# Patient Record
Sex: MALE | Race: OTHER | NOT HISPANIC OR LATINO | Employment: OTHER | URBAN - METROPOLITAN AREA
[De-identification: names, ages, dates, MRNs, and addresses within clinical notes are randomized per-mention and may not be internally consistent; named-entity substitution may affect disease eponyms.]

---

## 2017-11-20 ENCOUNTER — HISTORICAL (OUTPATIENT)
Dept: RADIOLOGY | Facility: HOSPITAL | Age: 62
End: 2017-11-20

## 2017-11-20 LAB
BUN SERPL-MCNC: 10 MG/DL (ref 7–18)
CALCIUM SERPL-MCNC: 9.1 MG/DL (ref 8.5–10.1)
CHLORIDE SERPL-SCNC: 104 MMOL/L (ref 98–107)
CO2 SERPL-SCNC: 30 MMOL/L (ref 21–32)
CREAT SERPL-MCNC: 0.78 MG/DL (ref 0.7–1.3)
GLUCOSE SERPL-MCNC: 92 MG/DL (ref 74–106)
POC CREATININE: 0.9 MG/DL (ref 0.6–1.3)
POTASSIUM SERPL-SCNC: 4.2 MMOL/L (ref 3.5–5.1)
SODIUM SERPL-SCNC: 139 MMOL/L (ref 136–145)

## 2017-12-04 ENCOUNTER — HISTORICAL (OUTPATIENT)
Dept: ADMINISTRATIVE | Facility: HOSPITAL | Age: 62
End: 2017-12-04

## 2017-12-04 LAB
ABS NEUT (OLG): 7.69 X10(3)/MCL (ref 2.1–9.2)
BASOPHILS # BLD AUTO: 0.1 X10(3)/MCL (ref 0–0.2)
BASOPHILS NFR BLD AUTO: 0 %
EOSINOPHIL # BLD AUTO: 0 X10(3)/MCL (ref 0–0.9)
EOSINOPHIL NFR BLD AUTO: 0 %
ERYTHROCYTE [DISTWIDTH] IN BLOOD BY AUTOMATED COUNT: 12.3 % (ref 11.5–17)
HCT VFR BLD AUTO: 35.1 % (ref 42–52)
HGB BLD-MCNC: 11.8 GM/DL (ref 14–18)
LYMPHOCYTES # BLD AUTO: 2.3 X10(3)/MCL (ref 0.6–4.6)
LYMPHOCYTES NFR BLD AUTO: 20 %
MCH RBC QN AUTO: 31.4 PG (ref 27–31)
MCHC RBC AUTO-ENTMCNC: 33.6 GM/DL (ref 33–36)
MCV RBC AUTO: 93.4 FL (ref 80–94)
MONOCYTES # BLD AUTO: 1.1 X10(3)/MCL (ref 0.1–1.3)
MONOCYTES NFR BLD AUTO: 10 %
NEUTROPHILS # BLD AUTO: 7.69 X10(3)/MCL (ref 2.1–9.2)
NEUTROPHILS NFR BLD AUTO: 68 %
PLATELET # BLD AUTO: 263 X10(3)/MCL (ref 130–400)
PMV BLD AUTO: 10 FL (ref 9.4–12.4)
RBC # BLD AUTO: 3.76 X10(6)/MCL (ref 4.7–6.1)
WBC # SPEC AUTO: 11.3 X10(3)/MCL (ref 4.5–11.5)

## 2017-12-07 LAB — FINAL CULTURE: NORMAL

## 2017-12-12 ENCOUNTER — HISTORICAL (OUTPATIENT)
Dept: ENDOSCOPY | Facility: HOSPITAL | Age: 62
End: 2017-12-12

## 2017-12-15 ENCOUNTER — HISTORICAL (OUTPATIENT)
Dept: ADMINISTRATIVE | Facility: HOSPITAL | Age: 62
End: 2017-12-15

## 2017-12-15 LAB
ALBUMIN SERPL-MCNC: 3.6 GM/DL (ref 3.4–5)
ALP SERPL-CCNC: 76 UNIT/L (ref 50–136)
ALT SERPL-CCNC: 26 UNIT/L (ref 12–78)
AST SERPL-CCNC: 17 UNIT/L (ref 15–37)
BILIRUB SERPL-MCNC: 0.4 MG/DL (ref 0.2–1)
BILIRUBIN DIRECT+TOT PNL SERPL-MCNC: 0.1 MG/DL (ref 0–0.2)
BILIRUBIN DIRECT+TOT PNL SERPL-MCNC: 0.3 MG/DL (ref 0–0.8)
CHOLEST SERPL-MCNC: 132 MG/DL (ref 0–200)
CHOLEST/HDLC SERPL: 2.6 {RATIO} (ref 0–5)
HDLC SERPL-MCNC: 50 MG/DL (ref 35–60)
LDLC SERPL CALC-MCNC: 61 MG/DL (ref 0–129)
LIVER PROFILE INTERP: NORMAL
PROT SERPL-MCNC: 7.6 GM/DL (ref 6.4–8.2)
TRIGL SERPL-MCNC: 103 MG/DL (ref 30–150)
VLDLC SERPL CALC-MCNC: 21 MG/DL

## 2018-10-31 ENCOUNTER — HISTORICAL (OUTPATIENT)
Dept: ADMINISTRATIVE | Facility: HOSPITAL | Age: 63
End: 2018-10-31

## 2019-01-26 LAB — HEMOCCULT SP2 STL QL: NEGATIVE

## 2019-01-28 ENCOUNTER — HISTORICAL (OUTPATIENT)
Dept: LAB | Facility: HOSPITAL | Age: 64
End: 2019-01-28

## 2019-01-29 ENCOUNTER — HISTORICAL (OUTPATIENT)
Dept: ADMINISTRATIVE | Facility: HOSPITAL | Age: 64
End: 2019-01-29

## 2021-01-22 ENCOUNTER — NURSE TRIAGE (OUTPATIENT)
Dept: ADMINISTRATIVE | Facility: CLINIC | Age: 66
End: 2021-01-22

## 2021-09-13 ENCOUNTER — HISTORICAL (OUTPATIENT)
Dept: FAMILY MEDICINE | Facility: CLINIC | Age: 66
End: 2021-09-13

## 2021-09-13 LAB
ABS NEUT (OLG): 4.93 X10(3)/MCL (ref 2.1–9.2)
ALBUMIN SERPL-MCNC: 3.6 GM/DL (ref 3.4–4.8)
ALBUMIN/GLOB SERPL: 0.8 RATIO (ref 1.1–2)
ALP SERPL-CCNC: 69 UNIT/L (ref 40–150)
ALT SERPL-CCNC: 17 UNIT/L (ref 0–55)
AST SERPL-CCNC: 19 UNIT/L (ref 5–34)
BASOPHILS # BLD AUTO: 0.1 X10(3)/MCL (ref 0–0.2)
BASOPHILS NFR BLD AUTO: 1 %
BILIRUB SERPL-MCNC: 0.6 MG/DL
BILIRUBIN DIRECT+TOT PNL SERPL-MCNC: 0.3 MG/DL (ref 0–0.5)
BILIRUBIN DIRECT+TOT PNL SERPL-MCNC: 0.3 MG/DL (ref 0–0.8)
BUN SERPL-MCNC: 7.5 MG/DL (ref 8.4–25.7)
CALCIUM SERPL-MCNC: 9.6 MG/DL (ref 8.8–10)
CHLORIDE SERPL-SCNC: 106 MMOL/L (ref 98–107)
CHOLEST SERPL-MCNC: 104 MG/DL
CHOLEST/HDLC SERPL: 3 {RATIO} (ref 0–5)
CO2 SERPL-SCNC: 26 MMOL/L (ref 23–31)
CREAT SERPL-MCNC: 0.8 MG/DL (ref 0.73–1.18)
DEPRECATED CALCIDIOL+CALCIFEROL SERPL-MC: 38.6 NG/ML (ref 30–80)
EOSINOPHIL # BLD AUTO: 0.4 X10(3)/MCL (ref 0–0.9)
EOSINOPHIL NFR BLD AUTO: 4 %
ERYTHROCYTE [DISTWIDTH] IN BLOOD BY AUTOMATED COUNT: 12.6 % (ref 11.5–14.5)
EST. AVERAGE GLUCOSE BLD GHB EST-MCNC: 119.8 MG/DL
GLOBULIN SER-MCNC: 4.6 GM/DL (ref 2.4–3.5)
GLUCOSE SERPL-MCNC: 104 MG/DL (ref 82–115)
HBA1C MFR BLD: 5.8 %
HCT VFR BLD AUTO: 37.2 % (ref 40–51)
HDLC SERPL-MCNC: 31 MG/DL (ref 35–60)
HGB BLD-MCNC: 12 GM/DL (ref 13.5–17.5)
IMM GRANULOCYTES # BLD AUTO: 0.03 10*3/UL
IMM GRANULOCYTES NFR BLD AUTO: 0 %
LDLC SERPL CALC-MCNC: 33 MG/DL (ref 50–140)
LYMPHOCYTES # BLD AUTO: 3.5 X10(3)/MCL (ref 0.6–4.6)
LYMPHOCYTES NFR BLD AUTO: 35 %
MCH RBC QN AUTO: 32 PG (ref 26–34)
MCHC RBC AUTO-ENTMCNC: 32.3 GM/DL (ref 31–37)
MCV RBC AUTO: 99.2 FL (ref 80–100)
MONOCYTES # BLD AUTO: 0.9 X10(3)/MCL (ref 0.1–1.3)
MONOCYTES NFR BLD AUTO: 9 %
NEUTROPHILS # BLD AUTO: 4.93 X10(3)/MCL (ref 2.1–9.2)
NEUTROPHILS NFR BLD AUTO: 50 %
NRBC BLD AUTO-RTO: 0 % (ref 0–0.2)
PLATELET # BLD AUTO: 238 X10(3)/MCL (ref 130–400)
PMV BLD AUTO: 9.4 FL (ref 7.4–10.4)
POTASSIUM SERPL-SCNC: 4.1 MMOL/L (ref 3.5–5.1)
PROT SERPL-MCNC: 8.2 GM/DL (ref 5.8–7.6)
RBC # BLD AUTO: 3.75 X10(6)/MCL (ref 4.5–5.9)
SODIUM SERPL-SCNC: 136 MMOL/L (ref 136–145)
TRIGL SERPL-MCNC: 202 MG/DL (ref 34–140)
VLDLC SERPL CALC-MCNC: 40 MG/DL
WBC # SPEC AUTO: 9.8 X10(3)/MCL (ref 4.5–11)

## 2021-09-17 ENCOUNTER — HISTORICAL (OUTPATIENT)
Dept: LAB | Facility: HOSPITAL | Age: 66
End: 2021-09-17

## 2021-09-17 LAB
ABS NEUT (OLG): 3.39 X10(3)/MCL (ref 2.1–9.2)
BASOPHILS # BLD AUTO: 0.1 X10(3)/MCL (ref 0–0.2)
BASOPHILS NFR BLD AUTO: 1 %
EOSINOPHIL # BLD AUTO: 0.3 X10(3)/MCL (ref 0–0.9)
EOSINOPHIL NFR BLD AUTO: 4 %
ERYTHROCYTE [DISTWIDTH] IN BLOOD BY AUTOMATED COUNT: 12.5 % (ref 11.5–14.5)
FERRITIN SERPL-MCNC: 227.58 NG/ML (ref 21.81–274.66)
FOLATE SERPL-MCNC: 17 NG/ML (ref 7–31.4)
HAPTOGLOB SERPL-MCNC: 187 MG/DL (ref 40–368)
HCT VFR BLD AUTO: 37.7 % (ref 40–51)
HGB BLD-MCNC: 12.6 GM/DL (ref 13.5–17.5)
IMM GRANULOCYTES # BLD AUTO: 0.06 10*3/UL
IMM GRANULOCYTES NFR BLD AUTO: 1 %
IRON SATN MFR SERPL: 39 % (ref 20–50)
IRON SERPL-MCNC: 107 UG/DL (ref 65–175)
LDH SERPL-CCNC: 199 UNIT/L (ref 140–271)
LYMPHOCYTES # BLD AUTO: 3.3 X10(3)/MCL (ref 0.6–4.6)
LYMPHOCYTES NFR BLD AUTO: 41 %
MCH RBC QN AUTO: 32.7 PG (ref 26–34)
MCHC RBC AUTO-ENTMCNC: 33.4 GM/DL (ref 31–37)
MCV RBC AUTO: 97.9 FL (ref 80–100)
MONOCYTES # BLD AUTO: 0.9 X10(3)/MCL (ref 0.1–1.3)
MONOCYTES NFR BLD AUTO: 11 %
NEUTROPHILS # BLD AUTO: 3.39 X10(3)/MCL (ref 2.1–9.2)
NEUTROPHILS NFR BLD AUTO: 42 %
NRBC BLD AUTO-RTO: 0 % (ref 0–0.2)
PLATELET # BLD AUTO: 247 X10(3)/MCL (ref 130–400)
PMV BLD AUTO: 10.1 FL (ref 7.4–10.4)
PSA SERPL-MCNC: 1.96 NG/ML
RBC # BLD AUTO: 3.85 X10(6)/MCL (ref 4.5–5.9)
RET# (OHS): 0.06 X10(6)/MCL (ref 0.02–0.09)
RETICULOCYTE COUNT AUTOMATED (OLG): 1.6 % (ref 0.5–1.5)
TIBC SERPL-MCNC: 170 UG/DL (ref 69–240)
TIBC SERPL-MCNC: 277 UG/DL (ref 250–450)
TRANSFERRIN SERPL-MCNC: 239 MG/DL (ref 163–344)
TSH SERPL-ACNC: 1.25 UIU/ML (ref 0.35–4.94)
VIT B12 SERPL-MCNC: 1333 PG/ML (ref 213–816)
WBC # SPEC AUTO: 8 X10(3)/MCL (ref 4.5–11)

## 2021-10-31 ENCOUNTER — HISTORICAL (OUTPATIENT)
Dept: ADMINISTRATIVE | Facility: HOSPITAL | Age: 66
End: 2021-10-31

## 2021-10-31 LAB — SARS-COV-2 RNA RESP QL NAA+PROBE: NOT DETECTED

## 2022-01-06 ENCOUNTER — HISTORICAL (OUTPATIENT)
Dept: ADMINISTRATIVE | Facility: HOSPITAL | Age: 67
End: 2022-01-06

## 2022-01-06 LAB — SARS-COV-2 RNA RESP QL NAA+PROBE: POSITIVE

## 2022-01-25 ENCOUNTER — HISTORICAL (OUTPATIENT)
Dept: RADIOLOGY | Facility: HOSPITAL | Age: 67
End: 2022-01-25

## 2022-04-09 ENCOUNTER — HISTORICAL (OUTPATIENT)
Dept: ADMINISTRATIVE | Facility: HOSPITAL | Age: 67
End: 2022-04-09
Payer: MEDICAID

## 2022-04-25 VITALS
OXYGEN SATURATION: 100 % | SYSTOLIC BLOOD PRESSURE: 118 MMHG | BODY MASS INDEX: 23.6 KG/M2 | WEIGHT: 159.38 LBS | DIASTOLIC BLOOD PRESSURE: 74 MMHG | HEIGHT: 69 IN

## 2022-05-30 ENCOUNTER — OFFICE VISIT (OUTPATIENT)
Dept: FAMILY MEDICINE | Facility: CLINIC | Age: 67
End: 2022-05-30
Payer: MEDICAID

## 2022-05-30 VITALS
DIASTOLIC BLOOD PRESSURE: 65 MMHG | BODY MASS INDEX: 24.22 KG/M2 | SYSTOLIC BLOOD PRESSURE: 121 MMHG | TEMPERATURE: 98 F | HEART RATE: 58 BPM | HEIGHT: 68 IN | OXYGEN SATURATION: 99 % | WEIGHT: 159.81 LBS

## 2022-05-30 DIAGNOSIS — E55.9 VITAMIN D DEFICIENCY: ICD-10-CM

## 2022-05-30 DIAGNOSIS — I10 ESSENTIAL HYPERTENSION: ICD-10-CM

## 2022-05-30 DIAGNOSIS — J84.10 CALCIFIED GRANULOMA OF LUNG: ICD-10-CM

## 2022-05-30 DIAGNOSIS — G47.30 SLEEP APNEA, UNSPECIFIED TYPE: ICD-10-CM

## 2022-05-30 DIAGNOSIS — E78.5 DYSLIPIDEMIA: ICD-10-CM

## 2022-05-30 DIAGNOSIS — I34.0 MITRAL VALVE INSUFFICIENCY, UNSPECIFIED ETIOLOGY: ICD-10-CM

## 2022-05-30 DIAGNOSIS — K90.0 CELIAC DISEASE: ICD-10-CM

## 2022-05-30 DIAGNOSIS — D64.9 ANEMIA, UNSPECIFIED TYPE: ICD-10-CM

## 2022-05-30 DIAGNOSIS — R53.83 FATIGUE, UNSPECIFIED TYPE: Primary | ICD-10-CM

## 2022-05-30 PROBLEM — I38 HEART VALVE DISEASE: Status: RESOLVED | Noted: 2022-05-30 | Resolved: 2022-05-30

## 2022-05-30 PROBLEM — J98.4 CALCIFIED GRANULOMA OF LUNG: Status: ACTIVE | Noted: 2022-05-30

## 2022-05-30 PROBLEM — I38 HEART VALVE DISEASE: Status: ACTIVE | Noted: 2022-05-30

## 2022-05-30 LAB
ALBUMIN SERPL-MCNC: 3.6 GM/DL (ref 3.4–4.8)
ALBUMIN/GLOB SERPL: 0.8 RATIO (ref 1.1–2)
ALP SERPL-CCNC: 58 UNIT/L (ref 40–150)
ALT SERPL-CCNC: 19 UNIT/L (ref 0–55)
AST SERPL-CCNC: 18 UNIT/L (ref 5–34)
BASOPHILS # BLD AUTO: 0.06 X10(3)/MCL (ref 0–0.2)
BASOPHILS NFR BLD AUTO: 0.7 %
BILIRUBIN DIRECT+TOT PNL SERPL-MCNC: 0.4 MG/DL
BUN SERPL-MCNC: 14.1 MG/DL (ref 8.4–25.7)
CALCIUM SERPL-MCNC: 9.5 MG/DL (ref 8.8–10)
CHLORIDE SERPL-SCNC: 105 MMOL/L (ref 98–107)
CO2 SERPL-SCNC: 29 MMOL/L (ref 23–31)
CREAT SERPL-MCNC: 0.88 MG/DL (ref 0.73–1.18)
DEPRECATED CALCIDIOL+CALCIFEROL SERPL-MC: 23.3 NG/ML (ref 30–80)
EOSINOPHIL # BLD AUTO: 0.44 X10(3)/MCL (ref 0–0.9)
EOSINOPHIL NFR BLD AUTO: 5.2 %
ERYTHROCYTE [DISTWIDTH] IN BLOOD BY AUTOMATED COUNT: 12.8 % (ref 11.5–17)
GLOBULIN SER-MCNC: 4.5 GM/DL (ref 2.4–3.5)
GLUCOSE SERPL-MCNC: 121 MG/DL (ref 82–115)
HCT VFR BLD AUTO: 34 % (ref 42–52)
HGB BLD-MCNC: 10.9 GM/DL (ref 14–18)
IMM GRANULOCYTES # BLD AUTO: 0.07 X10(3)/MCL (ref 0–0.02)
IMM GRANULOCYTES NFR BLD AUTO: 0.8 % (ref 0–0.43)
LYMPHOCYTES # BLD AUTO: 3.37 X10(3)/MCL (ref 0.6–4.6)
LYMPHOCYTES NFR BLD AUTO: 39.7 %
MCH RBC QN AUTO: 31.5 PG (ref 27–31)
MCHC RBC AUTO-ENTMCNC: 32.1 MG/DL (ref 33–36)
MCV RBC AUTO: 98.3 FL (ref 80–94)
MONOCYTES # BLD AUTO: 0.95 X10(3)/MCL (ref 0.1–1.3)
MONOCYTES NFR BLD AUTO: 11.2 %
NEUTROPHILS # BLD AUTO: 3.6 X10(3)/MCL (ref 2.1–9.2)
NEUTROPHILS NFR BLD AUTO: 42.4 %
NRBC BLD AUTO-RTO: 0 %
PLATELET # BLD AUTO: 242 X10(3)/MCL (ref 130–400)
PMV BLD AUTO: 10.2 FL (ref 9.4–12.4)
POTASSIUM SERPL-SCNC: 3.9 MMOL/L (ref 3.5–5.1)
PROT SERPL-MCNC: 8.1 GM/DL (ref 5.8–7.6)
RBC # BLD AUTO: 3.46 X10(6)/MCL (ref 4.7–6.1)
SODIUM SERPL-SCNC: 138 MMOL/L (ref 136–145)
TSH SERPL-ACNC: 1.51 UIU/ML (ref 0.35–4.94)
WBC # SPEC AUTO: 8.5 X10(3)/MCL (ref 4.5–11.5)

## 2022-05-30 PROCEDURE — 85025 COMPLETE CBC W/AUTO DIFF WBC: CPT

## 2022-05-30 PROCEDURE — 80053 COMPREHEN METABOLIC PANEL: CPT

## 2022-05-30 PROCEDURE — 99213 OFFICE O/P EST LOW 20 MIN: CPT | Mod: PBBFAC

## 2022-05-30 PROCEDURE — 82306 VITAMIN D 25 HYDROXY: CPT

## 2022-05-30 PROCEDURE — 84443 ASSAY THYROID STIM HORMONE: CPT

## 2022-05-30 PROCEDURE — 36415 COLL VENOUS BLD VENIPUNCTURE: CPT

## 2022-05-30 RX ORDER — POLYETHYLENE GLYCOL 3350 17 G/17G
17 POWDER, FOR SOLUTION ORAL DAILY
COMMUNITY
Start: 2022-05-06 | End: 2022-09-29

## 2022-05-30 RX ORDER — HYDROXYZINE HYDROCHLORIDE 25 MG/1
25 TABLET, FILM COATED ORAL 4 TIMES DAILY PRN
COMMUNITY
Start: 2022-02-08 | End: 2022-09-29

## 2022-05-30 RX ORDER — METFORMIN HYDROCHLORIDE 1000 MG/1
1000 TABLET ORAL 2 TIMES DAILY WITH MEALS
COMMUNITY
End: 2022-07-22 | Stop reason: SDUPTHER

## 2022-05-30 RX ORDER — ASPIRIN 81 MG/1
81 TABLET ORAL DAILY
COMMUNITY

## 2022-05-30 RX ORDER — FLUTICASONE PROPIONATE 50 MCG
50 SPRAY, SUSPENSION (ML) NASAL 2 TIMES DAILY
COMMUNITY
Start: 2022-02-08

## 2022-05-30 RX ORDER — IBUPROFEN 100 MG/5ML
1000 SUSPENSION, ORAL (FINAL DOSE FORM) ORAL DAILY
COMMUNITY

## 2022-05-30 RX ORDER — ATORVASTATIN CALCIUM 20 MG/1
20 TABLET, FILM COATED ORAL DAILY
COMMUNITY

## 2022-05-30 RX ORDER — METOPROLOL SUCCINATE 50 MG/1
50 TABLET, EXTENDED RELEASE ORAL DAILY
COMMUNITY
Start: 2022-05-07 | End: 2022-09-29 | Stop reason: SDUPTHER

## 2022-05-30 NOTE — PROGRESS NOTES
Subjective:       Patient ID: Yuriy Díaz is a 66 y.o. male.    Chief Complaint: Follow-up (Fatigue, sleepy, and bilateral lower ext and feet swelling x 4wks)    HPI  65 yo M with PMH Celiac disease, Dyslipidemia, Essential HTN, Mitral Regurgitation,     C/O  Tired, Swelling in his feet  + pain in his legs   + feeling sleepy most of the times for 4 weeks  - good appetite  - no trouble breathing  - drinking a lot water   - Blood pressure between 115/77 - 120-80 mm hg at home  - No cough runny nose, no fevers  - No significant weight loss or generalized pain   - Denies back pain   - He could walk > 3 blocks without getting short of breath    Sleep apnea  - he does wear a CPAP at night  - last adjustment made for the machine was about 1 month ago    Diabetes Mellitus  - He takes Metformin 1000 mg BID  - adherent with diet control and exercise     Celiac disease  -Colonoscopy to be done on June 29, 2022  By Dr. Kendall Kapoor   Transglutaminase IgA < 2  Endomysial Ab IgA  Negative    HbA1c level - 6.7 03/29/22  Vitamin B12 - 777  Folate 17  Hb 11.7    Mitral Regurg hx  Dr. Albert - Cardiologist. Last echo was normal 1 month ago per patient report.    Review of Systems    See above    Objective:       Vitals:    05/30/22 1409   BP: 121/65   Pulse: (!) 58   Temp: 98.1 °F (36.7 °C)     Physical Exam    Gen appearance: NAD  HEENT: EOMI, TM clear bilaterally, Clear oropharynx  Neck: No anterior or posterior cervical lymphadenopathy, no thyromegaly  CV: S1/S2, no murmurs, rubs or gallops  Resp: Clear breath sounds bilaterally  Abdomen: soft, no hepatosplenomegaly  Ext: no palmar erythema    Assessment and Plan:     1. Fatigue, unspecified type  - Chronic fatigue. Labs ordered today  - CBC Auto Differential  - Comprehensive Metabolic Panel  - TSH  - Vitamin D  - Will call patient in regards to lab results  - Reviewed Vitamin D - low level 23.3 . H and H 10.9/34.0, MCV 98.3. Low vitamin level and anemia most likely could explain  his symptoms of fatigue.     2. Calcified granuloma of lung  - stable  - CXR 09/17/2021: Calcified granuloma identified at the right base. Stable.     3. Essential hypertension  -Blood pressure reviewed. Within normal limits.    4. Celiac disease  - Scheduled for Colonoscopy on June 29, 2022 with Dr. Kendall Kapoor     5. Dyslipidemia  - Continue Atorvastatin 20 mg daily    6. Mitral valve insufficiency, unspecified etiology  - Stable    7. Sleep apnea  - Continue CPAP at night    8. Anemia  - Microcytic, hypochromic anemia consistent with Iron deficiency    - Scheduled for Colonoscopy on June 29, 2022 with Dr. Kendall Kapoor   Attempted to call patient x 2, left a VM.    9. Vitamin D deficiency  - Vitamin D 23.3   - Will replete Vitamin D levels 1000 IU daily       RCT 3 months for routine visit or sooner if patient has acute complaints

## 2022-06-02 RX ORDER — VIT C/E/ZN/COPPR/LUTEIN/ZEAXAN 250MG-90MG
1000 CAPSULE ORAL DAILY
Qty: 30 CAPSULE | Refills: 3 | Status: SHIPPED | OUTPATIENT
Start: 2022-06-02 | End: 2023-03-15 | Stop reason: SDUPTHER

## 2022-06-02 RX ORDER — PANTOPRAZOLE SODIUM 40 MG/1
40 TABLET, DELAYED RELEASE ORAL DAILY
COMMUNITY
End: 2023-08-18

## 2022-06-29 LAB — CRC RECOMMENDATION EXT: NORMAL

## 2022-07-23 RX ORDER — METFORMIN HYDROCHLORIDE 1000 MG/1
1000 TABLET ORAL 2 TIMES DAILY WITH MEALS
Qty: 30 TABLET | Refills: 3 | Status: SHIPPED | OUTPATIENT
Start: 2022-07-23 | End: 2022-09-29

## 2022-08-30 ENCOUNTER — OFFICE VISIT (OUTPATIENT)
Dept: FAMILY MEDICINE | Facility: CLINIC | Age: 67
End: 2022-08-30
Payer: MEDICAID

## 2022-08-30 VITALS
SYSTOLIC BLOOD PRESSURE: 130 MMHG | TEMPERATURE: 98 F | HEART RATE: 51 BPM | OXYGEN SATURATION: 100 % | HEIGHT: 68 IN | WEIGHT: 152.75 LBS | DIASTOLIC BLOOD PRESSURE: 71 MMHG | BODY MASS INDEX: 23.15 KG/M2

## 2022-08-30 DIAGNOSIS — E11.9 TYPE 2 DIABETES MELLITUS WITHOUT COMPLICATION, WITHOUT LONG-TERM CURRENT USE OF INSULIN: ICD-10-CM

## 2022-08-30 DIAGNOSIS — G51.0 RIGHT-SIDED BELL'S PALSY: Primary | ICD-10-CM

## 2022-08-30 PROBLEM — I83.93 VARICOSE VEINS OF BOTH LOWER EXTREMITIES: Status: ACTIVE | Noted: 2022-08-30

## 2022-08-30 PROBLEM — Z86.11 HISTORY OF TUBERCULOSIS: Status: ACTIVE | Noted: 2022-08-30

## 2022-08-30 PROBLEM — E78.2 MIXED HYPERLIPIDEMIA: Status: ACTIVE | Noted: 2022-08-30

## 2022-08-30 LAB — HBA1C MFR BLD: 6.2 %

## 2022-08-30 PROCEDURE — 99215 OFFICE O/P EST HI 40 MIN: CPT | Mod: PBBFAC

## 2022-08-30 PROCEDURE — 83036 HEMOGLOBIN GLYCOSYLATED A1C: CPT | Mod: PBBFAC

## 2022-08-30 RX ORDER — LACTULOSE 10 G/15ML
15 SOLUTION ORAL; RECTAL 2 TIMES DAILY
COMMUNITY
Start: 2022-08-22 | End: 2022-09-29

## 2022-08-30 RX ORDER — PREDNISONE 20 MG/1
60 TABLET ORAL DAILY
COMMUNITY
Start: 2022-08-27 | End: 2022-09-08

## 2022-08-30 RX ORDER — VALACYCLOVIR HYDROCHLORIDE 1 G/1
1000 TABLET, FILM COATED ORAL EVERY 8 HOURS
COMMUNITY
Start: 2022-08-27 | End: 2022-09-29

## 2022-08-30 RX ORDER — OFLOXACIN 3 MG/ML
1 SOLUTION/ DROPS OPHTHALMIC 4 TIMES DAILY
COMMUNITY
Start: 2022-08-05 | End: 2023-01-12

## 2022-08-30 RX ORDER — HYDROCORTISONE 25 MG/G
1 CREAM TOPICAL 2 TIMES DAILY
COMMUNITY
Start: 2022-06-29 | End: 2022-09-29 | Stop reason: SDUPTHER

## 2022-08-30 RX ORDER — FAMOTIDINE 40 MG/1
40 TABLET, FILM COATED ORAL NIGHTLY
COMMUNITY
Start: 2022-08-22 | End: 2023-08-09

## 2022-08-30 NOTE — PROGRESS NOTES
[facility] OFFICE VISIT NOTE  Yuriy Díaz  [id number]  08/30/2022      Chief Complaint: bells palsy    HPI    66 y.o. male    [place] Palsy   - right sided facial droop and decreased sensation with intermittent tinnitus; started 8/26/22  - minimal improvement in symptoms    - evaluated and dx at ED in [place]  - CTA Head and Neck W/ WO (8/26/22): No major LVO. No flow limiting intracranial stenosis or internal carotid artery or vertebral stenosis.    - Rx: Valtrex 1g PO q8 hr x7 days and Prednisone 60 mg PO daily x 5 days  - Hx [place] palsy 8/2017  - requests referral for neurology evaluation   - denies upper and lower extremity weakness, numbness or tingling, slurred speech, hearing loss, dizziness, confusion, sudden onset change in vision, new rash, travel to tick endemic area  - had cataract surgery 8/29/22, follow up today    PE:  Vitals:    08/30/22 0846   BP: 130/71   Pulse: (!) 51   Temp: 97.7 °F (36.5 °C)     General: appears well, in no acute distress   Eye: wearing sunglasses   HENT: MMM, oropharynx without erythema, exudate or masses, no parotid tenderness or masses  Respiratory: nonlabored respirations   Cardiovascular: bradycardic (at baseline), regular rhythm without murmurs   Neurological:   Mental Status: Alert and oriented. Comprehension wnl. No dysarthria.   CN II - XII: pupillary and fundus exam deferred due to recent cataract surgery and upcoming ophthalmology appointment, VA and visual field grossly intact, moderate ptosis to right eyelid, EOMI without nystagmus, light touch symmetric in CN V1-3 distribution, hearing grossly intact, right sided facial droop involving forehead, smile asymmetric with right sided droop, tongue and uvula midline, trapezius symmetric bilateral  Motor:  strength normal, strength 5/5 with extension and flexion of elbows, knees and ankles and flexion of hips and abduction of shoulders, no abnormal involuntary or voluntary movements, fine finger movements wnl b/l, no pronator  drift.   Sensory: Light touch symmetric. No sensory simultagnosia.   Cerebellar: finger nose finger wnl b/l  Romberg: Negative  Gait: normal, heel gait, toe gait and tandem gait wnl.       Assessment:   1. Right-sided Bell's palsy    2. Type 2 diabetes mellitus without complication, without long-term current use of insulin        Plan:  - continue prednisone and Valtrex as previously prescribed  - eye lubrication  - obtain outside records  - referral to Dr. Hyde (Neurology)  - POC A1c= 6.2; patient would like to continue current regimen, consider decreasing metformin at next visit  - ED precautions for new neurological symptoms    Keep scheduled follow up 9/29/22. Call clinic if symptoms not improving in 10 days or sooner if needed.     Nena Elliott M.D. HO-II  [facility]

## 2022-08-31 DIAGNOSIS — G51.0 BELL'S PALSY: Primary | ICD-10-CM

## 2022-08-31 RX ORDER — PREDNISONE 20 MG/1
60 TABLET ORAL DAILY
Qty: 6 TABLET | Refills: 0 | Status: SHIPPED | OUTPATIENT
Start: 2022-08-31 | End: 2022-09-02

## 2022-08-31 NOTE — PROGRESS NOTES
Patient called with questions regarding treatment for Bell's Palsy and referral to neurology. Called back and confirmed . Completed original rx prednisone 60 mg daily x 5 days. Symptoms have not resolved. Discussed that resolution may take weeks. Will Rx prednisone 60 mg daily x 2 days for total course of 7 days. Requests new referral to Dr. Barak Ferreira in [place]. Referral sent.     Nena Elliott MD  [facility] Resident, -II

## 2022-09-08 ENCOUNTER — OFFICE VISIT (OUTPATIENT)
Dept: URGENT CARE | Facility: CLINIC | Age: 67
End: 2022-09-08
Payer: MEDICAID

## 2022-09-08 ENCOUNTER — DOCUMENTATION ONLY (OUTPATIENT)
Dept: FAMILY MEDICINE | Facility: CLINIC | Age: 67
End: 2022-09-08
Payer: MEDICAID

## 2022-09-08 VITALS
RESPIRATION RATE: 18 BRPM | DIASTOLIC BLOOD PRESSURE: 71 MMHG | BODY MASS INDEX: 23.44 KG/M2 | HEART RATE: 51 BPM | SYSTOLIC BLOOD PRESSURE: 109 MMHG | HEIGHT: 68 IN | WEIGHT: 154.63 LBS | TEMPERATURE: 98 F | OXYGEN SATURATION: 98 %

## 2022-09-08 DIAGNOSIS — R51.9 INTRACTABLE HEADACHE, UNSPECIFIED CHRONICITY PATTERN, UNSPECIFIED HEADACHE TYPE: Primary | ICD-10-CM

## 2022-09-08 DIAGNOSIS — M54.2 NECK PAIN ON RIGHT SIDE: ICD-10-CM

## 2022-09-08 DIAGNOSIS — G51.0 BELL PALSY: ICD-10-CM

## 2022-09-08 PROCEDURE — 99213 PR OFFICE/OUTPT VISIT, EST, LEVL III, 20-29 MIN: ICD-10-PCS | Mod: S$PBB,,, | Performed by: NURSE PRACTITIONER

## 2022-09-08 PROCEDURE — 99214 OFFICE O/P EST MOD 30 MIN: CPT | Mod: PBBFAC | Performed by: NURSE PRACTITIONER

## 2022-09-08 PROCEDURE — 99213 OFFICE O/P EST LOW 20 MIN: CPT | Mod: S$PBB,,, | Performed by: NURSE PRACTITIONER

## 2022-09-08 RX ORDER — KETOROLAC TROMETHAMINE 30 MG/ML
30 INJECTION, SOLUTION INTRAMUSCULAR; INTRAVENOUS
Status: COMPLETED | OUTPATIENT
Start: 2022-09-08 | End: 2022-09-08

## 2022-09-08 RX ORDER — TIZANIDINE 4 MG/1
4 TABLET ORAL EVERY 6 HOURS PRN
Qty: 20 TABLET | Refills: 0 | Status: SHIPPED | OUTPATIENT
Start: 2022-09-08 | End: 2022-09-18

## 2022-09-08 RX ORDER — CHOLECALCIFEROL (VITAMIN D3) 25 MCG
1000 TABLET ORAL DAILY
COMMUNITY
Start: 2022-06-02 | End: 2022-09-29

## 2022-09-08 RX ORDER — METFORMIN HYDROCHLORIDE 500 MG/1
500 TABLET ORAL 2 TIMES DAILY WITH MEALS
COMMUNITY
Start: 2022-07-20 | End: 2022-09-29

## 2022-09-08 RX ADMIN — KETOROLAC TROMETHAMINE 30 MG: 30 INJECTION, SOLUTION INTRAMUSCULAR; INTRAVENOUS at 04:09

## 2022-09-08 NOTE — PROGRESS NOTES
I received a call from this patient concerning new right sided neck and upper extremity pain worse with movement. I discussed that I would like him to be evaluated in person. Will request same day appointment. Patient to call to schedule. Urgent care precautions given.     Nena Elliott MD

## 2022-09-08 NOTE — PROGRESS NOTES
"Subjective:       Patient ID: Yuriy Díaz is a 66 y.o. male.    Vitals:  height is 5' 8" (1.727 m) and weight is 70.1 kg (154 lb 9.6 oz). His oral temperature is 98.2 °F (36.8 °C). His blood pressure is 109/71 and his pulse is 51 (abnormal). His respiration is 18 and oxygen saturation is 98%.     Chief Complaint: Headache, Neck Pain, Generalized Body Aches, and Weakness (Patient reports being diagnosed with [place] Palsy on 8/29/22. Now c/o severe headaches, body and neck pain mostly on right side. Went to PCP and was come to  to see if we could order an MRI)    Patient is a 66-year-old male, here today for headache, neck pain that has been going on since he was diagnosed with Bell's palsy on 08/29/2022.  Patient states he was seen at a hospital in [place], head CT scan done at that time and was put on antiviral and high-dose steroid.  The pain improved and patient states movement to right lips, right eyelid improved while on antiviral and steroid.  Patient states overall symptoms have improved but he is still having a headache that he rates 5/10 and right neck pain.  Patient states he has had Bell's palsy in the past, states these symptoms today are similar to when he previously had it.  States that he does have an appointment with the neurologist and was told by the neurologist to try to get an MRI of the head before the appointment, so he was told to come to urgent care today by his primary doctor.  Denies dizziness, vision changes, increased  numbness, tingling, h/a or any other symptoms.       Constitution: Negative.   Neck: Positive for neck pain.   Cardiovascular: Negative.    Respiratory: Negative.     Musculoskeletal:  Positive for pain.   Neurological:  Positive for facial drooping.     Objective:      Physical Exam   Constitutional: He is oriented to person, place, and time. He appears well-developed.   HENT:   Head: Normocephalic.   Eyes: Conjunctivae and EOM are normal. Pupils are equal, round, and " reactive to light.   Neck: Neck supple.   Cardiovascular: Normal rate, regular rhythm and normal heart sounds.   Pulmonary/Chest: Effort normal and breath sounds normal.   Musculoskeletal: Normal range of motion.         General: Normal range of motion.        Arms:    Neurological: He is alert and oriented to person, place, and time. Coordination and gait normal.      Comments: R facial droop. Patient able to close both eyes. Tongue protrusion midline.  No pronator drift.   Skin: Skin is warm and dry.   Psychiatric: His behavior is normal. Mood, judgment and thought content normal.   Vitals reviewed.      Assessment:       1. Intractable headache, unspecified chronicity pattern, unspecified headache type    2. Neck pain on right side    3. Bell palsy            No visits with results within 1 Day(s) from this visit.   Latest known visit with results is:   Office Visit on 08/30/2022   Component Date Value Ref Range Status    Hemoglobin A1C 08/30/2022 6.2  % Final        No results found.   Plan:       Toradol 30mg IM in office.  Medication as ordered.  If any increase of symptoms such as pain, vision changes, headache,  weakness or any new symptoms then immediately go to ER.  Follow-up with neurologist as scheduled.        Intractable headache, unspecified chronicity pattern, unspecified headache type  -     ketorolac injection 30 mg    Neck pain on right side  -     ketorolac injection 30 mg  -     tiZANidine (ZANAFLEX) 4 MG tablet; Take 1 tablet (4 mg total) by mouth every 6 (six) hours as needed (pain).  Dispense: 20 tablet; Refill: 0    Bell palsy

## 2022-09-14 ENCOUNTER — TELEPHONE (OUTPATIENT)
Dept: FAMILY MEDICINE | Facility: CLINIC | Age: 67
End: 2022-09-14
Payer: MEDICAID

## 2022-09-15 ENCOUNTER — OFFICE VISIT (OUTPATIENT)
Dept: FAMILY MEDICINE | Facility: CLINIC | Age: 67
End: 2022-09-15
Payer: MEDICAID

## 2022-09-15 VITALS
TEMPERATURE: 98 F | DIASTOLIC BLOOD PRESSURE: 57 MMHG | WEIGHT: 156.94 LBS | HEART RATE: 54 BPM | HEIGHT: 68 IN | BODY MASS INDEX: 23.79 KG/M2 | SYSTOLIC BLOOD PRESSURE: 117 MMHG | OXYGEN SATURATION: 100 %

## 2022-09-15 DIAGNOSIS — M79.2 NEUROPATHIC PAIN: ICD-10-CM

## 2022-09-15 DIAGNOSIS — G51.0 BELL'S PALSY: Primary | ICD-10-CM

## 2022-09-15 DIAGNOSIS — G51.0 RIGHT-SIDED BELL'S PALSY: Primary | ICD-10-CM

## 2022-09-15 PROCEDURE — 99213 OFFICE O/P EST LOW 20 MIN: CPT | Mod: PBBFAC

## 2022-09-15 RX ORDER — GABAPENTIN 300 MG/1
300 CAPSULE ORAL 3 TIMES DAILY
Qty: 90 CAPSULE | Refills: 0 | Status: SHIPPED | OUTPATIENT
Start: 2022-09-15 | End: 2023-01-12

## 2022-09-15 NOTE — PROGRESS NOTES
Family Medicine Clinic    Subjective:       Patient ID: Yuriy Díaz is a 66 y.o. male.    Chief Complaint: R side Bell's Palsy pain (Requesting MRI)    2017 - initial bell's palsy dx. Thought to be idiopathic at that time  2022- June in [place], given steroid x 5 days.    Seen in [facility] given prednisone in valtrex. Pt reports Weakness Rt face slightly improved since June.   Pt spoke with physician at [facility] who recommended MRI and gave him name of neurologist who would be able to see him/take his insurance.  Pt reports continued pain over Rt face and occasionally down his neck. Pain fluctuates in severity and locality.      Review of Systems   Constitutional:  Negative for activity change, fatigue and fever.   Gastrointestinal:  Negative for abdominal pain, change in bowel habit and change in bowel habit.   Genitourinary:  Negative for bladder incontinence.   Musculoskeletal:  Negative for arthralgias, joint swelling and myalgias.   Integumentary:  Negative for rash.   Neurological:  Positive for facial asymmetry and weakness. Negative for syncope, numbness and headaches.        Weakness when chewing Rt side, eyelid weakness on Rt       Objective:      Vitals:    09/15/22 1527   BP: (!) 117/57   Pulse: (!) 54   Temp: 97.9 °F (36.6 °C)       Physical Exam  Constitutional:       General: He is not in acute distress.     Appearance: He is normal weight.      Comments: Pleasant, conversational   Cardiovascular:      Rate and Rhythm: Normal rate and regular rhythm.      Heart sounds: No murmur heard.  Pulmonary:      Effort: Pulmonary effort is normal. No respiratory distress.      Breath sounds: Normal breath sounds.   Skin:     General: Skin is warm and dry.   Neurological:      Mental Status: He is oriented to person, place, and time.      Cranial Nerves: Facial asymmetry present.      Comments: Sensation intact V1-3. Decreased hearing in Rt ear. Decrease motor VII Rt as compared to left.    Psychiatric:         Mood and  Affect: Mood normal.         Behavior: Behavior normal.         Thought Content: Thought content normal.       Assessment:       Problem List Items Addressed This Visit          Neuro    Bell's palsy - Primary    Relevant Orders    Ambulatory referral/consult to Neurology     Other Visit Diagnoses       Neuropathic pain                Plan:       Gabapentin 300 mg nightly. Gradually increasing dose frequency (1->2->3 /day)  Referral placed to Neurology: Markel Vanegas MD. [phone]  Encouraged pt to contact clinic w/ issues regarding referral    RTC 1 months, or earlier if needed    Angy Cazares M.D.  \[facility] [place] [facility]\"" Family Medicine HO-2

## 2022-09-16 ENCOUNTER — TELEPHONE (OUTPATIENT)
Dept: FAMILY MEDICINE | Facility: CLINIC | Age: 67
End: 2022-09-16
Payer: MEDICAID

## 2022-09-16 NOTE — TELEPHONE ENCOUNTER
Pharmacy left , needs clarification on gabapentin 300 mg, sig states take 1 cap po tid, but pharmacy note states 300 mg nightly then increase to BID. Please advise. Thank you!

## 2022-09-16 NOTE — PROGRESS NOTES
Patient requesting new referral to neurology. Previously referred to Dr. Hyde and Dr. Ferreira. Will send referral to Dr. Zamorano.     eNna Elliott MD  [facility] Resident, BRUNIL[place]

## 2022-09-29 ENCOUNTER — OFFICE VISIT (OUTPATIENT)
Dept: FAMILY MEDICINE | Facility: CLINIC | Age: 67
End: 2022-09-29
Payer: MEDICAID

## 2022-09-29 VITALS
RESPIRATION RATE: 17 BRPM | BODY MASS INDEX: 23.95 KG/M2 | HEART RATE: 63 BPM | OXYGEN SATURATION: 98 % | SYSTOLIC BLOOD PRESSURE: 101 MMHG | TEMPERATURE: 98 F | HEIGHT: 68 IN | DIASTOLIC BLOOD PRESSURE: 46 MMHG | WEIGHT: 158 LBS

## 2022-09-29 DIAGNOSIS — H90.5 SENSORINEURAL HEARING LOSS (SNHL) OF RIGHT EAR, UNSPECIFIED HEARING STATUS ON CONTRALATERAL SIDE: ICD-10-CM

## 2022-09-29 DIAGNOSIS — R03.1 LOW BLOOD PRESSURE READING: ICD-10-CM

## 2022-09-29 DIAGNOSIS — E11.9 TYPE 2 DIABETES MELLITUS WITHOUT COMPLICATION, WITHOUT LONG-TERM CURRENT USE OF INSULIN: ICD-10-CM

## 2022-09-29 DIAGNOSIS — G51.0 RIGHT-SIDED BELL'S PALSY: Primary | ICD-10-CM

## 2022-09-29 PROCEDURE — 99215 OFFICE O/P EST HI 40 MIN: CPT | Mod: PBBFAC

## 2022-09-29 RX ORDER — METOPROLOL SUCCINATE 25 MG/1
25 TABLET, EXTENDED RELEASE ORAL DAILY
Qty: 30 TABLET | Refills: 2 | Status: SHIPPED | OUTPATIENT
Start: 2022-09-29 | End: 2023-03-15

## 2022-09-29 RX ORDER — HYDROCORTISONE 25 MG/G
1 CREAM TOPICAL 2 TIMES DAILY
Qty: 28 G | Refills: 2 | Status: SHIPPED | OUTPATIENT
Start: 2022-09-29

## 2022-09-29 RX ORDER — METFORMIN HYDROCHLORIDE 1000 MG/1
1000 TABLET ORAL
Qty: 30 TABLET | Refills: 3
Start: 2022-09-29 | End: 2023-01-06 | Stop reason: SDUPTHER

## 2022-09-29 NOTE — PROGRESS NOTES
[facility] OFFICE VISIT NOTE  Yuriy Díaz  [id number]  09/30/2022      Chief Complaint:  Follow up [place] Palsy     HPI    Yuriy Díaz is a 66 y.o. male  presenting to [facility] for follow up. Had second cataract surgery 9/26/22.     Right sided Bell's Palsy dx 8/26/22  - completed course of prednisone and Valtrex  - currently on gabapentin 300 mg TID prn  - improvement in facial movement , some lip tingling   - pain to right side temporal and upper maxillary region since onset, no jaw pain   - worse in evening, 5/10 pain at max   - feeling of fullness to right ear, denies tinnitus   - appointment with neurologist Tuesday   - no numbness tinging, no bowel or bladder incontinence     Review of Systems  General: no fatigue, weight loss, lightheadedness or dizziness  CVS: no chest pain  Resp: no SOB  Ext: no lower extremity swelling     Healthcare maintenance  Colon cancer screen: Colonoscopy 6/29/22 Dr. Kendall Kapoor- will request records  Prostate cancer screen:   Hx smoking/ non smoker: never smoker   Immunizations: Tdap (7/202), Shingles (7/2020, 11/2020)  Specialists: Cardioogist (Dr. Albert)- appointment every 6 months, last visit one month ago, GI (Dr. Kapoor), Ophthalmologist- [facility] clinic       PE:  Vitals:    09/29/22 1556   BP: (!) 101/46   Pulse: 63   Resp: 17   Temp: 98 °F (36.7 °C)      General: appears well, in no acute distress   Eye: wearing sunglasses  HENT: MMM, mild pain to palpation over lateral temporal and upper maxillary area, no TMJ dysfunction or pain  Respiratory: nonlabored respirations, lungs clear to auscultation   Cardiovascular: RRR without murmurs, no carotid bruits    Neurological:   Mental Status: Alert and oriented. Comprehension wnl. No dysarthria.   CN II - XII: pupillary and fundus exam deferred due to recent cataract surgery, VA and visual field grossly intact, no ptosis to right eyelid, EOMI without nystagmus, light touch symmetric in CN V1-3 distribution bilaterally, hearing grossly  intact, Hamilton test lateralizes to left ear, Rhinne test air conduction > bone conduction with left > right,   minimal asymmetry in smile with right sided droop, tongue and uvula midline, trapezius symmetric bilateral  Motor: no abnormal involuntary or voluntary movements, fine finger movements wnl b/l.   Sensory: Light touch symmetric. No sensory simultagnosia.   Cerebellar: finger nose finger wnl b/l, heel to shin normal   Gait: normal       Assessment:   1. Right-sided Bell's palsy    2. Sensorineural hearing loss (SNHL) of right ear, unspecified hearing status on contralateral side    3. Type 2 diabetes mellitus without complication, without long-term current use of insulin    4. Low blood pressure reading        Plan:  - continue gabapentin 300 mg TID prn, keep scheduled neurology follow up   - referral to Audiology   - last A1c 6.2 (8/30/22)- shared decision making to decrease metformin to 1000 mg daily although likely discontinue if able to control with diet; plan for DM maintenance at next visit 10/2022   - decrease metoprolol succinate to 25 mg daily due to concern for hypotension; patient asymptomatic  - will request colonoscopy records    Return to clinic in 1 month for follow up DM and healthcare maintenance, or sooner if needed.     Nena Elliott M.D. HO-II  [facility]

## 2022-12-07 ENCOUNTER — TELEPHONE (OUTPATIENT)
Dept: AUDIOLOGY | Facility: HOSPITAL | Age: 67
End: 2022-12-07
Payer: MEDICARE

## 2023-01-06 RX ORDER — METFORMIN HYDROCHLORIDE 1000 MG/1
1000 TABLET ORAL
Qty: 30 TABLET | Refills: 3
Start: 2023-01-06 | End: 2023-01-10

## 2023-01-10 RX ORDER — METFORMIN HYDROCHLORIDE 500 MG/1
1000 TABLET ORAL DAILY
COMMUNITY
Start: 2022-10-26 | End: 2023-01-10

## 2023-01-10 RX ORDER — METFORMIN HYDROCHLORIDE 1000 MG/1
1000 TABLET ORAL
Qty: 30 TABLET | Refills: 3
Start: 2023-01-10 | End: 2023-03-15

## 2023-01-12 ENCOUNTER — OFFICE VISIT (OUTPATIENT)
Dept: FAMILY MEDICINE | Facility: CLINIC | Age: 68
End: 2023-01-12
Payer: MEDICARE

## 2023-01-12 VITALS
TEMPERATURE: 98 F | SYSTOLIC BLOOD PRESSURE: 120 MMHG | HEART RATE: 52 BPM | DIASTOLIC BLOOD PRESSURE: 60 MMHG | HEIGHT: 67 IN | OXYGEN SATURATION: 100 % | BODY MASS INDEX: 24.83 KG/M2 | WEIGHT: 158.19 LBS

## 2023-01-12 DIAGNOSIS — Z11.4 SCREENING FOR HIV (HUMAN IMMUNODEFICIENCY VIRUS): ICD-10-CM

## 2023-01-12 DIAGNOSIS — Z12.5 PROSTATE CANCER SCREENING: ICD-10-CM

## 2023-01-12 DIAGNOSIS — Z23 NEED FOR VACCINATION: ICD-10-CM

## 2023-01-12 DIAGNOSIS — Z11.59 NEED FOR HEPATITIS C SCREENING TEST: ICD-10-CM

## 2023-01-12 DIAGNOSIS — E11.9 TYPE 2 DIABETES MELLITUS WITHOUT COMPLICATION, WITHOUT LONG-TERM CURRENT USE OF INSULIN: Primary | ICD-10-CM

## 2023-01-12 DIAGNOSIS — K90.0 CELIAC DISEASE: ICD-10-CM

## 2023-01-12 LAB
ALBUMIN SERPL-MCNC: 3.8 G/DL (ref 3.4–4.8)
ALBUMIN/GLOB SERPL: 0.8 RATIO (ref 1.1–2)
ALP SERPL-CCNC: 66 UNIT/L (ref 40–150)
ALT SERPL-CCNC: 25 UNIT/L (ref 0–55)
AST SERPL-CCNC: 18 UNIT/L (ref 5–34)
BASOPHILS # BLD AUTO: 0.06 X10(3)/MCL (ref 0–0.2)
BASOPHILS NFR BLD AUTO: 0.8 %
BILIRUBIN DIRECT+TOT PNL SERPL-MCNC: 0.6 MG/DL
BUN SERPL-MCNC: 10.5 MG/DL (ref 8.4–25.7)
CALCIUM SERPL-MCNC: 9.4 MG/DL (ref 8.8–10)
CHLORIDE SERPL-SCNC: 105 MMOL/L (ref 98–107)
CHOLEST SERPL-MCNC: 106 MG/DL
CHOLEST/HDLC SERPL: 3 {RATIO} (ref 0–5)
CO2 SERPL-SCNC: 29 MMOL/L (ref 23–31)
CREAT SERPL-MCNC: 0.95 MG/DL (ref 0.73–1.18)
CREAT UR-MCNC: 62 MG/DL (ref 63–166)
DEPRECATED CALCIDIOL+CALCIFEROL SERPL-MC: 19.9 NG/ML (ref 30–80)
EOSINOPHIL # BLD AUTO: 0.3 X10(3)/MCL (ref 0–0.9)
EOSINOPHIL NFR BLD AUTO: 4.1 %
ERYTHROCYTE [DISTWIDTH] IN BLOOD BY AUTOMATED COUNT: 12.8 % (ref 11.5–17)
EST. AVERAGE GLUCOSE BLD GHB EST-MCNC: 128.4 MG/DL
FERRITIN SERPL-MCNC: 211.66 NG/ML (ref 21.81–274.66)
FOLATE SERPL-MCNC: 13.9 NG/ML (ref 7–31.4)
GFR SERPLBLD CREATININE-BSD FMLA CKD-EPI: >60 MLS/MIN/1.73/M2
GLOBULIN SER-MCNC: 4.8 GM/DL (ref 2.4–3.5)
GLUCOSE SERPL-MCNC: 102 MG/DL (ref 82–115)
HBA1C MFR BLD: 6.1 %
HCT VFR BLD AUTO: 37.2 % (ref 42–52)
HCV AB SERPL QL IA: NONREACTIVE
HDLC SERPL-MCNC: 33 MG/DL (ref 35–60)
HGB BLD-MCNC: 11.9 GM/DL (ref 14–18)
HIV 1+2 AB+HIV1 P24 AG SERPL QL IA: NONREACTIVE
IMM GRANULOCYTES # BLD AUTO: 0.04 X10(3)/MCL (ref 0–0.04)
IMM GRANULOCYTES NFR BLD AUTO: 0.6 %
IRON SATN MFR SERPL: 42 % (ref 20–50)
IRON SERPL-MCNC: 116 UG/DL (ref 65–175)
LDLC SERPL CALC-MCNC: 45 MG/DL (ref 50–140)
LYMPHOCYTES # BLD AUTO: 3.19 X10(3)/MCL (ref 0.6–4.6)
LYMPHOCYTES NFR BLD AUTO: 44.1 %
MCH RBC QN AUTO: 31.6 PG
MCHC RBC AUTO-ENTMCNC: 32 MG/DL (ref 33–36)
MCV RBC AUTO: 98.7 FL (ref 80–94)
MICROALBUMIN UR-MCNC: 7.6 UG/ML
MICROALBUMIN/CREAT RATIO PNL UR: 12.3 MG/GM CR (ref 0–30)
MONOCYTES # BLD AUTO: 0.87 X10(3)/MCL (ref 0.1–1.3)
MONOCYTES NFR BLD AUTO: 12 %
NEUTROPHILS # BLD AUTO: 2.77 X10(3)/MCL (ref 2.1–9.2)
NEUTROPHILS NFR BLD AUTO: 38.4 %
NRBC BLD AUTO-RTO: 0 %
PLATELET # BLD AUTO: 260 X10(3)/MCL (ref 130–400)
PMV BLD AUTO: 9.9 FL (ref 7.4–10.4)
POTASSIUM SERPL-SCNC: 4 MMOL/L (ref 3.5–5.1)
PROT SERPL-MCNC: 8.6 GM/DL (ref 5.8–7.6)
RBC # BLD AUTO: 3.77 X10(6)/MCL (ref 4.7–6.1)
SODIUM SERPL-SCNC: 136 MMOL/L (ref 136–145)
TIBC SERPL-MCNC: 163 UG/DL (ref 69–240)
TIBC SERPL-MCNC: 279 UG/DL (ref 250–450)
TRANSFERRIN SERPL-MCNC: 243 MG/DL (ref 163–344)
TRIGL SERPL-MCNC: 138 MG/DL (ref 34–140)
VIT B12 SERPL-MCNC: 969 PG/ML (ref 213–816)
VLDLC SERPL CALC-MCNC: 28 MG/DL
WBC # SPEC AUTO: 7.2 X10(3)/MCL (ref 4.5–11.5)

## 2023-01-12 PROCEDURE — 86803 HEPATITIS C AB TEST: CPT

## 2023-01-12 PROCEDURE — 85025 COMPLETE CBC W/AUTO DIFF WBC: CPT

## 2023-01-12 PROCEDURE — 90677 PCV20 VACCINE IM: CPT | Mod: PBBFAC

## 2023-01-12 PROCEDURE — 82607 VITAMIN B-12: CPT

## 2023-01-12 PROCEDURE — 87389 HIV-1 AG W/HIV-1&-2 AB AG IA: CPT

## 2023-01-12 PROCEDURE — 80061 LIPID PANEL: CPT

## 2023-01-12 PROCEDURE — 80053 COMPREHEN METABOLIC PANEL: CPT

## 2023-01-12 PROCEDURE — 83550 IRON BINDING TEST: CPT

## 2023-01-12 PROCEDURE — 36415 COLL VENOUS BLD VENIPUNCTURE: CPT

## 2023-01-12 PROCEDURE — 82728 ASSAY OF FERRITIN: CPT

## 2023-01-12 PROCEDURE — 82043 UR ALBUMIN QUANTITATIVE: CPT

## 2023-01-12 PROCEDURE — 99214 OFFICE O/P EST MOD 30 MIN: CPT | Mod: PBBFAC

## 2023-01-12 PROCEDURE — 83036 HEMOGLOBIN GLYCOSYLATED A1C: CPT

## 2023-01-12 PROCEDURE — 82746 ASSAY OF FOLIC ACID SERUM: CPT

## 2023-01-12 PROCEDURE — G0009 ADMIN PNEUMOCOCCAL VACCINE: HCPCS | Mod: PBBFAC

## 2023-01-12 PROCEDURE — 82306 VITAMIN D 25 HYDROXY: CPT

## 2023-01-12 RX ADMIN — PNEUMOCOCCAL 20-VALENT CONJUGATE VACCINE 0.5 ML
2.2; 2.2; 2.2; 2.2; 2.2; 2.2; 2.2; 2.2; 2.2; 2.2; 2.2; 2.2; 2.2; 2.2; 2.2; 2.2; 4.4; 2.2; 2.2; 2.2 INJECTION, SUSPENSION INTRAMUSCULAR at 11:01

## 2023-01-12 NOTE — PROGRESS NOTES
[facility] OFFICE VISIT NOTE  Yuriy Díaz  [id number]  01/13/2023      Chief Complaint: F/u R Side [place]'s Palsy      HPI    67 y.o. male- follow up, no complaints     Right sided Bell's Palsy- resolved, seen by Neurology (Dr. Tommie Dickey), MRI done- reports no acute abnormalities, no scheduled follow up    Chronic conditions:  DM II- controlled with diet and metformin 1000 mg daily   HTN- at goal today, on metoprolol 50 mg daily  Sleep apnea- compliant on CPAP at night  Celiac disease- follows Dr. Kapoor, last colonoscopy 6/2022, no recent flares  Mitral valve regurgitation- s/p MVR  GERD- controlled on Protonix 40 mg daily and famotidine 40 mg nightly     ROS:  CONSTITUTIONAL: No unexplained weight loss or weakness    Eyes: No acute change in vision   CARDIOVASCULAR: No chest pain or palpitations.    RESPIRATORY: No shortness of breath  GASTROINTESTINAL: No abdominal pain  GENITOURINARY: No dysuria  NEUROLOGICAL: No headache or dizziness    Healthcare maintenance  Colon cancer screen: Colonoscopy 6/29/22 Dr. Kendall Kapoor  Prostate cancer screen: due   Never smoker   Immunizations: Tdap (7/202), Shingles (7/2020, 11/2020)  Specialists: Cardiologist (Dr. Albert), GI (Dr. Kapoor), Ophthalmologist- [facility] clinic       PE:  Vitals:    01/12/23 1037   BP: 120/60   Pulse: (!) 52   Temp: 97.9 °F (36.6 °C)     General: appears well, in no acute distress   Eye: no scleral icterus   HENT: MMM, face symmetric  Neck: supple, no carotid bruits   Respiratory: clear to auscultation bilaterally, nonlabored respirations   Cardiovascular: mild bradycardia (chronic) regular rhythm without murmurs or gallops, no edema in bilateral lower extremities   Gastrointestinal: soft, non-tender, non-distended, bowel sounds present   Musculoskeletal: no gross deformities observed, gait wnl     DM foot exam:  Protective Sensation (w/ 10 gram monofilament):  Right: Intact  Left: Intact    Visual Inspection:  Normal -  Bilateral  Toenails thin  without onychomycosis    Pedal Pulses:   Right: Present  Left: Present    Posterior tibialis:   Right:Present  Left: Present       Assessment:   1. Type 2 diabetes mellitus without complication, without long-term current use of insulin    2. Celiac disease    3. Need for hepatitis C screening test    4. Need for vaccination    5. Screening for HIV (human immunodeficiency virus)    6. Prostate cancer screening        Plan:  - continue metformin 1000 mg and dietyary modifications, discussed possibility of discontinuation if repeat A1c below goal  - foot exam today  - urine microalbumin/Cr  - PCV 20 today   - Will obtain records from Dr. Kapoor (colonoscopy), Dr. Al (DM eye exam) and Neurology   - Labs: CMP, CBC, iron/TIBC, ferritin, folate, Vit D, Vit B12, lipid panel, A1c, Hep C Ab, HIV, Vit D and PSA     Return to clinic in 4 months for follow up, or sooner if needed.     Nena Elliott M.D. HO-II  [facility]

## 2023-01-13 NOTE — PROGRESS NOTES
I have discussed the case with the resident and reviewed the resident's history and physical, assessment, plan, and progress note. I agree with the findings.       Gray Baez MD  [facility] - Family Medicine

## 2023-03-15 ENCOUNTER — OFFICE VISIT (OUTPATIENT)
Dept: FAMILY MEDICINE | Facility: CLINIC | Age: 68
End: 2023-03-15
Payer: MEDICARE

## 2023-03-15 VITALS
WEIGHT: 158.81 LBS | DIASTOLIC BLOOD PRESSURE: 55 MMHG | HEART RATE: 60 BPM | SYSTOLIC BLOOD PRESSURE: 105 MMHG | OXYGEN SATURATION: 100 % | HEIGHT: 67 IN | BODY MASS INDEX: 24.92 KG/M2 | TEMPERATURE: 98 F

## 2023-03-15 DIAGNOSIS — M54.50 ACUTE BILATERAL LOW BACK PAIN WITHOUT SCIATICA: Primary | ICD-10-CM

## 2023-03-15 DIAGNOSIS — B35.1 ONYCHOMYCOSIS: ICD-10-CM

## 2023-03-15 DIAGNOSIS — E55.9 VITAMIN D DEFICIENCY: ICD-10-CM

## 2023-03-15 PROCEDURE — 99215 OFFICE O/P EST HI 40 MIN: CPT | Mod: PBBFAC

## 2023-03-15 RX ORDER — METOPROLOL SUCCINATE 50 MG/1
TABLET, EXTENDED RELEASE ORAL
COMMUNITY
Start: 2023-02-20

## 2023-03-15 RX ORDER — AMMONIUM LACTATE 12 G/100G
1 CREAM TOPICAL DAILY
Qty: 385 G | Refills: 2 | Status: SHIPPED | OUTPATIENT
Start: 2023-03-15

## 2023-03-15 RX ORDER — NYSTATIN 100000 [USP'U]/G
POWDER TOPICAL 4 TIMES DAILY
Qty: 60 G | Refills: 2 | Status: SHIPPED | OUTPATIENT
Start: 2023-03-15

## 2023-03-15 RX ORDER — CHOLECALCIFEROL (VITAMIN D3) 125 MCG
5000 CAPSULE ORAL DAILY
Qty: 30 CAPSULE | Refills: 1 | Status: SHIPPED | OUTPATIENT
Start: 2023-03-15 | End: 2023-05-10

## 2023-03-15 RX ORDER — KETOCONAZOLE 20 MG/G
CREAM TOPICAL DAILY
Qty: 60 G | Refills: 1 | Status: SHIPPED | OUTPATIENT
Start: 2023-03-15

## 2023-03-15 RX ORDER — NAPROXEN 500 MG/1
500 TABLET ORAL 2 TIMES DAILY WITH MEALS
Qty: 28 TABLET | Refills: 0 | Status: SHIPPED | OUTPATIENT
Start: 2023-03-15 | End: 2023-03-29

## 2023-03-15 RX ORDER — DICLOFENAC SODIUM 10 MG/G
2 GEL TOPICAL 4 TIMES DAILY
Qty: 450 G | Refills: 1 | Status: SHIPPED | OUTPATIENT
Start: 2023-03-15

## 2023-03-15 RX ORDER — METFORMIN HYDROCHLORIDE 500 MG/1
TABLET ORAL
COMMUNITY
Start: 2023-02-16 | End: 2023-07-20 | Stop reason: SDUPTHER

## 2023-03-15 NOTE — PROGRESS NOTES
I reviewed History, PE, A/P.  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  I agree with resident. Care provided was reasonable and necessary.   I evaluated the patient with resident at time of visit, participated in key parts of H/P and management was discussed.    No red flags; conservative management with short interval follow up and reassessment

## 2023-03-15 NOTE — PROGRESS NOTES
[facility] OFFICE VISIT NOTE  Yuriy Díaz  [id number]  03/15/2023      Chief Complaint: back pain    HPI    67 y.o. male     Low back pain x 2 weeks   - described as achy  - worse with sitting, improved with walking  - posterior thighs feel tight/stiff with prolonged car rides, right worse than left  - denies radiating pain down legs   - denies night sweats, unexplained weight loss, bowel/bladder incontience     Chronic conditions:  DM II- last A1c 6.1 (1/2023), controlled with diet and metformin 500 mg daily   HTN- at goal today, denies lightheadedness or dizziness, on metoprolol 50 mg daily  Sleep apnea- compliant on CPAP at night  Celiac disease- follows Dr. Kapoor, last colonoscopy 6/2022, no recent flares  Mitral valve regurgitation- s/p MVR  GERD- controlled on Protonix 40 mg daily and famotidine 40 mg nightly     ROS:  As per HPI     PE:  Vitals:    03/15/23 0835   BP: (!) 105/55   Pulse: 60   Temp: 97.9 °F (36.6 °C)     General: appears well, in no acute distress   Eye: no scleral icterus   Neck: no carotid bruits   Respiratory: clear to auscultation bilaterally, nonlabored respirations   Cardiovascular: regular rate and rhythm without murmurs   Musculoskeletal: no rash to lower back, tenderness to palpation over midline lumbar spine, PSIS and bilateral gluteal region, forward flexion of back to mid calf, extension of back without pain, roll log negative b/l, FADIR/BEKA negative bilaterally, straight leg negative    Feet: 2+ dorsal pedal pulses, nail of right great toe deformed, thickened and discolored, dry skin      Assessment:   1. Acute bilateral low back pain without sciatica    2. Vitamin D deficiency    3. Onychomycosis        Plan:  - Voltaren gel prn, heat pad, Naproxen 500 mg BID  - hamstring stretches  - no red flags, consider imaging if low back pain persists past 6 weeks  - increase vit D supplementation to 5000 IU daily x 8 weeks  - continue ketoconazole 2% cream, Nystatin powder and ammonium  lactate 12% cream, refills sent       Return to clinic in 1 month for follow up back pain, or sooner if needed.     Nena Elliott M.D. [facility]

## 2023-03-27 ENCOUNTER — OFFICE VISIT (OUTPATIENT)
Dept: URGENT CARE | Facility: CLINIC | Age: 68
End: 2023-03-27
Payer: MEDICARE

## 2023-03-27 VITALS
OXYGEN SATURATION: 100 % | SYSTOLIC BLOOD PRESSURE: 130 MMHG | HEART RATE: 64 BPM | HEIGHT: 68 IN | RESPIRATION RATE: 18 BRPM | WEIGHT: 158 LBS | TEMPERATURE: 98 F | BODY MASS INDEX: 23.95 KG/M2 | DIASTOLIC BLOOD PRESSURE: 69 MMHG

## 2023-03-27 DIAGNOSIS — M54.50 ACUTE LOW BACK PAIN, UNSPECIFIED BACK PAIN LATERALITY, UNSPECIFIED WHETHER SCIATICA PRESENT: Primary | ICD-10-CM

## 2023-03-27 PROCEDURE — 99214 PR OFFICE/OUTPT VISIT, EST, LEVL IV, 30-39 MIN: ICD-10-PCS | Mod: S$PBB,,, | Performed by: FAMILY MEDICINE

## 2023-03-27 PROCEDURE — 99214 OFFICE O/P EST MOD 30 MIN: CPT | Mod: PBBFAC | Performed by: FAMILY MEDICINE

## 2023-03-27 PROCEDURE — 63600175 PHARM REV CODE 636 W HCPCS: Performed by: FAMILY MEDICINE

## 2023-03-27 PROCEDURE — 99214 OFFICE O/P EST MOD 30 MIN: CPT | Mod: S$PBB,,, | Performed by: FAMILY MEDICINE

## 2023-03-27 RX ORDER — HYDROCODONE BITARTRATE AND ACETAMINOPHEN 5; 325 MG/1; MG/1
1 TABLET ORAL EVERY 6 HOURS PRN
Qty: 15 TABLET | Refills: 0 | Status: SHIPPED | OUTPATIENT
Start: 2023-03-27 | End: 2023-05-01

## 2023-03-27 RX ORDER — KETOROLAC TROMETHAMINE 30 MG/ML
30 INJECTION, SOLUTION INTRAMUSCULAR; INTRAVENOUS
Status: COMPLETED | OUTPATIENT
Start: 2023-03-27 | End: 2023-03-27

## 2023-03-27 RX ADMIN — KETOROLAC TROMETHAMINE 30 MG: 30 INJECTION, SOLUTION INTRAMUSCULAR; INTRAVENOUS at 09:03

## 2023-03-28 NOTE — PROGRESS NOTES
"Subjective:       Patient ID: Yuriy Díaz is a 67 y.o. male.    Vitals:  height is 5' 7.72" (1.72 m) and weight is 71.7 kg (158 lb). His oral temperature is 98.2 °F (36.8 °C). His blood pressure is 130/69 and his pulse is 64. His respiration is 18 and oxygen saturation is 100%.     Chief Complaint: Back Pain (X 3 weeks)    Bilateral paralumbar pain for 3 weeks, off and on, no bowel or bladder symptoms, no saddle anesthesia.  No fever.  Requesting a shot and something for pain.    Back Pain  Pertinent negatives include no abdominal pain, bladder incontinence, bowel incontinence, dysuria, leg pain, numbness, paresis, perianal numbness, tingling or weakness.     Gastrointestinal:  Negative for abdominal pain and bowel incontinence.   Genitourinary:  Negative for dysuria and bladder incontinence.   Musculoskeletal:  Positive for back pain.   Neurological:  Negative for numbness.     Objective:      Physical Exam   Constitutional: He appears well-developed.  Non-toxic appearance. He does not appear ill. No distress.   Musculoskeletal:      Thoracic back: Normal.      Lumbar back: He exhibits decreased range of motion, tenderness and spasm (Bilateral paralumbar, reproduces symptoms). He exhibits no bony tenderness.   Skin: Skin is not diaphoretic.   Nursing note and vitals reviewed.      Assessment:       1. Acute low back pain, unspecified back pain laterality, unspecified whether sciatica present            Plan:         Acute low back pain, unspecified back pain laterality, unspecified whether sciatica present    Other orders  -     ketorolac injection 30 mg  -     HYDROcodone-acetaminophen (NORCO) 5-325 mg per tablet; Take 1 tablet by mouth every 6 (six) hours as needed for Pain.  Dispense: 15 tablet; Refill: 0         Will give Toradol IM.  Restart p.o. NSAIDs tomorrow.  After reviewing , prescribed a few Norco with side effect precautions.  Use heat, topical analgesics.  Follow-up with PCP.  Return to urgent care " if need

## 2023-05-01 ENCOUNTER — OFFICE VISIT (OUTPATIENT)
Dept: FAMILY MEDICINE | Facility: CLINIC | Age: 68
End: 2023-05-01
Payer: MEDICARE

## 2023-05-01 VITALS
WEIGHT: 158 LBS | OXYGEN SATURATION: 100 % | HEIGHT: 68 IN | BODY MASS INDEX: 23.95 KG/M2 | SYSTOLIC BLOOD PRESSURE: 121 MMHG | TEMPERATURE: 98 F | RESPIRATION RATE: 18 BRPM | DIASTOLIC BLOOD PRESSURE: 68 MMHG | HEART RATE: 55 BPM

## 2023-05-01 DIAGNOSIS — M54.42 ACUTE BILATERAL LOW BACK PAIN WITH BILATERAL SCIATICA: Primary | ICD-10-CM

## 2023-05-01 DIAGNOSIS — M54.9 DORSALGIA, UNSPECIFIED: ICD-10-CM

## 2023-05-01 DIAGNOSIS — Z12.5 PROSTATE CANCER SCREENING: ICD-10-CM

## 2023-05-01 DIAGNOSIS — M54.41 ACUTE BILATERAL LOW BACK PAIN WITH BILATERAL SCIATICA: Primary | ICD-10-CM

## 2023-05-01 LAB
ANION GAP SERPL CALC-SCNC: 4 MEQ/L
BUN SERPL-MCNC: 11.6 MG/DL (ref 8.4–25.7)
CALCIUM SERPL-MCNC: 10.2 MG/DL (ref 8.8–10)
CHLORIDE SERPL-SCNC: 105 MMOL/L (ref 98–107)
CO2 SERPL-SCNC: 27 MMOL/L (ref 23–31)
CREAT SERPL-MCNC: 1.02 MG/DL (ref 0.73–1.18)
CREAT/UREA NIT SERPL: 11
GFR SERPLBLD CREATININE-BSD FMLA CKD-EPI: >60 MLS/MIN/1.73/M2
GLUCOSE SERPL-MCNC: 93 MG/DL (ref 82–115)
POTASSIUM SERPL-SCNC: 4.3 MMOL/L (ref 3.5–5.1)
PSA SERPL-MCNC: 1.7 NG/ML
SODIUM SERPL-SCNC: 136 MMOL/L (ref 136–145)

## 2023-05-01 PROCEDURE — 80048 BASIC METABOLIC PNL TOTAL CA: CPT

## 2023-05-01 PROCEDURE — 84153 ASSAY OF PSA TOTAL: CPT

## 2023-05-01 PROCEDURE — 96372 THER/PROPH/DIAG INJ SC/IM: CPT | Mod: PBBFAC

## 2023-05-01 PROCEDURE — 36415 COLL VENOUS BLD VENIPUNCTURE: CPT

## 2023-05-01 PROCEDURE — 99215 OFFICE O/P EST HI 40 MIN: CPT | Mod: PBBFAC

## 2023-05-01 RX ORDER — GABAPENTIN 100 MG/1
100 CAPSULE ORAL 3 TIMES DAILY
Qty: 90 CAPSULE | Refills: 0 | Status: SHIPPED | OUTPATIENT
Start: 2023-05-01 | End: 2023-05-09

## 2023-05-01 RX ORDER — KETOROLAC TROMETHAMINE 30 MG/ML
30 INJECTION, SOLUTION INTRAMUSCULAR; INTRAVENOUS ONCE
Status: COMPLETED | OUTPATIENT
Start: 2023-05-01 | End: 2023-05-01

## 2023-05-01 RX ORDER — METHOCARBAMOL 750 MG/1
750 TABLET, FILM COATED ORAL 4 TIMES DAILY
Qty: 40 TABLET | Refills: 0 | Status: SHIPPED | OUTPATIENT
Start: 2023-05-01 | End: 2023-05-11

## 2023-05-01 RX ORDER — HYDROCODONE BITARTRATE AND ACETAMINOPHEN 7.5; 325 MG/1; MG/1
1 TABLET ORAL EVERY 6 HOURS PRN
Qty: 45 TABLET | Refills: 0 | Status: SHIPPED | OUTPATIENT
Start: 2023-05-01 | End: 2023-05-01

## 2023-05-01 RX ORDER — HYDROCODONE BITARTRATE AND ACETAMINOPHEN 7.5; 325 MG/1; MG/1
1 TABLET ORAL EVERY 6 HOURS PRN
Qty: 45 TABLET | Refills: 0 | Status: SHIPPED | OUTPATIENT
Start: 2023-05-01 | End: 2023-05-09

## 2023-05-01 RX ADMIN — KETOROLAC TROMETHAMINE 30 MG: 30 INJECTION, SOLUTION INTRAMUSCULAR at 03:05

## 2023-05-01 NOTE — PROGRESS NOTES
Chief Complaint  Back Pain      History of Present Illness  Yuriy Díaz is a 67 y.o.  male presents to the clinic for low back pain; last seen 3/15/2023    LBP bilaterally x 3 months, worsening in the last 3-4days  Achy, constant, shooting radiates bilateral legs  No numbness, tingling, trauma, injuries  Awakes from sleep unable to get comfortable, now sleeping in a recliner   PT in the past without improvement  Reports he was seen by a Chiropractor in [facility] last week, completed hip and back Xrays  Tried to see his Neurologist, but currently on vacation  No relief with Voltern, heating pad, Tylenol and Naproxen 500mg bid  Seen in the UC on 3/27/2023 given  Rx for Norco 5mg q6hr prn, no relief    ROS per HPI      Physical Exam    Vitals:    05/01/23 1412   Resp: 18     Wt Readings from Last 2 Encounters:   03/27/23 71.7 kg (158 lb)   03/15/23 72 kg (158 lb 12.8 oz)     Gen: NAD, uncomfortable appearing  MSK: Antalgic gait; ROM on lumbar spine limited secondary to pain. Lumbar spine and SI joint without overlying skin changes. Tender bilateral SIJ and latissimus dorsi. 5/5 bilateral lower extremities    Current Outpatient Medications  Current Outpatient Medications   Medication Instructions    ammonium lactate 12 % Crea 1 application, Topical (Top), Daily    ascorbic acid (vitamin C) (VITAMIN C) 1,000 mg, Oral, Daily    aspirin (ECOTRIN) 81 mg, Oral, Daily    atorvastatin (LIPITOR) 20 mg, Oral, Daily    calcium carbonate 195 mg calcium (500 mg) Chew 1 tablet, Oral, Daily    cholecalciferol (vitamin D3) 5,000 Units, Oral, Daily    diclofenac sodium (VOLTAREN) 2 g, Topical (Top), 4 times daily    famotidine (PEPCID) 40 mg, Oral, Nightly    fluticasone propionate (FLONASE) 50 mcg/actuation nasal spray 50 sprays, Nasal, 2 times daily    HYDROcodone-acetaminophen (NORCO) 5-325 mg per tablet 1 tablet, Oral, Every 6 hours PRN    hydrocortisone (ANUSOL-HC) 2.5 % rectal cream 1 applicator, Rectal, 2 times daily    ketoconazole  (NIZORAL) 2 % cream Topical (Top), Daily    metFORMIN (GLUCOPHAGE) 500 MG tablet Oral    metoprolol succinate (TOPROL-XL) 50 MG 24 hr tablet Oral    nystatin (MYCOSTATIN) powder Topical (Top), 4 times daily    pantoprazole (PROTONIX) 40 mg, Oral, Daily             Assessment / Plan:    Acute bilateral low back pain with bilateral sciatica  -     ketorolac injection 30 mg  -     MRI Lumbar Spine Without Contrast; Future; Expected date: 05/01/2023  -     methocarbamoL (ROBAXIN) 750 MG Tab; Take 1 tablet (750 mg total) by mouth 4 (four) times daily. Can be sedating. Do not use while operating heavy machinery. for 10 days  Dispense: 40 tablet; Refill: 0  -     Basic Metabolic Panel; Future; Expected date: 05/01/2023  -     gabapentin (NEURONTIN) 100 MG capsule; Take 1 capsule (100 mg total) by mouth 3 (three) times daily.  Dispense: 90 capsule; Refill: 0  -     HYDROcodone-acetaminophen (NORCO) 7.5-325 mg per tablet; Take 1 tablet by mouth every 6 (six) hours as needed for Pain.  Dispense: 45 tablet; Refill: 0    Prostate cancer screening  -     PSA, Screening; Future; Expected date: 05/01/2023          Follow up:    In PRN, or earlier if needed. Keep scheduled f/u 5/16/2023    Meg Rodriguez MD  [employer] [facility] PGY-2

## 2023-05-07 ENCOUNTER — OFFICE VISIT (OUTPATIENT)
Dept: URGENT CARE | Facility: CLINIC | Age: 68
End: 2023-05-07
Payer: MEDICARE

## 2023-05-07 VITALS
TEMPERATURE: 100 F | SYSTOLIC BLOOD PRESSURE: 120 MMHG | OXYGEN SATURATION: 99 % | BODY MASS INDEX: 24.81 KG/M2 | HEART RATE: 53 BPM | DIASTOLIC BLOOD PRESSURE: 70 MMHG | RESPIRATION RATE: 20 BRPM | WEIGHT: 158.06 LBS | HEIGHT: 67 IN

## 2023-05-07 DIAGNOSIS — M54.50 CHRONIC LOW BACK PAIN, UNSPECIFIED BACK PAIN LATERALITY, UNSPECIFIED WHETHER SCIATICA PRESENT: Primary | ICD-10-CM

## 2023-05-07 DIAGNOSIS — G89.29 CHRONIC LOW BACK PAIN, UNSPECIFIED BACK PAIN LATERALITY, UNSPECIFIED WHETHER SCIATICA PRESENT: Primary | ICD-10-CM

## 2023-05-07 PROCEDURE — 99215 OFFICE O/P EST HI 40 MIN: CPT | Mod: PBBFAC | Performed by: FAMILY MEDICINE

## 2023-05-07 PROCEDURE — 99214 OFFICE O/P EST MOD 30 MIN: CPT | Mod: S$PBB,,, | Performed by: FAMILY MEDICINE

## 2023-05-07 PROCEDURE — 63600175 PHARM REV CODE 636 W HCPCS: Performed by: FAMILY MEDICINE

## 2023-05-07 PROCEDURE — 99214 PR OFFICE/OUTPT VISIT, EST, LEVL IV, 30-39 MIN: ICD-10-PCS | Mod: S$PBB,,, | Performed by: FAMILY MEDICINE

## 2023-05-07 RX ORDER — KETOROLAC TROMETHAMINE 30 MG/ML
60 INJECTION, SOLUTION INTRAMUSCULAR; INTRAVENOUS
Status: COMPLETED | OUTPATIENT
Start: 2023-05-07 | End: 2023-05-07

## 2023-05-07 RX ORDER — KETOROLAC TROMETHAMINE 30 MG/ML
30 INJECTION, SOLUTION INTRAMUSCULAR; INTRAVENOUS
Status: DISCONTINUED | OUTPATIENT
Start: 2023-05-07 | End: 2023-05-07

## 2023-05-07 RX ADMIN — KETOROLAC TROMETHAMINE 60 MG: 30 INJECTION, SOLUTION INTRAMUSCULAR at 02:05

## 2023-05-07 NOTE — PROGRESS NOTES
"Subjective:      Patient ID: Yuriy Díaz is a 67 y.o. male.    Vitals:  height is 5' 7" (1.702 m) and weight is 71.7 kg (158 lb 1.1 oz). His temperature is 100.2 °F (37.9 °C). His blood pressure is 120/70 and his pulse is 53 (abnormal). His respiration is 20 and oxygen saturation is 99%.     Chief Complaint: Back Pain (Requested pt to give urine sample, pt declines)    Patient presents to urgent care with continued low back pain, no radicular symptoms.  No bowel or bladder symptoms.  Recent lumbar MRI with degenerative disc disease and facet arthrosis.  Recently seen in Family Medicine, obtained some relief from Toradol 30 mg IM.  Awaiting a neurology appointment tomorrow?  States he has a friend who is a neurologist and will review his medications.  Has had some GI side effects from methocarbamol, combined with gabapentin.  States Norco does not really help.  Requesting another shot of Toradol to get him through the night so he can get more advice tomorrow.  No history of CKD.  Chart reviewed.  Labs reviewed    Back Pain  Pertinent negatives include no abdominal pain, bladder incontinence, bowel incontinence, dysuria, fever, leg pain, numbness, perianal numbness or weakness.     Constitution: Negative for fever.   Gastrointestinal:  Negative for abdominal pain and bowel incontinence.   Genitourinary:  Negative for dysuria and bladder incontinence.   Musculoskeletal:  Positive for back pain.   Neurological:  Negative for numbness.    Objective:     Physical Exam   Constitutional: He appears well-developed.  Non-toxic appearance. He does not appear ill. No distress.   Abdominal: Normal appearance.   Musculoskeletal:      Thoracic back: Normal.      Lumbar back: He exhibits tenderness (bilateral paralumbar). He exhibits no bony tenderness, no swelling and no deformity.   Neurological: no focal deficit. He is alert. He displays no weakness.   Skin: Skin is not diaphoretic. Capillary refill takes less than 2 seconds. "   Psychiatric: His behavior is normal. Mood normal.   Nursing note and vitals reviewed.    Assessment:     1. Chronic low back pain, unspecified back pain laterality, unspecified whether sciatica present        Plan:       Chronic low back pain, unspecified back pain laterality, unspecified whether sciatica present  -     Discontinue: ketorolac injection 30 mg    Other orders  -     ketorolac injection 60 mg      Had a lengthy discussion about various treatments for low back pain.  He will follow-up with his neurologist friend tomorrow as well as an upcoming PCP appointment.  During the interim, agreed to give Toradol IM.  Discussed his medications in detail and potential side effects.  ER precautions.  Return to urgent care if I can help in any way.

## 2023-05-09 ENCOUNTER — OFFICE VISIT (OUTPATIENT)
Dept: FAMILY MEDICINE | Facility: CLINIC | Age: 68
End: 2023-05-09
Payer: MEDICARE

## 2023-05-09 VITALS
RESPIRATION RATE: 16 BRPM | HEART RATE: 52 BPM | OXYGEN SATURATION: 100 % | SYSTOLIC BLOOD PRESSURE: 125 MMHG | TEMPERATURE: 98 F | BODY MASS INDEX: 24.11 KG/M2 | WEIGHT: 153.63 LBS | HEIGHT: 67 IN | DIASTOLIC BLOOD PRESSURE: 70 MMHG

## 2023-05-09 DIAGNOSIS — M54.41 ACUTE BILATERAL LOW BACK PAIN WITH RIGHT-SIDED SCIATICA: ICD-10-CM

## 2023-05-09 DIAGNOSIS — M51.36 DEGENERATIVE DISC DISEASE, LUMBAR: ICD-10-CM

## 2023-05-09 DIAGNOSIS — M48.00 SPINAL STENOSIS, UNSPECIFIED SPINAL REGION: Primary | ICD-10-CM

## 2023-05-09 PROCEDURE — 99215 OFFICE O/P EST HI 40 MIN: CPT | Mod: PBBFAC

## 2023-05-09 RX ORDER — PREGABALIN 50 MG/1
50 CAPSULE ORAL 2 TIMES DAILY
Qty: 60 CAPSULE | Refills: 1 | Status: SHIPPED | OUTPATIENT
Start: 2023-05-09 | End: 2023-07-05

## 2023-05-09 RX ORDER — TRAMADOL HYDROCHLORIDE 50 MG/1
50 TABLET ORAL EVERY 12 HOURS PRN
Qty: 60 TABLET | Refills: 0 | Status: SHIPPED | OUTPATIENT
Start: 2023-05-09 | End: 2023-06-08

## 2023-05-09 RX ORDER — LIDOCAINE 50 MG/G
1 PATCH TOPICAL DAILY
Qty: 30 PATCH | Refills: 1 | Status: SHIPPED | OUTPATIENT
Start: 2023-05-09 | End: 2023-07-05

## 2023-05-09 NOTE — PROGRESS NOTES
[facility] OFFICE VISIT [facility]  Yuriy Díaz  [id number]  05/14/2023      Chief Complaint: Back Pain      HPI    67 y.o. male- follow up back pain    Acute concerns:    Low back pain, onset early March   - localized to back with occasional sharp pain down posterior aspect of right leg  - fluctuating in intensity, overall worsening since onset, no change in character, difficulty doing ADLs, walking with pain  - difficulty sleeping   - seen in urgent care and [facility] multiple times  - MRI 5/4/23: degenerative disc disease and facet arthrosis, mild spinal canal stenosis L4-L5 greater than L3-L4, no high grade stenosis   - Toradol IM helps briefly  - side effects from gabapentin, Robaxin and Norco- GI and drowsiness   - Voltaren gel not helping  - stopped going to PT due to pain  - follows with Neurologist   - requests trial of tramadol and Lyrica   - going out of country [place], concerned he will not be able to go due to pain  - denies bowel/ bladder incontinence, night sweats, unexplained weight loss, chest pain or shortness of breath     ROS:  As per HPI    PE:  Vitals:    05/09/23 1000   BP: 125/70   Pulse: (!) 52   Resp: 16   Temp: 97.9 °F (36.6 °C)     General: appears uncomfortable due to pain, in no acute distress, not diaphoretic    Respiratory: non labored respirations   Cardiovascular: bradycardia, regular rhythm without murmurs   Genitourinary: no CVA tenderness   Extremities: no edema in bilateral lower extremities  Musculoskeletal: tenderness to palpation to bilateral lower lumbar back and no midline lumbar spine tenderness, no step offs, bilateral gluteal tenderness, antalgic gait    Neuro: no focal neurological deficit, face symmetric     Assessment:   1. Spinal stenosis, unspecified spinal region    2. Acute bilateral low back pain with right-sided sciatica        Plan:  - Will trial Lyrica 50 mg BID and Tramadol 50 mg BID prn pain, side effects discussed, sedation precautions   - lidocaine patch daily prn   -  referral Neurosurgery and interventional anesthesiology   - discussed MRI findings and anticipated course and treatment options for low back pain  - ED precautions to include bowel/ bladder incontinence, night sweats, unexplained weight loss or for any other concerns     Return to clinic in 3 weeks for follow up back pain, or sooner if needed.     Nena Elliott M.D. HO-II  [facility]

## 2023-05-15 ENCOUNTER — TELEPHONE (OUTPATIENT)
Dept: NEUROSURGERY | Facility: CLINIC | Age: 68
End: 2023-05-15
Payer: MEDICARE

## 2023-05-15 DIAGNOSIS — M54.50 ACUTE MIDLINE LOW BACK PAIN WITHOUT SCIATICA: Primary | ICD-10-CM

## 2023-05-15 DIAGNOSIS — M48.061 SPINAL STENOSIS, LUMBAR REGION, WITHOUT NEUROGENIC CLAUDICATION: Primary | ICD-10-CM

## 2023-05-15 NOTE — TELEPHONE ENCOUNTER
Give him a next available appointment with Dr. Grubbs.  He can come in to see Flower once her schedule opens up.    It would have been helpful to know if it was just back pain or leg pain as well.

## 2023-05-15 NOTE — TELEPHONE ENCOUNTER
"----- Message from Taty Collins MA sent at 5/15/2023 10:08 AM CDT -----  Regarding: REFERRAL PROCESS- lumbar  This patient is being referred to Dr. Grubbs by Dr. Maxwell for spinal stenosis of lumbar region, without neurogenic claudication. Nothing on the MRI report alarms me but was told to send this to for review. Per Anju: " He has called several times.  Hes foreign so its hard to understand.  He called last week saying he is in pain and cant walk.  I told him we received his referral and would work on it but if he was in too much pain to go to ER.  He called again this morning. told him to go to the ER he said they cant help him hes already been.  I talked to shaka and she said just get it processed today and send it for review and say patient is saying he is in lots of pain, cant walk, lay down, sleep, etc". Please review and advise on scheduling. Thank you     "

## 2023-05-15 NOTE — TELEPHONE ENCOUNTER
Patient came in the office today to see if he can be seen. Anju did advise him that if he is in that much pain, he needs to go to the ER. Patient stated he has already been there and they cannot do anything for him. He is wanting injections for relief. Daphne and Anju both advised patient that we do not do injections over here. Daphne asked if that is what he is wanting to be referred for because if so, he will have to be referred elsewhere. He stated he will find someone who does injections but does not want us to cancel referral.

## 2023-05-16 NOTE — TELEPHONE ENCOUNTER
Spoke with patient. Advised of below. I told him I can f/u with him in about 2wks to see if Flower's schedule is open. He asked if it was going to be for injections. I then advised him again that we do not do injections over here. He requests to cancel the referral. He does not want it.

## 2023-05-17 ENCOUNTER — TELEPHONE (OUTPATIENT)
Dept: FAMILY MEDICINE | Facility: CLINIC | Age: 68
End: 2023-05-17

## 2023-05-18 ENCOUNTER — OFFICE VISIT (OUTPATIENT)
Dept: FAMILY MEDICINE | Facility: CLINIC | Age: 68
End: 2023-05-18
Payer: MEDICARE

## 2023-05-18 VITALS
DIASTOLIC BLOOD PRESSURE: 59 MMHG | OXYGEN SATURATION: 99 % | RESPIRATION RATE: 20 BRPM | TEMPERATURE: 98 F | WEIGHT: 153.63 LBS | SYSTOLIC BLOOD PRESSURE: 109 MMHG | HEART RATE: 68 BPM | BODY MASS INDEX: 24.06 KG/M2

## 2023-05-18 DIAGNOSIS — M51.36 DEGENERATION OF LUMBAR INTERVERTEBRAL DISC: ICD-10-CM

## 2023-05-18 DIAGNOSIS — M48.061 SPINAL STENOSIS OF LUMBAR REGION, UNSPECIFIED WHETHER NEUROGENIC CLAUDICATION PRESENT: Primary | ICD-10-CM

## 2023-05-18 PROCEDURE — 99215 OFFICE O/P EST HI 40 MIN: CPT | Mod: PBBFAC

## 2023-05-18 NOTE — PROGRESS NOTES
[facility]    Subjective:      Chief Complaint: Back Pain    HPI   Yuriy Díaz is a 67 y.o. male who presents to [facility] for low back pain f/u.    Acute concerns  Continued low back pain with onset early March 2023. Pain is localized to lumbar region constant in nature with occasional radiation down posterior right leg. Has difficulty sleeping in bed with pain exacerbated by laying flat. Compensating by sleeping in recliner and on sofa. MRI showed degenerative disc disease and facet arthrosis at multiple levels with mild canal stenosis.  - Has attempted gabapentin, Robaxin, Norco, Lyrica, and tramadol with minimal relief. Does not need refills on current therapy of tramadol and Lyrica.  - Told by neurosurgery that he needs pain management referral because NS does not deal with injections  - Requesting referral to pain management and PT today  - Denies saddle anesthesia, bowel/bladder incontinence, weight loss, night sweats     Review of Systems  As per HPI.    Objective:     BP (!) 109/59 (BP Location: Left arm, Patient Position: Sitting, BP Method: Large (Automatic))   Pulse 68   Temp 97.7 °F (36.5 °C) (Oral)   Resp 20   Wt 69.7 kg (153 lb 9.6 oz)   SpO2 99%   BMI 24.06 kg/m²     Physical Exam:  Gen: No acute distress. Raises slowly from seated position due to back pain.  CV: Regular rhythm, bradycardia (chronic), no murmurs. No LE edema.  Resp: CTAB, breathing non labored on room air.  MSK: ambulating with antalgic gait. Lumbar paraspinal muscles tender to palpation. No step-offs or bony tenderness.       Current Outpatient Medications:     ammonium lactate 12 % Crea, Apply 1 application topically once daily., Disp: 385 g, Rfl: 2    ascorbic acid, vitamin C, (VITAMIN C) 1000 MG tablet, Take 1,000 mg by mouth once daily., Disp: , Rfl:     aspirin (ECOTRIN) 81 MG EC tablet, Take 81 mg by mouth once daily., Disp: , Rfl:     atorvastatin (LIPITOR) 20 MG tablet, Take 20 mg by mouth once daily.,  Disp: , Rfl:     calcium carbonate 195 mg calcium (500 mg) Chew, Take 1 tablet by mouth once daily., Disp: , Rfl:     diclofenac sodium (VOLTAREN) 1 % Gel, Apply 2 g topically 4 (four) times daily., Disp: 450 g, Rfl: 1    famotidine (PEPCID) 40 MG tablet, Take 40 mg by mouth every evening., Disp: , Rfl:     fluticasone propionate (FLONASE) 50 mcg/actuation nasal spray, 50 sprays by Nasal route 2 (two) times a day., Disp: , Rfl:     hydrocortisone (ANUSOL-HC) 2.5 % rectal cream, Place 1 applicator rectally 2 (two) times daily., Disp: 28 g, Rfl: 2    ketoconazole (NIZORAL) 2 % cream, Apply topically once daily., Disp: 60 g, Rfl: 1    LIDOcaine (LIDODERM) 5 %, Place 1 patch onto the skin once daily. Remove & Discard patch within 12 hours or as directed by MD, Disp: 30 patch, Rfl: 1    metFORMIN (GLUCOPHAGE) 500 MG tablet, Take by mouth., Disp: , Rfl:     metoprolol succinate (TOPROL-XL) 50 MG 24 hr tablet, Take by mouth., Disp: , Rfl:     nystatin (MYCOSTATIN) powder, Apply topically 4 (four) times daily., Disp: 60 g, Rfl: 2    pantoprazole (PROTONIX) 40 MG tablet, Take 40 mg by mouth once daily., Disp: , Rfl:     pregabalin (LYRICA) 50 MG capsule, Take 1 capsule (50 mg total) by mouth 2 (two) times daily., Disp: 60 capsule, Rfl: 1    traMADoL (ULTRAM) 50 mg tablet, Take 1 tablet (50 mg total) by mouth every 12 (twelve) hours as needed for Pain., Disp: 60 tablet, Rfl: 0     Assessment/Plan:     Spinal stenosis of lumbar region  Degeneration of lumbar intervertebral disc  - Continue treatment with tramadol and Lyrica as prescribed  - MRI reviewed  - Return to clinic or ED if red flag symptoms occur, precautions discussed  - Ambulatory referral/consult to Physical/Occupational Therapy; Future  - Ambulatory referral/consult to Pain Clinic; Future    Bradycardia  - Patient aware of chronic bradycardia, states he has been this way for 3-4 years due to palpitations if HR >60  - Currently on Toprol XL 50 mg  - Follows with  [facility]    Follow-up: 3 months for back pain, sooner if needed    Andrei Thorpe MD  [employer] [facility] - PGY-1

## 2023-05-18 NOTE — PROGRESS NOTES
Faculty addendum: Patient discussed with resident. Chart was reviewed including vitals, labs, etc. Care provided reasonable and necessary. I participated in the management of the patient and was immediately available throughout the encounter. Services were furnished in a primary care center located in the outpatient department of a [facility] hospital. I agree with the resident's findings and plan as documented in the resident's note.

## 2023-06-13 ENCOUNTER — OFFICE VISIT (OUTPATIENT)
Dept: FAMILY MEDICINE | Facility: CLINIC | Age: 68
End: 2023-06-13
Payer: MEDICARE

## 2023-06-13 VITALS
OXYGEN SATURATION: 99 % | SYSTOLIC BLOOD PRESSURE: 125 MMHG | WEIGHT: 144.63 LBS | HEART RATE: 57 BPM | BODY MASS INDEX: 22.7 KG/M2 | TEMPERATURE: 98 F | DIASTOLIC BLOOD PRESSURE: 74 MMHG | HEIGHT: 67 IN

## 2023-06-13 DIAGNOSIS — R73.03 PREDIABETES: ICD-10-CM

## 2023-06-13 DIAGNOSIS — K90.0 CELIAC DISEASE: ICD-10-CM

## 2023-06-13 DIAGNOSIS — R63.4 WEIGHT LOSS: ICD-10-CM

## 2023-06-13 DIAGNOSIS — I10 ESSENTIAL HYPERTENSION: Primary | ICD-10-CM

## 2023-06-13 PROCEDURE — 99215 OFFICE O/P EST HI 40 MIN: CPT | Mod: PBBFAC

## 2023-06-13 RX ORDER — TIZANIDINE 4 MG/1
4 TABLET ORAL 2 TIMES DAILY PRN
COMMUNITY
Start: 2023-05-16 | End: 2023-07-05

## 2023-06-13 NOTE — PROGRESS NOTES
[facility] Progress Note    ID:  Yuriy Díaz   MRN:  [id number]     6/13/2023    Chief Complaint:  Weight Loss     History of Present Illness:  Yuriy Díaz is a 67 y.o. male who presents to [facility] clinic for       Acute Issues:    Weight loss- 6 weeks history of weight loss 12-14 lb unintentionally.  Reports appetite has been somewhat decreased secondary to pain though has noticed increased size of abdomen.  He denies nausea, vomiting, dysphagia, dyspepsia, bloody stools, staying changes in stool caliber, constipation, fevers or night sweats, nervousness/anxiousness, skin or hair changes.  Denies history of smoking or alcohol use.  Receives EGD and colonoscopy every 2 years per Dr. Antwon Turner, last was roughly 1 year ago. PSA wnl 5/2023.    Chronic Issues Addressed today:  DM- med compliant metformin 500 daily, last A1C 6.1 in 1/2023.      Chronic medical conditions not addressed today:  HTN  HLD  GERD  Lumbar spinal stenosis  Vit D def  Allergic rhinitis      Health Maintenance   Topic Date Due    Eye Exam  Never done    Hemoglobin A1c  07/12/2023    Foot Exam  01/12/2024    Lipid Panel  01/12/2024    Low Dose Statin  05/07/2024    TETANUS VACCINE  07/29/2030    Hepatitis C Screening  Completed       Past Medical History:   Diagnosis Date    Diabetes mellitus, type 2     GERD (gastroesophageal reflux disease)     Hyperlipidemia     Hypertension        Past Surgical History:   Procedure Laterality Date    CARDIAC SURGERY  2018    EYE SURGERY         Social History     Tobacco Use    Smoking status: Never    Smokeless tobacco: Never   Substance Use Topics    Alcohol use: Never    Drug use: Never       History reviewed. No pertinent family history.      Current Outpatient Medications:     ammonium lactate 12 % Crea, Apply 1 application topically once daily., Disp: 385 g, Rfl: 2    ascorbic acid, vitamin C, (VITAMIN C) 1000 MG tablet, Take 1,000 mg by mouth once daily., Disp: , Rfl:     aspirin (ECOTRIN) 81 MG EC  "tablet, Take 81 mg by mouth once daily., Disp: , Rfl:     atorvastatin (LIPITOR) 20 MG tablet, Take 20 mg by mouth once daily., Disp: , Rfl:     calcium carbonate 195 mg calcium (500 mg) Chew, Take 1 tablet by mouth once daily., Disp: , Rfl:     diclofenac sodium (VOLTAREN) 1 % Gel, Apply 2 g topically 4 (four) times daily., Disp: 450 g, Rfl: 1    famotidine (PEPCID) 40 MG tablet, Take 40 mg by mouth every evening., Disp: , Rfl:     fluticasone propionate (FLONASE) 50 mcg/actuation nasal spray, 50 sprays by Nasal route 2 (two) times a day., Disp: , Rfl:     hydrocortisone (ANUSOL-HC) 2.5 % rectal cream, Place 1 applicator rectally 2 (two) times daily., Disp: 28 g, Rfl: 2    ketoconazole (NIZORAL) 2 % cream, Apply topically once daily., Disp: 60 g, Rfl: 1    LIDOcaine (LIDODERM) 5 %, Place 1 patch onto the skin once daily. Remove & Discard patch within 12 hours or as directed by MD, Disp: 30 patch, Rfl: 1    metFORMIN (GLUCOPHAGE) 500 MG tablet, Take by mouth., Disp: , Rfl:     metoprolol succinate (TOPROL-XL) 50 MG 24 hr tablet, Take by mouth., Disp: , Rfl:     nystatin (MYCOSTATIN) powder, Apply topically 4 (four) times daily., Disp: 60 g, Rfl: 2    pantoprazole (PROTONIX) 40 MG tablet, Take 40 mg by mouth once daily., Disp: , Rfl:     pregabalin (LYRICA) 50 MG capsule, Take 1 capsule (50 mg total) by mouth 2 (two) times daily., Disp: 60 capsule, Rfl: 1    tiZANidine (ZANAFLEX) 4 MG tablet, Take 4 mg by mouth 2 (two) times daily as needed., Disp: , Rfl:     Review of patient's allergies indicates:  No Known Allergies      Review of Systems:  See HPI      Physical Exam:  /74 (BP Location: Right arm, Patient Position: Sitting, BP Method: Medium (Automatic))   Pulse (!) 57   Temp 97.7 °F (36.5 °C) (Oral)   Ht 5' 7" (1.702 m)   Wt 65.6 kg (144 lb 9.6 oz)   SpO2 99%   BMI 22.65 kg/m²     Gen- appears uncomfortable secondary to pain  HEENT- PERRLA, EOM intact, no conjunctival injection or scleral icterus.  " Nares patent without rhinorrhea.  Oropharynx clear without oropharyngeal erythema or edema.  Palpable cervical lymphadenopathy.  CV- RRR, no MRG, BL PT pulses palpable  Resp- breathing is nonlabored, speaking full sentences, lungs clear  GI- abdomen is soft, nontender, nondistended.  No palpable masses.  No appreciable hepatosplenomegaly.   - no suprapubic tenderness  Lymph-no palpable lymphadenopathy  Neuro- alert and responding appropriately questions commands.   MSK- mild lumbar paraspinal tenderness.   Skin- No wound or rash    Assessment/Plan:    Weight loss   - Diff includes not limited to abdominal pelvic malignancies, multiple myeloma, hyperthyroidism, protein/calorie malnutrition  - CBC, CMP, TSH, A1c, SPEP and serum immunoglobulins, ESR, UPEP, long-bone survey ordered  - CT abdomen and pelvis with contrast evaluate for intra-abdominal processes    DM   - repeat A1c today   - stable, continue metformin at current dose pending results    Follow up in 3 weeks    Hank Mccarthy MD  [employer] [facility] Resident HO2

## 2023-06-14 ENCOUNTER — HOSPITAL ENCOUNTER (OUTPATIENT)
Dept: RADIOLOGY | Facility: HOSPITAL | Age: 68
Discharge: HOME | End: 2023-06-14
Attending: STUDENT IN AN ORGANIZED HEALTH CARE EDUCATION/TRAINING PROGRAM
Payer: MEDICARE

## 2023-06-14 ENCOUNTER — LAB VISIT (OUTPATIENT)
Dept: FAMILY MEDICINE | Facility: CLINIC | Age: 68
End: 2023-06-14
Payer: MEDICARE

## 2023-06-14 DIAGNOSIS — R63.4 WEIGHT LOSS: ICD-10-CM

## 2023-06-14 DIAGNOSIS — K90.0 CELIAC DISEASE: ICD-10-CM

## 2023-06-14 LAB
ALBUMIN SERPL-MCNC: 3.2 G/DL (ref 3.4–4.8)
ALBUMIN/GLOB SERPL: 0.6 RATIO (ref 1.1–2)
ALP SERPL-CCNC: 127 UNIT/L (ref 40–150)
ALT SERPL-CCNC: 15 UNIT/L (ref 0–55)
AST SERPL-CCNC: 13 UNIT/L (ref 5–34)
BASOPHILS # BLD AUTO: 0.05 X10(3)/MCL
BASOPHILS NFR BLD AUTO: 0.4 %
BILIRUBIN DIRECT+TOT PNL SERPL-MCNC: 0.4 MG/DL
BUN SERPL-MCNC: 15 MG/DL (ref 8.4–25.7)
CALCIUM SERPL-MCNC: 9.6 MG/DL (ref 8.8–10)
CHLORIDE SERPL-SCNC: 103 MMOL/L (ref 98–107)
CO2 SERPL-SCNC: 25 MMOL/L (ref 23–31)
CREAT SERPL-MCNC: 0.79 MG/DL (ref 0.73–1.18)
EOSINOPHIL # BLD AUTO: 0.16 X10(3)/MCL (ref 0–0.9)
EOSINOPHIL NFR BLD AUTO: 1.4 %
ERYTHROCYTE [DISTWIDTH] IN BLOOD BY AUTOMATED COUNT: 14.2 % (ref 11.5–17)
ERYTHROCYTE [SEDIMENTATION RATE] IN BLOOD: 15 MM/HR (ref 0–15)
EST. AVERAGE GLUCOSE BLD GHB EST-MCNC: 125.5 MG/DL
GFR SERPLBLD CREATININE-BSD FMLA CKD-EPI: >60 MLS/MIN/1.73/M2
GLOBULIN SER-MCNC: 5.7 GM/DL (ref 2.4–3.5)
GLUCOSE SERPL-MCNC: 110 MG/DL (ref 82–115)
HBA1C MFR BLD: 6 %
HCT VFR BLD AUTO: 33.2 % (ref 42–52)
HGB BLD-MCNC: 11 G/DL (ref 14–18)
IGA SERPL-MCNC: 33 MG/DL (ref 101–645)
IGG SERPL-MCNC: 3810 MG/DL (ref 540–1822)
IGM SERPL-MCNC: 11 MG/DL (ref 22–240)
IMM GRANULOCYTES # BLD AUTO: 0.12 X10(3)/MCL (ref 0–0.04)
IMM GRANULOCYTES NFR BLD AUTO: 1.1 %
LYMPHOCYTES # BLD AUTO: 4.21 X10(3)/MCL (ref 0.6–4.6)
LYMPHOCYTES NFR BLD AUTO: 37.4 %
MCH RBC QN AUTO: 32.8 PG (ref 27–31)
MCHC RBC AUTO-ENTMCNC: 33.1 G/DL (ref 33–36)
MCV RBC AUTO: 99.1 FL (ref 80–94)
MONOCYTES # BLD AUTO: 1.11 X10(3)/MCL (ref 0.1–1.3)
MONOCYTES NFR BLD AUTO: 9.9 %
NEUTROPHILS # BLD AUTO: 5.6 X10(3)/MCL (ref 2.1–9.2)
NEUTROPHILS NFR BLD AUTO: 49.8 %
NRBC BLD AUTO-RTO: 0 %
PLATELET # BLD AUTO: 322 X10(3)/MCL (ref 130–400)
PMV BLD AUTO: 9.6 FL (ref 7.4–10.4)
POTASSIUM SERPL-SCNC: 4 MMOL/L (ref 3.5–5.1)
PROT SERPL-MCNC: 8.9 GM/DL (ref 5.8–7.6)
RBC # BLD AUTO: 3.35 X10(6)/MCL (ref 4.7–6.1)
SODIUM SERPL-SCNC: 134 MMOL/L (ref 136–145)
TSH SERPL-ACNC: 0.71 UIU/ML (ref 0.35–4.94)
WBC # SPEC AUTO: 11.25 X10(3)/MCL (ref 4.5–11.5)

## 2023-06-14 PROCEDURE — 83521 IG LIGHT CHAINS FREE EACH: CPT

## 2023-06-14 PROCEDURE — 82784 ASSAY IGA/IGD/IGG/IGM EACH: CPT

## 2023-06-14 PROCEDURE — 84165 PROTEIN E-PHORESIS SERUM: CPT

## 2023-06-14 PROCEDURE — 85652 RBC SED RATE AUTOMATED: CPT

## 2023-06-14 PROCEDURE — 86334 IMMUNOFIX E-PHORESIS SERUM: CPT

## 2023-06-14 PROCEDURE — 85025 COMPLETE CBC W/AUTO DIFF WBC: CPT

## 2023-06-14 PROCEDURE — 36415 COLL VENOUS BLD VENIPUNCTURE: CPT

## 2023-06-14 PROCEDURE — 77074 RADEX OSSEOUS SURVEY LMTD: CPT | Mod: TC

## 2023-06-15 ENCOUNTER — APPOINTMENT (OUTPATIENT)
Dept: LAB | Facility: HOSPITAL | Age: 68
End: 2023-06-15
Attending: STUDENT IN AN ORGANIZED HEALTH CARE EDUCATION/TRAINING PROGRAM
Payer: MEDICARE

## 2023-06-15 LAB
ALBUMIN % SPEP (OHS): 41.6
ALBUMIN SERPL-MCNC: 3.6 G/DL (ref 3.4–4.8)
ALBUMIN/GLOB SERPL: 0.7 RATIO (ref 1.1–2)
ALPHA 1 GLOB (OHS): 0.18 GM/DL
ALPHA 1 GLOB% (OHS): 2.14
ALPHA 2 GLOB % (OHS): 11.08
ALPHA 2 GLOB (OHS): 0.95 GM/DL
BETA GLOB (OHS): 0.83 GM/DL
BETA GLOB% (OHS): 9.65
GAMMA GLOBULIN % (OHS): 35.54
GAMMA GLOBULIN (OHS): 3.06 GM/DL
GLOBULIN SER-MCNC: 5 GM/DL (ref 2.4–3.5)
KAPPA LC FREE SER-MCNC: 99.4 MG/DL (ref 0.33–1.94)
KAPPA LC FREE/LAMBDA FREE SER: 292 {RATIO} (ref 0.26–1.65)
LAMBDA LC FREE SERPL-MCNC: 0.34 MG/DL (ref 0.57–2.63)
M SPIKE % (OHS): 26.55
M SPIKE (OHS): 2.28 GM/DL
PATH REV: NORMAL
PROT SERPL-MCNC: 8.6 GM/DL (ref 5.8–7.6)

## 2023-06-16 ENCOUNTER — OFFICE VISIT (OUTPATIENT)
Dept: FAMILY MEDICINE | Facility: CLINIC | Age: 68
End: 2023-06-16
Payer: MEDICARE

## 2023-06-16 VITALS
HEART RATE: 63 BPM | OXYGEN SATURATION: 99 % | HEIGHT: 67 IN | SYSTOLIC BLOOD PRESSURE: 119 MMHG | DIASTOLIC BLOOD PRESSURE: 70 MMHG | BODY MASS INDEX: 22.73 KG/M2 | TEMPERATURE: 98 F | RESPIRATION RATE: 18 BRPM | WEIGHT: 144.81 LBS

## 2023-06-16 DIAGNOSIS — C90.00 MULTIPLE MYELOMA, REMISSION STATUS UNSPECIFIED: Primary | ICD-10-CM

## 2023-06-16 PROBLEM — R53.83 TIRED: Status: RESOLVED | Noted: 2018-01-09 | Resolved: 2023-06-16

## 2023-06-16 PROBLEM — E78.2 MIXED HYPERLIPIDEMIA: Status: RESOLVED | Noted: 2022-08-30 | Resolved: 2023-06-16

## 2023-06-16 PROBLEM — I10 ESSENTIAL HYPERTENSION: Status: RESOLVED | Noted: 2018-01-10 | Resolved: 2023-06-16

## 2023-06-16 LAB — LDH SERPL-CCNC: 174 U/L (ref 125–220)

## 2023-06-16 PROCEDURE — 83615 LACTATE (LD) (LDH) ENZYME: CPT

## 2023-06-16 PROCEDURE — 36415 COLL VENOUS BLD VENIPUNCTURE: CPT

## 2023-06-16 PROCEDURE — 99215 OFFICE O/P EST HI 40 MIN: CPT | Mod: PBBFAC

## 2023-06-16 PROCEDURE — 83883 ASSAY NEPHELOMETRY NOT SPEC: CPT

## 2023-06-16 PROCEDURE — 83521 IG LIGHT CHAINS FREE EACH: CPT | Mod: 59

## 2023-06-16 NOTE — PROGRESS NOTES
[facility] Progress Note    ID:  Yuriy Díaz   MRN:  [id number]     6/16/2023    Chief Complaint:  Discuss labs/Multiple Myeloma    History of Present Illness:  Yuriy Díaz is a 67 y.o. male who presents to [facility] for       Low back pain/weight loss  Multiple Myeloma  Interval Hx:   No significant clinical changes. Labs show evidence of multiple myeloma. Pt is aware of diagnosis due to discussion with outside physicians (daughter entering Oncology fellowship) and wishes for urgent referral.         Chronic medical conditions not addressed today:  HTN  HLD  GERD  Lumbar spinal stenosis  Vit D def  Allergic rhinitis      Health Maintenance   Topic Date Due    Eye Exam  Never done    Hemoglobin A1c  12/14/2023    Foot Exam  01/12/2024    Lipid Panel  01/12/2024    Low Dose Statin  05/07/2024    TETANUS VACCINE  07/29/2030    Hepatitis C Screening  Completed       Past Medical History:   Diagnosis Date    Diabetes mellitus, type 2     GERD (gastroesophageal reflux disease)     Hyperlipidemia     Hypertension        Past Surgical History:   Procedure Laterality Date    CARDIAC SURGERY  2018    EYE SURGERY         Social History     Tobacco Use    Smoking status: Never    Smokeless tobacco: Never   Substance Use Topics    Alcohol use: Never    Drug use: Never       History reviewed. No pertinent family history.      Current Outpatient Medications:     ammonium lactate 12 % Crea, Apply 1 application topically once daily., Disp: 385 g, Rfl: 2    ascorbic acid, vitamin C, (VITAMIN C) 1000 MG tablet, Take 1,000 mg by mouth once daily., Disp: , Rfl:     aspirin (ECOTRIN) 81 MG EC tablet, Take 81 mg by mouth once daily., Disp: , Rfl:     atorvastatin (LIPITOR) 20 MG tablet, Take 20 mg by mouth once daily., Disp: , Rfl:     calcium carbonate 195 mg calcium (500 mg) Chew, Take 1 tablet by mouth once daily., Disp: , Rfl:     diclofenac sodium (VOLTAREN) 1 % Gel, Apply 2 g topically 4 (four) times daily., Disp: 450 g, Rfl: 1    " famotidine (PEPCID) 40 MG tablet, Take 40 mg by mouth every evening., Disp: , Rfl:     fluticasone propionate (FLONASE) 50 mcg/actuation nasal spray, 50 sprays by Nasal route 2 (two) times a day., Disp: , Rfl:     hydrocortisone (ANUSOL-HC) 2.5 % rectal cream, Place 1 applicator rectally 2 (two) times daily., Disp: 28 g, Rfl: 2    ketoconazole (NIZORAL) 2 % cream, Apply topically once daily., Disp: 60 g, Rfl: 1    LIDOcaine (LIDODERM) 5 %, Place 1 patch onto the skin once daily. Remove & Discard patch within 12 hours or as directed by MD, Disp: 30 patch, Rfl: 1    metFORMIN (GLUCOPHAGE) 500 MG tablet, Take by mouth., Disp: , Rfl:     metoprolol succinate (TOPROL-XL) 50 MG 24 hr tablet, Take by mouth., Disp: , Rfl:     nystatin (MYCOSTATIN) powder, Apply topically 4 (four) times daily., Disp: 60 g, Rfl: 2    pantoprazole (PROTONIX) 40 MG tablet, Take 40 mg by mouth once daily., Disp: , Rfl:     pregabalin (LYRICA) 50 MG capsule, Take 1 capsule (50 mg total) by mouth 2 (two) times daily., Disp: 60 capsule, Rfl: 1    tiZANidine (ZANAFLEX) 4 MG tablet, Take 4 mg by mouth 2 (two) times daily as needed., Disp: , Rfl:     Review of patient's allergies indicates:  No Known Allergies      Review of Systems:  See HPI      Physical Exam:  /70 (BP Location: Left arm, Patient Position: Sitting, BP Method: Medium (Automatic))   Pulse 63   Temp 97.8 °F (36.6 °C) (Oral)   Resp 18   Ht 5' 7" (1.702 m)   Wt 65.7 kg (144 lb 12.8 oz)   SpO2 99%   BMI 22.68 kg/m²     Gen- appears uncomfortable secondary to pain  HEENT- PERRLA, EOM intact, no conjunctival injection or scleral icterus.  CV- no lower extremity edema  Resp- breathing is nonlabored, speaking full sentences  GI- abdomen is soft, nontender, nondistended.  No palpable masses.  No appreciable hepatosplenomegaly.   - no suprapubic tenderness  Lymph-no palpable lymphadenopathy  Neuro- alert and responding appropriately questions commands.   MSK- mild lumbar " paraspinal tenderness.   Skin- No wound or rash    Assessment/Plan:    Multiple Myeloma   - Labs reviewed and discussed with pt  - Additional studies Serum FLC, LDH, Urine FLC, and Urine IEF  - PET/CT scan whole body  - Referral sent to Dr. Obregon, oncologist upon pt request    Keep scheduled follow up visit    Hank Mccarthy MD  [employer]  Resident [facility]

## 2023-06-19 LAB
KAPPA LC FREE SER-MCNC: 79.2 MG/DL (ref 0.33–1.94)
KAPPA LC FREE/LAMBDA FREE SER: 139 {RATIO} (ref 0.26–1.65)
LAMBDA LC FREE SERPL-MCNC: 0.57 MG/DL (ref 0.57–2.63)

## 2023-06-21 ENCOUNTER — PATIENT MESSAGE (OUTPATIENT)
Dept: ADMINISTRATIVE | Facility: HOSPITAL | Age: 68
End: 2023-06-21
Payer: MEDICARE

## 2023-06-21 LAB
ALBUMIN 24H UR ELPH-MRATE: NORMAL G/(24.H)
ALBUMIN/GLOB 24H UR ELPH: 0.07 {RATIO}
ALPHA1 GLOB 24H UR ELPH-MRATE: NORMAL MG/(24.H)
ALPHA2 GLOB 24H UR ELPH-MRATE: NORMAL MG/(24.H)
B-GLOBULIN 24H UR ELPH-MRATE: NORMAL MG/(24.H)
COLLECT DURATION TIME UR: NORMAL H
GAMMA GLOB 24H UR ELPH-MRATE: NORMAL MG/(24.H)
INTERPRETATION 24H UR IFE-IMP: NORMAL
KAPPA LC UR-MCNC: NORMAL MG/DL
KAPPA LC/LAMBDA UR: >36.4 {RATIO}
LAMBDA LC UR-MCNC: NORMAL MG/DL
M IMMUNOFIXATION FLAG, 24 HR, U: NORMAL
M PROTEIN 24H UR ELPH-MRATE: NORMAL MG/(24.H)
M PROTEIN 24H UR ELPH-MRATE: NORMAL MG/(24.H)
PROT 24H UR-MRATE: NORMAL G/(24.H)
PROT PATTERN 24H UR ELPH-IMP: NORMAL
SPECIMEN VOL 24H UR: NORMAL L

## 2023-06-22 ENCOUNTER — TELEPHONE (OUTPATIENT)
Dept: FAMILY MEDICINE | Facility: CLINIC | Age: 68
End: 2023-06-22
Payer: MEDICARE

## 2023-06-22 ENCOUNTER — TELEPHONE (OUTPATIENT)
Dept: HEMATOLOGY/ONCOLOGY | Facility: CLINIC | Age: 68
End: 2023-06-22
Payer: MEDICARE

## 2023-06-22 NOTE — NURSING
Received return call from patient. Spoke with patient for pre-visit introduction and to assist with any questions or concerns. Discussed arrival time, location of clinic, and what to expect at consult visit. Provided contact number to call. No concerns or needs at this time. Confirmed that patient plans to attend appointment for 6/26/23 at 10:40 am.

## 2023-06-22 NOTE — NURSING
Attempted pre-visit phone contact, unsuccessful. Left voice message with RN Abhilash contact for return call.

## 2023-06-23 PROBLEM — C90.00 IGG MULTIPLE MYELOMA: Status: ACTIVE | Noted: 2023-06-23

## 2023-06-23 PROBLEM — M54.9 BACK PAIN: Status: ACTIVE | Noted: 2023-06-23

## 2023-06-24 NOTE — PROGRESS NOTES
History:  Past Medical History:   Diagnosis Date    Diabetes mellitus, type 2     GERD (gastroesophageal reflux disease)     Hyperlipidemia     Hypertension    Past medical history:  NIDDM. Dyslipidemia.  GERD.  Lumbar spinal stenosis.  Vitamin-D deficiency.  Allergic rhinitis.  HTN.  Bell's palsy.  Celiac disease.  Hemorrhoids.  Mitral regurgitation 01/05/2018.  Rheumatic valve narrowing and leaking mitral valve.  -02/02/2018 ([facility]):  АНДРЕЙ, right minimally invasive anterior thoracotomy, right femoral artery and vein exploration, extensive right pleural adhesiolysis/pneumolysis, bilateral Maze/ablation (extensive), left atrial appendage clip placement for left atrial appendage exclusion, mitral valve repair (complex repair with 26 mm annuloplasty ring)  Social history:  .  Lives in [place], [place].  Five children.  Owns a [employer] business.  No history of tobacco, alcohol, or illicit drug abuse.    Family history:  Negative for cancers or blood dyscrasias.    Health maintenance:  PCP in family medicine clinic, [facility].  Had EGD and colonoscopy done 1 year ago by Dr. Zoltan Kapoor, [facility], apparently unremarkable (he gets the endoscopies done periodically because of history of celiac disease).  Past Surgical History:   Procedure Laterality Date    CARDIAC SURGERY  2018    EYE SURGERY        Social History     Socioeconomic History    Marital status:    Tobacco Use    Smoking status: Never    Smokeless tobacco: Never   Substance and Sexual Activity    Alcohol use: Never    Drug use: Never      History reviewed. No pertinent family history.     Reason for Follow-up:  Reason for consultation:  -multiple myeloma, IgG kappa  -worsening low back pain    History of Present Illness:   Anemia        Oncologic/Hematologic History:  Oncology History   IgG multiple myeloma   6/23/2023 Initial Diagnosis    IgG multiple myeloma     7/6/2023 -  Chemotherapy    Treatment  Summary   Plan Name: OP VRD - WEEKLY BORTEZOMIB LENALIDOMIDE DEXAMETHASONE Q3W  Treatment Goal: Curative  Status: Active  Start Date: 7/6/2023 (Planned)  End Date: 11/2/2023 (Planned)  Provider: Narciso Lundy MD  Chemotherapy: bortezomib (VELCADE) injection 1.3 mg/m2 (Treatment Plan), 1.3 mg/m2, Subcutaneous, Clinic/HOD 1 time, 0 of 6 cycles  lenalidomide 25 mg Cap, 25 mg, Oral, Daily, 0 of 1 cycle, Start date: --, End date: --     67-year-old gentleman, referred from [facility], with newly diagnosed multiple myeloma.      05/14/2023: [facility] note:  Low back pain, onset early March   - localized to back with occasional sharp pain down posterior aspect of right leg  - fluctuating in intensity, overall worsening since onset, no change in character, difficulty doing ADLs, walking with pain  - difficulty sleeping   - seen in urgent care and C multiple times  - MRI 5/4/23: degenerative disc disease and facet arthrosis, mild spinal canal stenosis L4-L5 greater than L3-L4, no high grade stenosis   - Toradol IM helps briefly  - side effects from gabapentin, Robaxin and Norco- GI and drowsiness   - Voltaren gel not helping  - stopped going to PT due to pain  - follows with Neurologist   - requests trial of tramadol and Lyrica   - going out of country soon, concerned he will not be able to go due to pain  - denies bowel/ bladder incontinence, night sweats, unexplained weight loss, chest pain or shortness of breath   Spinal stenosis   Acute bilateral low back pain with right-sided sciatica    05/18/2023: [facility] note:  Low back pain.  Onset early March 2023.  Localized lumbar region.  Constant.  Occasional radiation down posterior right leg.  Difficulty sleeping in bed because of exacerbation of pain by lying down flat.  Compensating by sleeping in a recliner or on sofa. MRI showed degenerative disc disease and facet arthrosis at multiple levels with mild canal stenosis.  - Has  attempted gabapentin, Robaxin, Norco, Lyrica, and tramadol with minimal relief.  Currently being managed with tramadol and Lyrica.  - Told by neurosurgery that he needs pain management referral because NS does not deal with injections  - Denies saddle anesthesia, bowel/bladder incontinence, weight loss, night sweats    Spinal stenosis of lumbar region  Degeneration of lumbar intervertebral disc  - Continue treatment with tramadol and Lyrica as prescribed  Bradycardia  - Patient aware of chronic bradycardia, states he has been this way for 3-4 years due to palpitations if HR >60  - Currently on Toprol XL 50 mg  - Follows with [facility]    06/13/2023:  [facility] note:   Acute Issues:   Weight loss- 6 weeks history of weight loss 12-14 lb unintentionally.  Reports appetite has been somewhat decreased secondary to pain though has noticed increased size of abdomen.  He denies nausea, vomiting, dysphagia, dyspepsia, bloody stools, staying changes in stool caliber, constipation, fevers or night sweats, nervousness/anxiousness, skin or hair changes.  Denies history of smoking or alcohol use.  Receives EGD and colonoscopy every 2 years per Dr. Kapoor, [place], last was roughly 1 year ago. PSA wnl 5/2023.   Chronic Issues: DM- med compliant metformin 500 daily, last A1C 6.1 in 1/2023.   Chronic medical conditions:  Hypertension.  Dyslipidemia.  GERD.  Lumbar spinal stenosis.  Vitamin-D deficiency.  Allergic rhinitis.  HTN      Investigations reviewed:  05/04/2023: MRI lumbar spine without contrast (worsening lumbar pain x5-7 weeks with bilateral sciatica):   Lumbar degenerative disc disease and facet arthrosis as detailed above with no acute pathology identified.    Mild spinal canal stenosis at L4-L5 greater than L3-L4 with no high-grade spinal canal stenosis.   Mild bilateral neural foramen stenosis at L3-L4 and on the left greater than right at L4-L5.    06/14/2023:  Skeletal survey:   No definite lesions to  suggest either lytic and or sclerotic metastatic changes.   Multiple compression deformities mostly in the lumbar spine but these are seen also in the thoracic spine; other imaging modalities might prove helpful for further assessment; some of these compression deformities appear to be relatively new as compared with an MR of May 2023  (There are some degenerative changes of the thoracic spine with a mild compression deformity of superior endplate of 1 of the lower thoracic vertebral body levels; there are multiple compression deformities of the lumbar spine including the superior endplate of L3 and L4 there is a compression deformity of the superior endplate of T11 with compression deformities of the superior endplate of T12 and a compression deformity of L1; some of these compression deformities appear to be new since the last exam (MR) of May 4, 2023 other imaging modalities might prove helpful for further assessment    Labs reviewed:  01/12/2023:  Hemoglobin 11.9.  MCV 98.7.  Ferritin 211.66.  Transferrin saturation 42%.  Vitamin B12 969.  Folate 13.9.    06/14/2023:  Hemoglobin 11.0.  MCV 99.1.  ESR 15, normal.    06/14/2023: IgG 3810 mg/dL, elevated.  IgM 11, suppressed.  IgA 33, suppressed.  2.28 gm/dL IgG kappa monoclonal protein on SPEP and BUBBA.  Kappa 99.4, elevated.  Lambda 0.3400, suppressed.  Kappa/lambda ratio 292, elevated.    05/01/2023: PSA 1.7, normal.  06/14/2023:  BUN 15.  Creatinine 0.79.  Calcium 9.6.  Albumin 3.6.  LFTs normal.  TSH normal.  06/16/2023:  , normal.    06/15/2023:  24 hour urine protein 288 mg, elevated (reference Range:  < 229). M spike 183 mg. Monoclonal kappa.    06/16/2023:  Urine:  Kappa 25.5 mg/dL; lambda < 0.7000 mg/dL; kappa/lambda ratio> 36.4    06/26/2023:  Pleasant gentleman who presents for initial medical oncology consultation, accompanied by his wife and family present who is a past interventional cardiologist.  Back pain for 2 months.  Initially started in  right groin.  Physical therapy has not helped.  10/10 severity.  Has been taking Tylenol without significant relief.  Secured to take narcotics because of constipation.  Underwent couple of spinal steroid shots 3 weeks ago, with minor relief.  Pain is present all the time.  Pain increases with changing position in bed as well as upon standing.  He finds it difficult to ambulate.  Pain does not radiate down lower extremities and is not accompanied with lower extremity weakness, numbness, urinary or bowel incontinence, or saddle anesthesia.  Also experiences muscle cramps.  Generalized weakness.  However, ECOG 2.  No fevers or chills.  No repeated infections.  Cramps in hands and feet.  Appetite is down.  Appetite has picked up in last 10 days.  Has lost 12-14 lb in last 1-1/2 months.  No abnormal bleeding or bruising.      Interval History:  [No matching plan found]   OP VRD - WEEKLY BORTEZOMIB LENALIDOMIDE DEXAMETHASONE Q3W       Medications:  Current Outpatient Medications on File Prior to Visit   Medication Sig Dispense Refill    ammonium lactate 12 % Crea Apply 1 application topically once daily. 385 g 2    ascorbic acid, vitamin C, (VITAMIN C) 1000 MG tablet Take 1,000 mg by mouth once daily.      aspirin (ECOTRIN) 81 MG EC tablet Take 81 mg by mouth once daily.      atorvastatin (LIPITOR) 20 MG tablet Take 20 mg by mouth once daily.      calcium carbonate 195 mg calcium (500 mg) Chew Take 1 tablet by mouth once daily.      diclofenac sodium (VOLTAREN) 1 % Gel Apply 2 g topically 4 (four) times daily. 450 g 1    famotidine (PEPCID) 40 MG tablet Take 40 mg by mouth every evening.      fluticasone propionate (FLONASE) 50 mcg/actuation nasal spray 50 sprays by Nasal route 2 (two) times a day.      hydrocortisone (ANUSOL-HC) 2.5 % rectal cream Place 1 applicator rectally 2 (two) times daily. 28 g 2    ketoconazole (NIZORAL) 2 % cream Apply topically once daily. 60 g 1    LIDOcaine (LIDODERM) 5 % Place 1 patch onto  the skin once daily. Remove & Discard patch within 12 hours or as directed by MD Bowser patch 1    metFORMIN (GLUCOPHAGE) 500 MG tablet Take by mouth.      metoprolol succinate (TOPROL-XL) 50 MG 24 hr tablet Take by mouth.      nystatin (MYCOSTATIN) powder Apply topically 4 (four) times daily. 60 g 2    pantoprazole (PROTONIX) 40 MG tablet Take 40 mg by mouth once daily.      pregabalin (LYRICA) 50 MG capsule Take 1 capsule (50 mg total) by mouth 2 (two) times daily. 60 capsule 1    tiZANidine (ZANAFLEX) 4 MG tablet Take 4 mg by mouth 2 (two) times daily as needed.       No current facility-administered medications on file prior to visit.       Review of Systems:   All systems reviewed and found to be negative except for the symptoms detailed above    Physical Examination:   VITAL SIGNS:   Vitals:    06/26/23 1116   BP: 130/81   Pulse: 69   Resp: 18   Temp: 98.4 °F (36.9 °C)     GENERAL:  In no apparent distress.    HEAD:  No signs of head trauma.  EYES:  Pupils are equal.  Extraocular motions intact.    EARS:  Hearing grossly intact.  MOUTH:  Oropharynx is normal.   NECK:  No adenopathy, no JVD.     CHEST:  Chest with clear breath sounds bilaterally.  No wheezes, rales, rhonchi.    CARDIAC:  Regular rate and rhythm.  S1 and S2, without murmurs, gallops, rubs.  VASCULAR:  No Edema.  Peripheral pulses normal and equal in all extremities.  ABDOMEN:  Soft, without detectable tenderness.  No sign of distention.  No   rebound or guarding, and no masses palpated.   Bowel Sounds normal.  MUSCULOSKELETAL:  Good range of motion of all major joints. Extremities without clubbing, cyanosis or edema.    NEUROLOGIC EXAM:  Alert and oriented x 3.  No focal sensory or strength deficits.   Speech normal.  Follows commands.  PSYCHIATRIC:  Mood normal.    No results for input(s): CBC in the last 72 hours.   No results for input(s): CMP in the last 72 hours.     Assessment:  Problem List Items Addressed This Visit          Oncology    IgG  multiple myeloma       Orthopedic    Back pain     Other Visit Diagnoses       Multiple myeloma, remission status unspecified              Multiple myeloma, IgG kappa:  -presentation: 03/2023:  Worsening low back pain, no associated neurological symptoms, 12-14 pound weight loss over 6 weeks, mild L4-L5 spinal canal stenosis on MRI (05/04/2023), lumbar DJD and facet arthrosis on MRI lumbar spine (05/04/2023)  -skeletal survey 06/14/2023: Negative for lytic or sclerotic lesions; multiple compression deformities lumbar spine and thoracic spine (new since MRI scan dated 05/04/2023)  -06/14/2023:  Mild anemia (hemoglobin 11.0)   -06/14/2023:  2.28 gm/dL IgG kappa M protein. Kappa 99.4, elevated.  Lambda 0.3400, suppressed.  Kappa/lambda ratio 292, elevated.  -06/14/2023:  BUN, creatinine, calcium, albumin normal  -06/15/2022:  24 hour urine protein 280 mg, elevated.  M spike 183 mg.  Urine BUBBA showed monoclonal kappa light chains.  -06/16/2023: Urine:  Kappa 25.5 mg/dL; lambda < 0.7000 mg/dL; kappa/lambda ratio> 36.4  06/16/2023:  , normal.  >>>  From the data available so far, patient has multiple myeloma (symptomatic), by virtue of:  1. Involved: Uninvolved serum FLC ratio =/> 100 and involved FLC concentration 10 mg/dL or higher (in this patient, kappa/lambda ratio age 292, and kappa light chain 99.4 mg/dL)   2. So far, investigations reveal no hypercalcemia, renal insufficiency, anemia, osteolytic bone lesions (skeletal survey); bone marrow biopsy is pending.      Co-morbidities:  NIDDM.  Dyslipidemia.  Hypertension.  Celiac disease.  History of rheumatic mitral valve disease, S/P Maze/ablation procedure 02/02/2018      Vaccination history:   COVID-19, MRNA, LN-S, PF (MODERNA FULL 0.5 ML DOSE) 8/18/2022 , 10/23/2021 , 3/17/2021 , 2/17/2021   Hepatitis A / Hepatitis B 8/18/2022 , 10/5/2021 , 7/29/2020   Influenza - Quadrivalent - MDCK - PF 10/5/2021   Influenza - Trivalent - [facility] - PF 9/10/2015   Meningococcal  Conjugate (MCV4O) 8/18/2022 , 4/17/2012   Meningococcal Conjugate (MCV4P) 4/17/2012   Pneumococcal Conjugate - 20 Valent 1/12/2023   Tdap 7/29/2020   Zoster 11/18/2020 , 7/29/2020   Zoster Recombinant 11/18/2020 , 7/29/2020         Plan:  Start Norco 5 mg p.o. q.6 hours PRN x1 month  Patient is already taking Linzess for constipation    I am going to place chemotherapy orders in computer (VRD every 3 weeks)  Do not start chemotherapy at this time   Start chemotherapy ASAP after PET-CT scan and bone marrow biopsy are done  (need not wait for the results of bone marrow biopsy)    Chemotherapy teaching education with nursing staff within a week  Follow-up visit with me in 3 weeks.  For back pain      Newly diagnosed multiple myeloma.      Need the following for workup:  Bone marrow aspiration and core biopsy   FISH panel on bone marrow including del(13), del(17p13), t(4;14), t(11;14), t(14;16), t(14;20), 1q21 gain/1q21 amplification, 1p deletion  NGS panel testing on bone marrow specimen  FDG PET-CT  NT-proBNP/BNP  Serum viscosity  Hepatitis-B and C testing  HIV screening    Need the following for staging of multiple myeloma:  Serum beta 2 microglobulin level  Serum albumin and LDH are normal.  Evaluation of chromosomal abnormalities by FISH on bone marrow specimen    Patient needs dental evaluation and preventive Dentistry before we can start him on bisphosphonate as bone targeted therapy.    Start levofloxacin 500 mg p.o. q.day X 12 weeks    Patient is symptomatic in that he is experiencing severe back pain   We will plan to initiate chemotherapy ASAP while, at the same time, attempting to get investigations completed ASAP as well.    We will initiate treatment with bortezomib/lenalidomide/dexamethasone (VRd), category 1 regimen  Cycle length 21 days  -bortezomib 1.3 mg per m2 subQ days 1, 8, and 15  -lenalidomide 25 mg p.o. daily, on days 1 through 14  -dexamethasone 40 mg p.o. with food days 1, 8, and 15  Cycle  length: 21 days    Routine antithrombotic prophylaxis is warranted.  Thromboembolism was reported in 2 to 6% of patients in clinical trials receiving VRd despite antithrombotic prophylaxis.   Bortezomib therapy may be associated with an increased risk of herpes zoster and infections not related to neutropenia.   Antiviral prophylaxis (eg, acyclovir 400 mg orally twice a day) should be administered to all patients receiving VRd.   Some clinicians also administer prophylactic trimethoprim-sulfamethoxazole (eg, one double-strength tablet once daily on Mondays, Wednesdays, and Fridays) during treatment.    Assess CBC with differential, electrolytes, renal function, and liver function prior to starting each cycle.  A CBC should also be performed prior to the day 8 and 15 doses of bortezomib during induction therapy.  Weekly assessment for peripheral neuropathy and/or neuropathic pain.  Monitor for hypotension during bortezomib therapy; adjustment of antihypertensives and/or administration of IV hydration may be needed.  -Thyroid function tests (TSH at baseline then every 2 to 3 months during lenalidomide treatment    -lenalidomide: Monitor for signs and symptoms of infection (if neutropenic), bleeding or bruising, hepatotoxicity, secondary malignancies, thromboembolism (eg, shortness of breath, chest pain, arm or leg swelling), dermatologic toxicity, tumor lysis syndrome, or hypersensitivity.  -bortezomib: Peripheral neuropathy, posterior reversible leukoencephalopathy syndrome, progressive multifocal leukoencephalopathy, tumor lysis syndrome, or hyper-/hypoglycemia. Monitor closely in patients with risk factors for heart failure or existing heart disease.    Autologous transplantation: Category 1 evidence supports proceeding directly after induction therapy to high-dose therapy and HCT  Renal dysfunction and advanced age are not contraindications to transplant    If response after primary therapy, then:  Autologous HCT  versus continuation of myeloma therapy or maintenance therapy versus tandem autologous transplant, etc.    Bone targeted therapy with zoledronic acid x2 years  If PET-CT scan shows involvement of spine, then, short course radiotherapy for palliation of pain  Supportive care treatment as indicated for symptom management  We will assess for candidacy for transplant after starting therapy and reassess for transplant as performance status improves    Supportive care:  -bone targeted treatment: Bisphosphonate, category 1; or denosumab to reduce risk of skeletal related events; denosumab is preferred in patients with renal insufficiency; assess vitamin-D status; baseline dental exam; monitor for osteonecrosis of the jaw; monitor renal dysfunction with bisphosphonate therapy  -continue bone targeted treatment (bisphosphonate or denosumab) for 2 years; continue beyond 2 years should be based on clinical judgment  -patients receiving denosumab for bone disease who subsequently discontinued therapy should be given maintenance denosumab every 6 months or a single dose of bisphosphonate to mitigate risk of rebound osteoporosis  -for hypercalcemia, zoledronic acid, denosumab, steroids, and/or calcitonin  -for hyperviscosity, plasmapheresis should be used as adjuvant therapy for symptomatic hyperviscosity  -intravenous immunoglobulin therapy should be considered in the setting of recurrent serious infection and/or hypogammaglobulinemia (IgG </= 400 mg/dL)  -pneumococcal conjugate vaccine, followed by pneumococcal polysaccharide vaccine 1 year later  -Influenza vaccination is recommended; 2 doses of high-dose inactivated quandaivalent influenza vaccine should be considered  -consider 12 weeks of levofloxacin 500 mg p.o. daily at the time of initial diagnosis of multiple myeloma  -COVID-19 vaccination  -highest risk for VTE is in the 1st 6 months following new diagnosis of multiple myeloma  -VTE prophylaxis if no contraindications  to anticoagulation agents or antiplatelets  Recommendations for VTE prophylaxis:  Aspirin  mg daily; low molecular weight heparin equivalent to enoxaparin 40 mg daily; Xarelto 10 mg daily; Eliquis 2.5 mg b.i.d.; Arixtra 2.5 mg daily; Coumadin with target INR 2.0-3.0    Follow-up in 3 weeks, with results of requested studies.    Above discussed at length with the patient.  All questions answered.    Discussed labs and scans and gave him copies of relevant reports.    Nature of multiple myeloma discussed.    Rationale of investigations discussed.  Tentative plans of chemotherapy and subsequent referral for consideration of autologous hematopoietic stem cell transplant, discussed.    He understands and agrees with this plan.  ----------------------------------    Discussion:      Lenalidomide:  Embryo fetal toxicity  Hematologic toxicity.  Neutropenia.  Thrombocytopenia.  Venous and arterial thromboembolism.  DVT.  PE.  MI.  Stroke.  Dizziness, fatigue.  Tumor lysis syndrome.  Heart failure.    Used with caution in patients with renal impairment.  Lenalidomide =/> 4 cycles may decrease the # of CD34 pos cells collected for autologous stem cell transplant.  DVT, PE, atrial fibrillation, renal failure are more likely in patients> 65 years  Hepatotoxicity  Hypersensitivity reactions.  Anaphylaxis.  Angioedema.  Skin rash.  Eosinophilia.  Immediate hypersensitivity reactions.  Second primary malignancy.  AML.  MDS.  Solid tumor malignancies.  Nonmelanoma skin cancers.    Monitoring with lenalidomide:  -CBC with differential: weekly for the first 2 cycles, every 2 weeks during the third cycle and monthly thereafter;  -serum creatinine, LFTs (periodically).  -Thyroid function tests (TSH at baseline then every 2 to 3 months during lenalidomide treatment  -ECG when clinically indicated. Monitor for signs and symptoms of infection (if neutropenic), bleeding or bruising, hepatotoxicity, secondary malignancies,  thromboembolism (eg, shortness of breath, chest pain, arm or leg swelling), dermatologic toxicity, tumor lysis syndrome, or hypersensitivity.  Patients who could become pregnant: Pregnancy test 10 to 14 days and 24 hours prior to initiating therapy, weekly during the first 4 weeks of treatment, then every 2 to 4 weeks through 4 weeks after therapy discontinued.    Bortezomib:  Bone marrow suppression.  Neutropenia.  Thrombocytopenia.  Acute CHF.  Nausea, vomiting, diarrhea, constipation, ileus.    Hepatotoxicity:  Herpes zoster and her PCP plaques reactivation.    Anaphylactic reactions.  Hypersensitive reactions.  Angioedema.  Laryngeal edema.    Hypotension.  Peripheral neuropathy.  Posterior reversible leukoencephalopathy syndrome.  Progressive multifocal leukoencephalopathy.  Pulmonary toxicity.  Pneumonitis.  Interstitial pneumonia.  Lung infiltrates.  ARDS.  Thrombotic microangiopathy.  Tumor lysis syndrome.      Monitoring parameters with bortezomib:  CBC, CMP  Blood pressure (to monitor for hypotension)  Peripheral neuropathy  Posterior reversible leukoencephalopathy syndrome, progressive multifocal leukoencephalopathy, tumor lysis syndrome, or hyper-/hypoglycemia. Monitor baseline chest x-ray and then periodic pulmonary function testing (with new or worsening pulmonary symptoms). Monitor closely in patients with risk factors for heart failure or existing heart disease.      Follow-up:  No follow-ups on file.    Answers submitted by the patient for this visit:  Review of Systems Questionnaire (Submitted on 6/19/2023)  appetite change : No  unexpected weight change: Yes  mouth sores: No  visual disturbance: No  cough: No  shortness of breath: No  chest pain: No  abdominal pain: No  diarrhea: No  frequency: No  back pain: Yes  rash: No  headaches: No  adenopathy: No  nervous/ anxious: No

## 2023-06-26 ENCOUNTER — DOCUMENTATION ONLY (OUTPATIENT)
Dept: HEMATOLOGY/ONCOLOGY | Facility: CLINIC | Age: 68
End: 2023-06-26
Payer: MEDICARE

## 2023-06-26 ENCOUNTER — OFFICE VISIT (OUTPATIENT)
Dept: HEMATOLOGY/ONCOLOGY | Facility: CLINIC | Age: 68
End: 2023-06-26
Attending: INTERNAL MEDICINE
Payer: MEDICARE

## 2023-06-26 VITALS
TEMPERATURE: 98 F | DIASTOLIC BLOOD PRESSURE: 81 MMHG | WEIGHT: 145.63 LBS | SYSTOLIC BLOOD PRESSURE: 130 MMHG | RESPIRATION RATE: 18 BRPM | HEART RATE: 69 BPM | HEIGHT: 67 IN | BODY MASS INDEX: 22.86 KG/M2 | OXYGEN SATURATION: 99 %

## 2023-06-26 DIAGNOSIS — R94.5 ABNORMAL RESULTS OF LIVER FUNCTION STUDIES: ICD-10-CM

## 2023-06-26 DIAGNOSIS — C90.00 IGG MULTIPLE MYELOMA: Primary | ICD-10-CM

## 2023-06-26 DIAGNOSIS — C90.00 IGG MULTIPLE MYELOMA: ICD-10-CM

## 2023-06-26 DIAGNOSIS — M54.9 BILATERAL BACK PAIN, UNSPECIFIED BACK LOCATION, UNSPECIFIED CHRONICITY: ICD-10-CM

## 2023-06-26 DIAGNOSIS — C90.00 MULTIPLE MYELOMA, REMISSION STATUS UNSPECIFIED: ICD-10-CM

## 2023-06-26 LAB
ALBUMIN SERPL-MCNC: 3.2 G/DL (ref 3.4–4.8)
ALBUMIN/GLOB SERPL: 0.6 RATIO (ref 1.1–2)
ALP SERPL-CCNC: 103 UNIT/L (ref 40–150)
ALT SERPL-CCNC: 14 UNIT/L (ref 0–55)
AST SERPL-CCNC: 16 UNIT/L (ref 5–34)
BASOPHILS # BLD AUTO: 0.05 X10(3)/MCL
BASOPHILS NFR BLD AUTO: 0.5 %
BILIRUBIN DIRECT+TOT PNL SERPL-MCNC: 0.2 MG/DL
BUN SERPL-MCNC: 12.7 MG/DL (ref 8.4–25.7)
CALCIUM SERPL-MCNC: 10.1 MG/DL (ref 8.8–10)
CHLORIDE SERPL-SCNC: 103 MMOL/L (ref 98–107)
CO2 SERPL-SCNC: 25 MMOL/L (ref 23–31)
CREAT SERPL-MCNC: 0.82 MG/DL (ref 0.73–1.18)
EOSINOPHIL # BLD AUTO: 0.21 X10(3)/MCL (ref 0–0.9)
EOSINOPHIL NFR BLD AUTO: 2.3 %
ERYTHROCYTE [DISTWIDTH] IN BLOOD BY AUTOMATED COUNT: 14.7 % (ref 11.5–17)
GFR SERPLBLD CREATININE-BSD FMLA CKD-EPI: >60 MLS/MIN/1.73/M2
GLOBULIN SER-MCNC: 5.7 GM/DL (ref 2.4–3.5)
GLUCOSE SERPL-MCNC: 83 MG/DL (ref 82–115)
HAV IGM SERPL QL IA: NONREACTIVE
HBV CORE IGM SERPL QL IA: NONREACTIVE
HBV SURFACE AG SERPL QL IA: NONREACTIVE
HCT VFR BLD AUTO: 33.6 % (ref 42–52)
HCV AB SERPL QL IA: NONREACTIVE
HGB BLD-MCNC: 11 G/DL (ref 14–18)
HIV 1+2 AB+HIV1 P24 AG SERPL QL IA: NONREACTIVE
IMM GRANULOCYTES # BLD AUTO: 0.15 X10(3)/MCL (ref 0–0.04)
IMM GRANULOCYTES NFR BLD AUTO: 1.6 %
LYMPHOCYTES # BLD AUTO: 2.37 X10(3)/MCL (ref 0.6–4.6)
LYMPHOCYTES NFR BLD AUTO: 25.8 %
MCH RBC QN AUTO: 33.2 PG (ref 27–31)
MCHC RBC AUTO-ENTMCNC: 32.7 G/DL (ref 33–36)
MCV RBC AUTO: 101.5 FL (ref 80–94)
MONOCYTES # BLD AUTO: 1.34 X10(3)/MCL (ref 0.1–1.3)
MONOCYTES NFR BLD AUTO: 14.6 %
NEUTROPHILS # BLD AUTO: 5.06 X10(3)/MCL (ref 2.1–9.2)
NEUTROPHILS NFR BLD AUTO: 55.2 %
NRBC BLD AUTO-RTO: 0 %
PLATELET # BLD AUTO: 309 X10(3)/MCL (ref 130–400)
PMV BLD AUTO: 9.4 FL (ref 7.4–10.4)
POTASSIUM SERPL-SCNC: 4.3 MMOL/L (ref 3.5–5.1)
PROT SERPL-MCNC: 8.9 GM/DL (ref 5.8–7.6)
RBC # BLD AUTO: 3.31 X10(6)/MCL (ref 4.7–6.1)
SODIUM SERPL-SCNC: 133 MMOL/L (ref 136–145)
WBC # SPEC AUTO: 9.18 X10(3)/MCL (ref 4.5–11.5)

## 2023-06-26 PROCEDURE — 99205 PR OFFICE/OUTPT VISIT, NEW, LEVL V, 60-74 MIN: ICD-10-PCS | Mod: S$PBB,,, | Performed by: INTERNAL MEDICINE

## 2023-06-26 PROCEDURE — 36415 COLL VENOUS BLD VENIPUNCTURE: CPT | Performed by: INTERNAL MEDICINE

## 2023-06-26 PROCEDURE — 87389 HIV-1 AG W/HIV-1&-2 AB AG IA: CPT | Performed by: INTERNAL MEDICINE

## 2023-06-26 PROCEDURE — 82232 ASSAY OF BETA-2 PROTEIN: CPT | Performed by: INTERNAL MEDICINE

## 2023-06-26 PROCEDURE — 99205 OFFICE O/P NEW HI 60 MIN: CPT | Mod: S$PBB,,, | Performed by: INTERNAL MEDICINE

## 2023-06-26 PROCEDURE — 83880 ASSAY OF NATRIURETIC PEPTIDE: CPT | Performed by: INTERNAL MEDICINE

## 2023-06-26 PROCEDURE — 80074 ACUTE HEPATITIS PANEL: CPT | Performed by: INTERNAL MEDICINE

## 2023-06-26 PROCEDURE — 99215 OFFICE O/P EST HI 40 MIN: CPT | Mod: PBBFAC | Performed by: INTERNAL MEDICINE

## 2023-06-26 PROCEDURE — 85025 COMPLETE CBC W/AUTO DIFF WBC: CPT | Performed by: INTERNAL MEDICINE

## 2023-06-26 PROCEDURE — 80053 COMPREHEN METABOLIC PANEL: CPT | Performed by: INTERNAL MEDICINE

## 2023-06-26 RX ORDER — BORTEZOMIB 3.5 MG/1
1.3 INJECTION, POWDER, LYOPHILIZED, FOR SOLUTION INTRAVENOUS; SUBCUTANEOUS
Status: CANCELLED | OUTPATIENT
Start: 2023-07-06

## 2023-06-26 RX ORDER — BORTEZOMIB 3.5 MG/1
1.3 INJECTION, POWDER, LYOPHILIZED, FOR SOLUTION INTRAVENOUS; SUBCUTANEOUS
Status: CANCELLED | OUTPATIENT
Start: 2023-07-20

## 2023-06-26 RX ORDER — HEPARIN 100 UNIT/ML
500 SYRINGE INTRAVENOUS
Status: CANCELLED | OUTPATIENT
Start: 2023-08-03

## 2023-06-26 RX ORDER — BORTEZOMIB 3.5 MG/1
1.3 INJECTION, POWDER, LYOPHILIZED, FOR SOLUTION INTRAVENOUS; SUBCUTANEOUS
Status: CANCELLED | OUTPATIENT
Start: 2023-08-03

## 2023-06-26 RX ORDER — SODIUM CHLORIDE 0.9 % (FLUSH) 0.9 %
10 SYRINGE (ML) INJECTION
Status: CANCELLED | OUTPATIENT
Start: 2023-08-11

## 2023-06-26 RX ORDER — HEPARIN 100 UNIT/ML
500 SYRINGE INTRAVENOUS
Status: CANCELLED | OUTPATIENT
Start: 2023-08-11

## 2023-06-26 RX ORDER — HEPARIN 100 UNIT/ML
500 SYRINGE INTRAVENOUS
Status: CANCELLED | OUTPATIENT
Start: 2023-07-06

## 2023-06-26 RX ORDER — HEPARIN 100 UNIT/ML
500 SYRINGE INTRAVENOUS
Status: CANCELLED | OUTPATIENT
Start: 2023-07-27

## 2023-06-26 RX ORDER — SODIUM CHLORIDE 0.9 % (FLUSH) 0.9 %
10 SYRINGE (ML) INJECTION
Status: CANCELLED | OUTPATIENT
Start: 2023-07-06

## 2023-06-26 RX ORDER — SODIUM CHLORIDE 0.9 % (FLUSH) 0.9 %
10 SYRINGE (ML) INJECTION
Status: CANCELLED | OUTPATIENT
Start: 2023-08-24

## 2023-06-26 RX ORDER — HEPARIN 100 UNIT/ML
500 SYRINGE INTRAVENOUS
Status: CANCELLED | OUTPATIENT
Start: 2023-07-20

## 2023-06-26 RX ORDER — BORTEZOMIB 3.5 MG/1
1.3 INJECTION, POWDER, LYOPHILIZED, FOR SOLUTION INTRAVENOUS; SUBCUTANEOUS
Status: CANCELLED | OUTPATIENT
Start: 2023-08-11

## 2023-06-26 RX ORDER — BORTEZOMIB 3.5 MG/1
1.3 INJECTION, POWDER, LYOPHILIZED, FOR SOLUTION INTRAVENOUS; SUBCUTANEOUS
Status: CANCELLED | OUTPATIENT
Start: 2023-07-27

## 2023-06-26 RX ORDER — BORTEZOMIB 3.5 MG/1
1.3 INJECTION, POWDER, LYOPHILIZED, FOR SOLUTION INTRAVENOUS; SUBCUTANEOUS
Status: CANCELLED | OUTPATIENT
Start: 2023-08-24

## 2023-06-26 RX ORDER — SODIUM CHLORIDE 0.9 % (FLUSH) 0.9 %
10 SYRINGE (ML) INJECTION
Status: CANCELLED | OUTPATIENT
Start: 2023-08-03

## 2023-06-26 RX ORDER — SODIUM CHLORIDE 0.9 % (FLUSH) 0.9 %
10 SYRINGE (ML) INJECTION
Status: CANCELLED | OUTPATIENT
Start: 2023-07-20

## 2023-06-26 RX ORDER — SODIUM CHLORIDE 0.9 % (FLUSH) 0.9 %
10 SYRINGE (ML) INJECTION
Status: CANCELLED | OUTPATIENT
Start: 2023-07-27

## 2023-06-26 RX ORDER — HEPARIN 100 UNIT/ML
500 SYRINGE INTRAVENOUS
Status: CANCELLED | OUTPATIENT
Start: 2023-08-24

## 2023-06-26 RX ORDER — LEVOFLOXACIN 500 MG/1
500 TABLET, FILM COATED ORAL DAILY
Qty: 30 TABLET | Refills: 2 | Status: SHIPPED | OUTPATIENT
Start: 2023-06-26 | End: 2023-09-25 | Stop reason: SDUPTHER

## 2023-06-26 NOTE — Clinical Note
Other orders: Start Norco 5 mg p.o. q.6 hours PRN x1 month Patient is already taking Linzess for constipation  I am going to place chemotherapy orders in computer (VRD every 3 weeks) Do not start chemotherapy at this time   Start chemotherapy ASAP after PET-CT scan and bone marrow biopsy are done (need not wait for the results of bone marrow biopsy) Check with me before starting chemotherapy   Chemotherapy teaching education with nursing staff within a week Follow-up visit with me in 3 weeks.

## 2023-06-26 NOTE — Clinical Note
For clarification, keep chemotherapy on hold until FDG PET-CT and bone marrow biopsy done. We do not need to wait for the bone marrow biopsy report before starting chemotherapy.   Patient can start chemotherapy ASAP after FDG PET-CT and bone marrow biopsy are done.   Please check with me before starting chemotherapy.

## 2023-06-26 NOTE — PROGRESS NOTES
Labs reviewed:  Calcium 10.1.  Albumin 3.2.    Check ionized calcium level   Hydrate with 1 L of normal saline in our office today

## 2023-06-26 NOTE — Clinical Note
Orders for today:   Check CBC and CMP  Bone marrow aspiration and core biopsy FISH and NGS panel testing on bone marrow biopsy  FDG PET-CT ASAP NT-proBNP/BNP Serum viscosity Hepatitis-B and C testing HIV screening Serum beta 2 microglobulin level Serum albumin and LDH are normal.  Dental evaluation and preventive Dentistry before we can start him on Zometa or Xgeva  Start levofloxacin 500 mg p.o. q.day for 12 weeks.  Follow-up in 3 weeks, with results of requested studies.

## 2023-06-26 NOTE — NURSING
Met with patient today after his consult appointment with Dr. Lundy. Patient attended appointment accompanied by his wife and family friend Dr. Morrison. Provided patient with RN Abhilash contact and explained role in patient's care.    NCCN Distress Screen completed with a reported distress score of 1. Patient indicates his distress is related to his pain which effects his sleep. Reports he has resources of Medicare/Medicaid/Part D benefits. Social support reported as very good. Provided information on counseling services. Patient says that he watches TV to help him relax. Resource folder with written information of community and cancer resources, disability benefits, nutritional hints during cancer treatment, and Get a Game Plan hurricane preparedness given to patient. Provided patient resource booklet for Multiple Myeloma. Informed of [facility] and [employer] referral that he may need to utilize during the course of his treatment. Patient verbalized understanding and agreed to be referred. Patient agrees to contact BOBBY Hung if any needs or concerns arise.   Provided written schedule of appointments. Reviewed medications sent to pharmacy.    Oncology Navigation   Intake  Initial Nurse Navigator Contact: 06/22/23  Date Worked: 06/26/23  Appointment Date: 06/26/23     Treatment  Current Status: Staging work-up       Medical Oncologist: Narciso Lnudy MD  Consult Date: 06/26/23  Chemotherapy: Planned  Chemotherapy Regimen: bortezomib/lenalidomide/dexamethasone (VRd), category 1 regimen       Procedures: Biopsy; PET scan    General Referrals: [employer]; [facility]          Support Systems: Spouse/significant other; Family members; Friends / neighbors     Acuity  Systemic Treatment - predicted or initiated: Chemotherapy Regimen with Multiple drugs (+1)  Treatment Tolerability: Has not started treatment yet/treatment fully completed and side effects resolved  ECOG:  1  Comorbidities in Medical History: 2   Needed: 0  Support: 0  Verbalizes Financial Concerns: 0  Transportation: 0  Mental Health: PHQ Score: 0  Verbalizes the need for more education: 0  Navigation Acuity: 6     Follow Up  No follow-ups on file.

## 2023-06-27 ENCOUNTER — INFUSION (OUTPATIENT)
Dept: INFUSION THERAPY | Facility: HOSPITAL | Age: 68
End: 2023-06-27
Attending: INTERNAL MEDICINE
Payer: MEDICARE

## 2023-06-27 ENCOUNTER — HOSPITAL ENCOUNTER (OUTPATIENT)
Dept: RADIOLOGY | Facility: HOSPITAL | Age: 68
Discharge: HOME | End: 2023-06-27
Attending: STUDENT IN AN ORGANIZED HEALTH CARE EDUCATION/TRAINING PROGRAM
Payer: MEDICARE

## 2023-06-27 ENCOUNTER — HOSPITAL ENCOUNTER (OUTPATIENT)
Facility: HOSPITAL | Age: 68
Discharge: HOME | End: 2023-06-27
Attending: PATHOLOGY | Admitting: PATHOLOGY
Payer: MEDICARE

## 2023-06-27 VITALS
DIASTOLIC BLOOD PRESSURE: 63 MMHG | RESPIRATION RATE: 19 BRPM | HEIGHT: 67 IN | TEMPERATURE: 98 F | OXYGEN SATURATION: 98 % | HEART RATE: 66 BPM | WEIGHT: 145 LBS | BODY MASS INDEX: 22.76 KG/M2 | SYSTOLIC BLOOD PRESSURE: 116 MMHG

## 2023-06-27 DIAGNOSIS — C90.00 IGG MULTIPLE MYELOMA: ICD-10-CM

## 2023-06-27 DIAGNOSIS — R63.4 WEIGHT LOSS: ICD-10-CM

## 2023-06-27 DIAGNOSIS — C90.00 IGG MULTIPLE MYELOMA: Primary | ICD-10-CM

## 2023-06-27 LAB
B2 MICROGLOB SERPL-MCNC: 3.18 MCG/ML (ref 1.21–2.7)
BASOPHILS # BLD AUTO: 0.05 X10(3)/MCL
BASOPHILS NFR BLD AUTO: 0.5 %
EOSINOPHIL # BLD AUTO: 0.19 X10(3)/MCL (ref 0–0.9)
EOSINOPHIL NFR BLD AUTO: 1.9 %
ERYTHROCYTE [DISTWIDTH] IN BLOOD BY AUTOMATED COUNT: 14.6 % (ref 11.5–17)
HCT VFR BLD AUTO: 32.1 % (ref 42–52)
HGB BLD-MCNC: 10.4 G/DL (ref 14–18)
IMM GRANULOCYTES # BLD AUTO: 0.16 X10(3)/MCL (ref 0–0.04)
IMM GRANULOCYTES NFR BLD AUTO: 1.6 %
LYMPHOCYTES # BLD AUTO: 2.58 X10(3)/MCL (ref 0.6–4.6)
LYMPHOCYTES NFR BLD AUTO: 26.2 %
MCH RBC QN AUTO: 32.9 PG (ref 27–31)
MCHC RBC AUTO-ENTMCNC: 32.4 G/DL (ref 33–36)
MCV RBC AUTO: 101.6 FL (ref 80–94)
MONOCYTES # BLD AUTO: 1.43 X10(3)/MCL (ref 0.1–1.3)
MONOCYTES NFR BLD AUTO: 14.5 %
NEUTROPHILS # BLD AUTO: 5.45 X10(3)/MCL (ref 2.1–9.2)
NEUTROPHILS NFR BLD AUTO: 55.3 %
NRBC BLD AUTO-RTO: 0 %
NT-PROBNP SERPL IA-MCNC: 86 PG/ML
PATH REV: NORMAL
PLATELET # BLD AUTO: 269 X10(3)/MCL (ref 130–400)
PMV BLD AUTO: 9.1 FL (ref 7.4–10.4)
RBC # BLD AUTO: 3.16 X10(6)/MCL (ref 4.7–6.1)
WBC # SPEC AUTO: 9.86 X10(3)/MCL (ref 4.5–11.5)

## 2023-06-27 PROCEDURE — 99152 MOD SED SAME PHYS/QHP 5/>YRS: CPT | Performed by: PATHOLOGY

## 2023-06-27 PROCEDURE — 38222 DX BONE MARROW BX & ASPIR: CPT | Performed by: PATHOLOGY

## 2023-06-27 PROCEDURE — 25000003 PHARM REV CODE 250: Performed by: INTERNAL MEDICINE

## 2023-06-27 PROCEDURE — 74178 CT ABD&PLV WO CNTR FLWD CNTR: CPT | Mod: TC

## 2023-06-27 PROCEDURE — 85025 COMPLETE CBC W/AUTO DIFF WBC: CPT | Performed by: PATHOLOGY

## 2023-06-27 PROCEDURE — 96360 HYDRATION IV INFUSION INIT: CPT

## 2023-06-27 PROCEDURE — 85097 BONE MARROW INTERPRETATION: CPT | Mod: TC | Performed by: PATHOLOGY

## 2023-06-27 PROCEDURE — 85610 PROTHROMBIN TIME: CPT | Performed by: PATHOLOGY

## 2023-06-27 PROCEDURE — 85060 BLOOD SMEAR INTERPRETATION: CPT | Performed by: PATHOLOGY

## 2023-06-27 PROCEDURE — 63600175 PHARM REV CODE 636 W HCPCS: Performed by: PATHOLOGY

## 2023-06-27 PROCEDURE — 25000003 PHARM REV CODE 250: Performed by: PATHOLOGY

## 2023-06-27 PROCEDURE — 25500020 PHARM REV CODE 255: Performed by: STUDENT IN AN ORGANIZED HEALTH CARE EDUCATION/TRAINING PROGRAM

## 2023-06-27 RX ORDER — MIDAZOLAM HYDROCHLORIDE 1 MG/ML
INJECTION INTRAMUSCULAR; INTRAVENOUS
Status: COMPLETED | OUTPATIENT
Start: 2023-06-27 | End: 2023-06-27

## 2023-06-27 RX ORDER — SODIUM CHLORIDE 0.9 % (FLUSH) 0.9 %
10 SYRINGE (ML) INJECTION
Status: CANCELLED | OUTPATIENT
Start: 2023-06-27

## 2023-06-27 RX ORDER — SODIUM CHLORIDE, SODIUM LACTATE, POTASSIUM CHLORIDE, CALCIUM CHLORIDE 600; 310; 30; 20 MG/100ML; MG/100ML; MG/100ML; MG/100ML
INJECTION, SOLUTION INTRAVENOUS CONTINUOUS
Status: DISCONTINUED | OUTPATIENT
Start: 2023-06-27 | End: 2023-06-27 | Stop reason: HOSPADM

## 2023-06-27 RX ORDER — LIDOCAINE HYDROCHLORIDE 20 MG/ML
INJECTION, SOLUTION INFILTRATION; PERINEURAL
Status: COMPLETED | OUTPATIENT
Start: 2023-06-27 | End: 2023-06-27

## 2023-06-27 RX ADMIN — IOPAMIDOL 100 ML: 755 INJECTION, SOLUTION INTRAVENOUS at 01:06

## 2023-06-27 RX ADMIN — DIATRIZOATE MEGLUMINE AND DIATRIZOATE SODIUM 30 ML: 660; 100 LIQUID ORAL; RECTAL at 01:06

## 2023-06-27 RX ADMIN — SODIUM CHLORIDE 1000 ML: 9 INJECTION, SOLUTION INTRAVENOUS at 08:06

## 2023-06-27 NOTE — PROCEDURES
"Yuriy Díaz is a 67 y.o. male patient.    Temp: 98.4 °F (36.9 °C) (06/27/23 1026)  Pulse: 62 (06/27/23 1026)  Resp: 18 (06/27/23 1026)  BP: 132/74 (06/27/23 1026)  SpO2: 98 % (06/27/23 1026)  Weight: 66.7 kg (147 lb) (06/27/23 1026)  Height: 5' 8" (172.7 cm) (06/27/23 1026)       Bone marrow    Date/Time: 6/27/2023 10:51 AM  Performed by: Namita Daniels MD  Authorized by: Namita Daniels MD     Consent Done?: Yes (Verbal) and Yes (Written)   Immediately prior to procedure a time out was called to verify the correct patient, procedure, equipment, support staff and site/side marked as required.   Patient was prepped and draped in the usual sterile fashion.    I was present for the entire procedure.    Position: left lateral  Anesthesia: local infiltration  Local anesthetic: lidocaine 2% without epinephrine  Aspiration?: Yes   Biopsy?: Yes    Specimen source: left posterior iliac crest  Estimated blood loss (cc):2  Patient tolerated the procedure well with no immediate complications.  Post-operative instructions were provided for the patient.  Patient was discharged and will follow up if any complications occur.     Nursing staff at bedside and monitored patient during procedure. Versed 3 mg IV administered. 10 ml of lidocaine 2% administered to OP site (left posterior iliac crest).    6/27/2023    "

## 2023-06-27 NOTE — DISCHARGE SUMMARY
[facility]  Discharge Note  Short Stay    Procedure(s) (LRB):  ASPIRATION, BONE MARROW (N/A)  Biopsy-bone marrow (N/A)      OUTCOME: Patient tolerated treatment/procedure well without complication and is now ready for discharge.    DISPOSITION: Home or Self Care    FINAL DIAGNOSIS:  Multiple Myeloma    FOLLOWUP: In clinic (Oncology)    DISCHARGE INSTRUCTIONS:  See bone marrow instructions sheet.  Return to ER if signs of infection.     Clinical Reference Documents Added to Patient Instructions         Document    BONE MARROW ASPIRATION OR BIOPSY (ENGLISH)            TIME SPENT ON DISCHARGE: 5 minutes

## 2023-06-27 NOTE — PROGRESS NOTES
Pre-operative note  Consulted for bone marrow aspiration and biopsy by Kamron.  Indication:  Multiple Myeloma  Patient chart reviewed.  Patient seen this am and no complaints.    No changes to ordering provider's previous assessment.  PE: Alert and oriented.  Vitals stable.    Pre-procedure labs reviewed.    Informed consent obtained from patient.    Alternative treatment options, risks, and complications discussed with patient.    Patient voices understanding and wishes to proceed with procedure.  Discussed discharge instructions and transportation with patient.     Plan:  Bone marrow biopsy procedure to be performed on 5W.

## 2023-06-28 ENCOUNTER — DOCUMENTATION ONLY (OUTPATIENT)
Dept: HEMATOLOGY/ONCOLOGY | Facility: CLINIC | Age: 68
End: 2023-06-28
Payer: MEDICARE

## 2023-06-28 VITALS
WEIGHT: 147 LBS | OXYGEN SATURATION: 99 % | DIASTOLIC BLOOD PRESSURE: 70 MMHG | HEIGHT: 68 IN | HEART RATE: 68 BPM | TEMPERATURE: 98 F | SYSTOLIC BLOOD PRESSURE: 135 MMHG | RESPIRATION RATE: 20 BRPM | BODY MASS INDEX: 22.28 KG/M2

## 2023-06-28 DIAGNOSIS — M54.9 BILATERAL BACK PAIN, UNSPECIFIED BACK LOCATION, UNSPECIFIED CHRONICITY: ICD-10-CM

## 2023-06-28 DIAGNOSIS — C90.00 IGG MULTIPLE MYELOMA: Primary | ICD-10-CM

## 2023-06-28 RX ORDER — HYDROCODONE BITARTRATE AND ACETAMINOPHEN 10; 325 MG/1; MG/1
1 TABLET ORAL EVERY 6 HOURS PRN
Qty: 120 TABLET | Refills: 0 | Status: SHIPPED | OUTPATIENT
Start: 2023-06-28 | End: 2023-07-28

## 2023-06-28 RX ORDER — HYDROCODONE BITARTRATE AND ACETAMINOPHEN 10; 325 MG/1; MG/1
1 TABLET ORAL EVERY 6 HOURS PRN
Qty: 90 TABLET | Refills: 0 | Status: CANCELLED | OUTPATIENT
Start: 2023-06-28

## 2023-06-28 RX ORDER — HYDROCODONE BITARTRATE AND ACETAMINOPHEN 10; 325 MG/1; MG/1
1 TABLET ORAL EVERY 6 HOURS PRN
COMMUNITY
End: 2023-06-28 | Stop reason: SDUPTHER

## 2023-06-28 NOTE — NURSING
Dental response for biphosphonate treatment scanned into Epic media.    BOBBY Hung spoke with patient who reports he has dental appointment today to address dental issues.     Patient says his pain rating is 10/10 to his back. Discussed pain medication. Patient states he previously tried Norco 5mg that he found to be ineffective for his pain so is not taking any pain medication at this time.     Please advise.

## 2023-06-29 ENCOUNTER — DOCUMENTATION ONLY (OUTPATIENT)
Dept: HEMATOLOGY/ONCOLOGY | Facility: CLINIC | Age: 68
End: 2023-06-29
Payer: MEDICARE

## 2023-06-29 ENCOUNTER — HOSPITAL ENCOUNTER (OUTPATIENT)
Dept: RADIOLOGY | Facility: HOSPITAL | Age: 68
Discharge: HOME | End: 2023-06-29
Attending: STUDENT IN AN ORGANIZED HEALTH CARE EDUCATION/TRAINING PROGRAM
Payer: MEDICARE

## 2023-06-29 DIAGNOSIS — C90.00 IGG MULTIPLE MYELOMA: Primary | ICD-10-CM

## 2023-06-29 DIAGNOSIS — C90.00 MULTIPLE MYELOMA, REMISSION STATUS UNSPECIFIED: ICD-10-CM

## 2023-06-29 PROCEDURE — A9552 F18 FDG: HCPCS

## 2023-06-29 PROCEDURE — 78816 PET IMAGE W/CT FULL BODY: CPT | Mod: TC,PI

## 2023-06-29 RX ORDER — SODIUM CHLORIDE 0.9 % (FLUSH) 0.9 %
10 SYRINGE (ML) INJECTION
Status: CANCELLED | OUTPATIENT
Start: 2023-07-28

## 2023-06-29 RX ORDER — HEPARIN 100 UNIT/ML
500 SYRINGE INTRAVENOUS
Status: CANCELLED | OUTPATIENT
Start: 2023-06-30

## 2023-06-29 RX ORDER — SODIUM CHLORIDE 0.9 % (FLUSH) 0.9 %
10 SYRINGE (ML) INJECTION
Status: CANCELLED | OUTPATIENT
Start: 2023-06-30

## 2023-06-29 RX ORDER — HEPARIN 100 UNIT/ML
500 SYRINGE INTRAVENOUS
Status: CANCELLED | OUTPATIENT
Start: 2023-07-28

## 2023-06-29 NOTE — PROGRESS NOTES
Dental clearance noted.      Start Zometa 4 mg IV every 4 weeks  Calcium and vitamin-D supplements along with Zometa.  Check CMP at least monthly before Zometa infusion.

## 2023-06-30 ENCOUNTER — TELEPHONE (OUTPATIENT)
Dept: HEMATOLOGY/ONCOLOGY | Facility: CLINIC | Age: 68
End: 2023-06-30
Payer: MEDICARE

## 2023-06-30 LAB
HEMATOLOGIST REVIEW: NORMAL
POCT GLUCOSE: 105 MG/DL (ref 70–110)

## 2023-06-30 NOTE — NURSING
Received phone call from patient regarding his scans. Patient needing to confirm that Dr. Lundy received the scan reports and information on his biopsy. Informed patient that scan and biopsy reports are in his electronic medical record for Dr. Lundy to review. Confirmed that patient will meet with the NP for chemo teaching on 7/5/23. Patient voiced understanding.

## 2023-07-03 NOTE — PROGRESS NOTES
I reviewed History, PE, A/P and medical record.  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  I agree with resident, care reasonable and necessary.   I evaluated the patient with resident at time of visit, participated in key parts of H/P and management was discussed.    Labs reviewed with DR Mccarthy, he consulted ONC who requested add'l labs as ordered and will set up appt in Oncology    Yuliet Rivero MD  [facility] - [place]  [facility]

## 2023-07-05 ENCOUNTER — OFFICE VISIT (OUTPATIENT)
Dept: HEMATOLOGY/ONCOLOGY | Facility: CLINIC | Age: 68
End: 2023-07-05
Payer: MEDICARE

## 2023-07-05 VITALS
HEART RATE: 64 BPM | WEIGHT: 148 LBS | SYSTOLIC BLOOD PRESSURE: 124 MMHG | DIASTOLIC BLOOD PRESSURE: 73 MMHG | HEIGHT: 68 IN | OXYGEN SATURATION: 100 % | TEMPERATURE: 98 F | BODY MASS INDEX: 22.43 KG/M2 | RESPIRATION RATE: 20 BRPM

## 2023-07-05 DIAGNOSIS — C90.00 IGG MULTIPLE MYELOMA: Primary | ICD-10-CM

## 2023-07-05 PROCEDURE — 99215 OFFICE O/P EST HI 40 MIN: CPT | Mod: S$PBB,,, | Performed by: NURSE PRACTITIONER

## 2023-07-05 PROCEDURE — 99215 PR OFFICE/OUTPT VISIT, EST, LEVL V, 40-54 MIN: ICD-10-PCS | Mod: S$PBB,,, | Performed by: NURSE PRACTITIONER

## 2023-07-05 PROCEDURE — 99215 OFFICE O/P EST HI 40 MIN: CPT | Mod: PBBFAC | Performed by: NURSE PRACTITIONER

## 2023-07-05 RX ORDER — ACYCLOVIR 400 MG/1
400 TABLET ORAL 2 TIMES DAILY
Qty: 60 TABLET | Refills: 4 | Status: SHIPPED | OUTPATIENT
Start: 2023-07-05 | End: 2023-10-11 | Stop reason: SDUPTHER

## 2023-07-05 RX ORDER — SULFAMETHOXAZOLE AND TRIMETHOPRIM 800; 160 MG/1; MG/1
TABLET ORAL
Qty: 48 TABLET | Refills: 3 | Status: SHIPPED | OUTPATIENT
Start: 2023-07-05 | End: 2023-11-02 | Stop reason: SDUPTHER

## 2023-07-05 RX ORDER — LENALIDOMIDE 25 MG/1
25 CAPSULE ORAL DAILY
Qty: 14 CAPSULE | Refills: 4 | Status: SHIPPED | OUTPATIENT
Start: 2023-07-05 | End: 2023-07-07 | Stop reason: SDUPTHER

## 2023-07-05 RX ORDER — DEXAMETHASONE 4 MG/1
40 TABLET ORAL
Qty: 30 TABLET | Refills: 5 | Status: SHIPPED | OUTPATIENT
Start: 2023-07-05 | End: 2023-08-10 | Stop reason: SDUPTHER

## 2023-07-05 NOTE — PROGRESS NOTES
Reason for Follow-up:  Reason for consultation:  -multiple myeloma, IgG kappa  -worsening low back pain    Current Treatment:  We will initiate treatment with bortezomib/lenalidomide/dexamethasone (VRd), category 1 regimen  Cycle length 21 days  -bortezomib 1.3 mg per m2 subQ days 1, 8, and 15  -lenalidomide 25 mg p.o. daily, on days 1 through 14  -dexamethasone 40 mg p.o. with food days 1, 8, and 15  Cycle length: 21 days    Start 7/7/2023    Zometa 4mg IV every 28 days  Start 7/7/2023    Treatment History:    Past medical history:  NIDDM. Dyslipidemia.  GERD.  Lumbar spinal stenosis.  Vitamin-D deficiency.  Allergic rhinitis.  HTN.  Bell's palsy.  Celiac disease.  Hemorrhoids.  Mitral regurgitation 01/05/2018.  Rheumatic valve narrowing and leaking mitral valve.  -02/02/2018 ([facility]):  АНДРЕЙ, right minimally invasive anterior thoracotomy, right femoral artery and vein exploration, extensive right pleural adhesiolysis/pneumolysis, bilateral Maze/ablation (extensive), left atrial appendage clip placement for left atrial appendage exclusion, mitral valve repair (complex repair with 26 mm annuloplasty ring)  Social history:  .  Lives in [place], [place].  Five children.  Owns a [employer] business.  No history of tobacco, alcohol, or illicit drug abuse.    Family history:  Negative for cancers or blood dyscrasias.    Health maintenance:  PCP in family medicine clinic, [facility].  Had EGD and colonoscopy done 1 year ago by Dr. Zoltan Kapoor, [facility], apparently unremarkable (he gets the endoscopies done periodically because of history of celiac disease).      History of Present Illness:     Oncologic/Hematologic History:  Oncology History   IgG multiple myeloma   6/23/2023 Initial Diagnosis    IgG multiple myeloma     7/6/2023 -  Chemotherapy    Treatment Summary   Plan Name: OP VRD - WEEKLY BORTEZOMIB LENALIDOMIDE DEXAMETHASONE Q3W  Treatment Goal: Curative  Status:  Active  Start Date: 7/6/2023 (Planned)  End Date: 11/2/2023 (Planned)  Provider: Narciso Lundy MD  Chemotherapy: bortezomib (VELCADE) injection 2.25 mg, 1.3 mg/m2 = 2.25 mg, Subcutaneous, Clinic/HOD 1 time, 0 of 6 cycles  lenalidomide 25 mg Cap, 25 mg, Oral, Daily, 0 of 1 cycle, Start date: --, End date: --     67-year-old gentleman, referred from [facility], with newly diagnosed multiple myeloma.      05/14/2023: [facility] note:  Low back pain, onset early March   - localized to back with occasional sharp pain down posterior aspect of right leg  - fluctuating in intensity, overall worsening since onset, no change in character, difficulty doing ADLs, walking with pain  - difficulty sleeping   - seen in urgent care and [facility] multiple times  - MRI 5/4/23: degenerative disc disease and facet arthrosis, mild spinal canal stenosis L4-L5 greater than L3-L4, no high grade stenosis   - Toradol IM helps briefly  - side effects from gabapentin, Robaxin and Norco- GI and drowsiness   - Voltaren gel not helping  - stopped going to PT due to pain  - follows with Neurologist   - requests trial of tramadol and Lyrica   - going out of country soon, concerned he will not be able to go due to pain  - denies bowel/ bladder incontinence, night sweats, unexplained weight loss, chest pain or shortness of breath   Spinal stenosis   Acute bilateral low back pain with right-sided sciatica    05/18/2023: [facility] note:  Low back pain.  Onset early March 2023.  Localized lumbar region.  Constant.  Occasional radiation down posterior right leg.  Difficulty sleeping in bed because of exacerbation of pain by lying down flat.  Compensating by sleeping in a recliner or on sofa. MRI showed degenerative disc disease and facet arthrosis at multiple levels with mild canal stenosis.  - Has attempted gabapentin, Robaxin, Norco, Lyrica, and tramadol with minimal relief.  Currently being managed with tramadol and  Lyrica.  - Told by neurosurgery that he needs pain management referral because NS does not deal with injections  - Denies saddle anesthesia, bowel/bladder incontinence, weight loss, night sweats    Spinal stenosis of lumbar region  Degeneration of lumbar intervertebral disc  - Continue treatment with tramadol and Lyrica as prescribed  Bradycardia  - Patient aware of chronic bradycardia, states he has been this way for 3-4 years due to palpitations if HR >60  - Currently on Toprol XL 50 mg  - Follows with [facility]    06/13/2023:  [facility] note:   Acute Issues:   Weight loss- 6 weeks history of weight loss 12-14 lb unintentionally.  Reports appetite has been somewhat decreased secondary to pain though has noticed increased size of abdomen.  He denies nausea, vomiting, dysphagia, dyspepsia, bloody stools, staying changes in stool caliber, constipation, fevers or night sweats, nervousness/anxiousness, skin or hair changes.  Denies history of smoking or alcohol use.  Receives EGD and colonoscopy every 2 years per Dr. Kapoor Hackberry, last was roughly 1 year ago. PSA wnl 5/2023.   Chronic Issues: DM- med compliant metformin 500 daily, last A1C 6.1 in 1/2023.   Chronic medical conditions:  Hypertension.  Dyslipidemia.  GERD.  Lumbar spinal stenosis.  Vitamin-D deficiency.  Allergic rhinitis.  HTN      Investigations reviewed:  05/04/2023: MRI lumbar spine without contrast (worsening lumbar pain x5-7 weeks with bilateral sciatica):   Lumbar degenerative disc disease and facet arthrosis as detailed above with no acute pathology identified.    Mild spinal canal stenosis at L4-L5 greater than L3-L4 with no high-grade spinal canal stenosis.   Mild bilateral neural foramen stenosis at L3-L4 and on the left greater than right at L4-L5.    06/14/2023:  Skeletal survey:   No definite lesions to suggest either lytic and or sclerotic metastatic changes.   Multiple compression deformities mostly in the lumbar spine but  these are seen also in the thoracic spine; other imaging modalities might prove helpful for further assessment; some of these compression deformities appear to be relatively new as compared with an MR of May 2023  (There are some degenerative changes of the thoracic spine with a mild compression deformity of superior endplate of 1 of the lower thoracic vertebral body levels; there are multiple compression deformities of the lumbar spine including the superior endplate of L3 and L4 there is a compression deformity of the superior endplate of T11 with compression deformities of the superior endplate of T12 and a compression deformity of L1; some of these compression deformities appear to be new since the last exam (MR) of May 4, 2023 other imaging modalities might prove helpful for further assessment    Labs reviewed:  01/12/2023:  Hemoglobin 11.9.  MCV 98.7.  Ferritin 211.66.  Transferrin saturation 42%.  Vitamin B12 969.  Folate 13.9.    06/14/2023:  Hemoglobin 11.0.  MCV 99.1.  ESR 15, normal.    06/14/2023: IgG 3810 mg/dL, elevated.  IgM 11, suppressed.  IgA 33, suppressed.  2.28 gm/dL IgG kappa monoclonal protein on SPEP and BUBBA.  Kappa 99.4, elevated.  Lambda 0.3400, suppressed.  Kappa/lambda ratio 292, elevated.    05/01/2023: PSA 1.7, normal.  06/14/2023:  BUN 15.  Creatinine 0.79.  Calcium 9.6.  Albumin 3.6.  LFTs normal.  TSH normal.  06/16/2023:  , normal.    06/15/2023:  24 hour urine protein 288 mg, elevated (reference Range:  < 229). M spike 183 mg. Monoclonal kappa.    06/16/2023:  Urine:  Kappa 25.5 mg/dL; lambda < 0.7000 mg/dL; kappa/lambda ratio> 36.4    06/26/2023:  Pleasant gentleman who presents for initial medical oncology consultation, accompanied by his wife and family present who is a past interventional cardiologist.  Back pain for 2 months.  Initially started in right groin.  Physical therapy has not helped.  10/10 severity.  Has been taking Tylenol without significant relief.   Secured to take narcotics because of constipation.  Underwent couple of spinal steroid shots 3 weeks ago, with minor relief.  Pain is present all the time.  Pain increases with changing position in bed as well as upon standing.  He finds it difficult to ambulate.  Pain does not radiate down lower extremities and is not accompanied with lower extremity weakness, numbness, urinary or bowel incontinence, or saddle anesthesia.  Also experiences muscle cramps.  Generalized weakness.  However, ECOG 2.  No fevers or chills.  No repeated infections.  Cramps in hands and feet.  Appetite is down.  Appetite has picked up in last 10 days.  Has lost 12-14 lb in last 1-1/2 months.  No abnormal bleeding or bruising.      Interval History 7/5/2023: Patient presents to [facility] along with family member for chemotherapy teaching on VRD. We discussed treatment regimen along with potential side effects. We also discussed treatment options for any presenting symptoms. Patient consents to starting cycle 1 day 1 on 7/7/2023. He has not yet picked up Revlimid rx. Pending insurance auth.       ROS:  Constitutional: No fever, chills, weight loss, weakness or fatigue  Eye: No visual disturbances or changes in vision, no double vision  ENMT: no decreased hearing, nasal congestion, nosebleeds, sore throat, taste disturbance, tinnitus, vertigo  Respiratory: No shortness of breath, cough, sputum production, hemoptysis or pleuritic type chest pain  Cardiovascular: No chest pain, palpitations, syncope, leg swelling  Gastrointestinal: No nausea, vomiting, diarrhea, constipation, heartburn, abdominal pain  Genitourinary: No CVA tenderness  Hematology/lymph: no abnormal bruising, hemorrhage, petechiae swollen lymph glands  Musculoskeletal: No back or neck pain, joint pain, muscle pain or decreased range of motion  Integumentary: No rash, pruritus, skin lesion or any other significant skin complaints  Neurologic: alert and oriented x4, no abnormal  balance, confusion, numbness, tingling, headache  Psychiatric: No anxiety, depression, suicidal or homicidal ideations  12 point ROS done in full with pertinent positives as described in interval history. Remainder of ROS are negative.     Patient Instructions  Discussed:  Goals of therapy to be:  Premedication  Chemotherapy agents  Chemotherapy education:   Discussed common side effects to include nausea and vomiting, which is quite manageable, alopecia, taste changes, mouth ulcers, fatigue, fertility issues, low blood counts that may require transfusion, peripheral neuropathy 42% to 70%; grade 3/4: Less than 7% arthralgia and or myalgias, and rare side effects including cardiac toxicity.  Discussed chemotherapy (Revlimid days 1-14,  Dexamethasone days 1,8,15, Velcade days 1,8,15) to be given every 3 weeks    Notify our office for any problems or concerns, specifically shortness of breath, swelling, chest pain or fast heartbeat, intractable pain or nausea and vomiting, fever greater than 100.4, extreme fatigue or weakness.  Self-care tips include good hygiene in frequent handwashing to avoid infection, anti-nausea medicines to reduce nausea, soft toothbrush and mouth rinses 3 times a day with 1 teaspoon of baking soda with 8 ounces of water to prevent and treat mouth sores, avoid contact sports, avoid sun exposure and apply sunblock with SPF 30 or higher, drink 2-3 quarts of water every 24 hours, and maintaining good nutrition.  Short term and long term effects of treatment  Signs and symptoms to call clinic--->Notify our office for any problems or concerns, specifically shortness of breath, swelling, chest pain or fast heartbeat, intractable pain or nausea and vomiting, fever greater than 100.4, extreme fatigue or weakness.  Written material given to patient   All questions answered; patient verbalized understanding.    Review of Systems:   All systems reviewed and found to be negative except for the symptoms  detailed above    Physical Exam  VITAL SIGNS:   Vitals:    07/05/23 1314   BP: 124/73   Pulse: 64   Resp: 20   Temp: 98 °F (36.7 °C)       Assessment:  Multiple myeloma, IgG kappa:  -presentation: 03/2023:  Worsening low back pain, no associated neurological symptoms, 12-14 pound weight loss over 6 weeks, mild L4-L5 spinal canal stenosis on MRI (05/04/2023), lumbar DJD and facet arthrosis on MRI lumbar spine (05/04/2023)  -skeletal survey 06/14/2023: Negative for lytic or sclerotic lesions; multiple compression deformities lumbar spine and thoracic spine (new since MRI scan dated 05/04/2023)  -06/14/2023:  Mild anemia (hemoglobin 11.0)   -06/14/2023:  2.28 gm/dL IgG kappa M protein. Kappa 99.4, elevated.  Lambda 0.3400, suppressed.  Kappa/lambda ratio 292, elevated.  -06/14/2023:  BUN, creatinine, calcium, albumin normal  -06/15/2022:  24 hour urine protein 280 mg, elevated.  M spike 183 mg.  Urine BUBBA showed monoclonal kappa light chains.  -06/16/2023: Urine:  Kappa 25.5 mg/dL; lambda < 0.7000 mg/dL; kappa/lambda ratio> 36.4  06/16/2023:  , normal.  >>>  From the data available so far, patient has multiple myeloma (symptomatic), by virtue of:  1. Involved: Uninvolved serum FLC ratio =/> 100 and involved FLC concentration 10 mg/dL or higher (in this patient, kappa/lambda ratio age 292, and kappa light chain 99.4 mg/dL)   2. So far, investigations reveal no hypercalcemia, renal insufficiency, anemia, osteolytic bone lesions (skeletal survey); bone marrow biopsy is pending.      Co-morbidities:  NIDDM.  Dyslipidemia.  Hypertension.  Celiac disease.  History of rheumatic mitral valve disease, S/P Maze/ablation procedure 02/02/2018      Vaccination history:   COVID-19, MRNA, LN-S, PF (MODERNA FULL 0.5 ML DOSE) 8/18/2022 , 10/23/2021 , 3/17/2021 , 2/17/2021   Hepatitis A / Hepatitis B 8/18/2022 , 10/5/2021 , 7/29/2020   Influenza - Quadrivalent - MDCK - PF 10/5/2021   Influenza - Trivalent - MDCK - PF 9/10/2015    Meningococcal Conjugate (MCV4O) 8/18/2022 , 4/17/2012   Meningococcal Conjugate (MCV4P) 4/17/2012   Pneumococcal Conjugate - 20 Valent 1/12/2023   Tdap 7/29/2020   Zoster 11/18/2020 , 7/29/2020   Zoster Recombinant 11/18/2020 , 7/29/2020         Plan:  Multiple myeloma, IgG kappa  Newly diagnosed multiple myeloma.      Need the following for workup:  Bone marrow aspiration and core biopsy   FISH panel on bone marrow including del(13), del(17p13), t(4;14), t(11;14), t(14;16), t(14;20), 1q21 gain/1q21 amplification, 1p deletion  NGS panel testing on bone marrow specimen  FDG PET-CT  NT-proBNP/BNP  Serum viscosity  Hepatitis-B and C testing  HIV screening    Need the following for staging of multiple myeloma:  Serum beta 2 microglobulin level  Serum albumin and LDH are normal.  Evaluation of chromosomal abnormalities by FISH on bone marrow specimen    We will initiate treatment with bortezomib/lenalidomide/dexamethasone (VRd), category 1 regimen  Cycle length 21 days  -bortezomib 1.3 mg per m2 subQ days 1, 8, and 15  -lenalidomide 25 mg p.o. daily, on days 1 through 14  -dexamethasone 40 mg p.o. with food days 1, 8, and 15  Cycle length: 21 days    Autologous transplantation: Category 1 evidence supports proceeding directly after induction therapy to high-dose therapy and HCT  Renal dysfunction and advanced age are not contraindications to transplant    If response after primary therapy, then:  Autologous HCT versus continuation of myeloma therapy or maintenance therapy versus tandem autologous transplant, etc.    We will assess for candidacy for transplant after starting therapy and reassess for transplant as performance status improves    Do not start chemotherapy at this time   Start chemotherapy ASAP after PET-CT scan and bone marrow biopsy are done  (need not wait for the results of bone marrow biopsy)    Return to clinic on Friday 7/7/23 with lab work (cbc,cmp,mg) to start cycle 1 day 1 of VRD and zometa  infusion   Follow up with  on 7/10/23 to review biopsy results and all other investigational studies requested  Fu w/np on 7/14/23 with lab work (cbc,cmp,mg) for toxicity check prior to cycle 1 day 8 of VRD  Continue Norco 5mg po every 6 hours prn   Continue Linzess for constipation   Start Zometa 4mg IV every 4 weeks   Start Acyclovir 400mg po bid-rx sent to pharmacy   Continue levofloxacin 500 mg p.o. q.day X 12 weeks  Continue ASA 81mg po daily   Start Prophylactic trimethoprim-sulfamethoxazole (eg, one double-strength tablet once daily on Mondays, Wednesdays, and Fridays)-rx sent to pharmacy     Bone targeted therapy with zoledronic acid x2 years  Start Zometa 4 mg IV every 4 weeks  Calcium and vitamin-D supplements along with Zometa.  Check CMP at least monthly before Zometa infusion  If PET-CT scan shows involvement of spine, then, short course radiotherapy for palliation of pain  Supportive care treatment as indicated for symptom management    Routine antithrombotic prophylaxis is warranted.  Thromboembolism was reported in 2 to 6% of patients in clinical trials receiving VRd despite antithrombotic prophylaxis.   Bortezomib therapy may be associated with an increased risk of herpes zoster and infections not related to neutropenia.   Antiviral prophylaxis (eg, acyclovir 400 mg orally twice a day) should be administered to all patients receiving VRd.   Some clinicians also administer prophylactic trimethoprim-sulfamethoxazole (eg, one double-strength tablet once daily on Mondays, Wednesdays, and Fridays) during treatment.    Assess CBC with differential, electrolytes, renal function, and liver function prior to starting each cycle.  A CBC should also be performed prior to the day 8 and 15 doses of bortezomib during induction therapy.  Weekly assessment for peripheral neuropathy and/or neuropathic pain.  Monitor for hypotension during bortezomib therapy; adjustment of antihypertensives and/or  administration of IV hydration may be needed.  -Thyroid function tests (TSH at baseline then every 2 to 3 months during lenalidomide treatment    -lenalidomide: Monitor for signs and symptoms of infection (if neutropenic), bleeding or bruising, hepatotoxicity, secondary malignancies, thromboembolism (eg, shortness of breath, chest pain, arm or leg swelling), dermatologic toxicity, tumor lysis syndrome, or hypersensitivity.  -bortezomib: Peripheral neuropathy, posterior reversible leukoencephalopathy syndrome, progressive multifocal leukoencephalopathy, tumor lysis syndrome, or hyper-/hypoglycemia. Monitor closely in patients with risk factors for heart failure or existing heart disease.    Supportive care:  -bone targeted treatment: Bisphosphonate, category 1; or denosumab to reduce risk of skeletal related events; denosumab is preferred in patients with renal insufficiency; assess vitamin-D status; baseline dental exam; monitor for osteonecrosis of the jaw; monitor renal dysfunction with bisphosphonate therapy  -continue bone targeted treatment (bisphosphonate or denosumab) for 2 years; continue beyond 2 years should be based on clinical judgment  -patients receiving denosumab for bone disease who subsequently discontinued therapy should be given maintenance denosumab every 6 months or a single dose of bisphosphonate to mitigate risk of rebound osteoporosis  -for hypercalcemia, zoledronic acid, denosumab, steroids, and/or calcitonin  -for hyperviscosity, plasmapheresis should be used as adjuvant therapy for symptomatic hyperviscosity  -intravenous immunoglobulin therapy should be considered in the setting of recurrent serious infection and/or hypogammaglobulinemia (IgG </= 400 mg/dL)  -pneumococcal conjugate vaccine, followed by pneumococcal polysaccharide vaccine 1 year later  -Influenza vaccination is recommended; 2 doses of high-dose inactivated quandaivalent influenza vaccine should be considered  -consider 12  weeks of levofloxacin 500 mg p.o. daily at the time of initial diagnosis of multiple myeloma  -COVID-19 vaccination  -highest risk for VTE is in the 1st 6 months following new diagnosis of multiple myeloma  -VTE prophylaxis if no contraindications to anticoagulation agents or antiplatelets  Recommendations for VTE prophylaxis:  Aspirin  mg daily; low molecular weight heparin equivalent to enoxaparin 40 mg daily; Xarelto 10 mg daily; Eliquis 2.5 mg b.i.d.; Arixtra 2.5 mg daily; Coumadin with target INR 2.0-3.0    Above discussed at length with the patient.  All questions answered.    Rationale of investigations discussed.  Tentative plans of chemotherapy   He understands and agrees with this plan.  ----------------------------------    Discussion:  Lenalidomide:  Embryo fetal toxicity  Hematologic toxicity.  Neutropenia.  Thrombocytopenia.  Venous and arterial thromboembolism.  DVT.  PE.  MI.  Stroke.  Dizziness, fatigue.  Tumor lysis syndrome.  Heart failure.    Used with caution in patients with renal impairment.  Lenalidomide =/> 4 cycles may decrease the # of CD34 pos cells collected for autologous stem cell transplant.  DVT, PE, atrial fibrillation, renal failure are more likely in patients> 65 years  Hepatotoxicity  Hypersensitivity reactions.  Anaphylaxis.  Angioedema.  Skin rash.  Eosinophilia.  Immediate hypersensitivity reactions.  Second primary malignancy.  AML.  MDS.  Solid tumor malignancies.  Nonmelanoma skin cancers.    Monitoring with lenalidomide:  -CBC with differential: weekly for the first 2 cycles, every 2 weeks during the third cycle and monthly thereafter;  -serum creatinine, LFTs (periodically).  -Thyroid function tests (TSH at baseline then every 2 to 3 months during lenalidomide treatment  -ECG when clinically indicated. Monitor for signs and symptoms of infection (if neutropenic), bleeding or bruising, hepatotoxicity, secondary malignancies, thromboembolism (eg, shortness of breath,  chest pain, arm or leg swelling), dermatologic toxicity, tumor lysis syndrome, or hypersensitivity.  Patients who could become pregnant: Pregnancy test 10 to 14 days and 24 hours prior to initiating therapy, weekly during the first 4 weeks of treatment, then every 2 to 4 weeks through 4 weeks after therapy discontinued.    Bortezomib:  Bone marrow suppression.  Neutropenia.  Thrombocytopenia.  Acute CHF.  Nausea, vomiting, diarrhea, constipation, ileus.    Hepatotoxicity:  Herpes zoster and her PCP plaques reactivation.    Anaphylactic reactions.  Hypersensitive reactions.  Angioedema.  Laryngeal edema.    Hypotension.  Peripheral neuropathy.  Posterior reversible leukoencephalopathy syndrome.  Progressive multifocal leukoencephalopathy.  Pulmonary toxicity.  Pneumonitis.  Interstitial pneumonia.  Lung infiltrates.  ARDS.  Thrombotic microangiopathy.  Tumor lysis syndrome.      Monitoring parameters with bortezomib:  CBC, CMP  Blood pressure (to monitor for hypotension)  Peripheral neuropathy  Posterior reversible leukoencephalopathy syndrome, progressive multifocal leukoencephalopathy, tumor lysis syndrome, or hyper-/hypoglycemia. Monitor baseline chest x-ray and then periodic pulmonary function testing (with new or worsening pulmonary symptoms). Monitor closely in patients with risk factors for heart failure or existing heart disease.

## 2023-07-07 DIAGNOSIS — C90.00 IGG MULTIPLE MYELOMA: ICD-10-CM

## 2023-07-07 RX ORDER — LENALIDOMIDE 25 MG/1
25 CAPSULE ORAL DAILY
Qty: 14 CAPSULE | Refills: 4 | Status: SHIPPED | OUTPATIENT
Start: 2023-07-07 | End: 2023-08-24 | Stop reason: SDUPTHER

## 2023-07-10 ENCOUNTER — OFFICE VISIT (OUTPATIENT)
Dept: HEMATOLOGY/ONCOLOGY | Facility: CLINIC | Age: 68
End: 2023-07-10
Attending: INTERNAL MEDICINE
Payer: MEDICARE

## 2023-07-10 ENCOUNTER — TELEPHONE (OUTPATIENT)
Dept: HEMATOLOGY/ONCOLOGY | Facility: CLINIC | Age: 68
End: 2023-07-10
Payer: MEDICARE

## 2023-07-10 VITALS
SYSTOLIC BLOOD PRESSURE: 132 MMHG | BODY MASS INDEX: 21.92 KG/M2 | RESPIRATION RATE: 20 BRPM | HEIGHT: 68 IN | WEIGHT: 144.63 LBS | TEMPERATURE: 98 F | OXYGEN SATURATION: 100 % | DIASTOLIC BLOOD PRESSURE: 76 MMHG | HEART RATE: 68 BPM

## 2023-07-10 DIAGNOSIS — E83.52 HYPERCALCEMIA: ICD-10-CM

## 2023-07-10 DIAGNOSIS — C90.00 MULTIPLE MYELOMA, REMISSION STATUS UNSPECIFIED: ICD-10-CM

## 2023-07-10 DIAGNOSIS — Z79.899 DRUG-INDUCED IMMUNODEFICIENCY: ICD-10-CM

## 2023-07-10 DIAGNOSIS — C79.51 SECONDARY MALIGNANT NEOPLASM OF BONE: ICD-10-CM

## 2023-07-10 DIAGNOSIS — C79.51 SECONDARY MALIGNANCY OF LUMBAR VERTEBRAL COLUMN: ICD-10-CM

## 2023-07-10 DIAGNOSIS — M54.9 BILATERAL BACK PAIN, UNSPECIFIED BACK LOCATION, UNSPECIFIED CHRONICITY: Primary | ICD-10-CM

## 2023-07-10 DIAGNOSIS — N28.1 CYST OF RIGHT KIDNEY: ICD-10-CM

## 2023-07-10 DIAGNOSIS — C79.51 SECONDARY MALIGNANT NEOPLASM OF BONE: Primary | ICD-10-CM

## 2023-07-10 DIAGNOSIS — M48.54XA NONTRAUMATIC COMPRESSION FRACTURE OF T8 VERTEBRA, INITIAL ENCOUNTER: ICD-10-CM

## 2023-07-10 DIAGNOSIS — C90.00 IGG MULTIPLE MYELOMA: ICD-10-CM

## 2023-07-10 DIAGNOSIS — M54.9 BILATERAL BACK PAIN, UNSPECIFIED BACK LOCATION, UNSPECIFIED CHRONICITY: ICD-10-CM

## 2023-07-10 DIAGNOSIS — R94.5 ABNORMAL RESULTS OF LIVER FUNCTION STUDIES: ICD-10-CM

## 2023-07-10 DIAGNOSIS — C90.00 IGG MULTIPLE MYELOMA: Primary | ICD-10-CM

## 2023-07-10 DIAGNOSIS — D84.821 DRUG-INDUCED IMMUNODEFICIENCY: ICD-10-CM

## 2023-07-10 LAB
ALBUMIN SERPL-MCNC: 3.1 G/DL (ref 3.4–4.8)
ALBUMIN/GLOB SERPL: 0.5 RATIO (ref 1.1–2)
ALP SERPL-CCNC: 85 UNIT/L (ref 40–150)
ALT SERPL-CCNC: 13 UNIT/L (ref 0–55)
AST SERPL-CCNC: 17 UNIT/L (ref 5–34)
BASOPHILS # BLD AUTO: 0.08 X10(3)/MCL
BASOPHILS NFR BLD AUTO: 0.7 %
BILIRUBIN DIRECT+TOT PNL SERPL-MCNC: 0.2 MG/DL
BUN SERPL-MCNC: 11.8 MG/DL (ref 8.4–25.7)
CALCIUM SERPL-MCNC: 10.3 MG/DL (ref 8.8–10)
CHLORIDE SERPL-SCNC: 102 MMOL/L (ref 98–107)
CO2 SERPL-SCNC: 27 MMOL/L (ref 23–31)
CREAT SERPL-MCNC: 0.81 MG/DL (ref 0.73–1.18)
DEPRECATED CALCIDIOL+CALCIFEROL SERPL-MC: 34.9 NG/ML (ref 30–80)
EOSINOPHIL # BLD AUTO: 0.31 X10(3)/MCL (ref 0–0.9)
EOSINOPHIL NFR BLD AUTO: 2.8 %
ERYTHROCYTE [DISTWIDTH] IN BLOOD BY AUTOMATED COUNT: 14.1 % (ref 11.5–17)
GFR SERPLBLD CREATININE-BSD FMLA CKD-EPI: >60 MLS/MIN/1.73/M2
GLOBULIN SER-MCNC: 6.7 GM/DL (ref 2.4–3.5)
GLUCOSE SERPL-MCNC: 109 MG/DL (ref 82–115)
HCT VFR BLD AUTO: 31.6 % (ref 42–52)
HGB BLD-MCNC: 10.5 G/DL (ref 14–18)
IMM GRANULOCYTES # BLD AUTO: 0.49 X10(3)/MCL (ref 0–0.04)
IMM GRANULOCYTES NFR BLD AUTO: 4.5 %
LYMPHOCYTES # BLD AUTO: 3.74 X10(3)/MCL (ref 0.6–4.6)
LYMPHOCYTES NFR BLD AUTO: 34.2 %
MAGNESIUM SERPL-MCNC: 1.8 MG/DL (ref 1.6–2.6)
MCH RBC QN AUTO: 33.2 PG (ref 27–31)
MCHC RBC AUTO-ENTMCNC: 33.2 G/DL (ref 33–36)
MCV RBC AUTO: 100 FL (ref 80–94)
MONOCYTES # BLD AUTO: 1.58 X10(3)/MCL (ref 0.1–1.3)
MONOCYTES NFR BLD AUTO: 14.5 %
NEUTROPHILS # BLD AUTO: 4.72 X10(3)/MCL (ref 2.1–9.2)
NEUTROPHILS NFR BLD AUTO: 43.3 %
NRBC BLD AUTO-RTO: 0.2 %
PLATELET # BLD AUTO: 387 X10(3)/MCL (ref 130–400)
PMV BLD AUTO: 9 FL (ref 7.4–10.4)
POTASSIUM SERPL-SCNC: 4.1 MMOL/L (ref 3.5–5.1)
PROT SERPL-MCNC: 9.8 GM/DL (ref 5.8–7.6)
PTH-INTACT SERPL-MCNC: 19.7 PG/ML (ref 8.7–77)
RBC # BLD AUTO: 3.16 X10(6)/MCL (ref 4.7–6.1)
SODIUM SERPL-SCNC: 132 MMOL/L (ref 136–145)
T4 FREE SERPL-MCNC: 1.14 NG/DL (ref 0.7–1.48)
TSH SERPL-ACNC: 1.71 UIU/ML (ref 0.35–4.94)
WBC # SPEC AUTO: 10.92 X10(3)/MCL (ref 4.5–11.5)

## 2023-07-10 PROCEDURE — 80053 COMPREHEN METABOLIC PANEL: CPT | Performed by: INTERNAL MEDICINE

## 2023-07-10 PROCEDURE — 82306 VITAMIN D 25 HYDROXY: CPT | Performed by: INTERNAL MEDICINE

## 2023-07-10 PROCEDURE — 83970 ASSAY OF PARATHORMONE: CPT | Performed by: INTERNAL MEDICINE

## 2023-07-10 PROCEDURE — 99215 PR OFFICE/OUTPT VISIT, EST, LEVL V, 40-54 MIN: ICD-10-PCS | Mod: S$PBB,,, | Performed by: INTERNAL MEDICINE

## 2023-07-10 PROCEDURE — 84439 ASSAY OF FREE THYROXINE: CPT | Performed by: INTERNAL MEDICINE

## 2023-07-10 PROCEDURE — 83735 ASSAY OF MAGNESIUM: CPT | Performed by: INTERNAL MEDICINE

## 2023-07-10 PROCEDURE — 99215 OFFICE O/P EST HI 40 MIN: CPT | Mod: S$PBB,,, | Performed by: INTERNAL MEDICINE

## 2023-07-10 PROCEDURE — 36415 COLL VENOUS BLD VENIPUNCTURE: CPT | Performed by: INTERNAL MEDICINE

## 2023-07-10 PROCEDURE — 99215 OFFICE O/P EST HI 40 MIN: CPT | Mod: PBBFAC | Performed by: INTERNAL MEDICINE

## 2023-07-10 PROCEDURE — 84443 ASSAY THYROID STIM HORMONE: CPT | Performed by: INTERNAL MEDICINE

## 2023-07-10 PROCEDURE — 85025 COMPLETE CBC W/AUTO DIFF WBC: CPT | Performed by: INTERNAL MEDICINE

## 2023-07-10 PROCEDURE — 82330 ASSAY OF CALCIUM: CPT | Performed by: INTERNAL MEDICINE

## 2023-07-10 PROCEDURE — 82397 CHEMILUMINESCENT ASSAY: CPT | Performed by: INTERNAL MEDICINE

## 2023-07-10 NOTE — NURSING
Spoke with Bonita (pharmacist) at [facility] to provide [employer] auth #. Will also fax provider note and lab results to [phone].

## 2023-07-10 NOTE — Clinical Note
Start chemotherapy ASAP without any further delay Start Decadron ASAP Start Velcade shots ASAP Start chemotherapy even if he has not procured Revlimid as yet.   Start Zometa ASAP

## 2023-07-10 NOTE — Clinical Note
Orders for today:  Please order MRI scan of the abdomen, adrenal protocol, for evaluation of 10 mm cyst right kidney noted on CT scans dated 06/27/2023  Continue Norco Start chemotherapy ASAP Continue levofloxacin 500 mg p.o. q.day for 12 weeks Refer to Radiation Oncology at this time for palliative radiotherapy to thoracolumbar spine for pain control  Start Xgeva ASAP  Today, check CBC, CMP, ionized calcium level, intact PTH level, PTH related peptide, 25 hydroxy vitamin-D  Along with chemotherapy, start  Baby aspirin 81 mg daily Acyclovir 400 mg p.o. b.i.d. Bactrim ds 1 tablet every Monday/Wednesday/Friday Today, also check TSH and free T4, then every 3 months  Refer the patient to [facility] for stem cell transplant evaluation  Follow-up with me in 2 weeks.

## 2023-07-10 NOTE — PROGRESS NOTES
History:  Past Medical History:   Diagnosis Date    Diabetes mellitus, type 2     GERD (gastroesophageal reflux disease)     Hyperlipidemia     Hypertension    Past medical history:  NIDDM. Dyslipidemia.  GERD.  Lumbar spinal stenosis.  Vitamin-D deficiency.  Allergic rhinitis.  HTN.  Bell's palsy.  Celiac disease.  Hemorrhoids.  Mitral regurgitation 01/05/2018.  Rheumatic valve narrowing and leaking mitral valve.  -02/02/2018 ([facility]):  АНДРЕЙ, right minimally invasive anterior thoracotomy, right femoral artery and vein exploration, extensive right pleural adhesiolysis/pneumolysis, bilateral Maze/ablation (extensive), left atrial appendage clip placement for left atrial appendage exclusion, mitral valve repair (complex repair with 26 mm annuloplasty ring)  Social history:  .  Lives in [place], [place].  Five children.  Owns a [employer] business.  No history of tobacco, alcohol, or illicit drug abuse.    Family history:  Negative for cancers or blood dyscrasias.    Health maintenance:  PCP in family medicine clinic, [facility].  Had EGD and colonoscopy done 1 year ago by Dr. Zoltan Kapoor, [facility], apparently unremarkable (he gets the endoscopies done periodically because of history of celiac disease).  Past Surgical History:   Procedure Laterality Date    BONE MARROW ASPIRATION N/A 6/27/2023    Procedure: ASPIRATION, BONE MARROW;  Surgeon: Namita Daniels MD;  Location: [facility] ENDOSCOPY;  Service: General;  Laterality: N/A;    BONE MARROW BIOPSY N/A 6/27/2023    Procedure: Biopsy-bone marrow;  Surgeon: Haylie Liu MD;  Location: [facility] ENDOSCOPY;  Service: General;  Laterality: N/A;    CARDIAC SURGERY  2018    EYE SURGERY        Social History     Socioeconomic History    Marital status:    Tobacco Use    Smoking status: Never    Smokeless tobacco: Never   Substance and Sexual Activity    Alcohol use: Never    Drug use: Never      History reviewed. No  pertinent family history.     Reason for Follow-up:  Reason for consultation:  -multiple myeloma, IgG kappa  -worsening low back pain  -hypercalcemia    History of Present Illness:   IgG multiple myeloma        Oncologic/Hematologic History:  Oncology History   IgG multiple myeloma   6/23/2023 Initial Diagnosis    IgG multiple myeloma     7/6/2023 -  Chemotherapy    Treatment Summary   Plan Name: OP VRD - WEEKLY BORTEZOMIB LENALIDOMIDE DEXAMETHASONE Q3W  Treatment Goal: Curative  Status: Active  Start Date: 7/6/2023 (Planned)  End Date: 11/2/2023 (Planned)  Provider: Narciso Lundy MD  Chemotherapy: bortezomib (VELCADE) injection 2.25 mg, 1.3 mg/m2 = 2.25 mg, Subcutaneous, Clinic/HOD 1 time, 0 of 6 cycles  lenalidomide 25 mg Cap, 25 mg, Oral, Daily, 1 of 1 cycle, Start date: 7/5/2023, End date: --     67-year-old gentleman, referred from [facility], with newly diagnosed multiple myeloma.      05/14/2023: [facility] note:  Low back pain, onset early March   - localized to back with occasional sharp pain down posterior aspect of right leg  - fluctuating in intensity, overall worsening since onset, no change in character, difficulty doing ADLs, walking with pain  - difficulty sleeping   - seen in urgent care and C multiple times  - MRI 5/4/23: degenerative disc disease and facet arthrosis, mild spinal canal stenosis L4-L5 greater than L3-L4, no high grade stenosis   - Toradol IM helps briefly  - side effects from gabapentin, Robaxin and Norco- GI and drowsiness   - Voltaren gel not helping  - stopped going to PT due to pain  - follows with Neurologist   - requests trial of tramadol and Lyrica   - going out of country soon, concerned he will not be able to go due to pain  - denies bowel/ bladder incontinence, night sweats, unexplained weight loss, chest pain or shortness of breath   Spinal stenosis   Acute bilateral low back pain with right-sided sciatica    05/18/2023: [facility]  Medicine note:  Low back pain.  Onset early March 2023.  Localized lumbar region.  Constant.  Occasional radiation down posterior right leg.  Difficulty sleeping in bed because of exacerbation of pain by lying down flat.  Compensating by sleeping in a recliner or on sofa. MRI showed degenerative disc disease and facet arthrosis at multiple levels with mild canal stenosis.  - Has attempted gabapentin, Robaxin, Norco, Lyrica, and tramadol with minimal relief.  Currently being managed with tramadol and Lyrica.  - Told by neurosurgery that he needs pain management referral because NS does not deal with injections  - Denies saddle anesthesia, bowel/bladder incontinence, weight loss, night sweats    Spinal stenosis of lumbar region  Degeneration of lumbar intervertebral disc  - Continue treatment with tramadol and Lyrica as prescribed  Bradycardia  - Patient aware of chronic bradycardia, states he has been this way for 3-4 years due to palpitations if HR >60  - Currently on Toprol XL 50 mg  - Follows with [facility]    06/13/2023:  [facility] note:   Acute Issues:   Weight loss- 6 weeks history of weight loss 12-14 lb unintentionally.  Reports appetite has been somewhat decreased secondary to pain though has noticed increased size of abdomen.  He denies nausea, vomiting, dysphagia, dyspepsia, bloody stools, staying changes in stool caliber, constipation, fevers or night sweats, nervousness/anxiousness, skin or hair changes.  Denies history of smoking or alcohol use.  Receives EGD and colonoscopy every 2 years per Dr. Kapoor [place], last was roughly 1 year ago. PSA wnl 5/2023.   Chronic Issues: DM- med compliant metformin 500 daily, last A1C 6.1 in 1/2023.   Chronic medical conditions:  Hypertension.  Dyslipidemia.  GERD.  Lumbar spinal stenosis.  Vitamin-D deficiency.  Allergic rhinitis.  HTN      Investigations reviewed:  05/04/2023: MRI lumbar spine without contrast (worsening lumbar pain x5-7 weeks with  bilateral sciatica):   Lumbar degenerative disc disease and facet arthrosis as detailed above with no acute pathology identified.    Mild spinal canal stenosis at L4-L5 greater than L3-L4 with no high-grade spinal canal stenosis.   Mild bilateral neural foramen stenosis at L3-L4 and on the left greater than right at L4-L5.    06/14/2023:  Skeletal survey:   No definite lesions to suggest either lytic and or sclerotic metastatic changes.   Multiple compression deformities mostly in the lumbar spine but these are seen also in the thoracic spine; other imaging modalities might prove helpful for further assessment; some of these compression deformities appear to be relatively new as compared with an MR of May 2023  (There are some degenerative changes of the thoracic spine with a mild compression deformity of superior endplate of 1 of the lower thoracic vertebral body levels; there are multiple compression deformities of the lumbar spine including the superior endplate of L3 and L4 there is a compression deformity of the superior endplate of T11 with compression deformities of the superior endplate of T12 and a compression deformity of L1; some of these compression deformities appear to be new since the last exam (MR) of May 4, 2023 other imaging modalities might prove helpful for further assessment    Labs reviewed:  01/12/2023:  Hemoglobin 11.9.  MCV 98.7.  Ferritin 211.66.  Transferrin saturation 42%.  Vitamin B12 969.  Folate 13.9.    06/14/2023:  Hemoglobin 11.0.  MCV 99.1.  ESR 15, normal.    06/14/2023: IgG 3810 mg/dL, elevated.  IgM 11, suppressed.  IgA 33, suppressed.  2.28 gm/dL IgG kappa monoclonal protein on SPEP and BUBBA.  Kappa 99.4, elevated.  Lambda 0.3400, suppressed.  Kappa/lambda ratio 292, elevated.    05/01/2023: PSA 1.7, normal.  06/14/2023:  BUN 15.  Creatinine 0.79.  Calcium 9.6.  Albumin 3.6.  LFTs normal.  TSH normal.  06/16/2023:  , normal.    06/15/2023:  24 hour urine protein 288 mg,  elevated (reference Range:  < 229). M spike 183 mg. Monoclonal kappa.    06/16/2023:  Urine:  Kappa 25.5 mg/dL; lambda < 0.7000 mg/dL; kappa/lambda ratio> 36.4    06/26/2023:  Pleasant gentleman who presents for initial medical oncology consultation, accompanied by his wife and family present who is a past interventional cardiologist.  Back pain for 2 months.  Initially started in right groin.  Physical therapy has not helped.  10/10 severity.  Has been taking Tylenol without significant relief.  Secured to take narcotics because of constipation.  Underwent couple of spinal steroid shots 3 weeks ago, with minor relief.  Pain is present all the time.  Pain increases with changing position in bed as well as upon standing.  He finds it difficult to ambulate.  Pain does not radiate down lower extremities and is not accompanied with lower extremity weakness, numbness, urinary or bowel incontinence, or saddle anesthesia.  Also experiences muscle cramps.  Generalized weakness.  However, ECOG 2.  No fevers or chills.  No repeated infections.  Cramps in hands and feet.  Appetite is down.  Appetite has picked up in last 10 days.  Has lost 12-14 lb in last 1-1/2 months.  No abnormal bleeding or bruising.      Interval History:  INF FLUIDS   OP VRD - WEEKLY BORTEZOMIB LENALIDOMIDE DEXAMETHASONE Q3W     07/10/2023:   -06/26/2023:  Hemoglobin 11.0.  .5.  Monocytes 1.34 K. BUN, creatinine normal.  Calcium 10.1.  Albumin 3.2.  No serologic evidence of recent or past hepatitis-A, B, or C infection.  HIV negative.  Beta 2 microglobulin 3.18, elevated (reference Range:  1.21-2.70 microgram/mL).  NP Pineda NP 86, normal (reference Range:  </= 540 pg /ml).  -06/27/2020:  Hemoglobin 10.4.  .6.  Monocytes 1.43 K.  -06/27/2023:  Bone marrow aspiration and core biopsy:  Plasma cells 50% of bone marrow cellularity on bone marrow aspirate.  Plasma cells 36% of analyzed cells on flow cytometry of bone marrow, with kappa light  chain restriction.  -06/27/2022: CT abdomen pelvis with and without contrast (unintentional weight loss) pelvic comparison: CT abdomen pelvis 08/11/2015):  1. The liver demonstrates mild diffuse decreased attenuation which may reflect fatty infiltration.  2. Right larger than left fat containing inguinal hernias are seen.  There is extension of a small portion of the right inferior aspect of the urinary bladder into the right inguinal hernia.  3. The liver demonstrates mild diffuse decreased attenuation which may reflect fatty infiltration.  4. The visualized osseous structures demonstrate heterogeneous demineralization.  Marrow infiltrative process such as the patient's reported multiple myeloma is not excluded.  5. Multilevel compression deformities in the included thoracic spine and lumbar spine are seen.  The prominent superior endplate Schmorl's node/compression deformity at L3 is new from MRI from 05/04/2023.  The T12 on L1 compression deformities are also new from the prior MRI.  The lower thoracic levels are not included on the prior MRI for comparison.  These compression deformities may be subacute as there is no significant paravertebral hematoma.  Please correlate clinically with further evaluation including MRI to assess for bone marrow edema as warranted.  6. 10 mm exophytic hyperdense focus at the upper pole right kidney is seen compatible with a hyperdense cyst.  Consider follow-up to ensure stability.  -06/29/2023:  FDG PET-CT (comparison:  CT abdomen pelvis 06/27/2023):  1. Small focus of mild uptake at the left femur lesser trochanter  2. Borderline uptake with subtle contour deformity at the right 4th rib laterally  3. Multilevel thoracolumbar compression deformities.  There is mild linear uptake at the in plates of T8, T12, L1 and L2 suggesting potential remodeling acute to subacute fractures  4. Uptake at the maxilla on the right presumed odontogenic  5. Nonspecific small mildly FDG avid  bilateral hilar lymph nodes.  Presents for a follow-up visit, accompanied by his family, including his wife and his daughter.  In no acute discomfort.  Complains of constant pain in the lower back, radiating down anterior aspect of right thigh.  No weakness in lower extremity.  Retains full control of bowel and bladder.  No saddle anesthesia.  Fair appetite.  Supplementing with protein shakes.  No fevers or chills.  Awaiting shipment of Revlimid.  Back pain is rated as 9/10 in severity, 10 being the worst.  Norco 5 mg did not help.  He is taking only 2 pills of Norco 10 mg daily, twice daily.  Instructed him to take 1 pill every 4-6 hours prn.  In reference to 10 mm complex cyst of right kidney noted on CT scans, he denies flank pain or hematuria.      Medications:  Current Outpatient Medications on File Prior to Visit   Medication Sig Dispense Refill    acyclovir (ZOVIRAX) 400 MG tablet Take 1 tablet (400 mg total) by mouth 2 (two) times daily. 60 tablet 4    ammonium lactate 12 % Crea Apply 1 application topically once daily. 385 g 2    ascorbic acid, vitamin C, (VITAMIN C) 1000 MG tablet Take 1,000 mg by mouth once daily.      aspirin (ECOTRIN) 81 MG EC tablet Take 81 mg by mouth once daily.      atorvastatin (LIPITOR) 20 MG tablet Take 20 mg by mouth once daily.      calcium carbonate 195 mg calcium (500 mg) Chew Take 1 tablet by mouth once daily.      dexAMETHasone (DECADRON) 4 MG Tab Take 10 tablets (40 mg total) by mouth every 7 days. on days 1, 8, and 15 of each chemotherapy cycle. Take with food. 30 tablet 5    diclofenac sodium (VOLTAREN) 1 % Gel Apply 2 g topically 4 (four) times daily. 450 g 1    famotidine (PEPCID) 40 MG tablet Take 40 mg by mouth every evening.      fluticasone propionate (FLONASE) 50 mcg/actuation nasal spray 50 sprays by Nasal route 2 (two) times a day.      HYDROcodone-acetaminophen (NORCO)  mg per tablet Take 1 tablet by mouth every 6 (six) hours as needed for Pain. 120  tablet 0    hydrocortisone (ANUSOL-HC) 2.5 % rectal cream Place 1 applicator rectally 2 (two) times daily. 28 g 2    ketoconazole (NIZORAL) 2 % cream Apply topically once daily. 60 g 1    lenalidomide 25 mg Cap Take 1 capsule (25 mg total) by mouth once daily on days 1-14 of each 21 day cycle. [place] authorization #. 14 capsule 4    levoFLOXacin (LEVAQUIN) 500 MG tablet Take 1 tablet (500 mg total) by mouth once daily. 30 tablet 2    metFORMIN (GLUCOPHAGE) 500 MG tablet Take by mouth.      metoprolol succinate (TOPROL-XL) 50 MG 24 hr tablet Take by mouth.      nystatin (MYCOSTATIN) powder Apply topically 4 (four) times daily. 60 g 2    pantoprazole (PROTONIX) 40 MG tablet Take 40 mg by mouth once daily.      sulfamethoxazole-trimethoprim 800-160mg (BACTRIM DS) 800-160 mg Tab Take 1 tablet by mouth on MWF of each week 48 tablet 3     No current facility-administered medications on file prior to visit.       Review of Systems:   All systems reviewed and found to be negative except for the symptoms detailed above    Physical Examination:   VITAL SIGNS:   Vitals:    07/10/23 1516   BP: 132/76   Pulse: 68   Resp: 20   Temp: 98.2 °F (36.8 °C)       GENERAL:  In no apparent distress.    HEAD:  No signs of head trauma.  EYES:  Pupils are equal.  Extraocular motions intact.    EARS:  Hearing grossly intact.  MOUTH:  Oropharynx is normal.   NECK:  No adenopathy, no JVD.     CHEST:  Chest with clear breath sounds bilaterally.  No wheezes, rales, rhonchi.    CARDIAC:  Regular rate and rhythm.  S1 and S2, without murmurs, gallops, rubs.  VASCULAR:  No Edema.  Peripheral pulses normal and equal in all extremities.  ABDOMEN:  Soft, without detectable tenderness.  No sign of distention.  No   rebound or guarding, and no masses palpated.   Bowel Sounds normal.  MUSCULOSKELETAL:  Good range of motion of all major joints. Extremities without clubbing, cyanosis or edema.    NEUROLOGIC EXAM:  Alert and oriented x 3.  No focal sensory  or strength deficits.   Speech normal.  Follows commands.  PSYCHIATRIC:  Mood normal.    No results for input(s): CBC in the last 72 hours.   No results for input(s): CMP in the last 72 hours.     Assessment:  Problem List Items Addressed This Visit          Neuro    Compression fracture of thoracic spine, non-traumatic       Renal/    Hypercalcemia    Cyst of right kidney       Oncology    IgG multiple myeloma - Primary    Secondary malignant neoplasm of bone    Secondary malignancy of lumbar vertebral column       Orthopedic    Back pain     Multiple myeloma, IgG kappa:  -presentation: 03/2023:  Worsening low back pain, no associated neurological symptoms, 12-14 pound weight loss over 6 weeks, mild L4-L5 spinal canal stenosis on MRI (05/04/2023), lumbar DJD and facet arthrosis on MRI lumbar spine (05/04/2023)  -skeletal survey 06/14/2023: Negative for lytic or sclerotic lesions; multiple compression deformities lumbar spine and thoracic spine (new since MRI scan dated 05/04/2023)  -06/14/2023:  Mild anemia (hemoglobin 11.0)   -06/14/2023:  2.28 gm/dL IgG kappa M protein. Kappa 99.4, elevated.  Lambda 0.3400, suppressed.  Kappa/lambda ratio 292, elevated.  -06/14/2023:  BUN, creatinine, calcium, albumin normal  -06/15/2022:  24 hour urine protein 280 mg, elevated.  M spike 183 mg.  Urine BUBBA showed monoclonal kappa light chains.  -06/16/2023: Urine:  Kappa 25.5 mg/dL; lambda < 0.7000 mg/dL; kappa/lambda ratio> 36.4  06/16/2023:  , normal.  -06/26/2023:  Hemoglobin 11.0.  Beta 2 microglobulin 3.18.  Calcium 10.1.  Albumin 3.2.  Hepatitis-A/B/C/HIV serology negative.  -bone marrow biopsy 06/27/2022:  Plasma cells 50% in bone marrow aspirate; plasma cells 36% on flow cytometry of bone marrow aspirate, with kappa light chain restriction; molecular studies pending  -CT abdomen pelvis with contrast 06/27/2023:  Hepatic steatosis; bilateral inguinal hernias; heterogeneous bone demineralization, suggesting marrow  infiltrative process like multiple myeloma; multilevel compression deformities in the included thoracic spine and lumbar spine; 10 mm exophytic hyperdense focus upper pole of right kidney, compatible with a hyperdense cyst  -FDG PET-CT 06/29/2023:  Left femur lesser trochanter; right 4th rib laterally; multilevel thoracolumbar compression deformities; subacute fractures T8, T12, L1, L2  >>>  From the data available so far, patient has multiple myeloma (symptomatic), by virtue of:  1. Involved: Uninvolved serum FLC ratio =/> 100 and involved FLC concentration 10 mg/dL or higher (in this patient, kappa/lambda ratio age 292, and kappa light chain 99.4 mg/dL)   2. Hypercalcemia (06/26/2023): Calcium 10.1.  Albumin 3.2   3. Bone marrow plasmacytosis 50%  4. Heterogeneous bone demineralization on CT 06/27/2023, suggesting bone marrow infiltrative process like multiple myeloma  5. FDG PET-CT 06/29/2023:  Left femur lesser trochanter; right 4th rib laterally; multilevel thoracolumbar compression deformities; subacute fractures T8, T12, L1, L2  6. Beta 2 microglobulin level 3.18, elevated (06/26/2023)   7. No renal insufficiency, no significant anemia, LDH normal    Co-morbidities:  NIDDM.  Dyslipidemia.  Hypertension.  Celiac disease.  History of rheumatic mitral valve disease, S/P Maze/ablation procedure 02/02/2018      Vaccination history:   COVID-19, MRNA, LN-S, PF (MODERNA FULL 0.5 ML DOSE) 8/18/2022 , 10/23/2021 , 3/17/2021 , 2/17/2021   Hepatitis A / Hepatitis B 8/18/2022 , 10/5/2021 , 7/29/2020   Influenza - Quadrivalent - MDCK - PF 10/5/2021   Influenza - Trivalent - MDCK - PF 9/10/2015   Meningococcal Conjugate (MCV4O) 8/18/2022 , 4/17/2012   Meningococcal Conjugate (MCV4P) 4/17/2012   Pneumococcal Conjugate - 20 Valent 1/12/2023   Tdap 7/29/2020   Zoster 11/18/2020 , 7/29/2020   Zoster Recombinant 11/18/2020 , 7/29/2020         Plan:  06/26/2023:   Started Norco 5 mg p.o. q.6 hours PRN for pain; did not work; he  stopped taking  Taking Norco 10 mg only twice daily; instructed him to take Norco 10 mg every 4-6 hours p.r.n. for pain (has 9/10 lower back pain, constant, radiating to anterior aspect of right thigh, without neurological symptoms)  Chemotherapy orders placed (VRD every 3 weeks)  Started levofloxacin 500 mg p.o. q.day X 12 weeks  Patient is symptomatic in that he is experiencing severe back pain   We will plan to initiate chemotherapy ASAP while, at the same time, attempting to get investigations completed ASAP as well.  Refer to Radiation Oncology at this time for palliative radiotherapy to thoracolumbar spine for pain control    07/10/2023:  Pending:  FISH panel on bone marrow including del(13), del(17p13), t(4;14), t(11;14), t(14;16), t(14;20), 1q21 gain/1q21 amplification, 1p deletion  NGS panel testing on bone marrow specimen    10 mm exophytic hyperdense focus upper pole of right kidney, compatible with a hyperdense cyst, on CT abdomen pelvis with contrast 06/27/2023  >>>  07/10/2023: Will order MRI scan of abdomen, renal protocol    Need the following for staging of multiple myeloma:  Serum beta 2 microglobulin elevated  Serum albumin and LDH are normal.  Evaluation of chromosomal abnormalities by FISH on bone marrow specimen:  Pending as of 07/10/2023    06/29/2023:   Results of dental evaluation and dental purulence obtained from his dentist  Orders for Xgeva 4 mg IV every 4 weeks, along with calcium and vitamin-D supplements, placed  (bone targeted therapy) (x2 years)    We will initiate treatment with bortezomib/lenalidomide/dexamethasone (VRd), category 1 regimen  Cycle length 21 days  -bortezomib 1.3 mg per m2 subQ days 1, 8, and 15  -lenalidomide 25 mg p.o. daily, on days 1 through 14  -dexamethasone 40 mg p.o. with food days 1, 8, and 15  Cycle length: 21 days    Start baby aspirin 81 mg p.o. q.day concurrently, for thromboprophylaxis secondary to lenalidomide.  Start acyclovir 400 mg p.o. b.i.d. for  herpes zoster prophylaxis with bortezomib  Start Bactrim ds 1 tablet every Monday/Wednesday/Friday during treatment  Check CBC and CMP weekly during treatment  Check baseline TSH and free T4, then, every 3 months (patient on lenalidomide)  Weekly assessment for peripheral neuropathy and/or neuropathic pain.  Monitor for hypotension during bortezomib therapy; adjustment of antihypertensives and/or administration of IV hydration may be needed.    We are going to refer the patient for evaluation of stem cell transplant, to [facility]    Monitoring on lenalidomide:   Monitor for signs and symptoms of infection (if neutropenic), bleeding or bruising, hepatotoxicity, secondary malignancies, thromboembolism (eg, shortness of breath, chest pain, arm or leg swelling), dermatologic toxicity, tumor lysis syndrome, or hypersensitivity.    Monitoring on bortezomib:  Peripheral neuropathy, posterior reversible leukoencephalopathy syndrome, progressive multifocal leukoencephalopathy, tumor lysis syndrome, or hyper-/hypoglycemia. Monitor closely in patients with risk factors for heart failure or existing heart disease.    Autologous transplantation: Category 1 evidence supports proceeding directly after induction therapy to high-dose therapy and HCT  Renal dysfunction and advanced age are not contraindications to transplant    If response after primary therapy, then:  Autologous HCT versus continuation of myeloma therapy or maintenance therapy versus tandem autologous transplant, etc.    Bone targeted therapy with zoledronic acid x2 years  If PET-CT scan shows involvement of spine, then, short course radiotherapy for palliation of pain  Supportive care treatment as indicated for symptom management  We will assess for candidacy for transplant after starting therapy and reassess for transplant as performance status improves    Follow-up with me in 2 weeks.    Above discussed at length with the patient.  All questions answered.     Discussed labs and scans and gave him copies of relevant reports.    Nature of multiple myeloma discussed.    Rationale of investigations discussed.  Tentative plans of chemotherapy and subsequent referral for consideration of autologous hematopoietic stem cell transplant, discussed.    He understands and agrees with this plan.  ----------------------------------    Discussion:    Supportive care:  -bone targeted treatment: Bisphosphonate, category 1; or denosumab to reduce risk of skeletal related events; denosumab is preferred in patients with renal insufficiency; assess vitamin-D status; baseline dental exam; monitor for osteonecrosis of the jaw; monitor renal dysfunction with bisphosphonate therapy  -continue bone targeted treatment (bisphosphonate or denosumab) for 2 years; continue beyond 2 years should be based on clinical judgment  -patients receiving denosumab for bone disease who subsequently discontinued therapy should be given maintenance denosumab every 6 months or a single dose of bisphosphonate to mitigate risk of rebound osteoporosis  -for hypercalcemia, zoledronic acid, denosumab, steroids, and/or calcitonin  -for hyperviscosity, plasmapheresis should be used as adjuvant therapy for symptomatic hyperviscosity  -intravenous immunoglobulin therapy should be considered in the setting of recurrent serious infection and/or hypogammaglobulinemia (IgG </= 400 mg/dL)  -pneumococcal conjugate vaccine, followed by pneumococcal polysaccharide vaccine 1 year later  -Influenza vaccination is recommended; 2 doses of high-dose inactivated quandaivalent influenza vaccine should be considered  -consider 12 weeks of levofloxacin 500 mg p.o. daily at the time of initial diagnosis of multiple myeloma  -COVID-19 vaccination  -highest risk for VTE is in the 1st 6 months following new diagnosis of multiple myeloma  -VTE prophylaxis if no contraindications to anticoagulation agents or antiplatelets  Recommendations for  VTE prophylaxis:  Aspirin  mg daily; low molecular weight heparin equivalent to enoxaparin 40 mg daily; Xarelto 10 mg daily; Eliquis 2.5 mg b.i.d.; Arixtra 2.5 mg daily; Coumadin with target INR 2.0-3.0      Lenalidomide:  Embryo fetal toxicity  Hematologic toxicity.  Neutropenia.  Thrombocytopenia.  Venous and arterial thromboembolism.  DVT.  PE.  MI.  Stroke.  Dizziness, fatigue.  Tumor lysis syndrome.  Heart failure.    Used with caution in patients with renal impairment.  Lenalidomide =/> 4 cycles may decrease the # of CD34 pos cells collected for autologous stem cell transplant.  DVT, PE, atrial fibrillation, renal failure are more likely in patients> 65 years  Hepatotoxicity  Hypersensitivity reactions.  Anaphylaxis.  Angioedema.  Skin rash.  Eosinophilia.  Immediate hypersensitivity reactions.  Second primary malignancy.  AML.  MDS.  Solid tumor malignancies.  Nonmelanoma skin cancers.    Monitoring with lenalidomide:  -CBC with differential: weekly for the first 2 cycles, every 2 weeks during the third cycle and monthly thereafter;  -serum creatinine, LFTs (periodically).  -Thyroid function tests (TSH at baseline then every 2 to 3 months during lenalidomide treatment  -ECG when clinically indicated. Monitor for signs and symptoms of infection (if neutropenic), bleeding or bruising, hepatotoxicity, secondary malignancies, thromboembolism (eg, shortness of breath, chest pain, arm or leg swelling), dermatologic toxicity, tumor lysis syndrome, or hypersensitivity.  Patients who could become pregnant: Pregnancy test 10 to 14 days and 24 hours prior to initiating therapy, weekly during the first 4 weeks of treatment, then every 2 to 4 weeks through 4 weeks after therapy discontinued.    Bortezomib:  Bone marrow suppression.  Neutropenia.  Thrombocytopenia.  Acute CHF.  Nausea, vomiting, diarrhea, constipation, ileus.    Hepatotoxicity:  Herpes zoster and her PCP plaques reactivation.    Anaphylactic  reactions.  Hypersensitive reactions.  Angioedema.  Laryngeal edema.    Hypotension.  Peripheral neuropathy.  Posterior reversible leukoencephalopathy syndrome.  Progressive multifocal leukoencephalopathy.  Pulmonary toxicity.  Pneumonitis.  Interstitial pneumonia.  Lung infiltrates.  ARDS.  Thrombotic microangiopathy.  Tumor lysis syndrome.      Monitoring parameters with bortezomib:  CBC, CMP  Blood pressure (to monitor for hypotension)  Peripheral neuropathy  Posterior reversible leukoencephalopathy syndrome, progressive multifocal leukoencephalopathy, tumor lysis syndrome, or hyper-/hypoglycemia. Monitor baseline chest x-ray and then periodic pulmonary function testing (with new or worsening pulmonary symptoms). Monitor closely in patients with risk factors for heart failure or existing heart disease.      Follow-up:  No follow-ups on file.    Answers submitted by the patient for this visit:  Review of Systems Questionnaire (Submitted on 6/19/2023)  appetite change : No  unexpected weight change: Yes  mouth sores: No  visual disturbance: No  cough: No  shortness of breath: No  chest pain: No  abdominal pain: No  diarrhea: No  frequency: No  back pain: Yes  rash: No  headaches: No  adenopathy: No  nervous/ anxious: No

## 2023-07-11 ENCOUNTER — TELEPHONE (OUTPATIENT)
Dept: HEMATOLOGY/ONCOLOGY | Facility: CLINIC | Age: 68
End: 2023-07-11
Payer: MEDICARE

## 2023-07-11 ENCOUNTER — DOCUMENTATION ONLY (OUTPATIENT)
Dept: HEMATOLOGY/ONCOLOGY | Facility: CLINIC | Age: 68
End: 2023-07-11
Payer: MEDICARE

## 2023-07-11 DIAGNOSIS — C90.00 IGG MULTIPLE MYELOMA: Primary | ICD-10-CM

## 2023-07-11 DIAGNOSIS — C79.51 SECONDARY MALIGNANT NEOPLASM OF BONE: ICD-10-CM

## 2023-07-11 DIAGNOSIS — C79.51 SECONDARY MALIGNANCY OF LUMBAR VERTEBRAL COLUMN: ICD-10-CM

## 2023-07-11 RX ORDER — ABIRATERONE 500 MG/1
1000 TABLET ORAL DAILY
COMMUNITY

## 2023-07-11 NOTE — NURSING
BOBBY Hung spoke with patient regarding medication Revlimid through [facility]. Informed patient that:  [facility] is waiting on approval from patient's insurance.   Informed of referral to [facility] Hematology/Oncology for stem cell transplant evaluation; [facility] will reach out to him to schedule appointment.    Patient voiced understanding. Reports he has a GI appointment in [place] for 8/16/2023.

## 2023-07-11 NOTE — NURSING
Referral sent to Radiation Oncology for palliative radiotherapy to spine for pain control and referral to [facility] Hematology/Oncology for stem cell transplant eval.

## 2023-07-12 ENCOUNTER — HOSPITAL ENCOUNTER (OUTPATIENT)
Dept: RADIOLOGY | Facility: HOSPITAL | Age: 68
Discharge: HOME | End: 2023-07-12
Attending: INTERNAL MEDICINE
Payer: MEDICARE

## 2023-07-12 DIAGNOSIS — C79.51 SECONDARY MALIGNANT NEOPLASM OF BONE: ICD-10-CM

## 2023-07-12 DIAGNOSIS — C79.51 SECONDARY MALIGNANCY OF LUMBAR VERTEBRAL COLUMN: ICD-10-CM

## 2023-07-12 LAB — CA-I ADJ PH7.4 SERPL ISE-MCNC: 5.6 MG/DL (ref 4.57–5.43)

## 2023-07-12 PROCEDURE — A9577 INJ MULTIHANCE: HCPCS

## 2023-07-12 PROCEDURE — 25500020 PHARM REV CODE 255

## 2023-07-12 PROCEDURE — 74183 MRI ABD W/O CNTR FLWD CNTR: CPT | Mod: TC

## 2023-07-12 RX ADMIN — GADOBENATE DIMEGLUMINE 14 ML: 529 INJECTION, SOLUTION INTRAVENOUS at 09:07

## 2023-07-13 ENCOUNTER — INFUSION (OUTPATIENT)
Dept: INFUSION THERAPY | Facility: HOSPITAL | Age: 68
End: 2023-07-13
Attending: INTERNAL MEDICINE
Payer: MEDICARE

## 2023-07-13 ENCOUNTER — CLINICAL SUPPORT (OUTPATIENT)
Dept: HEMATOLOGY/ONCOLOGY | Facility: CLINIC | Age: 68
End: 2023-07-13
Payer: MEDICARE

## 2023-07-13 VITALS
DIASTOLIC BLOOD PRESSURE: 60 MMHG | SYSTOLIC BLOOD PRESSURE: 112 MMHG | TEMPERATURE: 98 F | WEIGHT: 145 LBS | RESPIRATION RATE: 18 BRPM | HEART RATE: 60 BPM | OXYGEN SATURATION: 100 % | BODY MASS INDEX: 22.23 KG/M2

## 2023-07-13 DIAGNOSIS — C90.00 IGG MULTIPLE MYELOMA: Primary | ICD-10-CM

## 2023-07-13 PROCEDURE — 96374 THER/PROPH/DIAG INJ IV PUSH: CPT

## 2023-07-13 PROCEDURE — 96401 CHEMO ANTI-NEOPL SQ/IM: CPT

## 2023-07-13 PROCEDURE — 25000003 PHARM REV CODE 250: Performed by: INTERNAL MEDICINE

## 2023-07-13 PROCEDURE — 63600175 PHARM REV CODE 636 W HCPCS: Mod: JZ,JG | Performed by: INTERNAL MEDICINE

## 2023-07-13 PROCEDURE — 96375 TX/PRO/DX INJ NEW DRUG ADDON: CPT

## 2023-07-13 RX ORDER — SODIUM CHLORIDE 0.9 % (FLUSH) 0.9 %
10 SYRINGE (ML) INJECTION
Status: DISCONTINUED | OUTPATIENT
Start: 2023-07-13 | End: 2023-07-13 | Stop reason: HOSPADM

## 2023-07-13 RX ORDER — HEPARIN 100 UNIT/ML
500 SYRINGE INTRAVENOUS
Status: DISCONTINUED | OUTPATIENT
Start: 2023-07-13 | End: 2023-07-13 | Stop reason: HOSPADM

## 2023-07-13 RX ORDER — BORTEZOMIB 3.5 MG/1
2.3 INJECTION, POWDER, LYOPHILIZED, FOR SOLUTION INTRAVENOUS; SUBCUTANEOUS
Status: COMPLETED | OUTPATIENT
Start: 2023-07-13 | End: 2023-07-13

## 2023-07-13 RX ADMIN — ZOLEDRONIC ACID 4 MG: 4 INJECTION, SOLUTION, CONCENTRATE INTRAVENOUS at 10:07

## 2023-07-13 RX ADMIN — BORTEZOMIB 2.25 MG: 3.5 INJECTION, POWDER, LYOPHILIZED, FOR SOLUTION INTRAVENOUS; SUBCUTANEOUS at 10:07

## 2023-07-13 NOTE — PROGRESS NOTES
"  Reason for Visit:  Chemo treatment for Mulitple Myeloma      PMHx: DM, GERD, HLD, HTN, Lumbar spinal stenosis, Vit D deficiency, Martha Palsy, Celiac disease, Mitral Regurgitation      Height: 68"  Weight:   Wt Readings from Last 15 Encounters:   07/13/23 65.8 kg (145 lb)   07/10/23 65.6 kg (144 lb 9.6 oz)   07/05/23 67.1 kg (148 lb)   06/27/23 66.7 kg (147 lb)   06/27/23 65.8 kg (145 lb)   06/26/23 66 kg (145 lb 9.6 oz)   06/16/23 65.7 kg (144 lb 12.8 oz)   06/13/23 65.6 kg (144 lb 9.6 oz)   05/18/23 69.7 kg (153 lb 9.6 oz)   05/09/23 69.7 kg (153 lb 9.6 oz)   05/07/23 71.7 kg (158 lb 1.1 oz)   05/01/23 71.7 kg (158 lb)   03/27/23 71.7 kg (158 lb)   03/15/23 72 kg (158 lb 12.8 oz)   01/12/23 71.8 kg (158 lb 3.2 oz)       Usual BW: 158 lb  Weight Change: 6% wt loss x 1 month  IBW:  154 lb  BMI: 22 (normal)    Allergies: Patient has no known allergies.    Current Medications:    Current Outpatient Medications:     abiraterone (ZYTIGA) 500 mg Tab, Take 1,000 mg by mouth once daily Take 2 (two) 500 mg tab (to equal 1,000 mg) by mouth daily., Disp: , Rfl:     acyclovir (ZOVIRAX) 400 MG tablet, Take 1 tablet (400 mg total) by mouth 2 (two) times daily., Disp: 60 tablet, Rfl: 4    ammonium lactate 12 % Crea, Apply 1 application topically once daily., Disp: 385 g, Rfl: 2    ascorbic acid, vitamin C, (VITAMIN C) 1000 MG tablet, Take 1,000 mg by mouth once daily., Disp: , Rfl:     aspirin (ECOTRIN) 81 MG EC tablet, Take 81 mg by mouth once daily., Disp: , Rfl:     atorvastatin (LIPITOR) 20 MG tablet, Take 20 mg by mouth once daily., Disp: , Rfl:     calcium carbonate 195 mg calcium (500 mg) Chew, Take 1 tablet by mouth once daily., Disp: , Rfl:     dexAMETHasone (DECADRON) 4 MG Tab, Take 10 tablets (40 mg total) by mouth every 7 days. on days 1, 8, and 15 of each chemotherapy cycle. Take with food., Disp: 30 tablet, Rfl: 5    diclofenac sodium (VOLTAREN) 1 % Gel, Apply 2 g topically 4 (four) times daily., Disp: 450 g, Rfl: " 1    famotidine (PEPCID) 40 MG tablet, Take 40 mg by mouth every evening., Disp: , Rfl:     fluticasone propionate (FLONASE) 50 mcg/actuation nasal spray, 50 sprays by Nasal route 2 (two) times a day., Disp: , Rfl:     HYDROcodone-acetaminophen (NORCO)  mg per tablet, Take 1 tablet by mouth every 6 (six) hours as needed for Pain., Disp: 120 tablet, Rfl: 0    hydrocortisone (ANUSOL-HC) 2.5 % rectal cream, Place 1 applicator rectally 2 (two) times daily., Disp: 28 g, Rfl: 2    ketoconazole (NIZORAL) 2 % cream, Apply topically once daily., Disp: 60 g, Rfl: 1    lenalidomide 25 mg Cap, Take 1 capsule (25 mg total) by mouth once daily on days 1-14 of each 21 day cycle. [place] authorization #., Disp: 14 capsule, Rfl: 4    levoFLOXacin (LEVAQUIN) 500 MG tablet, Take 1 tablet (500 mg total) by mouth once daily., Disp: 30 tablet, Rfl: 2    metFORMIN (GLUCOPHAGE) 500 MG tablet, Take by mouth., Disp: , Rfl:     metoprolol succinate (TOPROL-XL) 50 MG 24 hr tablet, Take by mouth., Disp: , Rfl:     nystatin (MYCOSTATIN) powder, Apply topically 4 (four) times daily., Disp: 60 g, Rfl: 2    pantoprazole (PROTONIX) 40 MG tablet, Take 40 mg by mouth once daily., Disp: , Rfl:     sulfamethoxazole-trimethoprim 800-160mg (BACTRIM DS) 800-160 mg Tab, Take 1 tablet by mouth on MWF of each week, Disp: 48 tablet, Rfl: 3    Labs:  7/13/23 -- H/H 10/13 L, Glu 144 H, Na 129 L, BUN 14.4, Cr 0.91    Diet History:   7/13/23 -- Pt beginning chemo treatment for multiple myeloma; reports decreased appetite over the last 3 weeks d/t feeling bloated & full, describes appetite as fair - encouraged small frequent meals/snacks for best management of early satiety; drinking 1 protein shake daily -- suggest ONS BID, will refer to [facility] for Glucerna shake; reports constipation managed, no n/v; pt with h/o celiac diease & follows gluten free, diabetic diet; Significant wt loss between May & June noted, stable over the last month    Nutrition  Diagnosis:   Problem:  malnutrition  Etiology (related to):  decreased appetite, early satiety  Signs/Symptoms (as evidenced by):  < 75% nutrition intake > 7 days, >5% wt loss x 1 month       Nutrition Rx: (based on 65.9 kg)  EEN: 4089-5575 kcal (28 - 30 kcal/kg)  EPN: 66-79 gm (1-1.2 gm/kg)  FLD: 1845 - 1975 ml (1ml/kcal)    Intervention:  Diabetic, Gluten Free diet; 5-6 small meals/snacks  Glucerna BID  Monitor Weights Weekly   Consult RD prn    Monitoring:  energy intake, GI tolerance, weight, and labs

## 2023-07-14 ENCOUNTER — TELEPHONE (OUTPATIENT)
Dept: FAMILY MEDICINE | Facility: CLINIC | Age: 68
End: 2023-07-14

## 2023-07-14 ENCOUNTER — TELEPHONE (OUTPATIENT)
Dept: HEMATOLOGY/ONCOLOGY | Facility: CLINIC | Age: 68
End: 2023-07-14
Payer: MEDICARE

## 2023-07-14 ENCOUNTER — OFFICE VISIT (OUTPATIENT)
Dept: FAMILY MEDICINE | Facility: CLINIC | Age: 68
End: 2023-07-14
Payer: MEDICARE

## 2023-07-14 VITALS
HEIGHT: 68 IN | DIASTOLIC BLOOD PRESSURE: 58 MMHG | HEART RATE: 69 BPM | WEIGHT: 150 LBS | RESPIRATION RATE: 18 BRPM | BODY MASS INDEX: 22.73 KG/M2 | SYSTOLIC BLOOD PRESSURE: 102 MMHG | TEMPERATURE: 98 F | OXYGEN SATURATION: 99 %

## 2023-07-14 DIAGNOSIS — K21.9 GASTROESOPHAGEAL REFLUX DISEASE WITHOUT ESOPHAGITIS: Primary | ICD-10-CM

## 2023-07-14 DIAGNOSIS — K59.03 THERAPEUTIC OPIOID INDUCED CONSTIPATION: ICD-10-CM

## 2023-07-14 DIAGNOSIS — R00.0 TACHYCARDIA: ICD-10-CM

## 2023-07-14 DIAGNOSIS — T40.2X5A THERAPEUTIC OPIOID INDUCED CONSTIPATION: ICD-10-CM

## 2023-07-14 LAB — PTH RELATED PROT SERPL-SCNC: 0.8 PMOL/L

## 2023-07-14 PROCEDURE — 99213 OFFICE O/P EST LOW 20 MIN: CPT | Mod: PBBFAC

## 2023-07-14 PROCEDURE — 93005 ELECTROCARDIOGRAM TRACING: CPT

## 2023-07-14 NOTE — NURSING
BOBBY Hung attempted to send P. A. through [employer] My Med[employer] and received message that it was a duplicate. Spoke with [facility] who states they had not submitted a request. Contacted Cover my Meds and [employer]. Completed authorization through [employer] and sent to [facility] drug plan. Awaiting response.  Patient notified of above and voiced understanding.

## 2023-07-14 NOTE — PROGRESS NOTES
Discussed with resident at time of encounter (07-14-23); pt. seen.  Epigastric / right anterior inferior thoracic region discomfort; onset post IVIG infusion (07-13-23);    some improvement since initial onset. Single episode of noticing heart rate faster than baseline.    PE  Gen: NAD; pleasant.  Chest: + surgical scar; anterior chest wall nontender; BS's equal / clear.  CV: reg. rhythm.  ABD: soft, subtle epigastric tenderness; no masses    EKG: NSR; no acute changes.    Resident's note reviewed 07-14-23.  Agree with assessment; plan of care appropriate.  Professional services provided in an outpatient primary care center affiliated with a teaching institution.

## 2023-07-14 NOTE — PROGRESS NOTES
[facility] Walk-in Clinic Note    CHIEF COMPLAINT:  Chief Complaint   Patient presents with    Gastroesophageal Reflux     C/o had chemo yesterday, noticed after he was having more heartburn than usual          HISTORY OF  PRESENT ILLNESS:  Yuriy Díaz is a 67 y.o. male w/PMHx of multiple myeloma on chemo, mitral valve repair, GERD, who presents to clinic today for epigastric pain.     Acute issues:  1) Epigastric pain  Onset 07/13/2023 after receiving his inital IVIG infusion for multiple myeloma (diagnosed 06/14/2023)  Patient states shortly after infusion, he felt the pain in his epigastric region without radiation  Describes pain has burning without radiation  Has history of GERD, taking Protonix 40 mg qd, which did not relieve the pain  Nothing makes the pain worse or better; he states this AM the epigastric pain is improved compared to yesterday  Denies fever, chills, chest pain, palpitations, Nausea/vomiting, but endorses epigastric pain    2) Constipation  Recently started taking Norco 10 mg q6hrs prn for pain related to chemotherapy  Patient admits to having decreased bowel-movements and feels bloated  Denies diarrhea but admits to constipation    REVIEW OF SYSTEMS:  See HPI    Past Medical History:   Diagnosis Date    Diabetes mellitus, type 2     GERD (gastroesophageal reflux disease)     Hyperlipidemia     Hypertension       Past Surgical History:   Procedure Laterality Date    BONE MARROW ASPIRATION N/A 6/27/2023    Procedure: ASPIRATION, BONE MARROW;  Surgeon: Namita Daniels MD;  Location: [facility] ENDOSCOPY;  Service: General;  Laterality: N/A;    BONE MARROW BIOPSY N/A 6/27/2023    Procedure: Biopsy-bone marrow;  Surgeon: Haylie Liu MD;  Location: [facility] ENDOSCOPY;  Service: General;  Laterality: N/A;    CARDIAC SURGERY  2018    EYE SURGERY        Social History     Socioeconomic History    Marital status:    Tobacco Use    Smoking status: Never    Smokeless tobacco: Never   Substance and  "Sexual Activity    Alcohol use: Never    Drug use: Never       PHYSICAL EXAMINATION:  Blood pressure (!) 102/58, pulse 69, temperature 97.9 °F (36.6 °C), temperature source Oral, resp. rate 18, height 5' 7.72" (1.72 m), weight 68 kg (150 lb), SpO2 99 %.    General:  VSS. No acute distress. Sitting in chair comfortably. Talking in full sentences.   Respiratory: clear to ascultation in all lung fields  Cardiovascular:  RRR, no additional heart sounds heard.  ABD: BS +, soft, no masses palpable, mild tenderness in the epigastric region and the right midclavicular line in the T5/T6 region.     ASSESSMENT/PLAN:    1) GERD  ECG done in clinic showed normal sinus rhythm  Informed patient to monitor symptoms and provided ED precautions related to chest pain, such as worsening chest pain with radiation to left arm/neck, palpitations, persistent elevated BP  Patient has cardiology f/u appointment 07/19/2023; informed patient to go to cardiology appointment; patient voiced understanding    2) Opioid-induced constipation  Prescribed Linzess 72 mcg qd prn for opioid induced constipation as patient prefers to work compared to other meds in the past      - Return to walk-in clinic prn; f/u with PCP on 07/26/2023 for routine f/u       Daryl Lara MD   [facility] Resident HO-1  07/14/2023   "

## 2023-07-16 PROBLEM — S32.020A COMPRESSION FRACTURE OF L2 LUMBAR VERTEBRA: Status: ACTIVE | Noted: 2023-07-16

## 2023-07-16 PROBLEM — S32.010A COMPRESSION FRACTURE OF FIRST LUMBAR VERTEBRA: Status: ACTIVE | Noted: 2023-07-16

## 2023-07-17 ENCOUNTER — OFFICE VISIT (OUTPATIENT)
Dept: HEMATOLOGY/ONCOLOGY | Facility: CLINIC | Age: 68
End: 2023-07-17
Attending: INTERNAL MEDICINE
Payer: MEDICARE

## 2023-07-17 VITALS
TEMPERATURE: 98 F | RESPIRATION RATE: 20 BRPM | WEIGHT: 146 LBS | SYSTOLIC BLOOD PRESSURE: 124 MMHG | BODY MASS INDEX: 22.91 KG/M2 | OXYGEN SATURATION: 99 % | HEART RATE: 65 BPM | DIASTOLIC BLOOD PRESSURE: 69 MMHG | HEIGHT: 67 IN

## 2023-07-17 DIAGNOSIS — C90.00 IGG MULTIPLE MYELOMA: Primary | ICD-10-CM

## 2023-07-17 DIAGNOSIS — Z79.899 DRUG-INDUCED IMMUNODEFICIENCY: ICD-10-CM

## 2023-07-17 DIAGNOSIS — M48.54XA NONTRAUMATIC COMPRESSION FRACTURE OF T8 VERTEBRA, INITIAL ENCOUNTER: ICD-10-CM

## 2023-07-17 DIAGNOSIS — S32.020G COMPRESSION FRACTURE OF L2 VERTEBRA WITH DELAYED HEALING, SUBSEQUENT ENCOUNTER: ICD-10-CM

## 2023-07-17 DIAGNOSIS — N28.1 CYST OF RIGHT KIDNEY: ICD-10-CM

## 2023-07-17 DIAGNOSIS — C79.51 SECONDARY MALIGNANT NEOPLASM OF BONE: ICD-10-CM

## 2023-07-17 DIAGNOSIS — S32.010G COMPRESSION FRACTURE OF L1 VERTEBRA WITH DELAYED HEALING, SUBSEQUENT ENCOUNTER: ICD-10-CM

## 2023-07-17 DIAGNOSIS — E83.52 HYPERCALCEMIA: ICD-10-CM

## 2023-07-17 DIAGNOSIS — C79.51 SECONDARY MALIGNANCY OF LUMBAR VERTEBRAL COLUMN: ICD-10-CM

## 2023-07-17 DIAGNOSIS — D84.821 DRUG-INDUCED IMMUNODEFICIENCY: ICD-10-CM

## 2023-07-17 DIAGNOSIS — M54.9 BILATERAL BACK PAIN, UNSPECIFIED BACK LOCATION, UNSPECIFIED CHRONICITY: ICD-10-CM

## 2023-07-17 PROCEDURE — 99215 PR OFFICE/OUTPT VISIT, EST, LEVL V, 40-54 MIN: ICD-10-PCS | Mod: S$PBB,,, | Performed by: INTERNAL MEDICINE

## 2023-07-17 PROCEDURE — 99215 OFFICE O/P EST HI 40 MIN: CPT | Mod: S$PBB,,, | Performed by: INTERNAL MEDICINE

## 2023-07-17 PROCEDURE — 99215 OFFICE O/P EST HI 40 MIN: CPT | Mod: PBBFAC | Performed by: INTERNAL MEDICINE

## 2023-07-17 NOTE — PROGRESS NOTES
History:  Past Medical History:   Diagnosis Date    Diabetes mellitus, type 2     GERD (gastroesophageal reflux disease)     Hyperlipidemia     Hypertension    Past medical history:  NIDDM. Dyslipidemia.  GERD.  Lumbar spinal stenosis.  Vitamin-D deficiency.  Allergic rhinitis.  HTN.  Bell's palsy.  Celiac disease.  Hemorrhoids.  Mitral regurgitation 01/05/2018.  Rheumatic valve narrowing and leaking mitral valve.  -02/02/2018 ([facility]):  АНДРЕЙ, right minimally invasive anterior thoracotomy, right femoral artery and vein exploration, extensive right pleural adhesiolysis/pneumolysis, bilateral Maze/ablation (extensive), left atrial appendage clip placement for left atrial appendage exclusion, mitral valve repair (complex repair with 26 mm annuloplasty ring)  Social history:  .  Lives in [place], [place].  Five children.  Owns a [employer] business.  No history of tobacco, alcohol, or illicit drug abuse.    Family history:  Negative for cancers or blood dyscrasias.    Health maintenance:  PCP in family medicine clinic, [facility].  Had EGD and colonoscopy done 1 year ago by Dr. Zoltan Kapoor, [facility], apparently unremarkable (he gets the endoscopies done periodically because of history of celiac disease).  Past Surgical History:   Procedure Laterality Date    BONE MARROW ASPIRATION N/A 6/27/2023    Procedure: ASPIRATION, BONE MARROW;  Surgeon: Namita Daniels MD;  Location: [facility] ENDOSCOPY;  Service: General;  Laterality: N/A;    BONE MARROW BIOPSY N/A 6/27/2023    Procedure: Biopsy-bone marrow;  Surgeon: Haylie Liu MD;  Location: [facility] ENDOSCOPY;  Service: General;  Laterality: N/A;    CARDIAC SURGERY  2018    EYE SURGERY        Social History     Socioeconomic History    Marital status:    Tobacco Use    Smoking status: Never    Smokeless tobacco: Never   Substance and Sexual Activity    Alcohol use: Never    Drug use: Never      History reviewed. No  pertinent family history.     Reason for Follow-up:  -multiple myeloma, IgG kappa  -worsening low back pain  -hypercalcemia    History of Present Illness:   Multiple Myeloma        Oncologic/Hematologic History:  Oncology History   IgG multiple myeloma   6/23/2023 Initial Diagnosis    IgG multiple myeloma     7/13/2023 -  Chemotherapy    Treatment Summary   Plan Name: OP VRD - WEEKLY BORTEZOMIB LENALIDOMIDE DEXAMETHASONE Q3W  Treatment Goal: Curative  Status: Active  Start Date: 7/13/2023  End Date: 11/3/2023 (Planned)  Provider: Narciso Lundy MD  Chemotherapy: bortezomib (VELCADE) injection 2.25 mg, 1.3 mg/m2 = 2.25 mg, Subcutaneous, Clinic/HOD 1 time, 1 of 6 cycles  lenalidomide 25 mg Cap, 25 mg, Oral, Daily, 1 of 1 cycle, Start date: 7/7/2023, End date: --     67-year-old gentleman, referred from [facility], with newly diagnosed multiple myeloma.      05/14/2023: [facility] note:  Low back pain, onset early March   - localized to back with occasional sharp pain down posterior aspect of right leg  - fluctuating in intensity, overall worsening since onset, no change in character, difficulty doing ADLs, walking with pain  - difficulty sleeping   - seen in urgent care and C multiple times  - MRI 5/4/23: degenerative disc disease and facet arthrosis, mild spinal canal stenosis L4-L5 greater than L3-L4, no high grade stenosis   - Toradol IM helps briefly  - side effects from gabapentin, Robaxin and Norco- GI and drowsiness   - Voltaren gel not helping  - stopped going to PT due to pain  - follows with Neurologist   - requests trial of tramadol and Lyrica   - going out of country soon, concerned he will not be able to go due to pain  - denies bowel/ bladder incontinence, night sweats, unexplained weight loss, chest pain or shortness of breath   Spinal stenosis   Acute bilateral low back pain with right-sided sciatica    05/18/2023: [facility] note:  Low back pain.  Onset early  March 2023.  Localized lumbar region.  Constant.  Occasional radiation down posterior right leg.  Difficulty sleeping in bed because of exacerbation of pain by lying down flat.  Compensating by sleeping in a recliner or on sofa. MRI showed degenerative disc disease and facet arthrosis at multiple levels with mild canal stenosis.  - Has attempted gabapentin, Robaxin, Norco, Lyrica, and tramadol with minimal relief.  Currently being managed with tramadol and Lyrica.  - Told by neurosurgery that he needs pain management referral because NS does not deal with injections  - Denies saddle anesthesia, bowel/bladder incontinence, weight loss, night sweats    Spinal stenosis of lumbar region  Degeneration of lumbar intervertebral disc  - Continue treatment with tramadol and Lyrica as prescribed  Bradycardia  - Patient aware of chronic bradycardia, states he has been this way for 3-4 years due to palpitations if HR >60  - Currently on Toprol XL 50 mg  - Follows with [facility]    06/13/2023:  [facility] note:   Acute Issues:   Weight loss- 6 weeks history of weight loss 12-14 lb unintentionally.  Reports appetite has been somewhat decreased secondary to pain though has noticed increased size of abdomen.  He denies nausea, vomiting, dysphagia, dyspepsia, bloody stools, staying changes in stool caliber, constipation, fevers or night sweats, nervousness/anxiousness, skin or hair changes.  Denies history of smoking or alcohol use.  Receives EGD and colonoscopy every 2 years per Dr. Kapoor, [place], last was roughly 1 year ago. PSA wnl 5/2023.   Chronic Issues: DM- med compliant metformin 500 daily, last A1C 6.1 in 1/2023.   Chronic medical conditions:  Hypertension.  Dyslipidemia.  GERD.  Lumbar spinal stenosis.  Vitamin-D deficiency.  Allergic rhinitis.  HTN      Investigations reviewed:  05/04/2023: MRI lumbar spine without contrast (worsening lumbar pain x5-7 weeks with bilateral sciatica):   Lumbar degenerative disc  disease and facet arthrosis as detailed above with no acute pathology identified.    Mild spinal canal stenosis at L4-L5 greater than L3-L4 with no high-grade spinal canal stenosis.   Mild bilateral neural foramen stenosis at L3-L4 and on the left greater than right at L4-L5.    06/14/2023:  Skeletal survey:   No definite lesions to suggest either lytic and or sclerotic metastatic changes.   Multiple compression deformities mostly in the lumbar spine but these are seen also in the thoracic spine; other imaging modalities might prove helpful for further assessment; some of these compression deformities appear to be relatively new as compared with an MR of May 2023  (There are some degenerative changes of the thoracic spine with a mild compression deformity of superior endplate of 1 of the lower thoracic vertebral body levels; there are multiple compression deformities of the lumbar spine including the superior endplate of L3 and L4 there is a compression deformity of the superior endplate of T11 with compression deformities of the superior endplate of T12 and a compression deformity of L1; some of these compression deformities appear to be new since the last exam (MR) of May 4, 2023 other imaging modalities might prove helpful for further assessment    Labs reviewed:  01/12/2023:  Hemoglobin 11.9.  MCV 98.7.  Ferritin 211.66.  Transferrin saturation 42%.  Vitamin B12 969.  Folate 13.9.    06/14/2023:  Hemoglobin 11.0.  MCV 99.1.  ESR 15, normal.    06/14/2023: IgG 3810 mg/dL, elevated.  IgM 11, suppressed.  IgA 33, suppressed.  2.28 gm/dL IgG kappa monoclonal protein on SPEP and BUBBA.  Kappa 99.4, elevated.  Lambda 0.3400, suppressed.  Kappa/lambda ratio 292, elevated.    05/01/2023: PSA 1.7, normal.  06/14/2023:  BUN 15.  Creatinine 0.79.  Calcium 9.6.  Albumin 3.6.  LFTs normal.  TSH normal.  06/16/2023:  , normal.    06/15/2023:  24 hour urine protein 288 mg, elevated (reference Range:  < 229). M spike 183  mg. Monoclonal kappa.    06/16/2023:  Urine:  Kappa 25.5 mg/dL; lambda < 0.7000 mg/dL; kappa/lambda ratio> 36.4    06/26/2023:  Pleasant gentleman who presents for initial medical oncology consultation, accompanied by his wife and family present who is a past interventional cardiologist.  Back pain for 2 months.  Initially started in right groin.  Physical therapy has not helped.  10/10 severity.  Has been taking Tylenol without significant relief.  Secured to take narcotics because of constipation.  Underwent couple of spinal steroid shots 3 weeks ago, with minor relief.  Pain is present all the time.  Pain increases with changing position in bed as well as upon standing.  He finds it difficult to ambulate.  Pain does not radiate down lower extremities and is not accompanied with lower extremity weakness, numbness, urinary or bowel incontinence, or saddle anesthesia.  Also experiences muscle cramps.  Generalized weakness.  However, ECOG 2.  No fevers or chills.  No repeated infections.  Cramps in hands and feet.  Appetite is down.  Appetite has picked up in last 10 days.  Has lost 12-14 lb in last 1-1/2 months.  No abnormal bleeding or bruising.      Interval History:  INF FLUIDS   OP VRD - WEEKLY BORTEZOMIB LENALIDOMIDE DEXAMETHASONE Q3W     07/17/2023:   -07/10/2023: TSH, free T4 normal 25 hydroxy vitamin-D level normal.  PTH level 19.7, normal begin (reference range:  8.7-77.0 picograms/mL) calcium 10.3.  Albumin 3.1.  Ionized calcium 5.6, elevated.  PTH related peptide 0.8, normal (reference Range:  </= 4.2 pmol/L)  -07/12/2023:  MRI abdomen with and without contrast (right renal cyst): Small exophytic right renal lesion most likely a proteinaceous or hemorrhagic cyst (10 mm, upper pole of right kidney)  -Velcade started 07/13/2023  -07/13/2023:  Calcium 9.8.  Albumin 3.0.  -07/13/2023: Zometa 4 mg IV every 4 weeks, started  Presents for a follow-up visit, accompanied by his wife and his daughter.  In no acute  discomfort.  Taking Norco, tramadol, and gabapentin for back pain.  The day after Zometa, back pain and pain in both thighs, worsened; I told him that is expected.  Says that he is going to start Revlimid tomorrow.  No fevers or chills.  ECOG 1.  No weakness or objective sensory loss in lower extremities.  Retains full control of bowel and bladder.  No saddle anesthesia.        Medications:  Current Outpatient Medications on File Prior to Visit   Medication Sig Dispense Refill    abiraterone (ZYTIGA) 500 mg Tab Take 1,000 mg by mouth once daily Take 2 (two) 500 mg tab (to equal 1,000 mg) by mouth daily.      acyclovir (ZOVIRAX) 400 MG tablet Take 1 tablet (400 mg total) by mouth 2 (two) times daily. 60 tablet 4    ammonium lactate 12 % Crea Apply 1 application topically once daily. 385 g 2    ascorbic acid, vitamin C, (VITAMIN C) 1000 MG tablet Take 1,000 mg by mouth once daily.      aspirin (ECOTRIN) 81 MG EC tablet Take 81 mg by mouth once daily.      atorvastatin (LIPITOR) 20 MG tablet Take 20 mg by mouth once daily.      calcium carbonate 195 mg calcium (500 mg) Chew Take 1 tablet by mouth once daily.      dexAMETHasone (DECADRON) 4 MG Tab Take 10 tablets (40 mg total) by mouth every 7 days. on days 1, 8, and 15 of each chemotherapy cycle. Take with food. 30 tablet 5    diclofenac sodium (VOLTAREN) 1 % Gel Apply 2 g topically 4 (four) times daily. 450 g 1    famotidine (PEPCID) 40 MG tablet Take 40 mg by mouth every evening.      fluticasone propionate (FLONASE) 50 mcg/actuation nasal spray 50 sprays by Nasal route 2 (two) times a day.      HYDROcodone-acetaminophen (NORCO)  mg per tablet Take 1 tablet by mouth every 6 (six) hours as needed for Pain. 120 tablet 0    hydrocortisone (ANUSOL-HC) 2.5 % rectal cream Place 1 applicator rectally 2 (two) times daily. 28 g 2    ketoconazole (NIZORAL) 2 % cream Apply topically once daily. 60 g 1    lenalidomide 25 mg Cap Take 1 capsule (25 mg total) by mouth once  daily on days 1-14 of each 21 day cycle. [employer] authorization #. 14 capsule 4    levoFLOXacin (LEVAQUIN) 500 MG tablet Take 1 tablet (500 mg total) by mouth once daily. 30 tablet 2    linaCLOtide (LINZESS) 72 mcg Cap capsule Take 1 capsule (72 mcg total) by mouth daily as needed (constipation). 90 each 1    metFORMIN (GLUCOPHAGE) 500 MG tablet Take by mouth.      metoprolol succinate (TOPROL-XL) 50 MG 24 hr tablet Take by mouth.      nystatin (MYCOSTATIN) powder Apply topically 4 (four) times daily. 60 g 2    pantoprazole (PROTONIX) 40 MG tablet Take 40 mg by mouth once daily.      sulfamethoxazole-trimethoprim 800-160mg (BACTRIM DS) 800-160 mg Tab Take 1 tablet by mouth on MWF of each week 48 tablet 3     No current facility-administered medications on file prior to visit.       Review of Systems:   All systems reviewed and found to be negative except for the symptoms detailed above    Physical Examination:   VITAL SIGNS:   Vitals:    07/17/23 1520   BP: 124/69   Pulse: 65   Resp: 20   Temp: 97.7 °F (36.5 °C)       GENERAL:  In no apparent distress.    HEAD:  No signs of head trauma.  EYES:  Pupils are equal.  Extraocular motions intact.    EARS:  Hearing grossly intact.  MOUTH:  Oropharynx is normal.   NECK:  No adenopathy, no JVD.     CHEST:  Chest with clear breath sounds bilaterally.  No wheezes, rales, rhonchi.    CARDIAC:  Regular rate and rhythm.  S1 and S2, without murmurs, gallops, rubs.  VASCULAR:  No Edema.  Peripheral pulses normal and equal in all extremities.  ABDOMEN:  Soft, without detectable tenderness.  No sign of distention.  No   rebound or guarding, and no masses palpated.   Bowel Sounds normal.  MUSCULOSKELETAL:  Good range of motion of all major joints. Extremities without clubbing, cyanosis or edema.    NEUROLOGIC EXAM:  Alert and oriented x 3.  No focal sensory or strength deficits.   Speech normal.  Follows commands.  PSYCHIATRIC:  Mood normal.    No results for input(s): CBC in the last  72 hours.   No results for input(s): CMP in the last 72 hours.     Assessment:  Problem List Items Addressed This Visit          Neuro    Compression fracture of thoracic spine, non-traumatic    Compression fracture of first lumbar vertebra    Compression fracture of L2 lumbar vertebra       Renal/    Hypercalcemia    Cyst of right kidney       Immunology/Multi System    Drug-induced immunodeficiency       Oncology    IgG multiple myeloma - Primary    Secondary malignant neoplasm of bone    Secondary malignancy of lumbar vertebral column       Orthopedic    Back pain     Multiple myeloma, IgG kappa:  -presentation: 03/2023:  Worsening low back pain, no associated neurological symptoms, 12-14 pound weight loss over 6 weeks, mild L4-L5 spinal canal stenosis on MRI (05/04/2023), lumbar DJD and facet arthrosis on MRI lumbar spine (05/04/2023)  -skeletal survey 06/14/2023: Negative for lytic or sclerotic lesions; multiple compression deformities lumbar spine and thoracic spine (new since MRI scan dated 05/04/2023)  -06/14/2023:  Mild anemia (hemoglobin 11.0)   -06/14/2023:  2.28 gm/dL IgG kappa M protein. Kappa 99.4, elevated.  Lambda 0.3400, suppressed.  Kappa/lambda ratio 292, elevated.  -06/14/2023:  BUN, creatinine, calcium, albumin normal  -06/15/2022:  24 hour urine protein 280 mg, elevated.  M spike 183 mg.  Urine BUBBA showed monoclonal kappa light chains.  -06/16/2023: Urine:  Kappa 25.5 mg/dL; lambda < 0.7000 mg/dL; kappa/lambda ratio> 36.4  06/16/2023:  , normal.  -06/26/2023:  Hemoglobin 11.0.  Beta 2 microglobulin 3.18.  Calcium 10.1.  Albumin 3.2.  Hepatitis-A/B/C/HIV serology negative.  -bone marrow biopsy 06/27/2022:  Plasma cells 50% in bone marrow aspirate; plasma cells 36% on flow cytometry of bone marrow aspirate, with kappa light chain restriction; molecular studies pending  -CT abdomen pelvis with contrast 06/27/2023:  Hepatic steatosis; bilateral inguinal hernias; heterogeneous bone  demineralization, suggesting marrow infiltrative process like multiple myeloma; multilevel compression deformities in the included thoracic spine and lumbar spine; 10 mm exophytic hyperdense focus upper pole of right kidney, compatible with a hyperdense cyst  -FDG PET-CT 06/29/2023:  Left femur lesser trochanter; right 4th rib laterally; multilevel thoracolumbar compression deformities; subacute fractures T8, T12, L1, L2  >>>  From the data available so far, patient has multiple myeloma (symptomatic), by virtue of:  1. Involved: Uninvolved serum FLC ratio =/> 100 and involved FLC concentration 10 mg/dL or higher (in this patient, kappa/lambda ratio age 292, and kappa light chain 99.4 mg/dL)   2. Hypercalcemia (06/26/2023): Calcium 10.1.  Albumin 3.2   3. Bone marrow plasmacytosis 50%  4. Heterogeneous bone demineralization on CT 06/27/2023, suggesting bone marrow infiltrative process like multiple myeloma  5. FDG PET-CT 06/29/2023:  Left femur lesser trochanter; right 4th rib laterally; multilevel thoracolumbar compression deformities; subacute fractures T8, T12, L1, L2  6. Beta 2 microglobulin level 3.18, elevated (06/26/2023)   7. No renal insufficiency, no significant anemia, LDH normal  >>>  -Velcade started 07/13/2023  -Zometa 4 mg IV every 4 weeks) x2 years), started 07/13/2023    Co-morbidities:  NIDDM.  Dyslipidemia.  Hypertension.  Celiac disease.  History of rheumatic mitral valve disease, S/P Maze/ablation procedure 02/02/2018      Vaccination history:   COVID-19, MRNA, LN-S, PF (MODERNA FULL 0.5 ML DOSE) 8/18/2022 , 10/23/2021 , 3/17/2021 , 2/17/2021   Hepatitis A / Hepatitis B 8/18/2022 , 10/5/2021 , 7/29/2020   Influenza - Quadrivalent - MDCK - PF 10/5/2021   Influenza - Trivalent - MDCK - PF 9/10/2015   Meningococcal Conjugate (MCV4O) 8/18/2022 , 4/17/2012   Meningococcal Conjugate (MCV4P) 4/17/2012   Pneumococcal Conjugate - 20 Valent 1/12/2023   Tdap 7/29/2020   Zoster 11/18/2020 , 7/29/2020    Zoster Recombinant 11/18/2020 , 7/29/2020         Plan:  -06/26/2023: Started Norco 5 mg p.o. q.6 hours PRN for pain; did not work; he stopped taking  -06/26/2023: Taking Norco 10 mg only twice daily; instructed him to take Norco 10 mg every 4-6 hours p.r.n. for pain (has 9/10 lower back pain, constant, radiating to anterior aspect of right thigh, without neurological symptoms)  -06/26/2023: Started levofloxacin 500 mg p.o. q.day X 12 weeks  -06/26/2023: Severe back pain; presumed due to myeloma; referred to Radiation Oncology  -Velcade started 07/13/2023  -Zometa 4 mg IV every 4 weeks x2 years (started 07/13/2023  >>>  Continue chemotherapy per protocol  Continue baby aspirin 81 mg p.o. q.day concurrently, for thromboprophylaxis secondary to lenalidomide.  Continue levofloxacin 5 mg p.o. q.day X 12 weeks (started 06/26/2023)  Continue acyclovir 400 mg p.o. b.i.d. for herpes zoster prophylaxis with bortezomib  Continue Bactrim ds 1 tablet every Monday/Wednesday/Friday during treatment  Check CBC and CMP weekly during treatment  Check baseline TSH and free T4, then, every 3 months (monitoring on lenalidomide)   Weekly assessment for peripheral neuropathy and/or neuropathic pain.  Monitor for hypotension during bortezomib therapy; adjustment of antihypertensives and/or administration of IV hydration may be needed.  Assess response with repeat SPEP, BUBBA, FLC assay, and 24 hour urine for total protein, UPEP, urine BUBBA, and urine FLC assay in 1 month (middle of August)  Continue Zometa 4 mg IV every 4 weeks x2 years (started 07/13/2023)  Patient already referred to [facility] for transplant evaluation  Patient already referred to Radiation Oncology for short course radiotherapy to spine for pain control  Continue narcotics for back pain    07/10/2023:  Pending:  FISH panel on bone marrow including del(13), del(17p13), t(4;14), t(11;14), t(14;16), t(14;20), 1q21 gain/1q21 amplification, 1p deletion  NGS panel testing on  bone marrow specimen    10 mm exophytic hyperdense focus upper pole of right kidney, compatible with a hyperdense cyst, on CT abdomen pelvis with contrast 06/27/2023  -07/12/2023:  MRI abdomen with and without contrast (right renal cyst): Small exophytic right renal lesion most likely a proteinaceous or hemorrhagic cyst (10 mm, upper pole of right kidney)  -Velcade started 07/13/2023    Need the following for staging of multiple myeloma:  Serum beta 2 microglobulin elevated  Serum albumin and LDH are normal.  Evaluation of chromosomal abnormalities by FISH on bone marrow specimen:  Pending as of 07/10/2023    Bortezomib/lenalidomide/dexamethasone (VRd), category 1 regimen:  Cycle length 21 days  -bortezomib 1.3 mg per m2 subQ days 1, 8, and 15  -lenalidomide 25 mg p.o. daily, on days 1 through 14  -dexamethasone 40 mg p.o. with food days 1, 8, and 15  Cycle length: 21 days    Monitoring on lenalidomide:   Monitor for signs and symptoms of infection (if neutropenic), bleeding or bruising, hepatotoxicity, secondary malignancies, thromboembolism (eg, shortness of breath, chest pain, arm or leg swelling), dermatologic toxicity, tumor lysis syndrome, or hypersensitivity.    Monitoring on bortezomib:  Peripheral neuropathy, posterior reversible leukoencephalopathy syndrome, progressive multifocal leukoencephalopathy, tumor lysis syndrome, or hyper-/hypoglycemia. Monitor closely in patients with risk factors for heart failure or existing heart disease.    Autologous transplantation:   Category 1 evidence supports proceeding directly after induction therapy to high-dose therapy and HCT  Renal dysfunction and advanced age are not contraindications to transplant    If response after primary therapy, then:  Autologous HCT versus continuation of myeloma therapy or maintenance therapy versus tandem autologous transplant, etc.    Follow-up with me in 2 weeks.    Above discussed at length with the patient.  All questions answered.     Discussed labs and scans and gave him copies of relevant reports.    Nature of multiple myeloma discussed.    Rationale of investigations discussed.  Tentative plans of chemotherapy and subsequent referral for consideration of autologous hematopoietic stem cell transplant, discussed.    He understands and agrees with this plan.  ----------------------------------    Discussion:    Supportive care:  -bone targeted treatment: Bisphosphonate, category 1; or denosumab to reduce risk of skeletal related events; denosumab is preferred in patients with renal insufficiency; assess vitamin-D status; baseline dental exam; monitor for osteonecrosis of the jaw; monitor renal dysfunction with bisphosphonate therapy  -continue bone targeted treatment (bisphosphonate or denosumab) for 2 years; continue beyond 2 years should be based on clinical judgment  -patients receiving denosumab for bone disease who subsequently discontinued therapy should be given maintenance denosumab every 6 months or a single dose of bisphosphonate to mitigate risk of rebound osteoporosis  -for hypercalcemia, zoledronic acid, denosumab, steroids, and/or calcitonin  -for hyperviscosity, plasmapheresis should be used as adjuvant therapy for symptomatic hyperviscosity  -intravenous immunoglobulin therapy should be considered in the setting of recurrent serious infection and/or hypogammaglobulinemia (IgG </= 400 mg/dL)  -pneumococcal conjugate vaccine, followed by pneumococcal polysaccharide vaccine 1 year later  -Influenza vaccination is recommended; 2 doses of high-dose inactivated quandaivalent influenza vaccine should be considered  -consider 12 weeks of levofloxacin 500 mg p.o. daily at the time of initial diagnosis of multiple myeloma  -COVID-19 vaccination  -highest risk for VTE is in the 1st 6 months following new diagnosis of multiple myeloma  -VTE prophylaxis if no contraindications to anticoagulation agents or antiplatelets  Recommendations for  VTE prophylaxis:  Aspirin  mg daily; low molecular weight heparin equivalent to enoxaparin 40 mg daily; Xarelto 10 mg daily; Eliquis 2.5 mg b.i.d.; Arixtra 2.5 mg daily; Coumadin with target INR 2.0-3.0      Lenalidomide:  Embryo fetal toxicity  Hematologic toxicity.  Neutropenia.  Thrombocytopenia.  Venous and arterial thromboembolism.  DVT.  PE.  MI.  Stroke.  Dizziness, fatigue.  Tumor lysis syndrome.  Heart failure.    Used with caution in patients with renal impairment.  Lenalidomide =/> 4 cycles may decrease the # of CD34 pos cells collected for autologous stem cell transplant.  DVT, PE, atrial fibrillation, renal failure are more likely in patients> 65 years  Hepatotoxicity  Hypersensitivity reactions.  Anaphylaxis.  Angioedema.  Skin rash.  Eosinophilia.  Immediate hypersensitivity reactions.  Second primary malignancy.  AML.  MDS.  Solid tumor malignancies.  Nonmelanoma skin cancers.    Monitoring with lenalidomide:  -CBC with differential: weekly for the first 2 cycles, every 2 weeks during the third cycle and monthly thereafter;  -serum creatinine, LFTs (periodically).  -Thyroid function tests (TSH at baseline then every 2 to 3 months during lenalidomide treatment  -ECG when clinically indicated. Monitor for signs and symptoms of infection (if neutropenic), bleeding or bruising, hepatotoxicity, secondary malignancies, thromboembolism (eg, shortness of breath, chest pain, arm or leg swelling), dermatologic toxicity, tumor lysis syndrome, or hypersensitivity.  Patients who could become pregnant: Pregnancy test 10 to 14 days and 24 hours prior to initiating therapy, weekly during the first 4 weeks of treatment, then every 2 to 4 weeks through 4 weeks after therapy discontinued.    Bortezomib:  Bone marrow suppression.  Neutropenia.  Thrombocytopenia.  Acute CHF.  Nausea, vomiting, diarrhea, constipation, ileus.    Hepatotoxicity:  Herpes zoster and her PCP plaques reactivation.    Anaphylactic  reactions.  Hypersensitive reactions.  Angioedema.  Laryngeal edema.    Hypotension.  Peripheral neuropathy.  Posterior reversible leukoencephalopathy syndrome.  Progressive multifocal leukoencephalopathy.  Pulmonary toxicity.  Pneumonitis.  Interstitial pneumonia.  Lung infiltrates.  ARDS.  Thrombotic microangiopathy.  Tumor lysis syndrome.      Monitoring parameters with bortezomib:  CBC, CMP  Blood pressure (to monitor for hypotension)  Peripheral neuropathy  Posterior reversible leukoencephalopathy syndrome, progressive multifocal leukoencephalopathy, tumor lysis syndrome, or hyper-/hypoglycemia. Monitor baseline chest x-ray and then periodic pulmonary function testing (with new or worsening pulmonary symptoms). Monitor closely in patients with risk factors for heart failure or existing heart disease.      Follow-up:  No follow-ups on file.

## 2023-07-17 NOTE — Clinical Note
Orders for today:   Continue chemotherapy  Baby aspirin 81 mg daily Continue levofloxacin, acyclovir, and Bactrim ds  In 1 month, recheck SPEP, BUBBA, FLC assay, and 24 hour urine for total protein, UPEP, urine BUBBA, and FLC assay  Check CBC and CMP weekly  Check TSH and free T4 today, then, every 3 months  Continue Zometa every 4 weeks  Refer to Radiation Oncology once again to consider palliative radiotherapy to spine  Continue Norco, tramadol, and gabapentin for back pain  Follow-up with NP in 2 weeks   Follow-up visit with me in 1 month.

## 2023-07-18 LAB
ESTRIOL SERPL-MCNC: NORMAL NG/ML
ESTROGEN SERPL-MCNC: NORMAL PG/ML
INSULIN SERPL-ACNC: NORMAL U[IU]/ML
LAB AP BM FLOWCYTOMETRY RESULT: NORMAL
LAB AP CLINICAL INFORMATION: NORMAL
LAB AP GROSS DESCRIPTION: NORMAL
T3RU NFR SERPL: NORMAL %

## 2023-07-18 NOTE — PROGRESS NOTES
Faculty Attestation: Patient was seen in [facility] clinic. Patient was seen and examined by me at the time of the visit. I have personally reviewed History of the Present Illness , Review of Systems, PFSH , Physical Exam, Assessment and Plan documented by the resident. I participated in the management of the patient and was immediately available throughout the encounter.  Dr. Obregon is not accepting patient's insurance therefore he has been referred to [facility].  Importance of adherence to treatment recommendations discussed with patient at the time of the visit. Services were furnished in a primary care center in the outpatient department at a [facility] hospital. I discussed the patient with the resident and agree with resident's findings and plan as documented in the resident note. Lauryn Wynn MD

## 2023-07-19 ENCOUNTER — TELEPHONE (OUTPATIENT)
Dept: HEMATOLOGY/ONCOLOGY | Facility: CLINIC | Age: 68
End: 2023-07-19
Payer: MEDICARE

## 2023-07-19 DIAGNOSIS — C79.51 SECONDARY MALIGNANT NEOPLASM OF BONE: Primary | ICD-10-CM

## 2023-07-19 PROCEDURE — 88184 FLOWCYTOMETRY/ TC 1 MARKER: CPT | Performed by: PATHOLOGY

## 2023-07-19 PROCEDURE — 88275 CYTOGENETICS 100-300: CPT | Performed by: PATHOLOGY

## 2023-07-19 PROCEDURE — 88271 CYTOGENETICS DNA PROBE: CPT | Mod: 59

## 2023-07-19 PROCEDURE — 88185 FLOWCYTOMETRY/TC ADD-ON: CPT | Mod: 59

## 2023-07-19 RX ORDER — MORPHINE SULFATE 30 MG/1
30 TABLET, FILM COATED, EXTENDED RELEASE ORAL 2 TIMES DAILY
COMMUNITY
End: 2023-07-19 | Stop reason: SDUPTHER

## 2023-07-19 RX ORDER — MORPHINE SULFATE 30 MG/1
30 TABLET, FILM COATED, EXTENDED RELEASE ORAL 2 TIMES DAILY
Qty: 30 TABLET | Refills: 0 | Status: SHIPPED | OUTPATIENT
Start: 2023-07-19 | End: 2024-03-01

## 2023-07-19 NOTE — NURSING
Received phone call from patient's daughter with patient in the back ground to report increased pain to the right lower leg. Reports that since last clinic appointment the pain to his right leg has become intolerable when walking and even has considerable pain at rest. Describes pain to right leg starting at the knee and radiating down anterior aspect of leg to his foot. Denies any redness, swelling, tenderness to touch, falls, or fever. Patient is taking Norco q 6 hours, Tramadol TID, and Gabapentin 100 mg BID. Patient requesting assistance with pain or to see what is causing the pain.    Please advise.

## 2023-07-20 ENCOUNTER — INFUSION (OUTPATIENT)
Dept: INFUSION THERAPY | Facility: HOSPITAL | Age: 68
End: 2023-07-20
Attending: INTERNAL MEDICINE
Payer: MEDICARE

## 2023-07-20 ENCOUNTER — OFFICE VISIT (OUTPATIENT)
Dept: HEMATOLOGY/ONCOLOGY | Facility: CLINIC | Age: 68
End: 2023-07-20
Payer: MEDICARE

## 2023-07-20 ENCOUNTER — HOSPITAL ENCOUNTER (OUTPATIENT)
Dept: RADIOLOGY | Facility: HOSPITAL | Age: 68
Discharge: HOME | End: 2023-07-20
Attending: NURSE PRACTITIONER
Payer: MEDICARE

## 2023-07-20 VITALS
HEIGHT: 68 IN | OXYGEN SATURATION: 100 % | HEART RATE: 54 BPM | RESPIRATION RATE: 18 BRPM | TEMPERATURE: 98 F | BODY MASS INDEX: 22.13 KG/M2 | DIASTOLIC BLOOD PRESSURE: 68 MMHG | SYSTOLIC BLOOD PRESSURE: 124 MMHG | WEIGHT: 146 LBS

## 2023-07-20 DIAGNOSIS — M79.604 LEG PAIN, DIFFUSE, RIGHT: ICD-10-CM

## 2023-07-20 DIAGNOSIS — C90.00 MULTIPLE MYELOMA, REMISSION STATUS UNSPECIFIED: ICD-10-CM

## 2023-07-20 DIAGNOSIS — M79.89 PAIN AND SWELLING OF RIGHT LOWER LEG: Primary | ICD-10-CM

## 2023-07-20 DIAGNOSIS — M79.89 PAIN AND SWELLING OF RIGHT LOWER LEG: ICD-10-CM

## 2023-07-20 DIAGNOSIS — Z79.899 DRUG-INDUCED IMMUNODEFICIENCY: ICD-10-CM

## 2023-07-20 DIAGNOSIS — C90.00 IGG MULTIPLE MYELOMA: ICD-10-CM

## 2023-07-20 DIAGNOSIS — M79.661 PAIN AND SWELLING OF RIGHT LOWER LEG: ICD-10-CM

## 2023-07-20 DIAGNOSIS — M79.661 PAIN AND SWELLING OF RIGHT LOWER LEG: Primary | ICD-10-CM

## 2023-07-20 DIAGNOSIS — C90.00 IGG MULTIPLE MYELOMA: Primary | ICD-10-CM

## 2023-07-20 DIAGNOSIS — D84.821 DRUG-INDUCED IMMUNODEFICIENCY: ICD-10-CM

## 2023-07-20 PROCEDURE — 99215 OFFICE O/P EST HI 40 MIN: CPT | Mod: PBBFAC,25 | Performed by: NURSE PRACTITIONER

## 2023-07-20 PROCEDURE — 96401 CHEMO ANTI-NEOPL SQ/IM: CPT

## 2023-07-20 PROCEDURE — 93971 EXTREMITY STUDY: CPT | Mod: RT

## 2023-07-20 PROCEDURE — 99214 PR OFFICE/OUTPT VISIT, EST, LEVL IV, 30-39 MIN: ICD-10-PCS | Mod: S$PBB,,, | Performed by: NURSE PRACTITIONER

## 2023-07-20 PROCEDURE — 63600175 PHARM REV CODE 636 W HCPCS: Mod: JZ,JG | Performed by: INTERNAL MEDICINE

## 2023-07-20 PROCEDURE — 99214 OFFICE O/P EST MOD 30 MIN: CPT | Mod: S$PBB,,, | Performed by: NURSE PRACTITIONER

## 2023-07-20 RX ORDER — HEPARIN 100 UNIT/ML
500 SYRINGE INTRAVENOUS
Status: DISCONTINUED | OUTPATIENT
Start: 2023-07-20 | End: 2023-10-11 | Stop reason: HOSPADM

## 2023-07-20 RX ORDER — METFORMIN HYDROCHLORIDE 500 MG/1
500 TABLET ORAL 2 TIMES DAILY WITH MEALS
Qty: 60 TABLET | Refills: 2 | Status: SHIPPED | OUTPATIENT
Start: 2023-07-20 | End: 2023-07-27 | Stop reason: SDUPTHER

## 2023-07-20 RX ORDER — SODIUM CHLORIDE 0.9 % (FLUSH) 0.9 %
10 SYRINGE (ML) INJECTION
Status: DISCONTINUED | OUTPATIENT
Start: 2023-07-20 | End: 2023-10-11 | Stop reason: HOSPADM

## 2023-07-20 RX ORDER — BORTEZOMIB 3.5 MG/1
1.3 INJECTION, POWDER, LYOPHILIZED, FOR SOLUTION INTRAVENOUS; SUBCUTANEOUS
Status: COMPLETED | OUTPATIENT
Start: 2023-07-20 | End: 2023-07-20

## 2023-07-20 RX ADMIN — BORTEZOMIB 2.25 MG: 3.5 INJECTION, POWDER, LYOPHILIZED, FOR SOLUTION INTRAVENOUS; SUBCUTANEOUS at 12:07

## 2023-07-20 NOTE — Clinical Note
Infusion (Velcade Day 8 today)  Schedule Infusion Velcade Day 15 7/27 RTC with NP 8/10 prior to infusion appt.  Cancel appt 8/3 with NP- Keep lab visit. He will ONLY need a LAB only appt this day.

## 2023-07-20 NOTE — PROGRESS NOTES
Reason for Follow-up:  Reason for consultation:  -multiple myeloma, IgG kappa  -worsening low back pain    Current Treatment:  We will initiate treatment with bortezomib/lenalidomide/dexamethasone (VRd), category 1 regimen  Cycle length 21 days  -bortezomib 1.3 mg per m2 subQ days 1, 8, and 15  -lenalidomide 25 mg p.o. daily, on days 1 through 14  -dexamethasone 40 mg p.o. with food days 1, 8, and 15  Cycle length: 21 days    Start 7/13/2023    Zometa 4mg IV every 28 days  Start 7/13/2023    Treatment History:    Past medical history:  NIDDM. Dyslipidemia.  GERD.  Lumbar spinal stenosis.  Vitamin-D deficiency.  Allergic rhinitis.  HTN.  Bell's palsy.  Celiac disease.  Hemorrhoids.  Mitral regurgitation 01/05/2018.  Rheumatic valve narrowing and leaking mitral valve.  -02/02/2018 ([facility]):  АНДРЕЙ, right minimally invasive anterior thoracotomy, right femoral artery and vein exploration, extensive right pleural adhesiolysis/pneumolysis, bilateral Maze/ablation (extensive), left atrial appendage clip placement for left atrial appendage exclusion, mitral valve repair (complex repair with 26 mm annuloplasty ring)  Social history:  .  Lives in [place], [place].  Five children.  Owns a [employer] business.  No history of tobacco, alcohol, or illicit drug abuse.    Family history:  Negative for cancers or blood dyscrasias.    Health maintenance:  PCP in family medicine clinic, [facility].  Had EGD and colonoscopy done 1 year ago by Dr. Zoltan Kapoor, [facility], apparently unremarkable (he gets the endoscopies done periodically because of history of celiac disease).      History of Present Illness:     Oncologic/Hematologic History:  Oncology History   IgG multiple myeloma   6/23/2023 Initial Diagnosis    IgG multiple myeloma     7/13/2023 -  Chemotherapy    Treatment Summary   Plan Name: OP VRD - WEEKLY BORTEZOMIB LENALIDOMIDE DEXAMETHASONE Q3W  Treatment Goal: Curative  Status:  Active  Start Date: 7/13/2023  End Date: 11/3/2023 (Planned)  Provider: Narciso Lundy MD  Chemotherapy: bortezomib (VELCADE) injection 2.25 mg, 1.3 mg/m2 = 2.25 mg, Subcutaneous, Clinic/HOD 1 time, 1 of 6 cycles  lenalidomide 25 mg Cap, 25 mg, Oral, Daily, 1 of 1 cycle, Start date: 7/7/2023, End date: --     67-year-old gentleman, referred from [facility], with newly diagnosed multiple myeloma.      05/14/2023: [facility] note:  Low back pain, onset early March   - localized to back with occasional sharp pain down posterior aspect of right leg  - fluctuating in intensity, overall worsening since onset, no change in character, difficulty doing ADLs, walking with pain  - difficulty sleeping   - seen in urgent care and [facility] [place]  - MRI 5/4/23: degenerative disc disease and facet arthrosis, mild spinal canal stenosis L4-L5 greater than L3-L4, no high grade stenosis   - Toradol IM helps briefly  - side effects from gabapentin, Robaxin and Norco- GI and drowsiness   - Voltaren gel not helping  - stopped going to PT due to pain  - follows with Neurologist   - requests trial of tramadol and Lyrica   - going out of country soon, concerned he will not be able to go due to pain  - denies bowel/ bladder incontinence, night sweats, unexplained weight loss, chest pain or shortness of breath   Spinal stenosis   Acute bilateral low back pain with right-sided sciatica    05/18/2023: [facility] note:  Low back pain.  Onset early March 2023.  Localized lumbar region.  Constant.  Occasional radiation down posterior right leg.  Difficulty sleeping in bed because of exacerbation of pain by lying down flat.  Compensating by sleeping in a recliner or on sofa. MRI showed degenerative disc disease and facet arthrosis at multiple levels with mild canal stenosis.  - Has attempted gabapentin, Robaxin, Norco, Lyrica, and tramadol with minimal relief.  Currently being managed with tramadol and  Lyrica.  - Told by neurosurgery that he needs pain management referral because NS does not deal with injections  - Denies saddle anesthesia, bowel/bladder incontinence, weight loss, night sweats    Spinal stenosis of lumbar region  Degeneration of lumbar intervertebral disc  - Continue treatment with tramadol and Lyrica as prescribed  Bradycardia  - Patient aware of chronic bradycardia, states he has been this way for 3-4 years due to palpitations if HR >60  - Currently on Toprol XL 50 mg  - Follows with [facility]    06/13/2023:  [facility] note:   Acute Issues:   Weight loss- 6 weeks history of weight loss 12-14 lb unintentionally.  Reports appetite has been somewhat decreased secondary to pain though has noticed increased size of abdomen.  He denies nausea, vomiting, dysphagia, dyspepsia, bloody stools, staying changes in stool caliber, constipation, fevers or night sweats, nervousness/anxiousness, skin or hair changes.  Denies history of smoking or alcohol use.  Receives EGD and colonoscopy every 2 years per Dr. Kapoor [place], last was roughly 1 year ago. PSA wnl 5/2023.   Chronic Issues: DM- med compliant metformin 500 daily, last A1C 6.1 in 1/2023.   Chronic medical conditions:  Hypertension.  Dyslipidemia.  GERD.  Lumbar spinal stenosis.  Vitamin-D deficiency.  Allergic rhinitis.  HTN      Investigations reviewed:  05/04/2023: MRI lumbar spine without contrast (worsening lumbar pain x5-7 weeks with bilateral sciatica):   Lumbar degenerative disc disease and facet arthrosis as detailed above with no acute pathology identified.    Mild spinal canal stenosis at L4-L5 greater than L3-L4 with no high-grade spinal canal stenosis.   Mild bilateral neural foramen stenosis at L3-L4 and on the left greater than right at L4-L5.    06/14/2023:  Skeletal survey:   No definite lesions to suggest either lytic and or sclerotic metastatic changes.   Multiple compression deformities mostly in the lumbar spine but  these are seen also in the thoracic spine; other imaging modalities might prove helpful for further assessment; some of these compression deformities appear to be relatively new as compared with an MR of May 2023  (There are some degenerative changes of the thoracic spine with a mild compression deformity of superior endplate of 1 of the lower thoracic vertebral body levels; there are multiple compression deformities of the lumbar spine including the superior endplate of L3 and L4 there is a compression deformity of the superior endplate of T11 with compression deformities of the superior endplate of T12 and a compression deformity of L1; some of these compression deformities appear to be new since the last exam (MR) of May 4, 2023 other imaging modalities might prove helpful for further assessment    Labs reviewed:  01/12/2023:  Hemoglobin 11.9.  MCV 98.7.  Ferritin 211.66.  Transferrin saturation 42%.  Vitamin B12 969.  Folate 13.9.    06/14/2023:  Hemoglobin 11.0.  MCV 99.1.  ESR 15, normal.    06/14/2023: IgG 3810 mg/dL, elevated.  IgM 11, suppressed.  IgA 33, suppressed.  2.28 gm/dL IgG kappa monoclonal protein on SPEP and BUBBA.  Kappa 99.4, elevated.  Lambda 0.3400, suppressed.  Kappa/lambda ratio 292, elevated.    05/01/2023: PSA 1.7, normal.  06/14/2023:  BUN 15.  Creatinine 0.79.  Calcium 9.6.  Albumin 3.6.  LFTs normal.  TSH normal.  06/16/2023:  , normal.    06/15/2023:  24 hour urine protein 288 mg, elevated (reference Range:  < 229). M spike 183 mg. Monoclonal kappa.    06/16/2023:  Urine:  Kappa 25.5 mg/dL; lambda < 0.7000 mg/dL; kappa/lambda ratio> 36.4    06/26/2023:  Pleasant gentleman who presents for initial medical oncology consultation, accompanied by his wife and family present who is a past interventional cardiologist.  Back pain for 2 months.  Initially started in right groin.  Physical therapy has not helped.  10/10 severity.  Has been taking Tylenol without significant relief.   Secured to take narcotics because of constipation.  Underwent couple of spinal steroid shots 3 weeks ago, with minor relief.  Pain is present all the time.  Pain increases with changing position in bed as well as upon standing.  He finds it difficult to ambulate.  Pain does not radiate down lower extremities and is not accompanied with lower extremity weakness, numbness, urinary or bowel incontinence, or saddle anesthesia.  Also experiences muscle cramps.  Generalized weakness.  However, ECOG 2.  No fevers or chills.  No repeated infections.  Cramps in hands and feet.  Appetite is down.  Appetite has picked up in last 10 days.  Has lost 12-14 lb in last 1-1/2 months.  No abnormal bleeding or bruising.      Interval History 7/20/2023:   Patient presented to the clinic today for Cycle 1 day 8 treatment;accompanied by his wife and one of his daughters. Patient admits to being very weak and increasing fatigue. Patient admits to having right lower leg pain and swelling that is affecting his ADL's. Doppler ultrasound was ordered on the patient today -Negative. Patient stated that he noticed his leg pain after he started taking his treatment. Patient also mentioned that he has lower back pain that was present before he started his treatment. MD prescribed patient MS contin on 7/19 to help control his pain. Patients daughter voiced apprehension to him taking this medication along with other medications. Education was given to the family regarding the use of MS contin. Patient and family understood the information. Patient says he takes Tramadol, Gabapentin and Norco to manage pain.  Patient denies any signs and symptoms of infection-denies any fever, chills or body aches. Patient denies any bleeding- blood in urine or any dysuria. Lab work was reviewed with the patient. Na level was slightly low- his daughter stated that he is consuming more than 7-8 (16oz) bottles of water per day. Patient will be going to [facility] tomorrow for a radiation consultation. All questions were answered fully.     Review of Systems:   All systems reviewed and found to be negative except for the symptoms detailed above    Lab Results   Component Value Date    WBC 11.02 07/20/2023    RBC 2.96 (L) 07/20/2023    HGB 9.6 (L) 07/20/2023    HCT 30.0 (L) 07/20/2023    .4 (H) 07/20/2023    MCH 32.4 (H) 07/20/2023    MCHC 32.0 (L) 07/20/2023    RDW 14.6 07/20/2023     07/20/2023    MPV 9.4 07/20/2023     CMP  Sodium Level   Date Value Ref Range Status   07/20/2023 128 (L) 136 - 145 mmol/L Final     Potassium Level   Date Value Ref Range Status   07/20/2023 4.4 3.5 - 5.1 mmol/L Final     Carbon Dioxide   Date Value Ref Range Status   07/20/2023 22 (L) 23 - 31 mmol/L Final     Blood Urea Nitrogen   Date Value Ref Range Status   07/20/2023 15.1 8.4 - 25.7 mg/dL Final     Creatinine   Date Value Ref Range Status   07/20/2023 1.10 0.73 - 1.18 mg/dL Final     Calcium Level Total   Date Value Ref Range Status   07/20/2023 9.3 8.8 - 10.0 mg/dL Final     Albumin Level   Date Value Ref Range Status   07/20/2023 3.0 (L) 3.4 - 4.8 g/dL Final     Bilirubin Total   Date Value Ref Range Status   07/20/2023 0.4 <=1.5 mg/dL Final     Alkaline Phosphatase   Date Value Ref Range Status   07/20/2023 79 40 - 150 unit/L Final     Aspartate Aminotransferase   Date Value Ref Range Status   07/20/2023 17 5 - 34 unit/L Final     Alanine Aminotransferase   Date Value Ref Range Status   07/20/2023 17 0 - 55 unit/L Final     eGFR   Date Value Ref Range Status   07/20/2023 >60 mls/min/1.73/m2 Final       Physical Exam  VITAL SIGNS:   Vitals:    07/20/23 1111   BP: 124/68   Pulse: (!) 54   Resp: 18   Temp: 97.5 °F (36.4 °C)        General: Alert and oriented. NAD  Eye: Pupils are equal, round and reactive to light, Extraocular movements are intact. Normal conjunctiva  HENT: Normocephalic. Oropharynx exam deferred; mask in place due to coronavirus  Neck:  Supple, Non-tender  Respiratory: Respirations are non-labored, Symmetrical chest wall expansion. Breath sounds CTA bilaterally  Cardiovascular: Regular rate, rhythm, Normal peripheral perfusion, No bilateral lower extremity edema  Gastrointestinal: Non-distended, Present bowel sounds   Genitourinary: Exam deferred  Lymphatics: No lymphadenopathy appreciated  Musculoskeletal: Moves all extremities  Integumentary: Intact. Warm, dry. Right lower leg darkening discoloration.  Neurologic: No focal deficits  Psychiatric: Cooperative. Appropriate mood and affect   ECOG Performance Scale: 1 - Capable of all self-care able to carry out light work activities  CBC  Lab Results   Component Value Date    WBC 11.02 07/20/2023    HGB 9.6 (L) 07/20/2023    HCT 30.0 (L) 07/20/2023    .4 (H) 07/20/2023     07/20/2023      CMP  Sodium Level   Date Value Ref Range Status   07/20/2023 128 (L) 136 - 145 mmol/L Final     Potassium Level   Date Value Ref Range Status   07/20/2023 4.4 3.5 - 5.1 mmol/L Final     Carbon Dioxide   Date Value Ref Range Status   07/20/2023 22 (L) 23 - 31 mmol/L Final     Blood Urea Nitrogen   Date Value Ref Range Status   07/20/2023 15.1 8.4 - 25.7 mg/dL Final     Creatinine   Date Value Ref Range Status   07/20/2023 1.10 0.73 - 1.18 mg/dL Final     Calcium Level Total   Date Value Ref Range Status   07/20/2023 9.3 8.8 - 10.0 mg/dL Final     Albumin Level   Date Value Ref Range Status   07/20/2023 3.0 (L) 3.4 - 4.8 g/dL Final     Bilirubin Total   Date Value Ref Range Status   07/20/2023 0.4 <=1.5 mg/dL Final     Alkaline Phosphatase   Date Value Ref Range Status   07/20/2023 79 40 - 150 unit/L Final     Aspartate Aminotransferase   Date Value Ref Range Status   07/20/2023 17 5 - 34 unit/L Final     Alanine Aminotransferase   Date Value Ref Range Status   07/20/2023 17 0 - 55 unit/L Final     eGFR   Date Value Ref Range Status   07/20/2023 >60 mls/min/1.73/m2 Final        Assessment:  Multiple  myeloma, IgG kappa:  -presentation: 03/2023:  Worsening low back pain, no associated neurological symptoms, 12-14 pound weight loss over 6 weeks, mild L4-L5 spinal canal stenosis on MRI (05/04/2023), lumbar DJD and facet arthrosis on MRI lumbar spine (05/04/2023)  -skeletal survey 06/14/2023: Negative for lytic or sclerotic lesions; multiple compression deformities lumbar spine and thoracic spine (new since MRI scan dated 05/04/2023)  -06/14/2023:  Mild anemia (hemoglobin 11.0)   -06/14/2023:  2.28 gm/dL IgG kappa M protein. Kappa 99.4, elevated.  Lambda 0.3400, suppressed.  Kappa/lambda ratio 292, elevated.  -06/14/2023:  BUN, creatinine, calcium, albumin normal  -06/15/2022:  24 hour urine protein 280 mg, elevated.  M spike 183 mg.  Urine BUBBA showed monoclonal kappa light chains.  -06/16/2023: Urine:  Kappa 25.5 mg/dL; lambda < 0.7000 mg/dL; kappa/lambda ratio> 36.4  06/16/2023:  , normal.  -06/26/2023:  Hemoglobin 11.0.  Beta 2 microglobulin 3.18.  Calcium 10.1.  Albumin 3.2.  Hepatitis-A/B/C/HIV serology negative.  -bone marrow biopsy 06/27/2022:  Plasma cells 50% in bone marrow aspirate; plasma cells 36% on flow cytometry of bone marrow aspirate, with kappa light chain restriction; molecular studies pending  -CT abdomen pelvis with contrast 06/27/2023:  Hepatic steatosis; bilateral inguinal hernias; heterogeneous bone demineralization, suggesting marrow infiltrative process like multiple myeloma; multilevel compression deformities in the included thoracic spine and lumbar spine; 10 mm exophytic hyperdense focus upper pole of right kidney, compatible with a hyperdense cyst  -FDG PET-CT 06/29/2023:  Left femur lesser trochanter; right 4th rib laterally; multilevel thoracolumbar compression deformities; subacute fractures T8, T12, L1, L2  >>>  From the data available so far, patient has multiple myeloma (symptomatic), by virtue of:  1. Involved: Uninvolved serum FLC ratio =/> 100 and involved FLC  concentration 10 mg/dL or higher (in this patient, kappa/lambda ratio age 292, and kappa light chain 99.4 mg/dL)   2. Hypercalcemia (06/26/2023): Calcium 10.1.  Albumin 3.2   3. Bone marrow plasmacytosis 50%  4. Heterogeneous bone demineralization on CT 06/27/2023, suggesting bone marrow infiltrative process like multiple myeloma  5. FDG PET-CT 06/29/2023:  Left femur lesser trochanter; right 4th rib laterally; multilevel thoracolumbar compression deformities; subacute fractures T8, T12, L1, L2  6. Beta 2 microglobulin level 3.18, elevated (06/26/2023)   7. No renal insufficiency, no significant anemia, LDH normal  >>>  -Velcade started 07/13/2023  -Zometa 4 mg IV every 4 weeks) x2 years), started 07/13/2023     Co-morbidities:  NIDDM.  Dyslipidemia.  Hypertension.  Celiac disease.  History of rheumatic mitral valve disease, S/P Maze/ablation procedure 02/02/2018        Vaccination history:   COVID-19, MRNA, LN-S, PF (MODERNA FULL 0.5 ML DOSE) 8/18/2022 , 10/23/2021 , 3/17/2021 , 2/17/2021   Hepatitis A / Hepatitis B 8/18/2022 , 10/5/2021 , 7/29/2020   Influenza - Quadrivalent - MDCK - PF 10/5/2021   Influenza - Trivalent - [facility]CK - PF 9/10/2015   Meningococcal Conjugate (MCV4O) 8/18/2022 , 4/17/2012   Meningococcal Conjugate (MCV4P) 4/17/2012   Pneumococcal Conjugate - 20 Valent 1/12/2023   Tdap 7/29/2020   Zoster 11/18/2020 , 7/29/2020   Zoster Recombinant 11/18/2020 , 7/29/2020          Plan:  Multiple myeloma, IgG kappa:  Autologous transplantation:   Category 1 evidence supports proceeding directly after induction therapy to high-dose therapy and HCT  Renal dysfunction and advanced age are not contraindications to transplant     Need the following for staging of multiple myeloma:  Serum beta 2 microglobulin elevated  Serum albumin and LDH are normal.  Evaluation of chromosomal abnormalities by FISH on bone marrow specimen:  Pending as of 07/10/2023     Bortezomib/lenalidomide/dexamethasone (VRd), category 1  regimen:  Cycle length 21 days  -bortezomib 1.3 mg per m2 subQ days 1, 8, and 15  -lenalidomide 25 mg p.o. daily, on days 1 through 14  -dexamethasone 40 mg p.o. with food days 1, 8, and 15  Cycle length: 21 days    If response after primary therapy, then:  Autologous HCT versus continuation of myeloma therapy or maintenance therapy versus tandem autologous transplant, etc.    -Velcade started 07/13/2023  -Zometa 4 mg IV every 4 weeks x2 years (started 07/13/2023      Okay to proceed with cycle 1 day 8 (Bortezomib)  Return in 1 week with lab work (cbc,cmp) to receive cycle 1 day 15 of (VRD) regimen  Follow up with NP in 3 weeks with lab work (cbc,cmp,mg) prior to cycle 2 Vrd and to review scans  Check baseline TSH and free T4, then, every 3 months (monitoring on lenalidomide)   Continue Zometa 4 mg IV every 4 weeks x2 years (started 07/13/2023)  Assess response with repeat SPEP, BUBBA, FLC assay, and 24 hour urine for total protein, UPEP, urine BUBBA, and urine FLC assay in 1 month (middle of August)  Continue baby aspirin 81 mg p.o. q.day concurrently, for thromboprophylaxis secondary to lenalidomide.  Continue levofloxacin 5 mg p.o. q.day X 12 weeks (started 06/26/2023)  Continue acyclovir 400 mg p.o. b.i.d. for herpes zoster prophylaxis with bortezomib  Continue Bactrim ds 1 tablet every Monday/Wednesday/Friday during treatment  Check CBC and CMP weekly during treatment  Patient already referred to [facility] for transplant evaluation  Patient already referred to [facility] ([facility]) for short course radiotherapy to spine for pain control-consultation appointment 7/21.       Weekly assessment for peripheral neuropathy and/or neuropathic pain.  Monitor for hypotension during bortezomib therapy; adjustment of antihypertensives and/or administration of IV hydration may be needed.    Back Pain  -06/26/2023: Started Norco 5 mg p.o. q.6 hours PRN for pain; did not work; he stopped taking  -06/26/2023: Taking  Norco 10 mg only twice daily; instructed him to take Norco 10 mg every 4-6 hours p.r.n. for pain (has 9/10 lower back pain, constant, radiating to anterior aspect of right thigh, without neurological symptoms)  -06/26/2023: Started levofloxacin 500 mg p.o. q.day X 12 weeks  -06/26/2023: Severe back pain; presumed due to myeloma; referred to Radiation Oncology  Continue narcotics for back pain      07/10/2023:  Pending:  FISH panel on bone marrow including del(13), del(17p13), t(4;14), t(11;14), t(14;16), t(14;20), 1q21 gain/1q21 amplification, 1p deletion  NGS panel testing on bone marrow specimen     10 mm exophytic hyperdense focus upper pole of right kidney, compatible with a hyperdense cyst, on CT abdomen pelvis with contrast 06/27/2023  -07/12/2023:  MRI abdomen with and without contrast (right renal cyst): Small exophytic right renal lesion most likely a proteinaceous or hemorrhagic cyst (10 mm, upper pole of right kidney)  -Velcade started 07/13/2023     Monitoring on lenalidomide:   Monitor for signs and symptoms of infection (if neutropenic), bleeding or bruising, hepatotoxicity, secondary malignancies, thromboembolism (eg, shortness of breath, chest pain, arm or leg swelling), dermatologic toxicity, tumor lysis syndrome, or hypersensitivity.     Monitoring on bortezomib:  Peripheral neuropathy, posterior reversible leukoencephalopathy syndrome, progressive multifocal leukoencephalopathy, tumor lysis syndrome, or hyper-/hypoglycemia. Monitor closely in patients with risk factors for heart failure or existing heart disease.        Above discussed at length with the patient.  All questions answered.      Nature of multiple myeloma discussed.    He understands and agrees with this plan.  ----------------------------------   Discussion:     Supportive care:  -bone targeted treatment: Bisphosphonate, category 1; or denosumab to reduce risk of skeletal related events; denosumab is preferred in patients with renal  insufficiency; assess vitamin-D status; baseline dental exam; monitor for osteonecrosis of the jaw; monitor renal dysfunction with bisphosphonate therapy  -continue bone targeted treatment (bisphosphonate or denosumab) for 2 years; continue beyond 2 years should be based on clinical judgment  -patients receiving denosumab for bone disease who subsequently discontinued therapy should be given maintenance denosumab every 6 months or a single dose of bisphosphonate to mitigate risk of rebound osteoporosis  -for hypercalcemia, zoledronic acid, denosumab, steroids, and/or calcitonin  -for hyperviscosity, plasmapheresis should be used as adjuvant therapy for symptomatic hyperviscosity  -intravenous immunoglobulin therapy should be considered in the setting of recurrent serious infection and/or hypogammaglobulinemia (IgG </= 400 mg/dL)  -pneumococcal conjugate vaccine, followed by pneumococcal polysaccharide vaccine 1 year later  -Influenza vaccination is recommended; 2 doses of high-dose inactivated quandaivalent influenza vaccine should be considered  -consider 12 weeks of levofloxacin 500 mg p.o. daily at the time of initial diagnosis of multiple myeloma  -COVID-19 vaccination  -highest risk for VTE is in the 1st 6 months following new diagnosis of multiple myeloma  -VTE prophylaxis if no contraindications to anticoagulation agents or antiplatelets  Recommendations for VTE prophylaxis:  Aspirin  mg daily; low molecular weight heparin equivalent to enoxaparin 40 mg daily; Xarelto 10 mg daily; Eliquis 2.5 mg b.i.d.; Arixtra 2.5 mg daily; Coumadin with target INR 2.0-3.0        Lenalidomide:  Embryo fetal toxicity  Hematologic toxicity.  Neutropenia.  Thrombocytopenia.  Venous and arterial thromboembolism.  DVT.  PE.  MI.  Stroke.  Dizziness, fatigue.  Tumor lysis syndrome.  Heart failure.    Used with caution in patients with renal impairment.  Lenalidomide =/> 4 cycles may decrease the # of CD34 pos cells collected  for autologous stem cell transplant.  DVT, PE, atrial fibrillation, renal failure are more likely in patients> 65 years  Hepatotoxicity  Hypersensitivity reactions.  Anaphylaxis.  Angioedema.  Skin rash.  Eosinophilia.  Immediate hypersensitivity reactions.  Second primary malignancy.  AML.  MDS.  Solid tumor malignancies.  Nonmelanoma skin cancers.     Monitoring with lenalidomide:  -CBC with differential: weekly for the first 2 cycles, every 2 weeks during the third cycle and monthly thereafter;  -serum creatinine, LFTs (periodically).  -Thyroid function tests (TSH at baseline then every 2 to 3 months during lenalidomide treatment  -ECG when clinically indicated. Monitor for signs and symptoms of infection (if neutropenic), bleeding or bruising, hepatotoxicity, secondary malignancies, thromboembolism (eg, shortness of breath, chest pain, arm or leg swelling), dermatologic toxicity, tumor lysis syndrome, or hypersensitivity.  Patients who could become pregnant: Pregnancy test 10 to 14 days and 24 hours prior to initiating therapy, weekly during the first 4 weeks of treatment, then every 2 to 4 weeks through 4 weeks after therapy discontinued.     Bortezomib:  Bone marrow suppression.  Neutropenia.  Thrombocytopenia.  Acute CHF.  Nausea, vomiting, diarrhea, constipation, ileus.    Hepatotoxicity:  Herpes zoster and her PCP plaques reactivation.    Anaphylactic reactions.  Hypersensitive reactions.  Angioedema.  Laryngeal edema.    Hypotension.  Peripheral neuropathy.  Posterior reversible leukoencephalopathy syndrome.  Progressive multifocal leukoencephalopathy.  Pulmonary toxicity.  Pneumonitis.  Interstitial pneumonia.  Lung infiltrates.  ARDS.  Thrombotic microangiopathy.  Tumor lysis syndrome.       Monitoring parameters with bortezomib:  CBC, CMP  Blood pressure (to monitor for hypotension)  Peripheral neuropathy  Posterior reversible leukoencephalopathy syndrome, progressive multifocal leukoencephalopathy,  tumor lysis syndrome, or hyper-/hypoglycemia. Monitor baseline chest x-ray and then periodic pulmonary function testing (with new or worsening pulmonary symptoms). Monitor closely in patients with risk factors for heart failure or existing heart disease.

## 2023-07-24 LAB
BEAKER SEE SCANNED REPORT: NORMAL
BEAKER SEE SCANNED REPORT: NORMAL

## 2023-07-25 ENCOUNTER — TELEPHONE (OUTPATIENT)
Dept: HEMATOLOGY/ONCOLOGY | Facility: CLINIC | Age: 68
End: 2023-07-25
Payer: MEDICARE

## 2023-07-25 NOTE — NURSING
Received call from patient to report that the pain to his leg is not controlled with MS Contin and hydrocodone. Informed Dr. Lundy of patient's report with directive to send referral to palliative care for pain management. BOBBY Hung informed patient of above and agreed to try with palliative medicine. Referral sent to [facility].   
Pediatric

## 2023-07-26 ENCOUNTER — DOCUMENTATION ONLY (OUTPATIENT)
Dept: HEMATOLOGY/ONCOLOGY | Facility: CLINIC | Age: 68
End: 2023-07-26
Payer: MEDICARE

## 2023-07-26 NOTE — NURSING
Confirmed that patient has an appt with [facility] for stem cell transplant eval on 8/1/23.

## 2023-07-27 ENCOUNTER — INFUSION (OUTPATIENT)
Dept: INFUSION THERAPY | Facility: HOSPITAL | Age: 68
End: 2023-07-27
Attending: INTERNAL MEDICINE
Payer: MEDICARE

## 2023-07-27 VITALS
OXYGEN SATURATION: 100 % | DIASTOLIC BLOOD PRESSURE: 66 MMHG | WEIGHT: 147 LBS | RESPIRATION RATE: 20 BRPM | HEART RATE: 58 BPM | SYSTOLIC BLOOD PRESSURE: 130 MMHG | TEMPERATURE: 98 F | BODY MASS INDEX: 22.54 KG/M2

## 2023-07-27 DIAGNOSIS — C90.00 IGG MULTIPLE MYELOMA: Primary | ICD-10-CM

## 2023-07-27 LAB
ALBUMIN SERPL-MCNC: 3 G/DL (ref 3.4–4.8)
ALBUMIN/GLOB SERPL: 0.7 RATIO (ref 1.1–2)
ALP SERPL-CCNC: 89 UNIT/L (ref 40–150)
ALT SERPL-CCNC: 12 UNIT/L (ref 0–55)
AST SERPL-CCNC: 12 UNIT/L (ref 5–34)
BILIRUBIN DIRECT+TOT PNL SERPL-MCNC: 0.3 MG/DL
BUN SERPL-MCNC: 12.5 MG/DL (ref 8.4–25.7)
CALCIUM SERPL-MCNC: 9.2 MG/DL (ref 8.8–10)
CHLORIDE SERPL-SCNC: 104 MMOL/L (ref 98–107)
CO2 SERPL-SCNC: 23 MMOL/L (ref 23–31)
CREAT SERPL-MCNC: 0.93 MG/DL (ref 0.73–1.18)
GFR SERPLBLD CREATININE-BSD FMLA CKD-EPI: >60 MLS/MIN/1.73/M2
GLOBULIN SER-MCNC: 4.6 GM/DL (ref 2.4–3.5)
GLUCOSE SERPL-MCNC: 118 MG/DL (ref 82–115)
POTASSIUM SERPL-SCNC: 4.1 MMOL/L (ref 3.5–5.1)
PROT SERPL-MCNC: 7.6 GM/DL (ref 5.8–7.6)
SODIUM SERPL-SCNC: 133 MMOL/L (ref 136–145)

## 2023-07-27 PROCEDURE — 80053 COMPREHEN METABOLIC PANEL: CPT

## 2023-07-27 PROCEDURE — 63600175 PHARM REV CODE 636 W HCPCS: Mod: JZ,JG | Performed by: INTERNAL MEDICINE

## 2023-07-27 PROCEDURE — 96401 CHEMO ANTI-NEOPL SQ/IM: CPT

## 2023-07-27 RX ORDER — SODIUM CHLORIDE 0.9 % (FLUSH) 0.9 %
10 SYRINGE (ML) INJECTION
Status: DISCONTINUED | OUTPATIENT
Start: 2023-07-27 | End: 2023-07-27 | Stop reason: HOSPADM

## 2023-07-27 RX ORDER — HEPARIN 100 UNIT/ML
500 SYRINGE INTRAVENOUS
Status: DISCONTINUED | OUTPATIENT
Start: 2023-07-27 | End: 2023-07-27 | Stop reason: HOSPADM

## 2023-07-27 RX ORDER — BORTEZOMIB 3.5 MG/1
1.3 INJECTION, POWDER, LYOPHILIZED, FOR SOLUTION INTRAVENOUS; SUBCUTANEOUS
Status: COMPLETED | OUTPATIENT
Start: 2023-07-27 | End: 2023-07-27

## 2023-07-27 RX ADMIN — BORTEZOMIB 2.25 MG: 3.5 INJECTION, POWDER, LYOPHILIZED, FOR SOLUTION INTRAVENOUS; SUBCUTANEOUS at 01:07

## 2023-07-28 DIAGNOSIS — C90.00 MYELOMA KIDNEY: Primary | ICD-10-CM

## 2023-07-28 DIAGNOSIS — N08 MYELOMA KIDNEY: Primary | ICD-10-CM

## 2023-07-28 RX ORDER — METFORMIN HYDROCHLORIDE 500 MG/1
500 TABLET ORAL 2 TIMES DAILY WITH MEALS
Qty: 60 TABLET | Refills: 2 | Status: SHIPPED | OUTPATIENT
Start: 2023-07-28 | End: 2023-09-05 | Stop reason: SDUPTHER

## 2023-07-28 RX ORDER — NALOXONE HYDROCHLORIDE 4 MG/.1ML
SPRAY NASAL
COMMUNITY
Start: 2023-07-21

## 2023-08-03 ENCOUNTER — APPOINTMENT (OUTPATIENT)
Dept: HEMATOLOGY/ONCOLOGY | Facility: CLINIC | Age: 68
End: 2023-08-03
Payer: MEDICARE

## 2023-08-03 DIAGNOSIS — C90.00 IGG MULTIPLE MYELOMA: ICD-10-CM

## 2023-08-03 DIAGNOSIS — Z79.899 DRUG-INDUCED IMMUNODEFICIENCY: ICD-10-CM

## 2023-08-03 DIAGNOSIS — C90.00 MULTIPLE MYELOMA, REMISSION STATUS UNSPECIFIED: ICD-10-CM

## 2023-08-03 DIAGNOSIS — D84.821 DRUG-INDUCED IMMUNODEFICIENCY: ICD-10-CM

## 2023-08-03 LAB
ALBUMIN SERPL-MCNC: 2.8 G/DL (ref 3.4–4.8)
ALBUMIN/GLOB SERPL: 0.7 RATIO (ref 1.1–2)
ALP SERPL-CCNC: 87 UNIT/L (ref 40–150)
ALT SERPL-CCNC: 14 UNIT/L (ref 0–55)
AST SERPL-CCNC: 15 UNIT/L (ref 5–34)
BASOPHILS # BLD AUTO: 0.04 X10(3)/MCL
BASOPHILS NFR BLD AUTO: 0.5 %
BILIRUBIN DIRECT+TOT PNL SERPL-MCNC: 0.3 MG/DL
BUN SERPL-MCNC: 8.3 MG/DL (ref 8.4–25.7)
CALCIUM SERPL-MCNC: 8.5 MG/DL (ref 8.8–10)
CHLORIDE SERPL-SCNC: 105 MMOL/L (ref 98–107)
CO2 SERPL-SCNC: 25 MMOL/L (ref 23–31)
CREAT SERPL-MCNC: 0.86 MG/DL (ref 0.73–1.18)
EOSINOPHIL # BLD AUTO: 0.3 X10(3)/MCL (ref 0–0.9)
EOSINOPHIL NFR BLD AUTO: 3.4 %
ERYTHROCYTE [DISTWIDTH] IN BLOOD BY AUTOMATED COUNT: 14 % (ref 11.5–17)
GFR SERPLBLD CREATININE-BSD FMLA CKD-EPI: >60 MLS/MIN/1.73/M2
GLOBULIN SER-MCNC: 3.9 GM/DL (ref 2.4–3.5)
GLUCOSE SERPL-MCNC: 101 MG/DL (ref 82–115)
HCT VFR BLD AUTO: 28.5 % (ref 42–52)
HGB BLD-MCNC: 9.2 G/DL (ref 14–18)
IMM GRANULOCYTES # BLD AUTO: 0.04 X10(3)/MCL (ref 0–0.04)
IMM GRANULOCYTES NFR BLD AUTO: 0.5 %
LYMPHOCYTES # BLD AUTO: 2.36 X10(3)/MCL (ref 0.6–4.6)
LYMPHOCYTES NFR BLD AUTO: 26.6 %
MAGNESIUM SERPL-MCNC: 2.1 MG/DL (ref 1.6–2.6)
MCH RBC QN AUTO: 33.5 PG (ref 27–31)
MCHC RBC AUTO-ENTMCNC: 32.3 G/DL (ref 33–36)
MCV RBC AUTO: 103.6 FL (ref 80–94)
MONOCYTES # BLD AUTO: 1.55 X10(3)/MCL (ref 0.1–1.3)
MONOCYTES NFR BLD AUTO: 17.5 %
NEUTROPHILS # BLD AUTO: 4.59 X10(3)/MCL (ref 2.1–9.2)
NEUTROPHILS NFR BLD AUTO: 51.5 %
NRBC BLD AUTO-RTO: 0 %
PLATELET # BLD AUTO: 350 X10(3)/MCL (ref 130–400)
PMV BLD AUTO: 9.9 FL (ref 7.4–10.4)
POTASSIUM SERPL-SCNC: 4.8 MMOL/L (ref 3.5–5.1)
PROT SERPL-MCNC: 6.7 GM/DL (ref 5.8–7.6)
RBC # BLD AUTO: 2.75 X10(6)/MCL (ref 4.7–6.1)
SODIUM SERPL-SCNC: 135 MMOL/L (ref 136–145)
T4 FREE SERPL-MCNC: 1.14 NG/DL (ref 0.7–1.48)
TSH SERPL-ACNC: 0.84 UIU/ML (ref 0.35–4.94)
WBC # SPEC AUTO: 8.88 X10(3)/MCL (ref 4.5–11.5)

## 2023-08-03 PROCEDURE — 83735 ASSAY OF MAGNESIUM: CPT

## 2023-08-03 PROCEDURE — 36415 COLL VENOUS BLD VENIPUNCTURE: CPT

## 2023-08-03 PROCEDURE — 80053 COMPREHEN METABOLIC PANEL: CPT

## 2023-08-03 PROCEDURE — 84443 ASSAY THYROID STIM HORMONE: CPT

## 2023-08-03 PROCEDURE — 85025 COMPLETE CBC W/AUTO DIFF WBC: CPT

## 2023-08-03 PROCEDURE — 84439 ASSAY OF FREE THYROXINE: CPT

## 2023-08-09 ENCOUNTER — OFFICE VISIT (OUTPATIENT)
Dept: FAMILY MEDICINE | Facility: CLINIC | Age: 68
End: 2023-08-09
Payer: MEDICARE

## 2023-08-09 VITALS
BODY MASS INDEX: 22.32 KG/M2 | SYSTOLIC BLOOD PRESSURE: 113 MMHG | DIASTOLIC BLOOD PRESSURE: 69 MMHG | HEIGHT: 67 IN | OXYGEN SATURATION: 99 % | RESPIRATION RATE: 18 BRPM | WEIGHT: 142.19 LBS | TEMPERATURE: 98 F | HEART RATE: 62 BPM

## 2023-08-09 DIAGNOSIS — R14.0 ABDOMINAL BLOATING WITH CRAMPS: ICD-10-CM

## 2023-08-09 DIAGNOSIS — R10.9 ABDOMINAL BLOATING WITH CRAMPS: ICD-10-CM

## 2023-08-09 DIAGNOSIS — M51.36 DEGENERATION OF LUMBAR INTERVERTEBRAL DISC: ICD-10-CM

## 2023-08-09 DIAGNOSIS — M48.061 SPINAL STENOSIS OF LUMBAR REGION, UNSPECIFIED WHETHER NEUROGENIC CLAUDICATION PRESENT: ICD-10-CM

## 2023-08-09 DIAGNOSIS — C90.00 IGG MULTIPLE MYELOMA: ICD-10-CM

## 2023-08-09 DIAGNOSIS — M54.50 RIGHT-SIDED LOW BACK PAIN WITHOUT SCIATICA, UNSPECIFIED CHRONICITY: Primary | ICD-10-CM

## 2023-08-09 DIAGNOSIS — C90.00 IGG MULTIPLE MYELOMA: Primary | ICD-10-CM

## 2023-08-09 PROBLEM — M51.369 DEGENERATION OF LUMBAR INTERVERTEBRAL DISC: Status: ACTIVE | Noted: 2023-08-09

## 2023-08-09 PROCEDURE — 99215 OFFICE O/P EST HI 40 MIN: CPT | Mod: PBBFAC

## 2023-08-09 RX ORDER — DICYCLOMINE HYDROCHLORIDE 10 MG/1
10 CAPSULE ORAL
Qty: 120 CAPSULE | Refills: 0 | Status: SHIPPED | OUTPATIENT
Start: 2023-08-09 | End: 2023-09-08

## 2023-08-09 RX ORDER — PANTOPRAZOLE SODIUM 20 MG/1
20 TABLET, DELAYED RELEASE ORAL DAILY
Qty: 30 TABLET | Refills: 11 | Status: SHIPPED | OUTPATIENT
Start: 2023-08-09 | End: 2024-08-08

## 2023-08-09 NOTE — ASSESSMENT & PLAN NOTE
Has hx of multiple myeloma   Pet Scan on 6/2023: Multilevel thoracolumbar compression deformities.  There is mild linear uptake at the in plates of T8, T12, L1 and L2 suggesting potential remodeling acute to subacute fractures    MRI 5/1/2023: Lumbar degenerative disc disease and facet arthrosis as detailed above with no acute pathology identified.  Mild spinal canal stenosis at L4-L5 greater than L3-L4 with no high-grade spinal canal stenosis.Mild bilateral neural foramen stenosis at L3-L4 and on the left greater than right at L4-L5.    -pain unimproved with PT, robaxin, norco,lyrica, and tramadol  -s/p epidural injection 2 weeks ago (pain is unimproved)   -follows pain management   -advised to apply heat pads   -cw taking gabapentin   -cw seeing oncology for multiple myleloma management   -advised to follow up with his pain management (next appointment is in 2 weeks)

## 2023-08-09 NOTE — PROGRESS NOTES
Subjective     Patient ID: Yuriy Díaz is a 67 y.o. male.    Chief Complaint: Back Pain    HPI  67 year old male with hx multiple myeloma and GERD presents to the office complaining of atraumatic right sided lower back pain onset 2-3 months ago. He states that his pain can exacerbate to a 10/10, constant and all day, worse with walking up and down the stairs and prolonged standing, improves with sitting and sleeping, intermittently radiates to his R knee, lost about 10-12 pounds of weight, and loss of appetite. Denies f/c, cp, sob, palpitations, numbness/tingling, recent injury, saddle anesthesia, or urinary/fecal incontinence. He was recently diagnosed with multiple myeloma this year, follows oncology, and is being treated with chemo (next chemo session is tomorrow). Follows pain management, has had 3 epidural injections done so far, most recently done 2 days ago with no improvement in pain. He is currently taking gabapentin for his pain with minimal relief. He has tried taking robaxain, norco, morphine, and tramadol in the past with minimal relief.  He has gotten a MRI done on 5/1 and a Pet Scan oin 6/2023. He has done PT daily for 2-3 weeks, which he has completed 2-3 months ago, with no improvement.    He also admits to diffuse abdominal bloating and cramping onset 1 month ago. Admits to daily BM/flatus and intermittent unchanged hematochezia (bright red blood) due to known hemorrhoid. Denies f/c, melena, n/v/d, and hematuria. He has had colonoscopy a year ago which was normal.     Review of Systems   Constitutional:  Negative for chills and fever.   HENT:  Negative for nasal congestion, ear pain and sore throat.    Eyes:  Negative for pain.   Respiratory:  Negative for cough, shortness of breath and wheezing.    Cardiovascular:  Negative for chest pain and palpitations.   Gastrointestinal:  Negative for abdominal pain, constipation and diarrhea.   Endocrine: Negative for polyuria.   Genitourinary:  Negative for  difficulty urinating, dysuria and urgency.   Musculoskeletal:  Negative for arthralgias and myalgias.   Neurological:  Negative for light-headedness and headaches.   Psychiatric/Behavioral:  Negative for confusion.    All other systems reviewed and are negative.         Objective     Physical Exam  Constitutional:       General: He is not in acute distress.     Appearance: Normal appearance. He is normal weight.   HENT:      Head: Normocephalic.      Right Ear: Tympanic membrane and external ear normal.      Left Ear: Tympanic membrane and external ear normal.      Nose: Nose normal.      Mouth/Throat:      Mouth: Mucous membranes are moist.      Pharynx: Oropharynx is clear.   Eyes:      Extraocular Movements: Extraocular movements intact.      Conjunctiva/sclera: Conjunctivae normal.      Pupils: Pupils are equal, round, and reactive to light.   Cardiovascular:      Rate and Rhythm: Normal rate and regular rhythm.      Pulses: Normal pulses.      Heart sounds: Normal heart sounds. No murmur heard.     No friction rub. No gallop.   Pulmonary:      Effort: Pulmonary effort is normal.      Breath sounds: Normal breath sounds.   Abdominal:      General: Abdomen is flat. Bowel sounds are normal. There is no distension.      Palpations: Abdomen is soft. There is no mass.      Tenderness: There is no abdominal tenderness. There is no guarding or rebound.      Hernia: No hernia is present.   Musculoskeletal:         General: No tenderness (No TTP b/l lower paraspinal muscles) or signs of injury. Normal range of motion.      Right lower leg: No edema.      Left lower leg: No edema.   Skin:     General: Skin is warm and dry.      Capillary Refill: Capillary refill takes less than 2 seconds.   Neurological:      General: No focal deficit present.      Mental Status: He is alert.      Sensory: No sensory deficit.      Deep Tendon Reflexes: Reflexes normal.      Comments: Neg b/l slump test, neg SLR   Psychiatric:          Behavior: Behavior normal.            Assessment and Plan     1. Right-sided low back pain without sciatica, unspecified chronicity  Assessment & Plan:  Has hx of multiple myeloma   Pet Scan on 6/2023: Multilevel thoracolumbar compression deformities.  There is mild linear uptake at the in plates of T8, T12, L1 and L2 suggesting potential remodeling acute to subacute fractures    MRI 5/1/2023: Lumbar degenerative disc disease and facet arthrosis as detailed above with no acute pathology identified.  Mild spinal canal stenosis at L4-L5 greater than L3-L4 with no high-grade spinal canal stenosis.Mild bilateral neural foramen stenosis at L3-L4 and on the left greater than right at L4-L5.    -pain unimproved with PT, robaxin, norco,lyrica, and tramadol  -s/p epidural injection 2 weeks ago (pain is unimproved)   -follows pain management   -advised to apply heat pads   -cw taking gabapentin   -cw seeing oncology for multiple myleloma management   -advised to follow up with his pain management (next appointment is in 2 weeks)       2. Degeneration of lumbar intervertebral disc  Assessment & Plan:  -see plan for low back pain      3. Spinal stenosis of lumbar region, unspecified whether neurogenic claudication present  Assessment & Plan:  -see plan for low back pain        4. IgG multiple myeloma  Assessment & Plan:  Follows oncology, Receives Chemotherapy (started on 7/13/2023):  -bortezomib/lenalidomide/dexamethasone (VRd), category 1 regimen  -Cycle length: 21 days; Started on 7/13/2023  -Receives Zometa 4mg IV every 28 days by oncology (started on 7/13/2023)  Next chemo is tomorrow  Advised to cw following oncology and neuro       5. Abdominal bloating with cramps  Assessment & Plan:  Admits to abdominal bloating and cramping   Colonoscopy was done last year, patient states his results were normal  Has hx of hemorrhoid, has + bright red blood (unchanged)  D/c pepcid  Start pantoprazole and bentyl   Follow up in 1-2  weeks                    No follow-ups on file.

## 2023-08-09 NOTE — ASSESSMENT & PLAN NOTE
Admits to abdominal bloating and cramping   Colonoscopy was done last year, patient states his results were normal  Has hx of hemorrhoid, has + bright red blood (unchanged)  D/c pepcid  Start pantoprazole and bentyl   Follow up in 1-2 weeks    [Well Nourished] : well nourished [Well Developed] : well developed [Well Groomed] : well groomed [Alert] : ~L alert [Active] : active [Normal Breathing Pattern] : normal breathing pattern [No Respiratory Distress] : no respiratory distress [No Allergic Shiners] : no allergic shiners [No Drainage] : no drainage [No Conjunctivitis] : no conjunctivitis [Tympanic Membranes Clear] : tympanic membranes were clear [No Nasal Drainage] : no nasal drainage [No Polyps] : no polyps [No Sinus Tenderness] : no sinus tenderness [No Oral Pallor] : no oral pallor [No Oral Cyanosis] : no oral cyanosis [Non-Erythematous] : non-erythematous [No Postnasal Drip] : no postnasal drip [No Tonsillar Enlargement] : no tonsillar enlargement [Absence Of Retractions] : absence of retractions [Symmetric] : symmetric [Good Expansion] : good expansion [No Acc Muscle Use] : no accessory muscle use [Good aeration to bases] : good aeration to bases [Equal Breath Sounds] : equal breath sounds bilaterally [No Wheezing] : no wheezing [Normal Sinus Rhythm] : normal sinus rhythm [No Heart Murmur] : no heart murmur [Soft, Non-Tender] : soft, non-tender [No Hepatosplenomegaly] : no hepatosplenomegaly [Non Distended] : was not ~L distended [Abdomen Mass (___ Cm)] : no abdominal mass palpated [Full ROM] : full range of motion [No Clubbing] : no clubbing [Capillary Refill < 2 secs] : capillary refill less than two seconds [No Cyanosis] : no cyanosis [No Petechiae] : no petechiae [No Contractures] : no contractures [Alert and  Oriented] : alert and oriented [No Abnormal Focal Findings] : no abnormal focal findings [FreeTextEntry1] : happy;still sounds congested; slow eater   [FreeTextEntry4] : nasal congestion improved ; dried secretions  [FreeTextEntry7] : good air entry but sounds congested;  [de-identified] : groin & neck--- candida infection, red,  wet-- much improved

## 2023-08-09 NOTE — ASSESSMENT & PLAN NOTE
Follows oncology, Receives Chemotherapy (started on 7/13/2023):  -bortezomib/lenalidomide/dexamethasone (VRd), category 1 regimen  -Cycle length: 21 days; Started on 7/13/2023  -Receives Zometa 4mg IV every 28 days by oncology (started on 7/13/2023)  Next chemo is tomorrow  Advised to cw following oncology and neuro

## 2023-08-10 ENCOUNTER — INFUSION (OUTPATIENT)
Dept: INFUSION THERAPY | Facility: HOSPITAL | Age: 68
End: 2023-08-10
Attending: INTERNAL MEDICINE
Payer: MEDICARE

## 2023-08-10 ENCOUNTER — OFFICE VISIT (OUTPATIENT)
Dept: HEMATOLOGY/ONCOLOGY | Facility: CLINIC | Age: 68
End: 2023-08-10
Payer: MEDICARE

## 2023-08-10 VITALS
DIASTOLIC BLOOD PRESSURE: 61 MMHG | TEMPERATURE: 98 F | RESPIRATION RATE: 18 BRPM | BODY MASS INDEX: 21.88 KG/M2 | HEIGHT: 67 IN | OXYGEN SATURATION: 100 % | SYSTOLIC BLOOD PRESSURE: 100 MMHG | WEIGHT: 139.38 LBS | HEART RATE: 56 BPM

## 2023-08-10 DIAGNOSIS — C90.00 IGG MULTIPLE MYELOMA: ICD-10-CM

## 2023-08-10 DIAGNOSIS — C79.51 SECONDARY MALIGNANT NEOPLASM OF BONE: Primary | ICD-10-CM

## 2023-08-10 DIAGNOSIS — M54.9 BILATERAL BACK PAIN, UNSPECIFIED BACK LOCATION, UNSPECIFIED CHRONICITY: ICD-10-CM

## 2023-08-10 DIAGNOSIS — C90.00 IGG MULTIPLE MYELOMA: Primary | ICD-10-CM

## 2023-08-10 DIAGNOSIS — C79.51 SECONDARY MALIGNANCY OF LUMBAR VERTEBRAL COLUMN: ICD-10-CM

## 2023-08-10 DIAGNOSIS — M79.604 LEG PAIN, DIFFUSE, RIGHT: ICD-10-CM

## 2023-08-10 PROCEDURE — 96372 THER/PROPH/DIAG INJ SC/IM: CPT | Mod: 59

## 2023-08-10 PROCEDURE — 99215 OFFICE O/P EST HI 40 MIN: CPT | Mod: S$PBB,,, | Performed by: NURSE PRACTITIONER

## 2023-08-10 PROCEDURE — A4216 STERILE WATER/SALINE, 10 ML: HCPCS | Performed by: INTERNAL MEDICINE

## 2023-08-10 PROCEDURE — 99215 PR OFFICE/OUTPT VISIT, EST, LEVL V, 40-54 MIN: ICD-10-PCS | Mod: S$PBB,,, | Performed by: NURSE PRACTITIONER

## 2023-08-10 PROCEDURE — 96367 TX/PROPH/DG ADDL SEQ IV INF: CPT

## 2023-08-10 PROCEDURE — 63600175 PHARM REV CODE 636 W HCPCS: Performed by: INTERNAL MEDICINE

## 2023-08-10 PROCEDURE — 96401 CHEMO ANTI-NEOPL SQ/IM: CPT

## 2023-08-10 PROCEDURE — 25000003 PHARM REV CODE 250: Performed by: INTERNAL MEDICINE

## 2023-08-10 PROCEDURE — 96365 THER/PROPH/DIAG IV INF INIT: CPT

## 2023-08-10 PROCEDURE — 99215 OFFICE O/P EST HI 40 MIN: CPT | Mod: PBBFAC,25 | Performed by: NURSE PRACTITIONER

## 2023-08-10 RX ORDER — BORTEZOMIB 3.5 MG/1
1.3 INJECTION, POWDER, LYOPHILIZED, FOR SOLUTION INTRAVENOUS; SUBCUTANEOUS
Status: COMPLETED | OUTPATIENT
Start: 2023-08-10 | End: 2023-08-10

## 2023-08-10 RX ORDER — DEXAMETHASONE 4 MG/1
40 TABLET ORAL
Qty: 30 TABLET | Refills: 5 | Status: SHIPPED | OUTPATIENT
Start: 2023-08-10 | End: 2023-10-11 | Stop reason: SDUPTHER

## 2023-08-10 RX ORDER — HEPARIN 100 UNIT/ML
500 SYRINGE INTRAVENOUS
Status: DISCONTINUED | OUTPATIENT
Start: 2023-08-10 | End: 2023-08-10 | Stop reason: HOSPADM

## 2023-08-10 RX ORDER — SODIUM CHLORIDE 0.9 % (FLUSH) 0.9 %
10 SYRINGE (ML) INJECTION
Status: DISCONTINUED | OUTPATIENT
Start: 2023-08-10 | End: 2023-08-10 | Stop reason: HOSPADM

## 2023-08-10 RX ADMIN — SODIUM CHLORIDE, PRESERVATIVE FREE 10 ML: 5 INJECTION INTRAVENOUS at 12:08

## 2023-08-10 RX ADMIN — ZOLEDRONIC ACID 4 MG: 4 INJECTION, SOLUTION, CONCENTRATE INTRAVENOUS at 12:08

## 2023-08-10 RX ADMIN — BORTEZOMIB 2.25 MG: 3.5 INJECTION, POWDER, LYOPHILIZED, FOR SOLUTION INTRAVENOUS; SUBCUTANEOUS at 12:08

## 2023-08-10 NOTE — PROGRESS NOTES
Faculty Attestation: Yuriy Díaz  was seen in [facility]. Discussed with resident at the time of the visit. History of Present Illness, Physical Exam, and Assessment and Plan reviewed. Treatment plan is reasonable and appropriate. Compliance with treatment recommendations is important.  Presented with chronic back pain in the middle of treatment with new onset bloating and and cramps.  Consider constipation and dyspepsia.  Continue follow-up with pain management for chronic issues.  Follow up with PCP to see if symptoms persist.     Shanika Howe MD  Family/Sports Medicine

## 2023-08-10 NOTE — NURSING
1123  Pt did labs, saw provider, and is here for C 2 D 1 velcade and q 4 week zometa.  Pt walks with a cane and is accomp by his wife.  Rates LOP to rt leg and  back 9/10.  Pt does report constipation and wt losd due to decreased appetite.

## 2023-08-10 NOTE — Clinical Note
RTC in 2 weeks with NP, treatment lab done prior CBC, CMP, Magnesium SPEP, BUBBA FLC assay and  immunofixation(BUBBA) & protein electrophoresis, urnie, 24 hour He will return to clinic in one week for treatment only, cbc and cmp done prior  Referral to PT

## 2023-08-10 NOTE — Clinical Note
RTC in 2 weeks with NP, treatment lab done prior CBC, CMP, Magnesium SPEP, BUBBA FLC assay and  immunofixation(BUBBA) & protein electrophoresis, urnie, 24 hour He will return to clinic in one week for treatment only, cbc and cmp done prior

## 2023-08-10 NOTE — PROGRESS NOTES
Reason for Follow-up:  Reason for consultation:  -multiple myeloma, IgG kappa  -worsening low back pain    Current Treatment:  We will initiate treatment with bortezomib/lenalidomide/dexamethasone (VRd), category 1 regimen  Cycle length 21 days  -bortezomib 1.3 mg per m2 subQ days 1, 8, and 15  -lenalidomide 25 mg p.o. daily, on days 1 through 14  -dexamethasone 40 mg p.o. with food days 1, 8, and 15  Cycle length: 21 days  Start 7/13/2023    Zometa 4mg IV every 28 days  Start 7/13/2023    Treatment History:  Past medical history:  NIDDM. Dyslipidemia.  GERD.  Lumbar spinal stenosis.  Vitamin-D deficiency.  Allergic rhinitis.  HTN.  Bell's palsy.  Celiac disease.  Hemorrhoids.  Mitral regurgitation 01/05/2018.  Rheumatic valve narrowing and leaking mitral valve.  -02/02/2018 ([facility]):  АНДРЕЙ, right minimally invasive anterior thoracotomy, right femoral artery and vein exploration, extensive right pleural adhesiolysis/pneumolysis, bilateral Maze/ablation (extensive), left atrial appendage clip placement for left atrial appendage exclusion, mitral valve repair (complex repair with 26 mm annuloplasty ring)  Social history:  .  Lives in [place], [place].  Five children.  Owns a [employer] business.  No history of tobacco, alcohol, or illicit drug abuse.    Family history:  Negative for cancers or blood dyscrasias.    Health maintenance:  PCP in family medicine clinic, [facility].  Had EGD and colonoscopy done 1 year ago by Dr. Zoltan Kapoor, [facility], apparently unremarkable (he gets the endoscopies done periodically because of history of celiac disease).    History of Present Illness:   Oncologic/Hematologic History:  Oncology History   IgG multiple myeloma   6/23/2023 Initial Diagnosis    IgG multiple myeloma     7/13/2023 -  Chemotherapy    Treatment Summary   Plan Name: OP VRD - WEEKLY BORTEZOMIB LENALIDOMIDE DEXAMETHASONE Q3W  Treatment Goal: Curative  Status:  Active  Start Date: 7/13/2023  End Date: 11/9/2023 (Planned)  Provider: Narciso Lundy MD  Chemotherapy: bortezomib (VELCADE) injection 2.25 mg, 2.25 mg, Subcutaneous, Clinic/HOD 1 time, 1 of 6 cycles  Administration: 2.25 mg (7/13/2023), 2.25 mg (7/20/2023)  lenalidomide 25 mg Cap, 25 mg, Oral, Daily, 1 of 1 cycle, Start date: 7/7/2023, End date: --     67-year-old gentleman, referred from [facility], with newly diagnosed multiple myeloma.      05/14/2023: [facility] note:  Low back pain, onset early March   - localized to back with occasional sharp pain down posterior aspect of right leg  - fluctuating in intensity, overall worsening since onset, no change in character, difficulty doing ADLs, walking with pain  - difficulty sleeping   - seen in urgent care and C multiple times  - MRI 5/4/23: degenerative disc disease and facet arthrosis, mild spinal canal stenosis L4-L5 greater than L3-L4, no high grade stenosis   - Toradol IM helps briefly  - side effects from gabapentin, Robaxin and Norco- GI and drowsiness   - Voltaren gel not helping  - stopped going to PT due to pain  - follows with Neurologist   - requests trial of tramadol and Lyrica   - going out of country soon, concerned he will not be able to go due to pain  - denies bowel/ bladder incontinence, night sweats, unexplained weight loss, chest pain or shortness of breath   Spinal stenosis   Acute bilateral low back pain with right-sided sciatica    05/18/2023: [facility] note:  Low back pain.  Onset early March 2023.  Localized lumbar region.  Constant.  Occasional radiation down posterior right leg.  Difficulty sleeping in bed because of exacerbation of pain by lying down flat.  Compensating by sleeping in a recliner or on sofa. MRI showed degenerative disc disease and facet arthrosis at multiple levels with mild canal stenosis.  - Has attempted gabapentin, Robaxin, Norco, Lyrica, and tramadol with minimal relief.   Currently being managed with tramadol and Lyrica.  - Told by neurosurgery that he needs pain management referral because NS does not deal with injections  - Denies saddle anesthesia, bowel/bladder incontinence, weight loss, night sweats    Spinal stenosis of lumbar region  Degeneration of lumbar intervertebral disc  - Continue treatment with tramadol and Lyrica as prescribed  Bradycardia  - Patient aware of chronic bradycardia, states he has been this way for 3-4 years due to palpitations if HR >60  - Currently on Toprol XL 50 mg  - Follows with [facility]    06/13/2023:  [facility] note:   Acute Issues:   Weight loss- 6 weeks history of weight loss 12-14 lb unintentionally.  Reports appetite has been somewhat decreased secondary to pain though has noticed increased size of abdomen.  He denies nausea, vomiting, dysphagia, dyspepsia, bloody stools, staying changes in stool caliber, constipation, fevers or night sweats, nervousness/anxiousness, skin or hair changes.  Denies history of smoking or alcohol use.  Receives EGD and colonoscopy every 2 years per Dr. Kapoor Burdett, last was roughly 1 year ago. PSA wnl 5/2023.   Chronic Issues: DM- med compliant metformin 500 daily, last A1C 6.1 in 1/2023.   Chronic medical conditions:  Hypertension.  Dyslipidemia.  GERD.  Lumbar spinal stenosis.  Vitamin-D deficiency.  Allergic rhinitis.  HTN      Investigations reviewed:  05/04/2023: MRI lumbar spine without contrast (worsening lumbar pain x5-7 weeks with bilateral sciatica):   Lumbar degenerative disc disease and facet arthrosis as detailed above with no acute pathology identified.    Mild spinal canal stenosis at L4-L5 greater than L3-L4 with no high-grade spinal canal stenosis.   Mild bilateral neural foramen stenosis at L3-L4 and on the left greater than right at L4-L5.    06/14/2023:  Skeletal survey:   No definite lesions to suggest either lytic and or sclerotic metastatic changes.   Multiple compression  deformities mostly in the lumbar spine but these are seen also in the thoracic spine; other imaging modalities might prove helpful for further assessment; some of these compression deformities appear to be relatively new as compared with an MR of May 2023  (There are some degenerative changes of the thoracic spine with a mild compression deformity of superior endplate of 1 of the lower thoracic vertebral body levels; there are multiple compression deformities of the lumbar spine including the superior endplate of L3 and L4 there is a compression deformity of the superior endplate of T11 with compression deformities of the superior endplate of T12 and a compression deformity of L1; some of these compression deformities appear to be new since the last exam (MR) of May 4, 2023 other imaging modalities might prove helpful for further assessment    Labs reviewed:  01/12/2023:  Hemoglobin 11.9.  MCV 98.7.  Ferritin 211.66.  Transferrin saturation 42%.  Vitamin B12 969.  Folate 13.9.    06/14/2023:  Hemoglobin 11.0.  MCV 99.1.  ESR 15, normal.    06/14/2023: IgG 3810 mg/dL, elevated.  IgM 11, suppressed.  IgA 33, suppressed.  2.28 gm/dL IgG kappa monoclonal protein on SPEP and BUBBA.  Kappa 99.4, elevated.  Lambda 0.3400, suppressed.  Kappa/lambda ratio 292, elevated.    05/01/2023: PSA 1.7, normal.  06/14/2023:  BUN 15.  Creatinine 0.79.  Calcium 9.6.  Albumin 3.6.  LFTs normal.  TSH normal.  06/16/2023:  , normal.    06/15/2023:  24 hour urine protein 288 mg, elevated (reference Range:  < 229). M spike 183 mg. Monoclonal kappa.    06/16/2023:  Urine:  Kappa 25.5 mg/dL; lambda < 0.7000 mg/dL; kappa/lambda ratio> 36.4    06/26/2023:  Pleasant gentleman who presents for initial medical oncology consultation, accompanied by his wife and family present who is a past interventional cardiologist.  Back pain for 2 months.  Initially started in right groin.  Physical therapy has not helped.  10/10 severity.  Has been taking  Tylenol without significant relief.  Secured to take narcotics because of constipation.  Underwent couple of spinal steroid shots 3 weeks ago, with minor relief.  Pain is present all the time.  Pain increases with changing position in bed as well as upon standing.  He finds it difficult to ambulate.  Pain does not radiate down lower extremities and is not accompanied with lower extremity weakness, numbness, urinary or bowel incontinence, or saddle anesthesia.  Also experiences muscle cramps.  Generalized weakness.  However, ECOG 2.  No fevers or chills.  No repeated infections.  Cramps in hands and feet.  Appetite is down.  Appetite has picked up in last 10 days.  Has lost 12-14 lb in last 1-1/2 months.  No abnormal bleeding or bruising.    Interval History 08/10/2023:   Patient presented to the clinic today for Cycle 2 day 1 treatment. Accompanied by his wife. Daughter Alona present via cell phone.   He continues to take Lenalidomide 25 mg daily. Started this cycle on Tuesday(8/8/2023). Is also taking Dexamethasone as directed.   Taking Acyclovir, Baby ASA, and Levofloxacin daily. Zometa is due today.   He saw his PCP yesterday for low back pain and abdominal bloating/cramps. Started on Pantoprazole and Bentyl. He has not started these medications yet. Will  from pharmacy today.   He is followed by palliative care for pain management.  Patient says he takes Tramadol, Gabapentin and Norco to manage pain. He met with [facility] radiation oncology to see if he is a candidate for radiation therapy for back pain. Per daughter Alona, there is not enough lesions to radiate. Daughter states that recent imaging studies are showing that old compression fractures are healing. They are requesting referral to PT to help with weakness. Daughter thinks right leg pain is secondary to sciatic pain. Increase in Gabapentin has helped. He had an epidural injection this past Monday, not effective.   He has  been referred to BMT for transplant evaluation. Per daughter had an appointment on 8/1/2023. States plan is to move forward with BM transplant following 4th cycle. Told he has stage 1 MM and next appointment is scheduled for 10/3/2023 for further work-up. Tentative date for BMT end of November 2023.   He is 3 pounds down from last visit. He states he has an appetite. May be secondary to his abdominal bloating/cramping. Daughter states he avoids eating too much to avoid abdominal bloating/cramping. Moves bowels daily. Last BM this morning. Small amount.   He had his teeth cleaned yesterday. Denies any jaw pain or dental extractions.   Patient denies any fever or symptoms of infection. No bleeding.      Review of Systems:   All systems reviewed and found to be negative except for the symptoms detailed above    Lab Results   Component Value Date    WBC 7.03 08/10/2023    RBC 3.22 (L) 08/10/2023    HGB 10.8 (L) 08/10/2023    HCT 32.4 (L) 08/10/2023    .6 (H) 08/10/2023    MCH 33.5 (H) 08/10/2023    MCHC 33.3 08/10/2023    RDW 13.5 08/10/2023     (H) 08/10/2023    MPV 9.1 08/10/2023     CMP  Sodium   Date Value Ref Range Status   07/01/2020 138 136 - 145 mmol/L Final     Sodium Level   Date Value Ref Range Status   08/10/2023 132 (L) 136 - 145 mmol/L Final     Potassium   Date Value Ref Range Status   07/01/2020 4.5 3.5 - 5.1 mmol/L Final     Potassium Level   Date Value Ref Range Status   08/10/2023 4.1 3.5 - 5.1 mmol/L Final     Chloride   Date Value Ref Range Status   07/01/2020 108 100 - 109 mmol/L Final     Carbon Dioxide   Date Value Ref Range Status   08/10/2023 23 23 - 31 mmol/L Final   07/01/2020 26 22 - 33 mmol/L Final     Blood Urea Nitrogen   Date Value Ref Range Status   08/10/2023 12.4 8.4 - 25.7 mg/dL Final   07/01/2020 10 5 - 25 mg/dL Final     Creatinine   Date Value Ref Range Status   08/10/2023 0.83 0.73 - 1.18 mg/dL Final   07/01/2020 0.81 0.57 - 1.25 mg/dL Final     Calcium   Date Value  Ref Range Status   07/01/2020 8.6 (L) 8.8 - 10.6 mg/dL Final     Calcium Level Total   Date Value Ref Range Status   08/10/2023 9.7 8.8 - 10.0 mg/dL Final     Albumin Level   Date Value Ref Range Status   08/10/2023 3.4 3.4 - 4.8 g/dL Final     Bilirubin Total   Date Value Ref Range Status   08/10/2023 0.3 <=1.5 mg/dL Final     Alkaline Phosphatase   Date Value Ref Range Status   08/10/2023 116 40 - 150 unit/L Final     Aspartate Aminotransferase   Date Value Ref Range Status   08/10/2023 14 5 - 34 unit/L Final     Alanine Aminotransferase   Date Value Ref Range Status   08/10/2023 17 0 - 55 unit/L Final     Anion Gap   Date Value Ref Range Status   07/01/2020 4 (L) 8 - 16 mmol/L Final     eGFR   Date Value Ref Range Status   08/10/2023 >60 mls/min/1.73/m2 Final       Physical Exam  VITAL SIGNS:   Vitals:    08/10/23 1011   BP: 100/61   Pulse: (!) 56   Resp: 18   Temp: 97.7 °F (36.5 °C)      General: Thin framed man.  NAD  Eye: Pupils are equal, round and reactive to light, Extraocular movements are intact. Normal conjunctiva, sclera anicteric.   HENT: Normocephalic. Oropharynx moist and clear. Good dentition.   Neck: Supple, Non-tender  Respiratory: Respirations are non-labored, Symmetrical chest wall expansion. Breath sounds CTA bilaterally  Cardiovascular: Regular rate, rhythm, Normal peripheral perfusion, No bilateral lower extremity edema  Gastrointestinal: Flat, soft, non-tender. Bowel sounds present in all 4 quadrants. No distention.   Genitourinary: Exam deferred  Lymphatics: No lymphadenopathy appreciated  Musculoskeletal: No spinal pain with palpation.   Integumentary: Intact. Warm, dry. Right lower leg darkening discoloration.  Neurologic: Muscle strength 5/5 except for right leg,proximally is 4/5.   Psychiatric: Cooperative. Appropriate mood and affect   ECOG Performance Scale: 1 - Capable of all self-care able to carry out light work activities    CBC  Lab Results   Component Value Date    WBC 7.03  08/10/2023    HGB 10.8 (L) 08/10/2023    HCT 32.4 (L) 08/10/2023    .6 (H) 08/10/2023     (H) 08/10/2023      CMP  Sodium   Date Value Ref Range Status   07/01/2020 138 136 - 145 mmol/L Final     Sodium Level   Date Value Ref Range Status   08/10/2023 132 (L) 136 - 145 mmol/L Final     Potassium   Date Value Ref Range Status   07/01/2020 4.5 3.5 - 5.1 mmol/L Final     Potassium Level   Date Value Ref Range Status   08/10/2023 4.1 3.5 - 5.1 mmol/L Final     Chloride   Date Value Ref Range Status   07/01/2020 108 100 - 109 mmol/L Final     Carbon Dioxide   Date Value Ref Range Status   08/10/2023 23 23 - 31 mmol/L Final   07/01/2020 26 22 - 33 mmol/L Final     Blood Urea Nitrogen   Date Value Ref Range Status   08/10/2023 12.4 8.4 - 25.7 mg/dL Final   07/01/2020 10 5 - 25 mg/dL Final     Creatinine   Date Value Ref Range Status   08/10/2023 0.83 0.73 - 1.18 mg/dL Final   07/01/2020 0.81 0.57 - 1.25 mg/dL Final     Calcium   Date Value Ref Range Status   07/01/2020 8.6 (L) 8.8 - 10.6 mg/dL Final     Calcium Level Total   Date Value Ref Range Status   08/10/2023 9.7 8.8 - 10.0 mg/dL Final     Albumin Level   Date Value Ref Range Status   08/10/2023 3.4 3.4 - 4.8 g/dL Final     Bilirubin Total   Date Value Ref Range Status   08/10/2023 0.3 <=1.5 mg/dL Final     Alkaline Phosphatase   Date Value Ref Range Status   08/10/2023 116 40 - 150 unit/L Final     Aspartate Aminotransferase   Date Value Ref Range Status   08/10/2023 14 5 - 34 unit/L Final     Alanine Aminotransferase   Date Value Ref Range Status   08/10/2023 17 0 - 55 unit/L Final     Anion Gap   Date Value Ref Range Status   07/01/2020 4 (L) 8 - 16 mmol/L Final     eGFR   Date Value Ref Range Status   08/10/2023 >60 mls/min/1.73/m2 Final      Assessment:  Multiple myeloma, IgG kappa:  -presentation: 03/2023:  Worsening low back pain, no associated neurological symptoms, 12-14 pound weight loss over 6 weeks, mild L4-L5 spinal canal stenosis on MRI  (05/04/2023), lumbar DJD and facet arthrosis on MRI lumbar spine (05/04/2023)  -skeletal survey 06/14/2023: Negative for lytic or sclerotic lesions; multiple compression deformities lumbar spine and thoracic spine (new since MRI scan dated 05/04/2023)  -06/14/2023:  Mild anemia (hemoglobin 11.0)   -06/14/2023:  2.28 gm/dL IgG kappa M protein. Kappa 99.4, elevated.  Lambda 0.3400, suppressed.  Kappa/lambda ratio 292, elevated.  -06/14/2023:  BUN, creatinine, calcium, albumin normal  -06/15/2022:  24 hour urine protein 280 mg, elevated.  M spike 183 mg.  Urine BUBBA showed monoclonal kappa light chains.  -06/16/2023: Urine:  Kappa 25.5 mg/dL; lambda < 0.7000 mg/dL; kappa/lambda ratio> 36.4  06/16/2023:  , normal.  -06/26/2023:  Hemoglobin 11.0.  Beta 2 microglobulin 3.18.  Calcium 10.1.  Albumin 3.2.  Hepatitis-A/B/C/HIV serology negative.  -bone marrow biopsy 06/27/2022:  Plasma cells 50% in bone marrow aspirate; plasma cells 36% on flow cytometry of bone marrow aspirate, with kappa light chain restriction; molecular studies pending  -CT abdomen pelvis with contrast 06/27/2023:  Hepatic steatosis; bilateral inguinal hernias; heterogeneous bone demineralization, suggesting marrow infiltrative process like multiple myeloma; multilevel compression deformities in the included thoracic spine and lumbar spine; 10 mm exophytic hyperdense focus upper pole of right kidney, compatible with a hyperdense cyst  -FDG PET-CT 06/29/2023:  Left femur lesser trochanter; right 4th rib laterally; multilevel thoracolumbar compression deformities; subacute fractures T8, T12, L1, L2  >>>  From the data available so far, patient has multiple myeloma (symptomatic), by virtue of:  1. Involved: Uninvolved serum FLC ratio =/> 100 and involved FLC concentration 10 mg/dL or higher (in this patient, kappa/lambda ratio age 292, and kappa light chain 99.4 mg/dL)   2. Hypercalcemia (06/26/2023): Calcium 10.1.  Albumin 3.2   3. Bone marrow  plasmacytosis 50%  4. Heterogeneous bone demineralization on CT 06/27/2023, suggesting bone marrow infiltrative process like multiple myeloma  5. FDG PET-CT 06/29/2023:  Left femur lesser trochanter; right 4th rib laterally; multilevel thoracolumbar compression deformities; subacute fractures T8, T12, L1, L2  6. Beta 2 microglobulin level 3.18, elevated (06/26/2023)   7. No renal insufficiency, no significant anemia, LDH normal  >>>  -Velcade started 07/13/2023  -Zometa 4 mg IV every 4 weeks) x2 years), started 07/13/2023     Co-morbidities:  NIDDM.  Dyslipidemia.  Hypertension.  Celiac disease.  History of rheumatic mitral valve disease, S/P Maze/ablation procedure 02/02/2018        Vaccination history:   COVID-19, MRNA, LN-S, PF (MODERNA FULL 0.5 ML DOSE) 8/18/2022 , 10/23/2021 , 3/17/2021 , 2/17/2021   Hepatitis A / Hepatitis B 8/18/2022 , 10/5/2021 , 7/29/2020   Influenza - Quadrivalent - MDCK - PF 10/5/2021   Influenza - Trivalent - MDCK - PF 9/10/2015   Meningococcal Conjugate (MCV4O) 8/18/2022 , 4/17/2012   Meningococcal Conjugate (MCV4P) 4/17/2012   Pneumococcal Conjugate - 20 Valent 1/12/2023   Tdap 7/29/2020   Zoster 11/18/2020 , 7/29/2020   Zoster Recombinant 11/18/2020 , 7/29/2020      8/10/2023:  He is feeling well today. Laboratory studies reviewed. Adequate to proceed with Velcade injection today. Continue Lenalidomide as directed. Started 8/8/2023    --lower extremity weakness, will refer to PT.     --Abdominal cramping/bloating. Patient to start pantoprazole and Bentyl as prescribed by PCP. Follow up with PCP in 1-2 weeks(per PCP note).     Plan:  Multiple myeloma, IgG kappa:  Autologous transplantation:   Category 1 evidence supports proceeding directly after induction therapy to high-dose therapy and HCT  Renal dysfunction and advanced age are not contraindications to transplant     Need the following for staging of multiple myeloma:  Serum beta 2 microglobulin elevated  Serum albumin and LDH are  normal.  Evaluation of chromosomal abnormalities by FISH on bone marrow specimen:  Pending as of 07/10/2023     Bortezomib/lenalidomide/dexamethasone (VRd), category 1 regimen:  Cycle length 21 days  -bortezomib 1.3 mg per m2 subQ days 1, 8, and 15  -lenalidomide 25 mg p.o. daily, on days 1 through 14  -dexamethasone 40 mg p.o. with food days 1, 8, and 15  Cycle length: 21 days    If response after primary therapy, then:  Autologous HCT versus continuation of myeloma therapy or maintenance therapy versus tandem autologous transplant, etc.    -Velcade started 07/13/2023  -Zometa 4 mg IV every 4 weeks x2 years (started 07/13/2023      Okay to proceed with cycle 2 day 1 (Bortezomib)  Check baseline TSH and free T4, then, every 3 months (monitoring on lenalidomide)   Continue Zometa 4 mg IV every 4 weeks x2 years (started 07/13/2023) - administered today (8/10/2023)  Assess response with repeat SPEP, BUBBA, FLC assay, and 24 hour urine for total protein, UPEP, urine BUBBA, and urine FLC assay in 1 month - ordered to be done in one week, reviewed at following visit.   Continue baby aspirin 81 mg p.o. q.day concurrently, for thromboprophylaxis secondary to lenalidomide.  Continue levofloxacin 5 mg p.o. q.day X 12 weeks (started 06/26/2023)  Continue acyclovir 400 mg p.o. b.i.d. for herpes zoster prophylaxis with bortezomib  Continue Bactrim ds 1 tablet every Monday/Wednesday/Friday during treatment  Check CBC and CMP weekly during treatment  Patient already referred to [facility] BMT for transplant evaluation- Next appointment is scheduled for 10/3/2023; Tentative plan for BMT around end of November 2023  Patient already referred to Radiation Oncology ([facility]) for short course radiotherapy to spine for pain control-consultation appointment 7/21. - per daughter not a candidate, not enough lesions to radiate.     RTC in 2 weeks with NP, treatment lab done prior  CBC, CMP, Magnesium SPEP, BUBBA FLC assay and  immunofixation(BUBBA) &  protein electrophoresis, urnie, 24 hour  He will return to clinic in one week for treatment only, cbc and cmp done prior       Weekly assessment for peripheral neuropathy and/or neuropathic pain.  Monitor for hypotension during bortezomib therapy; adjustment of antihypertensives and/or administration of IV hydration may be needed.    Back Pain  -06/26/2023: Started Norco 5 mg p.o. q.6 hours PRN for pain; did not work; he stopped taking  -06/26/2023: Taking Norco 10 mg only twice daily; instructed him to take Norco 10 mg every 4-6 hours p.r.n. for pain (has 9/10 lower back pain, constant, radiating to anterior aspect of right thigh, without neurological symptoms)  -06/26/2023: Started levofloxacin 500 mg p.o. q.day X 12 weeks  -06/26/2023: Severe back pain; presumed due to myeloma; referred to Radiation Oncology  Continue narcotics for back pain      07/10/2023:  Pending:  FISH panel on bone marrow including del(13), del(17p13), t(4;14), t(11;14), t(14;16), t(14;20), 1q21 gain/1q21 amplification, 1p deletion  NGS panel testing on bone marrow specimen     10 mm exophytic hyperdense focus upper pole of right kidney, compatible with a hyperdense cyst, on CT abdomen pelvis with contrast 06/27/2023  -07/12/2023:  MRI abdomen with and without contrast (right renal cyst): Small exophytic right renal lesion most likely a proteinaceous or hemorrhagic cyst (10 mm, upper pole of right kidney)  -Velcade started 07/13/2023     Monitoring on lenalidomide:   Monitor for signs and symptoms of infection (if neutropenic), bleeding or bruising, hepatotoxicity, secondary malignancies, thromboembolism (eg, shortness of breath, chest pain, arm or leg swelling), dermatologic toxicity, tumor lysis syndrome, or hypersensitivity.     Monitoring on bortezomib:  Peripheral neuropathy, posterior reversible leukoencephalopathy syndrome, progressive multifocal leukoencephalopathy, tumor lysis syndrome, or hyper-/hypoglycemia. Monitor closely in  patients with risk factors for heart failure or existing heart disease.        Above discussed at length with the patient.  All questions answered.      Nature of multiple myeloma discussed.    He understands and agrees with this plan.  ----------------------------------   Discussion:     Supportive care:  -bone targeted treatment: Bisphosphonate, category 1; or denosumab to reduce risk of skeletal related events; denosumab is preferred in patients with renal insufficiency; assess vitamin-D status; baseline dental exam; monitor for osteonecrosis of the jaw; monitor renal dysfunction with bisphosphonate therapy  -continue bone targeted treatment (bisphosphonate or denosumab) for 2 years; continue beyond 2 years should be based on clinical judgment  -patients receiving denosumab for bone disease who subsequently discontinued therapy should be given maintenance denosumab every 6 months or a single dose of bisphosphonate to mitigate risk of rebound osteoporosis  -for hypercalcemia, zoledronic acid, denosumab, steroids, and/or calcitonin  -for hyperviscosity, plasmapheresis should be used as adjuvant therapy for symptomatic hyperviscosity  -intravenous immunoglobulin therapy should be considered in the setting of recurrent serious infection and/or hypogammaglobulinemia (IgG </= 400 mg/dL)  -pneumococcal conjugate vaccine, followed by pneumococcal polysaccharide vaccine 1 year later  -Influenza vaccination is recommended; 2 doses of high-dose inactivated quandaivalent influenza vaccine should be considered  -consider 12 weeks of levofloxacin 500 mg p.o. daily at the time of initial diagnosis of multiple myeloma  -COVID-19 vaccination  -highest risk for VTE is in the 1st 6 months following new diagnosis of multiple myeloma  -VTE prophylaxis if no contraindications to anticoagulation agents or antiplatelets  Recommendations for VTE prophylaxis:  Aspirin  mg daily; low molecular weight heparin equivalent to enoxaparin  40 mg daily; Xarelto 10 mg daily; Eliquis 2.5 mg b.i.d.; Arixtra 2.5 mg daily; Coumadin with target INR 2.0-3.0        Lenalidomide:  Embryo fetal toxicity  Hematologic toxicity.  Neutropenia.  Thrombocytopenia.  Venous and arterial thromboembolism.  DVT.  PE.  MI.  Stroke.  Dizziness, fatigue.  Tumor lysis syndrome.  Heart failure.    Used with caution in patients with renal impairment.  Lenalidomide =/> 4 cycles may decrease the # of CD34 pos cells collected for autologous stem cell transplant.  DVT, PE, atrial fibrillation, renal failure are more likely in patients> 65 years  Hepatotoxicity  Hypersensitivity reactions.  Anaphylaxis.  Angioedema.  Skin rash.  Eosinophilia.  Immediate hypersensitivity reactions.  Second primary malignancy.  AML.  MDS.  Solid tumor malignancies.  Nonmelanoma skin cancers.     Monitoring with lenalidomide:  -CBC with differential: weekly for the first 2 cycles, every 2 weeks during the third cycle and monthly thereafter;  -serum creatinine, LFTs (periodically).  -Thyroid function tests (TSH at baseline then every 2 to 3 months during lenalidomide treatment  -ECG when clinically indicated. Monitor for signs and symptoms of infection (if neutropenic), bleeding or bruising, hepatotoxicity, secondary malignancies, thromboembolism (eg, shortness of breath, chest pain, arm or leg swelling), dermatologic toxicity, tumor lysis syndrome, or hypersensitivity.  Patients who could become pregnant: Pregnancy test 10 to 14 days and 24 hours prior to initiating therapy, weekly during the first 4 weeks of treatment, then every 2 to 4 weeks through 4 weeks after therapy discontinued.     Bortezomib:  Bone marrow suppression.  Neutropenia.  Thrombocytopenia.  Acute CHF.  Nausea, vomiting, diarrhea, constipation, ileus.    Hepatotoxicity:  Herpes zoster and her PCP plaques reactivation.    Anaphylactic reactions.  Hypersensitive reactions.  Angioedema.  Laryngeal edema.    Hypotension.  Peripheral  neuropathy.  Posterior reversible leukoencephalopathy syndrome.  Progressive multifocal leukoencephalopathy.  Pulmonary toxicity.  Pneumonitis.  Interstitial pneumonia.  Lung infiltrates.  ARDS.  Thrombotic microangiopathy.  Tumor lysis syndrome.       Monitoring parameters with bortezomib:  CBC, CMP  Blood pressure (to monitor for hypotension)  Peripheral neuropathy  Posterior reversible leukoencephalopathy syndrome, progressive multifocal leukoencephalopathy, tumor lysis syndrome, or hyper-/hypoglycemia. Monitor baseline chest x-ray and then periodic pulmonary function testing (with new or worsening pulmonary symptoms). Monitor closely in patients with risk factors for heart failure or existing heart disease.

## 2023-08-14 ENCOUNTER — DOCUMENTATION ONLY (OUTPATIENT)
Dept: HEMATOLOGY/ONCOLOGY | Facility: CLINIC | Age: 68
End: 2023-08-14
Payer: MEDICARE

## 2023-08-15 ENCOUNTER — PATIENT MESSAGE (OUTPATIENT)
Dept: ADMINISTRATIVE | Facility: HOSPITAL | Age: 68
End: 2023-08-15
Payer: MEDICARE

## 2023-08-15 DIAGNOSIS — M51.36 DEGENERATION OF LUMBAR INTERVERTEBRAL DISC: ICD-10-CM

## 2023-08-15 DIAGNOSIS — S32.020G COMPRESSION FRACTURE OF L2 VERTEBRA WITH DELAYED HEALING, SUBSEQUENT ENCOUNTER: ICD-10-CM

## 2023-08-15 DIAGNOSIS — C90.00 IGG MULTIPLE MYELOMA: Primary | ICD-10-CM

## 2023-08-15 DIAGNOSIS — M48.54XA NONTRAUMATIC COMPRESSION FRACTURE OF T8 VERTEBRA, INITIAL ENCOUNTER: ICD-10-CM

## 2023-08-15 DIAGNOSIS — C79.51 SECONDARY MALIGNANT NEOPLASM OF BONE: ICD-10-CM

## 2023-08-15 DIAGNOSIS — S32.020G COMPRESSION FRACTURE OF L2 VERTEBRA WITH DELAYED HEALING, SUBSEQUENT ENCOUNTER: Primary | ICD-10-CM

## 2023-08-15 DIAGNOSIS — C90.00 IGG MULTIPLE MYELOMA: ICD-10-CM

## 2023-08-15 DIAGNOSIS — S32.010G COMPRESSION FRACTURE OF L1 VERTEBRA WITH DELAYED HEALING, SUBSEQUENT ENCOUNTER: ICD-10-CM

## 2023-08-15 NOTE — LETTER
AUTHORIZATION FOR RELEASE OF   CONFIDENTIAL INFORMATION    Dear Dr. Kapoor,  Fax: [phone]    We are seeing Yuriy Díaz, date of birth 1955, in the clinic at [facility]. Nena Elliott MD is the patient's PCP. Yuriy Díaz has an outstanding lab/procedure at the time we reviewed his chart. In order to help keep his health information updated, he has authorized us to request the following medical record(s):        (  )  MAMMOGRAM                                      ( x )  COLONOSCOPY      (  )  PAP SMEAR                                          (  )  OUTSIDE LAB RESULTS     (  )  DEXA SCAN                                          (  )  EYE EXAM            (  )  FOOT EXAM                                          (  )  ENTIRE RECORD     (  )  OUTSIDE IMMUNIZATIONS                 (  )  _______________         Please fax records to [facility], [place], [place] R, MD, [phone].           Patient Name: Yuriy Díaz  : 1955  Patient Phone #: [phone]

## 2023-08-16 LAB
INR PPP: 1
PROTHROMBIN TIME: 13.4 SECONDS (ref 11.4–14)

## 2023-08-17 ENCOUNTER — CLINICAL SUPPORT (OUTPATIENT)
Dept: HEMATOLOGY/ONCOLOGY | Facility: CLINIC | Age: 68
End: 2023-08-17
Payer: MEDICARE

## 2023-08-17 ENCOUNTER — INFUSION (OUTPATIENT)
Dept: INFUSION THERAPY | Facility: HOSPITAL | Age: 68
End: 2023-08-17
Attending: INTERNAL MEDICINE
Payer: MEDICARE

## 2023-08-17 VITALS
BODY MASS INDEX: 22.39 KG/M2 | OXYGEN SATURATION: 100 % | SYSTOLIC BLOOD PRESSURE: 128 MMHG | WEIGHT: 142.63 LBS | DIASTOLIC BLOOD PRESSURE: 71 MMHG | TEMPERATURE: 98 F | RESPIRATION RATE: 18 BRPM | HEIGHT: 67 IN | HEART RATE: 57 BPM

## 2023-08-17 DIAGNOSIS — S32.020G COMPRESSION FRACTURE OF L2 VERTEBRA WITH DELAYED HEALING, SUBSEQUENT ENCOUNTER: ICD-10-CM

## 2023-08-17 DIAGNOSIS — S32.010G COMPRESSION FRACTURE OF L1 VERTEBRA WITH DELAYED HEALING, SUBSEQUENT ENCOUNTER: ICD-10-CM

## 2023-08-17 DIAGNOSIS — C90.00 IGG MULTIPLE MYELOMA: ICD-10-CM

## 2023-08-17 DIAGNOSIS — C90.00 MULTIPLE MYELOMA, REMISSION STATUS UNSPECIFIED: ICD-10-CM

## 2023-08-17 DIAGNOSIS — C90.00 IGG MULTIPLE MYELOMA: Primary | ICD-10-CM

## 2023-08-17 DIAGNOSIS — M48.54XA NONTRAUMATIC COMPRESSION FRACTURE OF T8 VERTEBRA, INITIAL ENCOUNTER: ICD-10-CM

## 2023-08-17 DIAGNOSIS — D84.821 DRUG-INDUCED IMMUNODEFICIENCY: ICD-10-CM

## 2023-08-17 DIAGNOSIS — Z79.899 DRUG-INDUCED IMMUNODEFICIENCY: ICD-10-CM

## 2023-08-17 LAB
ALBUMIN SERPL-MCNC: 3.5 G/DL (ref 3.4–4.8)
ALBUMIN/GLOB SERPL: 1.2 RATIO (ref 1.1–2)
ALP SERPL-CCNC: 95 UNIT/L (ref 40–150)
ALT SERPL-CCNC: 14 UNIT/L (ref 0–55)
AST SERPL-CCNC: 14 UNIT/L (ref 5–34)
BASOPHILS # BLD AUTO: 0.08 X10(3)/MCL
BASOPHILS NFR BLD AUTO: 0.8 %
BILIRUB SERPL-MCNC: 0.3 MG/DL
BUN SERPL-MCNC: 11.4 MG/DL (ref 8.4–25.7)
CALCIUM SERPL-MCNC: 9.3 MG/DL (ref 8.8–10)
CHLORIDE SERPL-SCNC: 102 MMOL/L (ref 98–107)
CO2 SERPL-SCNC: 24 MMOL/L (ref 23–31)
CREAT SERPL-MCNC: 0.79 MG/DL (ref 0.73–1.18)
EOSINOPHIL # BLD AUTO: 0.6 X10(3)/MCL (ref 0–0.9)
EOSINOPHIL NFR BLD AUTO: 6.2 %
ERYTHROCYTE [DISTWIDTH] IN BLOOD BY AUTOMATED COUNT: 13.6 % (ref 11.5–17)
GFR SERPLBLD CREATININE-BSD FMLA CKD-EPI: >60 MLS/MIN/1.73/M2
GLOBULIN SER-MCNC: 3 GM/DL (ref 2.4–3.5)
GLUCOSE SERPL-MCNC: 90 MG/DL (ref 82–115)
HCT VFR BLD AUTO: 34.4 % (ref 42–52)
HGB BLD-MCNC: 11.2 G/DL (ref 14–18)
IMM GRANULOCYTES # BLD AUTO: 0.22 X10(3)/MCL (ref 0–0.04)
IMM GRANULOCYTES NFR BLD AUTO: 2.3 %
LYMPHOCYTES # BLD AUTO: 2.66 X10(3)/MCL (ref 0.6–4.6)
LYMPHOCYTES NFR BLD AUTO: 27.6 %
MAGNESIUM SERPL-MCNC: 2 MG/DL (ref 1.6–2.6)
MCH RBC QN AUTO: 32.6 PG (ref 27–31)
MCHC RBC AUTO-ENTMCNC: 32.6 G/DL (ref 33–36)
MCV RBC AUTO: 100 FL (ref 80–94)
MONOCYTES # BLD AUTO: 1.27 X10(3)/MCL (ref 0.1–1.3)
MONOCYTES NFR BLD AUTO: 13.2 %
NEUTROPHILS # BLD AUTO: 4.81 X10(3)/MCL (ref 2.1–9.2)
NEUTROPHILS NFR BLD AUTO: 49.9 %
NRBC BLD AUTO-RTO: 0 %
PLATELET # BLD AUTO: 442 X10(3)/MCL (ref 130–400)
PMV BLD AUTO: 10.2 FL (ref 7.4–10.4)
POTASSIUM SERPL-SCNC: 4.2 MMOL/L (ref 3.5–5.1)
PROT SERPL-MCNC: 6.5 GM/DL (ref 5.8–7.6)
RBC # BLD AUTO: 3.44 X10(6)/MCL (ref 4.7–6.1)
SODIUM SERPL-SCNC: 132 MMOL/L (ref 136–145)
WBC # SPEC AUTO: 9.64 X10(3)/MCL (ref 4.5–11.5)

## 2023-08-17 PROCEDURE — 36415 COLL VENOUS BLD VENIPUNCTURE: CPT

## 2023-08-17 PROCEDURE — 63600175 PHARM REV CODE 636 W HCPCS: Mod: JZ,JG | Performed by: INTERNAL MEDICINE

## 2023-08-17 PROCEDURE — 85025 COMPLETE CBC W/AUTO DIFF WBC: CPT

## 2023-08-17 PROCEDURE — 96401 CHEMO ANTI-NEOPL SQ/IM: CPT

## 2023-08-17 PROCEDURE — 96372 THER/PROPH/DIAG INJ SC/IM: CPT | Mod: 59

## 2023-08-17 PROCEDURE — 80053 COMPREHEN METABOLIC PANEL: CPT

## 2023-08-17 PROCEDURE — 83735 ASSAY OF MAGNESIUM: CPT

## 2023-08-17 RX ORDER — SODIUM CHLORIDE 0.9 % (FLUSH) 0.9 %
10 SYRINGE (ML) INJECTION
Status: DISCONTINUED | OUTPATIENT
Start: 2023-08-17 | End: 2023-08-17 | Stop reason: HOSPADM

## 2023-08-17 RX ORDER — HEPARIN 100 UNIT/ML
500 SYRINGE INTRAVENOUS
Status: DISCONTINUED | OUTPATIENT
Start: 2023-08-17 | End: 2023-08-17 | Stop reason: HOSPADM

## 2023-08-17 RX ORDER — BORTEZOMIB 3.5 MG/1
1.3 INJECTION, POWDER, LYOPHILIZED, FOR SOLUTION INTRAVENOUS; SUBCUTANEOUS
Status: COMPLETED | OUTPATIENT
Start: 2023-08-17 | End: 2023-08-17

## 2023-08-17 RX ADMIN — BORTEZOMIB 2.25 MG: 3.5 INJECTION, POWDER, LYOPHILIZED, FOR SOLUTION INTRAVENOUS; SUBCUTANEOUS at 11:08

## 2023-08-18 ENCOUNTER — OFFICE VISIT (OUTPATIENT)
Dept: FAMILY MEDICINE | Facility: CLINIC | Age: 68
End: 2023-08-18
Payer: MEDICARE

## 2023-08-18 ENCOUNTER — CLINICAL SUPPORT (OUTPATIENT)
Dept: FAMILY MEDICINE | Facility: CLINIC | Age: 68
End: 2023-08-18
Payer: MEDICARE

## 2023-08-18 VITALS
HEIGHT: 66 IN | BODY MASS INDEX: 22.98 KG/M2 | WEIGHT: 143 LBS | HEART RATE: 65 BPM | SYSTOLIC BLOOD PRESSURE: 109 MMHG | TEMPERATURE: 98 F | OXYGEN SATURATION: 99 % | DIASTOLIC BLOOD PRESSURE: 60 MMHG | RESPIRATION RATE: 16 BRPM

## 2023-08-18 DIAGNOSIS — K40.90 RIGHT INGUINAL HERNIA: Primary | ICD-10-CM

## 2023-08-18 DIAGNOSIS — E11.9 TYPE 2 DIABETES MELLITUS WITHOUT COMPLICATION, WITHOUT LONG-TERM CURRENT USE OF INSULIN: Primary | ICD-10-CM

## 2023-08-18 DIAGNOSIS — E11.9 TYPE 2 DIABETES MELLITUS WITHOUT COMPLICATION, WITHOUT LONG-TERM CURRENT USE OF INSULIN: ICD-10-CM

## 2023-08-18 PROCEDURE — 99215 OFFICE O/P EST HI 40 MIN: CPT | Mod: PBBFAC | Performed by: STUDENT IN AN ORGANIZED HEALTH CARE EDUCATION/TRAINING PROGRAM

## 2023-08-18 PROCEDURE — 92228 IMG RTA DETC/MNTR DS PHY/QHP: CPT | Mod: PBBFAC | Performed by: STUDENT IN AN ORGANIZED HEALTH CARE EDUCATION/TRAINING PROGRAM

## 2023-08-18 RX ORDER — NALOXEGOL OXALATE 25 MG/1
TABLET, FILM COATED ORAL
COMMUNITY
Start: 2023-08-16

## 2023-08-18 RX ORDER — GABAPENTIN 400 MG/1
CAPSULE ORAL
COMMUNITY
Start: 2023-08-03 | End: 2024-01-18

## 2023-08-18 RX ORDER — FAMOTIDINE 40 MG/1
40 TABLET, FILM COATED ORAL NIGHTLY
COMMUNITY
Start: 2023-08-09

## 2023-08-18 RX ORDER — GABAPENTIN 100 MG/1
100 CAPSULE ORAL 3 TIMES DAILY
COMMUNITY
Start: 2023-07-31 | End: 2024-01-18

## 2023-08-18 RX ORDER — POLYETHYLENE GLYCOL 3350 17 G/17G
17 POWDER, FOR SOLUTION ORAL
COMMUNITY
Start: 2023-08-16

## 2023-08-18 NOTE — PROGRESS NOTES
[facility] OFFICE VISIT NOTE  Yuriy Díaz  [id number]    Chief Complaint: Hernia      HPI    67 y.o. male     Right sided groin pain x few days  - no exacerbating factors at onset, no heavy lifting/pulling   - bulge to right side of groin at area of pain  - no previous injury or surgeries to groin, scrotum or abdomin   - denies difficulty urinating, last BM today- soft, has baseline constipation- on opioids, passing gas, denies nausea/vomiting, fevers    Saw GI yesterday- given hemorrhoid cream     Chronic conditions:  Multiple Myeloma- follows [facility] Heme/Onc, undergoing chemo    DM II- controlled with diet and metformin 500 mg daily, last A1c=  6.0 (6/14/23), on statin, foot exam (1/2023), eye exam- see Ophthalmologist, foot exam (1/2023)  HTN- at goal today, on metoprolol 50 mg daily  Sleep apnea- compliant on CPAP at night  Celiac disease- follows Dr. Kapoor, last colonoscopy 6/2022  Mitral valve regurgitation- s/p MVR  GERD- Protonix 40 mg daily and famotidine 40 mg nightly     Healthcare maintenance  Colon cancer screen: colonoscopy 6/29/22 (Dr. Kapoor)  Prostate cancer screen: 1.7 (5/2023)  Non smoker  Immunizations: Tdap (7/2020), Shingles (7/2020, 11/2020), PCV 20 (1/2023)     ROS:  CONSTITUTIONAL: Weight stable since initiation of multiple myeloma treatment CARDIOVASCULAR: No chest pain    RESPIRATORY: No shortness of breath      PE:  Vitals:    08/18/23 0911   BP: 109/60   Pulse: 65   Resp: 16   Temp: 98.1 °F (36.7 °C)     General: thin, in no acute distress   Neck: no carotid bruits   Respiratory: clear to auscultation bilaterally, nonlabored respirations   Cardiovascular: regular rate and rhythm without murmurs   Gastrointestinal: soft, non-tender, non-distended, bowel sounds present   Genitourinary: bulging to right inguinal area- no tenderness, to inguinal lymphadenopathy, scrotum without masses or rash, testes without masses, testes non tender, circumcised penis without discharge, unable to appreciate right  or left inguinal hernia, no suprapubic tenderness, no CVA tenderness   Extremities: no edema in bilateral lower extremities  Musculoskeletal: able to get on exam table slowly       Assessment:   1. Right inguinal hernia    2. Type 2 diabetes mellitus without complication, without long-term current use of insulin        Plan:  - US Scrotum/Testes and Abdomen, advised patient to ensure soft, regular bowel movements with current bowel regimen, ED precautions to include fevers, nausea, vomiting, inability to pass gas or stool or severe abdominal pain   - continue current regimen: metformin 500 mg BID  - DM eye exam today    Return to clinic in 2 months for follow up healthcare maintenance and chronic conditions, or sooner if needed.     Nena Elliott M.D. HO-III  [facility]

## 2023-08-21 NOTE — PROGRESS NOTES
I reviewed History, PE, A/P and medical record.  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  I agree with resident. Care provided was reasonable and necessary.   I evaluated the patient with resident at time of visit.  US ordered for suspected inguinal hernia.

## 2023-08-24 ENCOUNTER — INFUSION (OUTPATIENT)
Dept: INFUSION THERAPY | Facility: HOSPITAL | Age: 68
End: 2023-08-24
Attending: INTERNAL MEDICINE
Payer: MEDICARE

## 2023-08-24 ENCOUNTER — OFFICE VISIT (OUTPATIENT)
Dept: HEMATOLOGY/ONCOLOGY | Facility: CLINIC | Age: 68
End: 2023-08-24
Payer: MEDICARE

## 2023-08-24 VITALS
RESPIRATION RATE: 20 BRPM | TEMPERATURE: 98 F | WEIGHT: 143 LBS | SYSTOLIC BLOOD PRESSURE: 118 MMHG | BODY MASS INDEX: 22.98 KG/M2 | OXYGEN SATURATION: 100 % | DIASTOLIC BLOOD PRESSURE: 70 MMHG | HEART RATE: 50 BPM | HEIGHT: 66 IN

## 2023-08-24 VITALS
SYSTOLIC BLOOD PRESSURE: 122 MMHG | OXYGEN SATURATION: 99 % | HEIGHT: 66 IN | BODY MASS INDEX: 22.92 KG/M2 | HEART RATE: 51 BPM | WEIGHT: 142.63 LBS | TEMPERATURE: 98 F | RESPIRATION RATE: 20 BRPM | DIASTOLIC BLOOD PRESSURE: 77 MMHG

## 2023-08-24 DIAGNOSIS — C90.00 IGG MULTIPLE MYELOMA: Primary | ICD-10-CM

## 2023-08-24 DIAGNOSIS — D72.821 MONOCYTOSIS: ICD-10-CM

## 2023-08-24 DIAGNOSIS — R14.0 ABDOMINAL BLOATING: ICD-10-CM

## 2023-08-24 PROCEDURE — 96401 CHEMO ANTI-NEOPL SQ/IM: CPT

## 2023-08-24 PROCEDURE — 63600175 PHARM REV CODE 636 W HCPCS: Mod: JZ,JG | Performed by: INTERNAL MEDICINE

## 2023-08-24 PROCEDURE — 99215 OFFICE O/P EST HI 40 MIN: CPT | Mod: S$PBB,,, | Performed by: NURSE PRACTITIONER

## 2023-08-24 PROCEDURE — 99215 PR OFFICE/OUTPT VISIT, EST, LEVL V, 40-54 MIN: ICD-10-PCS | Mod: S$PBB,,, | Performed by: NURSE PRACTITIONER

## 2023-08-24 PROCEDURE — 99213 OFFICE O/P EST LOW 20 MIN: CPT | Mod: PBBFAC | Performed by: NURSE PRACTITIONER

## 2023-08-24 RX ORDER — LENALIDOMIDE 25 MG/1
25 CAPSULE ORAL DAILY
Qty: 14 CAPSULE | Refills: 4 | Status: SHIPPED | OUTPATIENT
Start: 2023-08-24 | End: 2023-09-08 | Stop reason: SDUPTHER

## 2023-08-24 RX ORDER — HEPARIN 100 UNIT/ML
500 SYRINGE INTRAVENOUS
Status: DISCONTINUED | OUTPATIENT
Start: 2023-08-24 | End: 2023-08-24 | Stop reason: HOSPADM

## 2023-08-24 RX ORDER — BORTEZOMIB 3.5 MG/1
1.3 INJECTION, POWDER, LYOPHILIZED, FOR SOLUTION INTRAVENOUS; SUBCUTANEOUS
Status: COMPLETED | OUTPATIENT
Start: 2023-08-24 | End: 2023-08-24

## 2023-08-24 RX ORDER — SODIUM CHLORIDE 0.9 % (FLUSH) 0.9 %
10 SYRINGE (ML) INJECTION
Status: DISCONTINUED | OUTPATIENT
Start: 2023-08-24 | End: 2023-08-24 | Stop reason: HOSPADM

## 2023-08-24 RX ORDER — GABAPENTIN 600 MG/1
600 TABLET ORAL 3 TIMES DAILY
COMMUNITY
Start: 2023-08-17 | End: 2023-09-28 | Stop reason: SDUPTHER

## 2023-08-24 RX ADMIN — BORTEZOMIB 2.25 MG: 3.5 INJECTION, POWDER, LYOPHILIZED, FOR SOLUTION INTRAVENOUS; SUBCUTANEOUS at 01:08

## 2023-08-24 NOTE — TELEPHONE ENCOUNTER
Reviewed questionnaire submission and did a quick Population Health Chart Review. Colonoscopy done in 2022 with Dr. Kapoor, requesting report today.   DM Eye Exam ordered in office by Dr. Elliott.    Jahaira Hanks MA - Panel Care Coordinator

## 2023-08-24 NOTE — PROGRESS NOTES
Reason for Follow-up:  Reason for consultation:  -multiple myeloma, IgG kappa  -worsening low back pain    Current Treatment:  We will initiate treatment with bortezomib/lenalidomide/dexamethasone (VRd), category 1 regimen  Cycle length 21 days  -bortezomib 1.3 mg per m2 subQ days 1, 8, and 15  -lenalidomide 25 mg p.o. daily, on days 1 through 14  -dexamethasone 40 mg p.o. with food days 1, 8, and 15  Cycle length: 21 days  Start 7/13/2023    Zometa 4mg IV every 28 days  Start 7/13/2023    Treatment History:  Past medical history:  NIDDM. Dyslipidemia.  GERD.  Lumbar spinal stenosis.  Vitamin-D deficiency.  Allergic rhinitis.  HTN.  Bell's palsy.  Celiac disease.  Hemorrhoids.  Mitral regurgitation 01/05/2018.  Rheumatic valve narrowing and leaking mitral valve.  -02/02/2018 ([facility]):  АНДРЕЙ, right minimally invasive anterior thoracotomy, right femoral artery and vein exploration, extensive right pleural adhesiolysis/pneumolysis, bilateral Maze/ablation (extensive), left atrial appendage clip placement for left atrial appendage exclusion, mitral valve repair (complex repair with 26 mm annuloplasty ring)  Social history:  .  Lives in [place], [place].  Five children.  Owns a [employer] business.  No history of tobacco, alcohol, or illicit drug abuse.    Family history:  Negative for cancers or blood dyscrasias.    Health maintenance:  PCP in family medicine clinic, [facility].  Had EGD and colonoscopy done 1 year ago by Dr. Zoltan Kapoor, [facility], apparently unremarkable (he gets the endoscopies done periodically because of history of celiac disease).    History of Present Illness:   Oncologic/Hematologic History:  Oncology History   IgG multiple myeloma   6/23/2023 Initial Diagnosis    IgG multiple myeloma     7/13/2023 -  Chemotherapy    Treatment Summary   Plan Name: OP VRD - WEEKLY BORTEZOMIB LENALIDOMIDE DEXAMETHASONE Q3W  Treatment Goal: Curative  Status:  Active  Start Date: 7/13/2023  End Date: 11/10/2023 (Planned)  Provider: Narciso Lundy MD  Chemotherapy: bortezomib (VELCADE) injection 2.25 mg, 2.25 mg, Subcutaneous, Clinic/HOD 1 time, 2 of 6 cycles  Administration: 2.25 mg (7/13/2023), 2.25 mg (7/20/2023), 2.25 mg (7/27/2023), 2.25 mg (8/10/2023)  lenalidomide 25 mg Cap, 25 mg, Oral, Daily, 1 of 1 cycle, Start date: 7/7/2023, End date: --     67-year-old gentleman, referred from [facility], with newly diagnosed multiple myeloma.      05/14/2023: [facility] note:  Low back pain, onset early March   - localized to back with occasional sharp pain down posterior aspect of right leg  - fluctuating in intensity, overall worsening since onset, no change in character, difficulty doing ADLs, walking with pain  - difficulty sleeping   - seen in urgent care and C multiple times  - MRI 5/4/23: degenerative disc disease and facet arthrosis, mild spinal canal stenosis L4-L5 greater than L3-L4, no high grade stenosis   - Toradol IM helps briefly  - side effects from gabapentin, Robaxin and Norco- GI and drowsiness   - Voltaren gel not helping  - stopped going to PT due to pain  - follows with Neurologist   - requests trial of tramadol and Lyrica   - going out of country soon, concerned he will not be able to go due to pain  - denies bowel/ bladder incontinence, night sweats, unexplained weight loss, chest pain or shortness of breath   Spinal stenosis   Acute bilateral low back pain with right-sided sciatica    05/18/2023: [facility] note:  Low back pain.  Onset early March 2023.  Localized lumbar region.  Constant.  Occasional radiation down posterior right leg.  Difficulty sleeping in bed because of exacerbation of pain by lying down flat.  Compensating by sleeping in a recliner or on sofa. MRI showed degenerative disc disease and facet arthrosis at multiple levels with mild canal stenosis.  - Has attempted gabapentin, Robaxin, Norco,  Lyrica, and tramadol with minimal relief.  Currently being managed with tramadol and Lyrica.  - Told by neurosurgery that he needs pain management referral because NS does not deal with injections  - Denies saddle anesthesia, bowel/bladder incontinence, weight loss, night sweats    Spinal stenosis of lumbar region  Degeneration of lumbar intervertebral disc  - Continue treatment with tramadol and Lyrica as prescribed  Bradycardia  - Patient aware of chronic bradycardia, states he has been this way for 3-4 years due to palpitations if HR >60  - Currently on Toprol XL 50 mg  - Follows with [facility]    06/13/2023:  [facility] note:   Acute Issues:   Weight loss- 6 weeks history of weight loss 12-14 lb unintentionally.  Reports appetite has been somewhat decreased secondary to pain though has noticed increased size of abdomen.  He denies nausea, vomiting, dysphagia, dyspepsia, bloody stools, staying changes in stool caliber, constipation, fevers or night sweats, nervousness/anxiousness, skin or hair changes.  Denies history of smoking or alcohol use.  Receives EGD and colonoscopy every 2 years per Dr. Kapoor Hannacroix, last was roughly 1 year ago. PSA wnl 5/2023.   Chronic Issues: DM- med compliant metformin 500 daily, last A1C 6.1 in 1/2023.   Chronic medical conditions:  Hypertension.  Dyslipidemia.  GERD.  Lumbar spinal stenosis.  Vitamin-D deficiency.  Allergic rhinitis.  HTN      Investigations reviewed:  05/04/2023: MRI lumbar spine without contrast (worsening lumbar pain x5-7 weeks with bilateral sciatica):   Lumbar degenerative disc disease and facet arthrosis as detailed above with no acute pathology identified.    Mild spinal canal stenosis at L4-L5 greater than L3-L4 with no high-grade spinal canal stenosis.   Mild bilateral neural foramen stenosis at L3-L4 and on the left greater than right at L4-L5.    06/14/2023:  Skeletal survey:   No definite lesions to suggest either lytic and or sclerotic  metastatic changes.   Multiple compression deformities mostly in the lumbar spine but these are seen also in the thoracic spine; other imaging modalities might prove helpful for further assessment; some of these compression deformities appear to be relatively new as compared with an MR of May 2023  (There are some degenerative changes of the thoracic spine with a mild compression deformity of superior endplate of 1 of the lower thoracic vertebral body levels; there are multiple compression deformities of the lumbar spine including the superior endplate of L3 and L4 there is a compression deformity of the superior endplate of T11 with compression deformities of the superior endplate of T12 and a compression deformity of L1; some of these compression deformities appear to be new since the last exam (MR) of May 4, 2023 other imaging modalities might prove helpful for further assessment    Labs reviewed:  01/12/2023:  Hemoglobin 11.9.  MCV 98.7.  Ferritin 211.66.  Transferrin saturation 42%.  Vitamin B12 969.  Folate 13.9.    06/14/2023:  Hemoglobin 11.0.  MCV 99.1.  ESR 15, normal.    06/14/2023: IgG 3810 mg/dL, elevated.  IgM 11, suppressed.  IgA 33, suppressed.  2.28 gm/dL IgG kappa monoclonal protein on SPEP and BUBBA.  Kappa 99.4, elevated.  Lambda 0.3400, suppressed.  Kappa/lambda ratio 292, elevated.    05/01/2023: PSA 1.7, normal.  06/14/2023:  BUN 15.  Creatinine 0.79.  Calcium 9.6.  Albumin 3.6.  LFTs normal.  TSH normal.  06/16/2023:  , normal.    06/15/2023:  24 hour urine protein 288 mg, elevated (reference Range:  < 229). M spike 183 mg. Monoclonal kappa.    06/16/2023:  Urine:  Kappa 25.5 mg/dL; lambda < 0.7000 mg/dL; kappa/lambda ratio> 36.4    06/26/2023:  Pleasant gentleman who presents for initial medical oncology consultation, accompanied by his wife and family present who is a past interventional cardiologist.  Back pain for 2 months.  Initially started in right groin.  Physical therapy has not  helped.  10/10 severity.  Has been taking Tylenol without significant relief.  Secured to take narcotics because of constipation.  Underwent couple of spinal steroid shots 3 weeks ago, with minor relief.  Pain is present all the time.  Pain increases with changing position in bed as well as upon standing.  He finds it difficult to ambulate.  Pain does not radiate down lower extremities and is not accompanied with lower extremity weakness, numbness, urinary or bowel incontinence, or saddle anesthesia.  Also experiences muscle cramps.  Generalized weakness.  However, ECOG 2.  No fevers or chills.  No repeated infections.  Cramps in hands and feet.  Appetite is down.  Appetite has picked up in last 10 days.  Has lost 12-14 lb in last 1-1/2 months.  No abnormal bleeding or bruising.    Interval History 08/25/2023:   Patient presented to the clinic today for Cycle 2 day 15 treatment. Unaccompanied. Family are out of the country  He continues to take Lenalidomide 25 mg daily. Finished cycle on Monday(8/21/2023). Will restart next cycle on 8/29/2022. Asking for refill. Is also taking Dexamethasone as directed. Taking Acyclovir, Baby ASA, and Levofloxacin daily. Zometa is due 9/7/2023.   He had a follow up appointment with PCP on 8/18/2023. He continues to complain of abdominal bloating. States he is taking Pantoprazole and Bentyl as directed. Reports resolution in abdominal cramping. States he is still not eating much due to bloating. Has appetite, but eating makes bloating worse, so does not eat much. Weight is stable.  Reports normal bowel movements. His PCP has ordered a abdominal US and US of scrotum and testes to evaluate hernia(right inguinal region). He has not had US scheduled as of today. He has not heard from PT. Referred last visit for weakness per daughter's request.   Patient denies any fever or symptoms of infection. No bleeding.      Review of Systems:   All systems reviewed and found to be negative except  for the symptoms detailed above    Lab Results   Component Value Date    WBC 13.63 (H) 08/24/2023    RBC 3.46 (L) 08/24/2023    HGB 11.1 (L) 08/24/2023    HCT 34.4 (L) 08/24/2023    MCV 99.4 (H) 08/24/2023    MCH 32.1 (H) 08/24/2023    MCHC 32.3 (L) 08/24/2023    RDW 13.9 08/24/2023     08/24/2023    MPV 10.4 08/24/2023     CMP  Sodium   Date Value Ref Range Status   07/01/2020 138 136 - 145 mmol/L Final     Sodium Level   Date Value Ref Range Status   08/24/2023 134 (L) 136 - 145 mmol/L Final     Potassium   Date Value Ref Range Status   07/01/2020 4.5 3.5 - 5.1 mmol/L Final     Potassium Level   Date Value Ref Range Status   08/24/2023 4.4 3.5 - 5.1 mmol/L Final     Chloride   Date Value Ref Range Status   07/01/2020 108 100 - 109 mmol/L Final     Carbon Dioxide   Date Value Ref Range Status   08/24/2023 23 23 - 31 mmol/L Final   07/01/2020 26 22 - 33 mmol/L Final     Blood Urea Nitrogen   Date Value Ref Range Status   08/24/2023 13.2 8.4 - 25.7 mg/dL Final   07/01/2020 10 5 - 25 mg/dL Final     Creatinine   Date Value Ref Range Status   08/24/2023 0.95 0.73 - 1.18 mg/dL Final   07/01/2020 0.81 0.57 - 1.25 mg/dL Final     Calcium   Date Value Ref Range Status   07/01/2020 8.6 (L) 8.8 - 10.6 mg/dL Final     Calcium Level Total   Date Value Ref Range Status   08/24/2023 9.2 8.8 - 10.0 mg/dL Final     Albumin Level   Date Value Ref Range Status   08/24/2023 3.4 3.4 - 4.8 g/dL Final     Bilirubin Total   Date Value Ref Range Status   08/24/2023 0.4 <=1.5 mg/dL Final     Alkaline Phosphatase   Date Value Ref Range Status   08/24/2023 83 40 - 150 unit/L Final     Aspartate Aminotransferase   Date Value Ref Range Status   08/24/2023 14 5 - 34 unit/L Final     Alanine Aminotransferase   Date Value Ref Range Status   08/24/2023 15 0 - 55 unit/L Final     Anion Gap   Date Value Ref Range Status   07/01/2020 4 (L) 8 - 16 mmol/L Final     eGFR   Date Value Ref Range Status   08/24/2023 >60 mls/min/1.73/m2 Final        Physical Exam  VITAL SIGNS:   Vitals:    08/24/23 1035   BP: 122/77   Pulse: (!) 51   Resp: 20   Temp: 97.6 °F (36.4 °C)      General: Thin framed man.  NAD  Eye: Pupils are equal, round and reactive to light, Extraocular movements are intact. Normal conjunctiva, sclera anicteric.   HENT: Normocephalic. Oropharynx moist and clear. Good dentition.   Neck: Supple, Non-tender  Respiratory: Respirations are non-labored, Symmetrical chest wall expansion. Breath sounds CTA bilaterally  Cardiovascular: Regular rate, rhythm, Normal peripheral perfusion, No bilateral lower extremity edema  Gastrointestinal: Flat, soft, non-tender. Bowel sounds present in all 4 quadrants. No distention.   Lymphatics: No lymphadenopathy appreciated  Musculoskeletal: No spinal pain with palpation.   Integumentary: Intact. Warm, dry. Right lower leg darkening discoloration.  Neurological: Alert and oriented. No focal deficit present.   Psychiatric: Cooperative. Appropriate mood and affect   ECOG Performance Scale: 1 - Capable of all self-care able to carry out light work activities    CBC  Lab Results   Component Value Date    WBC 13.63 (H) 08/24/2023    HGB 11.1 (L) 08/24/2023    HCT 34.4 (L) 08/24/2023    MCV 99.4 (H) 08/24/2023     08/24/2023      CMP  Sodium   Date Value Ref Range Status   07/01/2020 138 136 - 145 mmol/L Final     Sodium Level   Date Value Ref Range Status   08/24/2023 134 (L) 136 - 145 mmol/L Final     Potassium   Date Value Ref Range Status   07/01/2020 4.5 3.5 - 5.1 mmol/L Final     Potassium Level   Date Value Ref Range Status   08/24/2023 4.4 3.5 - 5.1 mmol/L Final     Chloride   Date Value Ref Range Status   07/01/2020 108 100 - 109 mmol/L Final     Carbon Dioxide   Date Value Ref Range Status   08/24/2023 23 23 - 31 mmol/L Final   07/01/2020 26 22 - 33 mmol/L Final     Blood Urea Nitrogen   Date Value Ref Range Status   08/24/2023 13.2 8.4 - 25.7 mg/dL Final   07/01/2020 10 5 - 25 mg/dL Final      Creatinine   Date Value Ref Range Status   08/24/2023 0.95 0.73 - 1.18 mg/dL Final   07/01/2020 0.81 0.57 - 1.25 mg/dL Final     Calcium   Date Value Ref Range Status   07/01/2020 8.6 (L) 8.8 - 10.6 mg/dL Final     Calcium Level Total   Date Value Ref Range Status   08/24/2023 9.2 8.8 - 10.0 mg/dL Final     Albumin Level   Date Value Ref Range Status   08/24/2023 3.4 3.4 - 4.8 g/dL Final     Bilirubin Total   Date Value Ref Range Status   08/24/2023 0.4 <=1.5 mg/dL Final     Alkaline Phosphatase   Date Value Ref Range Status   08/24/2023 83 40 - 150 unit/L Final     Aspartate Aminotransferase   Date Value Ref Range Status   08/24/2023 14 5 - 34 unit/L Final     Alanine Aminotransferase   Date Value Ref Range Status   08/24/2023 15 0 - 55 unit/L Final     Anion Gap   Date Value Ref Range Status   07/01/2020 4 (L) 8 - 16 mmol/L Final     eGFR   Date Value Ref Range Status   08/24/2023 >60 mls/min/1.73/m2 Final      Assessment:  Multiple myeloma, IgG kappa:  -presentation: 03/2023:  Worsening low back pain, no associated neurological symptoms, 12-14 pound weight loss over 6 weeks, mild L4-L5 spinal canal stenosis on MRI (05/04/2023), lumbar DJD and facet arthrosis on MRI lumbar spine (05/04/2023)  -skeletal survey 06/14/2023: Negative for lytic or sclerotic lesions; multiple compression deformities lumbar spine and thoracic spine (new since MRI scan dated 05/04/2023)  -06/14/2023:  Mild anemia (hemoglobin 11.0)   -06/14/2023:  2.28 gm/dL IgG kappa M protein. Kappa 99.4, elevated.  Lambda 0.3400, suppressed.  Kappa/lambda ratio 292, elevated.  -06/14/2023:  BUN, creatinine, calcium, albumin normal  -06/15/2022:  24 hour urine protein 280 mg, elevated.  M spike 183 mg.  Urine BUBBA showed monoclonal kappa light chains.  -06/16/2023: Urine:  Kappa 25.5 mg/dL; lambda < 0.7000 mg/dL; kappa/lambda ratio> 36.4  06/16/2023:  , normal.  -06/26/2023:  Hemoglobin 11.0.  Beta 2 microglobulin 3.18.  Calcium 10.1.  Albumin  3.2.  Hepatitis-A/B/C/HIV serology negative.  -bone marrow biopsy 06/27/2022:  Plasma cells 50% in bone marrow aspirate; plasma cells 36% on flow cytometry of bone marrow aspirate, with kappa light chain restriction; molecular studies pending  -CT abdomen pelvis with contrast 06/27/2023:  Hepatic steatosis; bilateral inguinal hernias; heterogeneous bone demineralization, suggesting marrow infiltrative process like multiple myeloma; multilevel compression deformities in the included thoracic spine and lumbar spine; 10 mm exophytic hyperdense focus upper pole of right kidney, compatible with a hyperdense cyst  -FDG PET-CT 06/29/2023:  Left femur lesser trochanter; right 4th rib laterally; multilevel thoracolumbar compression deformities; subacute fractures T8, T12, L1, L2  >>>  From the data available so far, patient has multiple myeloma (symptomatic), by virtue of:  1. Involved: Uninvolved serum FLC ratio =/> 100 and involved FLC concentration 10 mg/dL or higher (in this patient, kappa/lambda ratio age 292, and kappa light chain 99.4 mg/dL)   2. Hypercalcemia (06/26/2023): Calcium 10.1.  Albumin 3.2   3. Bone marrow plasmacytosis 50%  4. Heterogeneous bone demineralization on CT 06/27/2023, suggesting bone marrow infiltrative process like multiple myeloma  5. FDG PET-CT 06/29/2023:  Left femur lesser trochanter; right 4th rib laterally; multilevel thoracolumbar compression deformities; subacute fractures T8, T12, L1, L2  6. Beta 2 microglobulin level 3.18, elevated (06/26/2023)   7. No renal insufficiency, no significant anemia, LDH normal  >>>  -Velcade started 07/13/2023  -Zometa 4 mg IV every 4 weeks) x2 years), started 07/13/2023     Co-morbidities:  NIDDM.  Dyslipidemia.  Hypertension.  Celiac disease.  History of rheumatic mitral valve disease, S/P Maze/ablation procedure 02/02/2018      Vaccination history:   COVID-19, MRNA, LN-S, PF (MODERNA FULL 0.5 ML DOSE) 8/18/2022 , 10/23/2021 , 3/17/2021 , 2/17/2021    Hepatitis A / Hepatitis B 8/18/2022 , 10/5/2021 , 7/29/2020   Influenza - Quadrivalent - MDCK - PF 10/5/2021   Influenza - Trivalent - [facility] - PF 9/10/2015   Meningococcal Conjugate (MCV4O) 8/18/2022 , 4/17/2012   Meningococcal Conjugate (MCV4P) 4/17/2012   Pneumococcal Conjugate - 20 Valent 1/12/2023   Tdap 7/29/2020   Zoster 11/18/2020 , 7/29/2020   Zoster Recombinant 11/18/2020 , 7/29/2020 8/22/2023:    Multiple Myeloma: Besides some abdominal bloating, he is feeling well.  Laboratory studies reviewed. Hgb 11.1 and stable.  Calcium and creatinine normal. Labs are adequate to proceed with Velcade injection today. Myeloma labs are pending. Continue Lenalidomide as directed. Will start next cycle on 8/29/2023. Refill was sent to his pharmacy today.     -Leukocytosis. 13.63, normal ANC. Monocytes 2.69. he denies any fever or symptoms of infection. None noted on exam. He has not taken his dexamethasone yet today. States will take it later today. Advised to call office immediately if he develops any fever of 100.4 or greater or symptoms of infection. He verbalized his understanding.     --lower extremity weakness, referred to PT, has not heard from PT regarding appointment. Note sent to [facility] to check on status of referral.     --Abdominal bloating. He has started pantoprazole and Bentyl as prescribed by PCP. Reports no abdominal cramping, but continued bloating.   A US of scrotum and testes and abdomen has been ordered by PCP. He has not scheduled appointment yet. Advised him to stop by US department today and schedule appointment. He agrees. Follow up with PCP for results and management of abdominal bloating.     Plan:  Multiple myeloma, IgG kappa:  Autologous transplantation:   Category 1 evidence supports proceeding directly after induction therapy to high-dose therapy and HCT  Renal dysfunction and advanced age are not contraindications to transplant     Need the following for staging of multiple  myeloma:  Serum beta 2 microglobulin elevated  Serum albumin and LDH are normal.  Evaluation of chromosomal abnormalities by FISH on bone marrow specimen:  Pending as of 07/10/2023     Bortezomib/lenalidomide/dexamethasone (VRd), category 1 regimen:  Cycle length 21 days  -bortezomib 1.3 mg per m2 subQ days 1, 8, and 15  -lenalidomide 25 mg p.o. daily, on days 1 through 14  -dexamethasone 40 mg p.o. with food days 1, 8, and 15  Cycle length: 21 days    If response after primary therapy, then:  Autologous HCT versus continuation of myeloma therapy or maintenance therapy versus tandem autologous transplant, etc.    -Velcade started 07/13/2023  -Zometa 4 mg IV every 4 weeks x2 years (started 07/13/2023    Okay to proceed with cycle 2 day 15 (Bortezomib)  Check baseline TSH and free T4, then, every 3 months (monitoring on lenalidomide) - next due 11/2023  Continue Zometa 4 mg IV every 4 weeks x2 years (started 07/13/2023) - next due 9/7/2023  Assess response with repeat SPEP, BUBBA, FLC assay, and 24 hour urine for total protein, UPEP, urine BUBBA, and urine FLC - pending.   Continue baby aspirin 81 mg p.o. q.day concurrently, for thromboprophylaxis secondary to lenalidomide.  Continue levofloxacin 5 mg p.o. q.day X 12 weeks (started 06/26/2023)  Continue acyclovir 400 mg p.o. b.i.d. for herpes zoster prophylaxis with bortezomib  Continue Bactrim ds 1 tablet every Monday/Wednesday/Friday during treatment  Check CBC and CMP weekly during treatment  Patient already referred to [facility] for transplant evaluation- Next appointment is scheduled for 10/3/2023; Tentative plan for BMT around end of November 2023  Patient already referred to Radiation Oncology ([facility]) for short course radiotherapy to spine for pain control-consultation appointment 7/21. - per daughter not a candidate, not enough lesions to radiate.     RTC in 1 week with NP, treatment(cycle # 3, day 1), labs(cbc, cmp and magnesium) done prior.    Weekly  assessment for peripheral neuropathy and/or neuropathic pain.  Monitor for hypotension during bortezomib therapy; adjustment of antihypertensives and/or administration of IV hydration may be needed.    Back Pain  -06/26/2023: Started Norco 5 mg p.o. q.6 hours PRN for pain; did not work; he stopped taking  -06/26/2023: Taking Norco 10 mg only twice daily; instructed him to take Norco 10 mg every 4-6 hours p.r.n. for pain (has 9/10 lower back pain, constant, radiating to anterior aspect of right thigh, without neurological symptoms)  -06/26/2023: Started levofloxacin 500 mg p.o. q.day X 12 weeks  -06/26/2023: Severe back pain; presumed due to myeloma; referred to Radiation Oncology  Continue narcotics for back pain      07/10/2023:  Pending:  FISH panel on bone marrow including del(13), del(17p13), t(4;14), t(11;14), t(14;16), t(14;20), 1q21 gain/1q21 amplification, 1p deletion  NGS panel testing on bone marrow specimen     10 mm exophytic hyperdense focus upper pole of right kidney, compatible with a hyperdense cyst, on CT abdomen pelvis with contrast 06/27/2023  -07/12/2023:  MRI abdomen with and without contrast (right renal cyst): Small exophytic right renal lesion most likely a proteinaceous or hemorrhagic cyst (10 mm, upper pole of right kidney)  -Velcade started 07/13/2023     Monitoring on lenalidomide:   Monitor for signs and symptoms of infection (if neutropenic), bleeding or bruising, hepatotoxicity, secondary malignancies, thromboembolism (eg, shortness of breath, chest pain, arm or leg swelling), dermatologic toxicity, tumor lysis syndrome, or hypersensitivity.     Monitoring on bortezomib:  Peripheral neuropathy, posterior reversible leukoencephalopathy syndrome, progressive multifocal leukoencephalopathy, tumor lysis syndrome, or hyper-/hypoglycemia. Monitor closely in patients with risk factors for heart failure or existing heart disease.        Above discussed at length with the patient.  All  questions answered.      Nature of multiple myeloma discussed.    He understands and agrees with this plan.  ----------------------------------   Discussion:     Supportive care:  -bone targeted treatment: Bisphosphonate, category 1; or denosumab to reduce risk of skeletal related events; denosumab is preferred in patients with renal insufficiency; assess vitamin-D status; baseline dental exam; monitor for osteonecrosis of the jaw; monitor renal dysfunction with bisphosphonate therapy  -continue bone targeted treatment (bisphosphonate or denosumab) for 2 years; continue beyond 2 years should be based on clinical judgment  -patients receiving denosumab for bone disease who subsequently discontinued therapy should be given maintenance denosumab every 6 months or a single dose of bisphosphonate to mitigate risk of rebound osteoporosis  -for hypercalcemia, zoledronic acid, denosumab, steroids, and/or calcitonin  -for hyperviscosity, plasmapheresis should be used as adjuvant therapy for symptomatic hyperviscosity  -intravenous immunoglobulin therapy should be considered in the setting of recurrent serious infection and/or hypogammaglobulinemia (IgG </= 400 mg/dL)  -pneumococcal conjugate vaccine, followed by pneumococcal polysaccharide vaccine 1 year later  -Influenza vaccination is recommended; 2 doses of high-dose inactivated quandaivalent influenza vaccine should be considered  -consider 12 weeks of levofloxacin 500 mg p.o. daily at the time of initial diagnosis of multiple myeloma  -COVID-19 vaccination  -highest risk for VTE is in the 1st 6 months following new diagnosis of multiple myeloma  -VTE prophylaxis if no contraindications to anticoagulation agents or antiplatelets  Recommendations for VTE prophylaxis:  Aspirin  mg daily; low molecular weight heparin equivalent to enoxaparin 40 mg daily; Xarelto 10 mg daily; Eliquis 2.5 mg b.i.d.; Arixtra 2.5 mg daily; Coumadin with target INR 2.0-3.0         Lenalidomide:  Embryo fetal toxicity  Hematologic toxicity.  Neutropenia.  Thrombocytopenia.  Venous and arterial thromboembolism.  DVT.  PE.  MI.  Stroke.  Dizziness, fatigue.  Tumor lysis syndrome.  Heart failure.    Used with caution in patients with renal impairment.  Lenalidomide =/> 4 cycles may decrease the # of CD34 pos cells collected for autologous stem cell transplant.  DVT, PE, atrial fibrillation, renal failure are more likely in patients> 65 years  Hepatotoxicity  Hypersensitivity reactions.  Anaphylaxis.  Angioedema.  Skin rash.  Eosinophilia.  Immediate hypersensitivity reactions.  Second primary malignancy.  AML.  MDS.  Solid tumor malignancies.  Nonmelanoma skin cancers.     Monitoring with lenalidomide:  -CBC with differential: weekly for the first 2 cycles, every 2 weeks during the third cycle and monthly thereafter;  -serum creatinine, LFTs (periodically).  -Thyroid function tests (TSH at baseline then every 2 to 3 months during lenalidomide treatment  -ECG when clinically indicated. Monitor for signs and symptoms of infection (if neutropenic), bleeding or bruising, hepatotoxicity, secondary malignancies, thromboembolism (eg, shortness of breath, chest pain, arm or leg swelling), dermatologic toxicity, tumor lysis syndrome, or hypersensitivity.  Patients who could become pregnant: Pregnancy test 10 to 14 days and 24 hours prior to initiating therapy, weekly during the first 4 weeks of treatment, then every 2 to 4 weeks through 4 weeks after therapy discontinued.     Bortezomib:  Bone marrow suppression.  Neutropenia.  Thrombocytopenia.  Acute CHF.  Nausea, vomiting, diarrhea, constipation, ileus.    Hepatotoxicity:  Herpes zoster and her PCP plaques reactivation.    Anaphylactic reactions.  Hypersensitive reactions.  Angioedema.  Laryngeal edema.    Hypotension.  Peripheral neuropathy.  Posterior reversible leukoencephalopathy syndrome.  Progressive multifocal leukoencephalopathy.  Pulmonary  toxicity.  Pneumonitis.  Interstitial pneumonia.  Lung infiltrates.  ARDS.  Thrombotic microangiopathy.  Tumor lysis syndrome.       Monitoring parameters with bortezomib:  CBC, CMP  Blood pressure (to monitor for hypotension)  Peripheral neuropathy  Posterior reversible leukoencephalopathy syndrome, progressive multifocal leukoencephalopathy, tumor lysis syndrome, or hyper-/hypoglycemia. Monitor baseline chest x-ray and then periodic pulmonary function testing (with new or worsening pulmonary symptoms). Monitor closely in patients with risk factors for heart failure or existing heart disease.

## 2023-08-24 NOTE — NURSING
11:25 Arrive for Velcade Inj. Heart rate 50 and WBC 13.63 L  Tamela NP messaged in secure chat approved to continue treatment as planned. Given printed future appointment information. Takes home medications for relief of backpain.

## 2023-08-29 ENCOUNTER — DOCUMENTATION ONLY (OUTPATIENT)
Dept: ADMINISTRATIVE | Facility: HOSPITAL | Age: 68
End: 2023-08-29
Payer: MEDICARE

## 2023-08-29 NOTE — PROGRESS NOTES
Hyperlinked colonoscopy.  Surgical hx updated.  Added Dr. Zoltan Kapoor to Care Team.    Jahaira Hanks MA - Panel Care Coordinator   Please give me a call at [phone] if you have any questions.

## 2023-08-30 DIAGNOSIS — C90.00 IGG MULTIPLE MYELOMA: Primary | ICD-10-CM

## 2023-08-31 ENCOUNTER — OFFICE VISIT (OUTPATIENT)
Dept: HEMATOLOGY/ONCOLOGY | Facility: CLINIC | Age: 68
End: 2023-08-31
Payer: MEDICARE

## 2023-08-31 ENCOUNTER — INFUSION (OUTPATIENT)
Dept: INFUSION THERAPY | Facility: HOSPITAL | Age: 68
End: 2023-08-31
Attending: INTERNAL MEDICINE
Payer: MEDICARE

## 2023-08-31 VITALS
WEIGHT: 146.38 LBS | SYSTOLIC BLOOD PRESSURE: 95 MMHG | HEIGHT: 66 IN | TEMPERATURE: 98 F | RESPIRATION RATE: 20 BRPM | OXYGEN SATURATION: 99 % | BODY MASS INDEX: 23.53 KG/M2 | HEART RATE: 52 BPM | DIASTOLIC BLOOD PRESSURE: 57 MMHG

## 2023-08-31 VITALS
TEMPERATURE: 97 F | SYSTOLIC BLOOD PRESSURE: 110 MMHG | OXYGEN SATURATION: 98 % | WEIGHT: 146.38 LBS | RESPIRATION RATE: 18 BRPM | BODY MASS INDEX: 23.53 KG/M2 | HEART RATE: 58 BPM | HEIGHT: 66 IN | DIASTOLIC BLOOD PRESSURE: 64 MMHG

## 2023-08-31 DIAGNOSIS — E87.8 IMPAIRED HYDRATION: ICD-10-CM

## 2023-08-31 DIAGNOSIS — C90.00 IGG MULTIPLE MYELOMA: Primary | ICD-10-CM

## 2023-08-31 PROCEDURE — 25000003 PHARM REV CODE 250: Performed by: INTERNAL MEDICINE

## 2023-08-31 PROCEDURE — 99215 OFFICE O/P EST HI 40 MIN: CPT | Mod: S$PBB,,, | Performed by: NURSE PRACTITIONER

## 2023-08-31 PROCEDURE — 96401 CHEMO ANTI-NEOPL SQ/IM: CPT

## 2023-08-31 PROCEDURE — 63600175 PHARM REV CODE 636 W HCPCS: Mod: JZ,JG | Performed by: NURSE PRACTITIONER

## 2023-08-31 PROCEDURE — 96360 HYDRATION IV INFUSION INIT: CPT

## 2023-08-31 PROCEDURE — 99215 PR OFFICE/OUTPT VISIT, EST, LEVL V, 40-54 MIN: ICD-10-PCS | Mod: S$PBB,,, | Performed by: NURSE PRACTITIONER

## 2023-08-31 PROCEDURE — 96402 CHEMO HORMON ANTINEOPL SQ/IM: CPT

## 2023-08-31 PROCEDURE — 96361 HYDRATE IV INFUSION ADD-ON: CPT

## 2023-08-31 PROCEDURE — 99213 OFFICE O/P EST LOW 20 MIN: CPT | Mod: PBBFAC,25 | Performed by: NURSE PRACTITIONER

## 2023-08-31 RX ORDER — HEPARIN 100 UNIT/ML
500 SYRINGE INTRAVENOUS
Status: DISCONTINUED | OUTPATIENT
Start: 2023-08-31 | End: 2023-08-31 | Stop reason: HOSPADM

## 2023-08-31 RX ORDER — SODIUM CHLORIDE 0.9 % (FLUSH) 0.9 %
10 SYRINGE (ML) INJECTION
Status: CANCELLED | OUTPATIENT
Start: 2023-08-31

## 2023-08-31 RX ORDER — BORTEZOMIB 3.5 MG/1
1.3 INJECTION, POWDER, LYOPHILIZED, FOR SOLUTION INTRAVENOUS; SUBCUTANEOUS
Status: CANCELLED | OUTPATIENT
Start: 2023-08-31

## 2023-08-31 RX ORDER — BORTEZOMIB 3.5 MG/1
1.3 INJECTION, POWDER, LYOPHILIZED, FOR SOLUTION INTRAVENOUS; SUBCUTANEOUS
Status: COMPLETED | OUTPATIENT
Start: 2023-08-31 | End: 2023-08-31

## 2023-08-31 RX ORDER — HEPARIN 100 UNIT/ML
500 SYRINGE INTRAVENOUS
Status: CANCELLED | OUTPATIENT
Start: 2023-08-31

## 2023-08-31 RX ORDER — SODIUM CHLORIDE 0.9 % (FLUSH) 0.9 %
10 SYRINGE (ML) INJECTION
Status: DISCONTINUED | OUTPATIENT
Start: 2023-08-31 | End: 2023-08-31 | Stop reason: HOSPADM

## 2023-08-31 RX ADMIN — BORTEZOMIB 2.25 MG: 3.5 INJECTION, POWDER, LYOPHILIZED, FOR SOLUTION INTRAVENOUS; SUBCUTANEOUS at 11:08

## 2023-08-31 RX ADMIN — SODIUM CHLORIDE 1000 ML: 9 INJECTION, SOLUTION INTRAVENOUS at 11:08

## 2023-08-31 NOTE — NURSING
Patient into infusion clinic for Velcade injection and 1 Liter of NS for dehydration.  1220-Infusion completed, Velcade given, patient states feeling better. Vitals improved:  /64, P-58, R=18. Stable upon discharge to home.

## 2023-08-31 NOTE — PROGRESS NOTES
Reason for Follow-up:  Reason for consultation:  -multiple myeloma, IgG kappa  -worsening low back pain    Current Treatment:  We will initiate treatment with bortezomib/lenalidomide/dexamethasone (VRd), category 1 regimen  Cycle length 21 days  -bortezomib 1.3 mg per m2 subQ days 1, 8, and 15  -lenalidomide 25 mg p.o. daily, on days 1 through 14  -dexamethasone 40 mg p.o. with food days 1, 8, and 15  Cycle length: 21 days  Start 7/13/2023    Zometa 4mg IV every 28 days  Start 7/13/2023    Treatment History:  Past medical history:  NIDDM. Dyslipidemia.  GERD.  Lumbar spinal stenosis.  Vitamin-D deficiency.  Allergic rhinitis.  HTN.  Bell's palsy.  Celiac disease.  Hemorrhoids.  Mitral regurgitation 01/05/2018.  Rheumatic valve narrowing and leaking mitral valve.  -02/02/2018 ([facility]):  АНДРЕЙ, right minimally invasive anterior thoracotomy, right femoral artery and vein exploration, extensive right pleural adhesiolysis/pneumolysis, bilateral Maze/ablation (extensive), left atrial appendage clip placement for left atrial appendage exclusion, mitral valve repair (complex repair with 26 mm annuloplasty ring)  Social history:  .  Lives in [place], [place].  Five children.  Owns a [employer] business.  No history of tobacco, alcohol, or illicit drug abuse.    Family history:  Negative for cancers or blood dyscrasias.    Health maintenance:  PCP in family medicine clinic, [facility].  Had EGD and colonoscopy done 1 year ago by Dr. Zoltan Kapoor, [place], apparently unremarkable (he gets the endoscopies done periodically because of history of celiac disease).    History of Present Illness:   Oncologic/Hematologic History:  Oncology History   IgG multiple myeloma   6/23/2023 Initial Diagnosis    IgG multiple myeloma     7/13/2023 -  Chemotherapy    Treatment Summary   Plan Name: OP VRD - WEEKLY BORTEZOMIB LENALIDOMIDE DEXAMETHASONE Q3W  Treatment Goal: Curative  Status:  Active  Start Date: 7/13/2023  End Date: 11/16/2023 (Planned)  Provider: Narciso Lundy MD  Chemotherapy: bortezomib (VELCADE) injection 2.25 mg, 2.25 mg, Subcutaneous, Clinic/HOD 1 time, 2 of 6 cycles  Administration: 2.25 mg (7/13/2023), 2.25 mg (7/20/2023), 2.25 mg (7/27/2023), 2.25 mg (8/10/2023), 2.25 mg (8/17/2023), 2.25 mg (8/24/2023)  lenalidomide 25 mg Cap, 25 mg, Oral, Daily, 1 of 1 cycle, Start date: 8/24/2023, End date: --     67-year-old gentleman, referred from [facility], with newly diagnosed multiple myeloma.      05/14/2023: [facility] note:  Low back pain, onset early March   - localized to back with occasional sharp pain down posterior aspect of right leg  - fluctuating in intensity, overall worsening since onset, no change in character, difficulty doing ADLs, walking with pain  - difficulty sleeping   - seen in urgent care and C multiple times  - MRI 5/4/23: degenerative disc disease and facet arthrosis, mild spinal canal stenosis L4-L5 greater than L3-L4, no high grade stenosis   - Toradol IM helps briefly  - side effects from gabapentin, Robaxin and Norco- GI and drowsiness   - Voltaren gel not helping  - stopped going to PT due to pain  - follows with Neurologist   - requests trial of tramadol and Lyrica   - going out of country soon, concerned he will not be able to go due to pain  - denies bowel/ bladder incontinence, night sweats, unexplained weight loss, chest pain or shortness of breath   Spinal stenosis   Acute bilateral low back pain with right-sided sciatica    05/18/2023: [facility] note:  Low back pain.  Onset early March 2023.  Localized lumbar region.  Constant.  Occasional radiation down posterior right leg.  Difficulty sleeping in bed because of exacerbation of pain by lying down flat.  Compensating by sleeping in a recliner or on sofa. MRI showed degenerative disc disease and facet arthrosis at multiple levels with mild canal stenosis.  -  Has attempted gabapentin, Robaxin, Norco, Lyrica, and tramadol with minimal relief.  Currently being managed with tramadol and Lyrica.  - Told by neurosurgery that he needs pain management referral because NS does not deal with injections  - Denies saddle anesthesia, bowel/bladder incontinence, weight loss, night sweats    Spinal stenosis of lumbar region  Degeneration of lumbar intervertebral disc  - Continue treatment with tramadol and Lyrica as prescribed  Bradycardia  - Patient aware of chronic bradycardia, states he has been this way for 3-4 years due to palpitations if HR >60  - Currently on Toprol XL 50 mg  - Follows with [facility]    06/13/2023:  [facility] note:   Acute Issues:   Weight loss- 6 weeks history of weight loss 12-14 lb unintentionally.  Reports appetite has been somewhat decreased secondary to pain though has noticed increased size of abdomen.  He denies nausea, vomiting, dysphagia, dyspepsia, bloody stools, staying changes in stool caliber, constipation, fevers or night sweats, nervousness/anxiousness, skin or hair changes.  Denies history of smoking or alcohol use.  Receives EGD and colonoscopy every 2 years per Dr. Kapoor [place], last was roughly 1 year ago. PSA wnl 5/2023.   Chronic Issues: DM- med compliant metformin 500 daily, last A1C 6.1 in 1/2023.   Chronic medical conditions:  Hypertension.  Dyslipidemia.  GERD.  Lumbar spinal stenosis.  Vitamin-D deficiency.  Allergic rhinitis.  HTN    Investigations reviewed:  05/04/2023: MRI lumbar spine without contrast (worsening lumbar pain x5-7 weeks with bilateral sciatica):   Lumbar degenerative disc disease and facet arthrosis as detailed above with no acute pathology identified.    Mild spinal canal stenosis at L4-L5 greater than L3-L4 with no high-grade spinal canal stenosis.   Mild bilateral neural foramen stenosis at L3-L4 and on the left greater than right at L4-L5.    06/14/2023:  Skeletal survey:   No definite lesions to  suggest either lytic and or sclerotic metastatic changes.   Multiple compression deformities mostly in the lumbar spine but these are seen also in the thoracic spine; other imaging modalities might prove helpful for further assessment; some of these compression deformities appear to be relatively new as compared with an MR of May 2023  (There are some degenerative changes of the thoracic spine with a mild compression deformity of superior endplate of 1 of the lower thoracic vertebral body levels; there are multiple compression deformities of the lumbar spine including the superior endplate of L3 and L4 there is a compression deformity of the superior endplate of T11 with compression deformities of the superior endplate of T12 and a compression deformity of L1; some of these compression deformities appear to be new since the last exam (MR) of May 4, 2023 other imaging modalities might prove helpful for further assessment    Labs reviewed:  01/12/2023:  Hemoglobin 11.9.  MCV 98.7.  Ferritin 211.66.  Transferrin saturation 42%.  Vitamin B12 969.  Folate 13.9.    06/14/2023:  Hemoglobin 11.0.  MCV 99.1.  ESR 15, normal.    06/14/2023: IgG 3810 mg/dL, elevated.  IgM 11, suppressed.  IgA 33, suppressed.  2.28 gm/dL IgG kappa monoclonal protein on SPEP and BUBBA.  Kappa 99.4, elevated.  Lambda 0.3400, suppressed.  Kappa/lambda ratio 292, elevated.    05/01/2023: PSA 1.7, normal.  06/14/2023:  BUN 15.  Creatinine 0.79.  Calcium 9.6.  Albumin 3.6.  LFTs normal.  TSH normal.  06/16/2023:  , normal.    06/15/2023:  24 hour urine protein 288 mg, elevated (reference Range:  < 229). M spike 183 mg. Monoclonal kappa.    06/16/2023:  Urine:  Kappa 25.5 mg/dL; lambda < 0.7000 mg/dL; kappa/lambda ratio> 36.4    06/26/2023:  Pleasant gentleman who presents for initial medical oncology consultation, accompanied by his wife and family present who is a past interventional cardiologist.  Back pain for 2 months.  Initially started in  right groin.  Physical therapy has not helped.  10/10 severity.  Has been taking Tylenol without significant relief.  Secured to take narcotics because of constipation.  Underwent couple of spinal steroid shots 3 weeks ago, with minor relief.  Pain is present all the time.  Pain increases with changing position in bed as well as upon standing.  He finds it difficult to ambulate.  Pain does not radiate down lower extremities and is not accompanied with lower extremity weakness, numbness, urinary or bowel incontinence, or saddle anesthesia.  Also experiences muscle cramps.  Generalized weakness.  However, ECOG 2.  No fevers or chills.  No repeated infections.  Cramps in hands and feet.  Appetite is down.  Appetite has picked up in last 10 days.  Has lost 12-14 lb in last 1-1/2 months.  No abnormal bleeding or bruising.    Interval History 08/31/2023:   Patient presented to the clinic today for Cycle 2 day 15 treatment. Unaccompanied. Family are out of the country until 9/6/2023.   He continues to take Lenalidomide 25 mg daily. Started this cycle  on 8/29/2022. Will finish on 9/11.   Taking Dexamethasone as directed. States will take Dexamethasone later today. Taking Acyclovir, Baby ASA, and Levofloxacin daily. Zometa is due 9/7/2023. Taking Calcium and vitamin D   States except for chronic back and right leg pain, he is feeling well. Reports improvement in abdominal bloating. States he is taking Pantoprazole and Bentyl as directed.   Is eating better with decrease in bloating. He did not get abdominal US (ordered by PCP) done. States it is scheduled for 9/6/2023, along with US of scrotum and testes to evaluate right inguinal hernia.   States he had a mild procedure done this past Monday for back pain. Per patient suppose to relieve pressure on spine and improve back pain. He does not feel it has helped at this time. Performed by Dr. Rodrick Winters here in [place]. He is taking Norco 1 tablet BID. Does not take more  frequently, states is makes him drowsy.   He stopped taking MS contin, states did not work. He has still not heard from PT regarding appointment.   Patient denies any fever or symptoms of infection. No bleeding.      Review of Systems:   All systems reviewed and found to be negative except for the symptoms detailed above    Lab Results   Component Value Date    WBC 13.63 (H) 08/24/2023    RBC 3.46 (L) 08/24/2023    HGB 11.1 (L) 08/24/2023    HCT 34.4 (L) 08/24/2023    MCV 99.4 (H) 08/24/2023    MCH 32.1 (H) 08/24/2023    MCHC 32.3 (L) 08/24/2023    RDW 13.9 08/24/2023     08/24/2023    MPV 10.4 08/24/2023     CMP  Sodium   Date Value Ref Range Status   07/01/2020 138 136 - 145 mmol/L Final     Sodium Level   Date Value Ref Range Status   08/24/2023 134 (L) 136 - 145 mmol/L Final     Potassium   Date Value Ref Range Status   07/01/2020 4.5 3.5 - 5.1 mmol/L Final     Potassium Level   Date Value Ref Range Status   08/24/2023 4.4 3.5 - 5.1 mmol/L Final     Chloride   Date Value Ref Range Status   07/01/2020 108 100 - 109 mmol/L Final     Carbon Dioxide   Date Value Ref Range Status   08/24/2023 23 23 - 31 mmol/L Final   07/01/2020 26 22 - 33 mmol/L Final     Blood Urea Nitrogen   Date Value Ref Range Status   08/24/2023 13.2 8.4 - 25.7 mg/dL Final   07/01/2020 10 5 - 25 mg/dL Final     Creatinine   Date Value Ref Range Status   08/24/2023 0.95 0.73 - 1.18 mg/dL Final   07/01/2020 0.81 0.57 - 1.25 mg/dL Final     Calcium   Date Value Ref Range Status   07/01/2020 8.6 (L) 8.8 - 10.6 mg/dL Final     Calcium Level Total   Date Value Ref Range Status   08/24/2023 9.2 8.8 - 10.0 mg/dL Final     Albumin Level   Date Value Ref Range Status   08/24/2023 3.4 3.4 - 4.8 g/dL Final   08/24/2023 3.1 (L) 3.4 - 4.8 g/dL Final     Bilirubin Total   Date Value Ref Range Status   08/24/2023 0.4 <=1.5 mg/dL Final     Alkaline Phosphatase   Date Value Ref Range Status   08/24/2023 83 40 - 150 unit/L Final     Aspartate  Aminotransferase   Date Value Ref Range Status   08/24/2023 14 5 - 34 unit/L Final     Alanine Aminotransferase   Date Value Ref Range Status   08/24/2023 15 0 - 55 unit/L Final     Anion Gap   Date Value Ref Range Status   07/01/2020 4 (L) 8 - 16 mmol/L Final     eGFR   Date Value Ref Range Status   08/24/2023 >60 mls/min/1.73/m2 Final       Physical Exam  VITAL SIGNS:   Vitals:    08/31/23 0937   BP: (!) 95/57   Pulse: (!) 52   Resp: 20   Temp: 97.9 °F (36.6 °C)    B/P recheck, manual cuff, left arm. 94/62  General: Thin framed man.  NAD  Eye: Pupils are equal, round and reactive to light, Extraocular movements are intact. Normal conjunctiva, sclera anicteric.   HENT: Normocephalic. Mild dryness oral mucosa. No erythema or ulcers visible. Good dentition.   Neck: Supple, Non-tender  Respiratory: Respirations are non-labored, Symmetrical chest wall expansion. Breath sounds CTA bilaterally  Cardiovascular: Regular rate, rhythm, Normal peripheral perfusion, No bilateral lower extremity edema  Gastrointestinal: Flat, soft, non-tender. Bowel sounds present in all 4 quadrants. No distention.   Lymphatics: No lymphadenopathy appreciated  Musculoskeletal: No spinal pain with palpation.   Integumentary: Intact. Warm, dry. Right lower leg darkening discoloration.  Neurological: Alert and oriented. No focal deficit present.   Psychiatric: Cooperative. Appropriate mood and affect   ECOG Performance Scale: 1 - Capable of all self-care able to carry out light work activities    CBC  Lab Results   Component Value Date    WBC 13.63 (H) 08/24/2023    HGB 11.1 (L) 08/24/2023    HCT 34.4 (L) 08/24/2023    MCV 99.4 (H) 08/24/2023     08/24/2023      CMP  Sodium   Date Value Ref Range Status   07/01/2020 138 136 - 145 mmol/L Final     Sodium Level   Date Value Ref Range Status   08/24/2023 134 (L) 136 - 145 mmol/L Final     Potassium   Date Value Ref Range Status   07/01/2020 4.5 3.5 - 5.1 mmol/L Final     Potassium Level   Date  Value Ref Range Status   08/24/2023 4.4 3.5 - 5.1 mmol/L Final     Chloride   Date Value Ref Range Status   07/01/2020 108 100 - 109 mmol/L Final     Carbon Dioxide   Date Value Ref Range Status   08/24/2023 23 23 - 31 mmol/L Final   07/01/2020 26 22 - 33 mmol/L Final     Blood Urea Nitrogen   Date Value Ref Range Status   08/24/2023 13.2 8.4 - 25.7 mg/dL Final   07/01/2020 10 5 - 25 mg/dL Final     Creatinine   Date Value Ref Range Status   08/24/2023 0.95 0.73 - 1.18 mg/dL Final   07/01/2020 0.81 0.57 - 1.25 mg/dL Final     Calcium   Date Value Ref Range Status   07/01/2020 8.6 (L) 8.8 - 10.6 mg/dL Final     Calcium Level Total   Date Value Ref Range Status   08/24/2023 9.2 8.8 - 10.0 mg/dL Final     Albumin Level   Date Value Ref Range Status   08/24/2023 3.4 3.4 - 4.8 g/dL Final   08/24/2023 3.1 (L) 3.4 - 4.8 g/dL Final     Bilirubin Total   Date Value Ref Range Status   08/24/2023 0.4 <=1.5 mg/dL Final     Alkaline Phosphatase   Date Value Ref Range Status   08/24/2023 83 40 - 150 unit/L Final     Aspartate Aminotransferase   Date Value Ref Range Status   08/24/2023 14 5 - 34 unit/L Final     Alanine Aminotransferase   Date Value Ref Range Status   08/24/2023 15 0 - 55 unit/L Final     Anion Gap   Date Value Ref Range Status   07/01/2020 4 (L) 8 - 16 mmol/L Final     eGFR   Date Value Ref Range Status   08/24/2023 >60 mls/min/1.73/m2 Final      Assessment:  Multiple myeloma, IgG kappa:  -presentation: 03/2023:  Worsening low back pain, no associated neurological symptoms, 12-14 pound weight loss over 6 weeks, mild L4-L5 spinal canal stenosis on MRI (05/04/2023), lumbar DJD and facet arthrosis on MRI lumbar spine (05/04/2023)  -skeletal survey 06/14/2023: Negative for lytic or sclerotic lesions; multiple compression deformities lumbar spine and thoracic spine (new since MRI scan dated 05/04/2023)  -06/14/2023:  Mild anemia (hemoglobin 11.0)   -06/14/2023:  2.28 gm/dL IgG kappa M protein. Kappa 99.4, elevated.   Lambda 0.3400, suppressed.  Kappa/lambda ratio 292, elevated.  -06/14/2023:  BUN, creatinine, calcium, albumin normal  -06/15/2022:  24 hour urine protein 280 mg, elevated.  M spike 183 mg.  Urine BUBBA showed monoclonal kappa light chains.  -06/16/2023: Urine:  Kappa 25.5 mg/dL; lambda < 0.7000 mg/dL; kappa/lambda ratio> 36.4  06/16/2023:  , normal.  -06/26/2023:  Hemoglobin 11.0.  Beta 2 microglobulin 3.18.  Calcium 10.1.  Albumin 3.2.  Hepatitis-A/B/C/HIV serology negative.  -bone marrow biopsy 06/27/2022:  Plasma cells 50% in bone marrow aspirate; plasma cells 36% on flow cytometry of bone marrow aspirate, with kappa light chain restriction; molecular studies pending  -CT abdomen pelvis with contrast 06/27/2023:  Hepatic steatosis; bilateral inguinal hernias; heterogeneous bone demineralization, suggesting marrow infiltrative process like multiple myeloma; multilevel compression deformities in the included thoracic spine and lumbar spine; 10 mm exophytic hyperdense focus upper pole of right kidney, compatible with a hyperdense cyst  -FDG PET-CT 06/29/2023:  Left femur lesser trochanter; right 4th rib laterally; multilevel thoracolumbar compression deformities; subacute fractures T8, T12, L1, L2  >>>  From the data available so far, patient has multiple myeloma (symptomatic), by virtue of:  1. Involved: Uninvolved serum FLC ratio =/> 100 and involved FLC concentration 10 mg/dL or higher (in this patient, kappa/lambda ratio age 292, and kappa light chain 99.4 mg/dL)   2. Hypercalcemia (06/26/2023): Calcium 10.1.  Albumin 3.2   3. Bone marrow plasmacytosis 50%  4. Heterogeneous bone demineralization on CT 06/27/2023, suggesting bone marrow infiltrative process like multiple myeloma  5. FDG PET-CT 06/29/2023:  Left femur lesser trochanter; right 4th rib laterally; multilevel thoracolumbar compression deformities; subacute fractures T8, T12, L1, L2  6. Beta 2 microglobulin level 3.18, elevated (06/26/2023)   7.  No renal insufficiency, no significant anemia, LDH normal  >>>  -Velcade started 07/13/2023  -Zometa 4 mg IV every 4 weeks) x2 years), started 07/13/2023     Co-morbidities:  NIDDM.  Dyslipidemia.  Hypertension.  Celiac disease.  History of rheumatic mitral valve disease, S/P Maze/ablation procedure 02/02/2018      Vaccination history:   COVID-19, MRNA, LN-S, PF (MODERNA FULL 0.5 ML DOSE) 8/18/2022 , 10/23/2021 , 3/17/2021 , 2/17/2021   Hepatitis A / Hepatitis B 8/18/2022 , 10/5/2021 , 7/29/2020   Influenza - Quadrivalent - MDCK - PF 10/5/2021   Influenza - Trivalent - MDCK - PF 9/10/2015   Meningococcal Conjugate (MCV4O) 8/18/2022 , 4/17/2012   Meningococcal Conjugate (MCV4P) 4/17/2012   Pneumococcal Conjugate - 20 Valent 1/12/2023   Tdap 7/29/2020   Zoster 11/18/2020 , 7/29/2020   Zoster Recombinant 11/18/2020 , 7/29/2020 8/22/2023:    Multiple Myeloma: Feeling better this visit, reporting less abdominal bloating. Eating more. Has put a couple pounds on.   Laboratory studies reviewed. Hgb 11.0 and stable.  Calcium and creatinine normal. Labs are adequate to proceed with Velcade injection today.  Myeloma labs from last visit reviewed. Kappa/Lambda FLC ratio is downtrending - from 52.5 to 5.72 on 8/24/2023.   M-spike has decreased from 1.36 g/dl to 0.39 g/dl.  IgG(792.00)normal, down from 2,242.00. Continue Lenalidomide as directed. Will finish current cycle on 9/11/2023.     -Leukocytosis. Currently resolved.     --lower extremity weakness, referred to PT, has not heard from PT regarding appointment. Will send a note to [facility] to inquire.   --chronic back pain. Advised patient to try taking only a 1/2 a tablet of norco and increase frequency to TID dosing and see if that better manages his back pain. Continue Gabapentin which he feels does help.     --Abdominal bloating. Improved per patient report. Continue pantoprazole and Bentyl as prescribed by PCP. Abdominal US along with US of scrotum and testes to  evaluate inguinal hernia is scheduled for 9/6/2023. Will follow up with PCP for results.     --Signs of impaired hydration. Hypotensive. IV hydration today.     Plan:  Multiple myeloma, IgG kappa:  Autologous transplantation:   Category 1 evidence supports proceeding directly after induction therapy to high-dose therapy and HCT  Renal dysfunction and advanced age are not contraindications to transplant     Need the following for staging of multiple myeloma:  Serum beta 2 microglobulin elevated  Serum albumin and LDH are normal.  Evaluation of chromosomal abnormalities by FISH on bone marrow specimen:  Pending as of 07/10/2023     Bortezomib/lenalidomide/dexamethasone (VRd), category 1 regimen:  Cycle length 21 days  -bortezomib 1.3 mg per m2 subQ days 1, 8, and 15  -lenalidomide 25 mg p.o. daily, on days 1 through 14  -dexamethasone 40 mg p.o. with food days 1, 8, and 15  Cycle length: 21 days    If response after primary therapy, then:  Autologous HCT versus continuation of myeloma therapy or maintenance therapy versus tandem autologous transplant, etc.    -Velcade started 07/13/2023  -Zometa 4 mg IV every 4 weeks x2 years (started 07/13/2023    Okay to proceed with cycle 3, day 1 (Bortezomib)  Check baseline TSH and free T4, then, every 3 months (monitoring on lenalidomide) - next due 11/2023  Continue Zometa 4 mg IV every 4 weeks x2 years (started 07/13/2023) - next due 9/7/2023  Continue baby aspirin 81 mg p.o. q.day concurrently, for thromboprophylaxis secondary to lenalidomide.  Continue levofloxacin 5 mg p.o. q.day X 12 weeks (started 06/26/2023)  Continue acyclovir 400 mg p.o. b.i.d. for herpes zoster prophylaxis with bortezomib  Continue Bactrim ds 1 tablet every Monday/Wednesday/Friday during treatment  Check CBC and CMP weekly during treatment  Patient already referred to [facility] BMT for transplant evaluation- Next appointment is scheduled for 10/3/2023; Tentative plan for BMT around end of November  2023  Patient already referred to Radiation Oncology ([facility]) for short course radiotherapy to spine for pain control-consultation appointment 7/21. - per daughter not a candidate, not enough lesions to radiate.     RTC in 3 weeks with NP, treatment(cycle # 4, day 1), labs(cbc, cmp and magnesium) done prior.  He will return to clinic on 9/7 and 9/14 for cycle 3 day 8 and day 15 of treatment. Labs done prior.     Weekly assessment for peripheral neuropathy and/or neuropathic pain.  Monitor for hypotension during bortezomib therapy; adjustment of antihypertensives and/or administration of IV hydration may be needed.    Back Pain  -06/26/2023: Started Norco 5 mg p.o. q.6 hours PRN for pain; did not work; he stopped taking  -06/26/2023: Taking Norco 10 mg only twice daily; instructed him to take Norco 10 mg every 4-6 hours p.r.n. for pain (has 9/10 lower back pain, constant, radiating to anterior aspect of right thigh, without neurological symptoms)  -06/26/2023: Started levofloxacin 500 mg p.o. q.day X 12 weeks  -06/26/2023: Severe back pain; presumed due to myeloma; referred to Radiation Oncology  Continue narcotics for back pain    07/10/2023:  Pending:  FISH panel on bone marrow including del(13), del(17p13), t(4;14), t(11;14), t(14;16), t(14;20), 1q21 gain/1q21 amplification, 1p deletion  NGS panel testing on bone marrow specimen     10 mm exophytic hyperdense focus upper pole of right kidney, compatible with a hyperdense cyst, on CT abdomen pelvis with contrast 06/27/2023  -07/12/2023:  MRI abdomen with and without contrast (right renal cyst): Small exophytic right renal lesion most likely a proteinaceous or hemorrhagic cyst (10 mm, upper pole of right kidney)  -Velcade started 07/13/2023     Monitoring on lenalidomide:   Monitor for signs and symptoms of infection (if neutropenic), bleeding or bruising, hepatotoxicity, secondary malignancies, thromboembolism (eg, shortness of breath, chest pain, arm or leg  swelling), dermatologic toxicity, tumor lysis syndrome, or hypersensitivity.     Monitoring on bortezomib:  Peripheral neuropathy, posterior reversible leukoencephalopathy syndrome, progressive multifocal leukoencephalopathy, tumor lysis syndrome, or hyper-/hypoglycemia. Monitor closely in patients with risk factors for heart failure or existing heart disease.        Above discussed at length with the patient.  All questions answered.      Nature of multiple myeloma discussed.    He understands and agrees with this plan.  ----------------------------------   Discussion:     Supportive care:  -bone targeted treatment: Bisphosphonate, category 1; or denosumab to reduce risk of skeletal related events; denosumab is preferred in patients with renal insufficiency; assess vitamin-D status; baseline dental exam; monitor for osteonecrosis of the jaw; monitor renal dysfunction with bisphosphonate therapy  -continue bone targeted treatment (bisphosphonate or denosumab) for 2 years; continue beyond 2 years should be based on clinical judgment  -patients receiving denosumab for bone disease who subsequently discontinued therapy should be given maintenance denosumab every 6 months or a single dose of bisphosphonate to mitigate risk of rebound osteoporosis  -for hypercalcemia, zoledronic acid, denosumab, steroids, and/or calcitonin  -for hyperviscosity, plasmapheresis should be used as adjuvant therapy for symptomatic hyperviscosity  -intravenous immunoglobulin therapy should be considered in the setting of recurrent serious infection and/or hypogammaglobulinemia (IgG </= 400 mg/dL)  -pneumococcal conjugate vaccine, followed by pneumococcal polysaccharide vaccine 1 year later  -Influenza vaccination is recommended; 2 doses of high-dose inactivated quandaivalent influenza vaccine should be considered  -consider 12 weeks of levofloxacin 500 mg p.o. daily at the time of initial diagnosis of multiple myeloma  -COVID-19  vaccination  -highest risk for VTE is in the 1st 6 months following new diagnosis of multiple myeloma  -VTE prophylaxis if no contraindications to anticoagulation agents or antiplatelets  Recommendations for VTE prophylaxis:  Aspirin  mg daily; low molecular weight heparin equivalent to enoxaparin 40 mg daily; Xarelto 10 mg daily; Eliquis 2.5 mg b.i.d.; Arixtra 2.5 mg daily; Coumadin with target INR 2.0-3.0        Lenalidomide:  Embryo fetal toxicity  Hematologic toxicity.  Neutropenia.  Thrombocytopenia.  Venous and arterial thromboembolism.  DVT.  PE.  MI.  Stroke.  Dizziness, fatigue.  Tumor lysis syndrome.  Heart failure.    Used with caution in patients with renal impairment.  Lenalidomide =/> 4 cycles may decrease the # of CD34 pos cells collected for autologous stem cell transplant.  DVT, PE, atrial fibrillation, renal failure are more likely in patients> 65 years  Hepatotoxicity  Hypersensitivity reactions.  Anaphylaxis.  Angioedema.  Skin rash.  Eosinophilia.  Immediate hypersensitivity reactions.  Second primary malignancy.  AML.  MDS.  Solid tumor malignancies.  Nonmelanoma skin cancers.     Monitoring with lenalidomide:  -CBC with differential: weekly for the first 2 cycles, every 2 weeks during the third cycle and monthly thereafter;  -serum creatinine, LFTs (periodically).  -Thyroid function tests (TSH at baseline then every 2 to 3 months during lenalidomide treatment  -ECG when clinically indicated. Monitor for signs and symptoms of infection (if neutropenic), bleeding or bruising, hepatotoxicity, secondary malignancies, thromboembolism (eg, shortness of breath, chest pain, arm or leg swelling), dermatologic toxicity, tumor lysis syndrome, or hypersensitivity.  Patients who could become pregnant: Pregnancy test 10 to 14 days and 24 hours prior to initiating therapy, weekly during the first 4 weeks of treatment, then every 2 to 4 weeks through 4 weeks after therapy discontinued.      Bortezomib:  Bone marrow suppression.  Neutropenia.  Thrombocytopenia.  Acute CHF.  Nausea, vomiting, diarrhea, constipation, ileus.    Hepatotoxicity:  Herpes zoster and her PCP plaques reactivation.    Anaphylactic reactions.  Hypersensitive reactions.  Angioedema.  Laryngeal edema.    Hypotension.  Peripheral neuropathy.  Posterior reversible leukoencephalopathy syndrome.  Progressive multifocal leukoencephalopathy.  Pulmonary toxicity.  Pneumonitis.  Interstitial pneumonia.  Lung infiltrates.  ARDS.  Thrombotic microangiopathy.  Tumor lysis syndrome.       Monitoring parameters with bortezomib:  CBC, CMP  Blood pressure (to monitor for hypotension)  Peripheral neuropathy  Posterior reversible leukoencephalopathy syndrome, progressive multifocal leukoencephalopathy, tumor lysis syndrome, or hyper-/hypoglycemia. Monitor baseline chest x-ray and then periodic pulmonary function testing (with new or worsening pulmonary symptoms). Monitor closely in patients with risk factors for heart failure or existing heart disease.

## 2023-08-31 NOTE — Clinical Note
RTC on 9/21/2023 with NP, treatment, labs done prior cbc, cmp mag Ok to treat today RTC for treatment only on cycle 3 day 8 and 15, cbc, cmp and mag done prior

## 2023-09-05 RX ORDER — METFORMIN HYDROCHLORIDE 500 MG/1
500 TABLET ORAL 2 TIMES DAILY WITH MEALS
Qty: 180 TABLET | Refills: 3 | Status: SHIPPED | OUTPATIENT
Start: 2023-09-05 | End: 2023-10-09 | Stop reason: SDUPTHER

## 2023-09-06 ENCOUNTER — HOSPITAL ENCOUNTER (OUTPATIENT)
Dept: RADIOLOGY | Facility: HOSPITAL | Age: 68
Discharge: HOME | End: 2023-09-06
Attending: STUDENT IN AN ORGANIZED HEALTH CARE EDUCATION/TRAINING PROGRAM
Payer: MEDICARE

## 2023-09-06 DIAGNOSIS — K40.90 RIGHT INGUINAL HERNIA: ICD-10-CM

## 2023-09-06 PROCEDURE — 76870 US EXAM SCROTUM: CPT | Mod: TC

## 2023-09-06 PROCEDURE — 76705 ECHO EXAM OF ABDOMEN: CPT | Mod: TC

## 2023-09-07 ENCOUNTER — INFUSION (OUTPATIENT)
Dept: INFUSION THERAPY | Facility: HOSPITAL | Age: 68
End: 2023-09-07
Attending: INTERNAL MEDICINE
Payer: MEDICARE

## 2023-09-07 VITALS
BODY MASS INDEX: 23.46 KG/M2 | RESPIRATION RATE: 20 BRPM | HEIGHT: 66 IN | TEMPERATURE: 98 F | SYSTOLIC BLOOD PRESSURE: 111 MMHG | HEART RATE: 57 BPM | WEIGHT: 145.94 LBS | DIASTOLIC BLOOD PRESSURE: 67 MMHG | OXYGEN SATURATION: 98 %

## 2023-09-07 DIAGNOSIS — C79.51 SECONDARY MALIGNANT NEOPLASM OF BONE: ICD-10-CM

## 2023-09-07 DIAGNOSIS — C90.00 IGG MULTIPLE MYELOMA: Primary | ICD-10-CM

## 2023-09-07 DIAGNOSIS — E86.1 HYPOVOLEMIA: ICD-10-CM

## 2023-09-07 PROCEDURE — 96401 CHEMO ANTI-NEOPL SQ/IM: CPT

## 2023-09-07 PROCEDURE — 25000003 PHARM REV CODE 250: Performed by: INTERNAL MEDICINE

## 2023-09-07 PROCEDURE — 96368 THER/DIAG CONCURRENT INF: CPT

## 2023-09-07 PROCEDURE — 25000003 PHARM REV CODE 250: Performed by: NURSE PRACTITIONER

## 2023-09-07 PROCEDURE — 96367 TX/PROPH/DG ADDL SEQ IV INF: CPT

## 2023-09-07 PROCEDURE — 63600175 PHARM REV CODE 636 W HCPCS: Mod: JZ,JG | Performed by: NURSE PRACTITIONER

## 2023-09-07 PROCEDURE — 96365 THER/PROPH/DIAG IV INF INIT: CPT

## 2023-09-07 RX ORDER — SODIUM CHLORIDE 0.9 % (FLUSH) 0.9 %
10 SYRINGE (ML) INJECTION
OUTPATIENT
Start: 2023-09-07

## 2023-09-07 RX ORDER — SODIUM CHLORIDE 0.9 % (FLUSH) 0.9 %
10 SYRINGE (ML) INJECTION
Status: DISCONTINUED | OUTPATIENT
Start: 2023-09-07 | End: 2023-09-07 | Stop reason: HOSPADM

## 2023-09-07 RX ORDER — HEPARIN 100 UNIT/ML
500 SYRINGE INTRAVENOUS
Status: DISCONTINUED | OUTPATIENT
Start: 2023-09-07 | End: 2023-09-07 | Stop reason: HOSPADM

## 2023-09-07 RX ORDER — BORTEZOMIB 3.5 MG/1
1.3 INJECTION, POWDER, LYOPHILIZED, FOR SOLUTION INTRAVENOUS; SUBCUTANEOUS
Status: CANCELLED | OUTPATIENT
Start: 2023-09-07

## 2023-09-07 RX ORDER — SODIUM CHLORIDE 0.9 % (FLUSH) 0.9 %
10 SYRINGE (ML) INJECTION
Status: CANCELLED | OUTPATIENT
Start: 2023-09-07

## 2023-09-07 RX ORDER — BORTEZOMIB 3.5 MG/1
1.3 INJECTION, POWDER, LYOPHILIZED, FOR SOLUTION INTRAVENOUS; SUBCUTANEOUS
Status: COMPLETED | OUTPATIENT
Start: 2023-09-07 | End: 2023-09-07

## 2023-09-07 RX ORDER — HEPARIN 100 UNIT/ML
500 SYRINGE INTRAVENOUS
Status: CANCELLED | OUTPATIENT
Start: 2023-09-07

## 2023-09-07 RX ADMIN — SODIUM CHLORIDE 1000 ML: 9 INJECTION, SOLUTION INTRAVENOUS at 11:09

## 2023-09-07 RX ADMIN — BORTEZOMIB 2.25 MG: 3.5 INJECTION, POWDER, LYOPHILIZED, FOR SOLUTION INTRAVENOUS; SUBCUTANEOUS at 11:09

## 2023-09-07 RX ADMIN — ZOLEDRONIC ACID 4 MG: 4 INJECTION, SOLUTION, CONCENTRATE INTRAVENOUS at 11:09

## 2023-09-07 NOTE — NURSING
Pt received Velcade injection, zometa & 1L NS without incident; pt's bp 111/67 with heart rate 57 at time of discharge; pt reminded to checked and record bp daily & verbalized understanding.

## 2023-09-07 NOTE — NURSING
ANDIE Mcleod NP, notified of maual bp 96/60, pt denied dizziness and weakness; NP spoke with pt and added 1L NS to treatment today & requested that pt take & record his bp daily; pt verbalized understanding.

## 2023-09-08 DIAGNOSIS — C90.00 IGG MULTIPLE MYELOMA: ICD-10-CM

## 2023-09-08 RX ORDER — LENALIDOMIDE 25 MG/1
25 CAPSULE ORAL DAILY
Qty: 14 CAPSULE | Refills: 0 | Status: SHIPPED | OUTPATIENT
Start: 2023-09-08 | End: 2023-09-25

## 2023-09-14 ENCOUNTER — INFUSION (OUTPATIENT)
Dept: INFUSION THERAPY | Facility: HOSPITAL | Age: 68
End: 2023-09-14
Attending: INTERNAL MEDICINE
Payer: MEDICARE

## 2023-09-14 VITALS
BODY MASS INDEX: 24.17 KG/M2 | HEART RATE: 64 BPM | WEIGHT: 145.06 LBS | DIASTOLIC BLOOD PRESSURE: 69 MMHG | OXYGEN SATURATION: 99 % | TEMPERATURE: 98 F | RESPIRATION RATE: 20 BRPM | SYSTOLIC BLOOD PRESSURE: 109 MMHG | HEIGHT: 65 IN

## 2023-09-14 DIAGNOSIS — E86.1 HYPOVOLEMIA: Primary | ICD-10-CM

## 2023-09-14 DIAGNOSIS — C90.00 IGG MULTIPLE MYELOMA: Primary | ICD-10-CM

## 2023-09-14 DIAGNOSIS — C90.00 IGG MULTIPLE MYELOMA: ICD-10-CM

## 2023-09-14 PROCEDURE — 63600175 PHARM REV CODE 636 W HCPCS: Mod: JZ,JG | Performed by: NURSE PRACTITIONER

## 2023-09-14 PROCEDURE — 96401 CHEMO ANTI-NEOPL SQ/IM: CPT

## 2023-09-14 RX ORDER — HEPARIN 100 UNIT/ML
500 SYRINGE INTRAVENOUS
Status: DISCONTINUED | OUTPATIENT
Start: 2023-09-14 | End: 2023-09-14 | Stop reason: HOSPADM

## 2023-09-14 RX ORDER — SODIUM CHLORIDE 0.9 % (FLUSH) 0.9 %
10 SYRINGE (ML) INJECTION
Status: CANCELLED | OUTPATIENT
Start: 2023-09-14

## 2023-09-14 RX ORDER — BORTEZOMIB 3.5 MG/1
1.3 INJECTION, POWDER, LYOPHILIZED, FOR SOLUTION INTRAVENOUS; SUBCUTANEOUS
Status: COMPLETED | OUTPATIENT
Start: 2023-09-14 | End: 2023-09-14

## 2023-09-14 RX ORDER — BORTEZOMIB 3.5 MG/1
1.3 INJECTION, POWDER, LYOPHILIZED, FOR SOLUTION INTRAVENOUS; SUBCUTANEOUS
Status: CANCELLED | OUTPATIENT
Start: 2023-09-14

## 2023-09-14 RX ORDER — HEPARIN 100 UNIT/ML
500 SYRINGE INTRAVENOUS
Status: CANCELLED | OUTPATIENT
Start: 2023-09-14

## 2023-09-14 RX ORDER — SODIUM CHLORIDE 0.9 % (FLUSH) 0.9 %
10 SYRINGE (ML) INJECTION
Status: DISCONTINUED | OUTPATIENT
Start: 2023-09-14 | End: 2023-09-14 | Stop reason: HOSPADM

## 2023-09-14 RX ADMIN — BORTEZOMIB 2.25 MG: 3.5 INJECTION, POWDER, LYOPHILIZED, FOR SOLUTION INTRAVENOUS; SUBCUTANEOUS at 01:09

## 2023-09-14 NOTE — NURSING
1122 Arrive for Velcade Inj. C/O of constipation takes home meds for relief. Given printed future appointment information.

## 2023-09-21 ENCOUNTER — INFUSION (OUTPATIENT)
Dept: INFUSION THERAPY | Facility: HOSPITAL | Age: 68
End: 2023-09-21
Attending: INTERNAL MEDICINE
Payer: MEDICARE

## 2023-09-21 ENCOUNTER — OFFICE VISIT (OUTPATIENT)
Dept: HEMATOLOGY/ONCOLOGY | Facility: CLINIC | Age: 68
End: 2023-09-21
Payer: MEDICARE

## 2023-09-21 VITALS — DIASTOLIC BLOOD PRESSURE: 70 MMHG | SYSTOLIC BLOOD PRESSURE: 120 MMHG

## 2023-09-21 VITALS
RESPIRATION RATE: 20 BRPM | OXYGEN SATURATION: 98 % | HEIGHT: 66 IN | SYSTOLIC BLOOD PRESSURE: 100 MMHG | DIASTOLIC BLOOD PRESSURE: 63 MMHG | HEART RATE: 74 BPM | WEIGHT: 144.81 LBS | BODY MASS INDEX: 23.27 KG/M2 | TEMPERATURE: 98 F

## 2023-09-21 DIAGNOSIS — C90.00 IGG MULTIPLE MYELOMA: Primary | ICD-10-CM

## 2023-09-21 DIAGNOSIS — R14.0 ABDOMINAL BLOATING: ICD-10-CM

## 2023-09-21 DIAGNOSIS — M51.36 DEGENERATION OF LUMBAR INTERVERTEBRAL DISC: ICD-10-CM

## 2023-09-21 DIAGNOSIS — Z79.899 LONG-TERM USE OF HIGH-RISK MEDICATION: Primary | ICD-10-CM

## 2023-09-21 PROCEDURE — 99213 OFFICE O/P EST LOW 20 MIN: CPT | Mod: PBBFAC,25 | Performed by: NURSE PRACTITIONER

## 2023-09-21 PROCEDURE — 99215 PR OFFICE/OUTPT VISIT, EST, LEVL V, 40-54 MIN: ICD-10-PCS | Mod: S$PBB,,, | Performed by: NURSE PRACTITIONER

## 2023-09-21 PROCEDURE — 96401 CHEMO ANTI-NEOPL SQ/IM: CPT

## 2023-09-21 PROCEDURE — 63600175 PHARM REV CODE 636 W HCPCS: Mod: JZ,JG | Performed by: NURSE PRACTITIONER

## 2023-09-21 PROCEDURE — 99215 OFFICE O/P EST HI 40 MIN: CPT | Mod: S$PBB,,, | Performed by: NURSE PRACTITIONER

## 2023-09-21 RX ORDER — HEPARIN 100 UNIT/ML
500 SYRINGE INTRAVENOUS
Status: CANCELLED | OUTPATIENT
Start: 2023-09-28

## 2023-09-21 RX ORDER — BORTEZOMIB 3.5 MG/1
1.3 INJECTION, POWDER, LYOPHILIZED, FOR SOLUTION INTRAVENOUS; SUBCUTANEOUS
Status: CANCELLED | OUTPATIENT
Start: 2023-10-05

## 2023-09-21 RX ORDER — HEPARIN 100 UNIT/ML
500 SYRINGE INTRAVENOUS
Status: DISCONTINUED | OUTPATIENT
Start: 2023-09-21 | End: 2023-09-21 | Stop reason: HOSPADM

## 2023-09-21 RX ORDER — HEPARIN 100 UNIT/ML
500 SYRINGE INTRAVENOUS
Status: CANCELLED | OUTPATIENT
Start: 2023-09-21

## 2023-09-21 RX ORDER — BORTEZOMIB 3.5 MG/1
1.3 INJECTION, POWDER, LYOPHILIZED, FOR SOLUTION INTRAVENOUS; SUBCUTANEOUS
Status: COMPLETED | OUTPATIENT
Start: 2023-09-21 | End: 2023-09-21

## 2023-09-21 RX ORDER — BORTEZOMIB 3.5 MG/1
1.3 INJECTION, POWDER, LYOPHILIZED, FOR SOLUTION INTRAVENOUS; SUBCUTANEOUS
Status: CANCELLED | OUTPATIENT
Start: 2023-09-21

## 2023-09-21 RX ORDER — SODIUM CHLORIDE 0.9 % (FLUSH) 0.9 %
10 SYRINGE (ML) INJECTION
Status: DISCONTINUED | OUTPATIENT
Start: 2023-09-21 | End: 2023-09-21 | Stop reason: HOSPADM

## 2023-09-21 RX ORDER — HEPARIN 100 UNIT/ML
500 SYRINGE INTRAVENOUS
Status: CANCELLED | OUTPATIENT
Start: 2023-10-05

## 2023-09-21 RX ORDER — SODIUM CHLORIDE 0.9 % (FLUSH) 0.9 %
10 SYRINGE (ML) INJECTION
Status: CANCELLED | OUTPATIENT
Start: 2023-09-28

## 2023-09-21 RX ORDER — SODIUM CHLORIDE 0.9 % (FLUSH) 0.9 %
10 SYRINGE (ML) INJECTION
Status: CANCELLED | OUTPATIENT
Start: 2023-09-21

## 2023-09-21 RX ORDER — BORTEZOMIB 3.5 MG/1
1.3 INJECTION, POWDER, LYOPHILIZED, FOR SOLUTION INTRAVENOUS; SUBCUTANEOUS
Status: CANCELLED | OUTPATIENT
Start: 2023-09-28

## 2023-09-21 RX ORDER — SODIUM CHLORIDE 0.9 % (FLUSH) 0.9 %
10 SYRINGE (ML) INJECTION
Status: CANCELLED | OUTPATIENT
Start: 2023-10-05

## 2023-09-21 RX ADMIN — BORTEZOMIB 2.25 MG: 3.5 INJECTION, POWDER, LYOPHILIZED, FOR SOLUTION INTRAVENOUS; SUBCUTANEOUS at 11:09

## 2023-09-21 NOTE — Clinical Note
RTC in 3 weeks with NP, treatment. CBC, CMP, mag done prior  RTC in one week for cycle 4, day 8 RTC in one week for cycle 4 day 15. CBC and cmp done prior to day 8 and 15 Okay to treat today

## 2023-09-25 ENCOUNTER — DOCUMENTATION ONLY (OUTPATIENT)
Dept: HEMATOLOGY/ONCOLOGY | Facility: CLINIC | Age: 68
End: 2023-09-25
Payer: MEDICARE

## 2023-09-25 DIAGNOSIS — C90.00 IGG MULTIPLE MYELOMA: ICD-10-CM

## 2023-09-25 RX ORDER — LEVOFLOXACIN 500 MG/1
500 TABLET, FILM COATED ORAL DAILY
Qty: 30 TABLET | Refills: 2 | Status: SHIPPED | OUTPATIENT
Start: 2023-09-25 | End: 2023-09-28

## 2023-09-25 RX ORDER — LEVOFLOXACIN 500 MG/1
500 TABLET, FILM COATED ORAL
Qty: 30 TABLET | Refills: 0 | Status: SHIPPED | OUTPATIENT
Start: 2023-09-25 | End: 2023-09-28

## 2023-09-25 NOTE — PROGRESS NOTES
Reason for Follow-up:  Reason for consultation:  -multiple myeloma, IgG kappa  -worsening low back pain    Current Treatment:  We will initiate treatment with bortezomib/lenalidomide/dexamethasone (VRd), category 1 regimen  Cycle length 21 days  -bortezomib 1.3 mg per m2 subQ days 1, 8, and 15  -lenalidomide 25 mg p.o. daily, on days 1 through 14  -dexamethasone 40 mg p.o. with food days 1, 8, and 15  Cycle length: 21 days  Start 7/13/2023    Zometa 4mg IV every 28 days  Start 7/13/2023    Treatment History:  Past medical history:  NIDDM. Dyslipidemia.  GERD.  Lumbar spinal stenosis.  Vitamin-D deficiency.  Allergic rhinitis.  HTN.  Bell's palsy.  Celiac disease.  Hemorrhoids.  Mitral regurgitation 01/05/2018.  Rheumatic valve narrowing and leaking mitral valve.  -02/02/2018 ([facility]):  АНДРЕЙ, right minimally invasive anterior thoracotomy, right femoral artery and vein exploration, extensive right pleural adhesiolysis/pneumolysis, bilateral Maze/ablation (extensive), left atrial appendage clip placement for left atrial appendage exclusion, mitral valve repair (complex repair with 26 mm annuloplasty ring)  Social history:  .  Lives in [place], [place].  Five children.  Owns a [employer] business.  No history of tobacco, alcohol, or illicit drug abuse.    Family history:  Negative for cancers or blood dyscrasias.    Health maintenance:  PCP in family medicine clinic, [facility].  Had EGD and colonoscopy done 1 year ago by Dr. Zoltan Kapoor, [facility], apparently unremarkable (he gets the endoscopies done periodically because of history of celiac disease).    History of Present Illness:   Oncologic/Hematologic History:  Oncology History   IgG multiple myeloma   6/23/2023 Initial Diagnosis    IgG multiple myeloma     7/13/2023 -  Chemotherapy    Treatment Summary   Plan Name: OP VRD - WEEKLY BORTEZOMIB LENALIDOMIDE DEXAMETHASONE Q3W  Treatment Goal: Curative  Status:  Active  Start Date: 7/13/2023  End Date: 11/16/2023 (Planned)  Provider: Narciso Lundy MD  Chemotherapy: bortezomib (VELCADE) injection 2.25 mg, 2.25 mg, Subcutaneous, Clinic/[facility] 1 time, 4 of 6 cycles  Administration: 2.25 mg (7/13/2023), 2.25 mg (7/20/2023), 2.25 mg (7/27/2023), 2.25 mg (8/10/2023), 2.25 mg (8/17/2023), 2.25 mg (8/24/2023), 2.25 mg (8/31/2023), 2.25 mg (9/7/2023), 2.25 mg (9/14/2023)  lenalidomide 25 mg Cap, 25 mg, Oral, Daily, 1 of 1 cycle, Start date: 9/8/2023, End date: --     67-year-old gentleman, referred from [facility], with newly diagnosed multiple myeloma.      05/14/2023: [facility] note:  Low back pain, onset early March   - localized to back with occasional sharp pain down posterior aspect of right leg  - fluctuating in intensity, overall worsening since onset, no change in character, difficulty doing ADLs, walking with pain  - difficulty sleeping   - seen in urgent care and C multiple times  - MRI 5/4/23: degenerative disc disease and facet arthrosis, mild spinal canal stenosis L4-L5 greater than L3-L4, no high grade stenosis   - Toradol IM helps briefly  - side effects from gabapentin, Robaxin and Norco- GI and drowsiness   - Voltaren gel not helping  - stopped going to PT due to pain  - follows with Neurologist   - requests trial of tramadol and Lyrica   - going out of country soon, concerned he will not be able to go due to pain  - denies bowel/ bladder incontinence, night sweats, unexplained weight loss, chest pain or shortness of breath   Spinal stenosis   Acute bilateral low back pain with right-sided sciatica    05/18/2023: [facility] note:  Low back pain.  Onset early March 2023.  Localized lumbar region.  Constant.  Occasional radiation down posterior right leg.  Difficulty sleeping in bed because of exacerbation of pain by lying down flat.  Compensating by sleeping in a recliner or on sofa. MRI showed degenerative disc disease and  facet arthrosis at multiple levels with mild canal stenosis.  - Has attempted gabapentin, Robaxin, Norco, Lyrica, and tramadol with minimal relief.  Currently being managed with tramadol and Lyrica.  - Told by neurosurgery that he needs pain management referral because NS does not deal with injections  - Denies saddle anesthesia, bowel/bladder incontinence, weight loss, night sweats    Spinal stenosis of lumbar region  Degeneration of lumbar intervertebral disc  - Continue treatment with tramadol and Lyrica as prescribed  Bradycardia  - Patient aware of chronic bradycardia, states he has been this way for 3-4 years due to palpitations if HR >60  - Currently on Toprol XL 50 mg  - Follows with [facility]    06/13/2023:  [facility] note:   Acute Issues:   Weight loss- 6 weeks history of weight loss 12-14 lb unintentionally.  Reports appetite has been somewhat decreased secondary to pain though has noticed increased size of abdomen.  He denies nausea, vomiting, dysphagia, dyspepsia, bloody stools, staying changes in stool caliber, constipation, fevers or night sweats, nervousness/anxiousness, skin or hair changes.  Denies history of smoking or alcohol use.  Receives EGD and colonoscopy every 2 years per Dr. Kapoor [place], last was roughly 1 year ago. PSA wnl 5/2023.   Chronic Issues: DM- med compliant metformin 500 daily, last A1C 6.1 in 1/2023.   Chronic medical conditions:  Hypertension.  Dyslipidemia.  GERD.  Lumbar spinal stenosis.  Vitamin-D deficiency.  Allergic rhinitis.  HTN    Investigations reviewed:  05/04/2023: MRI lumbar spine without contrast (worsening lumbar pain x5-7 weeks with bilateral sciatica):   Lumbar degenerative disc disease and facet arthrosis as detailed above with no acute pathology identified.    Mild spinal canal stenosis at L4-L5 greater than L3-L4 with no high-grade spinal canal stenosis.   Mild bilateral neural foramen stenosis at L3-L4 and on the left greater than right at  L4-L5.    06/14/2023:  Skeletal survey:   No definite lesions to suggest either lytic and or sclerotic metastatic changes.   Multiple compression deformities mostly in the lumbar spine but these are seen also in the thoracic spine; other imaging modalities might prove helpful for further assessment; some of these compression deformities appear to be relatively new as compared with an MR of May 2023  (There are some degenerative changes of the thoracic spine with a mild compression deformity of superior endplate of 1 of the lower thoracic vertebral body levels; there are multiple compression deformities of the lumbar spine including the superior endplate of L3 and L4 there is a compression deformity of the superior endplate of T11 with compression deformities of the superior endplate of T12 and a compression deformity of L1; some of these compression deformities appear to be new since the last exam (MR) of May 4, 2023 other imaging modalities might prove helpful for further assessment    Labs reviewed:  01/12/2023:  Hemoglobin 11.9.  MCV 98.7.  Ferritin 211.66.  Transferrin saturation 42%.  Vitamin B12 969.  Folate 13.9.    06/14/2023:  Hemoglobin 11.0.  MCV 99.1.  ESR 15, normal.    06/14/2023: IgG 3810 mg/dL, elevated.  IgM 11, suppressed.  IgA 33, suppressed.  2.28 gm/dL IgG kappa monoclonal protein on SPEP and BUBBA.  Kappa 99.4, elevated.  Lambda 0.3400, suppressed.  Kappa/lambda ratio 292, elevated.    05/01/2023: PSA 1.7, normal.  06/14/2023:  BUN 15.  Creatinine 0.79.  Calcium 9.6.  Albumin 3.6.  LFTs normal.  TSH normal.  06/16/2023:  , normal.    06/15/2023:  24 hour urine protein 288 mg, elevated (reference Range:  < 229). M spike 183 mg. Monoclonal kappa.    06/16/2023:  Urine:  Kappa 25.5 mg/dL; lambda < 0.7000 mg/dL; kappa/lambda ratio> 36.4    06/26/2023:  Pleasant gentleman who presents for initial medical oncology consultation, accompanied by his wife and family present who is a past  interventional cardiologist.  Back pain for 2 months.  Initially started in right groin.  Physical therapy has not helped.  10/10 severity.  Has been taking Tylenol without significant relief.  Secured to take narcotics because of constipation.  Underwent couple of spinal steroid shots 3 weeks ago, with minor relief.  Pain is present all the time.  Pain increases with changing position in bed as well as upon standing.  He finds it difficult to ambulate.  Pain does not radiate down lower extremities and is not accompanied with lower extremity weakness, numbness, urinary or bowel incontinence, or saddle anesthesia.  Also experiences muscle cramps.  Generalized weakness.  However, ECOG 2.  No fevers or chills.  No repeated infections.  Cramps in hands and feet.  Appetite is down.  Appetite has picked up in last 10 days.  Has lost 12-14 lb in last 1-1/2 months.  No abnormal bleeding or bruising.    Interval History 09/21/2023:   Patient presents to the clinic today for Cycle 4 day 1 treatment. Unaccompanied. Family are home, but were unavailable to accompany him.   He continues to take Lenalidomide 25 mg daily. Started this cycle  on 9/19/2023. Will finish on 10/2/2023  Taking Dexamethasone as directed.Taking Acyclovir, Baby ASA, and Levofloxacin daily. .  Received Zometa on 9/7/2023 Taking Calcium and vitamin D.   Main complaint today is his chronic low back and right leg pain.   He had a Lumbar spine MRI one week ago(9/14/2023).   Has chronic intermittent bloating. Denies any belching or increase in flatus.   States he saw a gastroenterologist last week, and was told to try OTC Gas-x for one week, if not helping to return in one week for further evaluation. States it helps some.   States he is not taking Pantoprazole or Bentyl prescribed by PCP. States stopped due to JEISON.   He had an abdominal US done on 9/6/2023. Ordered by PCP. He has followed up with PCP for results.    Patient denies any fever or symptoms of  infection. No bleeding.      Review of Systems:   All systems reviewed and found to be negative except for the symptoms detailed above    Lab Results   Component Value Date    WBC 9.58 09/21/2023    RBC 3.54 (L) 09/21/2023    HGB 11.5 (L) 09/21/2023    HCT 34.8 (L) 09/21/2023    MCV 98.3 (H) 09/21/2023    MCH 32.5 (H) 09/21/2023    MCHC 33.0 09/21/2023    RDW 13.5 09/21/2023     09/21/2023    MPV 9.7 09/21/2023     CMP  Sodium   Date Value Ref Range Status   07/01/2020 138 136 - 145 mmol/L Final     Sodium Level   Date Value Ref Range Status   09/21/2023 136 136 - 145 mmol/L Final     Potassium   Date Value Ref Range Status   07/01/2020 4.5 3.5 - 5.1 mmol/L Final     Potassium Level   Date Value Ref Range Status   09/21/2023 3.9 3.5 - 5.1 mmol/L Final     Chloride   Date Value Ref Range Status   07/01/2020 108 100 - 109 mmol/L Final     Carbon Dioxide   Date Value Ref Range Status   09/21/2023 26 23 - 31 mmol/L Final   07/01/2020 26 22 - 33 mmol/L Final     Blood Urea Nitrogen   Date Value Ref Range Status   09/21/2023 11.7 8.4 - 25.7 mg/dL Final   07/01/2020 10 5 - 25 mg/dL Final     Creatinine   Date Value Ref Range Status   09/21/2023 0.73 0.73 - 1.18 mg/dL Final   07/01/2020 0.81 0.57 - 1.25 mg/dL Final     Calcium   Date Value Ref Range Status   07/01/2020 8.6 (L) 8.8 - 10.6 mg/dL Final     Calcium Level Total   Date Value Ref Range Status   09/21/2023 9.3 8.8 - 10.0 mg/dL Final     Albumin Level   Date Value Ref Range Status   09/21/2023 3.5 3.4 - 4.8 g/dL Final     Bilirubin Total   Date Value Ref Range Status   09/21/2023 0.3 <=1.5 mg/dL Final     Alkaline Phosphatase   Date Value Ref Range Status   09/21/2023 69 40 - 150 unit/L Final     Aspartate Aminotransferase   Date Value Ref Range Status   09/21/2023 12 5 - 34 unit/L Final     Alanine Aminotransferase   Date Value Ref Range Status   09/21/2023 12 0 - 55 unit/L Final     Anion Gap   Date Value Ref Range Status   07/01/2020 4 (L) 8 - 16 mmol/L  Final     eGFR   Date Value Ref Range Status   09/21/2023 >60 mls/min/1.73/m2 Final       Physical Exam  VITAL SIGNS:   Vitals:    09/21/23 0953   BP: 100/63   Pulse: 74   Resp: 20   Temp: 97.9 °F (36.6 °C)    B/P recheck, manual cuff, left arm. 94/62  General: Thin framed man.  NAD  Eye: Pupils are equal, round and reactive to light, Extraocular movements are intact. Normal conjunctiva, sclera anicteric.   HENT: Normocephalic. Mild dryness oral mucosa. No erythema or ulcers visible. Good dentition.   Neck: Supple, Non-tender  Respiratory: Respirations are non-labored, Symmetrical chest wall expansion. Breath sounds CTA bilaterally  Cardiovascular: Regular rate, rhythm, Normal peripheral perfusion, No bilateral lower extremity edema  Gastrointestinal: Flat, soft, non-tender. Bowel sounds present in all 4 quadrants. No distention.   Lymphatics: No lymphadenopathy appreciated  Musculoskeletal: No spinal pain with palpation.   Integumentary: Intact. Warm, dry. Right lower leg darkening discoloration.  Neurological: Alert and oriented. No focal deficit present.   Psychiatric: Cooperative. Appropriate mood and affect   ECOG Performance Scale: 1 - Capable of all self-care able to carry out light work activities    CBC  Lab Results   Component Value Date    WBC 9.58 09/21/2023    HGB 11.5 (L) 09/21/2023    HCT 34.8 (L) 09/21/2023    MCV 98.3 (H) 09/21/2023     09/21/2023      CMP  Sodium   Date Value Ref Range Status   07/01/2020 138 136 - 145 mmol/L Final     Sodium Level   Date Value Ref Range Status   09/21/2023 136 136 - 145 mmol/L Final     Potassium   Date Value Ref Range Status   07/01/2020 4.5 3.5 - 5.1 mmol/L Final     Potassium Level   Date Value Ref Range Status   09/21/2023 3.9 3.5 - 5.1 mmol/L Final     Chloride   Date Value Ref Range Status   07/01/2020 108 100 - 109 mmol/L Final     Carbon Dioxide   Date Value Ref Range Status   09/21/2023 26 23 - 31 mmol/L Final   07/01/2020 26 22 - 33 mmol/L Final      Blood Urea Nitrogen   Date Value Ref Range Status   09/21/2023 11.7 8.4 - 25.7 mg/dL Final   07/01/2020 10 5 - 25 mg/dL Final     Creatinine   Date Value Ref Range Status   09/21/2023 0.73 0.73 - 1.18 mg/dL Final   07/01/2020 0.81 0.57 - 1.25 mg/dL Final     Calcium   Date Value Ref Range Status   07/01/2020 8.6 (L) 8.8 - 10.6 mg/dL Final     Calcium Level Total   Date Value Ref Range Status   09/21/2023 9.3 8.8 - 10.0 mg/dL Final     Albumin Level   Date Value Ref Range Status   09/21/2023 3.5 3.4 - 4.8 g/dL Final     Bilirubin Total   Date Value Ref Range Status   09/21/2023 0.3 <=1.5 mg/dL Final     Alkaline Phosphatase   Date Value Ref Range Status   09/21/2023 69 40 - 150 unit/L Final     Aspartate Aminotransferase   Date Value Ref Range Status   09/21/2023 12 5 - 34 unit/L Final     Alanine Aminotransferase   Date Value Ref Range Status   09/21/2023 12 0 - 55 unit/L Final     Anion Gap   Date Value Ref Range Status   07/01/2020 4 (L) 8 - 16 mmol/L Final     eGFR   Date Value Ref Range Status   09/21/2023 >60 mls/min/1.73/m2 Final      Assessment:  Multiple myeloma, IgG kappa:  -presentation: 03/2023:  Worsening low back pain, no associated neurological symptoms, 12-14 pound weight loss over 6 weeks, mild L4-L5 spinal canal stenosis on MRI (05/04/2023), lumbar DJD and facet arthrosis on MRI lumbar spine (05/04/2023)  -skeletal survey 06/14/2023: Negative for lytic or sclerotic lesions; multiple compression deformities lumbar spine and thoracic spine (new since MRI scan dated 05/04/2023)  -06/14/2023:  Mild anemia (hemoglobin 11.0)   -06/14/2023:  2.28 gm/dL IgG kappa M protein. Kappa 99.4, elevated.  Lambda 0.3400, suppressed.  Kappa/lambda ratio 292, elevated.  -06/14/2023:  BUN, creatinine, calcium, albumin normal  -06/15/2022:  24 hour urine protein 280 mg, elevated.  M spike 183 mg.  Urine BUBBA showed monoclonal kappa light chains.  -06/16/2023: Urine:  Kappa 25.5 mg/dL; lambda < 0.7000 mg/dL;  kappa/lambda ratio> 36.4  06/16/2023:  , normal.  -06/26/2023:  Hemoglobin 11.0.  Beta 2 microglobulin 3.18.  Calcium 10.1.  Albumin 3.2.  Hepatitis-A/B/C/HIV serology negative.  -bone marrow biopsy 06/27/2022:  Plasma cells 50% in bone marrow aspirate; plasma cells 36% on flow cytometry of bone marrow aspirate, with kappa light chain restriction; molecular studies pending  -CT abdomen pelvis with contrast 06/27/2023:  Hepatic steatosis; bilateral inguinal hernias; heterogeneous bone demineralization, suggesting marrow infiltrative process like multiple myeloma; multilevel compression deformities in the included thoracic spine and lumbar spine; 10 mm exophytic hyperdense focus upper pole of right kidney, compatible with a hyperdense cyst  -FDG PET-CT 06/29/2023:  Left femur lesser trochanter; right 4th rib laterally; multilevel thoracolumbar compression deformities; subacute fractures T8, T12, L1, L2  >>>  From the data available so far, patient has multiple myeloma (symptomatic), by virtue of:  1. Involved: Uninvolved serum FLC ratio =/> 100 and involved FLC concentration 10 mg/dL or higher (in this patient, kappa/lambda ratio age 292, and kappa light chain 99.4 mg/dL)   2. Hypercalcemia (06/26/2023): Calcium 10.1.  Albumin 3.2   3. Bone marrow plasmacytosis 50%  4. Heterogeneous bone demineralization on CT 06/27/2023, suggesting bone marrow infiltrative process like multiple myeloma  5. FDG PET-CT 06/29/2023:  Left femur lesser trochanter; right 4th rib laterally; multilevel thoracolumbar compression deformities; subacute fractures T8, T12, L1, L2  6. Beta 2 microglobulin level 3.18, elevated (06/26/2023)   7. No renal insufficiency, no significant anemia, LDH normal  >>>  -Velcade started 07/13/2023  -Zometa 4 mg IV every 4 weeks) x2 years), started 07/13/2023     Co-morbidities:  NIDDM.  Dyslipidemia.  Hypertension.  Celiac disease.  History of rheumatic mitral valve disease, S/P Maze/ablation procedure  02/02/2018      Vaccination history:   COVID-19, MRNA, LN-S, PF (MODERNA FULL 0.5 ML DOSE) 8/18/2022 , 10/23/2021 , 3/17/2021 , 2/17/2021   Hepatitis A / Hepatitis B 8/18/2022 , 10/5/2021 , 7/29/2020   Influenza - Quadrivalent - MDCK - PF 10/5/2021   Influenza - Trivalent - MDCK - PF 9/10/2015   Meningococcal Conjugate (MCV4O) 8/18/2022 , 4/17/2012   Meningococcal Conjugate (MCV4P) 4/17/2012   Pneumococcal Conjugate - 20 Valent 1/12/2023   Tdap 7/29/2020   Zoster 11/18/2020 , 7/29/2020   Zoster Recombinant 11/18/2020 , 7/29/2020 8/22/2023:    Multiple Myeloma:   Laboratory studies reviewed. Hgb 11.5 and stable.  Calcium and creatinine normal. Labs are adequate to proceed with Velcade injection today.  Myeloma labs from last visit reviewed. Kappa/Lambda FLC ratio is downtrending - from 52.5 to 5.72 on 8/24/2023.   M-spike has decreased from 1.36 g/dl to 0.39 g/dl.  IgG(792.00)normal, down from 2,242.00. Continue Lenalidomide as directed. Will finish current cycle on 9/11/2023.     -Leukocytosis. Currently resolved.     --lower extremity weakness, referred to PT, has not heard from PT regarding appointment. Will send a note to [facility] to inquire.   --chronic back pain. Reviewed Lumbar spine MRI done on 9/14/2023. Multilevel DDD with spinal stenosis severe at L3-L4 appearing to tether the descending nerve roots. Moderate spinal canal stenosis at L4-L5 and L2-L3.     Continue Norco and(Gabapentin which he feels does help), which he feels does help. Advised to follow up with Dr. Hernán Marroquin.     --Abdominal bloating.  Negative exam today. Reviewed oncological medications prescribed by this office, and none were associated with bloating(per UpToDate). Reviewed limited abdominal US.  Was negative, other than a cyst of the right kidney.  Advised him to follow up with PCP for US results of scrotum and testicles.  Per patient, he saw gastroenterologist last week, was advised to take OTC Gas-x(which he states he is)and  if not helping he is to follow up with GI after one week. Provided him today with a list of gas-producing foods to avoid.     Plan:  Multiple myeloma, IgG kappa:  Autologous transplantation:   Category 1 evidence supports proceeding directly after induction therapy to high-dose therapy and HCT  Renal dysfunction and advanced age are not contraindications to transplant     Need the following for staging of multiple myeloma:  Serum beta 2 microglobulin elevated  Serum albumin and LDH are normal.  Evaluation of chromosomal abnormalities by FISH on bone marrow specimen:  Pending as of 07/10/2023     Bortezomib/lenalidomide/dexamethasone (VRd), category 1 regimen:  Cycle length 21 days  -bortezomib 1.3 mg per m2 subQ days 1, 8, and 15  -lenalidomide 25 mg p.o. daily, on days 1 through 14  -dexamethasone 40 mg p.o. with food days 1, 8, and 15  Cycle length: 21 days    If response after primary therapy, then:  Autologous HCT versus continuation of myeloma therapy or maintenance therapy versus tandem autologous transplant, etc.    -Velcade started 07/13/2023  -Zometa 4 mg IV every 4 weeks x2 years (started 07/13/2023    Okay to proceed with cycle 4, day 1 (Bortezomib)  Check baseline TSH and free T4, then, every 3 months (monitoring on lenalidomide) - next due 11/2023  Continue Zometa 4 mg IV every 4 weeks x2 years (started 07/13/2023) - Given on 9/7/2023  Continue baby aspirin 81 mg p.o. q.day concurrently, for thromboprophylaxis secondary to lenalidomide.  Continue levofloxacin 5 mg p.o. q.day X 12 weeks (started 06/26/2023)  Continue acyclovir 400 mg p.o. b.i.d. for herpes zoster prophylaxis with bortezomib  Continue Bactrim ds 1 tablet every Monday/Wednesday/Friday during treatment  Check CBC and CMP weekly during treatment  Patient already referred to [facility] for transplant evaluation- Next appointment is scheduled for 10/3/2023; Tentative plan for BMT around end of November 2023  Patient already referred to  Radiation Oncology ([facility]) for short course radiotherapy to spine for pain control-consultation appointment 7/21. - per daughter not a candidate, not enough lesions to radiate.     RTC in 3 weeks with NP, treatment(cycle # 5, day 1), labs(cbc, cmp and magnesium) done prior.  He will return to clinic on  and 9/28 for cycle 4 day 8 and on 10/5 for day 15 of treatment. Labs done prior.     Weekly assessment for peripheral neuropathy and/or neuropathic pain.  Monitor for hypotension during bortezomib therapy; adjustment of antihypertensives and/or administration of IV hydration may be needed.    Back Pain  -06/26/2023: Started Norco 5 mg p.o. q.6 hours PRN for pain; did not work; he stopped taking  -06/26/2023: Taking Norco 10 mg only twice daily; instructed him to take Norco 10 mg every 4-6 hours p.r.n. for pain (has 9/10 lower back pain, constant, radiating to anterior aspect of right thigh, without neurological symptoms)  -06/26/2023: Started levofloxacin 500 mg p.o. q.day X 12 weeks  -06/26/2023: Severe back pain; presumed due to myeloma; referred to Radiation Oncology  Continue narcotics for back pain    07/10/2023:  Pending:  FISH panel on bone marrow including del(13), del(17p13), t(4;14), t(11;14), t(14;16), t(14;20), 1q21 gain/1q21 amplification, 1p deletion  NGS panel testing on bone marrow specimen     10 mm exophytic hyperdense focus upper pole of right kidney, compatible with a hyperdense cyst, on CT abdomen pelvis with contrast 06/27/2023  -07/12/2023:  MRI abdomen with and without contrast (right renal cyst): Small exophytic right renal lesion most likely a proteinaceous or hemorrhagic cyst (10 mm, upper pole of right kidney)  -Velcade started 07/13/2023     Monitoring on lenalidomide:   Monitor for signs and symptoms of infection (if neutropenic), bleeding or bruising, hepatotoxicity, secondary malignancies, thromboembolism (eg, shortness of breath, chest pain, arm or leg swelling), dermatologic  toxicity, tumor lysis syndrome, or hypersensitivity.     Monitoring on bortezomib:  Peripheral neuropathy, posterior reversible leukoencephalopathy syndrome, progressive multifocal leukoencephalopathy, tumor lysis syndrome, or hyper-/hypoglycemia. Monitor closely in patients with risk factors for heart failure or existing heart disease.        Above discussed at length with the patient.  All questions answered.      Nature of multiple myeloma discussed.    He understands and agrees with this plan.  ----------------------------------   Discussion:     Supportive care:  -bone targeted treatment: Bisphosphonate, category 1; or denosumab to reduce risk of skeletal related events; denosumab is preferred in patients with renal insufficiency; assess vitamin-D status; baseline dental exam; monitor for osteonecrosis of the jaw; monitor renal dysfunction with bisphosphonate therapy  -continue bone targeted treatment (bisphosphonate or denosumab) for 2 years; continue beyond 2 years should be based on clinical judgment  -patients receiving denosumab for bone disease who subsequently discontinued therapy should be given maintenance denosumab every 6 months or a single dose of bisphosphonate to mitigate risk of rebound osteoporosis  -for hypercalcemia, zoledronic acid, denosumab, steroids, and/or calcitonin  -for hyperviscosity, plasmapheresis should be used as adjuvant therapy for symptomatic hyperviscosity  -intravenous immunoglobulin therapy should be considered in the setting of recurrent serious infection and/or hypogammaglobulinemia (IgG </= 400 mg/dL)  -pneumococcal conjugate vaccine, followed by pneumococcal polysaccharide vaccine 1 year later  -Influenza vaccination is recommended; 2 doses of high-dose inactivated quandaivalent influenza vaccine should be considered  -consider 12 weeks of levofloxacin 500 mg p.o. daily at the time of initial diagnosis of multiple myeloma  -COVID-19 vaccination  -highest risk for VTE is  in the 1st 6 months following new diagnosis of multiple myeloma  -VTE prophylaxis if no contraindications to anticoagulation agents or antiplatelets  Recommendations for VTE prophylaxis:  Aspirin  mg daily; low molecular weight heparin equivalent to enoxaparin 40 mg daily; Xarelto 10 mg daily; Eliquis 2.5 mg b.i.d.; Arixtra 2.5 mg daily; Coumadin with target INR 2.0-3.0        Lenalidomide:  Embryo fetal toxicity  Hematologic toxicity.  Neutropenia.  Thrombocytopenia.  Venous and arterial thromboembolism.  DVT.  PE.  MI.  Stroke.  Dizziness, fatigue.  Tumor lysis syndrome.  Heart failure.    Used with caution in patients with renal impairment.  Lenalidomide =/> 4 cycles may decrease the # of CD34 pos cells collected for autologous stem cell transplant.  DVT, PE, atrial fibrillation, renal failure are more likely in patients> 65 years  Hepatotoxicity  Hypersensitivity reactions.  Anaphylaxis.  Angioedema.  Skin rash.  Eosinophilia.  Immediate hypersensitivity reactions.  Second primary malignancy.  AML.  MDS.  Solid tumor malignancies.  Nonmelanoma skin cancers.     Monitoring with lenalidomide:  -CBC with differential: weekly for the first 2 cycles, every 2 weeks during the third cycle and monthly thereafter;  -serum creatinine, LFTs (periodically).  -Thyroid function tests (TSH at baseline then every 2 to 3 months during lenalidomide treatment  -ECG when clinically indicated. Monitor for signs and symptoms of infection (if neutropenic), bleeding or bruising, hepatotoxicity, secondary malignancies, thromboembolism (eg, shortness of breath, chest pain, arm or leg swelling), dermatologic toxicity, tumor lysis syndrome, or hypersensitivity.  Patients who could become pregnant: Pregnancy test 10 to 14 days and 24 hours prior to initiating therapy, weekly during the first 4 weeks of treatment, then every 2 to 4 weeks through 4 weeks after therapy discontinued.     Bortezomib:  Bone marrow suppression.  Neutropenia.   Thrombocytopenia.  Acute CHF.  Nausea, vomiting, diarrhea, constipation, ileus.    Hepatotoxicity:  Herpes zoster and her PCP plaques reactivation.    Anaphylactic reactions.  Hypersensitive reactions.  Angioedema.  Laryngeal edema.    Hypotension.  Peripheral neuropathy.  Posterior reversible leukoencephalopathy syndrome.  Progressive multifocal leukoencephalopathy.  Pulmonary toxicity.  Pneumonitis.  Interstitial pneumonia.  Lung infiltrates.  ARDS.  Thrombotic microangiopathy.  Tumor lysis syndrome.       Monitoring parameters with bortezomib:  CBC, CMP  Blood pressure (to monitor for hypotension)  Peripheral neuropathy  Posterior reversible leukoencephalopathy syndrome, progressive multifocal leukoencephalopathy, tumor lysis syndrome, or hyper-/hypoglycemia. Monitor baseline chest x-ray and then periodic pulmonary function testing (with new or worsening pulmonary symptoms). Monitor closely in patients with risk factors for heart failure or existing heart disease.

## 2023-09-26 DIAGNOSIS — N28.1 RENAL CYST, RIGHT: ICD-10-CM

## 2023-09-26 DIAGNOSIS — C90.00 IGG MULTIPLE MYELOMA: ICD-10-CM

## 2023-09-26 DIAGNOSIS — N43.3 BILATERAL HYDROCELE: Primary | ICD-10-CM

## 2023-09-26 RX ORDER — LENALIDOMIDE 25 MG/1
CAPSULE ORAL
Qty: 14 EACH | Refills: 0 | Status: SHIPPED | OUTPATIENT
Start: 2023-09-26 | End: 2023-10-18 | Stop reason: SDUPTHER

## 2023-09-26 NOTE — PROGRESS NOTES
Attempted to call patient regarding US results. Left voicemail requesting call back. US Scrotum/Testes with bilateral hydroceles and right inguinal hernia. Plan for referral if patient agreeable. Right renal cyst measuring 1.5 x 1.1 x 9 mm. Plan for repeat imaging in 6-12 months to monitor.     Nena Elliott MD  [facility] Resident, [facility]       Addendum 9/26/23 @ 6222  Spoke with patient. Discussed results as above. Patient declines referral to General Surgery at this time. Would like to finish chemotherapy and undergo bone marrow biopsy before referral placed. Would like referral for Urology.     Nena Elliott MD  [facility] Resident, [facility]

## 2023-09-27 DIAGNOSIS — C90.00 IGG MULTIPLE MYELOMA: Primary | ICD-10-CM

## 2023-09-27 RX ORDER — SODIUM CHLORIDE 0.9 % (FLUSH) 0.9 %
10 SYRINGE (ML) INJECTION
Status: CANCELLED | OUTPATIENT
Start: 2023-11-02

## 2023-09-27 RX ORDER — SODIUM CHLORIDE 0.9 % (FLUSH) 0.9 %
10 SYRINGE (ML) INJECTION
Status: CANCELLED | OUTPATIENT
Start: 2023-10-26

## 2023-09-27 RX ORDER — SODIUM CHLORIDE 0.9 % (FLUSH) 0.9 %
10 SYRINGE (ML) INJECTION
Status: CANCELLED | OUTPATIENT
Start: 2023-10-05

## 2023-09-27 RX ORDER — BORTEZOMIB 3.5 MG/1
1.3 INJECTION, POWDER, LYOPHILIZED, FOR SOLUTION INTRAVENOUS; SUBCUTANEOUS
Status: CANCELLED | OUTPATIENT
Start: 2023-10-26

## 2023-09-27 RX ORDER — BORTEZOMIB 3.5 MG/1
1.3 INJECTION, POWDER, LYOPHILIZED, FOR SOLUTION INTRAVENOUS; SUBCUTANEOUS
Status: CANCELLED | OUTPATIENT
Start: 2023-10-19

## 2023-09-27 RX ORDER — HEPARIN 100 UNIT/ML
500 SYRINGE INTRAVENOUS
Status: CANCELLED | OUTPATIENT
Start: 2023-10-19

## 2023-09-27 RX ORDER — BORTEZOMIB 3.5 MG/1
1.3 INJECTION, POWDER, LYOPHILIZED, FOR SOLUTION INTRAVENOUS; SUBCUTANEOUS
Status: CANCELLED | OUTPATIENT
Start: 2023-10-12

## 2023-09-27 RX ORDER — SODIUM CHLORIDE 0.9 % (FLUSH) 0.9 %
10 SYRINGE (ML) INJECTION
Status: CANCELLED | OUTPATIENT
Start: 2023-10-19

## 2023-09-27 RX ORDER — HEPARIN 100 UNIT/ML
500 SYRINGE INTRAVENOUS
Status: CANCELLED | OUTPATIENT
Start: 2023-11-02

## 2023-09-27 RX ORDER — HEPARIN 100 UNIT/ML
500 SYRINGE INTRAVENOUS
Status: CANCELLED | OUTPATIENT
Start: 2023-10-26

## 2023-09-27 RX ORDER — SODIUM CHLORIDE 0.9 % (FLUSH) 0.9 %
10 SYRINGE (ML) INJECTION
Status: CANCELLED | OUTPATIENT
Start: 2023-10-12

## 2023-09-27 RX ORDER — HEPARIN 100 UNIT/ML
500 SYRINGE INTRAVENOUS
Status: CANCELLED | OUTPATIENT
Start: 2023-10-05

## 2023-09-27 RX ORDER — HEPARIN 100 UNIT/ML
500 SYRINGE INTRAVENOUS
Status: CANCELLED | OUTPATIENT
Start: 2023-10-12

## 2023-09-28 ENCOUNTER — OFFICE VISIT (OUTPATIENT)
Dept: HEMATOLOGY/ONCOLOGY | Facility: CLINIC | Age: 68
End: 2023-09-28
Attending: INTERNAL MEDICINE
Payer: MEDICARE

## 2023-09-28 ENCOUNTER — INFUSION (OUTPATIENT)
Dept: INFUSION THERAPY | Facility: HOSPITAL | Age: 68
End: 2023-09-28
Attending: INTERNAL MEDICINE
Payer: MEDICARE

## 2023-09-28 VITALS
HEART RATE: 70 BPM | SYSTOLIC BLOOD PRESSURE: 122 MMHG | DIASTOLIC BLOOD PRESSURE: 73 MMHG | RESPIRATION RATE: 20 BRPM | WEIGHT: 143.38 LBS | HEIGHT: 66 IN | BODY MASS INDEX: 23.04 KG/M2

## 2023-09-28 VITALS
SYSTOLIC BLOOD PRESSURE: 129 MMHG | DIASTOLIC BLOOD PRESSURE: 71 MMHG | HEIGHT: 66 IN | WEIGHT: 143.38 LBS | BODY MASS INDEX: 23.04 KG/M2 | OXYGEN SATURATION: 100 % | TEMPERATURE: 98 F | RESPIRATION RATE: 20 BRPM | HEART RATE: 69 BPM

## 2023-09-28 DIAGNOSIS — M81.0 AGE-RELATED OSTEOPOROSIS WITHOUT CURRENT PATHOLOGICAL FRACTURE: ICD-10-CM

## 2023-09-28 DIAGNOSIS — C90.00 IGG MULTIPLE MYELOMA: Primary | ICD-10-CM

## 2023-09-28 DIAGNOSIS — M48.061 SPINAL STENOSIS OF LUMBAR REGION, UNSPECIFIED WHETHER NEUROGENIC CLAUDICATION PRESENT: ICD-10-CM

## 2023-09-28 DIAGNOSIS — M48.061 SPINAL STENOSIS OF LUMBAR REGION, UNSPECIFIED WHETHER NEUROGENIC CLAUDICATION PRESENT: Primary | ICD-10-CM

## 2023-09-28 DIAGNOSIS — R93.89 ABNORMAL FINDINGS ON DIAGNOSTIC IMAGING OF OTHER SPECIFIED BODY STRUCTURES: ICD-10-CM

## 2023-09-28 DIAGNOSIS — E83.52 HYPERCALCEMIA: ICD-10-CM

## 2023-09-28 DIAGNOSIS — R14.0 ABDOMINAL BLOATING: ICD-10-CM

## 2023-09-28 DIAGNOSIS — S32.020G COMPRESSION FRACTURE OF L2 VERTEBRA WITH DELAYED HEALING, SUBSEQUENT ENCOUNTER: ICD-10-CM

## 2023-09-28 DIAGNOSIS — M48.54XA NONTRAUMATIC COMPRESSION FRACTURE OF T8 VERTEBRA, INITIAL ENCOUNTER: ICD-10-CM

## 2023-09-28 DIAGNOSIS — C79.51 SECONDARY MALIGNANCY OF LUMBAR VERTEBRAL COLUMN: ICD-10-CM

## 2023-09-28 DIAGNOSIS — S32.010G COMPRESSION FRACTURE OF L1 VERTEBRA WITH DELAYED HEALING, SUBSEQUENT ENCOUNTER: ICD-10-CM

## 2023-09-28 PROCEDURE — 99215 PR OFFICE/OUTPT VISIT, EST, LEVL V, 40-54 MIN: ICD-10-PCS | Mod: S$PBB,,, | Performed by: INTERNAL MEDICINE

## 2023-09-28 PROCEDURE — 96401 CHEMO ANTI-NEOPL SQ/IM: CPT

## 2023-09-28 PROCEDURE — 99215 OFFICE O/P EST HI 40 MIN: CPT | Mod: S$PBB,,, | Performed by: INTERNAL MEDICINE

## 2023-09-28 PROCEDURE — 99215 OFFICE O/P EST HI 40 MIN: CPT | Mod: PBBFAC,25 | Performed by: INTERNAL MEDICINE

## 2023-09-28 PROCEDURE — 63600175 PHARM REV CODE 636 W HCPCS: Mod: JZ,JG | Performed by: NURSE PRACTITIONER

## 2023-09-28 RX ORDER — MEGESTROL ACETATE 40 MG/ML
400 SUSPENSION ORAL DAILY
Qty: 300 ML | Refills: 0 | Status: SHIPPED | OUTPATIENT
Start: 2023-09-28 | End: 2024-03-01

## 2023-09-28 RX ORDER — HYDROCODONE BITARTRATE AND ACETAMINOPHEN 10; 325 MG/1; MG/1
1 TABLET ORAL EVERY 4 HOURS PRN
Qty: 120 TABLET | Refills: 0 | Status: SHIPPED | OUTPATIENT
Start: 2023-09-28 | End: 2023-10-12

## 2023-09-28 RX ORDER — BORTEZOMIB 3.5 MG/1
1.3 INJECTION, POWDER, LYOPHILIZED, FOR SOLUTION INTRAVENOUS; SUBCUTANEOUS
Status: COMPLETED | OUTPATIENT
Start: 2023-09-28 | End: 2023-09-28

## 2023-09-28 RX ORDER — PANTOPRAZOLE SODIUM 40 MG/1
40 TABLET, DELAYED RELEASE ORAL
COMMUNITY
Start: 2023-09-07 | End: 2024-03-01

## 2023-09-28 RX ORDER — MEGESTROL ACETATE 40 MG/ML
400 SUSPENSION ORAL DAILY
Qty: 300 ML | Refills: 0 | Status: CANCELLED | OUTPATIENT
Start: 2023-09-28 | End: 2023-10-28

## 2023-09-28 RX ORDER — GABAPENTIN 600 MG/1
600 TABLET ORAL 3 TIMES DAILY
Qty: 90 TABLET | Refills: 2 | Status: SHIPPED | OUTPATIENT
Start: 2023-09-28 | End: 2023-11-02 | Stop reason: SDUPTHER

## 2023-09-28 RX ORDER — SODIUM CHLORIDE 0.9 % (FLUSH) 0.9 %
10 SYRINGE (ML) INJECTION
Status: DISCONTINUED | OUTPATIENT
Start: 2023-09-28 | End: 2023-09-28 | Stop reason: HOSPADM

## 2023-09-28 RX ORDER — HEPARIN 100 UNIT/ML
500 SYRINGE INTRAVENOUS
Status: DISCONTINUED | OUTPATIENT
Start: 2023-09-28 | End: 2023-09-28 | Stop reason: HOSPADM

## 2023-09-28 RX ORDER — HYDROCODONE BITARTRATE AND ACETAMINOPHEN 10; 325 MG/1; MG/1
1 TABLET ORAL EVERY 4 HOURS PRN
Qty: 120 TABLET | Refills: 0 | Status: CANCELLED | OUTPATIENT
Start: 2023-09-28

## 2023-09-28 RX ADMIN — BORTEZOMIB 2.25 MG: 3.5 INJECTION, POWDER, LYOPHILIZED, FOR SOLUTION INTRAVENOUS; SUBCUTANEOUS at 12:09

## 2023-09-28 NOTE — Clinical Note
Continue chemotherapy every 3 weeks Continue Zometa every 4 weeks Follow-up with Dr. Brandy Mahajan, BMT, [facility] for stem cell transplant -continue baby aspirin 81 mg daily Stop levofloxacin now Continue Bactrim ds 1 tablet every day Continue acyclovir 400 mg p.o. b.i.d. Check CBC and CMP weekly Check TSH and free T4 in November Today, check SPEP, BUBBA, FLC assay Continue narcotics Refer to IR for consideration of kyphoplasty Follow-up with me in 2 weeks with labs.

## 2023-09-28 NOTE — PROGRESS NOTES
History:  Past Medical History:   Diagnosis Date    Diabetes mellitus, type 2     GERD (gastroesophageal reflux disease)     Hyperlipidemia     Hypertension     Personal history of colonic polyps 06/29/2022   Past medical history:  NIDDM. Dyslipidemia.  GERD.  Lumbar spinal stenosis.  Vitamin-D deficiency.  Allergic rhinitis.  HTN.  Bell's palsy.  Celiac disease.  Hemorrhoids.  Mitral regurgitation 01/05/2018.  Rheumatic valve narrowing and leaking mitral valve.  -02/02/2018 ([facility]):  АНДРЕЙ, right minimally invasive anterior thoracotomy, right femoral artery and vein exploration, extensive right pleural adhesiolysis/pneumolysis, bilateral Maze/ablation (extensive), left atrial appendage clip placement for left atrial appendage exclusion, mitral valve repair (complex repair with 26 mm annuloplasty ring)  Social history:  .  Lives in [place], [place].  Five children.  Owns a [employer] business.  No history of tobacco, alcohol, or illicit drug abuse.    Family history:  Negative for cancers or blood dyscrasias.    Health maintenance:  PCP in family medicine clinic, [facility].  Had EGD and colonoscopy done 1 year ago by Dr. Zoltan Kapoor, [facility], apparently unremarkable (he gets the endoscopies done periodically because of history of celiac disease).  Past Surgical History:   Procedure Laterality Date    BONE MARROW ASPIRATION N/A 06/27/2023    Procedure: ASPIRATION, BONE MARROW;  Surgeon: Namita Daniels MD;  Location: [facility] ENDOSCOPY;  Service: General;  Laterality: N/A;    BONE MARROW BIOPSY N/A 06/27/2023    Procedure: Biopsy-bone marrow;  Surgeon: Haylie Liu MD;  Location: [facility] ENDOSCOPY;  Service: General;  Laterality: N/A;    CARDIAC SURGERY  2018    COLONOSCOPY  06/29/2022    EYE SURGERY        Social History     Socioeconomic History    Marital status:    Tobacco Use    Smoking status: Never    Smokeless tobacco: Never   Substance and Sexual  Activity    Alcohol use: Never    Drug use: Never      History reviewed. No pertinent family history.     Reason for Follow-up:  -multiple myeloma, IgG kappa  -worsening low back pain  -hypercalcemia    History of Present Illness:   No chief complaint on file.        Oncologic/Hematologic History:  Oncology History   IgG multiple myeloma   6/23/2023 Initial Diagnosis    IgG multiple myeloma     7/13/2023 -  Chemotherapy    Treatment Summary   Plan Name: OP VRD - WEEKLY BORTEZOMIB LENALIDOMIDE DEXAMETHASONE Q3W  Treatment Goal: Curative  Status: Active  Start Date: 7/13/2023  End Date: 11/16/2023 (Planned)  Provider: Narciso Lundy MD  Chemotherapy: bortezomib (VELCADE) injection 2.25 mg, 2.25 mg, Subcutaneous, Clinic/HOD 1 time, 4 of 6 cycles  Administration: 2.25 mg (7/13/2023), 2.25 mg (7/20/2023), 2.25 mg (7/27/2023), 2.25 mg (8/10/2023), 2.25 mg (8/17/2023), 2.25 mg (8/24/2023), 2.25 mg (8/31/2023), 2.25 mg (9/7/2023), 2.25 mg (9/21/2023), 2.25 mg (9/14/2023)  lenalidomide 25 mg Cap, 25 mg, , , 1 of 1 cycle, Start date: 9/26/2023, End date: --     67-year-old gentleman, referred from [facility], with newly diagnosed multiple myeloma.      05/14/2023: [facility] note:  Low back pain, onset early March   - localized to back with occasional sharp pain down posterior aspect of right leg  - fluctuating in intensity, overall worsening since onset, no change in character, difficulty doing ADLs, walking with pain  - difficulty sleeping   - seen in urgent care and C multiple times  - MRI 5/4/23: degenerative disc disease and facet arthrosis, mild spinal canal stenosis L4-L5 greater than L3-L4, no high grade stenosis   - Toradol IM helps briefly  - side effects from gabapentin, Robaxin and Norco- GI and drowsiness   - Voltaren gel not helping  - stopped going to PT due to pain  - follows with Neurologist   - requests trial of tramadol and Lyrica   - going out of country soon, concerned he  will not be able to go due to pain  - denies bowel/ bladder incontinence, night sweats, unexplained weight loss, chest pain or shortness of breath   Spinal stenosis   Acute bilateral low back pain with right-sided sciatica    05/18/2023: [facility] note:  Low back pain.  Onset early March 2023.  Localized lumbar region.  Constant.  Occasional radiation down posterior right leg.  Difficulty sleeping in bed because of exacerbation of pain by lying down flat.  Compensating by sleeping in a recliner or on sofa. MRI showed degenerative disc disease and facet arthrosis at multiple levels with mild canal stenosis.  - Has attempted gabapentin, Robaxin, Norco, Lyrica, and tramadol with minimal relief.  Currently being managed with tramadol and Lyrica.  - Told by neurosurgery that he needs pain management referral because NS does not deal with injections  - Denies saddle anesthesia, bowel/bladder incontinence, weight loss, night sweats    Spinal stenosis of lumbar region  Degeneration of lumbar intervertebral disc  - Continue treatment with tramadol and Lyrica as prescribed  Bradycardia  - Patient aware of chronic bradycardia, states he has been this way for 3-4 years due to palpitations if HR >60  - Currently on Toprol XL 50 mg  - Follows with [facility]    06/13/2023:  [facility] note:   Acute Issues:   Weight loss- 6 weeks history of weight loss 12-14 lb unintentionally.  Reports appetite has been somewhat decreased secondary to pain though has noticed increased size of abdomen.  He denies nausea, vomiting, dysphagia, dyspepsia, bloody stools, staying changes in stool caliber, constipation, fevers or night sweats, nervousness/anxiousness, skin or hair changes.  Denies history of smoking or alcohol use.  Receives EGD and colonoscopy every 2 years per [facility] [place], last was roughly 1 year ago. PSA wnl 5/2023.   Chronic Issues: DM- med compliant metformin 500 daily, last A1C 6.1 in 1/2023.   Chronic  medical conditions:  Hypertension.  Dyslipidemia.  GERD.  Lumbar spinal stenosis.  Vitamin-D deficiency.  Allergic rhinitis.  HTN      Investigations reviewed:  05/04/2023: MRI lumbar spine without contrast (worsening lumbar pain x5-7 weeks with bilateral sciatica):   Lumbar degenerative disc disease and facet arthrosis as detailed above with no acute pathology identified.    Mild spinal canal stenosis at L4-L5 greater than L3-L4 with no high-grade spinal canal stenosis.   Mild bilateral neural foramen stenosis at L3-L4 and on the left greater than right at L4-L5.    06/14/2023:  Skeletal survey:   No definite lesions to suggest either lytic and or sclerotic metastatic changes.   Multiple compression deformities mostly in the lumbar spine but these are seen also in the thoracic spine; other imaging modalities might prove helpful for further assessment; some of these compression deformities appear to be relatively new as compared with an MR of May 2023  (There are some degenerative changes of the thoracic spine with a mild compression deformity of superior endplate of 1 of the lower thoracic vertebral body levels; there are multiple compression deformities of the lumbar spine including the superior endplate of L3 and L4 there is a compression deformity of the superior endplate of T11 with compression deformities of the superior endplate of T12 and a compression deformity of L1; some of these compression deformities appear to be new since the last exam (MR) of May 4, 2023 other imaging modalities might prove helpful for further assessment    Labs reviewed:  01/12/2023:  Hemoglobin 11.9.  MCV 98.7.  Ferritin 211.66.  Transferrin saturation 42%.  Vitamin B12 969.  Folate 13.9.    06/14/2023:  Hemoglobin 11.0.  MCV 99.1.  ESR 15, normal.    06/14/2023: IgG 3810 mg/dL, elevated.  IgM 11, suppressed.  IgA 33, suppressed.  2.28 gm/dL IgG kappa monoclonal protein on SPEP and BUBBA.  Kappa 99.4, elevated.  Lambda 0.3400,  suppressed.  Kappa/lambda ratio 292, elevated.    05/01/2023: PSA 1.7, normal.  06/14/2023:  BUN 15.  Creatinine 0.79.  Calcium 9.6.  Albumin 3.6.  LFTs normal.  TSH normal.  06/16/2023:  , normal.    06/15/2023:  24 hour urine protein 288 mg, elevated (reference Range:  < 229). M spike 183 mg. Monoclonal kappa.    06/16/2023:  Urine:  Kappa 25.5 mg/dL; lambda < 0.7000 mg/dL; kappa/lambda ratio> 36.4    06/26/2023:  Pleasant gentleman who presents for initial medical oncology consultation, accompanied by his wife and family present who is a past interventional cardiologist.  Back pain for 2 months.  Initially started in right groin.  Physical therapy has not helped.  10/10 severity.  Has been taking Tylenol without significant relief.  Secured to take narcotics because of constipation.  Underwent couple of spinal steroid shots 3 weeks ago, with minor relief.  Pain is present all the time.  Pain increases with changing position in bed as well as upon standing.  He finds it difficult to ambulate.  Pain does not radiate down lower extremities and is not accompanied with lower extremity weakness, numbness, urinary or bowel incontinence, or saddle anesthesia.  Also experiences muscle cramps.  Generalized weakness.  However, ECOG 2.  No fevers or chills.  No repeated infections.  Cramps in hands and feet.  Appetite is down.  Appetite has picked up in last 10 days.  Has lost 12-14 lb in last 1-1/2 months.  No abnormal bleeding or bruising.      Interval History:  INF FLUIDS   OP VRD - WEEKLY BORTEZOMIB LENALIDOMIDE DEXAMETHASONE Q3W     09/28/2023:   -VRD:  Cycle 1 started 07/13/2023; cycle 2 started 08/10/2023; cycle 3 started 08/31/2023; cycle 4 started 09/21/2023  -Zometa 4 mg IV every 4 weeks, started 07/13/2023  -06/27/2023: Bone marrow biopsy:  Abnormal myeloma FISH panel:  Aneuploidy: gain of chromosomes 7, 9, 15 and CCND1/11q) gain of chromosome 7; gain of chromosome 9; gain of chromosome 15) (aneuploidy is  the most common genetic alteration detected in plasma cell myeloma by FISH analysis, seen in approximately 69-90% of patients)  Additional signal for IGH/14q DNA sequence (clinical significance of this finding is unclear)  Loss of MAF/16q) also a common finding in plasma cell neoplasms and is reported to be associated with a poor prognosis in patients with myeloma)  -07/20/2023:  Venous Doppler right lower extremity (swelling):  No right lower extremity DVT; left common femoral vein patent  -07/21/2023: Radiation oncology consultation:  No significant lytic lesions within the areas in the spine that are giving him pain, which seems to be contributing to his problems; radiotherapy would be very unlikely to provide benefit in terms of pain control given the appearance of his imaging, including multiple compression deformities of the spine which could be related to myeloma, however; patient referred to IR for kyphoplasty  -08/08/2023: MRI thoracic spine with and without contrast:   Chronic superior endplate compression fractures at the T9 through L2 vertebral bodies with no acute/subacute fracture.   No osseous lesion or soft tissue mass.   Diffuse cervical and thoracic degenerative disease as detailed above without high-grade stenosis.  -09/06/2023:  Ultrasound abdomen, limited:  Cyst of right kidney, 1.5 x 1.1 x 9 mm  -09/06/2023:  Ultrasound scrotum and testicles (right inguinal hernia):  Bilateral hydroceles with no other significant abnormality; right inguinal hernia  -09/14/2023: MRI lumbar spine with and without contrast (back pain, right-sided sciatica) pelvic comparison:  05/04/2023):   Interval significant progression of skeletal demineralization, with new or progressive loss of vertebral body height throughout the spine when compared to May 2023.   Schmorl's node type superior endplate concavity at L4 is acute or subacute, with marrow edema and enhancement observed here.  All other areas of loss of vertebral  body height are chronic with no marrow edema or enhancement associated.   Multilevel degenerative disc disease as above, with spinal canal stenosis severe at L3-L4 appearing to tether the descending nerve roots.  Moderate spinal canal stenosis at L4-L5 and L2-L3.  -08/03/2023: TSH normal; free T4 normal  -06/14/2023: IgG 3810, elevated; IgM, IgA suppressed; kappa/lambda ratio 292; IgG kappa monoclonal protein 2.2 gm/dL  -07/27/2023:  IgG 2242, elevated but down; IgM, IgA remain suppressed; kappa/lambda ratio of 59.0, down; IgG kappa monoclonal protein 1.36 gm/dL (chemotherapy was started 07/13/2023)  -08/24/2023: IgG 792, has normalized; IgM, IgA remains suppressed; kappa/lambda ratio 5.72, elevated but significantly down; IgG kappa monoclonal protein 0.39 gm/dL, significantly down  Labs reviewed.    Presents for a follow-up visit, accompanied by his wife and a family friend.  Overall, stable.  Constant back pain secondary to DJD/spinal stenosis/collapse of vertebral bodies.  Apparently, consulted with the neurosurgeon who did not plan to do any surgery on the spine.  On gabapentin and Percocet; requesting refill.  No appetite; will start Megace.  Will order DEXA scan to assess bone mineral density.  Generalized weakness.  ECOG 1.  Some heartburn.  Takes Pepcid in addition to Decadron which is part of chemotherapy.  No GI bleeding.  Some constipation.  M protein is dropping satisfactorily with chemotherapy.  Says that he has appointment to visit with Dr. Brandy Mahajan, [facility], next week.        Medications:  Current Outpatient Medications on File Prior to Visit   Medication Sig Dispense Refill    abiraterone (ZYTIGA) 500 mg Tab Take 1,000 mg by mouth once daily Take 2 (two) 500 mg tab (to equal 1,000 mg) by mouth daily.      acyclovir (ZOVIRAX) 400 MG tablet Take 1 tablet (400 mg total) by mouth 2 (two) times daily. 60 tablet 4    ammonium lactate 12 % Crea Apply 1 application topically once daily. 385 g 2     ascorbic acid, vitamin C, (VITAMIN C) 1000 MG tablet Take 1,000 mg by mouth once daily.      aspirin (ECOTRIN) 81 MG EC tablet Take 81 mg by mouth once daily.      atorvastatin (LIPITOR) 20 MG tablet Take 20 mg by mouth once daily.      calcium carbonate 195 mg calcium (500 mg) Chew Take 1 tablet by mouth once daily.      dexAMETHasone (DECADRON) 4 MG Tab Take 10 tablets (40 mg total) by mouth every 7 days. on days 1, 8, and 15 of each chemotherapy cycle. Take with food. 30 tablet 5    diclofenac sodium (VOLTAREN) 1 % Gel Apply 2 g topically 4 (four) times daily. 450 g 1    famotidine (PEPCID) 40 MG tablet Take 40 mg by mouth every evening.      fluticasone propionate (FLONASE) 50 mcg/actuation nasal spray 50 sprays by Nasal route 2 (two) times a day.      gabapentin (NEURONTIN) 100 MG capsule Take 100 mg by mouth 3 (three) times daily.      gabapentin (NEURONTIN) 400 MG capsule       gabapentin (NEURONTIN) 600 MG tablet Take 600 mg by mouth 3 (three) times daily.      hydrocortisone (ANUSOL-HC) 2.5 % rectal cream Place 1 applicator rectally 2 (two) times daily. 28 g 2    ketoconazole (NIZORAL) 2 % cream Apply topically once daily. (Patient not taking: Reported on 8/31/2023) 60 g 1    lenalidomide 25 mg Cap Take 1 capsule by mouth once daily on days 1 to 14 of each 21 day cycle.. 14 each 0    levoFLOXacin (LEVAQUIN) 500 MG tablet Take 1 tablet by mouth once daily 30 tablet 0    levoFLOXacin (LEVAQUIN) 500 MG tablet Take 1 tablet (500 mg total) by mouth once daily. 30 tablet 2    linaCLOtide (LINZESS) 72 mcg Cap capsule Take 1 capsule (72 mcg total) by mouth daily as needed (constipation). 90 each 1    metFORMIN (GLUCOPHAGE) 500 MG tablet Take 1 tablet (500 mg total) by mouth 2 (two) times daily with meals. 180 tablet 3    metoprolol succinate (TOPROL-XL) 50 MG 24 hr tablet Take by mouth.      MIRALAX 17 gram/dose powder Take 17 g by mouth.      morphine (MS CONTIN) 30 MG 12 hr tablet Take 1 tablet (30 mg total) by  mouth 2 (two) times daily. Every 12 hours (Patient not taking: Reported on 8/31/2023) 30 tablet 0    MOVANTIK 25 mg tablet       naloxone (NARCAN) 4 mg/actuation Spry SMARTSIG:Both Nares      nystatin (MYCOSTATIN) powder Apply topically 4 (four) times daily. (Patient not taking: Reported on 8/31/2023) 60 g 2    pantoprazole (PROTONIX) 20 MG tablet Take 1 tablet (20 mg total) by mouth once daily. (Patient not taking: Reported on 8/31/2023) 30 tablet 11    pantoprazole (PROTONIX) 40 MG tablet Take 40 mg by mouth.      sulfamethoxazole-trimethoprim 800-160mg (BACTRIM DS) 800-160 mg Tab Take 1 tablet by mouth on MWF of each week 48 tablet 3     Current Facility-Administered Medications on File Prior to Visit   Medication Dose Route Frequency Provider Last Rate Last Admin    alteplase injection 2 mg  2 mg Intra-Catheter PRN Narciso Lundy MD        heparin, porcine (PF) 100 unit/mL injection flush 500 Units  500 Units Intravenous PRN Narciso Lundy MD        sodium chloride 0.9% flush 10 mL  10 mL Intravenous PRN Narciso Lundy MD           Review of Systems:   All systems reviewed and found to be negative except for the symptoms detailed above    Physical Examination:   VITAL SIGNS:   Vitals:    09/28/23 1139   BP: 129/71   Pulse: 69   Resp: 20   Temp: 97.9 °F (36.6 °C)     GENERAL:  In no apparent distress.    HEAD:  No signs of head trauma.  EYES:  Pupils are equal.  Extraocular motions intact.    EARS:  Hearing grossly intact.  MOUTH:  Oropharynx is normal.   NECK:  No adenopathy, no JVD.     CHEST:  Chest with clear breath sounds bilaterally.  No wheezes, rales, rhonchi.    CARDIAC:  Regular rate and rhythm.  S1 and S2, without murmurs, gallops, rubs.  VASCULAR:  No Edema.  Peripheral pulses normal and equal in all extremities.  ABDOMEN:  Soft, without detectable tenderness.  No sign of distention.  No   rebound or guarding, and no masses palpated.   Bowel Sounds normal.  MUSCULOSKELETAL:  Good range of  "motion of all major joints. Extremities without clubbing, cyanosis or edema.    NEUROLOGIC EXAM:  Alert and oriented x 3.  No focal sensory or strength deficits.   Speech normal.  Follows commands.  PSYCHIATRIC:  Mood normal.    No results for input(s): "CBC" in the last 72 hours.   No results for input(s): "CMP" in the last 72 hours.     Assessment:  Problem List Items Addressed This Visit          Neuro    Compression fracture of thoracic spine, non-traumatic    Compression fracture of first lumbar vertebra    Compression fracture of L2 lumbar vertebra    Spinal stenosis of lumbar region       Renal/    Hypercalcemia       Oncology    IgG multiple myeloma - Primary    Secondary malignancy of lumbar vertebral column       Multiple myeloma, IgG kappa:  -presentation: 03/2023:  Worsening low back pain, no associated neurological symptoms, 12-14 pound weight loss over 6 weeks, mild L4-L5 spinal canal stenosis on MRI (05/04/2023), lumbar DJD and facet arthrosis on MRI lumbar spine (05/04/2023)  -skeletal survey 06/14/2023: Negative for lytic or sclerotic lesions; multiple compression deformities lumbar spine and thoracic spine (new since MRI scan dated 05/04/2023)  -06/14/2023:  Mild anemia (hemoglobin 11.0)   -06/14/2023:  2.28 gm/dL IgG kappa M protein. Kappa 99.4, elevated.  Lambda 0.3400, suppressed.  Kappa/lambda ratio 292, elevated.  -06/14/2023:  BUN, creatinine, calcium, albumin normal  -06/15/2022:  24 hour urine protein 280 mg, elevated.  M spike 183 mg.  Urine BUBBA showed monoclonal kappa light chains.  -06/16/2023: Urine:  Kappa 25.5 mg/dL; lambda < 0.7000 mg/dL; kappa/lambda ratio> 36.4  06/16/2023:  , normal.  -06/26/2023:  Hemoglobin 11.0.  Beta 2 microglobulin 3.18.  Calcium 10.1.  Albumin 3.2.  Hepatitis-A/B/C/HIV serology negative.  -bone marrow biopsy 06/27/2022:  Plasma cells 50% in bone marrow aspirate; plasma cells 36% on flow cytometry of bone marrow aspirate, with kappa light chain " restriction; molecular studies pending  -CT abdomen pelvis with contrast 06/27/2023:  Hepatic steatosis; bilateral inguinal hernias; heterogeneous bone demineralization, suggesting marrow infiltrative process like multiple myeloma; multilevel compression deformities in the included thoracic spine and lumbar spine; 10 mm exophytic hyperdense focus upper pole of right kidney, compatible with a hyperdense cyst  -FDG PET-CT 06/29/2023:  Left femur lesser trochanter; right 4th rib laterally; multilevel thoracolumbar compression deformities; subacute fractures T8, T12, L1, L2  >>>  From the data available so far, patient has multiple myeloma (symptomatic), by virtue of:  1. Involved: Uninvolved serum FLC ratio =/> 100 and involved FLC concentration 10 mg/dL or higher (in this patient, kappa/lambda ratio age 292, and kappa light chain 99.4 mg/dL)   2. Hypercalcemia (06/26/2023): Calcium 10.1.  Albumin 3.2   3. Bone marrow plasmacytosis 50%  4. Heterogeneous bone demineralization on CT 06/27/2023, suggesting bone marrow infiltrative process like multiple myeloma  5. FDG PET-CT 06/29/2023:  Left femur lesser trochanter; right 4th rib laterally; multilevel thoracolumbar compression deformities; subacute fractures T8, T12, L1, L2  6. Beta 2 microglobulin level 3.18, elevated (06/26/2023)   7. No renal insufficiency, no significant anemia, LDH normal  >>>  -Velcade started 07/13/2023  -Zometa 4 mg IV every 4 weeks (x2 years), started 07/13/2023  -VRD:  Cycle 1 started 07/13/2023; cycle 2 started 08/10/2023; cycle 3 started 08/31/2023; cycle 4 started 09/21/2023  -Zometa 4 mg IV every 4 weeks, started 07/13/2023  -06/27/2023: Bone marrow biopsy:  Abnormal myeloma FISH panel:  Aneuploidy: gain of chromosomes 7, 9, 15 and CCND1/11q) gain of chromosome 7; gain of chromosome 9; gain of chromosome 15) (aneuploidy is the most common genetic alteration detected in plasma cell myeloma by FISH analysis, seen in approximately 69-90% of  patients)  Additional signal for IGH/14q DNA sequence (clinical significance of this finding is unclear)  Loss of MAF/16q) also a common finding in plasma cell neoplasms and is reported to be associated with a poor prognosis in patients with myeloma)  -07/21/2023: Radiation oncology consultation:  Radiotherapy to spine not indicated/will not be helpful; patient referred to IR for kyphoplasty  -MRI thoracic spine 08/08/2023:  Chronic superior endplate compression fractures T9-L2 vertebral bodies, no acute/subacute fracture, no osseous lesion or soft tissue mass  -MRI lumbar spine 09/14/2023:  Significant progression of skeletal demineralization; new/progressive loss of vertebral body height throughout the spine since 05/2023; superior endplate L4 acute/subacute with marrow edema and enhancement; multilevel degenerative disc disease; spinal stenosis severe at L3-L5 with tethering of descending nerve root; moderate spinal canal stenosis at L4-L5 and L2-L3  -08/03/2023: TSH normal; free T4 normal  -06/14/2023: IgG 3810, elevated; IgM, IgA suppressed; kappa/lambda ratio 292; IgG kappa monoclonal protein 2.2 gm/dL  -07/27/2023:  IgG 2242, elevated but down; IgM, IgA remain suppressed; kappa/lambda ratio of 59.0, down; IgG kappa monoclonal protein 1.36 gm/dL (chemotherapy was started 07/13/2023)  -08/24/2023: IgG 792, has normalized; IgM, IgA remains suppressed; kappa/lambda ratio 5.72, elevated but significantly down; IgG kappa monoclonal protein 0.39 gm/dL, significantly down      Staging of myeloma:  -serum beta 2 microglobulin elevated (3.18)  -serum albumin and LDH normal   -myeloma FISH panel on bone marrow:  Negative for high-risk cytogenetic abnormalities  >>>  ISS stage:  Stage I  R-ISS stage:  Stage I      Co-morbidities:  NIDDM.  Dyslipidemia.  Hypertension.  Celiac disease.  History of rheumatic mitral valve disease, S/P Maze/ablation procedure 02/02/2018      Vaccination history:   COVID-19, MRNA, LN-S, PF  (MODERNA FULL 0.5 ML DOSE) 8/18/2022 , 10/23/2021 , 3/17/2021 , 2/17/2021   Hepatitis A / Hepatitis B 8/18/2022 , 10/5/2021 , 7/29/2020   Influenza - Quadrivalent - MDCK - PF 10/5/2021   Influenza - Trivalent - MDCK - PF 9/10/2015   Meningococcal Conjugate (MCV4O) 8/18/2022 , 4/17/2012   Meningococcal Conjugate (MCV4P) 4/17/2012   Pneumococcal Conjugate - 20 Valent 1/12/2023   Tdap 7/29/2020   Zoster 11/18/2020 , 7/29/2020   Zoster Recombinant 11/18/2020 , 7/29/2020       Plan:  Continue chemotherapy every 3 weeks  Continue Zometa every 4 weeks  Follow-up with Dr. Brandy Mahajan, [facility], [facility] for stem cell transplant  -continue baby aspirin 81 mg daily  Stop levofloxacin now  Continue Bactrim ds 1 tablet every day  Continue acyclovir 400 mg p.o. b.i.d.  Check CBC and CMP weekly  Check TSH and free T4 in November  Today, check SPEP, BUBBA, FLC assay  Continue narcotics  Refill gabapentin 600 mg p.o. t.i.d.   Refill Norco 10 mg every 4 hours PRN   Order DEXA scan to assess bone mineral density   Start Megace 400 mg p.o. q.day  Refer to IR for consideration of kyphoplasty  Follow-up with me in 2 weeks with labs.  ---------------------      Staging of myeloma:  -serum beta 2 microglobulin elevated (3.18)  -serum albumin and LDH normal   -myeloma FISH panel on bone marrow:  Negative for high-risk cytogenetic abnormalities  >>>  ISS stage:  Stage I  R-ISS stage:  Stage I    09/28/2023:   Refill gabapentin 600 mg p.o. t.i.d.   Refill Norco 10 mg every 4 hours PRN   Order DEXA scan to assess bone mineral density   Start Megace 400 mg p.o. q.day    -06/26/2023: Started Norco 5 mg p.o. q.6 hours PRN for pain; did not work; he stopped taking  -06/26/2023: Taking Norco 10 mg only twice daily; instructed him to take Norco 10 mg every 4-6 hours p.r.n. for pain (has 9/10 lower back pain, constant, radiating to anterior aspect of right thigh, without neurological symptoms)  -06/26/2023: Started levofloxacin 500 mg p.o. q.day X 12  weeks  -06/26/2023: Severe back pain; presumed due to myeloma; referred to Radiation Oncology  -Velcade started 07/13/2023  -Zometa 4 mg IV every 4 weeks x2 years (started 07/13/2023  -VRD:  Cycle 1 started 07/13/2023; cycle 2 started 08/10/2023; cycle 3 started 08/31/2023; cycle 4 started 09/21/2023  -Zometa 4 mg IV every 4 weeks, started 07/13/2023  -06/27/2023: Bone marrow biopsy:  Abnormal myeloma FISH panel:  Aneuploidy: gain of chromosomes 7, 9, 15 and CCND1/11q) gain of chromosome 7; gain of chromosome 9; gain of chromosome 15) (aneuploidy is the most common genetic alteration detected in plasma cell myeloma by FISH analysis, seen in approximately 69-90% of patients)  Additional signal for IGH/14q DNA sequence (clinical significance of this finding is unclear)  Loss of MAF/16q) also a common finding in plasma cell neoplasms and is reported to be associated with a poor prognosis in patients with myeloma)  -07/21/2023: Radiation oncology consultation:  Radiotherapy to spine not indicated/will not be helpful; patient referred to IR for kyphoplasty  -MRI thoracic spine 08/08/2023:  Chronic superior endplate compression fractures T9-L2 vertebral bodies, no acute/subacute fracture, no osseous lesion or soft tissue mass  -MRI lumbar spine 09/14/2023:  Significant progression of skeletal demineralization; new/progressive loss of vertebral body height throughout the spine since 05/2023; superior endplate L4 acute/subacute with marrow edema and enhancement; multilevel degenerative disc disease; spinal stenosis severe at L3-L5 with tethering of descending nerve root; moderate spinal canal stenosis at L4-L5 and L2-L3  -08/03/2023: TSH normal; free T4 normal  -06/14/2023: IgG 3810, elevated; IgM, IgA suppressed; kappa/lambda ratio 292; IgG kappa monoclonal protein 2.2 gm/dL  -07/27/2023:  IgG 2242, elevated but down; IgM, IgA remain suppressed; kappa/lambda ratio of 59.0, down; IgG kappa monoclonal protein 1.36 gm/dL  (chemotherapy was started 07/13/2023)  -08/24/2023: IgG 792, has normalized; IgM, IgA remains suppressed; kappa/lambda ratio 5.72, elevated but significantly down; IgG kappa monoclonal protein 0.39 gm/dL, significantly down  >>>  -ISS stage:  Stage I  -R-ISS stage:  Stage I  -bone marrow negative for high-risk cytogenetic abnormalities  -VRD started 07/13/2023   -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 Brandy Mahajan, [facility], planning autologous hematopoietic stem cell transplant after 4 cycles of chemotherapy  -continue chemotherapy for now  Continue baby aspirin 81 mg p.o. q.day concurrently, for thromboprophylaxis secondary to lenalidomide.  Continue levofloxacin 500 mg p.o. q.day X 12 weeks (started 06/26/2023) (may stop now)  Continue acyclovir 400 mg p.o. b.i.d. for herpes zoster prophylaxis with bortezomib  Continue Bactrim ds 1 tablet every Monday/Wednesday/Friday during treatment  Check CBC and CMP weekly during treatment  -check TSH and free T4 every 3 months (monitoring on Revlimid); next, November  -weekly assessment for peripheral neuropathy and/or neuropathic pain  -monitor for hypotension during bortezomib therapy; adjust antihypertensives and/or administer IV fluids  -assess response with repeat SPEP, BUBBA, FLC assay, and 24 hour urine protein for total protein, UPEP, urine BUBBA, and urine FLC assay in 1 month (due now)  -continue Zometa 4 mg IV every 4 weeks for 2 years (started 07/13/2023)  -per Radiation Oncology, given radiologic findings, palliative radiotherapy to spine will not be helpful  -continue narcotics for pain  -on MRI thoracic and lumbar spine 08/08/2023 and 09/14/2023:  Chronic compression fractures of vertebral bodies; no osseous lesion or soft  tissue mass; significant progression of skeletal demineralization; progressive loss of vertebral body height throughout the spine since May 2023; spinal stenosis  -patient has been referred to IR for consideration of kyphoplasty    10 mm exophytic hyperdense focus upper pole of right kidney, compatible with a hyperdense cyst, on CT abdomen pelvis with contrast 06/27/2023  -07/12/2023:  MRI abdomen with and without contrast (right renal cyst): Small exophytic right renal lesion most likely a proteinaceous or hemorrhagic cyst (10 mm, upper pole of right kidney)    Staging of myeloma:  -serum beta 2 microglobulin elevated (3.18)  -serum albumin and LDH normal   -myeloma FISH panel on bone marrow:  Negative for high-risk cytogenetic abnormalities  >>>  -ISS stage:  Stage I  -R-ISS stage:  Stage I    Bortezomib/lenalidomide/dexamethasone (VRd), category 1 regimen:  Cycle length 21 days  -bortezomib 1.3 mg per m2 subQ days 1, 8, and 15  -lenalidomide 25 mg p.o. daily, on days 1 through 14  -dexamethasone 40 mg p.o. with food days 1, 8, and 15  Cycle length: 21 days    Monitoring on lenalidomide:   Monitor for signs and symptoms of infection (if neutropenic), bleeding or bruising, hepatotoxicity, secondary malignancies, thromboembolism (eg, shortness of breath, chest pain, arm or leg swelling), dermatologic toxicity, tumor lysis syndrome, or hypersensitivity.    Monitoring on bortezomib:  Peripheral neuropathy, posterior reversible leukoencephalopathy syndrome, progressive multifocal leukoencephalopathy, tumor lysis syndrome, or hyper-/hypoglycemia. Monitor closely in patients with risk factors for heart failure or existing heart disease.    Autologous transplantation:   Category 1 evidence supports proceeding directly after induction therapy to high-dose therapy and HCT  Renal dysfunction and advanced age are not contraindications to transplant    If response after primary therapy, then:  Autologous HCT versus  continuation of myeloma therapy or maintenance therapy versus tandem autologous transplant, etc.    Follow-up with me in 2 weeks with labs.    Above discussed at length with the patient.  All questions answered.    Discussed labs and scans and gave him copies of relevant reports.    He understands and agrees with this plan.  ----------------------------------    Discussion:    Supportive care:  -bone targeted treatment: Bisphosphonate, category 1; or denosumab to reduce risk of skeletal related events; denosumab is preferred in patients with renal insufficiency; assess vitamin-D status; baseline dental exam; monitor for osteonecrosis of the jaw; monitor renal dysfunction with bisphosphonate therapy  -continue bone targeted treatment (bisphosphonate or denosumab) for 2 years; continue beyond 2 years should be based on clinical judgment  -patients receiving denosumab for bone disease who subsequently discontinued therapy should be given maintenance denosumab every 6 months or a single dose of bisphosphonate to mitigate risk of rebound osteoporosis  -for hypercalcemia, zoledronic acid, denosumab, steroids, and/or calcitonin  -for hyperviscosity, plasmapheresis should be used as adjuvant therapy for symptomatic hyperviscosity  -intravenous immunoglobulin therapy should be considered in the setting of recurrent serious infection and/or hypogammaglobulinemia (IgG </= 400 mg/dL)  -pneumococcal conjugate vaccine, followed by pneumococcal polysaccharide vaccine 1 year later  -Influenza vaccination is recommended; 2 doses of high-dose inactivated quandaivalent influenza vaccine should be considered  -consider 12 weeks of levofloxacin 500 mg p.o. daily at the time of initial diagnosis of multiple myeloma  -COVID-19 vaccination  -highest risk for VTE is in the 1st 6 months following new diagnosis of multiple myeloma  -VTE prophylaxis if no contraindications to anticoagulation agents or antiplatelets  Recommendations for VTE  prophylaxis:  Aspirin  mg daily; low molecular weight heparin equivalent to enoxaparin 40 mg daily; Xarelto 10 mg daily; Eliquis 2.5 mg b.i.d.; Arixtra 2.5 mg daily; Coumadin with target INR 2.0-3.0      Lenalidomide:  Embryo fetal toxicity  Hematologic toxicity.  Neutropenia.  Thrombocytopenia.  Venous and arterial thromboembolism.  DVT.  PE.  MI.  Stroke.  Dizziness, fatigue.  Tumor lysis syndrome.  Heart failure.    Used with caution in patients with renal impairment.  Lenalidomide =/> 4 cycles may decrease the # of CD34 pos cells collected for autologous stem cell transplant.  DVT, PE, atrial fibrillation, renal failure are more likely in patients> 65 years  Hepatotoxicity  Hypersensitivity reactions.  Anaphylaxis.  Angioedema.  Skin rash.  Eosinophilia.  Immediate hypersensitivity reactions.  Second primary malignancy.  AML.  MDS.  Solid tumor malignancies.  Nonmelanoma skin cancers.    Monitoring with lenalidomide:  -CBC with differential: weekly for the first 2 cycles, every 2 weeks during the third cycle and monthly thereafter;  -serum creatinine, LFTs (periodically).  -Thyroid function tests (TSH at baseline then every 2 to 3 months during lenalidomide treatment  -ECG when clinically indicated. Monitor for signs and symptoms of infection (if neutropenic), bleeding or bruising, hepatotoxicity, secondary malignancies, thromboembolism (eg, shortness of breath, chest pain, arm or leg swelling), dermatologic toxicity, tumor lysis syndrome, or hypersensitivity.  Patients who could become pregnant: Pregnancy test 10 to 14 days and 24 hours prior to initiating therapy, weekly during the first 4 weeks of treatment, then every 2 to 4 weeks through 4 weeks after therapy discontinued.    Bortezomib:  Bone marrow suppression.  Neutropenia.  Thrombocytopenia.  Acute CHF.  Nausea, vomiting, diarrhea, constipation, ileus.    Hepatotoxicity:  Herpes zoster and her PCP plaques reactivation.    Anaphylactic reactions.   Hypersensitive reactions.  Angioedema.  Laryngeal edema.    Hypotension.  Peripheral neuropathy.  Posterior reversible leukoencephalopathy syndrome.  Progressive multifocal leukoencephalopathy.  Pulmonary toxicity.  Pneumonitis.  Interstitial pneumonia.  Lung infiltrates.  ARDS.  Thrombotic microangiopathy.  Tumor lysis syndrome.      Monitoring parameters with bortezomib:  CBC, CMP  Blood pressure (to monitor for hypotension)  Peripheral neuropathy  Posterior reversible leukoencephalopathy syndrome, progressive multifocal leukoencephalopathy, tumor lysis syndrome, or hyper-/hypoglycemia. Monitor baseline chest x-ray and then periodic pulmonary function testing (with new or worsening pulmonary symptoms). Monitor closely in patients with risk factors for heart failure or existing heart disease.      Follow-up:  No follow-ups on file.

## 2023-09-28 NOTE — Clinical Note
Refill gabapentin 600 mg p.o. t.i.d.  Refill Norco 10 mg every 4 hours PRN  Order DEXA scan to assess bone mineral density  Start Megace 400 mg p.o. q.day

## 2023-09-28 NOTE — NURSING
1234  Pt did labs, saw provider, and is here for C 4 D 8 velcade.   Pt is accomp by a male friend.  Rates LOP 8/10 to back and reports has issues with constipation.

## 2023-09-29 ENCOUNTER — TELEPHONE (OUTPATIENT)
Dept: HEMATOLOGY/ONCOLOGY | Facility: CLINIC | Age: 68
End: 2023-09-29
Payer: MEDICARE

## 2023-09-29 NOTE — TELEPHONE ENCOUNTER
Received PA request from [facility] pharmacy for Lenalidomide 25mg. Upon attempting to initiate PA on Cover my meds, noted authorization already on file for this request; authorization starting on 01/01/2023 and ending on 12/31/2023 (under info regarding your request). Called pharmacy and spoke with Joanne espinoza, she reported that medication has been approved, shipment is scheduled, and patient is aware.

## 2023-10-03 ENCOUNTER — TELEPHONE (OUTPATIENT)
Dept: FAMILY MEDICINE | Facility: CLINIC | Age: 68
End: 2023-10-03
Payer: MEDICARE

## 2023-10-03 DIAGNOSIS — K40.90 NON-RECURRENT UNILATERAL INGUINAL HERNIA WITHOUT OBSTRUCTION OR GANGRENE: Primary | ICD-10-CM

## 2023-10-05 ENCOUNTER — INFUSION (OUTPATIENT)
Dept: INFUSION THERAPY | Facility: HOSPITAL | Age: 68
End: 2023-10-05
Attending: INTERNAL MEDICINE
Payer: MEDICARE

## 2023-10-05 ENCOUNTER — DOCUMENTATION ONLY (OUTPATIENT)
Dept: HEMATOLOGY/ONCOLOGY | Facility: CLINIC | Age: 68
End: 2023-10-05
Payer: MEDICARE

## 2023-10-05 VITALS
TEMPERATURE: 98 F | OXYGEN SATURATION: 100 % | BODY MASS INDEX: 23.83 KG/M2 | WEIGHT: 146.5 LBS | SYSTOLIC BLOOD PRESSURE: 116 MMHG | DIASTOLIC BLOOD PRESSURE: 72 MMHG | HEART RATE: 57 BPM

## 2023-10-05 DIAGNOSIS — C90.00 IGG MULTIPLE MYELOMA: Primary | ICD-10-CM

## 2023-10-05 PROCEDURE — 96401 CHEMO ANTI-NEOPL SQ/IM: CPT

## 2023-10-05 PROCEDURE — 96365 THER/PROPH/DIAG IV INF INIT: CPT

## 2023-10-05 PROCEDURE — 25000003 PHARM REV CODE 250: Performed by: INTERNAL MEDICINE

## 2023-10-05 PROCEDURE — 63600175 PHARM REV CODE 636 W HCPCS: Mod: JZ,JG | Performed by: NURSE PRACTITIONER

## 2023-10-05 PROCEDURE — 63600175 PHARM REV CODE 636 W HCPCS: Performed by: INTERNAL MEDICINE

## 2023-10-05 RX ORDER — SODIUM CHLORIDE 0.9 % (FLUSH) 0.9 %
10 SYRINGE (ML) INJECTION
Status: DISCONTINUED | OUTPATIENT
Start: 2023-10-05 | End: 2023-10-05 | Stop reason: HOSPADM

## 2023-10-05 RX ORDER — BORTEZOMIB 3.5 MG/1
1.3 INJECTION, POWDER, LYOPHILIZED, FOR SOLUTION INTRAVENOUS; SUBCUTANEOUS
Status: COMPLETED | OUTPATIENT
Start: 2023-10-05 | End: 2023-10-05

## 2023-10-05 RX ORDER — HEPARIN 100 UNIT/ML
500 SYRINGE INTRAVENOUS
Status: DISCONTINUED | OUTPATIENT
Start: 2023-10-05 | End: 2023-10-05 | Stop reason: HOSPADM

## 2023-10-05 RX ADMIN — ZOLEDRONIC ACID 4 MG: 4 INJECTION, SOLUTION, CONCENTRATE INTRAVENOUS at 12:10

## 2023-10-05 RX ADMIN — SODIUM CHLORIDE: 9 INJECTION, SOLUTION INTRAVENOUS at 12:10

## 2023-10-05 RX ADMIN — BORTEZOMIB 2.25 MG: 3.5 INJECTION, POWDER, LYOPHILIZED, FOR SOLUTION INTRAVENOUS; SUBCUTANEOUS at 12:10

## 2023-10-05 NOTE — NURSING
BOBBY Hung met with patient in Infusion clinic. Patient states he had his appt with Dr. Clark at [facility] yesterday. Patient states that Dr. Clark says that he can have his BMBx in [place] before 11/7/2023. Attempted to contact Dr. Clark's office. Left voice message requesting consult notes.     Please advise if we need to order BMBx

## 2023-10-06 RX ORDER — METFORMIN HYDROCHLORIDE 500 MG/1
500 TABLET ORAL 2 TIMES DAILY WITH MEALS
Qty: 180 TABLET | Refills: 3 | Status: CANCELLED | OUTPATIENT
Start: 2023-10-06

## 2023-10-11 ENCOUNTER — HOSPITAL ENCOUNTER (OUTPATIENT)
Dept: RADIOLOGY | Facility: HOSPITAL | Age: 68
Discharge: HOME | End: 2023-10-11
Attending: INTERNAL MEDICINE
Payer: MEDICARE

## 2023-10-11 DIAGNOSIS — M81.0 AGE-RELATED OSTEOPOROSIS WITHOUT CURRENT PATHOLOGICAL FRACTURE: ICD-10-CM

## 2023-10-11 DIAGNOSIS — R93.89 ABNORMAL FINDINGS ON DIAGNOSTIC IMAGING OF OTHER SPECIFIED BODY STRUCTURES: ICD-10-CM

## 2023-10-11 DIAGNOSIS — R14.0 ABDOMINAL BLOATING: ICD-10-CM

## 2023-10-11 DIAGNOSIS — C90.00 IGG MULTIPLE MYELOMA: ICD-10-CM

## 2023-10-11 PROCEDURE — 77080 DXA BONE DENSITY AXIAL: CPT | Mod: TC

## 2023-10-11 RX ORDER — ACYCLOVIR 400 MG/1
400 TABLET ORAL 2 TIMES DAILY
Qty: 60 TABLET | Refills: 4 | Status: SHIPPED | OUTPATIENT
Start: 2023-10-11 | End: 2024-01-24 | Stop reason: SDUPTHER

## 2023-10-11 RX ORDER — DEXAMETHASONE 4 MG/1
40 TABLET ORAL
Qty: 30 TABLET | Refills: 5 | Status: SHIPPED | OUTPATIENT
Start: 2023-10-11 | End: 2024-02-15

## 2023-10-12 ENCOUNTER — INFUSION (OUTPATIENT)
Dept: INFUSION THERAPY | Facility: HOSPITAL | Age: 68
End: 2023-10-12
Attending: INTERNAL MEDICINE
Payer: MEDICARE

## 2023-10-12 ENCOUNTER — OFFICE VISIT (OUTPATIENT)
Dept: HEMATOLOGY/ONCOLOGY | Facility: CLINIC | Age: 68
End: 2023-10-12
Payer: MEDICARE

## 2023-10-12 VITALS
RESPIRATION RATE: 20 BRPM | BODY MASS INDEX: 23.01 KG/M2 | HEART RATE: 63 BPM | SYSTOLIC BLOOD PRESSURE: 116 MMHG | HEIGHT: 66 IN | DIASTOLIC BLOOD PRESSURE: 70 MMHG | TEMPERATURE: 98 F | WEIGHT: 143.19 LBS | OXYGEN SATURATION: 100 %

## 2023-10-12 VITALS — SYSTOLIC BLOOD PRESSURE: 122 MMHG | DIASTOLIC BLOOD PRESSURE: 65 MMHG

## 2023-10-12 DIAGNOSIS — E86.1 HYPOVOLEMIA: Primary | ICD-10-CM

## 2023-10-12 DIAGNOSIS — E87.8 IMPAIRED HYDRATION: ICD-10-CM

## 2023-10-12 DIAGNOSIS — C90.00 IGG MULTIPLE MYELOMA: Primary | ICD-10-CM

## 2023-10-12 DIAGNOSIS — S32.010G COMPRESSION FRACTURE OF L1 VERTEBRA WITH DELAYED HEALING, SUBSEQUENT ENCOUNTER: ICD-10-CM

## 2023-10-12 DIAGNOSIS — C90.00 IGG MULTIPLE MYELOMA: ICD-10-CM

## 2023-10-12 PROCEDURE — 63600175 PHARM REV CODE 636 W HCPCS: Mod: JZ,JG | Performed by: INTERNAL MEDICINE

## 2023-10-12 PROCEDURE — 99213 OFFICE O/P EST LOW 20 MIN: CPT | Mod: 25,PBBFAC | Performed by: NURSE PRACTITIONER

## 2023-10-12 PROCEDURE — 99215 PR OFFICE/OUTPT VISIT, EST, LEVL V, 40-54 MIN: ICD-10-PCS | Mod: S$PBB,,, | Performed by: NURSE PRACTITIONER

## 2023-10-12 PROCEDURE — 96401 CHEMO ANTI-NEOPL SQ/IM: CPT

## 2023-10-12 PROCEDURE — 99215 OFFICE O/P EST HI 40 MIN: CPT | Mod: S$PBB,,, | Performed by: NURSE PRACTITIONER

## 2023-10-12 RX ORDER — SODIUM CHLORIDE 0.9 % (FLUSH) 0.9 %
10 SYRINGE (ML) INJECTION
Status: DISCONTINUED | OUTPATIENT
Start: 2023-10-12 | End: 2023-10-12 | Stop reason: HOSPADM

## 2023-10-12 RX ORDER — BORTEZOMIB 3.5 MG/1
1.3 INJECTION, POWDER, LYOPHILIZED, FOR SOLUTION INTRAVENOUS; SUBCUTANEOUS
Status: COMPLETED | OUTPATIENT
Start: 2023-10-12 | End: 2023-10-12

## 2023-10-12 RX ORDER — METFORMIN HYDROCHLORIDE 500 MG/1
500 TABLET ORAL 2 TIMES DAILY WITH MEALS
Qty: 180 TABLET | Refills: 3 | Status: SHIPPED | OUTPATIENT
Start: 2023-10-12 | End: 2023-10-13 | Stop reason: SDUPTHER

## 2023-10-12 RX ORDER — HEPARIN 100 UNIT/ML
500 SYRINGE INTRAVENOUS
Status: DISCONTINUED | OUTPATIENT
Start: 2023-10-12 | End: 2023-10-12 | Stop reason: HOSPADM

## 2023-10-12 RX ORDER — TRAMADOL HYDROCHLORIDE 100 MG/1
100 TABLET, COATED ORAL EVERY 4 HOURS PRN
Qty: 60 TABLET | Refills: 2 | Status: SHIPPED | OUTPATIENT
Start: 2023-10-12 | End: 2024-03-12 | Stop reason: SDUPTHER

## 2023-10-12 RX ADMIN — BORTEZOMIB 2.25 MG: 3.5 INJECTION, POWDER, LYOPHILIZED, FOR SOLUTION INTRAVENOUS; SUBCUTANEOUS at 11:10

## 2023-10-12 NOTE — PROGRESS NOTES
Reason for Follow-up:  Reason for consultation:  -multiple myeloma, IgG kappa  -worsening low back pain    Current Treatment:  We will initiate treatment with bortezomib/lenalidomide/dexamethasone (VRd), category 1 regimen  Cycle length 21 days  -bortezomib 1.3 mg per m2 subQ days 1, 8, and 15  -lenalidomide 25 mg p.o. daily, on days 1 through 14  -dexamethasone 40 mg p.o. with food days 1, 8, and 15  Cycle length: 21 days    Start 7/13/2023    Zometa 4mg IV every 28 days  Start 7/13/2023    Treatment History:    Past medical history:  NIDDM. Dyslipidemia.  GERD.  Lumbar spinal stenosis.  Vitamin-D deficiency.  Allergic rhinitis.  HTN.  Bell's palsy.  Celiac disease.  Hemorrhoids.  Mitral regurgitation 01/05/2018.  Rheumatic valve narrowing and leaking mitral valve.  -02/02/2018 ([facility]):  АНДРЕЙ, right minimally invasive anterior thoracotomy, right femoral artery and vein exploration, extensive right pleural adhesiolysis/pneumolysis, bilateral Maze/ablation (extensive), left atrial appendage clip placement for left atrial appendage exclusion, mitral valve repair (complex repair with 26 mm annuloplasty ring)  Social history:  .  Lives in [place], [place].  Five children.  Owns a [employer] business.  No history of tobacco, alcohol, or illicit drug abuse.    Family history:  Negative for cancers or blood dyscrasias.    Health maintenance:  PCP in family medicine clinic, [facility].  Had EGD and colonoscopy done 1 year ago by Dr. Zoltan Kapoor, [facility], apparently unremarkable (he gets the endoscopies done periodically because of history of celiac disease).      History of Present Illness:     Oncologic/Hematologic History:  Oncology History   IgG multiple myeloma   6/23/2023 Initial Diagnosis    IgG multiple myeloma     7/13/2023 -  Chemotherapy    Treatment Summary   Plan Name: OP VRD - WEEKLY BORTEZOMIB LENALIDOMIDE DEXAMETHASONE Q3W  Treatment Goal: Curative  Status:  Active  Start Date: 7/13/2023  End Date: 11/16/2023 (Planned)  Provider: Narciso Lundy MD  Chemotherapy: bortezomib (VELCADE) injection 2.25 mg, 2.25 mg, Subcutaneous, Clinic/HOD 1 time, 5 of 6 cycles  Administration: 2.25 mg (7/13/2023), 2.25 mg (7/20/2023), 2.25 mg (7/27/2023), 2.25 mg (8/10/2023), 2.25 mg (8/17/2023), 2.25 mg (8/24/2023), 2.25 mg (8/31/2023), 2.25 mg (9/7/2023), 2.25 mg (9/21/2023), 2.25 mg (9/14/2023), 2.25 mg (9/28/2023), 2.25 mg (10/5/2023)  lenalidomide 25 mg Cap, 25 mg, , , 1 of 1 cycle, Start date: 9/26/2023, End date: --     67-year-old gentleman, referred from [facility], with newly diagnosed multiple myeloma.      05/14/2023: [facility] note:  Low back pain, onset early March   - localized to back with occasional sharp pain down posterior aspect of right leg  - fluctuating in intensity, overall worsening since onset, no change in character, difficulty doing ADLs, walking with pain  - difficulty sleeping   - seen in urgent care and C multiple times  - MRI 5/4/23: degenerative disc disease and facet arthrosis, mild spinal canal stenosis L4-L5 greater than L3-L4, no high grade stenosis   - Toradol IM helps briefly  - side effects from gabapentin, Robaxin and Norco- GI and drowsiness   - Voltaren gel not helping  - stopped going to PT due to pain  - follows with Neurologist   - requests trial of tramadol and Lyrica   - going out of country soon, concerned he will not be able to go due to pain  - denies bowel/ bladder incontinence, night sweats, unexplained weight loss, chest pain or shortness of breath   Spinal stenosis   Acute bilateral low back pain with right-sided sciatica    05/18/2023: [facility] note:  Low back pain.  Onset early March 2023.  Localized lumbar region.  Constant.  Occasional radiation down posterior right leg.  Difficulty sleeping in bed because of exacerbation of pain by lying down flat.  Compensating by sleeping in a recliner  or on sofa. MRI showed degenerative disc disease and facet arthrosis at multiple levels with mild canal stenosis.  - Has attempted gabapentin, Robaxin, Norco, Lyrica, and tramadol with minimal relief.  Currently being managed with tramadol and Lyrica.  - Told by neurosurgery that he needs pain management referral because NS does not deal with injections  - Denies saddle anesthesia, bowel/bladder incontinence, weight loss, night sweats    Spinal stenosis of lumbar region  Degeneration of lumbar intervertebral disc  - Continue treatment with tramadol and Lyrica as prescribed  Bradycardia  - Patient aware of chronic bradycardia, states he has been this way for 3-4 years due to palpitations if HR >60  - Currently on Toprol XL 50 mg  - Follows with [facility]    06/13/2023:  [facility] note:   Acute Issues:   Weight loss- 6 weeks history of weight loss 12-14 lb unintentionally.  Reports appetite has been somewhat decreased secondary to pain though has noticed increased size of abdomen.  He denies nausea, vomiting, dysphagia, dyspepsia, bloody stools, staying changes in stool caliber, constipation, fevers or night sweats, nervousness/anxiousness, skin or hair changes.  Denies history of smoking or alcohol use.  Receives EGD and colonoscopy every 2 years per Dr. Kapoor [employer], last was roughly 1 year ago. PSA wnl 5/2023.   Chronic Issues: DM- med compliant metformin 500 daily, last A1C 6.1 in 1/2023.   Chronic medical conditions:  Hypertension.  Dyslipidemia.  GERD.  Lumbar spinal stenosis.  Vitamin-D deficiency.  Allergic rhinitis.  HTN      Investigations reviewed:  05/04/2023: MRI lumbar spine without contrast (worsening lumbar pain x5-7 weeks with bilateral sciatica):   Lumbar degenerative disc disease and facet arthrosis as detailed above with no acute pathology identified.    Mild spinal canal stenosis at L4-L5 greater than L3-L4 with no high-grade spinal canal stenosis.   Mild bilateral neural foramen  stenosis at L3-L4 and on the left greater than right at L4-L5.    06/14/2023:  Skeletal survey:   No definite lesions to suggest either lytic and or sclerotic metastatic changes.   Multiple compression deformities mostly in the lumbar spine but these are seen also in the thoracic spine; other imaging modalities might prove helpful for further assessment; some of these compression deformities appear to be relatively new as compared with an MR of May 2023  (There are some degenerative changes of the thoracic spine with a mild compression deformity of superior endplate of 1 of the lower thoracic vertebral body levels; there are multiple compression deformities of the lumbar spine including the superior endplate of L3 and L4 there is a compression deformity of the superior endplate of T11 with compression deformities of the superior endplate of T12 and a compression deformity of L1; some of these compression deformities appear to be new since the last exam (MR) of May 4, 2023 other imaging modalities might prove helpful for further assessment    Labs reviewed:  01/12/2023:  Hemoglobin 11.9.  MCV 98.7.  Ferritin 211.66.  Transferrin saturation 42%.  Vitamin B12 969.  Folate 13.9.    06/14/2023:  Hemoglobin 11.0.  MCV 99.1.  ESR 15, normal.    06/14/2023: IgG 3810 mg/dL, elevated.  IgM 11, suppressed.  IgA 33, suppressed.  2.28 gm/dL IgG kappa monoclonal protein on SPEP and BUBBA.  Kappa 99.4, elevated.  Lambda 0.3400, suppressed.  Kappa/lambda ratio 292, elevated.    05/01/2023: PSA 1.7, normal.  06/14/2023:  BUN 15.  Creatinine 0.79.  Calcium 9.6.  Albumin 3.6.  LFTs normal.  TSH normal.  06/16/2023:  , normal.    06/15/2023:  24 hour urine protein 288 mg, elevated (reference Range:  < 229). M spike 183 mg. Monoclonal kappa.    06/16/2023:  Urine:  Kappa 25.5 mg/dL; lambda < 0.7000 mg/dL; kappa/lambda ratio> 36.4    Interval History 10/12/2023: Patient presents alone to clinic for scheduled follow up for Multiple  Myeloma. He reports doing well without any significant reportable symptoms. He reports poor appetite, he says he has started Megace as prescribed but has not noticed any difference. He reports his primary symptom to be back pain. DEXA scan completed and results of osteoporosis to bilateral hips reviewed with patient. Patient does admit to taking 1200mg of Calcium alongn with Vitamin D daily. He reports adherence to Bactrim, Acyclovir. He says he does not wish to continue NOrco for pain due to constipation.  and prefer to be changed to Tramadol for pain. Patient denies any signs and symptoms of infection-denies any fever, chills or body aches. Patient denies any bleeding- blood in urine or any dysuria. He does reort occassional cramps in hands and feet.     Review of Systems:   All systems reviewed and found to be negative except for the symptoms detailed above    Lab Results   Component Value Date    WBC 8.36 10/12/2023    RBC 3.66 (L) 10/12/2023    HGB 11.3 (L) 10/12/2023    HCT 35.9 (L) 10/12/2023    MCV 98.1 (H) 10/12/2023    MCH 30.9 10/12/2023    MCHC 31.5 (L) 10/12/2023    RDW 14.2 10/12/2023     10/12/2023    MPV 10.3 10/12/2023     CMP  Sodium   Date Value Ref Range Status   07/01/2020 138 136 - 145 mmol/L Final     Sodium Level   Date Value Ref Range Status   10/12/2023 137 136 - 145 mmol/L Final     Potassium   Date Value Ref Range Status   07/01/2020 4.5 3.5 - 5.1 mmol/L Final     Potassium Level   Date Value Ref Range Status   10/12/2023 4.0 3.5 - 5.1 mmol/L Final     Chloride   Date Value Ref Range Status   07/01/2020 108 100 - 109 mmol/L Final     Carbon Dioxide   Date Value Ref Range Status   10/12/2023 25 23 - 31 mmol/L Final   07/01/2020 26 22 - 33 mmol/L Final     Blood Urea Nitrogen   Date Value Ref Range Status   10/12/2023 13.2 8.4 - 25.7 mg/dL Final   07/01/2020 10 5 - 25 mg/dL Final     Creatinine   Date Value Ref Range Status   10/12/2023 0.73 0.73 - 1.18 mg/dL Final   07/01/2020 0.81  0.57 - 1.25 mg/dL Final     Calcium   Date Value Ref Range Status   07/01/2020 8.6 (L) 8.8 - 10.6 mg/dL Final     Calcium Level Total   Date Value Ref Range Status   10/12/2023 9.5 8.8 - 10.0 mg/dL Final     Albumin Level   Date Value Ref Range Status   10/12/2023 3.6 3.4 - 4.8 g/dL Final     Bilirubin Total   Date Value Ref Range Status   10/12/2023 0.4 <=1.5 mg/dL Final     Alkaline Phosphatase   Date Value Ref Range Status   10/12/2023 63 40 - 150 unit/L Final     Aspartate Aminotransferase   Date Value Ref Range Status   10/12/2023 12 5 - 34 unit/L Final     Alanine Aminotransferase   Date Value Ref Range Status   10/12/2023 14 0 - 55 unit/L Final     Anion Gap   Date Value Ref Range Status   07/01/2020 4 (L) 8 - 16 mmol/L Final     eGFR   Date Value Ref Range Status   10/12/2023 >60 mls/min/1.73/m2 Final       Physical Exam  VITAL SIGNS:   Vitals:    10/12/23 1020   BP: 116/70   Pulse: 63   Resp: 20   Temp: 97.9 °F (36.6 °C)        General: Alert and oriented. NAD  Eye: Pupils are equal, round and reactive to light, Extraocular movements are intact. Normal conjunctiva  HENT: Normocephalic. Oropharynx exam deferred; mask in place due to coronavirus  Neck: Supple, Non-tender  Respiratory: Respirations are non-labored, Symmetrical chest wall expansion. Breath sounds CTA bilaterally  Cardiovascular: Regular rate, rhythm, Normal peripheral perfusion, No bilateral lower extremity edema  Gastrointestinal: Non-distended, Present bowel sounds   Genitourinary: Exam deferred  Lymphatics: No lymphadenopathy appreciated  Musculoskeletal: Moves all extremities  Integumentary: Intact. Warm, dry. Right lower leg darkening discoloration.  Neurologic: No focal deficits  Psychiatric: Cooperative. Appropriate mood and affect   ECOG Performance Scale: 1 - Capable of all self-care able to carry out light work activities  CBC  Lab Results   Component Value Date    WBC 8.36 10/12/2023    HGB 11.3 (L) 10/12/2023    HCT 35.9 (L)  10/12/2023    MCV 98.1 (H) 10/12/2023     10/12/2023      CMP  Sodium   Date Value Ref Range Status   07/01/2020 138 136 - 145 mmol/L Final     Sodium Level   Date Value Ref Range Status   10/12/2023 137 136 - 145 mmol/L Final     Potassium   Date Value Ref Range Status   07/01/2020 4.5 3.5 - 5.1 mmol/L Final     Potassium Level   Date Value Ref Range Status   10/12/2023 4.0 3.5 - 5.1 mmol/L Final     Chloride   Date Value Ref Range Status   07/01/2020 108 100 - 109 mmol/L Final     Carbon Dioxide   Date Value Ref Range Status   10/12/2023 25 23 - 31 mmol/L Final   07/01/2020 26 22 - 33 mmol/L Final     Blood Urea Nitrogen   Date Value Ref Range Status   10/12/2023 13.2 8.4 - 25.7 mg/dL Final   07/01/2020 10 5 - 25 mg/dL Final     Creatinine   Date Value Ref Range Status   10/12/2023 0.73 0.73 - 1.18 mg/dL Final   07/01/2020 0.81 0.57 - 1.25 mg/dL Final     Calcium   Date Value Ref Range Status   07/01/2020 8.6 (L) 8.8 - 10.6 mg/dL Final     Calcium Level Total   Date Value Ref Range Status   10/12/2023 9.5 8.8 - 10.0 mg/dL Final     Albumin Level   Date Value Ref Range Status   10/12/2023 3.6 3.4 - 4.8 g/dL Final     Bilirubin Total   Date Value Ref Range Status   10/12/2023 0.4 <=1.5 mg/dL Final     Alkaline Phosphatase   Date Value Ref Range Status   10/12/2023 63 40 - 150 unit/L Final     Aspartate Aminotransferase   Date Value Ref Range Status   10/12/2023 12 5 - 34 unit/L Final     Alanine Aminotransferase   Date Value Ref Range Status   10/12/2023 14 0 - 55 unit/L Final     Anion Gap   Date Value Ref Range Status   07/01/2020 4 (L) 8 - 16 mmol/L Final     eGFR   Date Value Ref Range Status   10/12/2023 >60 mls/min/1.73/m2 Final        Assessment:  Multiple myeloma, IgG kappa:  -presentation: 03/2023:  Worsening low back pain, no associated neurological symptoms, 12-14 pound weight loss over 6 weeks, mild L4-L5 spinal canal stenosis on MRI (05/04/2023), lumbar DJD and facet arthrosis on MRI lumbar spine  (05/04/2023)  -skeletal survey 06/14/2023: Negative for lytic or sclerotic lesions; multiple compression deformities lumbar spine and thoracic spine (new since MRI scan dated 05/04/2023)  -06/14/2023:  Mild anemia (hemoglobin 11.0)   -06/14/2023:  2.28 gm/dL IgG kappa M protein. Kappa 99.4, elevated.  Lambda 0.3400, suppressed.  Kappa/lambda ratio 292, elevated.  -06/14/2023:  BUN, creatinine, calcium, albumin normal  -06/15/2022:  24 hour urine protein 280 mg, elevated.  M spike 183 mg.  Urine BUBBA showed monoclonal kappa light chains.  -06/16/2023: Urine:  Kappa 25.5 mg/dL; lambda < 0.7000 mg/dL; kappa/lambda ratio> 36.4  06/16/2023:  , normal.  -06/26/2023:  Hemoglobin 11.0.  Beta 2 microglobulin 3.18.  Calcium 10.1.  Albumin 3.2.  Hepatitis-A/B/C/HIV serology negative.  -bone marrow biopsy 06/27/2022:  Plasma cells 50% in bone marrow aspirate; plasma cells 36% on flow cytometry of bone marrow aspirate, with kappa light chain restriction; molecular studies pending  -CT abdomen pelvis with contrast 06/27/2023:  Hepatic steatosis; bilateral inguinal hernias; heterogeneous bone demineralization, suggesting marrow infiltrative process like multiple myeloma; multilevel compression deformities in the included thoracic spine and lumbar spine; 10 mm exophytic hyperdense focus upper pole of right kidney, compatible with a hyperdense cyst  -FDG PET-CT 06/29/2023:  Left femur lesser trochanter; right 4th rib laterally; multilevel thoracolumbar compression deformities; subacute fractures T8, T12, L1, L2  >>>  From the data available so far, patient has multiple myeloma (symptomatic), by virtue of:  1. Involved: Uninvolved serum FLC ratio =/> 100 and involved FLC concentration 10 mg/dL or higher (in this patient, kappa/lambda ratio age 292, and kappa light chain 99.4 mg/dL)   2. Hypercalcemia (06/26/2023): Calcium 10.1.  Albumin 3.2   3. Bone marrow plasmacytosis 50%  4. Heterogeneous bone demineralization on CT  06/27/2023, suggesting bone marrow infiltrative process like multiple myeloma  5. FDG PET-CT 06/29/2023:  Left femur lesser trochanter; right 4th rib laterally; multilevel thoracolumbar compression deformities; subacute fractures T8, T12, L1, L2  6. Beta 2 microglobulin level 3.18, elevated (06/26/2023)   7. No renal insufficiency, no significant anemia, LDH normal  >>>  -Velcade started 07/13/2023  -Zometa 4 mg IV every 4 weeks) x2 years), started 07/13/2023  -06/27/2023: Bone marrow biopsy:  Abnormal myeloma FISH panel:  Aneuploidy: gain of chromosomes 7, 9, 15 and CCND1/11q) gain of chromosome 7; gain of chromosome 9; gain of chromosome 15) (aneuploidy is the most common genetic alteration detected in plasma cell myeloma by FISH analysis, seen in approximately 69-90% of patients)  Additional signal for IGH/14q DNA sequence (clinical significance of this finding is unclear)  Loss of MAF/16q) also a common finding in plasma cell neoplasms and is reported to be associated with a poor prognosis in patients with myeloma)  -07/21/2023: Radiation oncology consultation:  Radiotherapy to spine not indicated/will not be helpful; patient referred to IR for kyphoplasty  -MRI thoracic spine 08/08/2023:  Chronic superior endplate compression fractures T9-L2 vertebral bodies, no acute/subacute fracture, no osseous lesion or soft tissue mass  -MRI lumbar spine 09/14/2023:  Significant progression of skeletal demineralization; new/progressive loss of vertebral body height throughout the spine since 05/2023; superior endplate L4 acute/subacute with marrow edema and enhancement; multilevel degenerative disc disease; spinal stenosis severe at L3-L5 with tethering of descending nerve root; moderate spinal canal stenosis at L4-L5 and L2-L3  -08/03/2023: TSH normal; free T4 normal  -06/14/2023: IgG 3810, elevated; IgM, IgA suppressed; kappa/lambda ratio 292; IgG kappa monoclonal protein 2.2 gm/dL  -07/27/2023:  IgG 2242, elevated but  down; IgM, IgA remain suppressed; kappa/lambda ratio of 59.0, down; IgG kappa monoclonal protein 1.36 gm/dL (chemotherapy was started 07/13/2023)  -08/24/2023: IgG 792, has normalized; IgM, IgA remains suppressed; kappa/lambda ratio 5.72, elevated but significantly down; IgG kappa monoclonal protein 0.39 gm/dL, significantly down        Staging of myeloma:  -serum beta 2 microglobulin elevated (3.18)  -serum albumin and LDH normal   -myeloma FISH panel on bone marrow:  Negative for high-risk cytogenetic abnormalities  >>>  ISS stage:  Stage I  R-ISS stage:  Stage I        Co-morbidities:  NIDDM.  Dyslipidemia.  Hypertension.  Celiac disease.  History of rheumatic mitral valve disease, S/P Maze/ablation procedure 02/02/2018        Vaccination history:   COVID-19, MRNA, LN-S, PF (MODERNA FULL 0.5 ML DOSE) 8/18/2022 , 10/23/2021 , 3/17/2021 , 2/17/2021   Hepatitis A / Hepatitis B 8/18/2022 , 10/5/2021 , 7/29/2020   Influenza - Quadrivalent - MDCK - PF 10/5/2021   Influenza - Trivalent - MDCK - PF 9/10/2015   Meningococcal Conjugate (MCV4O) 8/18/2022 , 4/17/2012   Meningococcal Conjugate (MCV4P) 4/17/2012   Pneumococcal Conjugate - 20 Valent 1/12/2023   Tdap 7/29/2020   Zoster 11/18/2020 , 7/29/2020   Zoster Recombinant 11/18/2020 , 7/29/2020          10 mm exophytic hyperdense focus upper pole of right kidney, compatible with a hyperdense cyst, on CT abdomen pelvis with contrast 06/27/2023  -07/12/2023:  MRI abdomen with and without contrast (right renal cyst): Small exophytic right renal lesion most likely a proteinaceous or hemorrhagic cyst (10 mm, upper pole of right kidney)      Plan:  Multiple myeloma, IgG kappa:  Autologous transplantation:   Category 1 evidence supports proceeding directly after induction therapy to high-dose therapy and HCT  Renal dysfunction and advanced age are not contraindications to transplant     Bortezomib/lenalidomide/dexamethasone (VRd), category 1 regimen:  Cycle length 21  days  -bortezomib 1.3 mg per m2 subQ days 1, 8, and 15  -lenalidomide 25 mg p.o. daily, on days 1 through 14  -dexamethasone 40 mg p.o. with food days 1, 8, and 15  Cycle length: 21 days    If response after primary therapy, then:  Autologous HCT versus continuation of myeloma therapy or maintenance therapy versus tandem autologous transplant, etc.    -Velcade started 07/13/2023  -Zometa 4 mg IV every 4 weeks x2 years (started 07/13/2023    -per Radiation Oncology, given radiologic findings, palliative radiotherapy to spine will not be helpful  -Dr. Brandy Mahajan, BMT, [facility], planning autologous hematopoietic stem cell transplant after 4 cycles of chemotherapy      Okay to proceed with cycle 5 day 8 (Bortezomib) today  Return in 1 week with lab work (cbc,cmp) to receive cycle 5 day 15 of (VRD) regimen  Return in 2 weeks with lab work (cbc,cmp) to receive cycle 6 day 1 of VRD regimen  Follow up with  in 3 weeks with lab work (cbc,cmp,mg) prior to cycle 6 day 1 Vrd   Check baseline TSH and free T4, then, every 3 months (monitoring on lenalidomide) next November   Continue Zometa 4 mg IV every 4 weeks x2 years (started 07/13/2023), due 11/2/23  Assess response with repeat SPEP, BUBBA, FLC assay, and 24 hour urine for total protein, UPEP, urine BUBBA, and urine FLC assay in 1 month (middle of August)  Continue baby aspirin 81 mg p.o. q.day concurrently, for thromboprophylaxis secondary to lenalidomide.  Continue acyclovir 400 mg p.o. b.i.d. for herpes zoster prophylaxis with bortezomib  Continue Bactrim ds 1 tablet every Monday/Wednesday/Friday during treatment  Check CBC and CMP weekly during treatment      Monitoring while on VRD:  -Weekly assessment for peripheral neuropathy and/or neuropathic pain.  -Monitor for hypotension during bortezomib therapy; adjustment of antihypertensives and/or administration of IV hydration may be needed.    Back Pain  -06/26/2023: Started Norco 5 mg p.o. q.6 hours PRN for pain; did  not work; he stopped taking  -06/26/2023: Taking Norco 10 mg only twice daily; instructed him to take Norco 10 mg every 4-6 hours p.r.n. for pain (has 9/10 lower back pain, constant, radiating to anterior aspect of right thigh, without neurological symptoms)  -06/26/2023: Started levofloxacin 500 mg p.o. q.day X 12 weeks  -06/26/2023: Severe back pain; presumed due to myeloma; referred to Radiation Oncology  -on MRI thoracic and lumbar spine 08/08/2023 and 09/14/2023:  Chronic compression fractures of vertebral bodies; no osseous lesion or soft tissue mass; significant progression of skeletal demineralization; progressive loss of vertebral body height throughout the spine since May 2023; spinal stenosis  -patient has been referred to IR for consideration of kyphoplasty       Monitoring on lenalidomide:   Monitor for signs and symptoms of infection (if neutropenic), bleeding or bruising, hepatotoxicity, secondary malignancies, thromboembolism (eg, shortness of breath, chest pain, arm or leg swelling), dermatologic toxicity, tumor lysis syndrome, or hypersensitivity.     Monitoring on bortezomib:  Peripheral neuropathy, posterior reversible leukoencephalopathy syndrome, progressive multifocal leukoencephalopathy, tumor lysis syndrome, or hyper-/hypoglycemia. Monitor closely in patients with risk factors for heart failure or existing heart disease.     Autologous transplantation:   Category 1 evidence supports proceeding directly after induction therapy to high-dose therapy and HCT  Renal dysfunction and advanced age are not contraindications to transplant     If response after primary therapy, then:  Autologous HCT versus continuation of myeloma therapy or maintenance therapy versus tandem autologous transplant, etc.       Above discussed at length with the patient.  All questions answered.      Nature of multiple myeloma discussed.    He understands and agrees with this  plan.  ----------------------------------  Supportive care:  -bone targeted treatment: Bisphosphonate, category 1; or denosumab to reduce risk of skeletal related events; denosumab is preferred in patients with renal insufficiency; assess vitamin-D status; baseline dental exam; monitor for osteonecrosis of the jaw; monitor renal dysfunction with bisphosphonate therapy  -continue bone targeted treatment (bisphosphonate or denosumab) for 2 years; continue beyond 2 years should be based on clinical judgment  -patients receiving denosumab for bone disease who subsequently discontinued therapy should be given maintenance denosumab every 6 months or a single dose of bisphosphonate to mitigate risk of rebound osteoporosis  -for hypercalcemia, zoledronic acid, denosumab, steroids, and/or calcitonin  -for hyperviscosity, plasmapheresis should be used as adjuvant therapy for symptomatic hyperviscosity  -intravenous immunoglobulin therapy should be considered in the setting of recurrent serious infection and/or hypogammaglobulinemia (IgG </= 400 mg/dL)  -pneumococcal conjugate vaccine, followed by pneumococcal polysaccharide vaccine 1 year later  -Influenza vaccination is recommended; 2 doses of high-dose inactivated quandaivalent influenza vaccine should be considered  -consider 12 weeks of levofloxacin 500 mg p.o. daily at the time of initial diagnosis of multiple myeloma  -COVID-19 vaccination  -highest risk for VTE is in the 1st 6 months following new diagnosis of multiple myeloma  -VTE prophylaxis if no contraindications to anticoagulation agents or antiplatelets  Recommendations for VTE prophylaxis:  Aspirin  mg daily; low molecular weight heparin equivalent to enoxaparin 40 mg daily; Xarelto 10 mg daily; Eliquis 2.5 mg b.i.d.; Arixtra 2.5 mg daily; Coumadin with target INR 2.0-3.0        Lenalidomide:  Embryo fetal toxicity  Hematologic toxicity.  Neutropenia.  Thrombocytopenia.  Venous and arterial  thromboembolism.  DVT.  PE.  MI.  Stroke.  Dizziness, fatigue.  Tumor lysis syndrome.  Heart failure.    Used with caution in patients with renal impairment.  Lenalidomide =/> 4 cycles may decrease the # of CD34 pos cells collected for autologous stem cell transplant.  DVT, PE, atrial fibrillation, renal failure are more likely in patients> 65 years  Hepatotoxicity  Hypersensitivity reactions.  Anaphylaxis.  Angioedema.  Skin rash.  Eosinophilia.  Immediate hypersensitivity reactions.  Second primary malignancy.  AML.  MDS.  Solid tumor malignancies.  Nonmelanoma skin cancers.     Monitoring with lenalidomide:  -CBC with differential: weekly for the first 2 cycles, every 2 weeks during the third cycle and monthly thereafter;  -serum creatinine, LFTs (periodically).  -Thyroid function tests (TSH at baseline then every 2 to 3 months during lenalidomide treatment  -ECG when clinically indicated. Monitor for signs and symptoms of infection (if neutropenic), bleeding or bruising, hepatotoxicity, secondary malignancies, thromboembolism (eg, shortness of breath, chest pain, arm or leg swelling), dermatologic toxicity, tumor lysis syndrome, or hypersensitivity.  Patients who could become pregnant: Pregnancy test 10 to 14 days and 24 hours prior to initiating therapy, weekly during the first 4 weeks of treatment, then every 2 to 4 weeks through 4 weeks after therapy discontinued.     Bortezomib:  Bone marrow suppression.  Neutropenia.  Thrombocytopenia.  Acute CHF.  Nausea, vomiting, diarrhea, constipation, ileus.    Hepatotoxicity:  Herpes zoster and her PCP plaques reactivation.    Anaphylactic reactions.  Hypersensitive reactions.  Angioedema.  Laryngeal edema.    Hypotension.  Peripheral neuropathy.  Posterior reversible leukoencephalopathy syndrome.  Progressive multifocal leukoencephalopathy.  Pulmonary toxicity.  Pneumonitis.  Interstitial pneumonia.  Lung infiltrates.  ARDS.  Thrombotic microangiopathy.  Tumor lysis  syndrome.       Monitoring parameters with bortezomib:  CBC, CMP  Blood pressure (to monitor for hypotension)  Peripheral neuropathy  Posterior reversible leukoencephalopathy syndrome, progressive multifocal leukoencephalopathy, tumor lysis syndrome, or hyper-/hypoglycemia. Monitor baseline chest x-ray and then periodic pulmonary function testing (with new or worsening pulmonary symptoms). Monitor closely in patients with risk factors for heart failure or existing heart disease.

## 2023-10-13 DIAGNOSIS — E11.9 TYPE 2 DIABETES MELLITUS WITHOUT COMPLICATION, WITHOUT LONG-TERM CURRENT USE OF INSULIN: Primary | ICD-10-CM

## 2023-10-13 RX ORDER — METFORMIN HYDROCHLORIDE 500 MG/1
500 TABLET ORAL 2 TIMES DAILY WITH MEALS
Qty: 180 TABLET | Refills: 3 | Status: SHIPPED | OUTPATIENT
Start: 2023-10-13

## 2023-10-13 NOTE — PROGRESS NOTES
Refilled request for Metformin sent to [facility] pharmacy. Lyrica, however not sent due to no evidence of active prescription. Will need apt to discuss risk/benefits or restarting this medication in setting of multiple comorbities and previous treatments tried.

## 2023-10-18 DIAGNOSIS — C90.00 IGG MULTIPLE MYELOMA: ICD-10-CM

## 2023-10-18 RX ORDER — LENALIDOMIDE 25 MG/1
CAPSULE ORAL
Qty: 14 EACH | Refills: 0 | Status: SHIPPED | OUTPATIENT
Start: 2023-10-18 | End: 2023-11-03 | Stop reason: SDUPTHER

## 2023-10-19 ENCOUNTER — INFUSION (OUTPATIENT)
Dept: INFUSION THERAPY | Facility: HOSPITAL | Age: 68
End: 2023-10-19
Attending: INTERNAL MEDICINE
Payer: MEDICARE

## 2023-10-19 VITALS
SYSTOLIC BLOOD PRESSURE: 100 MMHG | DIASTOLIC BLOOD PRESSURE: 56 MMHG | WEIGHT: 147.06 LBS | TEMPERATURE: 98 F | OXYGEN SATURATION: 100 % | HEART RATE: 56 BPM | BODY MASS INDEX: 23.92 KG/M2 | RESPIRATION RATE: 18 BRPM

## 2023-10-19 DIAGNOSIS — E86.1 HYPOVOLEMIA: Primary | ICD-10-CM

## 2023-10-19 DIAGNOSIS — E87.8 IMPAIRED HYDRATION: ICD-10-CM

## 2023-10-19 DIAGNOSIS — C90.00 IGG MULTIPLE MYELOMA: ICD-10-CM

## 2023-10-19 DIAGNOSIS — C90.00 IGG MULTIPLE MYELOMA: Primary | ICD-10-CM

## 2023-10-19 PROCEDURE — 96401 CHEMO ANTI-NEOPL SQ/IM: CPT

## 2023-10-19 PROCEDURE — 63600175 PHARM REV CODE 636 W HCPCS: Mod: JZ,JG | Performed by: INTERNAL MEDICINE

## 2023-10-19 RX ORDER — HEPARIN 100 UNIT/ML
500 SYRINGE INTRAVENOUS
Status: DISCONTINUED | OUTPATIENT
Start: 2023-10-19 | End: 2023-10-19 | Stop reason: HOSPADM

## 2023-10-19 RX ORDER — SODIUM CHLORIDE 0.9 % (FLUSH) 0.9 %
10 SYRINGE (ML) INJECTION
Status: DISCONTINUED | OUTPATIENT
Start: 2023-10-19 | End: 2023-10-19 | Stop reason: HOSPADM

## 2023-10-19 RX ORDER — BORTEZOMIB 3.5 MG/1
1.3 INJECTION, POWDER, LYOPHILIZED, FOR SOLUTION INTRAVENOUS; SUBCUTANEOUS
Status: COMPLETED | OUTPATIENT
Start: 2023-10-19 | End: 2023-10-19

## 2023-10-19 RX ADMIN — BORTEZOMIB 2.25 MG: 3.5 INJECTION, POWDER, LYOPHILIZED, FOR SOLUTION INTRAVENOUS; SUBCUTANEOUS at 09:10

## 2023-10-24 ENCOUNTER — HOSPITAL ENCOUNTER (OUTPATIENT)
Facility: HOSPITAL | Age: 68
Discharge: HOME | End: 2023-10-24
Attending: PATHOLOGY | Admitting: PATHOLOGY
Payer: MEDICARE

## 2023-10-24 DIAGNOSIS — C90.00 IGG MULTIPLE MYELOMA: ICD-10-CM

## 2023-10-24 LAB
ABS NEUT CALC (OHS): 8.08 X10(3)/MCL (ref 2.1–9.2)
EOSINOPHIL NFR BLD MANUAL: 0.12 X10(3)/MCL (ref 0–0.9)
EOSINOPHIL NFR BLD MANUAL: 1 % (ref 0–8)
ERYTHROCYTE [DISTWIDTH] IN BLOOD BY AUTOMATED COUNT: 14.4 % (ref 11.5–17)
HCT VFR BLD AUTO: 31.2 % (ref 42–52)
HEMATOLOGIST REVIEW: NORMAL
HGB BLD-MCNC: 10.2 G/DL (ref 14–18)
INR PPP: 1.1
LYMPHOCYTES NFR BLD MANUAL: 1.73 X10(3)/MCL
LYMPHOCYTES NFR BLD MANUAL: 15 % (ref 13–40)
MCH RBC QN AUTO: 31.4 PG (ref 27–31)
MCHC RBC AUTO-ENTMCNC: 32.7 G/DL (ref 33–36)
MCV RBC AUTO: 96 FL (ref 80–94)
MONOCYTES NFR BLD MANUAL: 1.62 X10(3)/MCL (ref 0.1–1.3)
MONOCYTES NFR BLD MANUAL: 14 % (ref 2–11)
NEUTROPHILS NFR BLD MANUAL: 70 % (ref 47–80)
NRBC BLD AUTO-RTO: 0 %
PLATELET # BLD AUTO: 278 X10(3)/MCL (ref 130–400)
PLATELET # BLD EST: ADEQUATE 10*3/UL
PMV BLD AUTO: 11.2 FL (ref 7.4–10.4)
PROTHROMBIN TIME: 13.8 SECONDS (ref 11.4–14)
RBC # BLD AUTO: 3.25 X10(6)/MCL (ref 4.7–6.1)
RBC MORPH BLD: NORMAL
WBC # SPEC AUTO: 11.54 X10(3)/MCL (ref 4.5–11.5)

## 2023-10-24 PROCEDURE — 99152 MOD SED SAME PHYS/QHP 5/>YRS: CPT | Performed by: PATHOLOGY

## 2023-10-24 PROCEDURE — 85027 COMPLETE CBC AUTOMATED: CPT | Performed by: PATHOLOGY

## 2023-10-24 PROCEDURE — 88184 FLOWCYTOMETRY/ TC 1 MARKER: CPT | Performed by: PATHOLOGY

## 2023-10-24 PROCEDURE — 85610 PROTHROMBIN TIME: CPT | Performed by: PATHOLOGY

## 2023-10-24 PROCEDURE — 25000003 PHARM REV CODE 250: Performed by: PATHOLOGY

## 2023-10-24 PROCEDURE — 63600175 PHARM REV CODE 636 W HCPCS: Performed by: PATHOLOGY

## 2023-10-24 PROCEDURE — 88271 CYTOGENETICS DNA PROBE: CPT

## 2023-10-24 PROCEDURE — 88275 CYTOGENETICS 100-300: CPT | Mod: 59 | Performed by: PATHOLOGY

## 2023-10-24 PROCEDURE — 88185 FLOWCYTOMETRY/TC ADD-ON: CPT

## 2023-10-24 PROCEDURE — 38222 DX BONE MARROW BX & ASPIR: CPT | Performed by: PATHOLOGY

## 2023-10-24 PROCEDURE — 85097 BONE MARROW INTERPRETATION: CPT | Mod: TC | Performed by: PATHOLOGY

## 2023-10-24 RX ORDER — LIDOCAINE HYDROCHLORIDE 20 MG/ML
INJECTION, SOLUTION INFILTRATION; PERINEURAL
Status: COMPLETED | OUTPATIENT
Start: 2023-10-24 | End: 2023-10-24

## 2023-10-24 RX ORDER — MIDAZOLAM HYDROCHLORIDE 1 MG/ML
INJECTION INTRAMUSCULAR; INTRAVENOUS
Status: COMPLETED | OUTPATIENT
Start: 2023-10-24 | End: 2023-10-24

## 2023-10-24 RX ORDER — SODIUM CHLORIDE, SODIUM LACTATE, POTASSIUM CHLORIDE, CALCIUM CHLORIDE 600; 310; 30; 20 MG/100ML; MG/100ML; MG/100ML; MG/100ML
INJECTION, SOLUTION INTRAVENOUS CONTINUOUS
Status: DISCONTINUED | OUTPATIENT
Start: 2023-10-24 | End: 2023-10-24 | Stop reason: HOSPADM

## 2023-10-24 NOTE — PROGRESS NOTES
Pre-operative note  Consulted for bone marrow aspiration and biopsy by Kamron.  Indication:  Multiple myeloma  Patient chart reviewed.  Patient seen this am and no complaints.    No changes to ordering provider's previous assessment.  PE: Alert and oriented.  Vitals stable.  Pre-procedure labs reviewed.    Informed consent obtained from patient.    Alternative treatment options, risks, and complications discussed with patient.    Patient voices understanding and wishes to proceed with procedure.  Discussed discharge instructions and transportation with patient (accompanied x his wife).     Plan:  Bone marrow biopsy procedure to be performed on 5W.

## 2023-10-24 NOTE — PROCEDURES
"Yuriy Díaz is a 67 y.o. male patient.    Temp: 98.3 °F (36.8 °C) (10/24/23 1021)  Pulse: (!) 52 (10/24/23 1105)  Resp: 19 (10/24/23 1021)  BP: 101/60 (10/24/23 1105)  SpO2: 100 % (10/24/23 1105)  Weight: 67.7 kg (149 lb 3.2 oz) (10/24/23 1021)  Height: 5' 8" (172.7 cm) (10/24/23 1021)       Bone marrow    Date/Time: 10/24/2023 11:18 AM    Performed by: Namita Daniels MD  Authorized by: Namita Daniels MD    Consent Done?: Yes (Verbal) and Yes (Written)   Immediately prior to procedure a time out was called to verify the correct patient, procedure, equipment, support staff and site/side marked as required.   Patient was prepped and draped in the usual sterile fashion.    I was present for the entire procedure.    Position: right lateral  Anesthesia: local infiltration  Local anesthetic: lidocaine 2% without epinephrine  Aspiration?: Yes   Biopsy?: Yes    Specimen source: right posterior iliac crest  Estimated blood loss (cc):1  Patient tolerated the procedure well with no immediate complications.  Post-operative instructions were provided for the patient.  Patient was discharged and will follow up if any complications occur.     Nursing staff at bedside and monitored patient during procedure. Versed 2 mg IV administered. 10 ml of lidocaine 2% administered to OP site (right posterior iliac crest).    10/24/2023    "

## 2023-10-24 NOTE — DISCHARGE SUMMARY
[facility]  Discharge Note  Short Stay    Procedure(s) (LRB):  ASPIRATION, BONE MARROW (N/A)  Biopsy-bone marrow (N/A)      OUTCOME: Patient tolerated treatment/procedure well without complication and is now ready for discharge.    DISPOSITION: Home or Self Care    FINAL DIAGNOSIS:  Multiple myeloma    FOLLOWUP: In clinic (oncology)    DISCHARGE INSTRUCTIONS:  See bone marrow instruction sheet.  Return to ER if signs of infection.     Clinical Reference Documents Added to Patient Instructions         Document    BONE MARROW ASPIRATION OR BIOPSY (ENGLISH)            TIME SPENT ON DISCHARGE: 5 minutes

## 2023-10-25 VITALS
WEIGHT: 149.19 LBS | HEART RATE: 54 BPM | OXYGEN SATURATION: 99 % | SYSTOLIC BLOOD PRESSURE: 98 MMHG | RESPIRATION RATE: 19 BRPM | DIASTOLIC BLOOD PRESSURE: 67 MMHG | BODY MASS INDEX: 22.61 KG/M2 | HEIGHT: 68 IN | TEMPERATURE: 98 F

## 2023-10-26 ENCOUNTER — INFUSION (OUTPATIENT)
Dept: INFUSION THERAPY | Facility: HOSPITAL | Age: 68
End: 2023-10-26
Attending: INTERNAL MEDICINE
Payer: MEDICARE

## 2023-10-26 VITALS
SYSTOLIC BLOOD PRESSURE: 106 MMHG | HEART RATE: 51 BPM | OXYGEN SATURATION: 99 % | TEMPERATURE: 98 F | RESPIRATION RATE: 98 BRPM | HEIGHT: 68 IN | WEIGHT: 148.56 LBS | BODY MASS INDEX: 22.52 KG/M2 | DIASTOLIC BLOOD PRESSURE: 60 MMHG

## 2023-10-26 DIAGNOSIS — E86.1 HYPOVOLEMIA: ICD-10-CM

## 2023-10-26 DIAGNOSIS — E87.8 IMPAIRED HYDRATION: ICD-10-CM

## 2023-10-26 DIAGNOSIS — C90.00 MYELOMA ASSOCIATED AMYLOIDOSIS: Primary | ICD-10-CM

## 2023-10-26 DIAGNOSIS — C90.00 IGG MULTIPLE MYELOMA: Primary | ICD-10-CM

## 2023-10-26 DIAGNOSIS — E85.9 MYELOMA ASSOCIATED AMYLOIDOSIS: Primary | ICD-10-CM

## 2023-10-26 PROCEDURE — 96401 CHEMO ANTI-NEOPL SQ/IM: CPT

## 2023-10-26 PROCEDURE — 63600175 PHARM REV CODE 636 W HCPCS: Mod: JZ,JG | Performed by: INTERNAL MEDICINE

## 2023-10-26 PROCEDURE — 96367 TX/PROPH/DG ADDL SEQ IV INF: CPT

## 2023-10-26 PROCEDURE — 96365 THER/PROPH/DIAG IV INF INIT: CPT

## 2023-10-26 RX ORDER — ZOLEDRONIC ACID 0.04 MG/ML
4 INJECTION, SOLUTION INTRAVENOUS
Status: COMPLETED | OUTPATIENT
Start: 2023-10-26 | End: 2023-10-26

## 2023-10-26 RX ORDER — SODIUM CHLORIDE 0.9 % (FLUSH) 0.9 %
10 SYRINGE (ML) INJECTION
Status: DISCONTINUED | OUTPATIENT
Start: 2023-10-26 | End: 2023-10-26 | Stop reason: HOSPADM

## 2023-10-26 RX ORDER — HEPARIN 100 UNIT/ML
500 SYRINGE INTRAVENOUS
Status: DISCONTINUED | OUTPATIENT
Start: 2023-10-26 | End: 2023-10-26 | Stop reason: HOSPADM

## 2023-10-26 RX ORDER — BORTEZOMIB 3.5 MG/1
1.3 INJECTION, POWDER, LYOPHILIZED, FOR SOLUTION INTRAVENOUS; SUBCUTANEOUS
Status: COMPLETED | OUTPATIENT
Start: 2023-10-26 | End: 2023-10-26

## 2023-10-26 RX ADMIN — ZOLEDRONIC ACID 4 MG: 0.04 INJECTION, SOLUTION INTRAVENOUS at 11:10

## 2023-10-26 RX ADMIN — BORTEZOMIB 2.25 MG: 3.5 INJECTION, POWDER, LYOPHILIZED, FOR SOLUTION INTRAVENOUS; SUBCUTANEOUS at 11:10

## 2023-10-31 ENCOUNTER — TELEPHONE (OUTPATIENT)
Dept: HEMATOLOGY/ONCOLOGY | Facility: CLINIC | Age: 68
End: 2023-10-31
Payer: MEDICARE

## 2023-10-31 PROBLEM — M81.0 OSTEOPOROSIS OF FEMUR WITHOUT PATHOLOGICAL FRACTURE: Status: ACTIVE | Noted: 2023-10-31

## 2023-11-01 ENCOUNTER — HOSPITAL ENCOUNTER (OUTPATIENT)
Dept: RADIOLOGY | Facility: HOSPITAL | Age: 68
Discharge: HOME | End: 2023-11-01
Attending: INTERNAL MEDICINE
Payer: MEDICARE

## 2023-11-01 ENCOUNTER — TELEPHONE (OUTPATIENT)
Dept: HEMATOLOGY/ONCOLOGY | Facility: CLINIC | Age: 68
End: 2023-11-01
Payer: MEDICARE

## 2023-11-01 DIAGNOSIS — E85.9 MYELOMA ASSOCIATED AMYLOIDOSIS: ICD-10-CM

## 2023-11-01 DIAGNOSIS — C90.00 MYELOMA ASSOCIATED AMYLOIDOSIS: ICD-10-CM

## 2023-11-01 LAB
BEAKER SEE SCANNED REPORT: NORMAL
BEAKER SEE SCANNED REPORT: NORMAL

## 2023-11-01 PROCEDURE — 71250 CT THORAX DX C-: CPT | Mod: TC

## 2023-11-01 NOTE — NURSING
Clinic received a call from Tami Gonzalez, transplant @ [facility] transplant clinic with Dr. Clark. Requested records of biopsy and treatment scheduled sent.   BOBBY Hung spoke with Tami who reports that patient is expected to start stem cell immobilization on 11/10/23 to 11/17/23. Patient will have a 1 & 1/2 week break then will be admitted at [facility] on 11/28/23 with transplant expected on 11/30/23.   Tami reports that Dr. Clark is requesting to hold next treatment cycle schedule for 11/2/23 and says that patient may be able to resume treatment after 11/17/23 until he is admitted on 11/28/23.    Dr Clark will forward progress note with final information.

## 2023-11-01 NOTE — PROGRESS NOTES
History:  Past Medical History:   Diagnosis Date    Diabetes mellitus, type 2     GERD (gastroesophageal reflux disease)     Hyperlipidemia     Hypertension     Personal history of colonic polyps 06/29/2022   Past medical history:  NIDDM. Dyslipidemia.  GERD.  Lumbar spinal stenosis.  Vitamin-D deficiency.  Allergic rhinitis.  HTN.  Bell's palsy.  Celiac disease.  Hemorrhoids.  Mitral regurgitation 01/05/2018.  Rheumatic valve narrowing and leaking mitral valve.  -02/02/2018 ([facility]):  АНДРЕЙ, right minimally invasive anterior thoracotomy, right femoral artery and vein exploration, extensive right pleural adhesiolysis/pneumolysis, bilateral Maze/ablation (extensive), left atrial appendage clip placement for left atrial appendage exclusion, mitral valve repair (complex repair with 26 mm annuloplasty ring)    Social history:  .  Lives in [place], [place].  Five children.  Owns a [employer] business.  No history of tobacco, alcohol, or illicit drug abuse.      Family history:  Negative for cancers or blood dyscrasias.      Health maintenance:  PCP in family medicine clinic, [facility].  Had EGD and colonoscopy done 1 year ago by Dr. Zoltan Kapoor, [facility], apparently unremarkable (he gets the endoscopies done periodically because of history of celiac disease).  Past Surgical History:   Procedure Laterality Date    BONE MARROW ASPIRATION N/A 06/27/2023    Procedure: ASPIRATION, BONE MARROW;  Surgeon: Namita Daniels MD;  Location: [facility] ENDOSCOPY;  Service: General;  Laterality: N/A;    BONE MARROW ASPIRATION N/A 10/24/2023    Procedure: ASPIRATION, BONE MARROW;  Surgeon: Namita Daniels MD;  Location: [facility] ENDOSCOPY;  Service: General;  Laterality: N/A;    BONE MARROW BIOPSY N/A 06/27/2023    Procedure: Biopsy-bone marrow;  Surgeon: Haylie Liu MD;  Location: [facility] ENDOSCOPY;  Service: General;  Laterality: N/A;    BONE MARROW BIOPSY N/A 10/24/2023    Procedure:  Biopsy-bone marrow;  Surgeon: Namita Daniels MD;  Location: [facility] ENDOSCOPY;  Service: General;  Laterality: N/A;    CARDIAC SURGERY  2018    COLONOSCOPY  06/29/2022    EYE SURGERY        Social History     Socioeconomic History    Marital status:    Tobacco Use    Smoking status: Never    Smokeless tobacco: Never   Substance and Sexual Activity    Alcohol use: Never    Drug use: Never    Sexual activity: Not Currently      History reviewed. No pertinent family history.     Reason for Follow-up:  -multiple myeloma, IgG kappa  -worsening low back pain  -hypercalcemia  -osteoporosis hips B/L    History of Present Illness:   Follow-up (Patient states he is having lower back pain, constipation. )        Oncologic/Hematologic History:  Oncology History   IgG multiple myeloma   6/23/2023 Initial Diagnosis    IgG multiple myeloma     7/13/2023 -  Chemotherapy    Treatment Summary   Plan Name: OP VRD - WEEKLY BORTEZOMIB LENALIDOMIDE DEXAMETHASONE Q3W  Treatment Goal: Curative  Status: Active  Start Date: 7/13/2023  End Date: 11/16/2023 (Planned)  Provider: Narciso Lundy MD  Chemotherapy: bortezomib (VELCADE) injection 2.25 mg, 2.25 mg, Subcutaneous, Clinic/HOD 1 time, 5 of 6 cycles  Administration: 2.25 mg (7/13/2023), 2.25 mg (7/20/2023), 2.25 mg (7/27/2023), 2.25 mg (8/10/2023), 2.25 mg (8/17/2023), 2.25 mg (8/24/2023), 2.25 mg (8/31/2023), 2.25 mg (9/7/2023), 2.25 mg (9/21/2023), 2.25 mg (9/14/2023), 2.25 mg (9/28/2023), 2.25 mg (10/12/2023), 2.25 mg (10/5/2023), 2.25 mg (10/19/2023), 2.25 mg (10/26/2023)  lenalidomide 25 mg Cap, 25 mg, , , 1 of 1 cycle, Start date: 10/18/2023, End date: --     67-year-old gentleman, referred from [facility], with newly diagnosed multiple myeloma.      05/14/2023: [facility] note:  Low back pain, onset early March   - localized to back with occasional sharp pain down posterior aspect of right leg  - fluctuating in intensity, overall worsening since  onset, no change in character, difficulty doing ADLs, walking with pain  - difficulty sleeping   - seen in urgent care and C multiple times  - MRI 5/4/23: degenerative disc disease and facet arthrosis, mild spinal canal stenosis L4-L5 greater than L3-L4, no high grade stenosis   - Toradol IM helps briefly  - side effects from gabapentin, Robaxin and Norco- GI and drowsiness   - Voltaren gel not helping  - stopped going to PT due to pain  - follows with Neurologist   - requests trial of tramadol and Lyrica   - going out of country soon, concerned he will not be able to go due to pain  - denies bowel/ bladder incontinence, night sweats, unexplained weight loss, chest pain or shortness of breath   Spinal stenosis   Acute bilateral low back pain with right-sided sciatica    05/18/2023: [facility] note:  Low back pain.  Onset early March 2023.  Localized lumbar region.  Constant.  Occasional radiation down posterior right leg.  Difficulty sleeping in bed because of exacerbation of pain by lying down flat.  Compensating by sleeping in a recliner or on sofa. MRI showed degenerative disc disease and facet arthrosis at multiple levels with mild canal stenosis.  - Has attempted gabapentin, Robaxin, Norco, Lyrica, and tramadol with minimal relief.  Currently being managed with tramadol and Lyrica.  - Told by neurosurgery that he needs pain management referral because NS does not deal with injections  - Denies saddle anesthesia, bowel/bladder incontinence, weight loss, night sweats    Spinal stenosis of lumbar region  Degeneration of lumbar intervertebral disc  - Continue treatment with tramadol and Lyrica as prescribed  Bradycardia  - Patient aware of chronic bradycardia, states he has been this way for 3-4 years due to palpitations if HR >60  - Currently on Toprol XL 50 mg  - Follows with [facility]    06/13/2023:  [facility] note:   Acute Issues:   Weight loss- 6 weeks history of weight loss 12-14 lb  unintentionally.  Reports appetite has been somewhat decreased secondary to pain though has noticed increased size of abdomen.  He denies nausea, vomiting, dysphagia, dyspepsia, bloody stools, staying changes in stool caliber, constipation, fevers or night sweats, nervousness/anxiousness, skin or hair changes.  Denies history of smoking or alcohol use.  Receives EGD and colonoscopy every 2 years per Dr. Kapoor [place], last was roughly 1 year ago. PSA wnl 5/2023.   Chronic Issues: DM- med compliant metformin 500 daily, last A1C 6.1 in 1/2023.   Chronic medical conditions:  Hypertension.  Dyslipidemia.  GERD.  Lumbar spinal stenosis.  Vitamin-D deficiency.  Allergic rhinitis.  HTN      Investigations reviewed:  05/04/2023: MRI lumbar spine without contrast (worsening lumbar pain x5-7 weeks with bilateral sciatica):   Lumbar degenerative disc disease and facet arthrosis as detailed above with no acute pathology identified.    Mild spinal canal stenosis at L4-L5 greater than L3-L4 with no high-grade spinal canal stenosis.   Mild bilateral neural foramen stenosis at L3-L4 and on the left greater than right at L4-L5.    06/14/2023:  Skeletal survey:   No definite lesions to suggest either lytic and or sclerotic metastatic changes.   Multiple compression deformities mostly in the lumbar spine but these are seen also in the thoracic spine; other imaging modalities might prove helpful for further assessment; some of these compression deformities appear to be relatively new as compared with an MR of May 2023  (There are some degenerative changes of the thoracic spine with a mild compression deformity of superior endplate of 1 of the lower thoracic vertebral body levels; there are multiple compression deformities of the lumbar spine including the superior endplate of L3 and L4 there is a compression deformity of the superior endplate of T11 with compression deformities of the superior endplate of T12 and a compression  deformity of L1; some of these compression deformities appear to be new since the last exam (MR) of May 4, 2023 other imaging modalities might prove helpful for further assessment    Labs reviewed:  01/12/2023:  Hemoglobin 11.9.  MCV 98.7.  Ferritin 211.66.  Transferrin saturation 42%.  Vitamin B12 969.  Folate 13.9.    06/14/2023:  Hemoglobin 11.0.  MCV 99.1.  ESR 15, normal.    06/14/2023: IgG 3810 mg/dL, elevated.  IgM 11, suppressed.  IgA 33, suppressed.  2.28 gm/dL IgG kappa monoclonal protein on SPEP and BUBBA.  Kappa 99.4, elevated.  Lambda 0.3400, suppressed.  Kappa/lambda ratio 292, elevated.    05/01/2023: PSA 1.7, normal.  06/14/2023:  BUN 15.  Creatinine 0.79.  Calcium 9.6.  Albumin 3.6.  LFTs normal.  TSH normal.  06/16/2023:  , normal.    06/15/2023:  24 hour urine protein 288 mg, elevated (reference Range:  < 229). M spike 183 mg. Monoclonal kappa.    06/16/2023:  Urine:  Kappa 25.5 mg/dL; lambda < 0.7000 mg/dL; kappa/lambda ratio> 36.4    06/26/2023:  Pleasant gentleman who presents for initial medical oncology consultation, accompanied by his wife and family present who is a past interventional cardiologist.  Back pain for 2 months.  Initially started in right groin.  Physical therapy has not helped.  10/10 severity.  Has been taking Tylenol without significant relief.  Secured to take narcotics because of constipation.  Underwent couple of spinal steroid shots 3 weeks ago, with minor relief.  Pain is present all the time.  Pain increases with changing position in bed as well as upon standing.  He finds it difficult to ambulate.  Pain does not radiate down lower extremities and is not accompanied with lower extremity weakness, numbness, urinary or bowel incontinence, or saddle anesthesia.  Also experiences muscle cramps.  Generalized weakness.  However, ECOG 2.  No fevers or chills.  No repeated infections.  Cramps in hands and feet.  Appetite is down.  Appetite has picked up in last 10 days.   Has lost 12-14 lb in last 1-1/2 months.  No abnormal bleeding or bruising.      Interval History:  INF FLUIDS   OP VRD - WEEKLY BORTEZOMIB LENALIDOMIDE DEXAMETHASONE Q3W     11/02/2023:   -VRD cycle 5 started 10/12/2023 (day 1 on 10/20/2023, day 8 on 10/19/2023, day 15 on 10/26/2023)  -08/01/2023: M spike 1.3 gm/dL; kappa 568.3; lambda 14.8; kappa/lambda ratio 38.4  -10/11/2023: DEXA scan:  Osteoporosis based on hips; significantly increased fracture risk (left femur T-score-2.5; right femur T-score-2.8; lumbar spine T-score -1.2)  -10/18/2023: [facility] Labs:  IgG 602, low (was 1750 on 08/01/2023); IgM 15, low (was 20.4 on 08/01/2023); IgA 49, low (was 35.2 on 08/01/2023); kappa 24.6 (was 568.3 on 08/01/2023); lambda 12.1 (was 14.8 on 08/01/2023); kappa/lambda ratio 2.03 (was 38.40 on 08/01/2023)  -10/24/2023: Restaging bone marrow biopsy (post VRD X 4-1/2 cycles):  Normocellular bone marrow with trilineage hematopoiesis with mild increase in monocytes and eosinophils (correlate with medical therapy).  Plasma cells, histologically, represent <1% of the total population  and by immunophenotypic studies, flow cytometry, represent 0.2% of the cell population.  Fish studies to be reported in an addendum.  Bone marrow cellularity 40%.  No clonal or abnormal plasma cell population detected by flow cytometry.  Relatively increased monocytic cells, 12% of analyzed cells, nonspecific, on flow cytometry.  FISH: Abnormal myeloma FISH panel: Aneuploidy: gain of chromosomes 7, 9 and 15 (Aneuploidy is the most common genetic alteration detected in 60 to 90% of cases of plasma cell neoplasms by FISH analysis. Trisomies of chromosomes 7, 9 and 15 are commonly observed in patients with plasma cell myeloma (multiple myeloma). Aneuploidy and particularly trisomy can also be found in monoclonal gammopathy of unknown significance (MGUS), smoldering myeloma and amyloidosis)  -10/24/2023:  UPEP and urine BUBBA:  Albumin is the only protein  detected (104 mg/24 hours) (no monoclonal protein detected)  Labs reviewed  Presents for follow-up visit, accompanied by his wife.  In no acute discomfort.  Chronic back pain secondary to DJD; no change in character.  Mild constipation.  Good appetite.  Good energy.  No recurrent fevers or chills.  Looking forward to his daughter's marriage on the 25th of this month.  Says that CBC admitted to [facility] on 11/09/2023, we will undergo stem cell collection on 11/10/2023, and will get discharged on 11/17/2023.  I will see him back for follow-up on 11/18/2023.  We will resume chemotherapy 11/19/2023.  Stem cell transplant is anticipated later this month.        Medications:  Current Outpatient Medications on File Prior to Visit   Medication Sig Dispense Refill    abiraterone (ZYTIGA) 500 mg Tab Take 1,000 mg by mouth once daily Take 2 (two) 500 mg tab (to equal 1,000 mg) by mouth daily.      acyclovir (ZOVIRAX) 400 MG tablet Take 1 tablet (400 mg total) by mouth 2 (two) times daily. 60 tablet 4    ammonium lactate 12 % Crea Apply 1 application topically once daily. 385 g 2    ascorbic acid, vitamin C, (VITAMIN C) 1000 MG tablet Take 1,000 mg by mouth once daily.      aspirin (ECOTRIN) 81 MG EC tablet Take 81 mg by mouth once daily.      atorvastatin (LIPITOR) 20 MG tablet Take 20 mg by mouth once daily.      calcium carbonate 195 mg calcium (500 mg) Chew Take 1 tablet by mouth once daily.      dexAMETHasone (DECADRON) 4 MG Tab Take 10 tablets (40 mg total) by mouth every 7 days. on days 1, 8, and 15 of each chemotherapy cycle. Take with food. 30 tablet 5    diclofenac sodium (VOLTAREN) 1 % Gel Apply 2 g topically 4 (four) times daily. 450 g 1    famotidine (PEPCID) 40 MG tablet Take 40 mg by mouth every evening.      fluticasone propionate (FLONASE) 50 mcg/actuation nasal spray 50 sprays by Nasal route 2 (two) times a day.      gabapentin (NEURONTIN) 100 MG capsule Take 100 mg by mouth 3 (three) times daily.       gabapentin (NEURONTIN) 400 MG capsule       gabapentin (NEURONTIN) 600 MG tablet Take 1 tablet (600 mg total) by mouth 3 (three) times daily. 90 tablet 2    hydrocortisone (ANUSOL-HC) 2.5 % rectal cream Place 1 applicator rectally 2 (two) times daily. 28 g 2    ketoconazole (NIZORAL) 2 % cream Apply topically once daily. 60 g 1    lenalidomide 25 mg Cap Take 1 capsule by mouth once daily on days 1 to 14 of each 21 day cycle.. 14 each 0    linaCLOtide (LINZESS) 72 mcg Cap capsule Take 1 capsule (72 mcg total) by mouth daily as needed (constipation). 90 each 1    megestroL (MEGACE) 400 mg/10 mL (40 mg/mL) Susp Take 10 mLs (400 mg total) by mouth once daily. 300 mL 0    metFORMIN (GLUCOPHAGE) 500 MG tablet Take 1 tablet (500 mg total) by mouth 2 (two) times daily with meals. 180 tablet 3    metoprolol succinate (TOPROL-XL) 50 MG 24 hr tablet Take by mouth.      MIRALAX 17 gram/dose powder Take 17 g by mouth.      morphine (MS CONTIN) 30 MG 12 hr tablet Take 1 tablet (30 mg total) by mouth 2 (two) times daily. Every 12 hours 30 tablet 0    MOVANTIK 25 mg tablet       naloxone (NARCAN) 4 mg/actuation Spry SMARTSIG:Both Nares      nystatin (MYCOSTATIN) powder Apply topically 4 (four) times daily. 60 g 2    pantoprazole (PROTONIX) 20 MG tablet Take 1 tablet (20 mg total) by mouth once daily. 30 tablet 11    pantoprazole (PROTONIX) 40 MG tablet Take 40 mg by mouth.      sulfamethoxazole-trimethoprim 800-160mg (BACTRIM DS) 800-160 mg Tab Take 1 tablet by mouth on MWF of each week 48 tablet 3    traMADoL 100 mg Tab Take 100 tablets by mouth every 4 (four) hours as needed (pain). 60 tablet 2    ciclopirox (PENLAC) 8 % Soln Apply 8 drops topically Daily.       No current facility-administered medications on file prior to visit.       Review of Systems:   All systems reviewed and found to be negative except for the symptoms detailed above    Physical Examination:   VITAL SIGNS:   Vitals:    11/02/23 0827   BP: 118/65   Pulse:  "(!) 48   Resp: 18   Temp: 98.2 °F (36.8 °C)       GENERAL:  In no apparent distress.    HEAD:  No signs of head trauma.  EYES:  Pupils are equal.  Extraocular motions intact.    EARS:  Hearing grossly intact.  MOUTH:  Oropharynx is normal.   NECK:  No adenopathy, no JVD.     CHEST:  Chest with clear breath sounds bilaterally.  No wheezes, rales, rhonchi.    CARDIAC:  Regular rate and rhythm.  S1 and S2, without murmurs, gallops, rubs.  VASCULAR:  No Edema.  Peripheral pulses normal and equal in all extremities.  ABDOMEN:  Soft, without detectable tenderness.  No sign of distention.  No   rebound or guarding, and no masses palpated.   Bowel Sounds normal.  MUSCULOSKELETAL:  Good range of motion of all major joints. Extremities without clubbing, cyanosis or edema.    NEUROLOGIC EXAM:  Alert and oriented x 3.  No focal sensory or strength deficits.   Speech normal.  Follows commands.  PSYCHIATRIC:  Mood normal.    No results for input(s): "CBC" in the last 72 hours.   No results for input(s): "CMP" in the last 72 hours.     Assessment:  Problem List Items Addressed This Visit          Neuro    Compression fracture of thoracic spine, non-traumatic    Compression fracture of first lumbar vertebra - Primary    Compression fracture of L2 lumbar vertebra    Spinal stenosis of lumbar region       Renal/    Hypercalcemia       Immunology/Multi System    Drug-induced immunodeficiency       Oncology    IgG multiple myeloma    Secondary malignant neoplasm of bone    Secondary malignancy of lumbar vertebral column       Endocrine    Osteoporosis of femur without pathological fracture     Multiple myeloma, IgG kappa:  ISS stage:  Stage I  R-ISS stage:  Stage I  -presentation: 03/2023:  Worsening low back pain, no associated neurological symptoms, 12-14 pound weight loss over 6 weeks, mild L4-L5 spinal canal stenosis on MRI (05/04/2023), lumbar DJD and facet arthrosis on MRI lumbar spine (05/04/2023)  -skeletal survey 06/14/2023: " Negative for lytic or sclerotic lesions; multiple compression deformities lumbar spine and thoracic spine (new since MRI scan dated 05/04/2023)  -06/14/2023:  Mild anemia (hemoglobin 11.0)   -06/14/2023:  2.28 gm/dL IgG kappa M protein. Kappa 99.4, elevated.  Lambda 0.3400, suppressed.  Kappa/lambda ratio 292, elevated.  -06/14/2023:  BUN, creatinine, calcium, albumin normal  -06/15/2022:  24 hour urine protein 280 mg, elevated.  M spike 183 mg.  Urine BUBBA showed monoclonal kappa light chains.  -06/16/2023: Urine:  Kappa 25.5 mg/dL; lambda < 0.7000 mg/dL; kappa/lambda ratio> 36.4  06/16/2023:  , normal.  -06/26/2023:  Hemoglobin 11.0.  Beta 2 microglobulin 3.18.  Calcium 10.1.  Albumin 3.2.  Hepatitis-A/B/C/HIV serology negative.  -bone marrow biopsy 06/27/2022:  Plasma cells 50% in bone marrow aspirate; plasma cells 36% on flow cytometry of bone marrow aspirate, with kappa light chain restriction; molecular studies pending  -CT abdomen pelvis with contrast 06/27/2023:  Hepatic steatosis; bilateral inguinal hernias; heterogeneous bone demineralization, suggesting marrow infiltrative process like multiple myeloma; multilevel compression deformities in the included thoracic spine and lumbar spine; 10 mm exophytic hyperdense focus upper pole of right kidney, compatible with a hyperdense cyst  -FDG PET-CT 06/29/2023:  (questionable findings)  Small focus of mild uptake left femur lesser trochanter; borderline uptake right for bilaterally; multilevel thoracolumbar compression deformities, mild linear uptake at the in plates of T8, T12, L1 and L2 suggesting potential remodeling acute to subacute fractures; presumed oncogenic uptake maxilla on the right; nonspecific small mildly FDG avid bilateral hilar lymph nodes  >>>  From the data available so far, patient has multiple myeloma (symptomatic), by virtue of:  1. Involved: Uninvolved serum FLC ratio =/> 100 and involved FLC concentration 10 mg/dL or higher (in this  patient, kappa/lambda ratio age 89, and kappa light chain 99.4 mg/dL)   2. Hypercalcemia (06/26/2023): Calcium 10.1.  Albumin 3.2   3. Bone marrow plasmacytosis 50%  4. Heterogeneous bone demineralization on CT 06/27/2023, suggesting bone marrow infiltrative process like multiple myeloma  5. FDG PET-CT 06/29/2023:  Questionable findings  6. Beta 2 microglobulin level 3.18, elevated (06/26/2023)   7. No renal insufficiency, no significant anemia, LDH normal  >>>  -Velcade started 07/13/2023  -Zometa 4 mg IV every 4 weeks (x2 years), started 07/13/2023  -VRD:  Cycle 1 started 07/13/2023; cycle 2 started 08/10/2023; cycle 3 started 08/31/2023; cycle 4 started 09/21/2023  -Zometa 4 mg IV every 4 weeks, started 07/13/2023  -06/27/2023: Bone marrow biopsy:  Abnormal myeloma FISH panel:  No high-risk findings  -chemotherapy with VRD started 07/13/2023  -07/21/2023: Radiation oncology consultation:  Radiotherapy to spine not indicated/will not be helpful; patient referred to IR for kyphoplasty (questionable findings on FDG PET-CT)  -MRI thoracic spine 08/08/2023:  Chronic superior endplate compression fractures T9-L2 vertebral bodies, no acute/subacute fracture, no osseous lesion or soft tissue mass  -MRI lumbar spine 09/14/2023:  Significant progression of skeletal demineralization; new/progressive loss of vertebral body height throughout the spine since 05/2023; superior endplate L4 acute/subacute with marrow edema and enhancement; multilevel degenerative disc disease; spinal stenosis severe at L3-L5 with tethering of descending nerve root; moderate spinal canal stenosis at L4-L5 and L2-L3  -08/03/2023: TSH normal; free T4 normal  -06/14/2023: IgG 3810, elevated; IgM, IgA suppressed; kappa/lambda ratio 292; IgG kappa monoclonal protein 2.2 gm/dL  -07/27/2023:  IgG 2242, elevated but down; IgM, IgA remain suppressed; kappa/lambda ratio of 59.0, down; IgG kappa monoclonal protein 1.36 gm/dL (chemotherapy was started  07/13/2023)  -08/24/2023: IgG 792, has normalized; IgM, IgA remains suppressed; kappa/lambda ratio 5.72, elevated but significantly down from 292 pre chemotherapy; IgG kappa monoclonal protein 0.39 gm/dL, significantly down from 2.28 g per dL pre chemotherapy)  -VRD cycle 5 started 10/12/2023 (day 1 on 10/20/2023, day 8 on 10/19/2023, day 15 on 10/26/2023)  -08/01/2023: M spike 1.3 gm/dL; kappa 568.3; lambda 14.8; kappa/lambda ratio 38.4  -09/28/2023:  Kappa 3.71; lambda 1.33, normalized; kappa/lambda ratio 2.79 (was 292 on 06/14/2023 chemotherapy)  -10/11/2023: DEXA scan:  Osteoporosis based on hips; significantly increased fracture risk (left femur T-score-2.5; right femur T-score-2.8; lumbar spine T-score -1.2)  -10/18/2023: [facility] Labs:  IgG 602, low (was 1750 on 08/01/2023); IgM 15, low (was 20.4 on 08/01/2023); IgA 49, low (was 35.2 on 08/01/2023); kappa 24.6 (was 568.3 on 08/01/2023); lambda 12.1 (was 14.8 on 08/01/2023); kappa/lambda ratio 2.03 (was 38.40 on 08/01/2023)  -10/24/2023: Restaging bone marrow biopsy (post VRD X 4-1/2 cycles):  Normocellular; <1% plasma cell; bone marrow cellularity 40%; no clonal or abnormal plasma cell population on flow cytometry; no high-risk FISH findings (aneuploidy; gain of chromosome 7, 9, and 15)  -10/24/2023:  UPEP and urine BUBBA:  Albumin is the only protein detected (104 mg/24 hours) (no monoclonal protein detected)      Staging of myeloma:  -serum beta 2 microglobulin elevated (3.18)  -serum albumin and LDH normal   -myeloma FISH panel on bone marrow:  Negative for high-risk cytogenetic abnormalities  >>>  ISS stage:  Stage I  R-ISS stage:  Stage I      Co-morbidities:  NIDDM.  Dyslipidemia.  Hypertension.  Celiac disease.    History of rheumatic mitral valve disease, S/P Maze/ablation procedure 02/02/2018      Vaccination history:   COVID-19, MRNA, LN-S, PF (MODERNA FULL 0.5 ML DOSE) 8/18/2022 , 10/23/2021 , 3/17/2021 , 2/17/2021   Hepatitis A / Hepatitis B 8/18/2022  , 10/5/2021 , 7/29/2020   Influenza - Quadrivalent - MDCK - PF 10/5/2021   Influenza - Trivalent - MDCK - PF 9/10/2015   Meningococcal Conjugate (MCV4O) 8/18/2022 , 4/17/2012   Meningococcal Conjugate (MCV4P) 4/17/2012   Pneumococcal Conjugate - 20 Valent 1/12/2023   Tdap 7/29/2020   Zoster 11/18/2020 , 7/29/2020   Zoster Recombinant 11/18/2020 , 7/29/2020       Plan:  Hold chemotherapy at this time; he is scheduled for stem cell collection at [facility] 07/10/2023  Patient is expected to be discharged from [facility] on 10/17/2023, after stem cell collection on 11/10/2023  Follow-up with me on 10/18/2023   Resume chemotherapy 10/19/2023  Check TSH and free T4 now  Mid November, repeat SPEP, BUBBA, FLC assay, and 24 hour urine for total protein, UPEP, urine BUBBA, urine FLC assay  Continue monthly Zometa  -continue baby aspirin 81 mg daily  Continue Bactrim ds 1 tablet every day  Continue acyclovir 400 mg p.o. b.i.d.  Check CBC and CMP weekly  Continue narcotics  Refill gabapentin 600 mg p.o. t.i.d.   Cont Megace 400 mg p.o. q.day  ---------------------      Patient is expected to be discharged from [facility] on 10/17/2023, after stem cell collection on 11/10/2023  Follow-up with me on 10/18/2023   Resume chemotherapy 10/19/2023    Staging of myeloma:  -serum beta 2 microglobulin elevated (3.18)  -serum albumin and LDH normal   -myeloma FISH panel on bone marrow:  Negative for high-risk cytogenetic abnormalities  >>>  ISS stage:  Stage I  R-ISS stage:  Stage I    09/28/2023:   Refill gabapentin 600 mg p.o. t.i.d.   Refill Norco 10 mg every 4 hours PRN   Ordered DEXA scan to assess bone mineral density   Start Megace 400 mg p.o. q.day    -06/26/2023: Started Norco 5 mg p.o. q.6 hours PRN for pain; did not work; he stopped taking  -06/26/2023: Taking Norco 10 mg only twice daily; instructed him to take Norco 10 mg every 4-6 hours p.r.n. for pain (has 9/10 lower back pain, constant, radiating to anterior aspect of right thigh,  without neurological symptoms)  -06/26/2023: Started levofloxacin 500 mg p.o. q.day X 12 weeks  -06/26/2023: Severe back pain; presumed due to myeloma; referred to Radiation Oncology  -Velcade started 07/13/2023  -Zometa 4 mg IV every 4 weeks x2 years (started 07/13/2023  -VRD:  Cycle 1 started 07/13/2023; cycle 2 started 08/10/2023; cycle 3 started 08/31/2023; cycle 4 started 09/21/2023  -Zometa 4 mg IV every 4 weeks, started 07/13/2023  -06/27/2023: Bone marrow biopsy:  Abnormal myeloma FISH panel:  No high-risk features  -07/13/2023:  VRD started  -07/21/2023: Radiation oncology consultation:  Radiotherapy to spine not indicated/will not be helpful; patient referred to IR for kyphoplasty (questionable findings on FDG PET-CT)  -MRI thoracic spine 08/08/2023:  Chronic superior endplate compression fractures T9-L2 vertebral bodies, no acute/subacute fracture, no osseous lesion or soft tissue mass  -MRI lumbar spine 09/14/2023:  Significant progression of skeletal demineralization; new/progressive loss of vertebral body height throughout the spine since 05/2023; superior endplate L4 acute/subacute with marrow edema and enhancement; multilevel degenerative disc disease; spinal stenosis severe at L3-L5 with tethering of descending nerve root; moderate spinal canal stenosis at L4-L5 and L2-L3  -08/03/2023: TSH normal; free T4 normal  -06/14/2023: IgG 3810, elevated; IgM, IgA suppressed; kappa/lambda ratio 292; IgG kappa monoclonal protein 2.2 gm/dL  -07/27/2023:  IgG 2242, elevated but down; IgM, IgA remain suppressed; kappa/lambda ratio of 59.0, down; IgG kappa monoclonal protein 1.36 gm/dL (chemotherapy was started 07/13/2023)  -08/24/2023: IgG 792, has normalized; IgM, IgA remains suppressed; kappa/lambda ratio 5.72, elevated but significantly down from 292 pre chemotherapy; IgG kappa monoclonal protein 0.39 gm/dL, significantly down from 2.28 g per dL pre chemotherapy)  -VRD:  Cycle 1 started 07/13/2023; cycle 2  started 08/10/2023; cycle 3 started 08/31/2023; cycle 4 started 09/21/2023  -VRD cycle 5 started 10/12/2023 (day 1 on 10/20/2023, day 8 on 10/19/2023, day 15 on 10/26/2023)  -08/01/2023: M spike 1.3 gm/dL; kappa 568.3; lambda 14.8; kappa/lambda ratio 38.4  -09/28/2023:  Kappa 3.71; lambda 1.33, normalized; kappa/lambda ratio 2.79 (was 292 on 06/14/2023 chemotherapy)  -10/11/2023: DEXA scan:  Osteoporosis based on hips; significantly increased fracture risk (left femur T-score-2.5; right femur T-score-2.8; lumbar spine T-score -1.2)  -10/18/2023: [facility] Labs:  IgG 602, low (was 1750 on 08/01/2023); IgM 15, low (was 20.4 on 08/01/2023); IgA 49, low (was 35.2 on 08/01/2023); kappa 24.6 (was 568.3 on 08/01/2023); lambda 12.1 (was 14.8 on 08/01/2023); kappa/lambda ratio 2.03 (was 38.40 on 08/01/2023)  -10/24/2023: Restaging bone marrow biopsy (post VRD X 4-1/2 cycles):  Normocellular; <1% plasma cell; bone marrow cellularity 40%; no clonal or abnormal plasma cell population on flow cytometry; no high-risk FISH findings (aneuploidy; gain of chromosome 7, 9, and 15)  -10/24/2023:  UPEP and urine BUBBA:  Albumin is the only protein detected (104 mg/24 hours) (no monoclonal protein detected)  >>>  -ISS stage:  Stage I  -R-ISS stage:  Stage I  -bone marrow negative for high-risk cytogenetic abnormalities  -VRD started 07/13/2023; cycle 4 on 09/21/2023; cycle 5 started 10/12/2023  -good response to chemotherapy (see above, including restaging bone marrow biopsy and labs)  -will get discharged from [facility] on 11/17/2023 after stem cell collection on 11/10/2023   I will see him back for follow-up on 11/18/2023   -resume chemotherapy 11/19/2023   -later this month, he will undergo stem cell transplant  -Continue baby aspirin 81 mg p.o. q.day concurrently, for thromboprophylaxis secondary to lenalidomide.  -completed 12 weeks of levofloxacin 09/28/2023  -Continue acyclovir 400 mg p.o. b.i.d. for herpes zoster prophylaxis with  bortezomib  -Continue Bactrim ds 1 tablet every Monday/Wednesday/Friday during treatment  -check TSH and free T4 every 3 months (monitoring on Revlimid); next, November  -weekly assessment for peripheral neuropathy and/or neuropathic pain  -monitor for hypotension during bortezomib therapy; adjust antihypertensives and/or administer IV fluids  -assess response with repeat SPEP, BUBBA, FLC assay, and 24 hour urine protein for total protein, UPEP, urine UBBBA, and urine FLC assay in 1 month (11/18/2023)  -continue Zometa 4 mg IV every 4 weeks for 2 years (started 07/13/2023)  -per Radiation Oncology, given radiologic findings (questionable findings on PET-CT), palliative radiotherapy to spine will not be helpful  -continue narcotics for pain  -on MRI thoracic and lumbar spine 08/08/2023 and 09/14/2023:  Chronic compression fractures of vertebral bodies; no osseous lesion or soft tissue mass; significant progression of skeletal demineralization; progressive loss of vertebral body height throughout the spine since May 2023; spinal stenosis  -patient has been referred to IR for consideration of kyphoplasty  -osteoporosis of hips noted on DEXA scan 10/11/2023; patient already receiving monthly Zometa for underlying multiple myeloma    10 mm exophytic hyperdense focus upper pole of right kidney, compatible with a hyperdense cyst, on CT abdomen pelvis with contrast 06/27/2023  -07/12/2023:  MRI abdomen with and without contrast (right renal cyst): Small exophytic right renal lesion most likely a proteinaceous or hemorrhagic cyst (10 mm, upper pole of right kidney)    Staging of myeloma:  -serum beta 2 microglobulin elevated (3.18)  -serum albumin and LDH normal   -myeloma FISH panel on bone marrow:  Negative for high-risk cytogenetic abnormalities  >>>  -ISS stage:  Stage I  -R-ISS stage:  Stage I    Bortezomib/lenalidomide/dexamethasone (VRd), category 1 regimen:  Cycle length 21 days  -bortezomib 1.3 mg per m2 subQ days 1, 8,  and 15  -lenalidomide 25 mg p.o. daily, on days 1 through 14  -dexamethasone 40 mg p.o. with food days 1, 8, and 15  Cycle length: 21 days    Monitoring on lenalidomide:   Monitor for signs and symptoms of infection (if neutropenic), bleeding or bruising, hepatotoxicity, secondary malignancies, thromboembolism (eg, shortness of breath, chest pain, arm or leg swelling), dermatologic toxicity, tumor lysis syndrome, or hypersensitivity.    Monitoring on bortezomib:  Peripheral neuropathy, posterior reversible leukoencephalopathy syndrome, progressive multifocal leukoencephalopathy, tumor lysis syndrome, or hyper-/hypoglycemia. Monitor closely in patients with risk factors for heart failure or existing heart disease.    Follow-up with me 11/18/2023   Resume chemotherapy 11/19/2023.    Above discussed at length with the patient.  All questions answered.    Discussed labs and scans and gave him copies of relevant reports.    He understands and agrees with this plan.  ----------------------------------    Discussion:    Supportive care:  -bone targeted treatment: Bisphosphonate, category 1; or denosumab to reduce risk of skeletal related events; denosumab is preferred in patients with renal insufficiency; assess vitamin-D status; baseline dental exam; monitor for osteonecrosis of the jaw; monitor renal dysfunction with bisphosphonate therapy  -continue bone targeted treatment (bisphosphonate or denosumab) for 2 years; continue beyond 2 years should be based on clinical judgment  -patients receiving denosumab for bone disease who subsequently discontinued therapy should be given maintenance denosumab every 6 months or a single dose of bisphosphonate to mitigate risk of rebound osteoporosis  -for hypercalcemia, zoledronic acid, denosumab, steroids, and/or calcitonin  -for hyperviscosity, plasmapheresis should be used as adjuvant therapy for symptomatic hyperviscosity  -intravenous immunoglobulin therapy should be considered  in the setting of recurrent serious infection and/or hypogammaglobulinemia (IgG </= 400 mg/dL)  -pneumococcal conjugate vaccine, followed by pneumococcal polysaccharide vaccine 1 year later  -Influenza vaccination is recommended; 2 doses of high-dose inactivated quandaivalent influenza vaccine should be considered  -consider 12 weeks of levofloxacin 500 mg p.o. daily at the time of initial diagnosis of multiple myeloma  -COVID-19 vaccination  -highest risk for VTE is in the 1st 6 months following new diagnosis of multiple myeloma  -VTE prophylaxis if no contraindications to anticoagulation agents or antiplatelets  Recommendations for VTE prophylaxis:  Aspirin  mg daily; low molecular weight heparin equivalent to enoxaparin 40 mg daily; Xarelto 10 mg daily; Eliquis 2.5 mg b.i.d.; Arixtra 2.5 mg daily; Coumadin with target INR 2.0-3.0      Lenalidomide:  Embryo fetal toxicity  Hematologic toxicity.  Neutropenia.  Thrombocytopenia.  Venous and arterial thromboembolism.  DVT.  PE.  MI.  Stroke.  Dizziness, fatigue.  Tumor lysis syndrome.  Heart failure.    Used with caution in patients with renal impairment.  Lenalidomide =/> 4 cycles may decrease the # of CD34 pos cells collected for autologous stem cell transplant.  DVT, PE, atrial fibrillation, renal failure are more likely in patients> 65 years  Hepatotoxicity  Hypersensitivity reactions.  Anaphylaxis.  Angioedema.  Skin rash.  Eosinophilia.  Immediate hypersensitivity reactions.  Second primary malignancy.  AML.  MDS.  Solid tumor malignancies.  Nonmelanoma skin cancers.    Monitoring with lenalidomide:  -CBC with differential: weekly for the first 2 cycles, every 2 weeks during the third cycle and monthly thereafter;  -serum creatinine, LFTs (periodically).  -Thyroid function tests (TSH at baseline then every 2 to 3 months during lenalidomide treatment  -ECG when clinically indicated. Monitor for signs and symptoms of infection (if neutropenic), bleeding or  bruising, hepatotoxicity, secondary malignancies, thromboembolism (eg, shortness of breath, chest pain, arm or leg swelling), dermatologic toxicity, tumor lysis syndrome, or hypersensitivity.  Patients who could become pregnant: Pregnancy test 10 to 14 days and 24 hours prior to initiating therapy, weekly during the first 4 weeks of treatment, then every 2 to 4 weeks through 4 weeks after therapy discontinued.    Bortezomib:  Bone marrow suppression.  Neutropenia.  Thrombocytopenia.  Acute CHF.  Nausea, vomiting, diarrhea, constipation, ileus.    Hepatotoxicity:  Herpes zoster and her PCP plaques reactivation.    Anaphylactic reactions.  Hypersensitive reactions.  Angioedema.  Laryngeal edema.    Hypotension.  Peripheral neuropathy.  Posterior reversible leukoencephalopathy syndrome.  Progressive multifocal leukoencephalopathy.  Pulmonary toxicity.  Pneumonitis.  Interstitial pneumonia.  Lung infiltrates.  ARDS.  Thrombotic microangiopathy.  Tumor lysis syndrome.      Monitoring parameters with bortezomib:  CBC, CMP  Blood pressure (to monitor for hypotension)  Peripheral neuropathy  Posterior reversible leukoencephalopathy syndrome, progressive multifocal leukoencephalopathy, tumor lysis syndrome, or hyper-/hypoglycemia. Monitor baseline chest x-ray and then periodic pulmonary function testing (with new or worsening pulmonary symptoms). Monitor closely in patients with risk factors for heart failure or existing heart disease.      Follow-up:  No follow-ups on file.

## 2023-11-01 NOTE — PROGRESS NOTES
Date of Service: 5/18/2023    Attending Attestation: Patient discussed with resident. The chart was reviewed thoroughly including pertinent vitals, labs, imaging, medications, prior notes, and consultant/specialist recommendations.  I participated in the management of the patient, examined the patient, reviewed the summary of the plan, and was immediately available at all times throughout the encounter. Services were furnished in a primary care center located in the outpatient department of a [facility] hospital. I agree with the resident's findings and plan as documented in the resident's note.    Karla Roche MD  Attending - Family Medicine / Geriatric Medicine  [facility] [place], [facility]

## 2023-11-02 ENCOUNTER — OFFICE VISIT (OUTPATIENT)
Dept: HEMATOLOGY/ONCOLOGY | Facility: CLINIC | Age: 68
End: 2023-11-02
Attending: INTERNAL MEDICINE
Payer: MEDICARE

## 2023-11-02 VITALS
DIASTOLIC BLOOD PRESSURE: 65 MMHG | BODY MASS INDEX: 22.34 KG/M2 | SYSTOLIC BLOOD PRESSURE: 118 MMHG | OXYGEN SATURATION: 100 % | WEIGHT: 147.38 LBS | RESPIRATION RATE: 18 BRPM | HEIGHT: 68 IN | TEMPERATURE: 98 F | HEART RATE: 48 BPM

## 2023-11-02 DIAGNOSIS — E83.52 HYPERCALCEMIA: ICD-10-CM

## 2023-11-02 DIAGNOSIS — Z79.899 DRUG-INDUCED IMMUNODEFICIENCY: ICD-10-CM

## 2023-11-02 DIAGNOSIS — S32.010G COMPRESSION FRACTURE OF L1 VERTEBRA WITH DELAYED HEALING, SUBSEQUENT ENCOUNTER: Primary | ICD-10-CM

## 2023-11-02 DIAGNOSIS — C90.00 IGG MULTIPLE MYELOMA: Primary | ICD-10-CM

## 2023-11-02 DIAGNOSIS — C79.51 SECONDARY MALIGNANT NEOPLASM OF BONE: ICD-10-CM

## 2023-11-02 DIAGNOSIS — S32.020G COMPRESSION FRACTURE OF L2 VERTEBRA WITH DELAYED HEALING, SUBSEQUENT ENCOUNTER: ICD-10-CM

## 2023-11-02 DIAGNOSIS — R93.89 ABNORMAL FINDINGS ON DIAGNOSTIC IMAGING OF OTHER SPECIFIED BODY STRUCTURES: ICD-10-CM

## 2023-11-02 DIAGNOSIS — M81.0 OSTEOPOROSIS OF FEMUR WITHOUT PATHOLOGICAL FRACTURE: ICD-10-CM

## 2023-11-02 DIAGNOSIS — C79.51 SECONDARY MALIGNANCY OF LUMBAR VERTEBRAL COLUMN: ICD-10-CM

## 2023-11-02 DIAGNOSIS — M48.061 SPINAL STENOSIS OF LUMBAR REGION, UNSPECIFIED WHETHER NEUROGENIC CLAUDICATION PRESENT: ICD-10-CM

## 2023-11-02 DIAGNOSIS — R94.5 ABNORMAL RESULTS OF LIVER FUNCTION STUDIES: ICD-10-CM

## 2023-11-02 DIAGNOSIS — D84.821 DRUG-INDUCED IMMUNODEFICIENCY: ICD-10-CM

## 2023-11-02 DIAGNOSIS — C90.00 IGG MULTIPLE MYELOMA: ICD-10-CM

## 2023-11-02 DIAGNOSIS — M48.54XA NONTRAUMATIC COMPRESSION FRACTURE OF T8 VERTEBRA, INITIAL ENCOUNTER: ICD-10-CM

## 2023-11-02 DIAGNOSIS — M80.08XS AGE-RELATED OSTEOPOROSIS WITH CURRENT PATHOLOGICAL FRACTURE, VERTEBRA(E), SEQUELA: ICD-10-CM

## 2023-11-02 PROCEDURE — 99215 OFFICE O/P EST HI 40 MIN: CPT | Mod: PBBFAC | Performed by: INTERNAL MEDICINE

## 2023-11-02 PROCEDURE — 99215 PR OFFICE/OUTPT VISIT, EST, LEVL V, 40-54 MIN: ICD-10-PCS | Mod: S$PBB,,, | Performed by: INTERNAL MEDICINE

## 2023-11-02 PROCEDURE — 99215 OFFICE O/P EST HI 40 MIN: CPT | Mod: S$PBB,,, | Performed by: INTERNAL MEDICINE

## 2023-11-02 RX ORDER — GABAPENTIN 600 MG/1
600 TABLET ORAL 3 TIMES DAILY
Qty: 90 TABLET | Refills: 2 | Status: SHIPPED | OUTPATIENT
Start: 2023-11-02

## 2023-11-02 RX ORDER — CICLOPIROX 80 MG/ML
8 SOLUTION TOPICAL DAILY
COMMUNITY
Start: 2023-10-27

## 2023-11-02 RX ORDER — SULFAMETHOXAZOLE AND TRIMETHOPRIM 800; 160 MG/1; MG/1
TABLET ORAL
Qty: 48 TABLET | Refills: 3 | Status: SHIPPED | OUTPATIENT
Start: 2023-11-02

## 2023-11-02 NOTE — Clinical Note
Hold chemotherapy at this time; he is scheduled for stem cell collection at [facility] 07/10/2023 Check TSH and free T4 now Mid November, repeat SPEP, BUBBA, FLC assay, and 24 hour urine for total protein, UPEP, urine BUBBA, urine FLC assay Continue monthly Zometa -continue baby aspirin 81 mg daily Continue Bactrim ds 1 tablet every day Continue acyclovir 400 mg p.o. b.i.d. Check CBC and CMP weekly Continue narcotics Refill gabapentin 600 mg p.o. t.i.d.  Cont Megace 400 mg p.o. q.day Refer to IR for consideration of kyphoplasty Follow-up with NP in 2 weeks ---------------------

## 2023-11-02 NOTE — Clinical Note
Updated orders:  Patient is expected to be discharged from [facility] on 10/17 Follow-up with me on 10/18/2023  Resume chemotherapy 10/19/2023

## 2023-11-03 ENCOUNTER — DOCUMENTATION ONLY (OUTPATIENT)
Dept: HEMATOLOGY/ONCOLOGY | Facility: CLINIC | Age: 68
End: 2023-11-03
Payer: MEDICARE

## 2023-11-03 DIAGNOSIS — C90.00 IGG MULTIPLE MYELOMA: ICD-10-CM

## 2023-11-03 RX ORDER — LENALIDOMIDE 25 MG/1
CAPSULE ORAL
Qty: 14 EACH | Refills: 0 | Status: SHIPPED | OUTPATIENT
Start: 2023-11-03 | End: 2024-02-15

## 2023-11-03 NOTE — PROGRESS NOTES
Daniel Jaramillo, Narciso Zayas MD; Navay Mandel LPN  Caller: Unspecified (Today,  3:16 PM)  Good afternoon! I work here at [facility] in IR with Dr Henry. He reviewed the consult for the kyphoplasty again and we saw in a recent H&P that the patient will be getting treatment soon at [facility]. Dr Henry is recommending that the patient get evaluated by them for the kyphoplasty as well. Please reach out if there are any questions. Thanks.

## 2023-11-07 ENCOUNTER — OFFICE VISIT (OUTPATIENT)
Dept: SURGERY | Facility: CLINIC | Age: 68
End: 2023-11-07
Payer: MEDICARE

## 2023-11-07 VITALS
WEIGHT: 144.38 LBS | BODY MASS INDEX: 21.88 KG/M2 | SYSTOLIC BLOOD PRESSURE: 117 MMHG | RESPIRATION RATE: 20 BRPM | DIASTOLIC BLOOD PRESSURE: 69 MMHG | HEART RATE: 54 BPM | HEIGHT: 68 IN | TEMPERATURE: 98 F | OXYGEN SATURATION: 100 %

## 2023-11-07 DIAGNOSIS — K40.90 NON-RECURRENT UNILATERAL INGUINAL HERNIA WITHOUT OBSTRUCTION OR GANGRENE: ICD-10-CM

## 2023-11-07 PROCEDURE — 99215 OFFICE O/P EST HI 40 MIN: CPT | Mod: PBBFAC

## 2023-11-07 NOTE — PROGRESS NOTES
[facility] General Surgery Clinic Note    HPI: Arjun Yan is a 67 yo male with a past medical history of T2DM on metformin, GERD, hyperlipidemia, well controlled hypertension on metoprolol, and multiple myeloma awaiting stem cell transplantation scheduled 11/30 after which he will resume chemotherapy with Lenalidomide, Bortezomib, and bone targeted treatment. He is presenting to clinic today after referral from his primary care doctor for a right sided inguinal hernia. Patient notes some pain in the right groin area, though this may be 2/2 to significant osteoporosis of R femur and hip.   Patient denies nausea/vomiting, sharp pain in inguinal area, fecal or urinary incontinence or signs of bowel obstruction.   PMH:   Past Medical History:   Diagnosis Date    Diabetes mellitus, type 2     GERD (gastroesophageal reflux disease)     Hyperlipidemia     Hypertension     Personal history of colonic polyps 06/29/2022      Meds:   Current Outpatient Medications:     abiraterone (ZYTIGA) 500 mg Tab, Take 1,000 mg by mouth once daily Take 2 (two) 500 mg tab (to equal 1,000 mg) by mouth daily., Disp: , Rfl:     acyclovir (ZOVIRAX) 400 MG tablet, Take 1 tablet (400 mg total) by mouth 2 (two) times daily., Disp: 60 tablet, Rfl: 4    ammonium lactate 12 % Crea, Apply 1 application topically once daily., Disp: 385 g, Rfl: 2    ascorbic acid, vitamin C, (VITAMIN C) 1000 MG tablet, Take 1,000 mg by mouth once daily., Disp: , Rfl:     aspirin (ECOTRIN) 81 MG EC tablet, Take 81 mg by mouth once daily., Disp: , Rfl:     atorvastatin (LIPITOR) 20 MG tablet, Take 20 mg by mouth once daily., Disp: , Rfl:     calcium carbonate 195 mg calcium (500 mg) Chew, Take 1 tablet by mouth once daily., Disp: , Rfl:     ciclopirox (PENLAC) 8 % Soln, Apply 8 drops topically Daily., Disp: , Rfl:     dexAMETHasone (DECADRON) 4 MG Tab, Take 10 tablets (40 mg total) by mouth every 7 days. on days 1, 8, and 15 of each chemotherapy cycle. Take with food., Disp: 30  tablet, Rfl: 5    diclofenac sodium (VOLTAREN) 1 % Gel, Apply 2 g topically 4 (four) times daily., Disp: 450 g, Rfl: 1    famotidine (PEPCID) 40 MG tablet, Take 40 mg by mouth every evening., Disp: , Rfl:     fluticasone propionate (FLONASE) 50 mcg/actuation nasal spray, 50 sprays by Nasal route 2 (two) times a day., Disp: , Rfl:     gabapentin (NEURONTIN) 600 MG tablet, Take 1 tablet (600 mg total) by mouth 3 (three) times daily., Disp: 90 tablet, Rfl: 2    hydrocortisone (ANUSOL-HC) 2.5 % rectal cream, Place 1 applicator rectally 2 (two) times daily., Disp: 28 g, Rfl: 2    ketoconazole (NIZORAL) 2 % cream, Apply topically once daily., Disp: 60 g, Rfl: 1    lenalidomide 25 mg Cap, Take 1 capsule by mouth once daily on days 1 to 14 of each 21 day cycle.., Disp: 14 each, Rfl: 0    linaCLOtide (LINZESS) 72 mcg Cap capsule, Take 1 capsule (72 mcg total) by mouth daily as needed (constipation)., Disp: 90 each, Rfl: 1    metFORMIN (GLUCOPHAGE) 500 MG tablet, Take 1 tablet (500 mg total) by mouth 2 (two) times daily with meals., Disp: 180 tablet, Rfl: 3    metoprolol succinate (TOPROL-XL) 50 MG 24 hr tablet, Take by mouth., Disp: , Rfl:     MIRALAX 17 gram/dose powder, Take 17 g by mouth., Disp: , Rfl:     morphine (MS CONTIN) 30 MG 12 hr tablet, Take 1 tablet (30 mg total) by mouth 2 (two) times daily. Every 12 hours, Disp: 30 tablet, Rfl: 0    MOVANTIK 25 mg tablet, , Disp: , Rfl:     naloxone (NARCAN) 4 mg/actuation Spry, SMARTSIG:Both Nares, Disp: , Rfl:     nystatin (MYCOSTATIN) powder, Apply topically 4 (four) times daily., Disp: 60 g, Rfl: 2    pantoprazole (PROTONIX) 40 MG tablet, Take 40 mg by mouth., Disp: , Rfl:     sulfamethoxazole-trimethoprim 800-160mg (BACTRIM DS) 800-160 mg Tab, Take 1 tablet by mouth on MWF of each week, Disp: 48 tablet, Rfl: 3    traMADoL 100 mg Tab, Take 100 tablets by mouth every 4 (four) hours as needed (pain)., Disp: 60 tablet, Rfl: 2    gabapentin (NEURONTIN) 100 MG capsule, Take  100 mg by mouth 3 (three) times daily., Disp: , Rfl:     gabapentin (NEURONTIN) 400 MG capsule, , Disp: , Rfl:     megestroL (MEGACE) 400 mg/10 mL (40 mg/mL) Susp, Take 10 mLs (400 mg total) by mouth once daily., Disp: 300 mL, Rfl: 0    pantoprazole (PROTONIX) 20 MG tablet, Take 1 tablet (20 mg total) by mouth once daily. (Patient not taking: Reported on 11/7/2023), Disp: 30 tablet, Rfl: 11  Allergies: Review of patient's allergies indicates:  No Known Allergies  Social History:   Social History     Tobacco Use    Smoking status: Never    Smokeless tobacco: Never   Substance Use Topics    Alcohol use: Never    Drug use: Never     Family History: History reviewed. No pertinent family history.  Surgical History:   Past Surgical History:   Procedure Laterality Date    BONE MARROW ASPIRATION N/A 06/27/2023    Procedure: ASPIRATION, BONE MARROW;  Surgeon: Namita Daniels MD;  Location: [facility] ENDOSCOPY;  Service: General;  Laterality: N/A;    BONE MARROW ASPIRATION N/A 10/24/2023    Procedure: ASPIRATION, BONE MARROW;  Surgeon: Namita Daniels MD;  Location: [facility] ENDOSCOPY;  Service: General;  Laterality: N/A;    BONE MARROW BIOPSY N/A 06/27/2023    Procedure: Biopsy-bone marrow;  Surgeon: Haylie Liu MD;  Location: [facility] ENDOSCOPY;  Service: General;  Laterality: N/A;    BONE MARROW BIOPSY N/A 10/24/2023    Procedure: Biopsy-bone marrow;  Surgeon: Namita Daniels MD;  Location: [facility] ENDOSCOPY;  Service: General;  Laterality: N/A;    CARDIAC SURGERY  2018    COLONOSCOPY  06/29/2022    EYE SURGERY       Review of Systems:  Skin: No rashes or itching.  Head: Denies headache or recent trauma.  Eyes: Denies eye pain or double vision.  Neck: Denies swelling or hoarseness of voice.  Respiratory: Denies shortness of breath or chest pain  Cardiac: Denies palpitations or swelling in hands/feet.  Gastrointestinal: Denies nausea, denies vomiting. Reports mild constipation.   Urinary: Denies dysuria or  hematuria.  Vascular: Denies claudication or leg swelling.  Neuro: Denies motor deficits. Denies weakness.  Endocrine: Denies excessive sweating or cold intolerance.  Psych: Denies memory problems. Denies anxiety.    Objective:    Vitals:  Vitals:    11/07/23 1052   BP: 117/69   Pulse: (!) 54   Resp: 20   Temp: 98.1 °F (36.7 °C)        Physical Exam:  Gen: NAD  Neuro: awake, alert, answering questions appropriately  CV: RRR  Resp: non-labored breathing, PERFECTO  Abd: soft, ND, NT  : Small mass palpable in right inguinal area with no signs of strangulation or incarceration. No masses noted on left side.   Ext: moves all 4 spontaneously and purposefully  Skin: warm, well perfused    Pertinent Labs:  -No new labs     Imaging:  -No new relevant imaging     Micro/Path/Other:  -None     Assessment/Plan:  Mr. Yan is a 69 yo male with a past medical history of T2DM on metformin, GERD, hyperlipidemia, well controlled hypertension on metoprolol, and multiple myeloma awaiting stem cell transplantation with [facility] scheduled 11/30. Chemotherapy is currently paused and will resume after stem cell transplantation. There is a small palpable mass in right inguinal area without evidence of strangulation or incarceration. Discussed with patient the risks of infection with surgery in the setting of immunosuppressive medication required for stem cell transplantation and chemotherapy. Patient will follow up prn to reevaluate hernia, at which point we will re-evaluate status of immunosuppression and possible scheduling for elective surgery.  Patient was instructed to contact us if he experiences increased right inguinal pain, redness in the area, inability to reduce the hernia, or nausea/vomiting. Patient agreed and was amenable to this plan.     - No plan for surgery at this time, will reevaluate after bone marrow transplant and chemotherapy is complete       Cris Villaseñor MS3  [facility] General Surgery  11/07/2023 11:20 AM       RESIDENT  ATTESTATION     I have seen and  evaluated the patient with the student doctor. Agree with above note and the necessary changes have been made. Agree w/ assessment and plan with following changes:     Assessment/Plan:  Pt w/ hx of MM currently awaiting bone marrow transplantation. No easily appreciable hernia on exam although small bulging noted on valsalva and pt does endorse right inguinal discomfort. CT from 6/27 shows b/l fat containing hernias with small involvement of bladder on right side. No changes in bowel movements or urinary symptoms reported. Pt to follow up after transplant/chemo for possible repair if pt no longer immunocompromised     -Follow up 6 months/ prn for eval of FREDRICK Delaney MD  PGY-1   [employer] General Surgery

## 2023-11-07 NOTE — PROGRESS NOTES
Pt seen by Dr. Delaney; Pt instructed to return to clinic as needed; Discharge paperwork given w/pt verbalizing understanding

## 2023-11-07 NOTE — PROGRESS NOTES
[facility] General Surgery Clinic Note    HPI: Arjun Yan is a 69 yo male with a past medical history of T2DM on metformin, GERD, hyperlipidemia, well controlled hypertension on metoprolol, and multiple myeloma awaiting stem cell transplantation scheduled 11/30 after which he will resume chemotherapy with Lenalidomide, Bortezomib, and bone targeted treatment. He is presenting to clinic today after referral from his primary care doctor for a right sided inguinal hernia. Patient notes some pain in the right groin area, though this may be 2/2 to significant osteoporosis of R femur and hip.   Patient denies nausea/vomiting, sharp pain in inguinal area, fecal or urinary incontinence or signs of bowel obstruction.   PMH:   Past Medical History:   Diagnosis Date    Diabetes mellitus, type 2     GERD (gastroesophageal reflux disease)     Hyperlipidemia     Hypertension     Personal history of colonic polyps 06/29/2022      Meds:   Current Outpatient Medications:     abiraterone (ZYTIGA) 500 mg Tab, Take 1,000 mg by mouth once daily Take 2 (two) 500 mg tab (to equal 1,000 mg) by mouth daily., Disp: , Rfl:     acyclovir (ZOVIRAX) 400 MG tablet, Take 1 tablet (400 mg total) by mouth 2 (two) times daily., Disp: 60 tablet, Rfl: 4    ammonium lactate 12 % Crea, Apply 1 application topically once daily., Disp: 385 g, Rfl: 2    ascorbic acid, vitamin C, (VITAMIN C) 1000 MG tablet, Take 1,000 mg by mouth once daily., Disp: , Rfl:     aspirin (ECOTRIN) 81 MG EC tablet, Take 81 mg by mouth once daily., Disp: , Rfl:     atorvastatin (LIPITOR) 20 MG tablet, Take 20 mg by mouth once daily., Disp: , Rfl:     calcium carbonate 195 mg calcium (500 mg) Chew, Take 1 tablet by mouth once daily., Disp: , Rfl:     ciclopirox (PENLAC) 8 % Soln, Apply 8 drops topically Daily., Disp: , Rfl:     dexAMETHasone (DECADRON) 4 MG Tab, Take 10 tablets (40 mg total) by mouth every 7 days. on days 1, 8, and 15 of each chemotherapy cycle. Take with food., Disp: 30  tablet, Rfl: 5    diclofenac sodium (VOLTAREN) 1 % Gel, Apply 2 g topically 4 (four) times daily., Disp: 450 g, Rfl: 1    famotidine (PEPCID) 40 MG tablet, Take 40 mg by mouth every evening., Disp: , Rfl:     fluticasone propionate (FLONASE) 50 mcg/actuation nasal spray, 50 sprays by Nasal route 2 (two) times a day., Disp: , Rfl:     gabapentin (NEURONTIN) 600 MG tablet, Take 1 tablet (600 mg total) by mouth 3 (three) times daily., Disp: 90 tablet, Rfl: 2    hydrocortisone (ANUSOL-HC) 2.5 % rectal cream, Place 1 applicator rectally 2 (two) times daily., Disp: 28 g, Rfl: 2    ketoconazole (NIZORAL) 2 % cream, Apply topically once daily., Disp: 60 g, Rfl: 1    lenalidomide 25 mg Cap, Take 1 capsule by mouth once daily on days 1 to 14 of each 21 day cycle.., Disp: 14 each, Rfl: 0    linaCLOtide (LINZESS) 72 mcg Cap capsule, Take 1 capsule (72 mcg total) by mouth daily as needed (constipation)., Disp: 90 each, Rfl: 1    metFORMIN (GLUCOPHAGE) 500 MG tablet, Take 1 tablet (500 mg total) by mouth 2 (two) times daily with meals., Disp: 180 tablet, Rfl: 3    metoprolol succinate (TOPROL-XL) 50 MG 24 hr tablet, Take by mouth., Disp: , Rfl:     MIRALAX 17 gram/dose powder, Take 17 g by mouth., Disp: , Rfl:     morphine (MS CONTIN) 30 MG 12 hr tablet, Take 1 tablet (30 mg total) by mouth 2 (two) times daily. Every 12 hours, Disp: 30 tablet, Rfl: 0    MOVANTIK 25 mg tablet, , Disp: , Rfl:     naloxone (NARCAN) 4 mg/actuation Spry, SMARTSIG:Both Nares, Disp: , Rfl:     nystatin (MYCOSTATIN) powder, Apply topically 4 (four) times daily., Disp: 60 g, Rfl: 2    pantoprazole (PROTONIX) 40 MG tablet, Take 40 mg by mouth., Disp: , Rfl:     sulfamethoxazole-trimethoprim 800-160mg (BACTRIM DS) 800-160 mg Tab, Take 1 tablet by mouth on MWF of each week, Disp: 48 tablet, Rfl: 3    traMADoL 100 mg Tab, Take 100 tablets by mouth every 4 (four) hours as needed (pain)., Disp: 60 tablet, Rfl: 2    gabapentin (NEURONTIN) 100 MG capsule, Take  100 mg by mouth 3 (three) times daily., Disp: , Rfl:     gabapentin (NEURONTIN) 400 MG capsule, , Disp: , Rfl:     megestroL (MEGACE) 400 mg/10 mL (40 mg/mL) Susp, Take 10 mLs (400 mg total) by mouth once daily., Disp: 300 mL, Rfl: 0    pantoprazole (PROTONIX) 20 MG tablet, Take 1 tablet (20 mg total) by mouth once daily. (Patient not taking: Reported on 11/7/2023), Disp: 30 tablet, Rfl: 11  Allergies: Review of patient's allergies indicates:  No Known Allergies  Social History:   Social History     Tobacco Use    Smoking status: Never    Smokeless tobacco: Never   Substance Use Topics    Alcohol use: Never    Drug use: Never     Family History: History reviewed. No pertinent family history.  Surgical History:   Past Surgical History:   Procedure Laterality Date    BONE MARROW ASPIRATION N/A 06/27/2023    Procedure: ASPIRATION, BONE MARROW;  Surgeon: Namita Daniels MD;  Location: [facility] ENDOSCOPY;  Service: General;  Laterality: N/A;    BONE MARROW ASPIRATION N/A 10/24/2023    Procedure: ASPIRATION, BONE MARROW;  Surgeon: Namita Daniels MD;  Location: [facility] ENDOSCOPY;  Service: General;  Laterality: N/A;    BONE MARROW BIOPSY N/A 06/27/2023    Procedure: Biopsy-bone marrow;  Surgeon: Haylie Liu MD;  Location: [facility] ENDOSCOPY;  Service: General;  Laterality: N/A;    BONE MARROW BIOPSY N/A 10/24/2023    Procedure: Biopsy-bone marrow;  Surgeon: Namita Daniels MD;  Location: [facility] ENDOSCOPY;  Service: General;  Laterality: N/A;    CARDIAC SURGERY  2018    COLONOSCOPY  06/29/2022    EYE SURGERY       Review of Systems:  Skin: No rashes or itching.  Head: Denies headache or recent trauma.  Eyes: Denies eye pain or double vision.  Neck: Denies swelling or hoarseness of voice.  Respiratory: Denies shortness of breath or chest pain  Cardiac: Denies palpitations or swelling in hands/feet.  Gastrointestinal: Denies nausea, denies vomiting. Reports mild constipation.   Urinary: Denies dysuria or  hematuria.  Vascular: Denies claudication or leg swelling.  Neuro: Denies motor deficits. Denies weakness.  Endocrine: Denies excessive sweating or cold intolerance.  Psych: Denies memory problems. Denies anxiety.    Objective:    Vitals:  Vitals:    11/07/23 1052   BP: 117/69   Pulse: (!) 54   Resp: 20   Temp: 98.1 °F (36.7 °C)        Physical Exam:  Gen: NAD  Neuro: awake, alert, answering questions appropriately  CV: RRR  Resp: non-labored breathing, PERFECTO  Abd: soft, ND, NT  : Small mass palpable in right inguinal area with no signs of strangulation or incarceration. No masses noted on left side.   Ext: moves all 4 spontaneously and purposefully  Skin: warm, well perfused    Pertinent Labs:  -No new labs     Imaging:  -No new relevant imaging     Micro/Path/Other:  -None     Assessment/Plan:  Mr. Yan is a 69 yo male with a past medical history of T2DM on metformin, GERD, hyperlipidemia, well controlled hypertension on metoprolol, and multiple myeloma awaiting stem cell transplantation with [facility] scheduled 11/30. Chemotherapy is currently paused and will resume after stem cell transplantation. There is a small palpable mass in right inguinal area without evidence of strangulation or incarceration. Discussed with patient the risks of infection with surgery in the setting of immunosuppressive medication required for stem cell transplantation and chemotherapy. Patient will follow up prn to reevaluate hernia, at which point we will re-evaluate status of immunosuppression and possible scheduling for elective surgery.  Patient was instructed to contact us if he experiences increased right inguinal pain, redness in the area, inability to reduce the hernia, or nausea/vomiting. Patient agreed and was amenable to this plan.     - No plan for surgery at this time, will reevaluate after bone marrow transplant and chemotherapy is complete       Cris Villaseñor MS3  [facility] General Surgery  11/07/2023 11:20 AM       RESIDENT  ATTESTATION     I have seen and  evaluated the patient with the student doctor. Agree with above note and the necessary changes have been made. Agree w/ assessment and plan with following changes:     Assessment/Plan:  Pt w/ hx of MM currently awaiting bone marrow transplantation. No easily appreciable hernia on exam although small bulging noted on valsalva and pt does endorse right inguinal discomfort. CT from 6/27 shows b/l fat containing hernias with small involvement of bladder on right side. No changes in bowel movements or urinary symptoms reported. Pt to follow up after transplant/chemo for possible repair if pt no longer immunocompromised     -Follow up 6 months/ prn for eval of FREDRICK Delaney MD  PGY-1   [employer] General Surgery

## 2023-11-08 ENCOUNTER — LAB VISIT (OUTPATIENT)
Dept: LAB | Facility: HOSPITAL | Age: 68
End: 2023-11-08
Attending: INTERNAL MEDICINE
Payer: MEDICARE

## 2023-11-08 DIAGNOSIS — C79.51 SECONDARY MALIGNANCY OF LUMBAR VERTEBRAL COLUMN: ICD-10-CM

## 2023-11-08 DIAGNOSIS — R93.89 ABNORMAL FINDINGS ON DIAGNOSTIC IMAGING OF OTHER SPECIFIED BODY STRUCTURES: ICD-10-CM

## 2023-11-08 DIAGNOSIS — R94.5 ABNORMAL RESULTS OF LIVER FUNCTION STUDIES: ICD-10-CM

## 2023-11-08 DIAGNOSIS — C79.51 SECONDARY MALIGNANT NEOPLASM OF BONE: ICD-10-CM

## 2023-11-08 DIAGNOSIS — C90.00 IGG MULTIPLE MYELOMA: ICD-10-CM

## 2023-11-08 PROCEDURE — 84166 PROTEIN E-PHORESIS/URINE/CSF: CPT | Mod: 59

## 2023-11-08 NOTE — PROGRESS NOTES
I have reviewed the notes, assessments, and/or procedures performed by the resident, I concur with her/his documentation of Yuriy Díaz.     Thania Alston MD

## 2023-11-09 ENCOUNTER — LAB VISIT (OUTPATIENT)
Dept: HEMATOLOGY/ONCOLOGY | Facility: CLINIC | Age: 68
End: 2023-11-09
Payer: MEDICARE

## 2023-11-09 DIAGNOSIS — R93.89 ABNORMAL FINDINGS ON DIAGNOSTIC IMAGING OF OTHER SPECIFIED BODY STRUCTURES: ICD-10-CM

## 2023-11-09 DIAGNOSIS — Z79.899 DRUG-INDUCED IMMUNODEFICIENCY: ICD-10-CM

## 2023-11-09 DIAGNOSIS — C90.00 IGG MULTIPLE MYELOMA: ICD-10-CM

## 2023-11-09 DIAGNOSIS — C79.51 SECONDARY MALIGNANT NEOPLASM OF BONE: ICD-10-CM

## 2023-11-09 DIAGNOSIS — C90.00 MULTIPLE MYELOMA, REMISSION STATUS UNSPECIFIED: ICD-10-CM

## 2023-11-09 DIAGNOSIS — D84.821 DRUG-INDUCED IMMUNODEFICIENCY: ICD-10-CM

## 2023-11-09 DIAGNOSIS — R94.5 ABNORMAL RESULTS OF LIVER FUNCTION STUDIES: ICD-10-CM

## 2023-11-09 DIAGNOSIS — C79.51 SECONDARY MALIGNANCY OF LUMBAR VERTEBRAL COLUMN: ICD-10-CM

## 2023-11-09 LAB
ALBUMIN SERPL-MCNC: 3.5 G/DL (ref 3.4–4.8)
ALBUMIN/GLOB SERPL: 1.3 RATIO (ref 1.1–2)
ALP SERPL-CCNC: 58 UNIT/L (ref 40–150)
ALT SERPL-CCNC: 16 UNIT/L (ref 0–55)
AST SERPL-CCNC: 14 UNIT/L (ref 5–34)
BASOPHILS # BLD AUTO: 0.09 X10(3)/MCL
BASOPHILS NFR BLD AUTO: 1.2 %
BILIRUB SERPL-MCNC: 0.3 MG/DL
BUN SERPL-MCNC: 20.7 MG/DL (ref 8.4–25.7)
CALCIUM SERPL-MCNC: 9.3 MG/DL (ref 8.8–10)
CHLORIDE SERPL-SCNC: 106 MMOL/L (ref 98–107)
CO2 SERPL-SCNC: 25 MMOL/L (ref 23–31)
CREAT SERPL-MCNC: 0.8 MG/DL (ref 0.73–1.18)
EOSINOPHIL # BLD AUTO: 0.63 X10(3)/MCL (ref 0–0.9)
EOSINOPHIL NFR BLD AUTO: 8.4 %
ERYTHROCYTE [DISTWIDTH] IN BLOOD BY AUTOMATED COUNT: 14.3 % (ref 11.5–17)
GFR SERPLBLD CREATININE-BSD FMLA CKD-EPI: >60 MLS/MIN/1.73/M2
GLOBULIN SER-MCNC: 2.8 GM/DL (ref 2.4–3.5)
GLUCOSE SERPL-MCNC: 79 MG/DL (ref 82–115)
HCT VFR BLD AUTO: 35 % (ref 42–52)
HGB BLD-MCNC: 11.3 G/DL (ref 14–18)
IGA SERPL-MCNC: 74 MG/DL (ref 101–645)
IGG SERPL-MCNC: 668 MG/DL (ref 540–1822)
IGM SERPL-MCNC: 18 MG/DL (ref 22–240)
IMM GRANULOCYTES # BLD AUTO: 0.13 X10(3)/MCL (ref 0–0.04)
IMM GRANULOCYTES NFR BLD AUTO: 1.7 %
LYMPHOCYTES # BLD AUTO: 2.19 X10(3)/MCL (ref 0.6–4.6)
LYMPHOCYTES NFR BLD AUTO: 29.1 %
MAGNESIUM SERPL-MCNC: 2.1 MG/DL (ref 1.6–2.6)
MCH RBC QN AUTO: 31 PG (ref 27–31)
MCHC RBC AUTO-ENTMCNC: 32.3 G/DL (ref 33–36)
MCV RBC AUTO: 95.9 FL (ref 80–94)
MONOCYTES # BLD AUTO: 1.04 X10(3)/MCL (ref 0.1–1.3)
MONOCYTES NFR BLD AUTO: 13.8 %
NEUTROPHILS # BLD AUTO: 3.44 X10(3)/MCL (ref 2.1–9.2)
NEUTROPHILS NFR BLD AUTO: 45.8 %
NRBC BLD AUTO-RTO: 0 %
PLATELET # BLD AUTO: 390 X10(3)/MCL (ref 130–400)
PMV BLD AUTO: 9.7 FL (ref 7.4–10.4)
POTASSIUM SERPL-SCNC: 4.1 MMOL/L (ref 3.5–5.1)
PROT SERPL-MCNC: 6.3 GM/DL (ref 5.8–7.6)
RBC # BLD AUTO: 3.65 X10(6)/MCL (ref 4.7–6.1)
SODIUM SERPL-SCNC: 137 MMOL/L (ref 136–145)
T4 FREE SERPL-MCNC: 1.06 NG/DL (ref 0.7–1.48)
TSH SERPL-ACNC: 0.88 UIU/ML (ref 0.35–4.94)
WBC # SPEC AUTO: 7.52 X10(3)/MCL (ref 4.5–11.5)

## 2023-11-09 PROCEDURE — 85025 COMPLETE CBC W/AUTO DIFF WBC: CPT

## 2023-11-09 PROCEDURE — 80053 COMPREHEN METABOLIC PANEL: CPT

## 2023-11-09 PROCEDURE — 84165 PROTEIN E-PHORESIS SERUM: CPT

## 2023-11-09 PROCEDURE — 84443 ASSAY THYROID STIM HORMONE: CPT

## 2023-11-09 PROCEDURE — 82784 ASSAY IGA/IGD/IGG/IGM EACH: CPT

## 2023-11-09 PROCEDURE — 83521 IG LIGHT CHAINS FREE EACH: CPT | Mod: 59

## 2023-11-09 PROCEDURE — 83735 ASSAY OF MAGNESIUM: CPT

## 2023-11-09 PROCEDURE — 86334 IMMUNOFIX E-PHORESIS SERUM: CPT

## 2023-11-09 PROCEDURE — 84439 ASSAY OF FREE THYROXINE: CPT

## 2023-11-09 PROCEDURE — 36415 COLL VENOUS BLD VENIPUNCTURE: CPT

## 2023-11-10 LAB
ALBUMIN % SPEP (OHS): 52.62
ALBUMIN SERPL-MCNC: 3.2 G/DL (ref 3.4–4.8)
ALBUMIN/GLOB SERPL: 1.1 RATIO (ref 1.1–2)
ALPHA 1 GLOB (OHS): 0.26 GM/DL
ALPHA 1 GLOB% (OHS): 4.4
ALPHA 2 GLOB % (OHS): 16.7
ALPHA 2 GLOB (OHS): 1 GM/DL
BETA GLOB (OHS): 0.88 GM/DL
BETA GLOB% (OHS): 14.63
GAMMA GLOBULIN % (OHS): 11.65
GAMMA GLOBULIN (OHS): 0.7 GM/DL
GLOBULIN SER-MCNC: 2.8 GM/DL (ref 2.4–3.5)
KAPPA LC FREE SER-MCNC: 2.29 MG/DL (ref 0.33–1.94)
KAPPA LC FREE/LAMBDA FREE SER: 1.36 {RATIO} (ref 0.26–1.65)
LAMBDA LC FREE SERPL-MCNC: 1.68 MG/DL (ref 0.57–2.63)
M SPIKE % (OHS): ABNORMAL
M SPIKE (OHS): ABNORMAL
PATH REV: NORMAL
PROT SERPL-MCNC: 6 GM/DL (ref 5.8–7.6)

## 2023-11-13 ENCOUNTER — TELEPHONE (OUTPATIENT)
Dept: HEMATOLOGY/ONCOLOGY | Facility: CLINIC | Age: 68
End: 2023-11-13
Payer: MEDICARE

## 2023-11-13 LAB
ALBUMIN 24H UR ELPH-MRATE: 210 MG/24 H
COLLECT DURATION TIME UR: 24 H
M FLAG M-PROTEIN ISOTYPE MS, 24 HR, U: NEGATIVE
M M-PROTEIN ISOTYPE MS, 24 HR, U: NORMAL
PROT 24H UR-MRATE: 210 MG/24 H
PROT PATTERN 24H UR ELPH-IMP: NORMAL
SPECIMEN VOL 24H UR: 3000 ML

## 2023-11-17 ENCOUNTER — TELEPHONE (OUTPATIENT)
Dept: HEMATOLOGY/ONCOLOGY | Facility: CLINIC | Age: 68
End: 2023-11-17
Payer: MEDICARE

## 2023-11-17 NOTE — NURSING
Received phone message from patient stating that he has an appointment in [place] on Monday 11/20/23 and will not be able to come to his appt with Dr. Lundy on 11/20/23.    Dr. Lundy please advise when patient can be rescheduled.

## 2023-11-20 RX ORDER — BORTEZOMIB 3.5 MG/1
1.3 INJECTION, POWDER, LYOPHILIZED, FOR SOLUTION INTRAVENOUS; SUBCUTANEOUS
Status: CANCELLED | OUTPATIENT
Start: 2023-11-20

## 2023-11-20 RX ORDER — HEPARIN 100 UNIT/ML
500 SYRINGE INTRAVENOUS
Status: CANCELLED | OUTPATIENT
Start: 2023-11-20

## 2023-11-20 RX ORDER — HEPARIN 100 UNIT/ML
500 SYRINGE INTRAVENOUS
Status: CANCELLED | OUTPATIENT
Start: 2023-11-22

## 2023-11-20 RX ORDER — BORTEZOMIB 3.5 MG/1
1.3 INJECTION, POWDER, LYOPHILIZED, FOR SOLUTION INTRAVENOUS; SUBCUTANEOUS
Status: CANCELLED | OUTPATIENT
Start: 2023-11-22

## 2023-11-20 RX ORDER — BORTEZOMIB 3.5 MG/1
1.3 INJECTION, POWDER, LYOPHILIZED, FOR SOLUTION INTRAVENOUS; SUBCUTANEOUS
Status: CANCELLED | OUTPATIENT
Start: 2023-11-29

## 2023-11-20 RX ORDER — SODIUM CHLORIDE 0.9 % (FLUSH) 0.9 %
10 SYRINGE (ML) INJECTION
Status: CANCELLED | OUTPATIENT
Start: 2023-11-29

## 2023-11-20 RX ORDER — SODIUM CHLORIDE 0.9 % (FLUSH) 0.9 %
10 SYRINGE (ML) INJECTION
Status: CANCELLED | OUTPATIENT
Start: 2023-11-22

## 2023-11-20 RX ORDER — SODIUM CHLORIDE 0.9 % (FLUSH) 0.9 %
10 SYRINGE (ML) INJECTION
Status: CANCELLED | OUTPATIENT
Start: 2023-11-20

## 2023-11-20 RX ORDER — HEPARIN 100 UNIT/ML
500 SYRINGE INTRAVENOUS
Status: CANCELLED | OUTPATIENT
Start: 2023-11-29

## 2023-11-21 ENCOUNTER — INFUSION (OUTPATIENT)
Dept: INFUSION THERAPY | Facility: HOSPITAL | Age: 68
End: 2023-11-21
Attending: INTERNAL MEDICINE
Payer: MEDICARE

## 2023-11-21 ENCOUNTER — OFFICE VISIT (OUTPATIENT)
Dept: HEMATOLOGY/ONCOLOGY | Facility: CLINIC | Age: 68
End: 2023-11-21
Attending: INTERNAL MEDICINE
Payer: MEDICARE

## 2023-11-21 VITALS
HEART RATE: 58 BPM | BODY MASS INDEX: 22.32 KG/M2 | DIASTOLIC BLOOD PRESSURE: 58 MMHG | WEIGHT: 146.81 LBS | SYSTOLIC BLOOD PRESSURE: 108 MMHG | TEMPERATURE: 99 F | OXYGEN SATURATION: 100 % | RESPIRATION RATE: 20 BRPM

## 2023-11-21 DIAGNOSIS — M48.54XA NONTRAUMATIC COMPRESSION FRACTURE OF T8 VERTEBRA, INITIAL ENCOUNTER: ICD-10-CM

## 2023-11-21 DIAGNOSIS — R10.9 ABDOMINAL BLOATING WITH CRAMPS: ICD-10-CM

## 2023-11-21 DIAGNOSIS — M48.061 SPINAL STENOSIS OF LUMBAR REGION, UNSPECIFIED WHETHER NEUROGENIC CLAUDICATION PRESENT: ICD-10-CM

## 2023-11-21 DIAGNOSIS — D84.821 DRUG-INDUCED IMMUNODEFICIENCY: ICD-10-CM

## 2023-11-21 DIAGNOSIS — M81.0 OSTEOPOROSIS OF FEMUR WITHOUT PATHOLOGICAL FRACTURE: ICD-10-CM

## 2023-11-21 DIAGNOSIS — M51.36 DEGENERATION OF LUMBAR INTERVERTEBRAL DISC: ICD-10-CM

## 2023-11-21 DIAGNOSIS — C90.00 IGG MULTIPLE MYELOMA: Primary | ICD-10-CM

## 2023-11-21 DIAGNOSIS — R14.0 ABDOMINAL BLOATING WITH CRAMPS: ICD-10-CM

## 2023-11-21 DIAGNOSIS — E87.8 IMPAIRED HYDRATION: ICD-10-CM

## 2023-11-21 DIAGNOSIS — Z79.899 DRUG-INDUCED IMMUNODEFICIENCY: ICD-10-CM

## 2023-11-21 DIAGNOSIS — S32.010G COMPRESSION FRACTURE OF L1 VERTEBRA WITH DELAYED HEALING, SUBSEQUENT ENCOUNTER: Primary | ICD-10-CM

## 2023-11-21 DIAGNOSIS — K90.0 CELIAC DISEASE: ICD-10-CM

## 2023-11-21 DIAGNOSIS — C90.00 IGG MULTIPLE MYELOMA: ICD-10-CM

## 2023-11-21 DIAGNOSIS — E86.1 HYPOVOLEMIA: ICD-10-CM

## 2023-11-21 DIAGNOSIS — C79.51 SECONDARY MALIGNANT NEOPLASM OF BONE: ICD-10-CM

## 2023-11-21 DIAGNOSIS — S32.020G COMPRESSION FRACTURE OF L2 VERTEBRA WITH DELAYED HEALING, SUBSEQUENT ENCOUNTER: ICD-10-CM

## 2023-11-21 DIAGNOSIS — C79.51 SECONDARY MALIGNANCY OF LUMBAR VERTEBRAL COLUMN: ICD-10-CM

## 2023-11-21 PROCEDURE — 63600175 PHARM REV CODE 636 W HCPCS: Mod: JZ,JG | Performed by: INTERNAL MEDICINE

## 2023-11-21 PROCEDURE — 96401 CHEMO ANTI-NEOPL SQ/IM: CPT

## 2023-11-21 PROCEDURE — 96365 THER/PROPH/DIAG IV INF INIT: CPT

## 2023-11-21 PROCEDURE — 63600175 PHARM REV CODE 636 W HCPCS: Performed by: NURSE PRACTITIONER

## 2023-11-21 PROCEDURE — 99214 OFFICE O/P EST MOD 30 MIN: CPT | Mod: S$PBB,,, | Performed by: INTERNAL MEDICINE

## 2023-11-21 PROCEDURE — 99214 PR OFFICE/OUTPT VISIT, EST, LEVL IV, 30-39 MIN: ICD-10-PCS | Mod: S$PBB,,, | Performed by: INTERNAL MEDICINE

## 2023-11-21 PROCEDURE — 99215 OFFICE O/P EST HI 40 MIN: CPT | Mod: PBBFAC,25 | Performed by: INTERNAL MEDICINE

## 2023-11-21 PROCEDURE — 96367 TX/PROPH/DG ADDL SEQ IV INF: CPT

## 2023-11-21 RX ORDER — ZOLEDRONIC ACID 0.04 MG/ML
4 INJECTION, SOLUTION INTRAVENOUS
Status: CANCELLED | OUTPATIENT
Start: 2023-12-28

## 2023-11-21 RX ORDER — BORTEZOMIB 3.5 MG/1
1.3 INJECTION, POWDER, LYOPHILIZED, FOR SOLUTION INTRAVENOUS; SUBCUTANEOUS
Status: COMPLETED | OUTPATIENT
Start: 2023-11-21 | End: 2023-11-21

## 2023-11-21 RX ORDER — HEPARIN 100 UNIT/ML
500 SYRINGE INTRAVENOUS
Status: CANCELLED | OUTPATIENT
Start: 2023-12-28

## 2023-11-21 RX ORDER — ZOLEDRONIC ACID 0.04 MG/ML
4 INJECTION, SOLUTION INTRAVENOUS
Status: COMPLETED | OUTPATIENT
Start: 2023-11-21 | End: 2023-11-21

## 2023-11-21 RX ORDER — SODIUM CHLORIDE 0.9 % (FLUSH) 0.9 %
10 SYRINGE (ML) INJECTION
Status: DISCONTINUED | OUTPATIENT
Start: 2023-11-21 | End: 2023-11-21 | Stop reason: HOSPADM

## 2023-11-21 RX ORDER — HEPARIN 100 UNIT/ML
500 SYRINGE INTRAVENOUS
Status: DISCONTINUED | OUTPATIENT
Start: 2023-11-21 | End: 2023-11-21 | Stop reason: HOSPADM

## 2023-11-21 RX ORDER — ZOLEDRONIC ACID 0.04 MG/ML
4 INJECTION, SOLUTION INTRAVENOUS
Status: CANCELLED | OUTPATIENT
Start: 2023-11-30

## 2023-11-21 RX ORDER — SODIUM CHLORIDE 0.9 % (FLUSH) 0.9 %
10 SYRINGE (ML) INJECTION
Status: CANCELLED | OUTPATIENT
Start: 2023-12-28

## 2023-11-21 RX ORDER — HEPARIN 100 UNIT/ML
500 SYRINGE INTRAVENOUS
Status: CANCELLED | OUTPATIENT
Start: 2023-11-30

## 2023-11-21 RX ORDER — SODIUM CHLORIDE 0.9 % (FLUSH) 0.9 %
10 SYRINGE (ML) INJECTION
Status: CANCELLED | OUTPATIENT
Start: 2023-11-30

## 2023-11-21 RX ADMIN — BORTEZOMIB 2.25 MG: 3.5 INJECTION, POWDER, LYOPHILIZED, FOR SOLUTION INTRAVENOUS; SUBCUTANEOUS at 12:11

## 2023-11-21 RX ADMIN — ZOLEDRONIC ACID 4 MG: 0.04 INJECTION, SOLUTION INTRAVENOUS at 01:11

## 2023-11-21 NOTE — PROGRESS NOTES
History:  Past Medical History:   Diagnosis Date    Diabetes mellitus, type 2     GERD (gastroesophageal reflux disease)     Hyperlipidemia     Hypertension     Personal history of colonic polyps 06/29/2022   Past medical history:  NIDDM. Dyslipidemia.  GERD.  Lumbar spinal stenosis.  Vitamin-D deficiency.  Allergic rhinitis.  HTN.  Bell's palsy.  Celiac disease.  Hemorrhoids.  Mitral regurgitation 01/05/2018.  Rheumatic valve narrowing and leaking mitral valve.  -02/02/2018 ([facility]):  АНДРЕЙ, right minimally invasive anterior thoracotomy, right femoral artery and vein exploration, extensive right pleural adhesiolysis/pneumolysis, bilateral Maze/ablation (extensive), left atrial appendage clip placement for left atrial appendage exclusion, mitral valve repair (complex repair with 26 mm annuloplasty ring)    Social history:  .  Lives in [place], [place].  Five children.  Owns a [employer] business.  No history of tobacco, alcohol, or illicit drug abuse.      Family history:  Negative for cancers or blood dyscrasias.      Health maintenance:  PCP in family medicine clinic, [facility].  Had EGD and colonoscopy done 1 year ago by Dr. Zoltan Kapoor, [facility], apparently unremarkable (he gets the endoscopies done periodically because of history of celiac disease).  Past Surgical History:   Procedure Laterality Date    BONE MARROW ASPIRATION N/A 06/27/2023    Procedure: ASPIRATION, BONE MARROW;  Surgeon: Namita Daniels MD;  Location: [facility] ENDOSCOPY;  Service: General;  Laterality: N/A;    BONE MARROW ASPIRATION N/A 10/24/2023    Procedure: ASPIRATION, BONE MARROW;  Surgeon: Namita Daniels MD;  Location: [facility] ENDOSCOPY;  Service: General;  Laterality: N/A;    BONE MARROW BIOPSY N/A 06/27/2023    Procedure: Biopsy-bone marrow;  Surgeon: Haylie Liu MD;  Location: [facility] ENDOSCOPY;  Service: General;  Laterality: N/A;    BONE MARROW BIOPSY N/A 10/24/2023    Procedure:  Biopsy-bone marrow;  Surgeon: Namita Daniels MD;  Location: [facility] ENDOSCOPY;  Service: General;  Laterality: N/A;    CARDIAC SURGERY  2018    COLONOSCOPY  06/29/2022    EYE SURGERY        Social History     Socioeconomic History    Marital status:    Tobacco Use    Smoking status: Never    Smokeless tobacco: Never   Substance and Sexual Activity    Alcohol use: Never    Drug use: Never    Sexual activity: Not Currently      History reviewed. No pertinent family history.     Reason for Follow-up:  -multiple myeloma, IgG kappa  -worsening low back pain  -hypercalcemia  -osteoporosis hips B/L    History of Present Illness:   Follow-up and IgG multiple myeloma (Pt states he has lower back pain. Wants some med refills.)        Oncologic/Hematologic History:  Oncology History   IgG multiple myeloma   6/23/2023 Initial Diagnosis    IgG multiple myeloma     7/13/2023 -  Chemotherapy    Treatment Summary   Plan Name: OP VRD - WEEKLY BORTEZOMIB LENALIDOMIDE DEXAMETHASONE Q3W  Treatment Goal: Curative  Status: Active  Start Date: 7/13/2023  End Date: 1/18/2024 (Planned)  Provider: Narciso Lundy MD  Chemotherapy: bortezomib (VELCADE) injection 2.25 mg, 2.25 mg, Subcutaneous, Clinic/HOD 1 time, 5 of 9 cycles  Administration: 2.25 mg (7/13/2023), 2.25 mg (7/20/2023), 2.25 mg (7/27/2023), 2.25 mg (8/10/2023), 2.25 mg (8/17/2023), 2.25 mg (8/24/2023), 2.25 mg (8/31/2023), 2.25 mg (9/7/2023), 2.25 mg (9/21/2023), 2.25 mg (9/14/2023), 2.25 mg (9/28/2023), 2.25 mg (10/12/2023), 2.25 mg (10/5/2023), 2.25 mg (10/19/2023), 2.25 mg (10/26/2023)  lenalidomide 25 mg Cap, 25 mg, , , 1 of 1 cycle, Start date: 11/3/2023, End date: --     67-year-old gentleman, referred from [facility], with newly diagnosed multiple myeloma.      05/14/2023: [facility] note:  Low back pain, onset early March   - localized to back with occasional sharp pain down posterior aspect of right leg  - fluctuating in intensity,  overall worsening since onset, no change in character, difficulty doing ADLs, walking with pain  - difficulty sleeping   - seen in urgent care and C multiple times  - MRI 5/4/23: degenerative disc disease and facet arthrosis, mild spinal canal stenosis L4-L5 greater than L3-L4, no high grade stenosis   - Toradol IM helps briefly  - side effects from gabapentin, Robaxin and Norco- GI and drowsiness   - Voltaren gel not helping  - stopped going to PT due to pain  - follows with Neurologist   - requests trial of tramadol and Lyrica   - going out of country soon, concerned he will not be able to go due to pain  - denies bowel/ bladder incontinence, night sweats, unexplained weight loss, chest pain or shortness of breath   Spinal stenosis   Acute bilateral low back pain with right-sided sciatica    05/18/2023: [facility] note:  Low back pain.  Onset early March 2023.  Localized lumbar region.  Constant.  Occasional radiation down posterior right leg.  Difficulty sleeping in bed because of exacerbation of pain by lying down flat.  Compensating by sleeping in a recliner or on sofa. MRI showed degenerative disc disease and facet arthrosis at multiple levels with mild canal stenosis.  - Has attempted gabapentin, Robaxin, Norco, Lyrica, and tramadol with minimal relief.  Currently being managed with tramadol and Lyrica.  - Told by neurosurgery that he needs pain management referral because NS does not deal with injections  - Denies saddle anesthesia, bowel/bladder incontinence, weight loss, night sweats    Spinal stenosis of lumbar region  Degeneration of lumbar intervertebral disc  - Continue treatment with tramadol and Lyrica as prescribed  Bradycardia  - Patient aware of chronic bradycardia, states he has been this way for 3-4 years due to palpitations if HR >60  - Currently on Toprol XL 50 mg  - Follows with [facility]    06/13/2023:  [facility] note:   Acute Issues:   Weight loss- 6 weeks history of weight  loss 12-14 lb unintentionally.  Reports appetite has been somewhat decreased secondary to pain though has noticed increased size of abdomen.  He denies nausea, vomiting, dysphagia, dyspepsia, bloody stools, staying changes in stool caliber, constipation, fevers or night sweats, nervousness/anxiousness, skin or hair changes.  Denies history of smoking or alcohol use.  Receives EGD and colonoscopy every 2 years per Dr. Kapoor [place], last was roughly 1 year ago. PSA wnl 5/2023.   Chronic Issues: DM- med compliant metformin 500 daily, last A1C 6.1 in 1/2023.   Chronic medical conditions:  Hypertension.  Dyslipidemia.  GERD.  Lumbar spinal stenosis.  Vitamin-D deficiency.  Allergic rhinitis.  HTN      Investigations reviewed:  05/04/2023: MRI lumbar spine without contrast (worsening lumbar pain x5-7 weeks with bilateral sciatica):   Lumbar degenerative disc disease and facet arthrosis as detailed above with no acute pathology identified.    Mild spinal canal stenosis at L4-L5 greater than L3-L4 with no high-grade spinal canal stenosis.   Mild bilateral neural foramen stenosis at L3-L4 and on the left greater than right at L4-L5.    06/14/2023:  Skeletal survey:   No definite lesions to suggest either lytic and or sclerotic metastatic changes.   Multiple compression deformities mostly in the lumbar spine but these are seen also in the thoracic spine; other imaging modalities might prove helpful for further assessment; some of these compression deformities appear to be relatively new as compared with an MR of May 2023  (There are some degenerative changes of the thoracic spine with a mild compression deformity of superior endplate of 1 of the lower thoracic vertebral body levels; there are multiple compression deformities of the lumbar spine including the superior endplate of L3 and L4 there is a compression deformity of the superior endplate of T11 with compression deformities of the superior endplate of T12 and a  compression deformity of L1; some of these compression deformities appear to be new since the last exam (MR) of May 4, 2023 other imaging modalities might prove helpful for further assessment    Labs reviewed:  01/12/2023:  Hemoglobin 11.9.  MCV 98.7.  Ferritin 211.66.  Transferrin saturation 42%.  Vitamin B12 969.  Folate 13.9.    06/14/2023:  Hemoglobin 11.0.  MCV 99.1.  ESR 15, normal.    06/14/2023: IgG 3810 mg/dL, elevated.  IgM 11, suppressed.  IgA 33, suppressed.  2.28 gm/dL IgG kappa monoclonal protein on SPEP and BUBBA.  Kappa 99.4, elevated.  Lambda 0.3400, suppressed.  Kappa/lambda ratio 292, elevated.    05/01/2023: PSA 1.7, normal.  06/14/2023:  BUN 15.  Creatinine 0.79.  Calcium 9.6.  Albumin 3.6.  LFTs normal.  TSH normal.  06/16/2023:  , normal.    06/15/2023:  24 hour urine protein 288 mg, elevated (reference Range:  < 229). M spike 183 mg. Monoclonal kappa.    06/16/2023:  Urine:  Kappa 25.5 mg/dL; lambda < 0.7000 mg/dL; kappa/lambda ratio> 36.4    06/26/2023:  Pleasant gentleman who presents for initial medical oncology consultation, accompanied by his wife and family present who is a past interventional cardiologist.  Back pain for 2 months.  Initially started in right groin.  Physical therapy has not helped.  10/10 severity.  Has been taking Tylenol without significant relief.  Secured to take narcotics because of constipation.  Underwent couple of spinal steroid shots 3 weeks ago, with minor relief.  Pain is present all the time.  Pain increases with changing position in bed as well as upon standing.  He finds it difficult to ambulate.  Pain does not radiate down lower extremities and is not accompanied with lower extremity weakness, numbness, urinary or bowel incontinence, or saddle anesthesia.  Also experiences muscle cramps.  Generalized weakness.  However, ECOG 2.  No fevers or chills.  No repeated infections.  Cramps in hands and feet.  Appetite is down.  Appetite has picked up in  last 10 days.  Has lost 12-14 lb in last 1-1/2 months.  No abnormal bleeding or bruising.      Interval History:  INF FLUIDS   OP VRD - WEEKLY BORTEZOMIB LENALIDOMIDE DEXAMETHASONE Q3W     11/21/2023:   -11/01/2020: CT chest without contrast pelvic comparison: CT chest 01/25/2022, MRI thoracic spine 08/08/2023):   1. No significant interval change in the appearance of the lungs compared to previous chest CT 01/25/2022 area of bandlike pleuroparenchymal scarring at the right lung apex is grossly unchanged.  2. Multilevel thoracic compression deformities similar to prior MRI thoracic spine  3. The bones are demineralized with numerous tiny lytic appearing lucencies which may be related to generalized demineralization or multiple myeloma  -11/08/2023: No monoclonal protein in urine  -11/09/2020: TSH, free T4 normal   -11/09/2023:  No monoclonal protein on SPEP and BUBBA; kappa/lambda ratio 1.36, normalized; kappa free light chains 2.29 mg/dL (to recall, 99.4 on 06/14/2023)  -11/21/2023: Labs reviewed; hemoglobin 9.8, down from 11.3 on 11/09/2023, down from 11.0 on 11/02/2023; MCV 95.9, chronically slightly elevated; rest of CMP unremarkable; monocytes 1.73 K secondary to underlying malignancy; CMP unremarkable; BUN 10.5, creatinine 0.75, no hypercalcemia  Presents for follow-up visit.  Doing well.  Occasional mild headaches.  Some heartburn.  Some constipation.  Says that he underwent stem cell collection at [facility] last week, on Tuesday, Wednesday, and Thursday.  Says that he is going to get admitted on 11/28/2023 and hope to get stem cell transplant on 11/30/2023.  Today, scheduled for Zometa and Velcade infusion.  I told him to keep Revlimid hold.  Advised him to continue aspirin, Bactrim DS, and acyclovir for now.  Overall, stable.  Fair appetite.  Chronic low back pain, 5/10 severity, without radiation down lower extremities.  ECOG 1.    Medications:  Current Outpatient Medications on File Prior to Visit   Medication  Sig Dispense Refill    abiraterone (ZYTIGA) 500 mg Tab Take 1,000 mg by mouth once daily Take 2 (two) 500 mg tab (to equal 1,000 mg) by mouth daily.      acyclovir (ZOVIRAX) 400 MG tablet Take 1 tablet (400 mg total) by mouth 2 (two) times daily. 60 tablet 4    ascorbic acid, vitamin C, (VITAMIN C) 1000 MG tablet Take 1,000 mg by mouth once daily.      atorvastatin (LIPITOR) 20 MG tablet Take 20 mg by mouth once daily.      calcium carbonate 195 mg calcium (500 mg) Chew Take 1 tablet by mouth once daily.      dexAMETHasone (DECADRON) 4 MG Tab Take 10 tablets (40 mg total) by mouth every 7 days. on days 1, 8, and 15 of each chemotherapy cycle. Take with food. 30 tablet 5    diclofenac sodium (VOLTAREN) 1 % Gel Apply 2 g topically 4 (four) times daily. 450 g 1    famotidine (PEPCID) 40 MG tablet Take 40 mg by mouth every evening.      fluticasone propionate (FLONASE) 50 mcg/actuation nasal spray 50 sprays by Nasal route 2 (two) times a day.      hydrocortisone (ANUSOL-HC) 2.5 % rectal cream Place 1 applicator rectally 2 (two) times daily. 28 g 2    metFORMIN (GLUCOPHAGE) 500 MG tablet Take 1 tablet (500 mg total) by mouth 2 (two) times daily with meals. 180 tablet 3    metoprolol succinate (TOPROL-XL) 50 MG 24 hr tablet Take by mouth.      MIRALAX 17 gram/dose powder Take 17 g by mouth.      sulfamethoxazole-trimethoprim 800-160mg (BACTRIM DS) 800-160 mg Tab Take 1 tablet by mouth on MWF of each week 48 tablet 3    traMADoL 100 mg Tab Take 100 tablets by mouth every 4 (four) hours as needed (pain). 60 tablet 2    ammonium lactate 12 % Crea Apply 1 application topically once daily. (Patient not taking: Reported on 11/21/2023) 385 g 2    aspirin (ECOTRIN) 81 MG EC tablet Take 81 mg by mouth once daily.      ciclopirox (PENLAC) 8 % Soln Apply 8 drops topically Daily.      gabapentin (NEURONTIN) 100 MG capsule Take 100 mg by mouth 3 (three) times daily.      gabapentin (NEURONTIN) 400 MG capsule       gabapentin  (NEURONTIN) 600 MG tablet Take 1 tablet (600 mg total) by mouth 3 (three) times daily. (Patient not taking: Reported on 11/21/2023) 90 tablet 2    ketoconazole (NIZORAL) 2 % cream Apply topically once daily. (Patient not taking: Reported on 11/21/2023) 60 g 1    lenalidomide 25 mg Cap Take 1 capsule by mouth once daily on days 1 to 14 of each 21 day cycle.. 14 each 0    linaCLOtide (LINZESS) 72 mcg Cap capsule Take 1 capsule (72 mcg total) by mouth daily as needed (constipation). (Patient not taking: Reported on 11/21/2023) 90 each 1    megestroL (MEGACE) 400 mg/10 mL (40 mg/mL) Susp Take 10 mLs (400 mg total) by mouth once daily. 300 mL 0    morphine (MS CONTIN) 30 MG 12 hr tablet Take 1 tablet (30 mg total) by mouth 2 (two) times daily. Every 12 hours (Patient not taking: Reported on 11/21/2023) 30 tablet 0    MOVANTIK 25 mg tablet       naloxone (NARCAN) 4 mg/actuation Spry SMARTSIG:Both Nares      nystatin (MYCOSTATIN) powder Apply topically 4 (four) times daily. (Patient not taking: Reported on 11/21/2023) 60 g 2    pantoprazole (PROTONIX) 20 MG tablet Take 1 tablet (20 mg total) by mouth once daily. (Patient not taking: Reported on 11/7/2023) 30 tablet 11    pantoprazole (PROTONIX) 40 MG tablet Take 40 mg by mouth.       No current facility-administered medications on file prior to visit.       Review of Systems:   All systems reviewed and found to be negative except for the symptoms detailed above    Physical Examination:   VITAL SIGNS:   Vitals:    11/21/23 1112   BP: (!) 108/58   Pulse: (!) 58   Resp: 20   Temp: 98.5 °F (36.9 °C)         GENERAL:  In no apparent distress.    HEAD:  No signs of head trauma.  EYES:  Pupils are equal.  Extraocular motions intact.    EARS:  Hearing grossly intact.  MOUTH:  Oropharynx is normal.   NECK:  No adenopathy, no JVD.     CHEST:  Chest with clear breath sounds bilaterally.  No wheezes, rales, rhonchi.    CARDIAC:  Regular rate and rhythm.  S1 and S2, without murmurs,  "gallops, rubs.  VASCULAR:  No Edema.  Peripheral pulses normal and equal in all extremities.  ABDOMEN:  Soft, without detectable tenderness.  No sign of distention.  No   rebound or guarding, and no masses palpated.   Bowel Sounds normal.  MUSCULOSKELETAL:  Good range of motion of all major joints. Extremities without clubbing, cyanosis or edema.    NEUROLOGIC EXAM:  Alert and oriented x 3.  No focal sensory or strength deficits.   Speech normal.  Follows commands.  PSYCHIATRIC:  Mood normal.    No results for input(s): "CBC" in the last 72 hours.   No results for input(s): "CMP" in the last 72 hours.     Assessment:  Problem List Items Addressed This Visit          Neuro    Compression fracture of thoracic spine, non-traumatic    Compression fracture of first lumbar vertebra - Primary    Compression fracture of L2 lumbar vertebra    Degeneration of lumbar intervertebral disc    Spinal stenosis of lumbar region       Immunology/Multi System    Drug-induced immunodeficiency       Oncology    IgG multiple myeloma    Secondary malignant neoplasm of bone    Secondary malignancy of lumbar vertebral column       Endocrine    Osteoporosis of femur without pathological fracture       GI    Celiac disease    Abdominal bloating with cramps     Multiple myeloma, IgG kappa:  ISS stage:  Stage I  R-ISS stage:  Stage I  -presentation: 03/2023:  Worsening low back pain, no associated neurological symptoms, 12-14 pound weight loss over 6 weeks, mild L4-L5 spinal canal stenosis on MRI (05/04/2023), lumbar DJD and facet arthrosis on MRI lumbar spine (05/04/2023)  -skeletal survey 06/14/2023: Negative for lytic or sclerotic lesions; multiple compression deformities lumbar spine and thoracic spine (new since MRI scan dated 05/04/2023)  -06/14/2023:  Mild anemia (hemoglobin 11.0)   -06/14/2023:  2.28 gm/dL IgG kappa M protein. Kappa 99.4, elevated.  Lambda 0.3400, suppressed.  Kappa/lambda ratio 292, elevated.  -06/14/2023:  BUN, " creatinine, calcium, albumin normal  -06/15/2022:  24 hour urine protein 280 mg, elevated.  M spike 183 mg.  Urine BUBBA showed monoclonal kappa light chains.  -06/16/2023: Urine:  Kappa 25.5 mg/dL; lambda < 0.7000 mg/dL; kappa/lambda ratio> 36.4  06/16/2023:  , normal.  -06/26/2023:  Hemoglobin 11.0.  Beta 2 microglobulin 3.18.  Calcium 10.1.  Albumin 3.2.  Hepatitis-A/B/C/HIV serology negative.  -bone marrow biopsy 06/27/2023:  Plasma cells 50% in bone marrow aspirate; plasma cells 36% on flow cytometry of bone marrow aspirate, with kappa light chain restriction; molecular studies pending  -CT abdomen pelvis with contrast 06/27/2023:  Hepatic steatosis; bilateral inguinal hernias; heterogeneous bone demineralization, suggesting marrow infiltrative process like multiple myeloma; multilevel compression deformities in the included thoracic spine and lumbar spine; 10 mm exophytic hyperdense focus upper pole of right kidney, compatible with a hyperdense cyst  -FDG PET-CT 06/29/2023:  (questionable findings)  Small focus of mild uptake left femur lesser trochanter; borderline uptake right for bilaterally; multilevel thoracolumbar compression deformities, mild linear uptake at the in plates of T8, T12, L1 and L2 suggesting potential remodeling acute to subacute fractures; presumed oncogenic uptake maxilla on the right; nonspecific small mildly FDG avid bilateral hilar lymph nodes  >>>  From the data available so far, patient has multiple myeloma (symptomatic), by virtue of:  1. Involved: Uninvolved serum FLC ratio =/> 100 and involved FLC concentration 10 mg/dL or higher (in this patient, kappa/lambda ratio age 292, and kappa light chain 99.4 mg/dL)   2. Hypercalcemia (06/26/2023): Calcium 10.1.  Albumin 3.2   3. Bone marrow plasmacytosis 50%  4. Heterogeneous bone demineralization on CT 06/27/2023, suggesting bone marrow infiltrative process like multiple myeloma  5. FDG PET-CT 06/29/2023:  Questionable  findings  6. Beta 2 microglobulin level 3.18, elevated (06/26/2023)   7. No renal insufficiency, no significant anemia, LDH normal  >>>  -Velcade started 07/13/2023  -Zometa 4 mg IV every 4 weeks (x2 years), started 07/13/2023  -VRD:  Cycle 1 started 07/13/2023; cycle 2 started 08/10/2023; cycle 3 started 08/31/2023; cycle 4 started 09/21/2023  -Zometa 4 mg IV every 4 weeks, started 07/13/2023  -06/27/2023: Bone marrow biopsy:  Abnormal myeloma FISH panel:  No high-risk findings  -chemotherapy with VRD started 07/13/2023  -07/21/2023: Radiation oncology consultation:  Radiotherapy to spine not indicated/will not be helpful; patient referred to IR for kyphoplasty (questionable findings on FDG PET-CT)  -MRI thoracic spine 08/08/2023:  Chronic superior endplate compression fractures T9-L2 vertebral bodies, no acute/subacute fracture, no osseous lesion or soft tissue mass  -MRI lumbar spine 09/14/2023:  Significant progression of skeletal demineralization; new/progressive loss of vertebral body height throughout the spine since 05/2023; superior endplate L4 acute/subacute with marrow edema and enhancement; multilevel degenerative disc disease; spinal stenosis severe at L3-L5 with tethering of descending nerve root; moderate spinal canal stenosis at L4-L5 and L2-L3  -08/03/2023: TSH normal; free T4 normal  -06/14/2023: IgG 3810, elevated; IgM, IgA suppressed; kappa/lambda ratio 292; IgG kappa monoclonal protein 2.2 gm/dL  -07/27/2023:  IgG 2242, elevated but down; IgM, IgA remain suppressed; kappa/lambda ratio of 59.0, down; IgG kappa monoclonal protein 1.36 gm/dL (chemotherapy was started 07/13/2023)  -08/01/2023: M spike 1.3 gm/dL; kappa 568.3; lambda 14.8; kappa/lambda ratio 38.4  -08/24/2023: IgG 792, has normalized; IgM, IgA remains suppressed; kappa/lambda ratio 5.72, elevated but significantly down from 292 pre chemotherapy; IgG kappa monoclonal protein 0.39 gm/dL, significantly down from 2.28 g per dL pre  chemotherapy)  -VRD cycle 5 started 10/12/2023 (day 1 on 10/12/2023, day 8 on 10/19/2023, day 15 on 10/26/2023)  -09/28/2023:  Kappa 3.71; lambda 1.33, normalized; kappa/lambda ratio 2.79 (was 292 on 06/14/2023 chemotherapy)  -10/11/2023: DEXA scan:  Osteoporosis based on hips; significantly increased fracture risk (left femur T-score-2.5; right femur T-score-2.8; lumbar spine T-score -1.2)  -10/18/2023: [facility] Labs:  IgG 602, low (was 1750 on 08/01/2023); IgM 15, low (was 20.4 on 08/01/2023); IgA 49, low (was 35.2 on 08/01/2023); kappa 24.6 (was 568.3 on 08/01/2023); lambda 12.1 (was 14.8 on 08/01/2023); kappa/lambda ratio 2.03 (was 38.40 on 08/01/2023)  -10/24/2023: Restaging bone marrow biopsy (post VRD X 4-1/2 cycles):  Normocellular; <1% plasma cell; bone marrow cellularity 40%; no clonal or abnormal plasma cell population on flow cytometry; no high-risk FISH findings (aneuploidy; gain of chromosome 7, 9, and 15)  -10/24/2023:  UPEP and urine BUBBA:  Albumin is the only protein detected (104 mg/24 hours) (no monoclonal protein detected)  -11/01/2020: CT chest without contrast pelvic comparison: CT chest 01/25/2022, MRI thoracic spine 08/08/2023):   1. No significant interval change in the appearance of the lungs compared to previous chest CT 01/25/2022 area of bandlike pleuroparenchymal scarring at the right lung apex is grossly unchanged.  2. Multilevel thoracic compression deformities similar to prior MRI thoracic spine  3. The bones are demineralized with numerous tiny lytic appearing lucencies which may be related to generalized demineralization or multiple myeloma  -11/08/2023: No monoclonal protein in urine  -11/09/2020: TSH, free T4 normal   -11/09/2023:  No monoclonal protein on SPEP and BUBBA; kappa/lambda ratio 1.36, normalized; kappa free light chains 2.29 mg/dL (to recall, 99.4 on 06/14/2023)  -11/21/2023: Says that he is going to get admitted on 11/28/2023 and hope to get stem cell transplant on  11/30/2023.  Today, scheduled for Zometa and Velcade infusion.  I told him to keep Revlimid hold.  Advised him to continue aspirin, Bactrim DS, and acyclovir for now      Staging of myeloma:  -serum beta 2 microglobulin elevated (3.18)  -serum albumin and LDH normal   -myeloma FISH panel on bone marrow:  Negative for high-risk cytogenetic abnormalities  >>>  ISS stage:  Stage I  R-ISS stage:  Stage I      Osteoporosis on hips on DEXA scan 10/11/2023      Non myeloma findings (DJD of spine, spinal stenosis, compression fractures):  -mild L4-L5 spinal canal stenosis on MRI 05/04/2023   -lumbar DJD and facet arthrosis on MRI lumbar spine 05/04/2023  -MRI T-spine 08/08/2023:  No myeloma lesions   -MRI lumbar spine 09/14/2023:  Progression of skeletal demineralization; multilevel degenerative disc disease; severe spinal stenosis L3-L5; new/progressive loss of vertebral body height throughout the spine since 05/2023, etc.  -IR at [facility] will not perform kyphoplasty      Co-morbidities:  NIDDM.  Dyslipidemia.  Hypertension.  Celiac disease.    History of rheumatic mitral valve disease, S/P Maze/ablation procedure 02/02/2018      Vaccination history:   COVID-19, MRNA, LN-S, PF (MODERNA FULL 0.5 ML DOSE) 8/18/2022 , 10/23/2021 , 3/17/2021 , 2/17/2021   Hepatitis A / Hepatitis B 8/18/2022 , 10/5/2021 , 7/29/2020   Influenza - Quadrivalent - MDCK - PF 10/5/2021   Influenza - Trivalent - MDCK - PF 9/10/2015   Meningococcal Conjugate (MCV4O) 8/18/2022 , 4/17/2012   Meningococcal Conjugate (MCV4P) 4/17/2012   Pneumococcal Conjugate - 20 Valent 1/12/2023   Tdap 7/29/2020   Zoster 11/18/2020 , 7/29/2020   Zoster Recombinant 11/18/2020 , 7/29/2020       Plan:  Proceed with Zometa and Velcade infusion today  Keep Revlimid on hold for now, in anticipation of hospitalization, then stem cell transplant, scheduled for 11/30/2023   Continue aspirin, Bactrim DS, and acyclovir  Follow-up in 1 month, after  transplant.  --------------------------      Staging of myeloma:  -serum beta 2 microglobulin elevated (3.18)  -serum albumin and LDH normal   -myeloma FISH panel on bone marrow:  Negative for high-risk cytogenetic abnormalities  >>>  ISS stage:  Stage I  R-ISS stage:  Stage I    09/28/2023:   Refilled gabapentin 600 mg p.o. t.i.d.   Refilled Norco 10 mg every 4 hours PRN   Started Megace 400 mg p.o. q.day    Multiple myeloma, IgG:   ISS stage:  Stage I  R-ISS stage:  Stage I  -presentation:  03/2023: Worsening back pain, 12-14 lb weight loss over 6 weeks  -skeletal survey 06/14/2023: Negative for lytic or sclerotic lesions  -06/14/2023:  Hemoglobin 11.0, IgG kappa M protein 2.28 gm/dL, kappa elevated, lambda suppressed, kappa/lambda ratio 292  -renal function normal, no hypercalcemia  -24 hour urine: M spike 183 mg; monoclonal kappa light chains in urine; urine kappa elevated, urine lambda suppressed, urine kappa/lambda ratio> 36.4  -LDH normal 06/16/2023   -Beta-2 microglobulin 3.18 on 06/26/2023  -bone marrow biopsy 06/27/2023:  50% plasmacytosis with kappa light chain restriction; no high-risk cytogenetics  -FDG PET-CT 06/29/2023:  Questionable findings  -Velcade started 07/13/2023   -Zometa 4 mg IV every 4 weeks) x2 years), started 07/13/2023  -VRD:  Cycle 1 started 07/13/2023; cycle 2 started 08/10/2023; cycle 3 started 08/31/2023; cycle 4 started 09/21/2023  -Radiation oncology consult:  Radiotherapy to spine not indicated/will not be helpful; questionable findings on FDG PET-CT  -IR will not perform kyphoplasty  -significant response to chemotherapy (on 08/24/2023, kappa/lambda ratio 5.72, much improved; IgG kappa monoclonal protein down to 0.39 gm/dL, significantly decreased)  -VRD cycle 5:  10/12/2023-10/26/2023  -restaging bone marrow biopsy 10/24/2023, post VRD X 4-1/2 cycles:  Normocellular; < 1% plasma cells; no clonal or abnormal plasma cell population on flow cytometry; no high-risk FISH  findings  -11/08/2023: No monoclonal protein in urine  -11/09/2023: TSH, free T4 normal   -11/09/2023:  No monoclonal protein on SPEP and BUBBA; kappa/lambda ratio 1.36, normalized; kappa free light chains 2.29 mg/dL (to recall, 99.4 on 06/14/2023)  -11/21/2023: Says that he is going to get admitted on 11/28/2023 and hope to get stem cell transplant on 11/30/2023.  Today, scheduled for Zometa and Velcade infusion.  I told him to keep Revlimid hold.  Advised him to continue aspirin, Bactrim DS, and acyclovir for now  >>>  -ISS stage:  Stage I  -R-ISS stage:  Stage I  -bone marrow negative for high-risk cytogenetic abnormalities  -excellent response to chemotherapy  -completed cycle 5 of VRD 10/26/2023  -today, 11/21/2023, scheduled for Zometa and Velcade infusion  -will get hospitalized at [facility] on 11/28/2023 in preparation for stem cell transplant; stem cell transplant scheduled for 11/30/2023; we will keep Revlimid on hold at this time  -Continue baby aspirin 81 mg p.o. q.day concurrently, for thromboprophylaxis secondary to lenalidomide.  -completed 12 weeks of levofloxacin 09/28/2023  -Continue acyclovir 400 mg p.o. b.i.d. for herpes zoster prophylaxis with bortezomib  -Continue Bactrim ds 1 tablet every Monday/Wednesday/Friday during treatment  -check TSH and free T4 every 3 months (monitoring on Revlimid); next, February 2024  -weekly assessment for peripheral neuropathy and/or neuropathic pain  -monitor for hypotension during bortezomib therapy; adjust antihypertensives and/or administer IV fluids  -assess response with repeat SPEP, BUBBA, FLC assay, and 24 hour urine protein for total protein, UPEP, urine BUBBA, and urine FLC assay every 6 weeks; next, mid December  -continue Zometa 4 mg IV every 4 weeks for 2 years (started 07/13/2023)  -per Radiation Oncology, given radiologic findings (questionable findings on PET-CT), palliative radiotherapy to spine will not be helpful  -continue narcotics for  pain    -osteoporosis of hips noted on DEXA scan 10/11/2023  -patient already receiving monthly Zometa for underlying multiple myeloma; we will help with osteoporosis as well  -repeat DEXA scan in 1 year (10/2024)    Non myeloma findings (DJD of spine, spinal stenosis, compression fractures):  -mild L4-L5 spinal canal stenosis on MRI 05/04/2023   -lumbar DJD and facet arthrosis on MRI lumbar spine 05/04/2023  -MRI T-spine 08/08/2023:  No myeloma lesions   -MRI lumbar spine 09/14/2023:  Progression of skeletal demineralization; multilevel degenerative disc disease; severe spinal stenosis L3-L5; new/progressive loss of vertebral body height throughout the spine since 05/2023, etc.  -[facility] at [facility] will not perform kyphoplasty    10 mm exophytic hyperdense focus upper pole of right kidney, compatible with a hyperdense cyst, on CT abdomen pelvis with contrast 06/27/2023  -07/12/2023:  MRI abdomen with and without contrast (right renal cyst): Small exophytic right renal lesion most likely a proteinaceous or hemorrhagic cyst (10 mm, upper pole of right kidney)    Bortezomib/lenalidomide/dexamethasone (VRd), category 1 regimen:  Cycle length 21 days:  -bortezomib 1.3 mg per m2 subQ days 1, 8, and 15  -lenalidomide 25 mg p.o. daily, on days 1 through 14  -dexamethasone 40 mg p.o. with food days 1, 8, and 15  Cycle length: 21 days    Monitoring on lenalidomide:   Monitor for signs and symptoms of infection (if neutropenic), bleeding or bruising, hepatotoxicity, secondary malignancies, thromboembolism (eg, shortness of breath, chest pain, arm or leg swelling), dermatologic toxicity, tumor lysis syndrome, or hypersensitivity.    Monitoring on bortezomib:  Peripheral neuropathy, posterior reversible leukoencephalopathy syndrome, progressive multifocal leukoencephalopathy, tumor lysis syndrome, or hyper-/hypoglycemia. Monitor closely in patients with risk factors for heart failure or existing heart disease.    Follow-up with me in 1  month.    Above discussed at length with the patient.  All questions answered.    Discussed labs and scans and gave him copies of relevant reports.    He understands and agrees with this plan.  ----------------------------------    Discussion:    Supportive care:  -bone targeted treatment: Bisphosphonate, category 1; or denosumab to reduce risk of skeletal related events; denosumab is preferred in patients with renal insufficiency; assess vitamin-D status; baseline dental exam; monitor for osteonecrosis of the jaw; monitor renal dysfunction with bisphosphonate therapy  -continue bone targeted treatment (bisphosphonate or denosumab) for 2 years; continue beyond 2 years should be based on clinical judgment  -patients receiving denosumab for bone disease who subsequently discontinued therapy should be given maintenance denosumab every 6 months or a single dose of bisphosphonate to mitigate risk of rebound osteoporosis  -for hypercalcemia, zoledronic acid, denosumab, steroids, and/or calcitonin  -for hyperviscosity, plasmapheresis should be used as adjuvant therapy for symptomatic hyperviscosity  -intravenous immunoglobulin therapy should be considered in the setting of recurrent serious infection and/or hypogammaglobulinemia (IgG </= 400 mg/dL)  -pneumococcal conjugate vaccine, followed by pneumococcal polysaccharide vaccine 1 year later  -Influenza vaccination is recommended; 2 doses of high-dose inactivated quandaivalent influenza vaccine should be considered  -consider 12 weeks of levofloxacin 500 mg p.o. daily at the time of initial diagnosis of multiple myeloma  -COVID-19 vaccination  -highest risk for VTE is in the 1st 6 months following new diagnosis of multiple myeloma  -VTE prophylaxis if no contraindications to anticoagulation agents or antiplatelets  Recommendations for VTE prophylaxis:  Aspirin  mg daily; low molecular weight heparin equivalent to enoxaparin 40 mg daily; Xarelto 10 mg daily; Eliquis  2.5 mg b.i.d.; Arixtra 2.5 mg daily; Coumadin with target INR 2.0-3.0      Lenalidomide:  Embryo fetal toxicity  Hematologic toxicity.  Neutropenia.  Thrombocytopenia.  Venous and arterial thromboembolism.  DVT.  PE.  MI.  Stroke.  Dizziness, fatigue.  Tumor lysis syndrome.  Heart failure.    Used with caution in patients with renal impairment.  Lenalidomide =/> 4 cycles may decrease the # of CD34 pos cells collected for autologous stem cell transplant.  DVT, PE, atrial fibrillation, renal failure are more likely in patients> 65 years  Hepatotoxicity  Hypersensitivity reactions.  Anaphylaxis.  Angioedema.  Skin rash.  Eosinophilia.  Immediate hypersensitivity reactions.  Second primary malignancy.  AML.  MDS.  Solid tumor malignancies.  Nonmelanoma skin cancers.    Monitoring with lenalidomide:  -CBC with differential: weekly for the first 2 cycles, every 2 weeks during the third cycle and monthly thereafter;  -serum creatinine, LFTs (periodically).  -Thyroid function tests (TSH at baseline then every 2 to 3 months during lenalidomide treatment  -ECG when clinically indicated. Monitor for signs and symptoms of infection (if neutropenic), bleeding or bruising, hepatotoxicity, secondary malignancies, thromboembolism (eg, shortness of breath, chest pain, arm or leg swelling), dermatologic toxicity, tumor lysis syndrome, or hypersensitivity.  Patients who could become pregnant: Pregnancy test 10 to 14 days and 24 hours prior to initiating therapy, weekly during the first 4 weeks of treatment, then every 2 to 4 weeks through 4 weeks after therapy discontinued.    Bortezomib:  Bone marrow suppression.  Neutropenia.  Thrombocytopenia.  Acute CHF.  Nausea, vomiting, diarrhea, constipation, ileus.    Hepatotoxicity:  Herpes zoster and her PCP plaques reactivation.    Anaphylactic reactions.  Hypersensitive reactions.  Angioedema.  Laryngeal edema.    Hypotension.  Peripheral neuropathy.  Posterior reversible  leukoencephalopathy syndrome.  Progressive multifocal leukoencephalopathy.  Pulmonary toxicity.  Pneumonitis.  Interstitial pneumonia.  Lung infiltrates.  ARDS.  Thrombotic microangiopathy.  Tumor lysis syndrome.      Monitoring parameters with bortezomib:  CBC, CMP  Blood pressure (to monitor for hypotension)  Peripheral neuropathy  Posterior reversible leukoencephalopathy syndrome, progressive multifocal leukoencephalopathy, tumor lysis syndrome, or hyper-/hypoglycemia. Monitor baseline chest x-ray and then periodic pulmonary function testing (with new or worsening pulmonary symptoms). Monitor closely in patients with risk factors for heart failure or existing heart disease.      Follow-up:  No follow-ups on file.

## 2023-11-21 NOTE — Clinical Note
Proceed with Zometa and Velcade infusion today Keep Revlimid on hold for now, in anticipation of hospitalization, then stem cell transplant, scheduled for 11/30/2023  Continue aspirin, Bactrim DS, and acyclovir Follow-up in 1 month, after transplant.

## 2023-12-13 ENCOUNTER — DOCUMENTATION ONLY (OUTPATIENT)
Dept: HEMATOLOGY/ONCOLOGY | Facility: CLINIC | Age: 68
End: 2023-12-13
Payer: MEDICARE

## 2023-12-13 NOTE — NURSING
Received phone call from Dr. Dionicio BECKETT (Fellow) at [facility]. Reports patient will be discharged today and is confirming patient has weekly lab work scheduled. Patient has CBC, CMP scheduled for 12/15/23 and on 12/21/23 along with MD appt on 12/21/23. Lab results will be sent to [facility].

## 2023-12-20 PROBLEM — Z94.84 STEM CELLS TRANSPLANT STATUS: Status: ACTIVE | Noted: 2023-12-20

## 2023-12-21 ENCOUNTER — INFUSION (OUTPATIENT)
Dept: INFUSION THERAPY | Facility: HOSPITAL | Age: 68
End: 2023-12-21
Attending: INTERNAL MEDICINE
Payer: MEDICARE

## 2023-12-21 ENCOUNTER — OFFICE VISIT (OUTPATIENT)
Dept: HEMATOLOGY/ONCOLOGY | Facility: CLINIC | Age: 68
End: 2023-12-21
Attending: INTERNAL MEDICINE
Payer: MEDICARE

## 2023-12-21 VITALS
RESPIRATION RATE: 16 BRPM | TEMPERATURE: 99 F | DIASTOLIC BLOOD PRESSURE: 70 MMHG | WEIGHT: 137 LBS | HEART RATE: 83 BPM | BODY MASS INDEX: 20.76 KG/M2 | HEIGHT: 68 IN | OXYGEN SATURATION: 100 % | SYSTOLIC BLOOD PRESSURE: 104 MMHG

## 2023-12-21 DIAGNOSIS — M48.061 SPINAL STENOSIS OF LUMBAR REGION, UNSPECIFIED WHETHER NEUROGENIC CLAUDICATION PRESENT: ICD-10-CM

## 2023-12-21 DIAGNOSIS — C90.00 IGG MULTIPLE MYELOMA: ICD-10-CM

## 2023-12-21 DIAGNOSIS — Z86.11 HISTORY OF TUBERCULOSIS: ICD-10-CM

## 2023-12-21 DIAGNOSIS — C79.51 SECONDARY MALIGNANT NEOPLASM OF BONE: ICD-10-CM

## 2023-12-21 DIAGNOSIS — Z79.899 DRUG-INDUCED IMMUNODEFICIENCY: ICD-10-CM

## 2023-12-21 DIAGNOSIS — M51.36 DEGENERATION OF LUMBAR INTERVERTEBRAL DISC: ICD-10-CM

## 2023-12-21 DIAGNOSIS — M48.54XA NONTRAUMATIC COMPRESSION FRACTURE OF T8 VERTEBRA, INITIAL ENCOUNTER: ICD-10-CM

## 2023-12-21 DIAGNOSIS — C79.51 SECONDARY MALIGNANCY OF LUMBAR VERTEBRAL COLUMN: ICD-10-CM

## 2023-12-21 DIAGNOSIS — C90.00 IGG MULTIPLE MYELOMA: Primary | ICD-10-CM

## 2023-12-21 DIAGNOSIS — Z94.84 STEM CELLS TRANSPLANT STATUS: ICD-10-CM

## 2023-12-21 DIAGNOSIS — M54.50 RIGHT-SIDED LOW BACK PAIN WITHOUT SCIATICA, UNSPECIFIED CHRONICITY: ICD-10-CM

## 2023-12-21 DIAGNOSIS — E83.52 HYPERCALCEMIA: ICD-10-CM

## 2023-12-21 DIAGNOSIS — K90.0 CELIAC DISEASE: ICD-10-CM

## 2023-12-21 DIAGNOSIS — S32.020G COMPRESSION FRACTURE OF L2 VERTEBRA WITH DELAYED HEALING, SUBSEQUENT ENCOUNTER: ICD-10-CM

## 2023-12-21 DIAGNOSIS — I34.0 MITRAL VALVE INSUFFICIENCY, UNSPECIFIED ETIOLOGY: ICD-10-CM

## 2023-12-21 DIAGNOSIS — M81.0 AGE-RELATED OSTEOPOROSIS WITHOUT CURRENT PATHOLOGICAL FRACTURE: ICD-10-CM

## 2023-12-21 DIAGNOSIS — M81.0 OSTEOPOROSIS OF FEMUR WITHOUT PATHOLOGICAL FRACTURE: ICD-10-CM

## 2023-12-21 DIAGNOSIS — S32.010G COMPRESSION FRACTURE OF L1 VERTEBRA WITH DELAYED HEALING, SUBSEQUENT ENCOUNTER: Primary | ICD-10-CM

## 2023-12-21 DIAGNOSIS — D84.821 DRUG-INDUCED IMMUNODEFICIENCY: ICD-10-CM

## 2023-12-21 PROCEDURE — 99215 PR OFFICE/OUTPT VISIT, EST, LEVL V, 40-54 MIN: ICD-10-PCS | Mod: S$PBB,,, | Performed by: INTERNAL MEDICINE

## 2023-12-21 PROCEDURE — 99215 OFFICE O/P EST HI 40 MIN: CPT | Mod: PBBFAC,25 | Performed by: INTERNAL MEDICINE

## 2023-12-21 PROCEDURE — 96365 THER/PROPH/DIAG IV INF INIT: CPT

## 2023-12-21 PROCEDURE — 99215 OFFICE O/P EST HI 40 MIN: CPT | Mod: S$PBB,,, | Performed by: INTERNAL MEDICINE

## 2023-12-21 PROCEDURE — 63600175 PHARM REV CODE 636 W HCPCS: Performed by: INTERNAL MEDICINE

## 2023-12-21 RX ORDER — ZOLEDRONIC ACID 0.04 MG/ML
4 INJECTION, SOLUTION INTRAVENOUS
Status: COMPLETED | OUTPATIENT
Start: 2023-12-21 | End: 2023-12-21

## 2023-12-21 RX ORDER — SODIUM CHLORIDE 0.9 % (FLUSH) 0.9 %
10 SYRINGE (ML) INJECTION
Status: DISCONTINUED | OUTPATIENT
Start: 2023-12-21 | End: 2023-12-21 | Stop reason: HOSPADM

## 2023-12-21 RX ORDER — HEPARIN 100 UNIT/ML
500 SYRINGE INTRAVENOUS
Status: DISCONTINUED | OUTPATIENT
Start: 2023-12-21 | End: 2023-12-21 | Stop reason: HOSPADM

## 2023-12-21 RX ADMIN — ZOLEDRONIC ACID 4 MG: 0.04 INJECTION, SOLUTION INTRAVENOUS at 10:12

## 2023-12-21 NOTE — PROGRESS NOTES
History:  Past Medical History:   Diagnosis Date    Diabetes mellitus, type 2     GERD (gastroesophageal reflux disease)     Hyperlipidemia     Hypertension     Personal history of colonic polyps 06/29/2022   Past medical history:  NIDDM. Dyslipidemia.  GERD.  Lumbar spinal stenosis.  Vitamin-D deficiency.  Allergic rhinitis.  HTN.  Bell's palsy.  Celiac disease.  Hemorrhoids.  Mitral regurgitation 01/05/2018.  Rheumatic valve narrowing and leaking mitral valve.  -02/02/2018 ([facility]):  АНДРЕЙ, right minimally invasive anterior thoracotomy, right femoral artery and vein exploration, extensive right pleural adhesiolysis/pneumolysis, bilateral Maze/ablation (extensive), left atrial appendage clip placement for left atrial appendage exclusion, mitral valve repair (complex repair with 26 mm annuloplasty ring)    Social history:  .  Lives in [place], [place].  Five children.  Owns a [employer] business.  No history of tobacco, alcohol, or illicit drug abuse.      Family history:  Negative for cancers or blood dyscrasias.      Health maintenance:  PCP in family medicine clinic, [facility].  Had EGD and colonoscopy done 1 year ago by Dr. Zoltan Kapoor, [facility], apparently unremarkable (he gets the endoscopies done periodically because of history of celiac disease).  Past Surgical History:   Procedure Laterality Date    BONE MARROW ASPIRATION N/A 06/27/2023    Procedure: ASPIRATION, BONE MARROW;  Surgeon: Namita Daniels MD;  Location: [facility] ENDOSCOPY;  Service: General;  Laterality: N/A;    BONE MARROW ASPIRATION N/A 10/24/2023    Procedure: ASPIRATION, BONE MARROW;  Surgeon: Namita Daniels MD;  Location: [facility] ENDOSCOPY;  Service: General;  Laterality: N/A;    BONE MARROW BIOPSY N/A 06/27/2023    Procedure: Biopsy-bone marrow;  Surgeon: Haylie Liu MD;  Location: [facility] ENDOSCOPY;  Service: General;  Laterality: N/A;    BONE MARROW BIOPSY N/A 10/24/2023    Procedure:  Biopsy-bone marrow;  Surgeon: Namita Daniels MD;  Location: [facility] ENDOSCOPY;  Service: General;  Laterality: N/A;    CARDIAC SURGERY  2018    COLONOSCOPY  06/29/2022    EYE SURGERY        Social History     Socioeconomic History    Marital status:    Tobacco Use    Smoking status: Never    Smokeless tobacco: Never   Substance and Sexual Activity    Alcohol use: Never    Drug use: Never    Sexual activity: Not Currently      No family history on file.     Reason for Follow-up:  -multiple myeloma, IgG kappa; S/P ASCT  -worsening low back pain  -hypercalcemia  -osteoporosis hips B/L    History of Present Illness:   Follow-up Clinical Reassessment and follow up s/p stem cell transplant      Oncologic/Hematologic History:  Oncology History   IgG multiple myeloma   6/23/2023 Initial Diagnosis    IgG multiple myeloma     7/13/2023 -  Chemotherapy    Treatment Summary   Plan Name: OP VRD - WEEKLY BORTEZOMIB LENALIDOMIDE DEXAMETHASONE Q3W  Treatment Goal: Curative  Status: Active  Start Date: 7/13/2023  End Date: 1/31/2024 (Planned)  Provider: Narciso Lundy MD  Chemotherapy: bortezomib (VELCADE) injection 2.25 mg, 2.25 mg, Subcutaneous, Clinic/HOD 1 time, 6 of 9 cycles  Administration: 2.25 mg (7/13/2023), 2.25 mg (7/20/2023), 2.25 mg (7/27/2023), 2.25 mg (8/10/2023), 2.25 mg (8/17/2023), 2.25 mg (8/24/2023), 2.25 mg (8/31/2023), 2.25 mg (9/7/2023), 2.25 mg (9/21/2023), 2.25 mg (9/14/2023), 2.25 mg (9/28/2023), 2.25 mg (10/12/2023), 2.25 mg (10/5/2023), 2.25 mg (10/19/2023), 2.25 mg (10/26/2023)  lenalidomide 25 mg Cap, 25 mg, , , 1 of 1 cycle, Start date: 11/3/2023, End date: --     67-year-old gentleman, referred from [facility], with newly diagnosed multiple myeloma.      Patient is being followed for IgG kappa multiple myeloma.    Please assessment and plan section for details.    06/26/2023:  Initial consultation:  Adrianna gentleman who presents for initial medical oncology consultation,  accompanied by his wife and family present who is a past interventional cardiologist.  Back pain for 2 months.  Initially started in right groin.  Physical therapy has not helped.  10/10 severity.  Has been taking Tylenol without significant relief.  Secured to take narcotics because of constipation.  Underwent couple of spinal steroid shots 3 weeks ago, with minor relief.  Pain is present all the time.  Pain increases with changing position in bed as well as upon standing.  He finds it difficult to ambulate.  Pain does not radiate down lower extremities and is not accompanied with lower extremity weakness, numbness, urinary or bowel incontinence, or saddle anesthesia.  Also experiences muscle cramps.  Generalized weakness.  However, ECOG 2.  No fevers or chills.  No repeated infections.  Cramps in hands and feet.  Appetite is down.  Appetite has picked up in last 10 days.  Has lost 12-14 lb in last 1-1/2 months.  No abnormal bleeding or bruising.      Interval History:  INF FLUIDS   OP VRD - WEEKLY BORTEZOMIB LENALIDOMIDE DEXAMETHASONE Q3W     12/21/2023:   -cycle 6 day 1 of Revlimid 11/21/2023; subsequently, on hold in anticipation of stem cell transplant  -melphalan 360 mg IV administered day -2o (11/28/2023) (200 mg per m2 per day)  -ASCT infusion day 0 (11/30/2023)  -filgrastim 5 microgram/kg subQ every 24 hours, started day +5 (12/05/2023)  -infectious prophylaxis  -pentamidine 300 mg inhaled once on day-2 (11/28/2023)  -acyclovir 800 mg p.o. q.12 hours starting day-2 (11/28/2023), to continue for 1 year  -fluconazole 400 mg p.o. Q 24 hours starting day-2 (11/28/2023), continue until engraftment and/or mucositis resolved  -cefepime started 12/08/2023, vancomycin added  -IVIG on 12/10/2023  -12/12/2023:  HCT day +12; doing well on antibiotics; counts recovered  -12/12/2023: Transfusion independent and ANC has recovered  -12/13/2023: Patient discharged  - 12/21/2023:  WBC 8.06 K. Hemoglobin 10.9.  MCV 94.8.   Platelets 248 K. Monocytes 1.9 K. CMP unremarkable.  BUN 10.5.  Creatinine 0.75.  Calcium 8.5.  Albumin 3.5.  Presents for a follow-up visit.  Doing well.  ECOG 1.  Appetite is okay; slowly improving.  He is concerned about weight loss.  Encouraged him to eat.  He has follow-up appointment with   Dr. Brandy Mahajan at [facility], on 01/03/2023. No fevers or chills.  No chest pain, cough, or dyspnea.  No abdominal pain, nausea or vomiting.  Chronic and persisting back pain secondary to DJD.    Medications:  Current Outpatient Medications on File Prior to Visit   Medication Sig Dispense Refill    abiraterone (ZYTIGA) 500 mg Tab Take 1,000 mg by mouth once daily Take 2 (two) 500 mg tab (to equal 1,000 mg) by mouth daily.      acyclovir (ZOVIRAX) 400 MG tablet Take 1 tablet (400 mg total) by mouth 2 (two) times daily. 60 tablet 4    ascorbic acid, vitamin C, (VITAMIN C) 1000 MG tablet Take 1,000 mg by mouth once daily.      aspirin (ECOTRIN) 81 MG EC tablet Take 81 mg by mouth once daily.      atorvastatin (LIPITOR) 20 MG tablet Take 20 mg by mouth once daily.      calcium carbonate 195 mg calcium (500 mg) Chew Take 1 tablet by mouth once daily.      ciclopirox (PENLAC) 8 % Soln Apply 8 drops topically Daily.      dexAMETHasone (DECADRON) 4 MG Tab Take 10 tablets (40 mg total) by mouth every 7 days. on days 1, 8, and 15 of each chemotherapy cycle. Take with food. 30 tablet 5    diclofenac sodium (VOLTAREN) 1 % Gel Apply 2 g topically 4 (four) times daily. 450 g 1    famotidine (PEPCID) 40 MG tablet Take 40 mg by mouth every evening.      fluticasone propionate (FLONASE) 50 mcg/actuation nasal spray 50 sprays by Nasal route 2 (two) times a day.      gabapentin (NEURONTIN) 600 MG tablet Take 1 tablet (600 mg total) by mouth 3 (three) times daily. 90 tablet 2    linaCLOtide (LINZESS) 72 mcg Cap capsule Take 1 capsule (72 mcg total) by mouth daily as needed (constipation). 90 each 1    metFORMIN (GLUCOPHAGE) 500 MG tablet Take 1  tablet (500 mg total) by mouth 2 (two) times daily with meals. 180 tablet 3    metoprolol succinate (TOPROL-XL) 50 MG 24 hr tablet Take by mouth.      MIRALAX 17 gram/dose powder Take 17 g by mouth.      MOVANTIK 25 mg tablet       pantoprazole (PROTONIX) 20 MG tablet Take 1 tablet (20 mg total) by mouth once daily. 30 tablet 11    sulfamethoxazole-trimethoprim 800-160mg (BACTRIM DS) 800-160 mg Tab Take 1 tablet by mouth on MWF of each week 48 tablet 3    traMADoL 100 mg Tab Take 100 tablets by mouth every 4 (four) hours as needed (pain). 60 tablet 2    ammonium lactate 12 % Crea Apply 1 application topically once daily. (Patient not taking: Reported on 11/21/2023) 385 g 2    gabapentin (NEURONTIN) 100 MG capsule Take 100 mg by mouth 3 (three) times daily.      gabapentin (NEURONTIN) 400 MG capsule       hydrocortisone (ANUSOL-HC) 2.5 % rectal cream Place 1 applicator rectally 2 (two) times daily. (Patient not taking: Reported on 12/21/2023) 28 g 2    ketoconazole (NIZORAL) 2 % cream Apply topically once daily. (Patient not taking: Reported on 11/21/2023) 60 g 1    lenalidomide 25 mg Cap Take 1 capsule by mouth once daily on days 1 to 14 of each 21 day cycle.. (Patient not taking: Reported on 12/21/2023.) 14 each 0    megestroL (MEGACE) 400 mg/10 mL (40 mg/mL) Susp Take 10 mLs (400 mg total) by mouth once daily. 300 mL 0    morphine (MS CONTIN) 30 MG 12 hr tablet Take 1 tablet (30 mg total) by mouth 2 (two) times daily. Every 12 hours (Patient not taking: Reported on 11/21/2023) 30 tablet 0    naloxone (NARCAN) 4 mg/actuation Spry SMARTSIG:Both Nares      nystatin (MYCOSTATIN) powder Apply topically 4 (four) times daily. (Patient not taking: Reported on 11/21/2023) 60 g 2    pantoprazole (PROTONIX) 40 MG tablet Take 40 mg by mouth.       No current facility-administered medications on file prior to visit.       Review of Systems:   All systems reviewed and found to be negative except for the symptoms detailed  "above    Physical Examination:   VITAL SIGNS:   Vitals:    12/21/23 0905   BP: 104/70   Pulse: 83   Resp: 16   Temp: 98.6 °F (37 °C)       GENERAL:  In no apparent distress.    HEAD:  No signs of head trauma.  EYES:  Pupils are equal.  Extraocular motions intact.    EARS:  Hearing grossly intact.  MOUTH:  Oropharynx is normal.   NECK:  No adenopathy, no JVD.     CHEST:  Chest with clear breath sounds bilaterally.  No wheezes, rales, rhonchi.    CARDIAC:  Regular rate and rhythm.  S1 and S2, without murmurs, gallops, rubs.  VASCULAR:  No Edema.  Peripheral pulses normal and equal in all extremities.  ABDOMEN:  Soft, without detectable tenderness.  No sign of distention.  No   rebound or guarding, and no masses palpated.   Bowel Sounds normal.  MUSCULOSKELETAL:  Good range of motion of all major joints. Extremities without clubbing, cyanosis or edema.    NEUROLOGIC EXAM:  Alert and oriented x 3.  No focal sensory or strength deficits.   Speech normal.  Follows commands.  PSYCHIATRIC:  Mood normal.    No results for input(s): "CBC" in the last 72 hours.   No results for input(s): "CMP" in the last 72 hours.     Assessment:  Problem List Items Addressed This Visit          Neuro    Compression fracture of thoracic spine, non-traumatic    Compression fracture of first lumbar vertebra - Primary    Compression fracture of L2 lumbar vertebra    Degeneration of lumbar intervertebral disc    Spinal stenosis of lumbar region       Cardiac/Vascular    Mitral valve insufficiency    Overview     Formatting of this note might be different from the original.  Added automatically from request for surgery [id number]            Renal/    Hypercalcemia       ID    History of tuberculosis       Immunology/Multi System    Drug-induced immunodeficiency       Oncology    IgG multiple myeloma    Secondary malignant neoplasm of bone    Secondary malignancy of lumbar vertebral column    Stem cells transplant status       Endocrine    " Osteoporosis of femur without pathological fracture       GI    Celiac disease       Orthopedic    Low back pain       Multiple myeloma, IgG kappa:  ISS stage:  Stage I  R-ISS stage:  Stage I  Multiple myeloma, IgG kappa:   ISS stage:  Stage I  R-ISS stage:  Stage I  -presentation:  03/2023: Worsening back pain, 12-14 lb weight loss over 6 weeks  -skeletal survey 06/14/2023: Negative for lytic or sclerotic lesions  -06/14/2023:  Hemoglobin 11.0, IgG kappa M protein 2.28 gm/dL, kappa elevated, lambda suppressed, kappa/lambda ratio 292  -renal function normal, no hypercalcemia  -24 hour urine: M spike 183 mg; monoclonal kappa light chains in urine; urine kappa elevated, urine lambda suppressed, urine kappa/lambda ratio> 36.4  -LDH normal 06/16/2023   -Beta-2 microglobulin 3.18 on 06/26/2023  -bone marrow biopsy 06/27/2023:  50% plasmacytosis with kappa light chain restriction; no high-risk cytogenetics  -FDG PET-CT 06/29/2023:  Questionable findings  -Velcade started 07/13/2023   -Zometa 4 mg IV every 4 weeks (x2 years), started 07/13/2023  -VRD:  Cycle 1 started 07/13/2023; cycle 2 started 08/10/2023; cycle 3 started 08/31/2023; cycle 4 started 09/21/2023  -Radiation oncology consult:  Radiotherapy to spine not indicated/will not be helpful; questionable findings on FDG PET-CT  -IR at [facility] will not perform kyphoplasty  -significant response to chemotherapy x2 cycles (on 08/24/2023, kappa/lambda ratio 5.72, much improved; IgG kappa monoclonal protein down to 0.39 gm/dL, significantly decreased)  -VRD cycle 5:  10/12/2023-10/26/2023  -restaging bone marrow biopsy 10/24/2023, post VRD X 4-1/2 cycles:  Normocellular; < 1% plasma cells; no clonal or abnormal plasma cell population on flow cytometry; no high-risk FISH findings  -11/08/2023: No monoclonal protein in urine  -11/09/2023: TSH, free T4 normal   -11/09/2023:  No monoclonal protein on SPEP and BUBBA; kappa/lambda ratio 1.36, normalized; kappa free light chains 2.29  mg/dL (to recall, 99.4 on 06/14/2023 before starting chemotherapy)  -cycle 6 day 1 of Revlimid 11/21/2023; subsequently, on hold in anticipation of stem cell transplant  >>>  ASCT at [facility]:  -melphalan 360 mg IV administered day -2o (11/28/2023) (200 mg per m2 per day)  -ASCT infusion day 0 (11/30/2023)  -filgrastim 5 microgram/kg subQ every 24 hours, started day +5 (12/05/2023)  -infectious prophylaxis  -pentamidine 300 mg inhaled once on day-2 (11/28/2023)  -acyclovir 800 mg p.o. q.12 hours starting day-2 (11/28/2023), to continue for 1 year  -fluconazole 400 mg p.o. Q 24 hours starting day-2 (11/28/2023), continue until engraftment and/or mucositis resolved  -cefepime started 12/08/2023, vancomycin added  -IVIG on 12/10/2023  -12/12/2023:  HCT day +12; doing well on antibiotics; counts recovered  -12/12/2023: Transfusion independent and ANC has recovered  -12/13/2023: Patient discharged      Staging of myeloma:  -serum beta 2 microglobulin elevated (3.18)  -serum albumin and LDH normal   -myeloma FISH panel on bone marrow:  Negative for high-risk cytogenetic abnormalities  >>>  ISS stage:  Stage I  R-ISS stage:  Stage I      Osteoporosis on hips on DEXA scan 10/11/2023      Non myeloma findings (DJD of spine, spinal stenosis, compression fractures):  -mild L4-L5 spinal canal stenosis on MRI 05/04/2023   -lumbar DJD and facet arthrosis on MRI lumbar spine 05/04/2023  -MRI T-spine 08/08/2023:  No myeloma lesions   -MRI lumbar spine 09/14/2023:  Progression of skeletal demineralization; multilevel degenerative disc disease; severe spinal stenosis L3-L5; new/progressive loss of vertebral body height throughout the spine since 05/2023, etc.  -[facility] will not perform kyphoplasty      Co-morbidities:  NIDDM.  Dyslipidemia.  Hypertension.  Celiac disease.    History of rheumatic mitral valve disease, S/P Maze/ablation procedure 02/02/2018      Vaccination history:   COVID-19, MRNA, LN-S, PF (MODERNA FULL 0.5 ML  DOSE) 8/18/2022 , 10/23/2021 , 3/17/2021 , 2/17/2021   Hepatitis A / Hepatitis B 8/18/2022 , 10/5/2021 , 7/29/2020   Influenza - Quadrivalent - MDCK - PF 10/5/2021   Influenza - Trivalent - MDCK - PF 9/10/2015   Meningococcal Conjugate (MCV4O) 8/18/2022 , 4/17/2012   Meningococcal Conjugate (MCV4P) 4/17/2012   Pneumococcal Conjugate - 20 Valent 1/12/2023   Tdap 7/29/2020   Zoster 11/18/2020 , 7/29/2020   Zoster Recombinant 11/18/2020 , 7/29/2020       Plan:  Continue Zometa every 4 weeks  CBC and CMP twice a week in our office  Continue acyclovir  DEXA scan in October 2024  Follow-up with NP in 2 weeks  --------------------------      Staging of myeloma:  -serum beta 2 microglobulin elevated (3.18)  -serum albumin and LDH normal   -myeloma FISH panel on bone marrow:  Negative for high-risk cytogenetic abnormalities  >>>  ISS stage:  Stage I  R-ISS stage:  Stage I    Multiple myeloma, IgG kappa:   ISS stage:  Stage I  R-ISS stage:  Stage I  -presentation:  03/2023: Worsening back pain, 12-14 lb weight loss over 6 weeks  -skeletal survey 06/14/2023: Negative for lytic or sclerotic lesions  -06/14/2023:  Hemoglobin 11.0, IgG kappa M protein 2.28 gm/dL, kappa elevated, lambda suppressed, kappa/lambda ratio 292  -renal function normal, no hypercalcemia  -24 hour urine: M spike 183 mg; monoclonal kappa light chains in urine; urine kappa elevated, urine lambda suppressed, urine kappa/lambda ratio> 36.4  -LDH normal 06/16/2023   -Beta-2 microglobulin 3.18 on 06/26/2023  -bone marrow biopsy 06/27/2023:  50% plasmacytosis with kappa light chain restriction; no high-risk cytogenetics  -FDG PET-CT 06/29/2023:  Questionable findings  -Velcade started 07/13/2023   -Zometa 4 mg IV every 4 weeks (x2 years), started 07/13/2023  -VRD:  Cycle 1 started 07/13/2023; cycle 2 started 08/10/2023; cycle 3 started 08/31/2023; cycle 4 started 09/21/2023  -Radiation oncology consult:  Radiotherapy to spine not indicated/will not be helpful;  questionable findings on FDG PET-CT  -IR at [facility] will not perform kyphoplasty  -significant response to chemotherapy x2 cycles (on 08/24/2023, kappa/lambda ratio 5.72, much improved; IgG kappa monoclonal protein down to 0.39 gm/dL, significantly decreased)  -VRD cycle 5:  10/12/2023-10/26/2023  -restaging bone marrow biopsy 10/24/2023, post VRD X 4-1/2 cycles:  Normocellular; < 1% plasma cells; no clonal or abnormal plasma cell population on flow cytometry; no high-risk FISH findings  -11/08/2023: No monoclonal protein in urine  -11/09/2023: TSH, free T4 normal   -11/09/2023:  No monoclonal protein on SPEP and BUBBA; kappa/lambda ratio 1.36, normalized; kappa free light chains 2.29 mg/dL (to recall, 99.4 on 06/14/2023 before starting chemotherapy)  -cycle 6 day 1 of Revlimid 11/21/2023; subsequently, on hold in anticipation of stem cell transplant  >>>  [place]:  -melphalan 360 mg IV administered day -2o (11/28/2023) (200 mg per m2 per day)  -ASCT infusion day 0 (11/30/2023)  -filgrastim 5 microgram/kg subQ every 24 hours, started day +5 (12/05/2023)  -infectious prophylaxis  -pentamidine 300 mg inhaled once on day-2 (11/28/2023)  -acyclovir 800 mg p.o. q.12 hours starting day-2 (11/28/2023), to continue for 1 year  -fluconazole 400 mg p.o. Q 24 hours starting day-2 (11/28/2023), continue until engraftment and/or mucositis resolved  -cefepime started 12/08/2023, vancomycin added  -IVIG on 12/10/2023  -12/12/2023:  HCT day +12; doing well on antibiotics; counts recovered  -12/12/2023: Transfusion independent and ANC has recovered  -12/13/2023: Patient discharged  >>>  -per [place], twice weekly CBC and CMP; repletion accordingly  -acyclovir was started at [place] day minus 2 (-2) ASCT 11/28/2023; to continue for 1 year (until 11/2024)  -continue Zometa every 4 weeks x2 years (started 07/13/2023  -per Radiation Oncology, given questionable radiologic findings on PET-CT, palliative radiotherapy to spine would not be  helpful    -osteoporosis of hips noted on DEXA scan 10/11/2023  -patient already receiving monthly Zometa for underlying multiple myeloma; we will help with osteoporosis as well  -repeat DEXA scan in 1 year (10/2024)    Non myeloma findings (DJD of spine, spinal stenosis, compression fractures):  -mild L4-L5 spinal canal stenosis on MRI 05/04/2023   -lumbar DJD and facet arthrosis on MRI lumbar spine 05/04/2023  -MRI T-spine 08/08/2023:  No myeloma lesions   -MRI lumbar spine 09/14/2023:  Progression of skeletal demineralization; multilevel degenerative disc disease; severe spinal stenosis L3-L5; new/progressive loss of vertebral body height throughout the spine since 05/2023, etc.  -IR at [facility] will not perform kyphoplasty    10 mm exophytic hyperdense focus upper pole of right kidney, compatible with a hyperdense cyst, on CT abdomen pelvis with contrast 06/27/2023  -07/12/2023:  MRI abdomen with and without contrast (right renal cyst): Small exophytic right renal lesion most likely a proteinaceous or hemorrhagic cyst (10 mm, upper pole of right kidney)    Bortezomib/lenalidomide/dexamethasone (VRd), category 1 regimen:  Cycle length 21 days:  -bortezomib 1.3 mg per m2 subQ days 1, 8, and 15  -lenalidomide 25 mg p.o. daily, on days 1 through 14  -dexamethasone 40 mg p.o. with food days 1, 8, and 15  Cycle length: 21 days    Monitoring on lenalidomide:   Monitor for signs and symptoms of infection (if neutropenic), bleeding or bruising, hepatotoxicity, secondary malignancies, thromboembolism (eg, shortness of breath, chest pain, arm or leg swelling), dermatologic toxicity, tumor lysis syndrome, or hypersensitivity.    Follow-up with NP in 2 weeks    Above discussed at length with the patient.  All questions answered.    Discussed labs and scans and gave him copies of relevant reports.    He understands and agrees with this plan.  ----------------------------------    Discussion:    Supportive care for myeloma:  -bone  targeted treatment: Bisphosphonate, category 1; or denosumab to reduce risk of skeletal related events; denosumab is preferred in patients with renal insufficiency; assess vitamin-D status; baseline dental exam; monitor for osteonecrosis of the jaw; monitor renal dysfunction with bisphosphonate therapy  -continue bone targeted treatment (bisphosphonate or denosumab) for 2 years; continue beyond 2 years should be based on clinical judgment  -patients receiving denosumab for bone disease who subsequently discontinued therapy should be given maintenance denosumab every 6 months or a single dose of bisphosphonate to mitigate risk of rebound osteoporosis  -for hypercalcemia, zoledronic acid, denosumab, steroids, and/or calcitonin  -for hyperviscosity, plasmapheresis should be used as adjuvant therapy for symptomatic hyperviscosity  -intravenous immunoglobulin therapy should be considered in the setting of recurrent serious infection and/or hypogammaglobulinemia (IgG </= 400 mg/dL)  -pneumococcal conjugate vaccine, followed by pneumococcal polysaccharide vaccine 1 year later  -Influenza vaccination is recommended; 2 doses of high-dose inactivated quandaivalent influenza vaccine should be considered  -consider 12 weeks of levofloxacin 500 mg p.o. daily at the time of initial diagnosis of multiple myeloma  -COVID-19 vaccination  -highest risk for VTE is in the 1st 6 months following new diagnosis of multiple myeloma  -VTE prophylaxis if no contraindications to anticoagulation agents or antiplatelets  Recommendations for VTE prophylaxis:  Aspirin  mg daily; low molecular weight heparin equivalent to enoxaparin 40 mg daily; Xarelto 10 mg daily; Eliquis 2.5 mg b.i.d.; Arixtra 2.5 mg daily; Coumadin with target INR 2.0-3.0      Lenalidomide:  Embryo fetal toxicity  Hematologic toxicity.  Neutropenia.  Thrombocytopenia.  Venous and arterial thromboembolism.  DVT.  PE.  MI.  Stroke.  Dizziness, fatigue.  Tumor lysis  syndrome.  Heart failure.    Used with caution in patients with renal impairment.  Lenalidomide =/> 4 cycles may decrease the # of CD34 pos cells collected for autologous stem cell transplant.  DVT, PE, atrial fibrillation, renal failure are more likely in patients> 65 years  Hepatotoxicity  Hypersensitivity reactions.  Anaphylaxis.  Angioedema.  Skin rash.  Eosinophilia.  Immediate hypersensitivity reactions.  Second primary malignancy.  AML.  MDS.  Solid tumor malignancies.  Nonmelanoma skin cancers.    Monitoring with lenalidomide:  -CBC with differential: weekly for the first 2 cycles, every 2 weeks during the third cycle and monthly thereafter;  -serum creatinine, LFTs (periodically).  -Thyroid function tests (TSH at baseline then every 2 to 3 months during lenalidomide treatment  -ECG when clinically indicated. Monitor for signs and symptoms of infection (if neutropenic), bleeding or bruising, hepatotoxicity, secondary malignancies, thromboembolism (eg, shortness of breath, chest pain, arm or leg swelling), dermatologic toxicity, tumor lysis syndrome, or hypersensitivity.  Patients who could become pregnant: Pregnancy test 10 to 14 days and 24 hours prior to initiating therapy, weekly during the first 4 weeks of treatment, then every 2 to 4 weeks through 4 weeks after therapy discontinued.    Follow-up:  No follow-ups on file.

## 2023-12-21 NOTE — Clinical Note
Continue Zometa every 4 weeks CBC and CMP twice a week in our office Continue acyclovir DEXA scan in October 2024 Follow-up with NP in 2 weeks

## 2023-12-26 ENCOUNTER — LAB VISIT (OUTPATIENT)
Dept: HEMATOLOGY/ONCOLOGY | Facility: CLINIC | Age: 68
End: 2023-12-26
Payer: MEDICARE

## 2023-12-26 DIAGNOSIS — C90.00 IGG MULTIPLE MYELOMA: ICD-10-CM

## 2023-12-26 LAB
ALBUMIN SERPL-MCNC: 3.4 G/DL (ref 3.4–4.8)
ALBUMIN/GLOB SERPL: 0.9 RATIO (ref 1.1–2)
ALP SERPL-CCNC: 71 UNIT/L (ref 40–150)
ALT SERPL-CCNC: 37 UNIT/L (ref 0–55)
AST SERPL-CCNC: 29 UNIT/L (ref 5–34)
BASOPHILS # BLD AUTO: 0.06 X10(3)/MCL
BASOPHILS NFR BLD AUTO: 1.2 %
BILIRUB SERPL-MCNC: 0.3 MG/DL
BUN SERPL-MCNC: 17.7 MG/DL (ref 8.4–25.7)
CALCIUM SERPL-MCNC: 9.6 MG/DL (ref 8.8–10)
CHLORIDE SERPL-SCNC: 103 MMOL/L (ref 98–107)
CO2 SERPL-SCNC: 26 MMOL/L (ref 23–31)
CREAT SERPL-MCNC: 0.76 MG/DL (ref 0.73–1.18)
EOSINOPHIL # BLD AUTO: 0 X10(3)/MCL (ref 0–0.9)
EOSINOPHIL NFR BLD AUTO: 0 %
ERYTHROCYTE [DISTWIDTH] IN BLOOD BY AUTOMATED COUNT: 16.6 % (ref 11.5–17)
GFR SERPLBLD CREATININE-BSD FMLA CKD-EPI: >60 MLS/MIN/1.73/M2
GLOBULIN SER-MCNC: 3.8 GM/DL (ref 2.4–3.5)
GLUCOSE SERPL-MCNC: 100 MG/DL (ref 82–115)
HCT VFR BLD AUTO: 36.2 % (ref 42–52)
HGB BLD-MCNC: 11.6 G/DL (ref 14–18)
IMM GRANULOCYTES # BLD AUTO: 0.01 X10(3)/MCL (ref 0–0.04)
IMM GRANULOCYTES NFR BLD AUTO: 0.2 %
LYMPHOCYTES # BLD AUTO: 2.4 X10(3)/MCL (ref 0.6–4.6)
LYMPHOCYTES NFR BLD AUTO: 46.6 %
MAGNESIUM SERPL-MCNC: 2 MG/DL (ref 1.6–2.6)
MCH RBC QN AUTO: 30.4 PG (ref 27–31)
MCHC RBC AUTO-ENTMCNC: 32 G/DL (ref 33–36)
MCV RBC AUTO: 94.8 FL (ref 80–94)
MONOCYTES # BLD AUTO: 1.31 X10(3)/MCL (ref 0.1–1.3)
MONOCYTES NFR BLD AUTO: 25.4 %
NEUTROPHILS # BLD AUTO: 1.37 X10(3)/MCL (ref 2.1–9.2)
NEUTROPHILS NFR BLD AUTO: 26.6 %
NRBC BLD AUTO-RTO: 0 %
PLATELET # BLD AUTO: 278 X10(3)/MCL (ref 130–400)
PMV BLD AUTO: 9.9 FL (ref 7.4–10.4)
POTASSIUM SERPL-SCNC: 4.4 MMOL/L (ref 3.5–5.1)
PROT SERPL-MCNC: 7.2 GM/DL (ref 5.8–7.6)
RBC # BLD AUTO: 3.82 X10(6)/MCL (ref 4.7–6.1)
SODIUM SERPL-SCNC: 135 MMOL/L (ref 136–145)
WBC # SPEC AUTO: 5.15 X10(3)/MCL (ref 4.5–11.5)

## 2023-12-26 PROCEDURE — 80053 COMPREHEN METABOLIC PANEL: CPT

## 2023-12-26 PROCEDURE — 83735 ASSAY OF MAGNESIUM: CPT

## 2023-12-26 PROCEDURE — 36415 COLL VENOUS BLD VENIPUNCTURE: CPT

## 2023-12-26 PROCEDURE — 85025 COMPLETE CBC W/AUTO DIFF WBC: CPT

## 2023-12-28 ENCOUNTER — LAB VISIT (OUTPATIENT)
Dept: HEMATOLOGY/ONCOLOGY | Facility: CLINIC | Age: 68
End: 2023-12-28
Payer: MEDICARE

## 2023-12-28 DIAGNOSIS — C90.00 IGG MULTIPLE MYELOMA: ICD-10-CM

## 2023-12-28 LAB
ALBUMIN SERPL-MCNC: 3.5 G/DL (ref 3.4–4.8)
ALBUMIN/GLOB SERPL: 0.9 RATIO (ref 1.1–2)
ALP SERPL-CCNC: 71 UNIT/L (ref 40–150)
ALT SERPL-CCNC: 36 UNIT/L (ref 0–55)
AST SERPL-CCNC: 30 UNIT/L (ref 5–34)
BASOPHILS # BLD AUTO: 0.06 X10(3)/MCL
BASOPHILS NFR BLD AUTO: 0.8 %
BILIRUB SERPL-MCNC: 0.3 MG/DL
BUN SERPL-MCNC: 15.7 MG/DL (ref 8.4–25.7)
CALCIUM SERPL-MCNC: 9.6 MG/DL (ref 8.8–10)
CHLORIDE SERPL-SCNC: 105 MMOL/L (ref 98–107)
CO2 SERPL-SCNC: 26 MMOL/L (ref 23–31)
CREAT SERPL-MCNC: 0.82 MG/DL (ref 0.73–1.18)
EOSINOPHIL # BLD AUTO: 0.01 X10(3)/MCL (ref 0–0.9)
EOSINOPHIL NFR BLD AUTO: 0.1 %
ERYTHROCYTE [DISTWIDTH] IN BLOOD BY AUTOMATED COUNT: 16.9 % (ref 11.5–17)
GFR SERPLBLD CREATININE-BSD FMLA CKD-EPI: >60 MLS/MIN/1.73/M2
GLOBULIN SER-MCNC: 3.7 GM/DL (ref 2.4–3.5)
GLUCOSE SERPL-MCNC: 93 MG/DL (ref 82–115)
HCT VFR BLD AUTO: 34.5 % (ref 42–52)
HGB BLD-MCNC: 11.4 G/DL (ref 14–18)
IMM GRANULOCYTES # BLD AUTO: 0.01 X10(3)/MCL (ref 0–0.04)
IMM GRANULOCYTES NFR BLD AUTO: 0.1 %
LYMPHOCYTES # BLD AUTO: 3.34 X10(3)/MCL (ref 0.6–4.6)
LYMPHOCYTES NFR BLD AUTO: 47 %
MCH RBC QN AUTO: 31.5 PG (ref 27–31)
MCHC RBC AUTO-ENTMCNC: 33 G/DL (ref 33–36)
MCV RBC AUTO: 95.3 FL (ref 80–94)
MONOCYTES # BLD AUTO: 1.47 X10(3)/MCL (ref 0.1–1.3)
MONOCYTES NFR BLD AUTO: 20.7 %
NEUTROPHILS # BLD AUTO: 2.22 X10(3)/MCL (ref 2.1–9.2)
NEUTROPHILS NFR BLD AUTO: 31.3 %
NRBC BLD AUTO-RTO: 0 %
PLATELET # BLD AUTO: 267 X10(3)/MCL (ref 130–400)
PMV BLD AUTO: 9.1 FL (ref 7.4–10.4)
POTASSIUM SERPL-SCNC: 4.7 MMOL/L (ref 3.5–5.1)
PROT SERPL-MCNC: 7.2 GM/DL (ref 5.8–7.6)
RBC # BLD AUTO: 3.62 X10(6)/MCL (ref 4.7–6.1)
SODIUM SERPL-SCNC: 138 MMOL/L (ref 136–145)
WBC # SPEC AUTO: 7.11 X10(3)/MCL (ref 4.5–11.5)

## 2023-12-28 PROCEDURE — 36415 COLL VENOUS BLD VENIPUNCTURE: CPT

## 2023-12-28 PROCEDURE — 85025 COMPLETE CBC W/AUTO DIFF WBC: CPT

## 2023-12-28 PROCEDURE — 80053 COMPREHEN METABOLIC PANEL: CPT

## 2024-01-02 ENCOUNTER — LAB VISIT (OUTPATIENT)
Dept: HEMATOLOGY/ONCOLOGY | Facility: CLINIC | Age: 69
End: 2024-01-02
Payer: MEDICARE

## 2024-01-02 DIAGNOSIS — C90.00 IGG MULTIPLE MYELOMA: ICD-10-CM

## 2024-01-02 LAB
ALBUMIN SERPL-MCNC: 3.6 G/DL (ref 3.4–4.8)
ALBUMIN/GLOB SERPL: 1.1 RATIO (ref 1.1–2)
ALP SERPL-CCNC: 70 UNIT/L (ref 40–150)
ALT SERPL-CCNC: 25 UNIT/L (ref 0–55)
AST SERPL-CCNC: 22 UNIT/L (ref 5–34)
BASOPHILS # BLD AUTO: 0.04 X10(3)/MCL
BASOPHILS NFR BLD AUTO: 0.5 %
BILIRUB SERPL-MCNC: 0.3 MG/DL
BUN SERPL-MCNC: 15.1 MG/DL (ref 8.4–25.7)
CALCIUM SERPL-MCNC: 9.5 MG/DL (ref 8.8–10)
CHLORIDE SERPL-SCNC: 104 MMOL/L (ref 98–107)
CO2 SERPL-SCNC: 25 MMOL/L (ref 23–31)
CREAT SERPL-MCNC: 0.83 MG/DL (ref 0.73–1.18)
EOSINOPHIL # BLD AUTO: 0.05 X10(3)/MCL (ref 0–0.9)
EOSINOPHIL NFR BLD AUTO: 0.6 %
ERYTHROCYTE [DISTWIDTH] IN BLOOD BY AUTOMATED COUNT: 17 % (ref 11.5–17)
GFR SERPLBLD CREATININE-BSD FMLA CKD-EPI: >60 MLS/MIN/1.73/M2
GLOBULIN SER-MCNC: 3.4 GM/DL (ref 2.4–3.5)
GLUCOSE SERPL-MCNC: 91 MG/DL (ref 82–115)
HCT VFR BLD AUTO: 34.1 % (ref 42–52)
HGB BLD-MCNC: 11 G/DL (ref 14–18)
IMM GRANULOCYTES # BLD AUTO: 0.06 X10(3)/MCL (ref 0–0.04)
IMM GRANULOCYTES NFR BLD AUTO: 0.7 %
LYMPHOCYTES # BLD AUTO: 2.97 X10(3)/MCL (ref 0.6–4.6)
LYMPHOCYTES NFR BLD AUTO: 35.1 %
MCH RBC QN AUTO: 30.9 PG (ref 27–31)
MCHC RBC AUTO-ENTMCNC: 32.3 G/DL (ref 33–36)
MCV RBC AUTO: 95.8 FL (ref 80–94)
MONOCYTES # BLD AUTO: 1.28 X10(3)/MCL (ref 0.1–1.3)
MONOCYTES NFR BLD AUTO: 15.1 %
NEUTROPHILS # BLD AUTO: 4.05 X10(3)/MCL (ref 2.1–9.2)
NEUTROPHILS NFR BLD AUTO: 48 %
NRBC BLD AUTO-RTO: 0 %
PLATELET # BLD AUTO: 336 X10(3)/MCL (ref 130–400)
PMV BLD AUTO: 9.3 FL (ref 7.4–10.4)
POTASSIUM SERPL-SCNC: 4.7 MMOL/L (ref 3.5–5.1)
PROT SERPL-MCNC: 7 GM/DL (ref 5.8–7.6)
RBC # BLD AUTO: 3.56 X10(6)/MCL (ref 4.7–6.1)
SODIUM SERPL-SCNC: 135 MMOL/L (ref 136–145)
WBC # SPEC AUTO: 8.45 X10(3)/MCL (ref 4.5–11.5)

## 2024-01-02 PROCEDURE — 85025 COMPLETE CBC W/AUTO DIFF WBC: CPT

## 2024-01-02 PROCEDURE — 80053 COMPREHEN METABOLIC PANEL: CPT

## 2024-01-02 PROCEDURE — 36415 COLL VENOUS BLD VENIPUNCTURE: CPT

## 2024-01-03 ENCOUNTER — DOCUMENTATION ONLY (OUTPATIENT)
Dept: HEMATOLOGY/ONCOLOGY | Facility: CLINIC | Age: 69
End: 2024-01-03
Payer: MEDICARE

## 2024-01-03 NOTE — PROGRESS NOTES
Message from Aldo Sommer:     On Mr Bux, can we do blood work only once a month and do the Mm studies and BM biopsy at day 100 in February

## 2024-01-04 DIAGNOSIS — C90.00 IGG MULTIPLE MYELOMA: Primary | ICD-10-CM

## 2024-01-04 DIAGNOSIS — Z94.84 STEM CELLS TRANSPLANT STATUS: ICD-10-CM

## 2024-01-04 DIAGNOSIS — M81.0 AGE-RELATED OSTEOPOROSIS WITHOUT CURRENT PATHOLOGICAL FRACTURE: ICD-10-CM

## 2024-01-18 ENCOUNTER — OFFICE VISIT (OUTPATIENT)
Dept: HEMATOLOGY/ONCOLOGY | Facility: CLINIC | Age: 69
End: 2024-01-18
Payer: MEDICARE

## 2024-01-18 ENCOUNTER — INFUSION (OUTPATIENT)
Dept: INFUSION THERAPY | Facility: HOSPITAL | Age: 69
End: 2024-01-18
Attending: INTERNAL MEDICINE
Payer: MEDICARE

## 2024-01-18 VITALS
BODY MASS INDEX: 22.44 KG/M2 | TEMPERATURE: 98 F | HEART RATE: 72 BPM | WEIGHT: 143 LBS | HEIGHT: 67 IN | RESPIRATION RATE: 20 BRPM | SYSTOLIC BLOOD PRESSURE: 107 MMHG | OXYGEN SATURATION: 100 % | DIASTOLIC BLOOD PRESSURE: 73 MMHG

## 2024-01-18 DIAGNOSIS — C90.00 IGG MULTIPLE MYELOMA: Primary | ICD-10-CM

## 2024-01-18 DIAGNOSIS — C79.51 SECONDARY MALIGNANCY OF LUMBAR VERTEBRAL COLUMN: ICD-10-CM

## 2024-01-18 DIAGNOSIS — M81.0 AGE-RELATED OSTEOPOROSIS WITHOUT CURRENT PATHOLOGICAL FRACTURE: ICD-10-CM

## 2024-01-18 DIAGNOSIS — S32.020G COMPRESSION FRACTURE OF L2 VERTEBRA WITH DELAYED HEALING, SUBSEQUENT ENCOUNTER: ICD-10-CM

## 2024-01-18 PROCEDURE — 96374 THER/PROPH/DIAG INJ IV PUSH: CPT

## 2024-01-18 PROCEDURE — 99214 OFFICE O/P EST MOD 30 MIN: CPT | Mod: S$PBB,,, | Performed by: NURSE PRACTITIONER

## 2024-01-18 PROCEDURE — 99213 OFFICE O/P EST LOW 20 MIN: CPT | Mod: PBBFAC,25 | Performed by: NURSE PRACTITIONER

## 2024-01-18 PROCEDURE — 63600175 PHARM REV CODE 636 W HCPCS: Performed by: NURSE PRACTITIONER

## 2024-01-18 RX ORDER — HEPARIN 100 UNIT/ML
500 SYRINGE INTRAVENOUS
Status: DISCONTINUED | OUTPATIENT
Start: 2024-01-18 | End: 2024-01-18 | Stop reason: HOSPADM

## 2024-01-18 RX ORDER — ZOLEDRONIC ACID 0.04 MG/ML
4 INJECTION, SOLUTION INTRAVENOUS
Status: CANCELLED | OUTPATIENT
Start: 2024-01-25

## 2024-01-18 RX ORDER — SODIUM CHLORIDE 0.9 % (FLUSH) 0.9 %
10 SYRINGE (ML) INJECTION
Status: CANCELLED | OUTPATIENT
Start: 2024-01-25

## 2024-01-18 RX ORDER — HEPARIN 100 UNIT/ML
500 SYRINGE INTRAVENOUS
Status: CANCELLED | OUTPATIENT
Start: 2024-01-25

## 2024-01-18 RX ORDER — SODIUM CHLORIDE 0.9 % (FLUSH) 0.9 %
10 SYRINGE (ML) INJECTION
Status: DISCONTINUED | OUTPATIENT
Start: 2024-01-18 | End: 2024-01-18 | Stop reason: HOSPADM

## 2024-01-18 RX ORDER — ZOLEDRONIC ACID 0.04 MG/ML
4 INJECTION, SOLUTION INTRAVENOUS
Status: COMPLETED | OUTPATIENT
Start: 2024-01-18 | End: 2024-01-18

## 2024-01-18 RX ADMIN — ZOLEDRONIC ACID 4 MG: 0.04 INJECTION, SOLUTION INTRAVENOUS at 11:01

## 2024-01-18 NOTE — PROGRESS NOTES
Reason for Follow-up:  Reason for consultation:  -multiple myeloma, IgG kappa  -worsening low back pain    Current Treatment:  We will initiate treatment with bortezomib/lenalidomide/dexamethasone (VRd), category 1 regimen  Cycle length 21 days  -bortezomib 1.3 mg per m2 subQ days 1, 8, and 15  -lenalidomide 25 mg p.o. daily, on days 1 through 14  -dexamethasone 40 mg p.o. with food days 1, 8, and 15  Cycle length: 21 days    Start 7/13/2023    Zometa 4mg IV every 28 days  Start 7/13/2023    Treatment History:    Past medical history:  NIDDM. Dyslipidemia.  GERD.  Lumbar spinal stenosis.  Vitamin-D deficiency.  Allergic rhinitis.  HTN.  Bell's palsy.  Celiac disease.  Hemorrhoids.  Mitral regurgitation 01/05/2018.  Rheumatic valve narrowing and leaking mitral valve.  -02/02/2018 ([facility]):  АНДРЕЙ, right minimally invasive anterior thoracotomy, right femoral artery and vein exploration, extensive right pleural adhesiolysis/pneumolysis, bilateral Maze/ablation (extensive), left atrial appendage clip placement for left atrial appendage exclusion, mitral valve repair (complex repair with 26 mm annuloplasty ring)  Social history:  .  Lives in [place], [place].  Five children.  Owns a [employer] business.  No history of tobacco, alcohol, or illicit drug abuse.    Family history:  Negative for cancers or blood dyscrasias.    Health maintenance:  PCP in family medicine clinic, [facility].  Had EGD and colonoscopy done 1 year ago by Dr. Zoltan Kapoor, [facility], apparently unremarkable (he gets the endoscopies done periodically because of history of celiac disease).      History of Present Illness:     Oncologic/Hematologic History:  Oncology History   IgG multiple myeloma   6/23/2023 Initial Diagnosis    IgG multiple myeloma     7/13/2023 -  Chemotherapy    Treatment Summary   Plan Name: OP VRD - WEEKLY BORTEZOMIB LENALIDOMIDE DEXAMETHASONE Q3W  Treatment Goal: Curative  Status:  Active  Start Date: 7/13/2023  End Date: 1/31/2024 (Planned)  Provider: Narciso Lundy MD  Chemotherapy: bortezomib (VELCADE) injection 2.25 mg, 2.25 mg, Subcutaneous, Clinic/HOD 1 time, 6 of 9 cycles  Administration: 2.25 mg (7/13/2023), 2.25 mg (7/20/2023), 2.25 mg (7/27/2023), 2.25 mg (8/10/2023), 2.25 mg (8/17/2023), 2.25 mg (8/24/2023), 2.25 mg (8/31/2023), 2.25 mg (9/7/2023), 2.25 mg (9/21/2023), 2.25 mg (9/14/2023), 2.25 mg (9/28/2023), 2.25 mg (10/12/2023), 2.25 mg (10/5/2023), 2.25 mg (10/19/2023), 2.25 mg (10/26/2023)  lenalidomide 25 mg Cap, 25 mg, , , 1 of 1 cycle, Start date: 11/3/2023, End date: --     67-year-old gentleman, referred from [facility], with newly diagnosed multiple myeloma.      05/14/2023: [facility] note:  Low back pain, onset early March   - localized to back with occasional sharp pain down posterior aspect of right leg  - fluctuating in intensity, overall worsening since onset, no change in character, difficulty doing ADLs, walking with pain  - difficulty sleeping   - seen in urgent care and C multiple times  - MRI 5/4/23: degenerative disc disease and facet arthrosis, mild spinal canal stenosis L4-L5 greater than L3-L4, no high grade stenosis   - Toradol IM helps briefly  - side effects from gabapentin, Robaxin and Norco- GI and drowsiness   - Voltaren gel not helping  - stopped going to PT due to pain  - follows with Neurologist   - requests trial of tramadol and Lyrica   - going out of country soon, concerned he will not be able to go due to pain  - denies bowel/ bladder incontinence, night sweats, unexplained weight loss, chest pain or shortness of breath   Spinal stenosis   Acute bilateral low back pain with right-sided sciatica    05/18/2023: [facility] note:  Low back pain.  Onset early March 2023.  Localized lumbar region.  Constant.  Occasional radiation down posterior right leg.  Difficulty sleeping in bed because of exacerbation of  pain by lying down flat.  Compensating by sleeping in a recliner or on sofa. MRI showed degenerative disc disease and facet arthrosis at multiple levels with mild canal stenosis.  - Has attempted gabapentin, Robaxin, Norco, Lyrica, and tramadol with minimal relief.  Currently being managed with tramadol and Lyrica.  - Told by neurosurgery that he needs pain management referral because NS does not deal with injections  - Denies saddle anesthesia, bowel/bladder incontinence, weight loss, night sweats    Spinal stenosis of lumbar region  Degeneration of lumbar intervertebral disc  - Continue treatment with tramadol and Lyrica as prescribed  Bradycardia  - Patient aware of chronic bradycardia, states he has been this way for 3-4 years due to palpitations if HR >60  - Currently on Toprol XL 50 mg  - Follows with [facility]    06/13/2023:  [facility] note:   Acute Issues:   Weight loss- 6 weeks history of weight loss 12-14 lb unintentionally.  Reports appetite has been somewhat decreased secondary to pain though has noticed increased size of abdomen.  He denies nausea, vomiting, dysphagia, dyspepsia, bloody stools, staying changes in stool caliber, constipation, fevers or night sweats, nervousness/anxiousness, skin or hair changes.  Denies history of smoking or alcohol use.  Receives EGD and colonoscopy every 2 years per Dr. Kapoor [place], last was roughly 1 year ago. PSA wnl 5/2023.   Chronic Issues: DM- med compliant metformin 500 daily, last A1C 6.1 in 1/2023.   Chronic medical conditions:  Hypertension.  Dyslipidemia.  GERD.  Lumbar spinal stenosis.  Vitamin-D deficiency.  Allergic rhinitis.  HTN      Investigations reviewed:  05/04/2023: MRI lumbar spine without contrast (worsening lumbar pain x5-7 weeks with bilateral sciatica):   Lumbar degenerative disc disease and facet arthrosis as detailed above with no acute pathology identified.    Mild spinal canal stenosis at L4-L5 greater than L3-L4 with no  high-grade spinal canal stenosis.   Mild bilateral neural foramen stenosis at L3-L4 and on the left greater than right at L4-L5.    06/14/2023:  Skeletal survey:   No definite lesions to suggest either lytic and or sclerotic metastatic changes.   Multiple compression deformities mostly in the lumbar spine but these are seen also in the thoracic spine; other imaging modalities might prove helpful for further assessment; some of these compression deformities appear to be relatively new as compared with an MR of May 2023  (There are some degenerative changes of the thoracic spine with a mild compression deformity of superior endplate of 1 of the lower thoracic vertebral body levels; there are multiple compression deformities of the lumbar spine including the superior endplate of L3 and L4 there is a compression deformity of the superior endplate of T11 with compression deformities of the superior endplate of T12 and a compression deformity of L1; some of these compression deformities appear to be new since the last exam (MR) of May 4, 2023 other imaging modalities might prove helpful for further assessment    Labs reviewed:  01/12/2023:  Hemoglobin 11.9.  MCV 98.7.  Ferritin 211.66.  Transferrin saturation 42%.  Vitamin B12 969.  Folate 13.9.    06/14/2023:  Hemoglobin 11.0.  MCV 99.1.  ESR 15, normal.    06/14/2023: IgG 3810 mg/dL, elevated.  IgM 11, suppressed.  IgA 33, suppressed.  2.28 gm/dL IgG kappa monoclonal protein on SPEP and BUBBA.  Kappa 99.4, elevated.  Lambda 0.3400, suppressed.  Kappa/lambda ratio 292, elevated.    05/01/2023: PSA 1.7, normal.  06/14/2023:  BUN 15.  Creatinine 0.79.  Calcium 9.6.  Albumin 3.6.  LFTs normal.  TSH normal.  06/16/2023:  , normal.    06/15/2023:  24 hour urine protein 288 mg, elevated (reference Range:  < 229). M spike 183 mg. Monoclonal kappa.    06/16/2023:  Urine:  Kappa 25.5 mg/dL; lambda < 0.7000 mg/dL; kappa/lambda ratio> 36.4    Interval History 1/18/2024:  Patient presents alone to clinic for scheduled follow up for Multiple Myeloma.  He is due to receive Zometa infusion today.  Patient reports doing well overall however says that he has severe chronic bone pain, which he has been told is unrelated to his history of multiple myeloma. Patient does admit to taking 1200mg of Calcium alongn with Vitamin D daily. He reports adherence to Bactrim, Acyclovir.  Patient says Norco nor tramadol has worked in relieving his pain.  He does not want to change medications and be put on treatment that causes kidney damage.  Patient requests referral to physical therapy.  Patient says he has been seen by Orthopedic, however at this time surgery is not recommended.  Patient says if pain gets too bad he will continue to take Tylenol over-the-counter.  Lab work reviewed with patient, discussed plan of care and follow-up.    Review of Systems   Musculoskeletal:  Positive for back pain, joint pain, myalgias and neck pain.   Neurological:  Positive for tingling.   All other systems reviewed and are negative.        Physical Exam  Constitutional:       Appearance: Normal appearance.   HENT:      Head: Normocephalic.      Mouth/Throat:      Mouth: Mucous membranes are moist.   Eyes:      Pupils: Pupils are equal, round, and reactive to light.   Cardiovascular:      Rate and Rhythm: Normal rate and regular rhythm.      Pulses: Normal pulses.      Heart sounds: Normal heart sounds.   Pulmonary:      Effort: Pulmonary effort is normal.      Breath sounds: Normal breath sounds.   Abdominal:      General: Bowel sounds are normal.      Palpations: Abdomen is soft.   Musculoskeletal:         General: Normal range of motion.      Cervical back: Normal range of motion.   Skin:     General: Skin is warm and dry.      Capillary Refill: Capillary refill takes less than 2 seconds.   Neurological:      General: No focal deficit present.      Mental Status: He is alert and oriented to person, place, and  time.   Psychiatric:         Attention and Perception: Attention normal.         Mood and Affect: Affect normal.         Speech: Speech normal.         Behavior: Behavior normal.         Thought Content: Thought content normal.         VITAL SIGNS:   Vitals:    01/18/24 1014   BP: 107/73   Pulse: 72   Resp: 20   Temp: 97.9 °F (36.6 °C)         Assessment:  Multiple myeloma, IgG kappa:  -presentation: 03/2023:  Worsening low back pain, no associated neurological symptoms, 12-14 pound weight loss over 6 weeks, mild L4-L5 spinal canal stenosis on MRI (05/04/2023), lumbar DJD and facet arthrosis on MRI lumbar spine (05/04/2023)  -skeletal survey 06/14/2023: Negative for lytic or sclerotic lesions; multiple compression deformities lumbar spine and thoracic spine (new since MRI scan dated 05/04/2023)  -06/14/2023:  Mild anemia (hemoglobin 11.0)   -06/14/2023:  2.28 gm/dL IgG kappa M protein. Kappa 99.4, elevated.  Lambda 0.3400, suppressed.  Kappa/lambda ratio 292, elevated.  -06/14/2023:  BUN, creatinine, calcium, albumin normal  -06/15/2022:  24 hour urine protein 280 mg, elevated.  M spike 183 mg.  Urine BUBBA showed monoclonal kappa light chains.  -06/16/2023: Urine:  Kappa 25.5 mg/dL; lambda < 0.7000 mg/dL; kappa/lambda ratio> 36.4  06/16/2023:  , normal.  -06/26/2023:  Hemoglobin 11.0.  Beta 2 microglobulin 3.18.  Calcium 10.1.  Albumin 3.2.  Hepatitis-A/B/C/HIV serology negative.  -bone marrow biopsy 06/27/2022:  Plasma cells 50% in bone marrow aspirate; plasma cells 36% on flow cytometry of bone marrow aspirate, with kappa light chain restriction; molecular studies pending  -CT abdomen pelvis with contrast 06/27/2023:  Hepatic steatosis; bilateral inguinal hernias; heterogeneous bone demineralization, suggesting marrow infiltrative process like multiple myeloma; multilevel compression deformities in the included thoracic spine and lumbar spine; 10 mm exophytic hyperdense focus upper pole of right kidney,  compatible with a hyperdense cyst  -FDG PET-CT 06/29/2023:  Left femur lesser trochanter; right 4th rib laterally; multilevel thoracolumbar compression deformities; subacute fractures T8, T12, L1, L2  >>>  From the data available so far, patient has multiple myeloma (symptomatic), by virtue of:  1. Involved: Uninvolved serum FLC ratio =/> 100 and involved FLC concentration 10 mg/dL or higher (in this patient, kappa/lambda ratio age 292, and kappa light chain 99.4 mg/dL)   2. Hypercalcemia (06/26/2023): Calcium 10.1.  Albumin 3.2   3. Bone marrow plasmacytosis 50%  4. Heterogeneous bone demineralization on CT 06/27/2023, suggesting bone marrow infiltrative process like multiple myeloma  5. FDG PET-CT 06/29/2023:  Left femur lesser trochanter; right 4th rib laterally; multilevel thoracolumbar compression deformities; subacute fractures T8, T12, L1, L2  6. Beta 2 microglobulin level 3.18, elevated (06/26/2023)   7. No renal insufficiency, no significant anemia, LDH normal  >>>  -Velcade started 07/13/2023  -Zometa 4 mg IV every 4 weeks) x2 years), started 07/13/2023  -06/27/2023: Bone marrow biopsy:  Abnormal myeloma FISH panel:  Aneuploidy: gain of chromosomes 7, 9, 15 and CCND1/11q) gain of chromosome 7; gain of chromosome 9; gain of chromosome 15) (aneuploidy is the most common genetic alteration detected in plasma cell myeloma by FISH analysis, seen in approximately 69-90% of patients)  Additional signal for IGH/14q DNA sequence (clinical significance of this finding is unclear)  Loss of MAF/16q) also a common finding in plasma cell neoplasms and is reported to be associated with a poor prognosis in patients with myeloma)  -07/21/2023: Radiation oncology consultation:  Radiotherapy to spine not indicated/will not be helpful; patient referred to IR for kyphoplasty  -MRI thoracic spine 08/08/2023:  Chronic superior endplate compression fractures T9-L2 vertebral bodies, no acute/subacute fracture, no osseous lesion or  soft tissue mass  -MRI lumbar spine 09/14/2023:  Significant progression of skeletal demineralization; new/progressive loss of vertebral body height throughout the spine since 05/2023; superior endplate L4 acute/subacute with marrow edema and enhancement; multilevel degenerative disc disease; spinal stenosis severe at L3-L5 with tethering of descending nerve root; moderate spinal canal stenosis at L4-L5 and L2-L3  -08/03/2023: TSH normal; free T4 normal  -06/14/2023: IgG 3810, elevated; IgM, IgA suppressed; kappa/lambda ratio 292; IgG kappa monoclonal protein 2.2 gm/dL  -07/27/2023:  IgG 2242, elevated but down; IgM, IgA remain suppressed; kappa/lambda ratio of 59.0, down; IgG kappa monoclonal protein 1.36 gm/dL (chemotherapy was started 07/13/2023)  -08/24/2023: IgG 792, has normalized; IgM, IgA remains suppressed; kappa/lambda ratio 5.72, elevated but significantly down; IgG kappa monoclonal protein 0.39 gm/dL, significantly down  -VRD cycle 5:  10/12/2023-10/26/2023  -restaging bone marrow biopsy 10/24/2023, post VRD X 4-1/2 cycles:  Normocellular; < 1% plasma cells; no clonal or abnormal plasma cell population on flow cytometry; no high-risk FISH findings  -11/08/2023: No monoclonal protein in urine  -11/09/2023: TSH, free T4 normal   -11/09/2023:  No monoclonal protein on SPEP and BUBBA; kappa/lambda ratio 1.36, normalized; kappa free light chains 2.29 mg/dL (to recall, 99.4 on 06/14/2023 before starting chemotherapy)  -cycle 6 day 1 of Revlimid 11/21/2023; subsequently, on hold in anticipation of stem cell transplant  >>>  ASCT at [facility]:  -melphalan 360 mg IV administered day -2o (11/28/2023) (200 mg per m2 per day)  -ASCT infusion day 0 (11/30/2023)  -filgrastim 5 microgram/kg subQ every 24 hours, started day +5 (12/05/2023)  -infectious prophylaxis  -pentamidine 300 mg inhaled once on day-2 (11/28/2023)  -acyclovir 800 mg p.o. q.12 hours starting day-2 (11/28/2023), to continue for 1 year  -fluconazole 400 mg  p.o. Q 24 hours starting day-2 (11/28/2023), continue until engraftment and/or mucositis resolved  -cefepime started 12/08/2023, vancomycin added  -IVIG on 12/10/2023  -12/12/2023:  HCT day +12; doing well on antibiotics; counts recovered  -12/12/2023: Transfusion independent and ANC has recovered  -12/13/2023: Patient discharged        Staging of myeloma:  -serum beta 2 microglobulin elevated (3.18)  -serum albumin and LDH normal   -myeloma FISH panel on bone marrow:  Negative for high-risk cytogenetic abnormalities  >>>  ISS stage:  Stage I  R-ISS stage:  Stage I      Osteoporosis on hips on DEXA scan 10/11/2023        Non myeloma findings (DJD of spine, spinal stenosis, compression fractures):  -mild L4-L5 spinal canal stenosis on MRI 05/04/2023   -lumbar DJD and facet arthrosis on MRI lumbar spine 05/04/2023  -MRI T-spine 08/08/2023:  No myeloma lesions   -MRI lumbar spine 09/14/2023:  Progression of skeletal demineralization; multilevel degenerative disc disease; severe spinal stenosis L3-L5; new/progressive loss of vertebral body height throughout the spine since 05/2023, etc.  -[facility] at [facility] will not perform kyphoplasty      Co-morbidities:  NIDDM.  Dyslipidemia.  Hypertension.  Celiac disease.  History of rheumatic mitral valve disease, S/P Maze/ablation procedure 02/02/2018        Vaccination history:   COVID-19, MRNA, LN-S, PF (MODERNA FULL 0.5 ML DOSE) 8/18/2022 , 10/23/2021 , 3/17/2021 , 2/17/2021   Hepatitis A / Hepatitis B 8/18/2022 , 10/5/2021 , 7/29/2020   Influenza - Quadrivalent - MDCK - PF 10/5/2021   Influenza - Trivalent - [facility]CK - PF 9/10/2015   Meningococcal Conjugate (MCV4O) 8/18/2022 , 4/17/2012   Meningococcal Conjugate (MCV4P) 4/17/2012   Pneumococcal Conjugate - 20 Valent 1/12/2023   Tdap 7/29/2020   Zoster 11/18/2020 , 7/29/2020   Zoster Recombinant 11/18/2020 , 7/29/2020        10 mm exophytic hyperdense focus upper pole of right kidney, compatible with a hyperdense cyst, on CT abdomen  pelvis with contrast 06/27/2023  -07/12/2023:  MRI abdomen with and without contrast (right renal cyst): Small exophytic right renal lesion most likely a proteinaceous or hemorrhagic cyst (10 mm, upper pole of right kidney)      Plan:  Multiple myeloma, IgG kappa:    Autologous transplantation:   Category 1 evidence supports proceeding directly after induction therapy to high-dose therapy and HCT  Renal dysfunction and advanced age are not contraindications to transplant     Bortezomib/lenalidomide/dexamethasone (VRd), category 1 regimen:  Cycle length 21 days  -bortezomib 1.3 mg per m2 subQ days 1, 8, and 15  -lenalidomide 25 mg p.o. daily, on days 1 through 14  -dexamethasone 40 mg p.o. with food days 1, 8, and 15  Cycle length: 21 days    If response after primary therapy, then:  Autologous HCT versus continuation of myeloma therapy or maintenance therapy versus tandem autologous transplant, etc.    -Velcade started 07/13/2023  -Zometa 4 mg IV every 4 weeks x2 years (started 07/13/2023    -per Radiation Oncology, given radiologic findings, palliative radiotherapy to spine will not be helpful  -Dr. Brandy Mahajan, BMT, [facility], autologous hematopoietic stem cell transplant after 4 cycles of chemotherapy 11/28/23    -per [facility] CBC and CMP; repletion accordingly  -acyclovir was started at [facility] day minus 2 (-2) ASCT 11/28/2023; to continue for 1 year (until 11/2024)  -continue Zometa every 4 weeks x2 years (started 07/13/2023) Okay to give today  -Follow up with  in 4 weeks with lab wok cbc,cmp prior to Zometa infusion.  -Referral to physical Therapy for chronic back pain per patient request    Monitoring while on VRD:  -Weekly assessment for peripheral neuropathy and/or neuropathic pain.  -Monitor for hypotension during bortezomib therapy; adjustment of antihypertensives and/or administration of IV hydration may be needed.    Back Pain  -06/26/2023: Started Norco 5 mg p.o. q.6 hours PRN for pain;  did not work; he stopped taking  -06/26/2023: Taking Norco 10 mg only twice daily; instructed him to take Norco 10 mg every 4-6 hours p.r.n. for pain (has 9/10 lower back pain, constant, radiating to anterior aspect of right thigh, without neurological symptoms)  -06/26/2023: Started levofloxacin 500 mg p.o. q.day X 12 weeks  -06/26/2023: Severe back pain; presumed due to myeloma; referred to Radiation Oncology  -on MRI thoracic and lumbar spine 08/08/2023 and 09/14/2023:  Chronic compression fractures of vertebral bodies; no osseous lesion or soft tissue mass; significant progression of skeletal demineralization; progressive loss of vertebral body height throughout the spine since May 2023; spinal stenosis  -patient has been referred to IR for consideration of kyphoplasty       Monitoring on lenalidomide:   Monitor for signs and symptoms of infection (if neutropenic), bleeding or bruising, hepatotoxicity, secondary malignancies, thromboembolism (eg, shortness of breath, chest pain, arm or leg swelling), dermatologic toxicity, tumor lysis syndrome, or hypersensitivity.     Monitoring on bortezomib:  Peripheral neuropathy, posterior reversible leukoencephalopathy syndrome, progressive multifocal leukoencephalopathy, tumor lysis syndrome, or hyper-/hypoglycemia. Monitor closely in patients with risk factors for heart failure or existing heart disease.     Autologous transplantation:   Category 1 evidence supports proceeding directly after induction therapy to high-dose therapy and HCT  Renal dysfunction and advanced age are not contraindications to transplant     If response after primary therapy, then:  Autologous HCT versus continuation of myeloma therapy or maintenance therapy versus tandem autologous transplant, etc.       Above discussed at length with the patient.  All questions answered.      Nature of multiple myeloma discussed.    He understands and agrees with this  plan.  ----------------------------------  Supportive care:  -bone targeted treatment: Bisphosphonate, category 1; or denosumab to reduce risk of skeletal related events; denosumab is preferred in patients with renal insufficiency; assess vitamin-D status; baseline dental exam; monitor for osteonecrosis of the jaw; monitor renal dysfunction with bisphosphonate therapy  -continue bone targeted treatment (bisphosphonate or denosumab) for 2 years; continue beyond 2 years should be based on clinical judgment  -patients receiving denosumab for bone disease who subsequently discontinued therapy should be given maintenance denosumab every 6 months or a single dose of bisphosphonate to mitigate risk of rebound osteoporosis  -for hypercalcemia, zoledronic acid, denosumab, steroids, and/or calcitonin  -for hyperviscosity, plasmapheresis should be used as adjuvant therapy for symptomatic hyperviscosity  -intravenous immunoglobulin therapy should be considered in the setting of recurrent serious infection and/or hypogammaglobulinemia (IgG </= 400 mg/dL)  -pneumococcal conjugate vaccine, followed by pneumococcal polysaccharide vaccine 1 year later  -Influenza vaccination is recommended; 2 doses of high-dose inactivated quandaivalent influenza vaccine should be considered  -consider 12 weeks of levofloxacin 500 mg p.o. daily at the time of initial diagnosis of multiple myeloma  -COVID-19 vaccination  -highest risk for VTE is in the 1st 6 months following new diagnosis of multiple myeloma  -VTE prophylaxis if no contraindications to anticoagulation agents or antiplatelets  Recommendations for VTE prophylaxis:  Aspirin  mg daily; low molecular weight heparin equivalent to enoxaparin 40 mg daily; Xarelto 10 mg daily; Eliquis 2.5 mg b.i.d.; Arixtra 2.5 mg daily; Coumadin with target INR 2.0-3.0        Lenalidomide:  Embryo fetal toxicity  Hematologic toxicity.  Neutropenia.  Thrombocytopenia.  Venous and arterial  thromboembolism.  DVT.  PE.  MI.  Stroke.  Dizziness, fatigue.  Tumor lysis syndrome.  Heart failure.    Used with caution in patients with renal impairment.  Lenalidomide =/> 4 cycles may decrease the # of CD34 pos cells collected for autologous stem cell transplant.  DVT, PE, atrial fibrillation, renal failure are more likely in patients> 65 years  Hepatotoxicity  Hypersensitivity reactions.  Anaphylaxis.  Angioedema.  Skin rash.  Eosinophilia.  Immediate hypersensitivity reactions.  Second primary malignancy.  AML.  MDS.  Solid tumor malignancies.  Nonmelanoma skin cancers.     Monitoring with lenalidomide:  -CBC with differential: weekly for the first 2 cycles, every 2 weeks during the third cycle and monthly thereafter;  -serum creatinine, LFTs (periodically).  -Thyroid function tests (TSH at baseline then every 2 to 3 months during lenalidomide treatment  -ECG when clinically indicated. Monitor for signs and symptoms of infection (if neutropenic), bleeding or bruising, hepatotoxicity, secondary malignancies, thromboembolism (eg, shortness of breath, chest pain, arm or leg swelling), dermatologic toxicity, tumor lysis syndrome, or hypersensitivity.  Patients who could become pregnant: Pregnancy test 10 to 14 days and 24 hours prior to initiating therapy, weekly during the first 4 weeks of treatment, then every 2 to 4 weeks through 4 weeks after therapy discontinued.     Bortezomib:  Bone marrow suppression.  Neutropenia.  Thrombocytopenia.  Acute CHF.  Nausea, vomiting, diarrhea, constipation, ileus.    Hepatotoxicity:  Herpes zoster and her PCP plaques reactivation.    Anaphylactic reactions.  Hypersensitive reactions.  Angioedema.  Laryngeal edema.    Hypotension.  Peripheral neuropathy.  Posterior reversible leukoencephalopathy syndrome.  Progressive multifocal leukoencephalopathy.  Pulmonary toxicity.  Pneumonitis.  Interstitial pneumonia.  Lung infiltrates.  ARDS.  Thrombotic microangiopathy.  Tumor lysis  syndrome.       Monitoring parameters with bortezomib:  CBC, CMP  Blood pressure (to monitor for hypotension)  Peripheral neuropathy  Posterior reversible leukoencephalopathy syndrome, progressive multifocal leukoencephalopathy, tumor lysis syndrome, or hyper-/hypoglycemia. Monitor baseline chest x-ray and then periodic pulmonary function testing (with new or worsening pulmonary symptoms). Monitor closely in patients with risk factors for heart failure or existing heart disease.

## 2024-01-23 ENCOUNTER — TELEPHONE (OUTPATIENT)
Dept: HEMATOLOGY/ONCOLOGY | Facility: CLINIC | Age: 69
End: 2024-01-23
Payer: MEDICARE

## 2024-01-23 NOTE — NURSING
PT/OT referral sent to [facility]. Spoke with patient and informed of referral. Location & contact number provided.

## 2024-01-24 DIAGNOSIS — C90.00 IGG MULTIPLE MYELOMA: ICD-10-CM

## 2024-01-24 RX ORDER — ACYCLOVIR 400 MG/1
400 TABLET ORAL 2 TIMES DAILY
Qty: 60 TABLET | Refills: 4 | Status: SHIPPED | OUTPATIENT
Start: 2024-01-24

## 2024-02-15 ENCOUNTER — INFUSION (OUTPATIENT)
Dept: INFUSION THERAPY | Facility: HOSPITAL | Age: 69
End: 2024-02-15
Attending: INTERNAL MEDICINE
Payer: MEDICARE

## 2024-02-15 ENCOUNTER — OFFICE VISIT (OUTPATIENT)
Dept: HEMATOLOGY/ONCOLOGY | Facility: CLINIC | Age: 69
End: 2024-02-15
Attending: INTERNAL MEDICINE
Payer: MEDICARE

## 2024-02-15 VITALS
WEIGHT: 149.81 LBS | HEIGHT: 67 IN | TEMPERATURE: 98 F | OXYGEN SATURATION: 100 % | RESPIRATION RATE: 18 BRPM | HEART RATE: 74 BPM | SYSTOLIC BLOOD PRESSURE: 119 MMHG | BODY MASS INDEX: 23.51 KG/M2 | DIASTOLIC BLOOD PRESSURE: 79 MMHG

## 2024-02-15 DIAGNOSIS — C90.00 IGG MULTIPLE MYELOMA: Primary | ICD-10-CM

## 2024-02-15 DIAGNOSIS — S32.010G COMPRESSION FRACTURE OF L1 VERTEBRA WITH DELAYED HEALING, SUBSEQUENT ENCOUNTER: Primary | ICD-10-CM

## 2024-02-15 DIAGNOSIS — Z94.84 STEM CELLS TRANSPLANT STATUS: ICD-10-CM

## 2024-02-15 DIAGNOSIS — D84.821 DRUG-INDUCED IMMUNODEFICIENCY: ICD-10-CM

## 2024-02-15 DIAGNOSIS — Z79.899 DRUG-INDUCED IMMUNODEFICIENCY: ICD-10-CM

## 2024-02-15 DIAGNOSIS — M81.0 OSTEOPOROSIS OF FEMUR WITHOUT PATHOLOGICAL FRACTURE: ICD-10-CM

## 2024-02-15 DIAGNOSIS — M51.36 DEGENERATION OF LUMBAR INTERVERTEBRAL DISC: ICD-10-CM

## 2024-02-15 DIAGNOSIS — M54.50 RIGHT-SIDED LOW BACK PAIN WITHOUT SCIATICA, UNSPECIFIED CHRONICITY: ICD-10-CM

## 2024-02-15 DIAGNOSIS — C79.51 SECONDARY MALIGNANCY OF LUMBAR VERTEBRAL COLUMN: ICD-10-CM

## 2024-02-15 DIAGNOSIS — M48.54XA NONTRAUMATIC COMPRESSION FRACTURE OF T8 VERTEBRA, INITIAL ENCOUNTER: ICD-10-CM

## 2024-02-15 DIAGNOSIS — K90.0 CELIAC DISEASE: ICD-10-CM

## 2024-02-15 DIAGNOSIS — S32.020G COMPRESSION FRACTURE OF L2 VERTEBRA WITH DELAYED HEALING, SUBSEQUENT ENCOUNTER: ICD-10-CM

## 2024-02-15 DIAGNOSIS — C90.00 IGG MULTIPLE MYELOMA: ICD-10-CM

## 2024-02-15 DIAGNOSIS — E83.52 HYPERCALCEMIA: ICD-10-CM

## 2024-02-15 DIAGNOSIS — M48.061 SPINAL STENOSIS OF LUMBAR REGION, UNSPECIFIED WHETHER NEUROGENIC CLAUDICATION PRESENT: ICD-10-CM

## 2024-02-15 PROCEDURE — 63600175 PHARM REV CODE 636 W HCPCS: Performed by: INTERNAL MEDICINE

## 2024-02-15 PROCEDURE — 99215 OFFICE O/P EST HI 40 MIN: CPT | Mod: PBBFAC | Performed by: INTERNAL MEDICINE

## 2024-02-15 PROCEDURE — 96365 THER/PROPH/DIAG IV INF INIT: CPT

## 2024-02-15 PROCEDURE — 99214 OFFICE O/P EST MOD 30 MIN: CPT | Mod: S$PBB,,, | Performed by: INTERNAL MEDICINE

## 2024-02-15 RX ORDER — ZOLEDRONIC ACID 0.04 MG/ML
4 INJECTION, SOLUTION INTRAVENOUS
Status: CANCELLED | OUTPATIENT
Start: 2024-03-18

## 2024-02-15 RX ORDER — HEPARIN 100 UNIT/ML
500 SYRINGE INTRAVENOUS
Status: CANCELLED | OUTPATIENT
Start: 2024-02-15

## 2024-02-15 RX ORDER — SODIUM CHLORIDE 0.9 % (FLUSH) 0.9 %
10 SYRINGE (ML) INJECTION
Status: CANCELLED | OUTPATIENT
Start: 2024-02-15

## 2024-02-15 RX ORDER — SODIUM CHLORIDE 0.9 % (FLUSH) 0.9 %
10 SYRINGE (ML) INJECTION
Status: DISCONTINUED | OUTPATIENT
Start: 2024-02-15 | End: 2024-02-15 | Stop reason: HOSPADM

## 2024-02-15 RX ORDER — SODIUM CHLORIDE 0.9 % (FLUSH) 0.9 %
10 SYRINGE (ML) INJECTION
Status: CANCELLED | OUTPATIENT
Start: 2024-04-19

## 2024-02-15 RX ORDER — HEPARIN 100 UNIT/ML
500 SYRINGE INTRAVENOUS
Status: CANCELLED | OUTPATIENT
Start: 2024-03-18

## 2024-02-15 RX ORDER — HEPARIN 100 UNIT/ML
500 SYRINGE INTRAVENOUS
Status: DISCONTINUED | OUTPATIENT
Start: 2024-02-15 | End: 2024-02-15 | Stop reason: HOSPADM

## 2024-02-15 RX ORDER — ZOLEDRONIC ACID 0.04 MG/ML
4 INJECTION, SOLUTION INTRAVENOUS
Status: COMPLETED | OUTPATIENT
Start: 2024-02-15 | End: 2024-02-15

## 2024-02-15 RX ORDER — SODIUM CHLORIDE 0.9 % (FLUSH) 0.9 %
10 SYRINGE (ML) INJECTION
Status: CANCELLED | OUTPATIENT
Start: 2024-03-18

## 2024-02-15 RX ORDER — HEPARIN 100 UNIT/ML
500 SYRINGE INTRAVENOUS
Status: CANCELLED | OUTPATIENT
Start: 2024-04-19

## 2024-02-15 RX ORDER — ZOLEDRONIC ACID 0.04 MG/ML
4 INJECTION, SOLUTION INTRAVENOUS
Status: CANCELLED | OUTPATIENT
Start: 2024-04-19

## 2024-02-15 RX ORDER — ZOLEDRONIC ACID 0.04 MG/ML
4 INJECTION, SOLUTION INTRAVENOUS
Status: CANCELLED | OUTPATIENT
Start: 2024-02-15

## 2024-02-15 RX ADMIN — ZOLEDRONIC ACID 4 MG: 0.04 INJECTION, SOLUTION INTRAVENOUS at 11:02

## 2024-02-15 NOTE — Clinical Note
Bone marrow aspiration and core biopsy, SPEP, BUBBA, FLC assay, and 24 hour urine for total protein, UPEP, urine BUBBA, and urine FLC assay anytime after March 10 Continue acyclovir for 1 year, until 11/2024 Continue Zometa every 4 weeks until 07/2025 Calcium and vitamin-D supplements along with Zometa to prevent hypocalcemia DEXA scan October Follow-up in 1 month, with bone marrow biopsy report and labs

## 2024-02-15 NOTE — PROGRESS NOTES
History:  Past Medical History:   Diagnosis Date    Diabetes mellitus, type 2     GERD (gastroesophageal reflux disease)     Hyperlipidemia     Hypertension     Personal history of colonic polyps 06/29/2022   Past medical history:  NIDDM. Dyslipidemia.  GERD.  Lumbar spinal stenosis.  Vitamin-D deficiency.  Allergic rhinitis.  HTN.  Bell's palsy.  Celiac disease.  Hemorrhoids.  Mitral regurgitation 01/05/2018.  Rheumatic valve narrowing and leaking mitral valve.  -02/02/2018 ([facility]):  АНДРЕЙ, right minimally invasive anterior thoracotomy, right femoral artery and vein exploration, extensive right pleural adhesiolysis/pneumolysis, bilateral Maze/ablation (extensive), left atrial appendage clip placement for left atrial appendage exclusion, mitral valve repair (complex repair with 26 mm annuloplasty ring)    Social history:  .  Lives in [place], [place].  Five children.  Owns a [employer] business.  No history of tobacco, alcohol, or illicit drug abuse.      Family history:  Negative for cancers or blood dyscrasias.      Health maintenance:  PCP in family medicine clinic, [facility].  Had EGD and colonoscopy done 1 year ago by Dr. Zoltan Kapoor, [facility], apparently unremarkable (he gets the endoscopies done periodically because of history of celiac disease).  Past Surgical History:   Procedure Laterality Date    BONE MARROW ASPIRATION N/A 06/27/2023    Procedure: ASPIRATION, BONE MARROW;  Surgeon: Namita Daniels MD;  Location: [facility] ENDOSCOPY;  Service: General;  Laterality: N/A;    BONE MARROW ASPIRATION N/A 10/24/2023    Procedure: ASPIRATION, BONE MARROW;  Surgeon: Namita Daniels MD;  Location: [facility] ENDOSCOPY;  Service: General;  Laterality: N/A;    BONE MARROW BIOPSY N/A 06/27/2023    Procedure: Biopsy-bone marrow;  Surgeon: Haylie Liu MD;  Location: [facility] ENDOSCOPY;  Service: General;  Laterality: N/A;    BONE MARROW BIOPSY N/A 10/24/2023    Procedure:  Biopsy-bone marrow;  Surgeon: Namita Daniels MD;  Location: [facility] ENDOSCOPY;  Service: General;  Laterality: N/A;    CARDIAC SURGERY  2018    COLONOSCOPY  06/29/2022    EYE SURGERY        Social History     Socioeconomic History    Marital status:    Tobacco Use    Smoking status: Never    Smokeless tobacco: Never   Substance and Sexual Activity    Alcohol use: Never    Drug use: Never    Sexual activity: Not Currently      History reviewed. No pertinent family history.     Reason for Follow-up:  -multiple myeloma, IgG kappa; S/P ASCT  -worsening low back pain  -hypercalcemia  -osteoporosis hips B/L    History of Present Illness:   IgG multiple myeloma      Oncologic/Hematologic History:  Oncology History   IgG multiple myeloma   6/23/2023 Initial Diagnosis    IgG multiple myeloma     7/13/2023 -  Chemotherapy    Treatment Summary   Plan Name: OP VRD - WEEKLY BORTEZOMIB LENALIDOMIDE DEXAMETHASONE Q3W  Treatment Goal: Curative  Status: Active  Start Date: 7/13/2023  End Date: 1/31/2024 (Planned)  Provider: Narciso Lundy MD  Chemotherapy: bortezomib (VELCADE) injection 2.25 mg, 2.25 mg, Subcutaneous, Clinic/HOD 1 time, 6 of 9 cycles  Administration: 2.25 mg (7/13/2023), 2.25 mg (7/20/2023), 2.25 mg (7/27/2023), 2.25 mg (8/10/2023), 2.25 mg (8/17/2023), 2.25 mg (8/24/2023), 2.25 mg (8/31/2023), 2.25 mg (9/7/2023), 2.25 mg (9/21/2023), 2.25 mg (9/14/2023), 2.25 mg (9/28/2023), 2.25 mg (10/12/2023), 2.25 mg (10/5/2023), 2.25 mg (10/19/2023), 2.25 mg (10/26/2023)  lenalidomide 25 mg Cap, 25 mg, , , 1 of 1 cycle, Start date: 11/3/2023, End date: --     67-year-old gentleman, referred from [facility], with newly diagnosed multiple myeloma.      Patient is being followed for IgG kappa multiple myeloma.    Please assessment and plan section for details.    06/26/2023:  Initial consultation:  Pleasant gentleman who presents for initial medical oncology consultation, accompanied by his wife and  family present who is a past interventional cardiologist.  Back pain for 2 months.  Initially started in right groin.  Physical therapy has not helped.  10/10 severity.  Has been taking Tylenol without significant relief.  Secured to take narcotics because of constipation.  Underwent couple of spinal steroid shots 3 weeks ago, with minor relief.  Pain is present all the time.  Pain increases with changing position in bed as well as upon standing.  He finds it difficult to ambulate.  Pain does not radiate down lower extremities and is not accompanied with lower extremity weakness, numbness, urinary or bowel incontinence, or saddle anesthesia.  Also experiences muscle cramps.  Generalized weakness.  However, ECOG 2.  No fevers or chills.  No repeated infections.  Cramps in hands and feet.  Appetite is down.  Appetite has picked up in last 10 days.  Has lost 12-14 lb in last 1-1/2 months.  No abnormal bleeding or bruising.      Interval History:  INF FLUIDS   OP VRD - WEEKLY BORTEZOMIB LENALIDOMIDE DEXAMETHASONE Q3W     02/15/2024:   -continues on Zometa every 4 weeks  -01/23/2024:  Our nurse navigator sent PT/OT referral sent to [facility]  -per [facility], monthly CBC and CMP check  -per [facility], multiple myeloma studies and bone marrow biopsy on day 100 post ASCT  -bone marrow biopsy scheduled for 03/12/2024  -02/15/2024:  Labs reviewed; hemoglobin 10.8, stable; MCV 99.4; ANC 1.79 K; CBC essentially unremarkable; CMP and myeloma studies are pending  Presents for a follow-up visit.  Doing well.  No complaints.  Great appetite.  No side effects with Zometa.  Compliant with calcium and vitamin-D pills.  Compliant with acyclovir.  No weakness, fatigue, malaise, recurrent fevers, drenching night sweats, anorexia, unintentional weight loss, any opportunistic infections, chest pain, cough, dyspnea, abdominal pain, nausea, vomiting, constipation, diarrhea, or abnormal bleeding in any form.  ECOG 1.  Follows  up with Dr. Brandy Mahajan, BMT, [facility], as well.  Scheduled for restaging bone marrow biopsy 03/12/2024.      Medications:  Current Outpatient Medications on File Prior to Visit   Medication Sig Dispense Refill    abiraterone (ZYTIGA) 500 mg Tab Take 1,000 mg by mouth once daily Take 2 (two) 500 mg tab (to equal 1,000 mg) by mouth daily.      acyclovir (ZOVIRAX) 400 MG tablet Take 1 tablet (400 mg total) by mouth 2 (two) times daily. 60 tablet 4    ammonium lactate 12 % Crea Apply 1 application topically once daily. 385 g 2    ascorbic acid, vitamin C, (VITAMIN C) 1000 MG tablet Take 1,000 mg by mouth once daily.      aspirin (ECOTRIN) 81 MG EC tablet Take 81 mg by mouth once daily.      atorvastatin (LIPITOR) 20 MG tablet Take 20 mg by mouth once daily.      calcium carbonate 195 mg calcium (500 mg) Chew Take 1 tablet by mouth once daily.      ciclopirox (PENLAC) 8 % Soln Apply 8 drops topically Daily.      dexAMETHasone (DECADRON) 4 MG Tab Take 10 tablets (40 mg total) by mouth every 7 days. on days 1, 8, and 15 of each chemotherapy cycle. Take with food. 30 tablet 5    diclofenac sodium (VOLTAREN) 1 % Gel Apply 2 g topically 4 (four) times daily. 450 g 1    famotidine (PEPCID) 40 MG tablet Take 40 mg by mouth every evening.      fluticasone propionate (FLONASE) 50 mcg/actuation nasal spray 50 sprays by Nasal route 2 (two) times a day.      gabapentin (NEURONTIN) 600 MG tablet Take 1 tablet (600 mg total) by mouth 3 (three) times daily. 90 tablet 2    hydrocortisone (ANUSOL-HC) 2.5 % rectal cream Place 1 applicator rectally 2 (two) times daily. 28 g 2    ketoconazole (NIZORAL) 2 % cream Apply topically once daily. 60 g 1    lenalidomide 25 mg Cap Take 1 capsule by mouth once daily on days 1 to 14 of each 21 day cycle.. 14 each 0    linaCLOtide (LINZESS) 72 mcg Cap capsule Take 1 capsule (72 mcg total) by mouth daily as needed (constipation). 90 each 1    metFORMIN (GLUCOPHAGE) 500 MG tablet Take 1 tablet (500 mg  total) by mouth 2 (two) times daily with meals. 180 tablet 3    metoprolol succinate (TOPROL-XL) 50 MG 24 hr tablet Take by mouth.      MIRALAX 17 gram/dose powder Take 17 g by mouth.      morphine (MS CONTIN) 30 MG 12 hr tablet Take 1 tablet (30 mg total) by mouth 2 (two) times daily. Every 12 hours 30 tablet 0    MOVANTIK 25 mg tablet       naloxone (NARCAN) 4 mg/actuation Spry SMARTSIG:Both Nares      nystatin (MYCOSTATIN) powder Apply topically 4 (four) times daily. 60 g 2    pantoprazole (PROTONIX) 20 MG tablet Take 1 tablet (20 mg total) by mouth once daily. 30 tablet 11    pantoprazole (PROTONIX) 40 MG tablet Take 40 mg by mouth.      sulfamethoxazole-trimethoprim 800-160mg (BACTRIM DS) 800-160 mg Tab Take 1 tablet by mouth on MWF of each week 48 tablet 3    traMADoL 100 mg Tab Take 100 tablets by mouth every 4 (four) hours as needed (pain). 60 tablet 2    megestroL (MEGACE) 400 mg/10 mL (40 mg/mL) Susp Take 10 mLs (400 mg total) by mouth once daily. 300 mL 0     No current facility-administered medications on file prior to visit.       Review of Systems:   All systems reviewed and found to be negative except for the symptoms detailed above    Physical Examination:   VITAL SIGNS:   Vitals:    02/15/24 1039   BP: 119/79   Pulse: 74   Resp: 18   Temp: 97.7 °F (36.5 °C)         GENERAL:  In no apparent distress.    HEAD:  No signs of head trauma.  EYES:  Pupils are equal.  Extraocular motions intact.    EARS:  Hearing grossly intact.  MOUTH:  Oropharynx is normal.   NECK:  No adenopathy, no JVD.     CHEST:  Chest with clear breath sounds bilaterally.  No wheezes, rales, rhonchi.    CARDIAC:  Regular rate and rhythm.  S1 and S2, without murmurs, gallops, rubs.  VASCULAR:  No Edema.  Peripheral pulses normal and equal in all extremities.  ABDOMEN:  Soft, without detectable tenderness.  No sign of distention.  No   rebound or guarding, and no masses palpated.   Bowel Sounds normal.  MUSCULOSKELETAL:  Good range of  "motion of all major joints. Extremities without clubbing, cyanosis or edema.    NEUROLOGIC EXAM:  Alert and oriented x 3.  No focal sensory or strength deficits.   Speech normal.  Follows commands.  PSYCHIATRIC:  Mood normal.    No results for input(s): "CBC" in the last 72 hours.   No results for input(s): "CMP" in the last 72 hours.     Assessment:  Problem List Items Addressed This Visit          Neuro    Compression fracture of thoracic spine, non-traumatic    Compression fracture of first lumbar vertebra - Primary    Compression fracture of L2 lumbar vertebra    Degeneration of lumbar intervertebral disc    Spinal stenosis of lumbar region       Renal/    Hypercalcemia       Immunology/Multi System    Drug-induced immunodeficiency       Oncology    IgG multiple myeloma    Secondary malignancy of lumbar vertebral column    Stem cells transplant status       Endocrine    Osteoporosis of femur without pathological fracture       GI    Celiac disease       Orthopedic    Low back pain     Multiple myeloma, IgG kappa:  ISS stage:  Stage I  R-ISS stage:  Stage I  Multiple myeloma, IgG kappa:   ISS stage:  Stage I  R-ISS stage:  Stage I  -presentation:  03/2023: Worsening back pain, 12-14 lb weight loss over 6 weeks  -skeletal survey 06/14/2023: Negative for lytic or sclerotic lesions  -06/14/2023:  Hemoglobin 11.0, IgG kappa M protein 2.28 gm/dL, kappa elevated, lambda suppressed, kappa/lambda ratio 292  -renal function normal, no hypercalcemia  -24 hour urine: M spike 183 mg; monoclonal kappa light chains in urine; urine kappa elevated, urine lambda suppressed, urine kappa/lambda ratio> 36.4  -LDH normal 06/16/2023   -Beta-2 microglobulin 3.18 on 06/26/2023  -bone marrow biopsy 06/27/2023:  50% plasmacytosis with kappa light chain restriction; no high-risk cytogenetics  -FDG PET-CT 06/29/2023:  Questionable findings  -Velcade started 07/13/2023   -Zometa 4 mg IV every 4 weeks (x2 years), started 07/13/2023  -VRD:  " Cycle 1 started 07/13/2023; cycle 2 started 08/10/2023; cycle 3 started 08/31/2023; cycle 4 started 09/21/2023  -Radiation oncology consult:  Radiotherapy to spine not indicated/will not be helpful; questionable findings on FDG PET-CT  -IR at [facility] will not perform kyphoplasty  -significant response to chemotherapy x2 cycles (on 08/24/2023, kappa/lambda ratio 5.72, much improved; IgG kappa monoclonal protein down to 0.39 gm/dL, significantly decreased)  -VRD cycle 5:  10/12/2023-10/26/2023  -restaging bone marrow biopsy 10/24/2023, post VRD X 4-1/2 cycles:  Normocellular; < 1% plasma cells; no clonal or abnormal plasma cell population on flow cytometry; no high-risk FISH findings  -11/08/2023: No monoclonal protein in urine  -11/09/2023:  No monoclonal protein on SPEP and BUBBA; kappa/lambda ratio 1.36, normalized; kappa free light chains 2.29 mg/dL (to recall, 99.4 on 06/14/2023 before starting chemotherapy)  -cycle 6 day 1 of RVD 11/21/2023; subsequently, on hold in anticipation of stem cell transplant  >>>  ASCT at [facility]:  -melphalan 360 mg IV administered day -2 (11/28/2023) (200 mg per m2 per day)  -ASCT infusion day 0 (11/30/2023)  -filgrastim 5 microgram/kg subQ every 24 hours, started day +5 (12/05/2023)  -infection prophylaxis  -pentamidine 300 mg inhaled once on day -2 (11/28/2023)  -acyclovir 800 mg p.o. q.12 hours starting day -2 (11/28/2023), to continue for 1 year  -fluconazole 400 mg p.o. Q 24 hours starting day -2 (11/28/2023), continue until engraftment and/or mucositis resolved  -cefepime started 12/08/2023, vancomycin added  -IVIG on 12/10/2023  -12/12/2023:  HCT day +12; doing well on antibiotics; counts recovered  -12/12/2023: Transfusion independent and ANC has recovered  -12/13/2023: Patient discharged      Staging of myeloma:  -serum beta 2 microglobulin elevated (3.18)  -serum albumin and LDH normal   -myeloma FISH panel on bone marrow:  Negative for high-risk cytogenetic  abnormalities  >>>  ISS stage:  Stage I  R-ISS stage:  Stage I      Osteoporosis on hips on DEXA scan 10/11/2023      Non myeloma findings (DJD of spine, spinal stenosis, compression fractures):  -mild L4-L5 spinal canal stenosis on MRI 05/04/2023   -lumbar DJD and facet arthrosis on MRI lumbar spine 05/04/2023  -MRI T-spine 08/08/2023:  No myeloma lesions   -MRI lumbar spine 09/14/2023:  Progression of skeletal demineralization; multilevel degenerative disc disease; severe spinal stenosis L3-L5; new/progressive loss of vertebral body height throughout the spine since 05/2023, etc.  -IR at [facility] will not perform kyphoplasty      Co-morbidities:  NIDDM.  Dyslipidemia.  Hypertension.  Celiac disease.    History of rheumatic mitral valve disease, S/P Maze/ablation procedure 02/02/2018      Vaccination history:   COVID-19, MRNA, LN-S, PF (MODERNA FULL 0.5 ML DOSE) 8/18/2022 , 10/23/2021 , 3/17/2021 , 2/17/2021   Hepatitis A / Hepatitis B 8/18/2022 , 10/5/2021 , 7/29/2020   Influenza - Quadrivalent - MDCK - PF 10/5/2021   Influenza - Trivalent - MDCK - PF 9/10/2015   Meningococcal Conjugate (MCV4O) 8/18/2022 , 4/17/2012   Meningococcal Conjugate (MCV4P) 4/17/2012   Pneumococcal Conjugate - 20 Valent 1/12/2023   Tdap 7/29/2020   Zoster 11/18/2020 , 7/29/2020   Zoster Recombinant 11/18/2020 , 7/29/2020       Plan:  Bone marrow aspiration and core biopsy, SPEP, BUBBA, FLC assay, and 24 hour urine for total protein, UPEP, urine BUBBA, and urine FLC assay anytime after March 10  Continue acyclovir for 1 year, until 11/2024  Continue Zometa every 4 weeks until 07/2025  Calcium and vitamin-D supplements along with Zometa to prevent hypocalcemia  DEXA scan October  Follow-up in 1 month, with bone marrow biopsy report and labs  ---------------------------------      Staging of myeloma:  -serum beta 2 microglobulin elevated (3.18)  -serum albumin and LDH normal   -myeloma FISH panel on bone marrow:  Negative for high-risk cytogenetic  abnormalities  >>>  ISS stage:  Stage I  R-ISS stage:  Stage I    Multiple myeloma, IgG kappa:   ISS stage:  Stage I  R-ISS stage:  Stage I  -presentation:  03/2023: Worsening back pain, 12-14 lb weight loss over 6 weeks  -skeletal survey 06/14/2023: Negative for lytic or sclerotic lesions  -06/14/2023:  Hemoglobin 11.0, IgG kappa M protein 2.28 gm/dL, kappa elevated, lambda suppressed, kappa/lambda ratio 292  -renal function normal, no hypercalcemia  -24 hour urine: M spike 183 mg; monoclonal kappa light chains in urine; urine kappa elevated, urine lambda suppressed, urine kappa/lambda ratio> 36.4  -LDH normal 06/16/2023   -Beta-2 microglobulin 3.18 on 06/26/2023  -bone marrow biopsy 06/27/2023:  50% plasmacytosis with kappa light chain restriction; no high-risk cytogenetics  -FDG PET-CT 06/29/2023:  Questionable findings  -Velcade started 07/13/2023   -Zometa 4 mg IV every 4 weeks (x2 years), started 07/13/2023  -VRD:  Cycle 1 started 07/13/2023; cycle 2 started 08/10/2023; cycle 3 started 08/31/2023; cycle 4 started 09/21/2023  -Radiation oncology consult:  Radiotherapy to spine not indicated/will not be helpful; questionable findings on FDG PET-CT  -IR at [facility] will not perform kyphoplasty  -significant response to chemotherapy x2 cycles (on 08/24/2023, kappa/lambda ratio 5.72, much improved; IgG kappa monoclonal protein down to 0.39 gm/dL, significantly decreased)  -VRD cycle 5:  10/12/2023-10/26/2023  -restaging bone marrow biopsy 10/24/2023, post VRD X 4-1/2 cycles:  Normocellular; < 1% plasma cells; no clonal or abnormal plasma cell population on flow cytometry; no high-risk FISH findings  -11/08/2023: No monoclonal protein in urine  -11/09/2023: TSH, free T4 normal   -11/09/2023:  No monoclonal protein on SPEP and BUBBA; kappa/lambda ratio 1.36, normalized; kappa free light chains 2.29 mg/dL (to recall, 99.4 on 06/14/2023 before starting chemotherapy)  -cycle 6 day 1 of Revlimid 11/21/2023; subsequently, on  hold in anticipation of stem cell transplant  >>>  ASCT at [facility]:  -melphalan 360 mg IV administered day -2o (11/28/2023) (200 mg per m2 per day)  -ASCT infusion day 0 (11/30/2023)  -filgrastim 5 microgram/kg subQ every 24 hours, started day +5 (12/05/2023)  -infectious prophylaxis  -pentamidine 300 mg inhaled once on day-2 (11/28/2023)  -acyclovir 800 mg p.o. q.12 hours starting day-2 (11/28/2023), to continue for 1 year  -fluconazole 400 mg p.o. Q 24 hours starting day-2 (11/28/2023), continue until engraftment and/or mucositis resolved  -cefepime started 12/08/2023, vancomycin added  -IVIG on 12/10/2023  -12/12/2023:  HCT day +12; doing well on antibiotics; counts recovered  -12/12/2023: Transfusion independent and ANC has recovered  -12/13/2023: Patient discharged  >>>  -01/23/2024:  Our nurse navigator sent PT/OT referral sent to [facility]  -per [facility], monthly CBC and CMP check  -per [facility], multiple myeloma studies and bone marrow biopsy on day 100 post ASCT  -bone marrow biopsy scheduled for 03/12/2024  -acyclovir was started at [facility] day minus 2 (-2) ASCT 11/28/2023; to continue for 1 year (until 11/2024)  -continue Zometa every 4 weeks x2 years (started 07/13/2023  -Calcium and vitamin-D supplements along with Zometa to prevent hypocalcemia  -per Radiation Oncology, given questionable radiologic findings on PET-CT, palliative radiotherapy to spine would not be helpful    -osteoporosis of hips noted on DEXA scan 10/11/2023  -patient already receiving monthly Zometa for underlying multiple myeloma; this will help with osteoporosis as well  -repeat DEXA scan in 1 year (10/2024)    Non myeloma findings (DJD of spine, spinal stenosis, compression fractures):  -mild L4-L5 spinal canal stenosis on MRI 05/04/2023   -lumbar DJD and facet arthrosis on MRI lumbar spine 05/04/2023  -MRI T-spine 08/08/2023:  No myeloma lesions   -MRI lumbar spine 09/14/2023:  Progression of skeletal  demineralization; multilevel degenerative disc disease; severe spinal stenosis L3-L5; new/progressive loss of vertebral body height throughout the spine since 05/2023, etc.  -IR at [facility] will not perform kyphoplasty    10 mm exophytic hyperdense focus upper pole of right kidney, compatible with a hyperdense cyst, on CT abdomen pelvis with contrast 06/27/2023  -07/12/2023:  MRI abdomen with and without contrast (right renal cyst): Small exophytic right renal lesion most likely a proteinaceous or hemorrhagic cyst (10 mm, upper pole of right kidney)    Follow-up in 1 month, with bone marrow biopsy report and labs    Above discussed at length with the patient.  All questions answered.    Discussed labs and scans and gave him copies of relevant reports.    He understands and agrees with this plan.  ----------------------------------    Discussion:    Supportive care for myeloma:  -bone targeted treatment: Bisphosphonate, category 1; or denosumab to reduce risk of skeletal related events; denosumab is preferred in patients with renal insufficiency; assess vitamin-D status; baseline dental exam; monitor for osteonecrosis of the jaw; monitor renal dysfunction with bisphosphonate therapy  -continue bone targeted treatment (bisphosphonate or denosumab) for 2 years; continue beyond 2 years should be based on clinical judgment  -patients receiving denosumab for bone disease who subsequently discontinued therapy should be given maintenance denosumab every 6 months or a single dose of bisphosphonate to mitigate risk of rebound osteoporosis  -for hypercalcemia, zoledronic acid, denosumab, steroids, and/or calcitonin  -for hyperviscosity, plasmapheresis should be used as adjuvant therapy for symptomatic hyperviscosity  -intravenous immunoglobulin therapy should be considered in the setting of recurrent serious infection and/or hypogammaglobulinemia (IgG </= 400 mg/dL)  -pneumococcal conjugate vaccine, followed by pneumococcal  polysaccharide vaccine 1 year later  -Influenza vaccination is recommended; 2 doses of high-dose inactivated quandaivalent influenza vaccine should be considered  -consider 12 weeks of levofloxacin 500 mg p.o. daily at the time of initial diagnosis of multiple myeloma  -COVID-19 vaccination  -highest risk for VTE is in the 1st 6 months following new diagnosis of multiple myeloma  -VTE prophylaxis if no contraindications to anticoagulation agents or antiplatelets  Recommendations for VTE prophylaxis:  Aspirin  mg daily; low molecular weight heparin equivalent to enoxaparin 40 mg daily; Xarelto 10 mg daily; Eliquis 2.5 mg b.i.d.; Arixtra 2.5 mg daily; Coumadin with target INR 2.0-3.0    Follow-up:  No follow-ups on file.

## 2024-02-29 ENCOUNTER — HOSPITAL ENCOUNTER (OUTPATIENT)
Dept: RADIOLOGY | Facility: HOSPITAL | Age: 69
Discharge: HOME | End: 2024-02-29
Attending: STUDENT IN AN ORGANIZED HEALTH CARE EDUCATION/TRAINING PROGRAM
Payer: MEDICARE

## 2024-02-29 ENCOUNTER — OFFICE VISIT (OUTPATIENT)
Dept: FAMILY MEDICINE | Facility: CLINIC | Age: 69
End: 2024-02-29
Payer: MEDICARE

## 2024-02-29 VITALS
TEMPERATURE: 98 F | DIASTOLIC BLOOD PRESSURE: 73 MMHG | RESPIRATION RATE: 18 BRPM | HEIGHT: 67 IN | HEART RATE: 62 BPM | BODY MASS INDEX: 24.04 KG/M2 | OXYGEN SATURATION: 99 % | WEIGHT: 153.19 LBS | SYSTOLIC BLOOD PRESSURE: 122 MMHG

## 2024-02-29 DIAGNOSIS — R60.0 PEDAL EDEMA: ICD-10-CM

## 2024-02-29 DIAGNOSIS — E11.9 TYPE 2 DIABETES MELLITUS WITHOUT COMPLICATION, WITHOUT LONG-TERM CURRENT USE OF INSULIN: ICD-10-CM

## 2024-02-29 DIAGNOSIS — R60.0 PEDAL EDEMA: Primary | ICD-10-CM

## 2024-02-29 PROCEDURE — 99215 OFFICE O/P EST HI 40 MIN: CPT | Mod: PBBFAC,25 | Performed by: STUDENT IN AN ORGANIZED HEALTH CARE EDUCATION/TRAINING PROGRAM

## 2024-02-29 PROCEDURE — 93970 EXTREMITY STUDY: CPT

## 2024-02-29 NOTE — PROGRESS NOTES
[facility] OFFICE VISIT NOTE  Yuriy Díaz  [id number]  02/29/2024      Chief Complaint: Foot Swelling      HPI    68 y.o. male presenting to [facility] for bilateral foot swelling. Gradual onset 3 weeks ago. Improved with elevation. Denies pain, redness, warm, proceeding injury or new or changed medications. Denies associated chest pain and shortness of breath. Reports recent ECHO prior to bone marrow biopsy that was normal. Records not in EMR. H/o multiple myeloma diagnosed 6/2023. Completed chemotherapy and bone marrow biopsy. On aspirin, but no other anticoagulation.        Chronic conditions:  Multiple Myeloma- follows [facility] Heme/Onc, undergoing chemo    DM II- metformin 500 mg daily, last A1c=  6.0 (6/14/23), on statin, foot exam (1/2023), eye exam- see Ophthalmologist, urine screen (1/2023)  HTN- on metoprolol 50 mg daily  Sleep apnea- compliant on CPAP at night  Celiac disease- follows Dr. Kapoor, last colonoscopy 6/2022  Mitral valve regurgitation- s/p MVR  GERD- Protonix 40 mg daily and famotidine 40 mg nightly      Healthcare maintenance  Colon cancer screen: colonoscopy 6/29/22 (Dr. Kapoor)  Prostate cancer screen: 1.7 (5/2023)  Non smoker  Immunizations: Tdap (7/2020), Shingles (7/2020, 11/2020), PCV 20 (1/2023)       ROS:  As per HPI       PE:  Vitals:    02/29/24 1346   BP: 122/73   Pulse: 62   Resp: 18   Temp: 97.8 °F (36.6 °C)     General: appears well, in no acute distress    Neck: no thyromegaly  Respiratory: clear to auscultation bilaterally, nonlabored respirations   Cardiovascular: regular rate and rhythm without murmurs   Extremities: bilateral swelling from ankles to toes, nonpitting, left worse than right, onychomycosis present (currently being treated), feet without redness or warmth, no wounds, strength intact, right calf 35 cm, left calf 36 cm, calves non tender to squeeze and without redness, warmth or edema    Assessment:   1. Pedal edema        Plan:  - bilateral DVT US negative  - elevation  and compression stockings while awake   - patient has upcoming blood work and urine and would like additional work up to be done at that time  - TSH, BNP, A1c, lipid panel and CMP (future CMP previously ordered by Heme/Onc)  - 24 hour urine protein, urine microalbumin/Cr (needed for DM II metric)  -Return precautions provided    Return to clinic in 3 weeks for follow up bilateral feet swelling and lab review, or sooner if needed.     Nena Elliott M.D. HO-III  [facility]

## 2024-03-01 RX ORDER — FERROUS SULFATE 325(65) MG
650 TABLET ORAL
COMMUNITY

## 2024-03-01 RX ORDER — CALCIUM CARBONATE/VITAMIN D3 600MG-62.5
500 CAPSULE ORAL
COMMUNITY

## 2024-03-01 RX ORDER — MULTIVITAMIN
1 TABLET ORAL DAILY
COMMUNITY

## 2024-03-01 RX ORDER — AA/PROT/LYSINE/METHIO/VIT C/B6 50-12.5 MG
1 TABLET ORAL DAILY
COMMUNITY

## 2024-03-01 RX ORDER — EFINACONAZOLE 100 MG/ML
SOLUTION TOPICAL
COMMUNITY
Start: 2023-10-26 | End: 2024-05-27

## 2024-03-01 RX ORDER — ONDANSETRON 4 MG/1
4 TABLET, FILM COATED ORAL EVERY 6 HOURS PRN
COMMUNITY
Start: 2023-12-12 | End: 2024-05-27

## 2024-03-01 RX ORDER — LENALIDOMIDE 25 MG/1
25 CAPSULE ORAL
COMMUNITY
Start: 2023-11-03 | End: 2024-05-27

## 2024-03-11 ENCOUNTER — LAB VISIT (OUTPATIENT)
Dept: HEMATOLOGY/ONCOLOGY | Facility: CLINIC | Age: 69
End: 2024-03-11
Payer: MEDICARE

## 2024-03-11 DIAGNOSIS — S32.010G COMPRESSION FRACTURE OF L1 VERTEBRA WITH DELAYED HEALING, SUBSEQUENT ENCOUNTER: ICD-10-CM

## 2024-03-11 DIAGNOSIS — C90.00 IGG MULTIPLE MYELOMA: ICD-10-CM

## 2024-03-11 DIAGNOSIS — Z94.84 STEM CELLS TRANSPLANT STATUS: ICD-10-CM

## 2024-03-11 DIAGNOSIS — Z79.899 DRUG-INDUCED IMMUNODEFICIENCY: ICD-10-CM

## 2024-03-11 DIAGNOSIS — D84.821 DRUG-INDUCED IMMUNODEFICIENCY: ICD-10-CM

## 2024-03-11 LAB
ALBUMIN SERPL-MCNC: 3.7 G/DL (ref 3.4–4.8)
ALBUMIN/GLOB SERPL: 1.3 RATIO (ref 1.1–2)
ALP SERPL-CCNC: 69 UNIT/L (ref 40–150)
ALT SERPL-CCNC: 12 UNIT/L (ref 0–55)
AST SERPL-CCNC: 19 UNIT/L (ref 5–34)
BASOPHILS # BLD AUTO: 0.05 X10(3)/MCL
BASOPHILS NFR BLD AUTO: 0.6 %
BILIRUB SERPL-MCNC: 0.2 MG/DL
BUN SERPL-MCNC: 12.7 MG/DL (ref 8.4–25.7)
CALCIUM SERPL-MCNC: 9.5 MG/DL (ref 8.8–10)
CHLORIDE SERPL-SCNC: 105 MMOL/L (ref 98–107)
CO2 SERPL-SCNC: 24 MMOL/L (ref 23–31)
CREAT SERPL-MCNC: 0.83 MG/DL (ref 0.73–1.18)
EOSINOPHIL # BLD AUTO: 0.12 X10(3)/MCL (ref 0–0.9)
EOSINOPHIL NFR BLD AUTO: 1.5 %
ERYTHROCYTE [DISTWIDTH] IN BLOOD BY AUTOMATED COUNT: 14.7 % (ref 11.5–17)
GFR SERPLBLD CREATININE-BSD FMLA CKD-EPI: >60 MLS/MIN/1.73/M2
GLOBULIN SER-MCNC: 2.9 GM/DL (ref 2.4–3.5)
GLUCOSE SERPL-MCNC: 85 MG/DL (ref 82–115)
HCT VFR BLD AUTO: 34.4 % (ref 42–52)
HGB BLD-MCNC: 11.2 G/DL (ref 14–18)
IGA SERPL-MCNC: 50 MG/DL (ref 101–645)
IGG SERPL-MCNC: 926 MG/DL (ref 540–1822)
IGM SERPL-MCNC: 13 MG/DL (ref 22–240)
IMM GRANULOCYTES # BLD AUTO: 0.02 X10(3)/MCL (ref 0–0.04)
IMM GRANULOCYTES NFR BLD AUTO: 0.3 %
LYMPHOCYTES # BLD AUTO: 3.02 X10(3)/MCL (ref 0.6–4.6)
LYMPHOCYTES NFR BLD AUTO: 38.5 %
MAGNESIUM SERPL-MCNC: 2 MG/DL (ref 1.6–2.6)
MCH RBC QN AUTO: 32.1 PG (ref 27–31)
MCHC RBC AUTO-ENTMCNC: 32.6 G/DL (ref 33–36)
MCV RBC AUTO: 98.6 FL (ref 80–94)
MONOCYTES # BLD AUTO: 1.11 X10(3)/MCL (ref 0.1–1.3)
MONOCYTES NFR BLD AUTO: 14.2 %
NEUTROPHILS # BLD AUTO: 3.52 X10(3)/MCL (ref 2.1–9.2)
NEUTROPHILS NFR BLD AUTO: 44.9 %
NRBC BLD AUTO-RTO: 0 %
PLATELET # BLD AUTO: 273 X10(3)/MCL (ref 130–400)
PMV BLD AUTO: 9 FL (ref 7.4–10.4)
POTASSIUM SERPL-SCNC: 4.6 MMOL/L (ref 3.5–5.1)
PROT SERPL-MCNC: 6.6 GM/DL (ref 5.8–7.6)
RBC # BLD AUTO: 3.49 X10(6)/MCL (ref 4.7–6.1)
SODIUM SERPL-SCNC: 135 MMOL/L (ref 136–145)
WBC # SPEC AUTO: 7.84 X10(3)/MCL (ref 4.5–11.5)

## 2024-03-11 PROCEDURE — 84165 PROTEIN E-PHORESIS SERUM: CPT

## 2024-03-11 PROCEDURE — 83735 ASSAY OF MAGNESIUM: CPT

## 2024-03-11 PROCEDURE — 82784 ASSAY IGA/IGD/IGG/IGM EACH: CPT

## 2024-03-11 PROCEDURE — 85025 COMPLETE CBC W/AUTO DIFF WBC: CPT

## 2024-03-11 PROCEDURE — 80053 COMPREHEN METABOLIC PANEL: CPT

## 2024-03-11 PROCEDURE — 83521 IG LIGHT CHAINS FREE EACH: CPT | Mod: 59

## 2024-03-11 PROCEDURE — 36415 COLL VENOUS BLD VENIPUNCTURE: CPT

## 2024-03-12 ENCOUNTER — HOSPITAL ENCOUNTER (OUTPATIENT)
Facility: HOSPITAL | Age: 69
Discharge: HOME | End: 2024-03-12
Attending: PATHOLOGY | Admitting: PATHOLOGY
Payer: MEDICARE

## 2024-03-12 DIAGNOSIS — S32.010G COMPRESSION FRACTURE OF L1 VERTEBRA WITH DELAYED HEALING, SUBSEQUENT ENCOUNTER: ICD-10-CM

## 2024-03-12 DIAGNOSIS — Z94.84 STEM CELLS TRANSPLANT STATUS: ICD-10-CM

## 2024-03-12 DIAGNOSIS — C90.00 IGG MULTIPLE MYELOMA: ICD-10-CM

## 2024-03-12 DIAGNOSIS — M81.0 AGE-RELATED OSTEOPOROSIS WITHOUT CURRENT PATHOLOGICAL FRACTURE: ICD-10-CM

## 2024-03-12 LAB
ABS NEUT CALC (OHS): 2.73 X10(3)/MCL (ref 2.1–9.2)
ALBUMIN % SPEP (OHS): 50.33
ALBUMIN SERPL-MCNC: 3.1 G/DL (ref 3.4–4.8)
ALBUMIN/GLOB SERPL: 1 RATIO (ref 1.1–2)
ALPHA 1 GLOB (OHS): 0.25 GM/DL
ALPHA 1 GLOB% (OHS): 4.07
ALPHA 2 GLOB % (OHS): 14.72
ALPHA 2 GLOB (OHS): 0.91 GM/DL
ANISOCYTOSIS BLD QL SMEAR: SLIGHT
BETA GLOB (OHS): 0.92 GM/DL
BETA GLOB% (OHS): 14.82
EOSINOPHIL NFR BLD MANUAL: 0.06 X10(3)/MCL (ref 0–0.9)
EOSINOPHIL NFR BLD MANUAL: 1 % (ref 0–8)
ERYTHROCYTE [DISTWIDTH] IN BLOOD BY AUTOMATED COUNT: 14.6 % (ref 11.5–17)
GAMMA GLOBULIN % (OHS): 16.05
GAMMA GLOBULIN (OHS): 1 GM/DL
GLOBULIN SER-MCNC: 3.1 GM/DL (ref 2.4–3.5)
HCT VFR BLD AUTO: 33 % (ref 42–52)
HGB BLD-MCNC: 10.7 G/DL (ref 14–18)
INR PPP: 1
KAPPA LC FREE SER-MCNC: 1.34 MG/DL (ref 0.33–1.94)
KAPPA LC FREE/LAMBDA FREE SER: 1.31 {RATIO} (ref 0.26–1.65)
LAMBDA LC FREE SERPL-MCNC: 1.02 MG/DL (ref 0.57–2.63)
LYMPHOCYTES NFR BLD MANUAL: 3.05 X10(3)/MCL
LYMPHOCYTES NFR BLD MANUAL: 48 % (ref 13–40)
M SPIKE % (OHS): ABNORMAL
M SPIKE (OHS): ABNORMAL
MCH RBC QN AUTO: 31.4 PG (ref 27–31)
MCHC RBC AUTO-ENTMCNC: 32.4 G/DL (ref 33–36)
MCV RBC AUTO: 96.8 FL (ref 80–94)
MONOCYTES NFR BLD MANUAL: 0.51 X10(3)/MCL (ref 0.1–1.3)
MONOCYTES NFR BLD MANUAL: 8 % (ref 2–11)
NEUTROPHILS NFR BLD MANUAL: 43 % (ref 47–80)
NRBC BLD AUTO-RTO: 0 %
PATH REV: NORMAL
PLATELET # BLD AUTO: 272 X10(3)/MCL (ref 130–400)
PLATELET # BLD EST: ADEQUATE 10*3/UL
PMV BLD AUTO: 9.1 FL (ref 7.4–10.4)
PROT SERPL-MCNC: 6.2 GM/DL (ref 5.8–7.6)
PROTHROMBIN TIME: 13.4 SECONDS (ref 11.4–14)
RBC # BLD AUTO: 3.41 X10(6)/MCL (ref 4.7–6.1)
WBC # SPEC AUTO: 6.35 X10(3)/MCL (ref 4.5–11.5)

## 2024-03-12 PROCEDURE — 63600175 PHARM REV CODE 636 W HCPCS: Performed by: PATHOLOGY

## 2024-03-12 PROCEDURE — 88184 FLOWCYTOMETRY/ TC 1 MARKER: CPT | Performed by: INTERNAL MEDICINE

## 2024-03-12 PROCEDURE — 85097 BONE MARROW INTERPRETATION: CPT | Mod: TC | Performed by: PATHOLOGY

## 2024-03-12 PROCEDURE — 85027 COMPLETE CBC AUTOMATED: CPT | Performed by: PATHOLOGY

## 2024-03-12 PROCEDURE — 38222 DX BONE MARROW BX & ASPIR: CPT | Performed by: PATHOLOGY

## 2024-03-12 PROCEDURE — 88271 CYTOGENETICS DNA PROBE: CPT

## 2024-03-12 PROCEDURE — 88275 CYTOGENETICS 100-300: CPT | Performed by: INTERNAL MEDICINE

## 2024-03-12 PROCEDURE — 25000003 PHARM REV CODE 250: Performed by: PATHOLOGY

## 2024-03-12 PROCEDURE — 85007 BL SMEAR W/DIFF WBC COUNT: CPT | Performed by: PATHOLOGY

## 2024-03-12 PROCEDURE — 88305 TISSUE EXAM BY PATHOLOGIST: CPT | Mod: TC | Performed by: PATHOLOGY

## 2024-03-12 PROCEDURE — 88264 CHROMOSOME ANALYSIS 20-25: CPT

## 2024-03-12 PROCEDURE — 88185 FLOWCYTOMETRY/TC ADD-ON: CPT

## 2024-03-12 PROCEDURE — 85610 PROTHROMBIN TIME: CPT | Performed by: PATHOLOGY

## 2024-03-12 RX ORDER — SODIUM CHLORIDE, SODIUM LACTATE, POTASSIUM CHLORIDE, CALCIUM CHLORIDE 600; 310; 30; 20 MG/100ML; MG/100ML; MG/100ML; MG/100ML
INJECTION, SOLUTION INTRAVENOUS
Status: COMPLETED | OUTPATIENT
Start: 2024-03-12 | End: 2024-03-12

## 2024-03-12 RX ORDER — TRAMADOL HYDROCHLORIDE 100 MG/1
100 TABLET, COATED ORAL EVERY 4 HOURS PRN
Qty: 60 TABLET | Refills: 2 | Status: SHIPPED | OUTPATIENT
Start: 2024-03-12

## 2024-03-12 RX ORDER — MIDAZOLAM HYDROCHLORIDE 1 MG/ML
INJECTION INTRAMUSCULAR; INTRAVENOUS
Status: COMPLETED | OUTPATIENT
Start: 2024-03-12 | End: 2024-03-12

## 2024-03-12 RX ORDER — LIDOCAINE HYDROCHLORIDE 20 MG/ML
INJECTION, SOLUTION INFILTRATION; PERINEURAL
Status: COMPLETED | OUTPATIENT
Start: 2024-03-12 | End: 2024-03-12

## 2024-03-12 NOTE — DISCHARGE SUMMARY
[facility] - Endoscopy  Discharge Note  Short Stay    Procedure(s) (LRB):  Biopsy-bone marrow (N/A)  ASPIRATION, BONE MARROW (N/A)      OUTCOME: Patient tolerated treatment/procedure well without complication and is now ready for discharge.    DISPOSITION: Home or Self Care    FINAL DIAGNOSIS:  <principal problem not specified>    FOLLOWUP: In clinic    DISCHARGE INSTRUCTIONS:  No discharge procedures on file.      Clinical Reference Documents Added to Patient Instructions         Document    BONE MARROW ASPIRATION OR BIOPSY (ENGLISH)            TIME SPENT ON DISCHARGE: 5 minutes

## 2024-03-12 NOTE — PROGRESS NOTES
Pre-operative note  Consulted for bone marrow aspiration and biopsy by Dr. Liu.  Indication:  Multiple myeloma s/p transplant  Patient chart reviewed.  Patient seen this am and no complaints.    No changes to ordering provider's previous assessment.  PE: Alert and oriented.  Vitals stable.  Pre-procedure labs reviewed.    Informed consent obtained from patient.    Alternative treatment options, risks, and complications discussed with patient.    Patient voices understanding and wishes to proceed with procedure.  Discussed discharge instructions and transportation with patient.     Plan:  Bone marrow biopsy procedure to be performed on 5W.

## 2024-03-12 NOTE — PLAN OF CARE
Patient resting in bed. No acute distress. Wife at bedside. Declines anything or drink at this time as it is a [employer] holiday and he is currently fasting.

## 2024-03-12 NOTE — PROCEDURES
"Yuriy Díaz is a 68 y.o. male patient.    Temp: 97.8 °F (36.6 °C) (03/12/24 1105)  Pulse: 79 (03/12/24 1140)  Resp: 16 (03/12/24 1105)  BP: 112/74 (03/12/24 1140)  SpO2: 100 % (03/12/24 1140)  Weight: 68.3 kg (150 lb 9.6 oz) (03/12/24 1105)  Height: 5' 8" (172.7 cm) (03/12/24 1105)       Bone marrow    Date/Time: 3/12/2024 11:48 AM    Performed by: Haylie Liu MD  Authorized by: Haylie Liu MD    Consent Done?: Yes (Written)   Immediately prior to procedure a time out was called to verify the correct patient, procedure, equipment, support staff and site/side marked as required.   Patient was prepped and draped in the usual sterile fashion.    Assistants?: No    I was present for the entire procedure.    Position: left lateral  Anesthesia: local infiltration  Local anesthetic: lidocaine 2% without epinephrine  Aspiration?: Yes   Biopsy?: Yes    Specimen source: posterior iliac crest  Number of Specimens: 3  Estimated blood loss (cc):1  Patient tolerated the procedure well with no immediate complications.  Post-operative instructions were provided for the patient.  Patient was discharged and will follow up if any complications occur.     3/12/2024    "

## 2024-03-13 VITALS
DIASTOLIC BLOOD PRESSURE: 70 MMHG | WEIGHT: 150.63 LBS | TEMPERATURE: 98 F | BODY MASS INDEX: 22.83 KG/M2 | OXYGEN SATURATION: 98 % | HEIGHT: 68 IN | RESPIRATION RATE: 18 BRPM | SYSTOLIC BLOOD PRESSURE: 120 MMHG | HEART RATE: 76 BPM

## 2024-03-13 LAB — HEMATOLOGIST REVIEW: NORMAL

## 2024-03-17 NOTE — PROGRESS NOTES
History:  Past Medical History:   Diagnosis Date    Diabetes mellitus, type 2     GERD (gastroesophageal reflux disease)     Hyperlipidemia     Hypertension     Personal history of colonic polyps 06/29/2022   Past medical history:  NIDDM. Dyslipidemia.  GERD.  Lumbar spinal stenosis.  Vitamin-D deficiency.  Allergic rhinitis.  HTN.  Bell's palsy.  Celiac disease.  Hemorrhoids.  Mitral regurgitation 01/05/2018.  Rheumatic valve narrowing and leaking mitral valve.  -02/02/2018 ([facility]):  АНДРЕЙ, right minimally invasive anterior thoracotomy, right femoral artery and vein exploration, extensive right pleural adhesiolysis/pneumolysis, bilateral Maze/ablation (extensive), left atrial appendage clip placement for left atrial appendage exclusion, mitral valve repair (complex repair with 26 mm annuloplasty ring)    Social history:  .  Lives in [place], [place].  Five children.  Owns a [employer] business.  No history of tobacco, alcohol, or illicit drug abuse.      Family history:  Negative for cancers or blood dyscrasias.      Health maintenance:  PCP in family medicine clinic, [facility].  Had EGD and colonoscopy done 1 year ago by Dr. Zoltan Kapoor, [facility], apparently unremarkable (he gets the endoscopies done periodically because of history of celiac disease).  Past Surgical History:   Procedure Laterality Date    BONE MARROW ASPIRATION N/A 06/27/2023    Procedure: ASPIRATION, BONE MARROW;  Surgeon: Namita Daniels MD;  Location: [facility] ENDOSCOPY;  Service: General;  Laterality: N/A;    BONE MARROW ASPIRATION N/A 10/24/2023    Procedure: ASPIRATION, BONE MARROW;  Surgeon: Namita Daniels MD;  Location: [facility] ENDOSCOPY;  Service: General;  Laterality: N/A;    BONE MARROW ASPIRATION N/A 3/12/2024    Procedure: ASPIRATION, BONE MARROW;  Surgeon: Haylie Liu MD;  Location: [facility] ENDOSCOPY;  Service: General;  Laterality: N/A;    BONE MARROW BIOPSY N/A 06/27/2023     Procedure: Biopsy-bone marrow;  Surgeon: Haylie Liu MD;  Location: [facility] ENDOSCOPY;  Service: General;  Laterality: N/A;    BONE MARROW BIOPSY N/A 10/24/2023    Procedure: Biopsy-bone marrow;  Surgeon: Namita Daniels MD;  Location: [facility] ENDOSCOPY;  Service: General;  Laterality: N/A;    BONE MARROW BIOPSY N/A 3/12/2024    Procedure: Biopsy-bone marrow;  Surgeon: Haylie Liu MD;  Location: [facility] ENDOSCOPY;  Service: General;  Laterality: N/A;    CARDIAC SURGERY  2018    COLONOSCOPY  06/29/2022    EYE SURGERY        Social History     Socioeconomic History    Marital status:    Tobacco Use    Smoking status: Never    Smokeless tobacco: Never   Substance and Sexual Activity    Alcohol use: Never    Drug use: Never    Sexual activity: Not Currently      History reviewed. No pertinent family history.     Reason for Follow-up:  -multiple myeloma, IgG kappa; S/P ASCT  -worsening low back pain  -hypercalcemia  -osteoporosis hips B/L    History of Present Illness:   IgG multiple myeloma      Oncologic/Hematologic History:  Oncology History   IgG multiple myeloma   6/23/2023 Initial Diagnosis    IgG multiple myeloma     7/13/2023 - 11/21/2023 Chemotherapy    Treatment Summary   Plan Name: OP VRD - WEEKLY BORTEZOMIB LENALIDOMIDE DEXAMETHASONE Q3W  Treatment Goal: Curative  Status: Inactive  Start Date: 7/13/2023  End Date: 11/21/2023  Provider: Narciso Lundy MD  Chemotherapy: bortezomib (VELCADE) injection 2.25 mg, 2.25 mg, Subcutaneous, Clinic[facility] 1 time, 6 of 9 cycles  Administration: 2.25 mg (7/13/2023), 2.25 mg (7/20/2023), 2.25 mg (7/27/2023), 2.25 mg (8/10/2023), 2.25 mg (8/17/2023), 2.25 mg (8/24/2023), 2.25 mg (8/31/2023), 2.25 mg (9/7/2023), 2.25 mg (9/21/2023), 2.25 mg (9/14/2023), 2.25 mg (9/28/2023), 2.25 mg (10/12/2023), 2.25 mg (10/5/2023), 2.25 mg (10/19/2023), 2.25 mg (10/26/2023), 2.25 mg (11/21/2023)  lenalidomide 25 mg Cap, 25 mg, , , 1 of 1 cycle, Start date: 11/3/2023, End date:  2/15/2024     67-year-old gentleman, referred from [facility], with newly diagnosed multiple myeloma.      Patient is being followed for IgG kappa multiple myeloma.    Please assessment and plan section for details.    06/26/2023:  Initial consultation:  Pleasant gentleman who presents for initial medical oncology consultation, accompanied by his wife and family present who is a past interventional cardiologist.  Back pain for 2 months.  Initially started in right groin.  Physical therapy has not helped.  10/10 severity.  Has been taking Tylenol without significant relief.  Secured to take narcotics because of constipation.  Underwent couple of spinal steroid shots 3 weeks ago, with minor relief.  Pain is present all the time.  Pain increases with changing position in bed as well as upon standing.  He finds it difficult to ambulate.  Pain does not radiate down lower extremities and is not accompanied with lower extremity weakness, numbness, urinary or bowel incontinence, or saddle anesthesia.  Also experiences muscle cramps.  Generalized weakness.  However, ECOG 2.  No fevers or chills.  No repeated infections.  Cramps in hands and feet.  Appetite is down.  Appetite has picked up in last 10 days.  Has lost 12-14 lb in last 1-1/2 months.  No abnormal bleeding or bruising.      Interval History:  INF FLUIDS   [No matching plan found]     03/18/2024:   -02/29/2024:  CV ultrasound Doppler venous bilateral legs (pedal edema):  No evidence of right lower extremity or left lower extremity DVT  -03/11/2024:  Hemoglobin 11.2; rest of CBC unremarkable; CMP unremarkable, albumin 3.1  -03/11/2024: IgG normal; IgM, IgA suppressed; no monoclonal bands on SPEP and BUBBA; FLC assay normal  -03/12/2024:  Restaging bone marrow aspiration and core biopsy (day 100 post ASCT):  Normocellular bone marrow; trilineage hematopoiesis with maturation; no histologic increase in plasma cells or immunophenotyping (flow cytometry)  abnormalities are identified; decreased iron stores; bone marrow cellularity 40%; no increase in plasma cells or significant plasma cell dysplasia identified; plasma cells 1%  -03/12/2024: Hemoglobin 10.7  -03/18/2024:  WBC 6.71; hemoglobin 11.3; platelets 268; differential count normal; ANC 2.34; CMP unremarkable  Presents for a follow-up visit.  Doing well.  Great appetite no new symptoms of concern.  ECOG 0-1.  Not yet on Revlimid maintenance.  He says that he is appointment with [facility] next week.      Medications:  Current Outpatient Medications on File Prior to Visit   Medication Sig Dispense Refill    abiraterone (ZYTIGA) 500 mg Tab Take 1,000 mg by mouth once daily Take 2 (two) 500 mg tab (to equal 1,000 mg) by mouth daily.      acyclovir (ZOVIRAX) 400 MG tablet Take 1 tablet (400 mg total) by mouth 2 (two) times daily. 60 tablet 4    ammonium lactate 12 % Crea Apply 1 application topically once daily. 385 g 2    ascorbic acid, vitamin C, (VITAMIN C) 1000 MG tablet Take 1,000 mg by mouth once daily.      aspirin (ECOTRIN) 81 MG EC tablet Take 81 mg by mouth once daily.      atorvastatin (LIPITOR) 20 MG tablet Take 20 mg by mouth once daily.      calcium carbonate 195 mg calcium (500 mg) Chew Take 1 tablet by mouth once daily.      ciclopirox (PENLAC) 8 % Soln Apply 8 drops topically Daily.      coenzyme Q10 10 mg capsule Take 1 capsule by mouth once daily.      diclofenac sodium (VOLTAREN) 1 % Gel Apply 2 g topically 4 (four) times daily. 450 g 1    famotidine (PEPCID) 40 MG tablet Take 40 mg by mouth every evening.      ferrous sulfate (FEOSOL) 325 mg (65 mg iron) Tab tablet Take 650 mg by mouth.      fluticasone propionate (FLONASE) 50 mcg/actuation nasal spray 50 sprays by Nasal route 2 (two) times a day.      gabapentin (NEURONTIN) 600 MG tablet Take 1 tablet (600 mg total) by mouth 3 (three) times daily. 90 tablet 2    hydrocortisone (ANUSOL-HC) 2.5 % rectal cream Place 1 applicator  rectally 2 (two) times daily. 28 g 2    JUBLIA 10 % Trevon APPLY TO AFFECTED NAIL(S) ONCE DAILY . REMOVE EVERY WEEK THEN REAPPLY DAILY      ketoconazole (NIZORAL) 2 % cream Apply topically once daily. 60 g 1    lenalidomide 25 mg Cap Take 25 mg by mouth.      linaCLOtide (LINZESS) 72 mcg Cap capsule Take 1 capsule (72 mcg total) by mouth daily as needed (constipation). 90 each 1    metFORMIN (GLUCOPHAGE) 500 MG tablet Take 1 tablet (500 mg total) by mouth 2 (two) times daily with meals. 180 tablet 3    metoprolol succinate (TOPROL-XL) 50 MG 24 hr tablet Take by mouth.      MIRALAX 17 gram/dose powder Take 17 g by mouth.      MOVANTIK 25 mg tablet       multivit with min-folic acid 0.4 mg Tab Take 1 tablet by mouth once daily.      naloxone (NARCAN) 4 mg/actuation Spry SMARTSIG:Both Nares      nystatin (MYCOSTATIN) powder Apply topically 4 (four) times daily. 60 g 2    omega 3-dha-epa-fish oil 300 mg (120 mg- 180mg)-1,000 mg Cap Take 500 mg by mouth.      ondansetron (ZOFRAN) 4 MG tablet Take 4 mg by mouth every 6 (six) hours as needed.      pantoprazole (PROTONIX) 20 MG tablet Take 1 tablet (20 mg total) by mouth once daily. 30 tablet 11    sulfamethoxazole-trimethoprim 800-160mg (BACTRIM DS) 800-160 mg Tab Take 1 tablet by mouth on MWF of each week 48 tablet 3    traMADoL 100 mg Tab Take 100 tablets by mouth every 4 (four) hours as needed (pain). 60 tablet 2     No current facility-administered medications on file prior to visit.       Review of Systems:   All systems reviewed and found to be negative except for the symptoms detailed above    Physical Examination:   VITAL SIGNS:   Vitals:    03/18/24 1400   BP: 117/73   Pulse: 70   Resp: 18   Temp: 98 °F (36.7 °C)       GENERAL:  In no apparent distress.    HEAD:  No signs of head trauma.  EYES:  Pupils are equal.  Extraocular motions intact.    EARS:  Hearing grossly intact.  MOUTH:  Oropharynx is normal.   NECK:  No adenopathy, no JVD.     CHEST:  Chest with clear  "breath sounds bilaterally.  No wheezes, rales, rhonchi.    CARDIAC:  Regular rate and rhythm.  S1 and S2, without murmurs, gallops, rubs.  VASCULAR:  No Edema.  Peripheral pulses normal and equal in all extremities.  ABDOMEN:  Soft, without detectable tenderness.  No sign of distention.  No   rebound or guarding, and no masses palpated.   Bowel Sounds normal.  MUSCULOSKELETAL:  Good range of motion of all major joints. Extremities without clubbing, cyanosis or edema.    NEUROLOGIC EXAM:  Alert and oriented x 3.  No focal sensory or strength deficits.   Speech normal.  Follows commands.  PSYCHIATRIC:  Mood normal.    No results for input(s): "CBC" in the last 72 hours.   No results for input(s): "CMP" in the last 72 hours.     Assessment:  Problem List Items Addressed This Visit          Neuro    Compression fracture of thoracic spine, non-traumatic    Compression fracture of first lumbar vertebra - Primary    Compression fracture of L2 lumbar vertebra    Degeneration of lumbar intervertebral disc    Spinal stenosis of lumbar region       ID    History of tuberculosis       Immunology/Multi System    Drug-induced immunodeficiency       Oncology    IgG multiple myeloma    Secondary malignant neoplasm of bone    Secondary malignancy of lumbar vertebral column    Stem cells transplant status       Endocrine    Osteoporosis of femur without pathological fracture       GI    Celiac disease     Multiple myeloma, IgG kappa:  ISS stage:  Stage I  R-ISS stage:  Stage I  Multiple myeloma, IgG kappa:   ISS stage:  Stage I  R-ISS stage:  Stage I  -presentation:  03/2023: Worsening back pain, 12-14 lb weight loss over 6 weeks  -skeletal survey 06/14/2023: Negative for lytic or sclerotic lesions  -06/14/2023:  Hemoglobin 11.0, IgG kappa M protein 2.28 gm/dL, kappa elevated, lambda suppressed, kappa/lambda ratio 292  -renal function normal, no hypercalcemia  -24 hour urine: M spike 183 mg; monoclonal kappa light chains in urine; " urine kappa elevated, urine lambda suppressed, urine kappa/lambda ratio> 36.4  -LDH normal 06/16/2023   -Beta-2 microglobulin 3.18 on 06/26/2023  -bone marrow biopsy 06/27/2023:  50% plasmacytosis with kappa light chain restriction; no high-risk cytogenetics  -FDG PET-CT 06/29/2023:  Questionable findings  -Velcade started 07/13/2023   -Zometa 4 mg IV every 4 weeks (x2 years), started 07/13/2023  -VRD:  Cycle 1 started 07/13/2023; cycle 2 started 08/10/2023; cycle 3 started 08/31/2023; cycle 4 started 09/21/2023  -Radiation oncology consult:  Radiotherapy to spine not indicated/will not be helpful; questionable findings on FDG PET-CT  -IR at [facility] will not perform kyphoplasty  -significant response to chemotherapy x2 cycles (on 08/24/2023, kappa/lambda ratio 5.72, much improved; IgG kappa monoclonal protein down to 0.39 gm/dL, significantly decreased)  -VRD cycle 5:  10/12/2023-10/26/2023  -restaging bone marrow biopsy 10/24/2023, post VRD X 4-1/2 cycles:  Normocellular; < 1% plasma cells; no clonal or abnormal plasma cell population on flow cytometry; no high-risk FISH findings  -11/08/2023: No monoclonal protein in urine  -11/09/2023:  No monoclonal protein on SPEP and BUBBA; kappa/lambda ratio 1.36, normalized; kappa free light chains 2.29 mg/dL (to recall, 99.4 on 06/14/2023 before starting chemotherapy)  -cycle 6 day 1 of RVD 11/21/2023; subsequently, on hold in anticipation of stem cell transplant  >>>  ASCT at [facility]:  -melphalan 360 mg IV administered day -2 (11/28/2023) (200 mg per m2 per day)  -ASCT infusion day 0 (11/30/2023)  -filgrastim 5 microgram/kg subQ every 24 hours, started day +5 (12/05/2023)  -infection prophylaxis  -pentamidine 300 mg inhaled once on day -2 (11/28/2023)  -acyclovir 800 mg p.o. q.12 hours starting day -2 (11/28/2023), to continue for 1 year  -fluconazole 400 mg p.o. Q 24 hours starting day -2 (11/28/2023), continue until engraftment and/or mucositis resolved  -cefepime started  12/08/2023, vancomycin added  -IVIG on 12/10/2023  -12/12/2023:  HCT day +12; doing well on antibiotics; counts recovered  -12/12/2023: Transfusion independent and ANC has recovered  -12/13/2023: Patient discharged  -venous Doppler both lower extremities 02/29/2024: Pedal edema: No DVT   -03/11/2024:  Hemoglobin 11.2; CMP unremarkable   -03/11/2024 (day + 102):  No monoclonal protein in blood; FLC assay normal   -restaging bone marrow biopsy 03/12/2024 (day +103):  No increase in plasma cells      Staging of myeloma:  -serum beta 2 microglobulin elevated (3.18)  -serum albumin and LDH normal   -myeloma FISH panel on bone marrow:  Negative for high-risk cytogenetic abnormalities  >>>  ISS stage:  Stage I  R-ISS stage:  Stage I      Osteoporosis on hips on DEXA scan 10/11/2023      Non myeloma findings (DJD of spine, spinal stenosis, compression fractures):  -mild L4-L5 spinal canal stenosis on MRI 05/04/2023   -lumbar DJD and facet arthrosis on MRI lumbar spine 05/04/2023  -MRI T-spine 08/08/2023:  No myeloma lesions   -MRI lumbar spine 09/14/2023:  Progression of skeletal demineralization; multilevel degenerative disc disease; severe spinal stenosis L3-L5; new/progressive loss of vertebral body height throughout the spine since 05/2023, etc.  -IR at [facility] will not perform kyphoplasty      Co-morbidities:  NIDDM.  Dyslipidemia.  Hypertension.  Celiac disease.    History of rheumatic mitral valve disease, S/P Maze/ablation procedure 02/02/2018      Vaccination history:   COVID-19, MRNA, LN-S, PF (MODERNA FULL 0.5 ML DOSE) 8/18/2022 , 10/23/2021 , 3/17/2021 , 2/17/2021   Hepatitis A / Hepatitis B 8/18/2022 , 10/5/2021 , 7/29/2020   Influenza - Quadrivalent - MDCK - PF 10/5/2021   Influenza - Trivalent - MDCK - PF 9/10/2015   Meningococcal Conjugate (MCV4O) 8/18/2022 , 4/17/2012   Meningococcal Conjugate (MCV4P) 4/17/2012   Pneumococcal Conjugate - 20 Valent 1/12/2023   Tdap 7/29/2020   Zoster 11/18/2020 , 7/29/2020    Zoster Recombinant 11/18/2020 , 7/29/2020       Plan:  Continue acyclovir until November 2024   Continue Zometa every 4 weeks  Calcium and vitamin-D supplements  Check CBC and CMP monthly  DEXA scan in October  BUBBA GREY, FLC assay in 3 months (mid June)  Follow-up with NP in 1 month, with CBC and CMP  ---------------------------------------      Multiple myeloma, IgG kappa:   -serum beta 2 microglobulin elevated (3.18)  -serum albumin and LDH normal   -myeloma FISH panel on bone marrow:  Negative for high-risk cytogenetic abnormalities  >>>  ISS stage:  Stage I  R-ISS stage:  Stage I  >>>  S/P VRD x6 cycles  -S/P ASCT at [facility] (ASCT infusion 11/30/2023, day 0)  -12/12/2023:  HCT day +12; doing well on antibiotics; counts recovered  -12/12/2023: Transfusion independent and ANC has recovered  -12/13/2023: Patient discharged  -venous Doppler both lower extremities 02/29/2024: Pedal edema: No DVT   -03/11/2024:  Hemoglobin 11.2; CMP unremarkable   -per [facility], multiple myeloma studies and bone marrow biopsy on day 100 post ASCT (anytime after 03/09/2024)  -03/11/2024 (day +102):  No monoclonal protein in blood; FLC assay normal   -restaging bone marrow biopsy 03/12/2024 (day +103):  No increase in plasma cells  >>>  -01/23/2024:  Our nurse navigator sent PT/OT referral sent to [facility]  -per [facility], monthly CBC and CMP check  -per [facility], multiple myeloma studies and bone marrow biopsy on day 100 post ASCT (anytime after 03/09/2024); negative for myeloma on blood tests 03/11/2024; bone marrow biopsy negative on 03/12/2024  -acyclovir 800 mg p.o. q.12 hours, was started at [facility] day minus 2 (-2) ASCT 11/28/2023; to continue for 1 year (until 11/2024)  -continue Zometa every 4 weeks x2 years (started 07/13/2023; continue until 07/2025)  -Calcium and vitamin-D supplements along with Zometa to prevent hypocalcemia  -monitor renal function while on Zometa  -per Radiation Oncology, given  questionable radiologic findings on PET-CT, palliative radiotherapy to spine would not be helpful  -SPEP, BUBBA, FLC assay in 3 months (mid June)    -osteoporosis of hips noted on DEXA scan 10/11/2023  -patient already receiving monthly Zometa for underlying multiple myeloma; this will help with osteoporosis as well  -repeat DEXA scan in 1 year (10/2024)    Non myeloma findings (DJD of spine, spinal stenosis, compression fractures):  -mild L4-L5 spinal canal stenosis on MRI 05/04/2023   -lumbar DJD and facet arthrosis on MRI lumbar spine 05/04/2023  -MRI T-spine 08/08/2023:  No myeloma lesions   -MRI lumbar spine 09/14/2023:  Progression of skeletal demineralization; multilevel degenerative disc disease; severe spinal stenosis L3-L5; new/progressive loss of vertebral body height throughout the spine since 05/2023, etc.  -IR at [facility] will not perform kyphoplasty    10 mm exophytic hyperdense focus upper pole of right kidney, compatible with a hyperdense cyst, on CT abdomen pelvis with contrast 06/27/2023  -07/12/2023:  MRI abdomen with and without contrast (right renal cyst): Small exophytic right renal lesion most likely a proteinaceous or hemorrhagic cyst (10 mm, upper pole of right kidney)    Follow-up with NP in 1 month, with CBC and CMP    Above discussed at length with the patient.  All questions answered.    Discussed labs and scans and gave him copies of relevant reports.    He understands and agrees with this plan.  ----------------------------------    Discussion:    Supportive care for myeloma:  -bone targeted treatment: Bisphosphonate, category 1; or denosumab to reduce risk of skeletal related events; denosumab is preferred in patients with renal insufficiency; assess vitamin-D status; baseline dental exam; monitor for osteonecrosis of the jaw; monitor renal dysfunction with bisphosphonate therapy  -continue bone targeted treatment (bisphosphonate or denosumab) for 2 years; continue beyond 2 years should be  based on clinical judgment  -patients receiving denosumab for bone disease who subsequently discontinued therapy should be given maintenance denosumab every 6 months or a single dose of bisphosphonate to mitigate risk of rebound osteoporosis  -for hypercalcemia, zoledronic acid, denosumab, steroids, and/or calcitonin  -for hyperviscosity, plasmapheresis should be used as adjuvant therapy for symptomatic hyperviscosity  -intravenous immunoglobulin therapy should be considered in the setting of recurrent serious infection and/or hypogammaglobulinemia (IgG </= 400 mg/dL)  -pneumococcal conjugate vaccine, followed by pneumococcal polysaccharide vaccine 1 year later  -Influenza vaccination is recommended; 2 doses of high-dose inactivated quandaivalent influenza vaccine should be considered  -consider 12 weeks of levofloxacin 500 mg p.o. daily at the time of initial diagnosis of multiple myeloma  -COVID-19 vaccination  -highest risk for VTE is in the 1st 6 months following new diagnosis of multiple myeloma  -VTE prophylaxis if no contraindications to anticoagulation agents or antiplatelets  Recommendations for VTE prophylaxis:  Aspirin  mg daily; low molecular weight heparin equivalent to enoxaparin 40 mg daily; Xarelto 10 mg daily; Eliquis 2.5 mg b.i.d.; Arixtra 2.5 mg daily; Coumadin with target INR 2.0-3.0    Follow-up:  No follow-ups on file.

## 2024-03-18 ENCOUNTER — OFFICE VISIT (OUTPATIENT)
Dept: HEMATOLOGY/ONCOLOGY | Facility: CLINIC | Age: 69
End: 2024-03-18
Attending: INTERNAL MEDICINE
Payer: MEDICARE

## 2024-03-18 ENCOUNTER — INFUSION (OUTPATIENT)
Dept: INFUSION THERAPY | Facility: HOSPITAL | Age: 69
End: 2024-03-18
Attending: INTERNAL MEDICINE
Payer: MEDICARE

## 2024-03-18 VITALS
DIASTOLIC BLOOD PRESSURE: 73 MMHG | WEIGHT: 150.38 LBS | HEART RATE: 70 BPM | TEMPERATURE: 98 F | BODY MASS INDEX: 22.79 KG/M2 | SYSTOLIC BLOOD PRESSURE: 117 MMHG | OXYGEN SATURATION: 100 % | HEIGHT: 68 IN | RESPIRATION RATE: 18 BRPM

## 2024-03-18 DIAGNOSIS — C90.00 IGG MULTIPLE MYELOMA: ICD-10-CM

## 2024-03-18 DIAGNOSIS — M51.36 DEGENERATION OF LUMBAR INTERVERTEBRAL DISC: ICD-10-CM

## 2024-03-18 DIAGNOSIS — Z94.84 STEM CELLS TRANSPLANT STATUS: ICD-10-CM

## 2024-03-18 DIAGNOSIS — C79.51 SECONDARY MALIGNANCY OF LUMBAR VERTEBRAL COLUMN: ICD-10-CM

## 2024-03-18 DIAGNOSIS — K90.0 CELIAC DISEASE: ICD-10-CM

## 2024-03-18 DIAGNOSIS — C90.00 IGG MULTIPLE MYELOMA: Primary | ICD-10-CM

## 2024-03-18 DIAGNOSIS — Z86.11 HISTORY OF TUBERCULOSIS: ICD-10-CM

## 2024-03-18 DIAGNOSIS — M81.0 OSTEOPOROSIS OF FEMUR WITHOUT PATHOLOGICAL FRACTURE: ICD-10-CM

## 2024-03-18 DIAGNOSIS — M48.54XA NONTRAUMATIC COMPRESSION FRACTURE OF T8 VERTEBRA, INITIAL ENCOUNTER: ICD-10-CM

## 2024-03-18 DIAGNOSIS — S32.010G COMPRESSION FRACTURE OF L1 VERTEBRA WITH DELAYED HEALING, SUBSEQUENT ENCOUNTER: Primary | ICD-10-CM

## 2024-03-18 DIAGNOSIS — D84.821 DRUG-INDUCED IMMUNODEFICIENCY: ICD-10-CM

## 2024-03-18 DIAGNOSIS — S32.020G COMPRESSION FRACTURE OF L2 VERTEBRA WITH DELAYED HEALING, SUBSEQUENT ENCOUNTER: ICD-10-CM

## 2024-03-18 DIAGNOSIS — M48.061 SPINAL STENOSIS OF LUMBAR REGION, UNSPECIFIED WHETHER NEUROGENIC CLAUDICATION PRESENT: ICD-10-CM

## 2024-03-18 DIAGNOSIS — Z79.899 DRUG-INDUCED IMMUNODEFICIENCY: ICD-10-CM

## 2024-03-18 DIAGNOSIS — C79.51 SECONDARY MALIGNANT NEOPLASM OF BONE: ICD-10-CM

## 2024-03-18 PROCEDURE — 63600175 PHARM REV CODE 636 W HCPCS: Performed by: INTERNAL MEDICINE

## 2024-03-18 PROCEDURE — 99214 OFFICE O/P EST MOD 30 MIN: CPT | Mod: S$PBB,,, | Performed by: INTERNAL MEDICINE

## 2024-03-18 PROCEDURE — 99215 OFFICE O/P EST HI 40 MIN: CPT | Mod: PBBFAC | Performed by: INTERNAL MEDICINE

## 2024-03-18 PROCEDURE — 96374 THER/PROPH/DIAG INJ IV PUSH: CPT

## 2024-03-18 RX ORDER — ZOLEDRONIC ACID 0.04 MG/ML
4 INJECTION, SOLUTION INTRAVENOUS
Status: COMPLETED | OUTPATIENT
Start: 2024-03-18 | End: 2024-03-18

## 2024-03-18 RX ORDER — SODIUM CHLORIDE 0.9 % (FLUSH) 0.9 %
10 SYRINGE (ML) INJECTION
Status: DISCONTINUED | OUTPATIENT
Start: 2024-03-18 | End: 2024-03-18 | Stop reason: HOSPADM

## 2024-03-18 RX ORDER — HEPARIN 100 UNIT/ML
500 SYRINGE INTRAVENOUS
Status: DISCONTINUED | OUTPATIENT
Start: 2024-03-18 | End: 2024-03-18 | Stop reason: HOSPADM

## 2024-03-18 RX ADMIN — ZOLEDRONIC ACID 4 MG: 0.04 INJECTION, SOLUTION INTRAVENOUS at 03:03

## 2024-03-18 NOTE — Clinical Note
Continue acyclovir until November 2024  Continue Zometa every 4 weeks Calcium and vitamin-D supplements Check CBC and CMP monthly DEXA scan in October SPEP, BUBBA, FLC assay in 3 months (mid June) Follow-up with NP in 1 month, with CBC and CMP

## 2024-03-22 NOTE — PROGRESS NOTES
[facility] OFFICE VISIT NOTE  Yuriy Díaz  [id number]  03/28/2024      Chief Complaint: Edema      HPI    68 y.o. male     Follow up pedal edema  - denies pedal edema today  - reports occasional pedal edema at night, elevates feet on multiple pillows, resolves by the morning  - denies chest pain, shortness of breath, swelling above ankles, pain, redness or warmth in calves    - no other complaints today   - lab work ordered last visit not completed    ROS:  As per HPI     PE:  Vitals:    03/28/24 1331   BP: 100/60   Pulse: 62   Resp: 17   Temp: 98 °F (36.7 °C)     General: appears well, in no acute distress   Respiratory: clear to auscultation bilaterally, nonlabored respirations   Cardiovascular: regular rate and rhythm without murmurs    Extremities: no edema in bilateral lower extremities, calves equal in size without redness, warmth or tenderness  Musculoskeletal: wearing back brace, gait slowed, but overall wnl      Assessment:   1. Dependent edema        Plan:  - resolved on exam today, continue to monitor  - continue elevation of lower extremities prn   - reminded patient of blood work to be done at outpatient lab (TSH, BNP, A1c, lipid panel, urine microalbumin/Cr)   - patient to obtain recent ECHO records from Cardiologist and bring to next visit   - ED/return precautions to include unilateral leg swelling, redness, warmth or tenderness to calves, painful swelling, chest pain or shortness of breath     Return to clinic in 2 months for follow up DM II and HTN or sooner if needed.     Nena Elliott M.D. -III  [facility]

## 2024-03-22 NOTE — PROGRESS NOTES
USPSTF screening recommendations note     Colon cancer screening (45-75 yo, interval depending on screening test): colonoscopy 6/2022, 5 mm tubular adenoma ascending colon    Non smoker  ASCVD risk (statin if 40-74 yo with > 10% risk): on statin  DM II screen (35-69 yo who are overweight or obese): h/o impaired glucose tolerance   HIV (15-66 yo): NR 6/2023  Syphilis (increased risk): no increased risk   Hep C (18-78 yo): NR 6/2023    Screenings UTD.     Nena Elliott MD  [employer]  Resident, [facility]

## 2024-03-27 LAB
BEAKER SEE SCANNED REPORT: NORMAL

## 2024-03-28 ENCOUNTER — OFFICE VISIT (OUTPATIENT)
Dept: FAMILY MEDICINE | Facility: CLINIC | Age: 69
End: 2024-03-28
Payer: MEDICARE

## 2024-03-28 VITALS
HEIGHT: 68 IN | DIASTOLIC BLOOD PRESSURE: 60 MMHG | RESPIRATION RATE: 17 BRPM | WEIGHT: 151 LBS | BODY MASS INDEX: 22.88 KG/M2 | SYSTOLIC BLOOD PRESSURE: 100 MMHG | TEMPERATURE: 98 F | HEART RATE: 62 BPM | OXYGEN SATURATION: 98 %

## 2024-03-28 DIAGNOSIS — R60.9 DEPENDENT EDEMA: Primary | ICD-10-CM

## 2024-03-28 PROCEDURE — 99215 OFFICE O/P EST HI 40 MIN: CPT | Mod: PBBFAC | Performed by: STUDENT IN AN ORGANIZED HEALTH CARE EDUCATION/TRAINING PROGRAM

## 2024-04-18 ENCOUNTER — DOCUMENTATION ONLY (OUTPATIENT)
Dept: HEMATOLOGY/ONCOLOGY | Facility: CLINIC | Age: 69
End: 2024-04-18
Payer: COMMERCIAL

## 2024-04-18 NOTE — NURSING
Dr. Clark's progress notes scanned into media.    Dr. Clark requesting CBC weekly.     Please advise.

## 2024-04-19 ENCOUNTER — INFUSION (OUTPATIENT)
Dept: INFUSION THERAPY | Facility: HOSPITAL | Age: 69
End: 2024-04-19
Attending: INTERNAL MEDICINE
Payer: COMMERCIAL

## 2024-04-19 ENCOUNTER — OFFICE VISIT (OUTPATIENT)
Dept: HEMATOLOGY/ONCOLOGY | Facility: CLINIC | Age: 69
End: 2024-04-19
Payer: MEDICARE

## 2024-04-19 VITALS
HEIGHT: 68 IN | OXYGEN SATURATION: 99 % | RESPIRATION RATE: 18 BRPM | SYSTOLIC BLOOD PRESSURE: 115 MMHG | WEIGHT: 146.38 LBS | HEART RATE: 63 BPM | DIASTOLIC BLOOD PRESSURE: 71 MMHG | TEMPERATURE: 98 F | BODY MASS INDEX: 22.19 KG/M2

## 2024-04-19 VITALS
TEMPERATURE: 98 F | SYSTOLIC BLOOD PRESSURE: 132 MMHG | WEIGHT: 146.38 LBS | BODY MASS INDEX: 22.19 KG/M2 | RESPIRATION RATE: 19 BRPM | HEIGHT: 68 IN | HEART RATE: 66 BPM | DIASTOLIC BLOOD PRESSURE: 74 MMHG | OXYGEN SATURATION: 99 %

## 2024-04-19 DIAGNOSIS — C90.00 IGG MULTIPLE MYELOMA: Primary | ICD-10-CM

## 2024-04-19 DIAGNOSIS — N52.9 ERECTILE DYSFUNCTION, UNSPECIFIED ERECTILE DYSFUNCTION TYPE: Primary | ICD-10-CM

## 2024-04-19 DIAGNOSIS — N52.9 ERECTILE DYSFUNCTION, UNSPECIFIED ERECTILE DYSFUNCTION TYPE: ICD-10-CM

## 2024-04-19 DIAGNOSIS — M81.0 OSTEOPOROSIS OF FEMUR WITHOUT PATHOLOGICAL FRACTURE: ICD-10-CM

## 2024-04-19 DIAGNOSIS — C90.00 IGG MULTIPLE MYELOMA: ICD-10-CM

## 2024-04-19 DIAGNOSIS — M54.50 RIGHT-SIDED LOW BACK PAIN WITHOUT SCIATICA, UNSPECIFIED CHRONICITY: ICD-10-CM

## 2024-04-19 DIAGNOSIS — Z79.899 LONG-TERM USE OF HIGH-RISK MEDICATION: ICD-10-CM

## 2024-04-19 DIAGNOSIS — C79.51 SECONDARY MALIGNANCY OF LUMBAR VERTEBRAL COLUMN: ICD-10-CM

## 2024-04-19 DIAGNOSIS — Z94.84 STEM CELLS TRANSPLANT STATUS: ICD-10-CM

## 2024-04-19 LAB — TESTOST SERPL-MCNC: 468.42 NG/DL (ref 220.91–715.81)

## 2024-04-19 PROCEDURE — 99215 OFFICE O/P EST HI 40 MIN: CPT | Mod: S$PBB,,, | Performed by: NURSE PRACTITIONER

## 2024-04-19 PROCEDURE — 96365 THER/PROPH/DIAG IV INF INIT: CPT

## 2024-04-19 PROCEDURE — 25000003 PHARM REV CODE 250: Performed by: INTERNAL MEDICINE

## 2024-04-19 PROCEDURE — 84403 ASSAY OF TOTAL TESTOSTERONE: CPT

## 2024-04-19 PROCEDURE — 99215 OFFICE O/P EST HI 40 MIN: CPT | Mod: PBBFAC,25 | Performed by: NURSE PRACTITIONER

## 2024-04-19 PROCEDURE — 63600175 PHARM REV CODE 636 W HCPCS: Performed by: INTERNAL MEDICINE

## 2024-04-19 RX ORDER — HEPARIN 100 UNIT/ML
500 SYRINGE INTRAVENOUS
Status: DISCONTINUED | OUTPATIENT
Start: 2024-04-19 | End: 2024-04-19 | Stop reason: HOSPADM

## 2024-04-19 RX ORDER — SODIUM CHLORIDE 0.9 % (FLUSH) 0.9 %
10 SYRINGE (ML) INJECTION
Status: CANCELLED | OUTPATIENT
Start: 2024-05-17

## 2024-04-19 RX ORDER — SODIUM CHLORIDE 0.9 % (FLUSH) 0.9 %
10 SYRINGE (ML) INJECTION
Status: DISCONTINUED | OUTPATIENT
Start: 2024-04-19 | End: 2024-04-19 | Stop reason: HOSPADM

## 2024-04-19 RX ORDER — ZOLEDRONIC ACID 0.04 MG/ML
4 INJECTION, SOLUTION INTRAVENOUS
Status: COMPLETED | OUTPATIENT
Start: 2024-04-19 | End: 2024-04-19

## 2024-04-19 RX ORDER — HEPARIN 100 UNIT/ML
500 SYRINGE INTRAVENOUS
Status: CANCELLED | OUTPATIENT
Start: 2024-05-17

## 2024-04-19 RX ORDER — ZOLEDRONIC ACID 0.04 MG/ML
4 INJECTION, SOLUTION INTRAVENOUS
Status: CANCELLED | OUTPATIENT
Start: 2024-05-17

## 2024-04-19 RX ADMIN — SODIUM CHLORIDE: 9 INJECTION, SOLUTION INTRAVENOUS at 09:04

## 2024-04-19 RX ADMIN — ZOLEDRONIC ACID 4 MG: 0.04 INJECTION, SOLUTION INTRAVENOUS at 09:04

## 2024-04-19 NOTE — NURSING
After labs & MIKEY Wiley NP Oncology visit, pt received q4wk Zometa infusion via peripheral IV without incident; pt scheduled for next lab/Zometa on 5/17/24; pt not yet scheduled for next Oncology Clinic/Provider visit at time of discharge from the Infusion; pt prefers using the portal to paper; notified PHANI Person J Landry, MA for MIKEY Singletary NP & Infusion Supervisor ANDIE Melton RN Oncology/Provider visit not scheduled at time of discharge.

## 2024-04-19 NOTE — Clinical Note
Infusion today's Zometa infusion Return to infusion in 4 weeks on 5/17/24 with lab work prior to Zometa Follow up with NP in 8 weeks on 6/14/24 with lab work plus infusion for Zometa

## 2024-04-19 NOTE — PROGRESS NOTES
Reason for Follow-up:  Reason for consultation:  -multiple myeloma, IgG kappa  -worsening low back pain    Current Treatment:  Zometa 4mg IV every 28 days  Start 7/13/2023    Treatment History:  VRD (7/13/2023-11/21/23)  ASCT 11/30/2023    Past medical history:  NIDDM. Dyslipidemia.  GERD.  Lumbar spinal stenosis.  Vitamin-D deficiency.  Allergic rhinitis.  HTN.  Bell's palsy.  Celiac disease.  Hemorrhoids.  Mitral regurgitation 01/05/2018.  Rheumatic valve narrowing and leaking mitral valve.  -02/02/2018 ([facility]):  АНДРЕЙ, right minimally invasive anterior thoracotomy, right femoral artery and vein exploration, extensive right pleural adhesiolysis/pneumolysis, bilateral Maze/ablation (extensive), left atrial appendage clip placement for left atrial appendage exclusion, mitral valve repair (complex repair with 26 mm annuloplasty ring)  Social history:  .  Lives in [place], [place].  Five children.  Owns a [employer] business.  No history of tobacco, alcohol, or illicit drug abuse.    Family history:  Negative for cancers or blood dyscrasias.    Health maintenance:  PCP in family medicine clinic, [facility].  Had EGD and colonoscopy done 1 year ago by Dr. Zoltan Kapoor, [facility], apparently unremarkable (he gets the endoscopies done periodically because of history of celiac disease).      History of Present Illness:     Oncologic/Hematologic History:  Oncology History   IgG multiple myeloma   6/23/2023 Initial Diagnosis    IgG multiple myeloma     7/13/2023 - 11/21/2023 Chemotherapy    Treatment Summary   Plan Name: OP VRD - WEEKLY BORTEZOMIB LENALIDOMIDE DEXAMETHASONE Q3W  Treatment Goal: Curative  Status: Inactive  Start Date: 7/13/2023  End Date: 11/21/2023  Provider: Narciso Lundy MD  Chemotherapy: bortezomib (VELCADE) injection 2.25 mg, 2.25 mg, Subcutaneous, Clinic/HOD 1 time, 6 of 9 cycles  Administration: 2.25 mg (7/13/2023), 2.25 mg (7/20/2023), 2.25 mg  (7/27/2023), 2.25 mg (8/10/2023), 2.25 mg (8/17/2023), 2.25 mg (8/24/2023), 2.25 mg (8/31/2023), 2.25 mg (9/7/2023), 2.25 mg (9/21/2023), 2.25 mg (9/14/2023), 2.25 mg (9/28/2023), 2.25 mg (10/12/2023), 2.25 mg (10/5/2023), 2.25 mg (10/19/2023), 2.25 mg (10/26/2023), 2.25 mg (11/21/2023)  lenalidomide 25 mg Cap, 25 mg, , , 1 of 1 cycle, Start date: 11/3/2023, End date: 2/15/2024     67-year-old gentleman, referred from [facility], with newly diagnosed multiple myeloma.      05/14/2023: [facility] note:  Low back pain, onset early March   - localized to back with occasional sharp pain down posterior aspect of right leg  - fluctuating in intensity, overall worsening since onset, no change in character, difficulty doing ADLs, walking with pain  - difficulty sleeping   - seen in urgent care and C multiple times  - MRI 5/4/23: degenerative disc disease and facet arthrosis, mild spinal canal stenosis L4-L5 greater than L3-L4, no high grade stenosis   - Toradol IM helps briefly  - side effects from gabapentin, Robaxin and Norco- GI and drowsiness   - Voltaren gel not helping  - stopped going to PT due to pain  - follows with Neurologist   - requests trial of tramadol and Lyrica   - going out of country soon, concerned he will not be able to go due to pain  - denies bowel/ bladder incontinence, night sweats, unexplained weight loss, chest pain or shortness of breath   Spinal stenosis   Acute bilateral low back pain with right-sided sciatica    05/18/2023: [facility] note:  Low back pain.  Onset early March 2023.  Localized lumbar region.  Constant.  Occasional radiation down posterior right leg.  Difficulty sleeping in bed because of exacerbation of pain by lying down flat.  Compensating by sleeping in a recliner or on sofa. MRI showed degenerative disc disease and facet arthrosis at multiple levels with mild canal stenosis.  - Has attempted gabapentin, Robaxin, Norco, Lyrica, and  tramadol with minimal relief.  Currently being managed with tramadol and Lyrica.  - Told by neurosurgery that he needs pain management referral because NS does not deal with injections  - Denies saddle anesthesia, bowel/bladder incontinence, weight loss, night sweats    Spinal stenosis of lumbar region  Degeneration of lumbar intervertebral disc  - Continue treatment with tramadol and Lyrica as prescribed  Bradycardia  - Patient aware of chronic bradycardia, states he has been this way for 3-4 years due to palpitations if HR >60  - Currently on Toprol XL 50 mg  - Follows with [facility]    06/13/2023:  [facility] note:   Acute Issues:   Weight loss- 6 weeks history of weight loss 12-14 lb unintentionally.  Reports appetite has been somewhat decreased secondary to pain though has noticed increased size of abdomen.  He denies nausea, vomiting, dysphagia, dyspepsia, bloody stools, staying changes in stool caliber, constipation, fevers or night sweats, nervousness/anxiousness, skin or hair changes.  Denies history of smoking or alcohol use.  Receives EGD and colonoscopy every 2 years per Dr. Kapoor [place], last was roughly 1 year ago. PSA wnl 5/2023.   Chronic Issues: DM- med compliant metformin 500 daily, last A1C 6.1 in 1/2023.   Chronic medical conditions:  Hypertension.  Dyslipidemia.  GERD.  Lumbar spinal stenosis.  Vitamin-D deficiency.  Allergic rhinitis.  HTN      Investigations reviewed:  05/04/2023: MRI lumbar spine without contrast (worsening lumbar pain x5-7 weeks with bilateral sciatica):   Lumbar degenerative disc disease and facet arthrosis as detailed above with no acute pathology identified.    Mild spinal canal stenosis at L4-L5 greater than L3-L4 with no high-grade spinal canal stenosis.   Mild bilateral neural foramen stenosis at L3-L4 and on the left greater than right at L4-L5.    06/14/2023:  Skeletal survey:   No definite lesions to suggest either lytic and or sclerotic metastatic  changes.   Multiple compression deformities mostly in the lumbar spine but these are seen also in the thoracic spine; other imaging modalities might prove helpful for further assessment; some of these compression deformities appear to be relatively new as compared with an MR of May 2023  (There are some degenerative changes of the thoracic spine with a mild compression deformity of superior endplate of 1 of the lower thoracic vertebral body levels; there are multiple compression deformities of the lumbar spine including the superior endplate of L3 and L4 there is a compression deformity of the superior endplate of T11 with compression deformities of the superior endplate of T12 and a compression deformity of L1; some of these compression deformities appear to be new since the last exam (MR) of May 4, 2023 other imaging modalities might prove helpful for further assessment    Labs reviewed:  01/12/2023:  Hemoglobin 11.9.  MCV 98.7.  Ferritin 211.66.  Transferrin saturation 42%.  Vitamin B12 969.  Folate 13.9.    06/14/2023:  Hemoglobin 11.0.  MCV 99.1.  ESR 15, normal.    06/14/2023: IgG 3810 mg/dL, elevated.  IgM 11, suppressed.  IgA 33, suppressed.  2.28 gm/dL IgG kappa monoclonal protein on SPEP and BUBBA.  Kappa 99.4, elevated.  Lambda 0.3400, suppressed.  Kappa/lambda ratio 292, elevated.    05/01/2023: PSA 1.7, normal.  06/14/2023:  BUN 15.  Creatinine 0.79.  Calcium 9.6.  Albumin 3.6.  LFTs normal.  TSH normal.  06/16/2023:  , normal.    06/15/2023:  24 hour urine protein 288 mg, elevated (reference Range:  < 229). M spike 183 mg. Monoclonal kappa.    06/16/2023:  Urine:  Kappa 25.5 mg/dL; lambda < 0.7000 mg/dL; kappa/lambda ratio> 36.4    Interval History 4/19/2024: Patient presents alone to clinic for scheduled follow up for Multiple Myeloma.  He is due to receive Zometa infusion today.  Patient recently seen by Dr. Mahajan.  He reports compliance with acyclovir, Bactrim, Revlimid 10 mg daily.  He also  takes gabapentin for lower back pain.  Patient says his biggest reportable symptom is lower back pain of which he is currently still undergoing physical therapy.  Patient says he has a decreased sex drive.  He will follow up with Dr. Mahajan mid July.  He will return to [place] for immunizations 1st part of June.  Lab work reviewed with the patient stable.  We discussed plan of care and follow-up.      Review of Systems   Musculoskeletal:  Positive for back pain, joint pain, myalgias and neck pain.   Neurological:  Positive for tingling.   All other systems reviewed and are negative.        Physical Exam  Constitutional:       Appearance: Normal appearance.   HENT:      Head: Normocephalic.      Mouth/Throat:      Mouth: Mucous membranes are moist.   Eyes:      Pupils: Pupils are equal, round, and reactive to light.   Cardiovascular:      Rate and Rhythm: Normal rate and regular rhythm.      Pulses: Normal pulses.      Heart sounds: Normal heart sounds.   Pulmonary:      Effort: Pulmonary effort is normal.      Breath sounds: Normal breath sounds.   Abdominal:      General: Bowel sounds are normal.      Palpations: Abdomen is soft.   Musculoskeletal:         General: Normal range of motion.      Cervical back: Normal range of motion.   Skin:     General: Skin is warm and dry.      Capillary Refill: Capillary refill takes less than 2 seconds.   Neurological:      General: No focal deficit present.      Mental Status: He is alert and oriented to person, place, and time.   Psychiatric:         Attention and Perception: Attention normal.         Mood and Affect: Affect normal.         Speech: Speech normal.         Behavior: Behavior normal.         Thought Content: Thought content normal.       VITAL SIGNS:   Vitals:    04/19/24 0845   BP: 132/74   Pulse: 66   Resp: 19   Temp: 98.1 °F (36.7 °C)         Assessment:  Multiple myeloma, IgG kappa:  -presentation: 03/2023:  Worsening low back pain, no associated  neurological symptoms, 12-14 pound weight loss over 6 weeks, mild L4-L5 spinal canal stenosis on MRI (05/04/2023), lumbar DJD and facet arthrosis on MRI lumbar spine (05/04/2023)  -skeletal survey 06/14/2023: Negative for lytic or sclerotic lesions; multiple compression deformities lumbar spine and thoracic spine (new since MRI scan dated 05/04/2023)  -06/14/2023:  Mild anemia (hemoglobin 11.0)   -06/14/2023:  2.28 gm/dL IgG kappa M protein. Kappa 99.4, elevated.  Lambda 0.3400, suppressed.  Kappa/lambda ratio 292, elevated.  -06/14/2023:  BUN, creatinine, calcium, albumin normal  -06/15/2022:  24 hour urine protein 280 mg, elevated.  M spike 183 mg.  Urine BUBBA showed monoclonal kappa light chains.  -06/16/2023: Urine:  Kappa 25.5 mg/dL; lambda < 0.7000 mg/dL; kappa/lambda ratio> 36.4  06/16/2023:  , normal.  -06/26/2023:  Hemoglobin 11.0.  Beta 2 microglobulin 3.18.  Calcium 10.1.  Albumin 3.2.  Hepatitis-A/B/C/HIV serology negative.  -bone marrow biopsy 06/27/2022:  Plasma cells 50% in bone marrow aspirate; plasma cells 36% on flow cytometry of bone marrow aspirate, with kappa light chain restriction; molecular studies pending  -CT abdomen pelvis with contrast 06/27/2023:  Hepatic steatosis; bilateral inguinal hernias; heterogeneous bone demineralization, suggesting marrow infiltrative process like multiple myeloma; multilevel compression deformities in the included thoracic spine and lumbar spine; 10 mm exophytic hyperdense focus upper pole of right kidney, compatible with a hyperdense cyst  -FDG PET-CT 06/29/2023:  Left femur lesser trochanter; right 4th rib laterally; multilevel thoracolumbar compression deformities; subacute fractures T8, T12, L1, L2  >>>  From the data available so far, patient has multiple myeloma (symptomatic), by virtue of:  1. Involved: Uninvolved serum FLC ratio =/> 100 and involved FLC concentration 10 mg/dL or higher (in this patient, kappa/lambda ratio age 292, and kappa light  chain 99.4 mg/dL)   2. Hypercalcemia (06/26/2023): Calcium 10.1.  Albumin 3.2   3. Bone marrow plasmacytosis 50%  4. Heterogeneous bone demineralization on CT 06/27/2023, suggesting bone marrow infiltrative process like multiple myeloma  5. FDG PET-CT 06/29/2023:  Left femur lesser trochanter; right 4th rib laterally; multilevel thoracolumbar compression deformities; subacute fractures T8, T12, L1, L2  6. Beta 2 microglobulin level 3.18, elevated (06/26/2023)   7. No renal insufficiency, no significant anemia, LDH normal  >>>  -Velcade started 07/13/2023  -Zometa 4 mg IV every 4 weeks) x2 years), started 07/13/2023  -06/27/2023: Bone marrow biopsy:  Abnormal myeloma FISH panel:  Aneuploidy: gain of chromosomes 7, 9, 15 and CCND1/11q) gain of chromosome 7; gain of chromosome 9; gain of chromosome 15) (aneuploidy is the most common genetic alteration detected in plasma cell myeloma by FISH analysis, seen in approximately 69-90% of patients)  Additional signal for IGH/14q DNA sequence (clinical significance of this finding is unclear)  Loss of MAF/16q) also a common finding in plasma cell neoplasms and is reported to be associated with a poor prognosis in patients with myeloma)  -07/21/2023: Radiation oncology consultation:  Radiotherapy to spine not indicated/will not be helpful; patient referred to IR for kyphoplasty  -MRI thoracic spine 08/08/2023:  Chronic superior endplate compression fractures T9-L2 vertebral bodies, no acute/subacute fracture, no osseous lesion or soft tissue mass  -MRI lumbar spine 09/14/2023:  Significant progression of skeletal demineralization; new/progressive loss of vertebral body height throughout the spine since 05/2023; superior endplate L4 acute/subacute with marrow edema and enhancement; multilevel degenerative disc disease; spinal stenosis severe at L3-L5 with tethering of descending nerve root; moderate spinal canal stenosis at L4-L5 and L2-L3  -08/03/2023: TSH normal; free T4  normal  -06/14/2023: IgG 3810, elevated; IgM, IgA suppressed; kappa/lambda ratio 292; IgG kappa monoclonal protein 2.2 gm/dL  -07/27/2023:  IgG 2242, elevated but down; IgM, IgA remain suppressed; kappa/lambda ratio of 59.0, down; IgG kappa monoclonal protein 1.36 gm/dL (chemotherapy was started 07/13/2023)  -08/24/2023: IgG 792, has normalized; IgM, IgA remains suppressed; kappa/lambda ratio 5.72, elevated but significantly down; IgG kappa monoclonal protein 0.39 gm/dL, significantly down  -VRD cycle 5:  10/12/2023-10/26/2023  -restaging bone marrow biopsy 10/24/2023, post VRD X 4-1/2 cycles:  Normocellular; < 1% plasma cells; no clonal or abnormal plasma cell population on flow cytometry; no high-risk FISH findings  -11/08/2023: No monoclonal protein in urine  -11/09/2023: TSH, free T4 normal   -11/09/2023:  No monoclonal protein on SPEP and BUBBA; kappa/lambda ratio 1.36, normalized; kappa free light chains 2.29 mg/dL (to recall, 99.4 on 06/14/2023 before starting chemotherapy)  -cycle 6 day 1 of Revlimid 11/21/2023; subsequently, on hold in anticipation of stem cell transplant  >>>  ASCT at [facility]:  -melphalan 360 mg IV administered day -2o (11/28/2023) (200 mg per m2 per day)  -ASCT infusion day 0 (11/30/2023)  -filgrastim 5 microgram/kg subQ every 24 hours, started day +5 (12/05/2023)  -infectious prophylaxis  -pentamidine 300 mg inhaled once on day-2 (11/28/2023)  -acyclovir 800 mg p.o. q.12 hours starting day-2 (11/28/2023), to continue for 1 year  -fluconazole 400 mg p.o. Q 24 hours starting day-2 (11/28/2023), continue until engraftment and/or mucositis resolved  -cefepime started 12/08/2023, vancomycin added  -IVIG on 12/10/2023  -12/12/2023:  HCT day +12; doing well on antibiotics; counts recovered  -12/12/2023: Transfusion independent and ANC has recovered  -12/13/2023: Patient discharged  -venous Doppler both lower extremities 02/29/2024: Pedal edema: No DVT   -03/11/2024:  Hemoglobin 11.2; CMP  unremarkable   -03/11/2024 (day + 102):  No monoclonal protein in blood; FLC assay normal   -restaging bone marrow biopsy 03/12/2024 (day +103):  No increase in plasma cells        Staging of myeloma:  -serum beta 2 microglobulin elevated (3.18)  -serum albumin and LDH normal   -myeloma FISH panel on bone marrow:  Negative for high-risk cytogenetic abnormalities  >>>  ISS stage:  Stage I  R-ISS stage:  Stage I      Osteoporosis on hips on DEXA scan 10/11/2023        Non myeloma findings (DJD of spine, spinal stenosis, compression fractures):  -mild L4-L5 spinal canal stenosis on MRI 05/04/2023   -lumbar DJD and facet arthrosis on MRI lumbar spine 05/04/2023  -MRI T-spine 08/08/2023:  No myeloma lesions   -MRI lumbar spine 09/14/2023:  Progression of skeletal demineralization; multilevel degenerative disc disease; severe spinal stenosis L3-L5; new/progressive loss of vertebral body height throughout the spine since 05/2023, etc.  -IR at [facility] will not perform kyphoplasty      Co-morbidities:  NIDDM.  Dyslipidemia.  Hypertension.  Celiac disease.  History of rheumatic mitral valve disease, S/P Maze/ablation procedure 02/02/2018        Vaccination history:   COVID-19, MRNA, LN-S, PF (MODERNA FULL 0.5 ML DOSE) 8/18/2022 , 10/23/2021 , 3/17/2021 , 2/17/2021   Hepatitis A / Hepatitis B 8/18/2022 , 10/5/2021 , 7/29/2020   Influenza - Quadrivalent - MDCK - PF 10/5/2021   Influenza - Trivalent - MDCK - PF 9/10/2015   Meningococcal Conjugate (MCV4O) 8/18/2022 , 4/17/2012   Meningococcal Conjugate (MCV4P) 4/17/2012   Pneumococcal Conjugate - 20 Valent 1/12/2023   Tdap 7/29/2020   Zoster 11/18/2020 , 7/29/2020   Zoster Recombinant 11/18/2020 , 7/29/2020        10 mm exophytic hyperdense focus upper pole of right kidney, compatible with a hyperdense cyst, on CT abdomen pelvis with contrast 06/27/2023  -07/12/2023:  MRI abdomen with and without contrast (right renal cyst): Small exophytic right renal lesion most likely a  proteinaceous or hemorrhagic cyst (10 mm, upper pole of right kidney)      Plan:  Multiple myeloma, IgG kappa:    Autologous transplantation:   Category 1 evidence supports proceeding directly after induction therapy to high-dose therapy and HCT  Renal dysfunction and advanced age are not contraindications to transplant     Bortezomib/lenalidomide/dexamethasone (VRd), category 1 regimen:  Cycle length 21 days  -bortezomib 1.3 mg per m2 subQ days 1, 8, and 15  -lenalidomide 25 mg p.o. daily, on days 1 through 14  -dexamethasone 40 mg p.o. with food days 1, 8, and 15  Cycle length: 21 days    If response after primary therapy, then:  Autologous HCT versus continuation of myeloma therapy or maintenance therapy versus tandem autologous transplant, etc.    -Velcade started 07/13/2023  -Zometa 4 mg IV every 4 weeks x2 years (started 07/13/2023      -per Radiation Oncology, given radiologic findings, palliative radiotherapy to spine will not be helpful  -Dr. Brandy Mahajan, BMT, [facility], autologous hematopoietic stem cell transplant after 4 cycles of chemotherapy 11/28/23    -continue Zometa every 4 weeks x2 years (started 07/13/2023) Okay to give today  -return to infusion in 28 days with lab work (BMP) prior to Zometa infusion  -Continue acyclovir 800mg every 12 hours until November 2024   -Calcium and vitamin-D supplements  -per andrea Carlson CBC and CMP; repletion accordingly  -DEXA scan in October  -SPEP, BUBBA, FLC assay in 3 months (mid June)  -Follow-up with NP in 1 month, with CBC and CMP  -acyclovir was started at [facility] day minus 2 (-2) ASCT 11/28/2023; to continue for 1 year (until 11/2024)  -Follow up with NP in 8 weeks with lab wok (cbc,cmp) prior to Zometa infusion.    Decreased sex drive  Collect Testosterone level today    Monitoring while on VRD:  -Weekly assessment for peripheral neuropathy and/or neuropathic pain.  -Monitor for hypotension during bortezomib therapy; adjustment of antihypertensives and/or  administration of IV hydration may be needed.    Back Pain  -06/26/2023: Started Norco 5 mg p.o. q.6 hours PRN for pain; did not work; he stopped taking  -06/26/2023: Taking Norco 10 mg only twice daily; instructed him to take Norco 10 mg every 4-6 hours p.r.n. for pain (has 9/10 lower back pain, constant, radiating to anterior aspect of right thigh, without neurological symptoms)  -06/26/2023: Started levofloxacin 500 mg p.o. q.day X 12 weeks  -06/26/2023: Severe back pain; presumed due to myeloma; referred to Radiation Oncology  -on MRI thoracic and lumbar spine 08/08/2023 and 09/14/2023:  Chronic compression fractures of vertebral bodies; no osseous lesion or soft tissue mass; significant progression of skeletal demineralization; progressive loss of vertebral body height throughout the spine since May 2023; spinal stenosis  -patient has been referred to IR for consideration of kyphoplasty-IR @ [facility] will not perform Kyphoplasty  -Continue Physical Therapy with [facility]     Monitoring on lenalidomide:   Monitor for signs and symptoms of infection (if neutropenic), bleeding or bruising, hepatotoxicity, secondary malignancies, thromboembolism (eg, shortness of breath, chest pain, arm or leg swelling), dermatologic toxicity, tumor lysis syndrome, or hypersensitivity.     Monitoring on bortezomib:  Peripheral neuropathy, posterior reversible leukoencephalopathy syndrome, progressive multifocal leukoencephalopathy, tumor lysis syndrome, or hyper-/hypoglycemia. Monitor closely in patients with risk factors for heart failure or existing heart disease.     Autologous transplantation:   Category 1 evidence supports proceeding directly after induction therapy to high-dose therapy and HCT  Renal dysfunction and advanced age are not contraindications to transplant     If response after primary therapy, then:  Autologous HCT versus continuation of myeloma therapy or maintenance therapy versus tandem  autologous transplant, etc.       Above discussed at length with the patient.  All questions answered.      Nature of multiple myeloma discussed.    He understands and agrees with this plan.  ----------------------------------  Supportive care:  -bone targeted treatment: Bisphosphonate, category 1; or denosumab to reduce risk of skeletal related events; denosumab is preferred in patients with renal insufficiency; assess vitamin-D status; baseline dental exam; monitor for osteonecrosis of the jaw; monitor renal dysfunction with bisphosphonate therapy  -continue bone targeted treatment (bisphosphonate or denosumab) for 2 years; continue beyond 2 years should be based on clinical judgment  -patients receiving denosumab for bone disease who subsequently discontinued therapy should be given maintenance denosumab every 6 months or a single dose of bisphosphonate to mitigate risk of rebound osteoporosis  -for hypercalcemia, zoledronic acid, denosumab, steroids, and/or calcitonin  -for hyperviscosity, plasmapheresis should be used as adjuvant therapy for symptomatic hyperviscosity  -intravenous immunoglobulin therapy should be considered in the setting of recurrent serious infection and/or hypogammaglobulinemia (IgG </= 400 mg/dL)  -pneumococcal conjugate vaccine, followed by pneumococcal polysaccharide vaccine 1 year later  -Influenza vaccination is recommended; 2 doses of high-dose inactivated quandaivalent influenza vaccine should be considered  -consider 12 weeks of levofloxacin 500 mg p.o. daily at the time of initial diagnosis of multiple myeloma  -COVID-19 vaccination  -highest risk for VTE is in the 1st 6 months following new diagnosis of multiple myeloma  -VTE prophylaxis if no contraindications to anticoagulation agents or antiplatelets  Recommendations for VTE prophylaxis:  Aspirin  mg daily; low molecular weight heparin equivalent to enoxaparin 40 mg daily; Xarelto 10 mg daily; Eliquis 2.5 mg b.i.d.;  Arixtra 2.5 mg daily; Coumadin with target INR 2.0-3.0        Lenalidomide:  Embryo fetal toxicity  Hematologic toxicity.  Neutropenia.  Thrombocytopenia.  Venous and arterial thromboembolism.  DVT.  PE.  MI.  Stroke.  Dizziness, fatigue.  Tumor lysis syndrome.  Heart failure.    Used with caution in patients with renal impairment.  Lenalidomide =/> 4 cycles may decrease the # of CD34 pos cells collected for autologous stem cell transplant.  DVT, PE, atrial fibrillation, renal failure are more likely in patients> 65 years  Hepatotoxicity  Hypersensitivity reactions.  Anaphylaxis.  Angioedema.  Skin rash.  Eosinophilia.  Immediate hypersensitivity reactions.  Second primary malignancy.  AML.  MDS.  Solid tumor malignancies.  Nonmelanoma skin cancers.     Monitoring with lenalidomide:  -CBC with differential: weekly for the first 2 cycles, every 2 weeks during the third cycle and monthly thereafter;  -serum creatinine, LFTs (periodically).  -Thyroid function tests (TSH at baseline then every 2 to 3 months during lenalidomide treatment  -ECG when clinically indicated. Monitor for signs and symptoms of infection (if neutropenic), bleeding or bruising, hepatotoxicity, secondary malignancies, thromboembolism (eg, shortness of breath, chest pain, arm or leg swelling), dermatologic toxicity, tumor lysis syndrome, or hypersensitivity.  Patients who could become pregnant: Pregnancy test 10 to 14 days and 24 hours prior to initiating therapy, weekly during the first 4 weeks of treatment, then every 2 to 4 weeks through 4 weeks after therapy discontinued.     Bortezomib:  Bone marrow suppression.  Neutropenia.  Thrombocytopenia.  Acute CHF.  Nausea, vomiting, diarrhea, constipation, ileus.    Hepatotoxicity:  Herpes zoster and her PCP plaques reactivation.    Anaphylactic reactions.  Hypersensitive reactions.  Angioedema.  Laryngeal edema.    Hypotension.  Peripheral neuropathy.  Posterior reversible leukoencephalopathy  syndrome.  Progressive multifocal leukoencephalopathy.  Pulmonary toxicity.  Pneumonitis.  Interstitial pneumonia.  Lung infiltrates.  ARDS.  Thrombotic microangiopathy.  Tumor lysis syndrome.       Monitoring parameters with bortezomib:  CBC, CMP  Blood pressure (to monitor for hypotension)  Peripheral neuropathy  Posterior reversible leukoencephalopathy syndrome, progressive multifocal leukoencephalopathy, tumor lysis syndrome, or hyper-/hypoglycemia. Monitor baseline chest x-ray and then periodic pulmonary function testing (with new or worsening pulmonary symptoms). Monitor closely in patients with risk factors for heart failure or existing heart disease.

## 2024-04-26 ENCOUNTER — LAB VISIT (OUTPATIENT)
Dept: LAB | Facility: HOSPITAL | Age: 69
End: 2024-04-26
Attending: STUDENT IN AN ORGANIZED HEALTH CARE EDUCATION/TRAINING PROGRAM
Payer: MEDICARE

## 2024-04-26 ENCOUNTER — LAB VISIT (OUTPATIENT)
Dept: HEMATOLOGY/ONCOLOGY | Facility: CLINIC | Age: 69
End: 2024-04-26
Payer: MEDICARE

## 2024-04-26 DIAGNOSIS — R60.0 PEDAL EDEMA: ICD-10-CM

## 2024-04-26 DIAGNOSIS — C90.00 IGG MULTIPLE MYELOMA: ICD-10-CM

## 2024-04-26 DIAGNOSIS — E11.9 TYPE 2 DIABETES MELLITUS WITHOUT COMPLICATION, WITHOUT LONG-TERM CURRENT USE OF INSULIN: ICD-10-CM

## 2024-04-26 LAB
ALBUMIN SERPL-MCNC: 3.9 G/DL (ref 3.4–4.8)
ALBUMIN/GLOB SERPL: 1.2 RATIO (ref 1.1–2)
ALP SERPL-CCNC: 75 UNIT/L (ref 40–150)
ALT SERPL-CCNC: 15 UNIT/L (ref 0–55)
AST SERPL-CCNC: 18 UNIT/L (ref 5–34)
BASOPHILS # BLD AUTO: 0.05 X10(3)/MCL
BASOPHILS NFR BLD AUTO: 0.5 %
BILIRUB SERPL-MCNC: 0.3 MG/DL
BNP BLD-MCNC: 51.5 PG/ML
BUN SERPL-MCNC: 10.5 MG/DL (ref 8.4–25.7)
CALCIUM SERPL-MCNC: 9.4 MG/DL (ref 8.8–10)
CHLORIDE SERPL-SCNC: 106 MMOL/L (ref 98–107)
CHOLEST SERPL-MCNC: 118 MG/DL
CHOLEST/HDLC SERPL: 3 {RATIO} (ref 0–5)
CO2 SERPL-SCNC: 25 MMOL/L (ref 23–31)
CREAT SERPL-MCNC: 1.2 MG/DL (ref 0.73–1.18)
EOSINOPHIL # BLD AUTO: 0.23 X10(3)/MCL (ref 0–0.9)
EOSINOPHIL NFR BLD AUTO: 2.3 %
ERYTHROCYTE [DISTWIDTH] IN BLOOD BY AUTOMATED COUNT: 12.9 % (ref 11.5–17)
EST. AVERAGE GLUCOSE BLD GHB EST-MCNC: 114 MG/DL
GFR SERPLBLD CREATININE-BSD FMLA CKD-EPI: >60 MLS/MIN/1.73/M2
GLOBULIN SER-MCNC: 3.2 GM/DL (ref 2.4–3.5)
GLUCOSE SERPL-MCNC: 113 MG/DL (ref 82–115)
HBA1C MFR BLD: 5.6 %
HCT VFR BLD AUTO: 38.7 % (ref 42–52)
HDLC SERPL-MCNC: 38 MG/DL (ref 35–60)
HGB BLD-MCNC: 12.8 G/DL (ref 14–18)
IMM GRANULOCYTES # BLD AUTO: 0.06 X10(3)/MCL (ref 0–0.04)
IMM GRANULOCYTES NFR BLD AUTO: 0.6 %
LDLC SERPL CALC-MCNC: 58 MG/DL (ref 50–140)
LYMPHOCYTES # BLD AUTO: 3.08 X10(3)/MCL (ref 0.6–4.6)
LYMPHOCYTES NFR BLD AUTO: 30.8 %
MAGNESIUM SERPL-MCNC: 2.2 MG/DL (ref 1.6–2.6)
MCH RBC QN AUTO: 32.8 PG (ref 27–31)
MCHC RBC AUTO-ENTMCNC: 33.1 G/DL (ref 33–36)
MCV RBC AUTO: 99.2 FL (ref 80–94)
MONOCYTES # BLD AUTO: 1.69 X10(3)/MCL (ref 0.1–1.3)
MONOCYTES NFR BLD AUTO: 16.9 %
NEUTROPHILS # BLD AUTO: 4.89 X10(3)/MCL (ref 2.1–9.2)
NEUTROPHILS NFR BLD AUTO: 48.9 %
NRBC BLD AUTO-RTO: 0 %
PLATELET # BLD AUTO: 264 X10(3)/MCL (ref 130–400)
PMV BLD AUTO: 9.3 FL (ref 7.4–10.4)
POTASSIUM SERPL-SCNC: 4.3 MMOL/L (ref 3.5–5.1)
PROT SERPL-MCNC: 7.1 GM/DL (ref 5.8–7.6)
RBC # BLD AUTO: 3.9 X10(6)/MCL (ref 4.7–6.1)
SODIUM SERPL-SCNC: 140 MMOL/L (ref 136–145)
TRIGL SERPL-MCNC: 112 MG/DL (ref 34–140)
TSH SERPL-ACNC: 2.2 UIU/ML (ref 0.35–4.94)
VLDLC SERPL CALC-MCNC: 22 MG/DL
WBC # SPEC AUTO: 10 X10(3)/MCL (ref 4.5–11.5)

## 2024-04-26 PROCEDURE — 36415 COLL VENOUS BLD VENIPUNCTURE: CPT

## 2024-04-26 PROCEDURE — 80053 COMPREHEN METABOLIC PANEL: CPT

## 2024-04-26 PROCEDURE — 84443 ASSAY THYROID STIM HORMONE: CPT

## 2024-04-26 PROCEDURE — 85025 COMPLETE CBC W/AUTO DIFF WBC: CPT

## 2024-04-26 PROCEDURE — 80061 LIPID PANEL: CPT

## 2024-04-26 PROCEDURE — 83036 HEMOGLOBIN GLYCOSYLATED A1C: CPT

## 2024-04-26 PROCEDURE — 83735 ASSAY OF MAGNESIUM: CPT

## 2024-04-26 PROCEDURE — 83880 ASSAY OF NATRIURETIC PEPTIDE: CPT

## 2024-05-03 ENCOUNTER — LAB VISIT (OUTPATIENT)
Dept: HEMATOLOGY/ONCOLOGY | Facility: CLINIC | Age: 69
End: 2024-05-03
Payer: MEDICARE

## 2024-05-03 DIAGNOSIS — C90.00 IGG MULTIPLE MYELOMA: ICD-10-CM

## 2024-05-03 LAB
ALBUMIN SERPL-MCNC: 3.7 G/DL (ref 3.4–4.8)
ALBUMIN/GLOB SERPL: 1.2 RATIO (ref 1.1–2)
ALP SERPL-CCNC: 81 UNIT/L (ref 40–150)
ALT SERPL-CCNC: 15 UNIT/L (ref 0–55)
AST SERPL-CCNC: 17 UNIT/L (ref 5–34)
BASOPHILS # BLD AUTO: 0.06 X10(3)/MCL
BASOPHILS NFR BLD AUTO: 1 %
BILIRUB SERPL-MCNC: 0.2 MG/DL
BUN SERPL-MCNC: 8 MG/DL (ref 8.4–25.7)
CALCIUM SERPL-MCNC: 9.4 MG/DL (ref 8.8–10)
CHLORIDE SERPL-SCNC: 107 MMOL/L (ref 98–107)
CO2 SERPL-SCNC: 23 MMOL/L (ref 23–31)
CREAT SERPL-MCNC: 1.04 MG/DL (ref 0.73–1.18)
EOSINOPHIL # BLD AUTO: 0.41 X10(3)/MCL (ref 0–0.9)
EOSINOPHIL NFR BLD AUTO: 6.5 %
ERYTHROCYTE [DISTWIDTH] IN BLOOD BY AUTOMATED COUNT: 13.2 % (ref 11.5–17)
GFR SERPLBLD CREATININE-BSD FMLA CKD-EPI: >60 MLS/MIN/1.73/M2
GLOBULIN SER-MCNC: 3.2 GM/DL (ref 2.4–3.5)
GLUCOSE SERPL-MCNC: 86 MG/DL (ref 82–115)
HCT VFR BLD AUTO: 36.6 % (ref 42–52)
HGB BLD-MCNC: 11.7 G/DL (ref 14–18)
IMM GRANULOCYTES # BLD AUTO: 0.01 X10(3)/MCL (ref 0–0.04)
IMM GRANULOCYTES NFR BLD AUTO: 0.2 %
LYMPHOCYTES # BLD AUTO: 2.81 X10(3)/MCL (ref 0.6–4.6)
LYMPHOCYTES NFR BLD AUTO: 44.7 %
MAGNESIUM SERPL-MCNC: 2.1 MG/DL (ref 1.6–2.6)
MCH RBC QN AUTO: 32.2 PG (ref 27–31)
MCHC RBC AUTO-ENTMCNC: 32 G/DL (ref 33–36)
MCV RBC AUTO: 100.8 FL (ref 80–94)
MONOCYTES # BLD AUTO: 1.09 X10(3)/MCL (ref 0.1–1.3)
MONOCYTES NFR BLD AUTO: 17.4 %
NEUTROPHILS # BLD AUTO: 1.9 X10(3)/MCL (ref 2.1–9.2)
NEUTROPHILS NFR BLD AUTO: 30.2 %
NRBC BLD AUTO-RTO: 0 %
PLATELET # BLD AUTO: 262 X10(3)/MCL (ref 130–400)
PMV BLD AUTO: 9.5 FL (ref 7.4–10.4)
POTASSIUM SERPL-SCNC: 4.2 MMOL/L (ref 3.5–5.1)
PROT SERPL-MCNC: 6.9 GM/DL (ref 5.8–7.6)
RBC # BLD AUTO: 3.63 X10(6)/MCL (ref 4.7–6.1)
SODIUM SERPL-SCNC: 138 MMOL/L (ref 136–145)
WBC # SPEC AUTO: 6.28 X10(3)/MCL (ref 4.5–11.5)

## 2024-05-03 PROCEDURE — 83735 ASSAY OF MAGNESIUM: CPT

## 2024-05-03 PROCEDURE — 80053 COMPREHEN METABOLIC PANEL: CPT

## 2024-05-03 PROCEDURE — 85025 COMPLETE CBC W/AUTO DIFF WBC: CPT

## 2024-05-03 PROCEDURE — 36415 COLL VENOUS BLD VENIPUNCTURE: CPT

## 2024-05-10 ENCOUNTER — LAB VISIT (OUTPATIENT)
Dept: HEMATOLOGY/ONCOLOGY | Facility: CLINIC | Age: 69
End: 2024-05-10
Payer: MEDICARE

## 2024-05-10 DIAGNOSIS — C90.00 MULTIPLE MYELOMA, REMISSION STATUS UNSPECIFIED: ICD-10-CM

## 2024-05-10 DIAGNOSIS — C90.00 IGG MULTIPLE MYELOMA: ICD-10-CM

## 2024-05-10 DIAGNOSIS — Z79.899 DRUG-INDUCED IMMUNODEFICIENCY: ICD-10-CM

## 2024-05-10 DIAGNOSIS — D84.821 DRUG-INDUCED IMMUNODEFICIENCY: ICD-10-CM

## 2024-05-10 LAB
ALBUMIN SERPL-MCNC: 3.6 G/DL (ref 3.4–4.8)
ALBUMIN/GLOB SERPL: 1.2 RATIO (ref 1.1–2)
ALP SERPL-CCNC: 73 UNIT/L (ref 40–150)
ALT SERPL-CCNC: 14 UNIT/L (ref 0–55)
AST SERPL-CCNC: 16 UNIT/L (ref 5–34)
BASOPHILS # BLD AUTO: 0.12 X10(3)/MCL
BASOPHILS NFR BLD AUTO: 1.7 %
BILIRUB SERPL-MCNC: 0.3 MG/DL
BUN SERPL-MCNC: 10 MG/DL (ref 8.4–25.7)
CALCIUM SERPL-MCNC: 9.2 MG/DL (ref 8.8–10)
CHLORIDE SERPL-SCNC: 107 MMOL/L (ref 98–107)
CO2 SERPL-SCNC: 27 MMOL/L (ref 23–31)
CREAT SERPL-MCNC: 1.03 MG/DL (ref 0.73–1.18)
EOSINOPHIL # BLD AUTO: 0.43 X10(3)/MCL (ref 0–0.9)
EOSINOPHIL NFR BLD AUTO: 6.2 %
ERYTHROCYTE [DISTWIDTH] IN BLOOD BY AUTOMATED COUNT: 13.3 % (ref 11.5–17)
GFR SERPLBLD CREATININE-BSD FMLA CKD-EPI: >60 ML/MIN/1.73/M2
GLOBULIN SER-MCNC: 3 GM/DL (ref 2.4–3.5)
GLUCOSE SERPL-MCNC: 98 MG/DL (ref 82–115)
HCT VFR BLD AUTO: 36.4 % (ref 42–52)
HGB BLD-MCNC: 11.5 G/DL (ref 14–18)
IMM GRANULOCYTES # BLD AUTO: 0.03 X10(3)/MCL (ref 0–0.04)
IMM GRANULOCYTES NFR BLD AUTO: 0.4 %
LYMPHOCYTES # BLD AUTO: 3.02 X10(3)/MCL (ref 0.6–4.6)
LYMPHOCYTES NFR BLD AUTO: 43.5 %
MAGNESIUM SERPL-MCNC: 2.1 MG/DL (ref 1.6–2.6)
MCH RBC QN AUTO: 31.5 PG (ref 27–31)
MCHC RBC AUTO-ENTMCNC: 31.6 G/DL (ref 33–36)
MCV RBC AUTO: 99.7 FL (ref 80–94)
MONOCYTES # BLD AUTO: 1.17 X10(3)/MCL (ref 0.1–1.3)
MONOCYTES NFR BLD AUTO: 16.9 %
NEUTROPHILS # BLD AUTO: 2.17 X10(3)/MCL (ref 2.1–9.2)
NEUTROPHILS NFR BLD AUTO: 31.3 %
NRBC BLD AUTO-RTO: 0 %
PLATELET # BLD AUTO: 283 X10(3)/MCL (ref 130–400)
PMV BLD AUTO: 9.6 FL (ref 7.4–10.4)
POTASSIUM SERPL-SCNC: 4.1 MMOL/L (ref 3.5–5.1)
PROT SERPL-MCNC: 6.6 GM/DL (ref 5.8–7.6)
RBC # BLD AUTO: 3.65 X10(6)/MCL (ref 4.7–6.1)
SODIUM SERPL-SCNC: 140 MMOL/L (ref 136–145)
WBC # SPEC AUTO: 6.94 X10(3)/MCL (ref 4.5–11.5)

## 2024-05-10 PROCEDURE — 83735 ASSAY OF MAGNESIUM: CPT

## 2024-05-10 PROCEDURE — 85025 COMPLETE CBC W/AUTO DIFF WBC: CPT

## 2024-05-10 PROCEDURE — 80053 COMPREHEN METABOLIC PANEL: CPT

## 2024-05-10 PROCEDURE — 36415 COLL VENOUS BLD VENIPUNCTURE: CPT

## 2024-05-17 ENCOUNTER — LAB VISIT (OUTPATIENT)
Dept: HEMATOLOGY/ONCOLOGY | Facility: CLINIC | Age: 69
End: 2024-05-17
Payer: MEDICARE

## 2024-05-17 ENCOUNTER — INFUSION (OUTPATIENT)
Dept: INFUSION THERAPY | Facility: HOSPITAL | Age: 69
End: 2024-05-17
Attending: INTERNAL MEDICINE
Payer: MEDICARE

## 2024-05-17 VITALS
HEIGHT: 68 IN | DIASTOLIC BLOOD PRESSURE: 63 MMHG | TEMPERATURE: 98 F | RESPIRATION RATE: 20 BRPM | BODY MASS INDEX: 22.38 KG/M2 | SYSTOLIC BLOOD PRESSURE: 106 MMHG | OXYGEN SATURATION: 99 % | HEART RATE: 54 BPM | WEIGHT: 147.69 LBS

## 2024-05-17 DIAGNOSIS — C90.00 IGG MULTIPLE MYELOMA: Primary | ICD-10-CM

## 2024-05-17 DIAGNOSIS — C90.00 IGG MULTIPLE MYELOMA: ICD-10-CM

## 2024-05-17 LAB
ALBUMIN SERPL-MCNC: 3.6 G/DL (ref 3.4–4.8)
ALBUMIN/GLOB SERPL: 1.1 RATIO (ref 1.1–2)
ALP SERPL-CCNC: 68 UNIT/L (ref 40–150)
ALT SERPL-CCNC: 15 UNIT/L (ref 0–55)
AST SERPL-CCNC: 15 UNIT/L (ref 5–34)
BASOPHILS # BLD AUTO: 0.19 X10(3)/MCL
BASOPHILS NFR BLD AUTO: 2.9 %
BILIRUB SERPL-MCNC: 0.3 MG/DL
BUN SERPL-MCNC: 7.9 MG/DL (ref 8.4–25.7)
CALCIUM SERPL-MCNC: 9.3 MG/DL (ref 8.8–10)
CHLORIDE SERPL-SCNC: 109 MMOL/L (ref 98–107)
CO2 SERPL-SCNC: 23 MMOL/L (ref 23–31)
CREAT SERPL-MCNC: 1.11 MG/DL (ref 0.73–1.18)
EOSINOPHIL # BLD AUTO: 0.55 X10(3)/MCL (ref 0–0.9)
EOSINOPHIL NFR BLD AUTO: 8.4 %
ERYTHROCYTE [DISTWIDTH] IN BLOOD BY AUTOMATED COUNT: 13.4 % (ref 11.5–17)
GFR SERPLBLD CREATININE-BSD FMLA CKD-EPI: >60 ML/MIN/1.73/M2
GLOBULIN SER-MCNC: 3.2 GM/DL (ref 2.4–3.5)
GLUCOSE SERPL-MCNC: 88 MG/DL (ref 82–115)
HCT VFR BLD AUTO: 35.8 % (ref 42–52)
HGB BLD-MCNC: 11.7 G/DL (ref 14–18)
IMM GRANULOCYTES # BLD AUTO: 0.02 X10(3)/MCL (ref 0–0.04)
IMM GRANULOCYTES NFR BLD AUTO: 0.3 %
LYMPHOCYTES # BLD AUTO: 3.14 X10(3)/MCL (ref 0.6–4.6)
LYMPHOCYTES NFR BLD AUTO: 47.7 %
MAGNESIUM SERPL-MCNC: 2.1 MG/DL (ref 1.6–2.6)
MCH RBC QN AUTO: 31.7 PG (ref 27–31)
MCHC RBC AUTO-ENTMCNC: 32.7 G/DL (ref 33–36)
MCV RBC AUTO: 97 FL (ref 80–94)
MONOCYTES # BLD AUTO: 1 X10(3)/MCL (ref 0.1–1.3)
MONOCYTES NFR BLD AUTO: 15.2 %
NEUTROPHILS # BLD AUTO: 1.68 X10(3)/MCL (ref 2.1–9.2)
NEUTROPHILS NFR BLD AUTO: 25.5 %
NRBC BLD AUTO-RTO: 0 %
PLATELET # BLD AUTO: 265 X10(3)/MCL (ref 130–400)
PMV BLD AUTO: 9.4 FL (ref 7.4–10.4)
POTASSIUM SERPL-SCNC: 4.1 MMOL/L (ref 3.5–5.1)
PROT SERPL-MCNC: 6.8 GM/DL (ref 5.8–7.6)
RBC # BLD AUTO: 3.69 X10(6)/MCL (ref 4.7–6.1)
SODIUM SERPL-SCNC: 139 MMOL/L (ref 136–145)
WBC # SPEC AUTO: 6.58 X10(3)/MCL (ref 4.5–11.5)

## 2024-05-17 PROCEDURE — 63600175 PHARM REV CODE 636 W HCPCS: Performed by: NURSE PRACTITIONER

## 2024-05-17 PROCEDURE — 96365 THER/PROPH/DIAG IV INF INIT: CPT

## 2024-05-17 PROCEDURE — 83735 ASSAY OF MAGNESIUM: CPT

## 2024-05-17 PROCEDURE — 36415 COLL VENOUS BLD VENIPUNCTURE: CPT

## 2024-05-17 PROCEDURE — 80053 COMPREHEN METABOLIC PANEL: CPT

## 2024-05-17 PROCEDURE — 85025 COMPLETE CBC W/AUTO DIFF WBC: CPT

## 2024-05-17 PROCEDURE — 25000003 PHARM REV CODE 250: Performed by: NURSE PRACTITIONER

## 2024-05-17 RX ORDER — ZOLEDRONIC ACID 0.04 MG/ML
4 INJECTION, SOLUTION INTRAVENOUS
Status: COMPLETED | OUTPATIENT
Start: 2024-05-17 | End: 2024-05-17

## 2024-05-17 RX ORDER — HEPARIN 100 UNIT/ML
500 SYRINGE INTRAVENOUS
Status: DISCONTINUED | OUTPATIENT
Start: 2024-05-17 | End: 2024-05-17 | Stop reason: HOSPADM

## 2024-05-17 RX ORDER — SODIUM CHLORIDE 0.9 % (FLUSH) 0.9 %
10 SYRINGE (ML) INJECTION
Status: DISCONTINUED | OUTPATIENT
Start: 2024-05-17 | End: 2024-05-17 | Stop reason: HOSPADM

## 2024-05-17 RX ADMIN — ZOLEDRONIC ACID 4 MG: 0.04 INJECTION, SOLUTION INTRAVENOUS at 11:05

## 2024-05-17 RX ADMIN — SODIUM CHLORIDE: 9 INJECTION, SOLUTION INTRAVENOUS at 11:05

## 2024-05-17 NOTE — NURSING
Infusion Note:  Pt has an appointment today for: Labs and Infusion    Treatment Regimen: Zometa   Cycle: 12 Day: 1 every Q4 weeks;    Given    PIV    Treatment parameters: Ca 9.3    Nursing note:  Assessment performed, no complaints reported other than 5/10 back pain. Pt denies jaw pain, any invasive dental work in the past 3 months and knows to report these S/S to infusion nurse and provider if these develop. Reports compliance to Ca and Vit D supplements.    Next infusion appointment is for: Labs, Provider, and Infusion

## 2024-05-21 DIAGNOSIS — M48.061 SPINAL STENOSIS OF LUMBAR REGION, UNSPECIFIED WHETHER NEUROGENIC CLAUDICATION PRESENT: ICD-10-CM

## 2024-05-21 RX ORDER — GABAPENTIN 600 MG/1
600 TABLET ORAL 3 TIMES DAILY
Qty: 90 TABLET | Refills: 2 | Status: SHIPPED | OUTPATIENT
Start: 2024-05-21

## 2024-05-27 ENCOUNTER — OFFICE VISIT (OUTPATIENT)
Dept: FAMILY MEDICINE | Facility: CLINIC | Age: 69
End: 2024-05-27
Payer: MEDICARE

## 2024-05-27 VITALS
HEIGHT: 68 IN | TEMPERATURE: 98 F | BODY MASS INDEX: 21.52 KG/M2 | OXYGEN SATURATION: 100 % | WEIGHT: 142 LBS | RESPIRATION RATE: 17 BRPM | HEART RATE: 57 BPM

## 2024-05-27 DIAGNOSIS — E11.9 TYPE 2 DIABETES MELLITUS WITHOUT COMPLICATION, WITHOUT LONG-TERM CURRENT USE OF INSULIN: Primary | ICD-10-CM

## 2024-05-27 DIAGNOSIS — Z12.5 SCREENING FOR PROSTATE CANCER: ICD-10-CM

## 2024-05-27 PROBLEM — R73.03 PRE-DIABETES: Status: ACTIVE | Noted: 2024-04-09

## 2024-05-27 LAB
CREAT UR-MCNC: 52.4 MG/DL (ref 63–166)
MICROALBUMIN UR-MCNC: 24 UG/ML
MICROALBUMIN/CREAT RATIO PNL UR: 45.8 MG/GM CR (ref 0–30)
PSA SERPL-MCNC: 2.78 NG/ML

## 2024-05-27 PROCEDURE — 82043 UR ALBUMIN QUANTITATIVE: CPT | Performed by: STUDENT IN AN ORGANIZED HEALTH CARE EDUCATION/TRAINING PROGRAM

## 2024-05-27 PROCEDURE — 99215 OFFICE O/P EST HI 40 MIN: CPT | Mod: PBBFAC | Performed by: STUDENT IN AN ORGANIZED HEALTH CARE EDUCATION/TRAINING PROGRAM

## 2024-05-27 PROCEDURE — 84153 ASSAY OF PSA TOTAL: CPT | Performed by: STUDENT IN AN ORGANIZED HEALTH CARE EDUCATION/TRAINING PROGRAM

## 2024-05-27 PROCEDURE — 36415 COLL VENOUS BLD VENIPUNCTURE: CPT | Performed by: STUDENT IN AN ORGANIZED HEALTH CARE EDUCATION/TRAINING PROGRAM

## 2024-05-27 RX ORDER — LENALIDOMIDE 10 MG/1
1 CAPSULE ORAL
COMMUNITY
Start: 2024-05-07

## 2024-05-27 NOTE — PROGRESS NOTES
[facility] OFFICE VISIT NOTE  Yuriy Díaz  [id number]  05/27/2024      Chief Complaint: Back Pain, Diabetes, Hypertension, and Follow-up (Back pain (4))      HPI    68 y.o. male     Back pain from known compression fractures  - currently 4/10, improved with current pain regimen, overall improving since onset  - no new symptoms     No need for refills at this time.     DM II  - well controlled, last A1c= 5.6 (4/26/24)  - on metformin 500 mg BID, tolerating   - denies symptoms of hypoglycemia   - on statin  - foot exam (1/2023), eye exam (10/2023), urine screen (1/2023)    Problem List   Multiple Myeloma ( dx 2023)- in remission, follows [facility] Heme[facility]Onc  DM II/impaired glucose tolerance?- previous DM II range A1cs, now well controlled   non obstructive CAD, HTN  Sleep apnea- compliant on CPAP at night  Celiac disease- follows Dr. Kapoor, last colonoscopy 6/2022  Mitral valve regurgitation- s/p MVR  GERD      Healthcare maintenance  Colon cancer screen: colonoscopy 6/29/22 (Dr. Kapoor)-  mm tubular adenoma in ascending colon  Prostate cancer screen: 1.7 (5/2023)  Non smoker  Immunizations: Tdap (7/2020), Shingles (7/2020, 11/2020), PCV 20 (1/2023)     ROS:  CONSTITUTIONAL: No unexplained weight loss     CARDIOVASCULAR: No chest pain    RESPIRATORY: No shortness of breath  GASTROINTESTINAL: No abdominal pain, constipation or diarrhea     PE:  Vitals:    05/27/24 1308   Pulse: (!) 57   Resp: 17   Temp: 97.8 °F (36.6 °C)     General: appears well, in no acute distress   Eye: no scleral icterus   Neck: no carotid bruits   Respiratory: clear to auscultation bilaterally, nonlabored respirations   Cardiovascular: regular rate and rhythm without murmurs   Gastrointestinal: soft, non-tender, non-distended, bowel sounds present   Extremities: no edema in bilateral lower extremities  Musculoskeletal: midline lumbar spine non tender, gait wnl      DM II foot exam     Protective Sensation (w/ 10 gram monofilament): Right: Intact, Left:  Intact  Proprioception: Right: Intact, Left: Intact  Visual Inspection: nails thickened (has Penlac Rx), no wounds  Pedal Pulses: Right: Present, Left: Present  Posterior Tibialis Pulses: Right:Present, Left: Present     Assessment:   1. Type 2 diabetes mellitus without complication, without long-term current use of insulin    2. Screening for prostate cancer        Plan:  - discussed trial off metformin vs. continuation, patient would like to continue at this time   - foot exam today, foot care discussed  - urine microalbumin/Cr  - PSA  - keep follow up with Heme/Onc and Cardiology     Return to clinic in 3 months for follow up DM II, or sooner if needed.     Nena Elliott M.D. -III  [facility]

## 2024-05-28 ENCOUNTER — TELEPHONE (OUTPATIENT)
Dept: FAMILY MEDICINE | Facility: CLINIC | Age: 69
End: 2024-05-28
Payer: COMMERCIAL

## 2024-05-28 NOTE — PROGRESS NOTES
I reviewed History, PE, A/P and chart was reviewed.  Services provided in the outpatient department of  a teaching facility, I was immediately available.  I agree with resident, care reasonable and necessary.   Management discussed with resident at time of visit.    Yuliet Rivero MD  [facility] - [facility] [place]  [facility]

## 2024-05-29 ENCOUNTER — TELEPHONE (OUTPATIENT)
Dept: NEUROSURGERY | Facility: CLINIC | Age: 69
End: 2024-05-29
Payer: COMMERCIAL

## 2024-05-29 DIAGNOSIS — S32.010A COMPRESSION FRACTURE OF FIRST LUMBAR VERTEBRA: ICD-10-CM

## 2024-05-29 DIAGNOSIS — M48.54XA: Primary | ICD-10-CM

## 2024-05-29 DIAGNOSIS — M51.36 DEGENERATION OF LUMBAR INTERVERTEBRAL DISC: ICD-10-CM

## 2024-05-29 NOTE — TELEPHONE ENCOUNTER
Patient is being referred to Dr Grubbs by Dr Lundy on 5/29/24 for Compression fracture of thoracic spine, non-traumatic; Compression fracture of first lumbar vertebra; Degeneration of lumbar intervertebral disc    MRI Lumbar 9/14/23 (images/report in chart) impression states:   Interval significant progression of skeletal demineralization, with new or progressive loss of vertebral body height throughout the spine when compared to May 2023.     Schmorl's node type superior endplate concavity at L4 is acute or subacute, with marrow edema and enhancement observed here.  All other areas of loss of vertebral body height are chronic with no marrow edema or enhancement associated.     Multilevel degenerative disc disease as above, with spinal canal stenosis severe at L3-L4 appearing to tether the descending nerve roots.  Moderate spinal canal stenosis at L4-L5 and L2-L3.    *There is an order in the chart for lumbar kyphoplasty and a request for him to be evaluated while at [facility] for bone marrow aspiration and biopsy but I don't see it being done.      *PT/OT referral sent to [facility] on 1/23/24    *Telemed Notes dated 1/31/24   Stem cells transplant status; Multiple myeloma, remission status unspecified   Back pain, unspecified back location, unspecified back pain laterality, unspecified chronicity Chronic. Followed by neurologist. MRI on 5/2023 revealed degernate disc disease with mild disc disease on L3-L4, and L4-L5.       Please review and advise re scheduling. Thank you

## 2024-05-31 NOTE — TELEPHONE ENCOUNTER
The imaging is old.  He needs to see RADHA first.  Go ahead and make an appointment for him to see Dr. Grubbs as well a couple of weeks after seen by RADHA.

## 2024-06-03 NOTE — TELEPHONE ENCOUNTER
Spoke with patient and scheduled to see ALEJANDRA on 7/10 at 2pm and Dr Grubbs on 8/6 at 9am     Patient stated that sharp low back pain goes down his right leg from an unknown cause for a year - does not wake him at night. Patient Rx Gabapentin 600 mg and Tramadol - helps a little. Patient denies any previous spine surgeries or other providers for these symptoms.

## 2024-06-10 ENCOUNTER — LAB VISIT (OUTPATIENT)
Dept: LAB | Facility: HOSPITAL | Age: 69
End: 2024-06-10
Attending: INTERNAL MEDICINE
Payer: COMMERCIAL

## 2024-06-10 DIAGNOSIS — M81.0 OSTEOPOROSIS OF FEMUR WITHOUT PATHOLOGICAL FRACTURE: ICD-10-CM

## 2024-06-10 DIAGNOSIS — C79.51 SECONDARY MALIGNANCY OF LUMBAR VERTEBRAL COLUMN: ICD-10-CM

## 2024-06-10 DIAGNOSIS — C90.00 IGG MULTIPLE MYELOMA: ICD-10-CM

## 2024-06-10 LAB
IGA SERPL-MCNC: 97 MG/DL (ref 101–645)
IGG SERPL-MCNC: 1058 MG/DL (ref 540–1822)
IGM SERPL-MCNC: 18 MG/DL (ref 22–240)

## 2024-06-10 PROCEDURE — 82784 ASSAY IGA/IGD/IGG/IGM EACH: CPT

## 2024-06-10 PROCEDURE — 83521 IG LIGHT CHAINS FREE EACH: CPT

## 2024-06-10 PROCEDURE — 36415 COLL VENOUS BLD VENIPUNCTURE: CPT

## 2024-06-10 PROCEDURE — 84165 PROTEIN E-PHORESIS SERUM: CPT

## 2024-06-11 DIAGNOSIS — M51.36 DEGENERATION OF LUMBAR INTERVERTEBRAL DISC: Primary | ICD-10-CM

## 2024-06-11 LAB
ALBUMIN % SPEP (OHS): 51.84 (ref 48.1–59.5)
ALBUMIN SERPL-MCNC: 3.3 G/DL (ref 3.4–4.8)
ALBUMIN/GLOB SERPL: 1.1 RATIO (ref 1.1–2)
ALPHA 1 GLOB (OHS): 0.25 GM/DL (ref 0–0.4)
ALPHA 1 GLOB% (OHS): 3.84 (ref 2.3–4.9)
ALPHA 2 GLOB % (OHS): 13.93 (ref 6.9–13)
ALPHA 2 GLOB (OHS): 0.89 GM/DL (ref 0.4–1)
BETA GLOB (OHS): 0.86 GM/DL (ref 0.7–1.3)
BETA GLOB% (OHS): 13.37 (ref 13.8–19.7)
GAMMA GLOBULIN % (OHS): 17.03 (ref 10.1–21.9)
GAMMA GLOBULIN (OHS): 1.09 GM/DL (ref 0.4–1.8)
GLOBULIN SER-MCNC: 3.1 GM/DL (ref 2.4–3.5)
KAPPA LC FREE SER NEPH-MCNC: 3.66 MG/DL (ref 0.33–1.94)
KAPPA LC FREE/LAMBDA FREE SER NEPH: 1.63 {RATIO} (ref 0.26–1.65)
LAMBDA LC FREE SER NEPH-MCNC: 2.25 MG/DL (ref 0.57–2.63)
M SPIKE % (OHS): ABNORMAL
M SPIKE (OHS): ABNORMAL
PATH REV: NORMAL
PROT SERPL-MCNC: 6.4 GM/DL (ref 5.8–7.6)

## 2024-06-14 ENCOUNTER — INFUSION (OUTPATIENT)
Dept: INFUSION THERAPY | Facility: HOSPITAL | Age: 69
End: 2024-06-14
Attending: INTERNAL MEDICINE
Payer: MEDICARE

## 2024-06-14 ENCOUNTER — LAB VISIT (OUTPATIENT)
Dept: LAB | Facility: HOSPITAL | Age: 69
End: 2024-06-14
Attending: PHYSICIAN ASSISTANT
Payer: MEDICARE

## 2024-06-14 ENCOUNTER — APPOINTMENT (OUTPATIENT)
Dept: HEMATOLOGY/ONCOLOGY | Facility: CLINIC | Age: 69
End: 2024-06-14
Payer: MEDICARE

## 2024-06-14 VITALS
OXYGEN SATURATION: 99 % | WEIGHT: 147.69 LBS | BODY MASS INDEX: 22.38 KG/M2 | HEIGHT: 68 IN | DIASTOLIC BLOOD PRESSURE: 66 MMHG | RESPIRATION RATE: 20 BRPM | TEMPERATURE: 98 F | SYSTOLIC BLOOD PRESSURE: 114 MMHG

## 2024-06-14 DIAGNOSIS — N18.4 CHRONIC KIDNEY DISEASE, STAGE 4 (SEVERE): ICD-10-CM

## 2024-06-14 DIAGNOSIS — M51.36 DEGENERATION OF LUMBAR INTERVERTEBRAL DISC: ICD-10-CM

## 2024-06-14 DIAGNOSIS — C90.00 IGG MULTIPLE MYELOMA: Primary | ICD-10-CM

## 2024-06-14 DIAGNOSIS — M54.50 LOWER BACK PAIN: Primary | ICD-10-CM

## 2024-06-14 LAB
ALBUMIN SERPL-MCNC: 3.5 G/DL (ref 3.4–4.8)
ALBUMIN/GLOB SERPL: 1.1 RATIO (ref 1.1–2)
ALP SERPL-CCNC: 70 UNIT/L (ref 40–150)
ALT SERPL-CCNC: 14 UNIT/L (ref 0–55)
ANION GAP SERPL CALC-SCNC: 6 MEQ/L
AST SERPL-CCNC: 15 UNIT/L (ref 5–34)
BASOPHILS # BLD AUTO: 0.19 X10(3)/MCL
BASOPHILS NFR BLD AUTO: 2.3 %
BILIRUB SERPL-MCNC: 0.4 MG/DL
BUN SERPL-MCNC: 8.8 MG/DL (ref 8.4–25.7)
CALCIUM SERPL-MCNC: 9.1 MG/DL (ref 8.8–10)
CHLORIDE SERPL-SCNC: 110 MMOL/L (ref 98–107)
CO2 SERPL-SCNC: 24 MMOL/L (ref 23–31)
CREAT SERPL-MCNC: 0.98 MG/DL (ref 0.73–1.18)
CREAT/UREA NIT SERPL: 9
EOSINOPHIL # BLD AUTO: 0.75 X10(3)/MCL (ref 0–0.9)
EOSINOPHIL NFR BLD AUTO: 9.2 %
ERYTHROCYTE [DISTWIDTH] IN BLOOD BY AUTOMATED COUNT: 14.5 % (ref 11.5–17)
GFR SERPLBLD CREATININE-BSD FMLA CKD-EPI: >60 ML/MIN/1.73/M2
GLOBULIN SER-MCNC: 3.2 GM/DL (ref 2.4–3.5)
GLUCOSE SERPL-MCNC: 96 MG/DL (ref 82–115)
HCT VFR BLD AUTO: 33.3 % (ref 42–52)
HGB BLD-MCNC: 11.1 G/DL (ref 14–18)
IMM GRANULOCYTES # BLD AUTO: 0.03 X10(3)/MCL (ref 0–0.04)
IMM GRANULOCYTES NFR BLD AUTO: 0.4 %
LYMPHOCYTES # BLD AUTO: 4.03 X10(3)/MCL (ref 0.6–4.6)
LYMPHOCYTES NFR BLD AUTO: 49.4 %
MCH RBC QN AUTO: 32.4 PG (ref 27–31)
MCHC RBC AUTO-ENTMCNC: 33.3 G/DL (ref 33–36)
MCV RBC AUTO: 97.1 FL (ref 80–94)
MONOCYTES # BLD AUTO: 1.14 X10(3)/MCL (ref 0.1–1.3)
MONOCYTES NFR BLD AUTO: 14 %
NEUTROPHILS # BLD AUTO: 2.02 X10(3)/MCL (ref 2.1–9.2)
NEUTROPHILS NFR BLD AUTO: 24.7 %
NRBC BLD AUTO-RTO: 0 %
PLATELET # BLD AUTO: 267 X10(3)/MCL (ref 130–400)
PMV BLD AUTO: 9.1 FL (ref 7.4–10.4)
POTASSIUM SERPL-SCNC: 4 MMOL/L (ref 3.5–5.1)
PROT SERPL-MCNC: 6.7 GM/DL (ref 5.8–7.6)
RBC # BLD AUTO: 3.43 X10(6)/MCL (ref 4.7–6.1)
SODIUM SERPL-SCNC: 140 MMOL/L (ref 136–145)
WBC # BLD AUTO: 8.16 X10(3)/MCL (ref 4.5–11.5)

## 2024-06-14 PROCEDURE — 86812 HLA TYPING A B OR C: CPT

## 2024-06-14 PROCEDURE — 86200 CCP ANTIBODY: CPT

## 2024-06-14 PROCEDURE — 80053 COMPREHEN METABOLIC PANEL: CPT

## 2024-06-14 PROCEDURE — 83516 IMMUNOASSAY NONANTIBODY: CPT

## 2024-06-14 PROCEDURE — 25000003 PHARM REV CODE 250

## 2024-06-14 PROCEDURE — 85025 COMPLETE CBC W/AUTO DIFF WBC: CPT

## 2024-06-14 PROCEDURE — 96374 THER/PROPH/DIAG INJ IV PUSH: CPT

## 2024-06-14 PROCEDURE — 36415 COLL VENOUS BLD VENIPUNCTURE: CPT

## 2024-06-14 PROCEDURE — 86039 ANTINUCLEAR ANTIBODIES (ANA): CPT

## 2024-06-14 PROCEDURE — 63600175 PHARM REV CODE 636 W HCPCS

## 2024-06-14 RX ORDER — HEPARIN 100 UNIT/ML
500 SYRINGE INTRAVENOUS
Status: DISCONTINUED | OUTPATIENT
Start: 2024-06-14 | End: 2024-06-14 | Stop reason: HOSPADM

## 2024-06-14 RX ORDER — SODIUM CHLORIDE 0.9 % (FLUSH) 0.9 %
10 SYRINGE (ML) INJECTION
Status: CANCELLED | OUTPATIENT
Start: 2024-06-14

## 2024-06-14 RX ORDER — SODIUM CHLORIDE 0.9 % (FLUSH) 0.9 %
10 SYRINGE (ML) INJECTION
Status: DISCONTINUED | OUTPATIENT
Start: 2024-06-14 | End: 2024-06-14 | Stop reason: HOSPADM

## 2024-06-14 RX ORDER — ZOLEDRONIC ACID 0.04 MG/ML
4 INJECTION, SOLUTION INTRAVENOUS
Status: CANCELLED | OUTPATIENT
Start: 2024-06-14

## 2024-06-14 RX ORDER — ZOLEDRONIC ACID 0.04 MG/ML
4 INJECTION, SOLUTION INTRAVENOUS
Status: COMPLETED | OUTPATIENT
Start: 2024-06-14 | End: 2024-06-14

## 2024-06-14 RX ORDER — HEPARIN 100 UNIT/ML
500 SYRINGE INTRAVENOUS
Status: CANCELLED | OUTPATIENT
Start: 2024-06-14

## 2024-06-14 RX ADMIN — ZOLEDRONIC ACID 4 MG: 0.04 INJECTION, SOLUTION INTRAVENOUS at 11:06

## 2024-06-14 RX ADMIN — SODIUM CHLORIDE: 9 INJECTION, SOLUTION INTRAVENOUS at 11:06

## 2024-06-14 NOTE — NURSING
Pt received Zometa via peripheral IV without incident; AVS given & pt verbalized understanding his next appt is 7/12/24.

## 2024-06-17 DIAGNOSIS — R97.20 INCREASED PROSTATE SPECIFIC ANTIGEN (PSA) VELOCITY: Primary | ICD-10-CM

## 2024-06-17 LAB
HLA TYP COMM-IMP: NORMAL
HLA-B27 QL FC: NEGATIVE
RF IGA SER-ACNC: <5 UNITS
RF IGG SER-ACNC: <5 UNITS
RF IGM SER-ACNC: <5 UNITS

## 2024-06-19 LAB
ANA SER QL HEP2 SUBST: NORMAL
CYCLIC CITRULLINATED PEPTIDE (CCP) (OHS): 1 U/ML

## 2024-06-24 DIAGNOSIS — C90.00 IGG MULTIPLE MYELOMA: ICD-10-CM

## 2024-06-25 RX ORDER — ACYCLOVIR 400 MG/1
400 TABLET ORAL 2 TIMES DAILY
Qty: 60 TABLET | Refills: 4 | Status: SHIPPED | OUTPATIENT
Start: 2024-06-25

## 2024-06-26 ENCOUNTER — TELEPHONE (OUTPATIENT)
Dept: HEMATOLOGY/ONCOLOGY | Facility: CLINIC | Age: 69
End: 2024-06-26
Payer: COMMERCIAL

## 2024-06-27 ENCOUNTER — OFFICE VISIT (OUTPATIENT)
Dept: HEMATOLOGY/ONCOLOGY | Facility: CLINIC | Age: 69
End: 2024-06-27
Payer: MEDICARE

## 2024-06-27 VITALS
OXYGEN SATURATION: 99 % | TEMPERATURE: 98 F | BODY MASS INDEX: 23.36 KG/M2 | HEIGHT: 67 IN | RESPIRATION RATE: 19 BRPM | WEIGHT: 148.81 LBS | DIASTOLIC BLOOD PRESSURE: 88 MMHG | HEART RATE: 54 BPM | SYSTOLIC BLOOD PRESSURE: 129 MMHG

## 2024-06-27 DIAGNOSIS — M51.36 DEGENERATION OF LUMBAR INTERVERTEBRAL DISC: ICD-10-CM

## 2024-06-27 DIAGNOSIS — C90.00 IGG MULTIPLE MYELOMA: Primary | ICD-10-CM

## 2024-06-27 DIAGNOSIS — Z79.899 LONG-TERM USE OF HIGH-RISK MEDICATION: ICD-10-CM

## 2024-06-27 DIAGNOSIS — M54.50 RIGHT-SIDED LOW BACK PAIN WITHOUT SCIATICA, UNSPECIFIED CHRONICITY: ICD-10-CM

## 2024-06-27 DIAGNOSIS — C90.00 MULTIPLE MYELOMA, REMISSION STATUS UNSPECIFIED: ICD-10-CM

## 2024-06-27 PROCEDURE — 99215 OFFICE O/P EST HI 40 MIN: CPT | Mod: PBBFAC | Performed by: NURSE PRACTITIONER

## 2024-06-27 NOTE — PROGRESS NOTES
Reason for Follow-up:  Reason for consultation:  -multiple myeloma, IgG kappa  -worsening low back pain    Current Treatment:  Zometa 4mg IV every 28 days  Start 7/13/2023    Revlimid 10 mg p.o. daily with 81 mg aspirin  Started 04/09/2024    Treatment History:  VRD (7/13/2023-11/21/23)  ASCT 11/30/2023    Past medical history:  NIDDM. Dyslipidemia.  GERD.  Lumbar spinal stenosis.  Vitamin-D deficiency.  Allergic rhinitis.  HTN.  Bell's palsy.  Celiac disease.  Hemorrhoids.  Mitral regurgitation 01/05/2018.  Rheumatic valve narrowing and leaking mitral valve.  -02/02/2018 ([facility]):  АНДРЕЙ, right minimally invasive anterior thoracotomy, right femoral artery and vein exploration, extensive right pleural adhesiolysis/pneumolysis, bilateral Maze/ablation (extensive), left atrial appendage clip placement for left atrial appendage exclusion, mitral valve repair (complex repair with 26 mm annuloplasty ring)  Social history:  .  Lives in [place], [place].  Five children.  Owns a jewelry business.  No history of tobacco, alcohol, or illicit drug abuse.    Family history:  Negative for cancers or blood dyscrasias.    Health maintenance:  PCP in family medicine clinic, [facility].  Had EGD and colonoscopy done 1 year ago by Dr. Zoltan Kapoor, [facility], apparently unremarkable (he gets the endoscopies done periodically because of history of celiac disease).      History of Present Illness:     Oncologic/Hematologic History:  Oncology History   IgG multiple myeloma   6/23/2023 Initial Diagnosis    IgG multiple myeloma     7/13/2023 - 11/21/2023 Chemotherapy    Treatment Summary   Plan Name: OP VRD - WEEKLY BORTEZOMIB LENALIDOMIDE DEXAMETHASONE Q3W  Treatment Goal: Curative  Status: Inactive  Start Date: 7/13/2023  End Date: 11/21/2023  Provider: Narciso Lundy MD  Chemotherapy: bortezomib (VELCADE) injection 2.25 mg, 2.25 mg, Subcutaneous, Clinic/HOD 1 time, 6 of 9  cycles  Administration: 2.25 mg (7/13/2023), 2.25 mg (7/20/2023), 2.25 mg (7/27/2023), 2.25 mg (8/10/2023), 2.25 mg (8/17/2023), 2.25 mg (8/24/2023), 2.25 mg (8/31/2023), 2.25 mg (9/7/2023), 2.25 mg (9/21/2023), 2.25 mg (9/14/2023), 2.25 mg (9/28/2023), 2.25 mg (10/12/2023), 2.25 mg (10/5/2023), 2.25 mg (10/19/2023), 2.25 mg (10/26/2023), 2.25 mg (11/21/2023)  lenalidomide 25 mg Cap, 25 mg, , , 1 of 1 cycle, Start date: 11/3/2023, End date: 2/15/2024     67-year-old gentleman, referred from [facility], with newly diagnosed multiple myeloma.      05/14/2023: [facility] note:  Low back pain, onset early March   - localized to back with occasional sharp pain down posterior aspect of right leg  - fluctuating in intensity, overall worsening since onset, no change in character, difficulty doing ADLs, walking with pain  - difficulty sleeping   - seen in urgent care and C multiple times  - MRI 5/4/23: degenerative disc disease and facet arthrosis, mild spinal canal stenosis L4-L5 greater than L3-L4, no high grade stenosis   - Toradol IM helps briefly  - side effects from gabapentin, Robaxin and Norco- GI and drowsiness   - Voltaren gel not helping  - stopped going to PT due to pain  - follows with Neurologist   - requests trial of tramadol and Lyrica   - going out of country soon, concerned he will not be able to go due to pain  - denies bowel/ bladder incontinence, night sweats, unexplained weight loss, chest pain or shortness of breath   Spinal stenosis   Acute bilateral low back pain with right-sided sciatica    05/18/2023: [facility] note:  Low back pain.  Onset early March 2023.  Localized lumbar region.  Constant.  Occasional radiation down posterior right leg.  Difficulty sleeping in bed because of exacerbation of pain by lying down flat.  Compensating by sleeping in a recliner or on sofa. MRI showed degenerative disc disease and facet arthrosis at multiple levels with mild  canal stenosis.  - Has attempted gabapentin, Robaxin, Norco, Lyrica, and tramadol with minimal relief.  Currently being managed with tramadol and Lyrica.  - Told by neurosurgery that he needs pain management referral because NS does not deal with injections  - Denies saddle anesthesia, bowel/bladder incontinence, weight loss, night sweats    Spinal stenosis of lumbar region  Degeneration of lumbar intervertebral disc  - Continue treatment with tramadol and Lyrica as prescribed  Bradycardia  - Patient aware of chronic bradycardia, states he has been this way for 3-4 years due to palpitations if HR >60  - Currently on Toprol XL 50 mg  - Follows with [facility]    06/13/2023:  [facility] note:   Acute Issues:   Weight loss- 6 weeks history of weight loss 12-14 lb unintentionally.  Reports appetite has been somewhat decreased secondary to pain though has noticed increased size of abdomen.  He denies nausea, vomiting, dysphagia, dyspepsia, bloody stools, staying changes in stool caliber, constipation, fevers or night sweats, nervousness/anxiousness, skin or hair changes.  Denies history of smoking or alcohol use.  Receives EGD and colonoscopy every 2 years per Dr. Kapoor [place], last was roughly 1 year ago. PSA wnl 5/2023.   Chronic Issues: DM- med compliant metformin 500 daily, last A1C 6.1 in 1/2023.   Chronic medical conditions:  Hypertension.  Dyslipidemia.  GERD.  Lumbar spinal stenosis.  Vitamin-D deficiency.  Allergic rhinitis.  HTN      Investigations reviewed:  05/04/2023: MRI lumbar spine without contrast (worsening lumbar pain x5-7 weeks with bilateral sciatica):   Lumbar degenerative disc disease and facet arthrosis as detailed above with no acute pathology identified.    Mild spinal canal stenosis at L4-L5 greater than L3-L4 with no high-grade spinal canal stenosis.   Mild bilateral neural foramen stenosis at L3-L4 and on the left greater than right at L4-L5.    06/14/2023:  Skeletal survey:   No  definite lesions to suggest either lytic and or sclerotic metastatic changes.   Multiple compression deformities mostly in the lumbar spine but these are seen also in the thoracic spine; other imaging modalities might prove helpful for further assessment; some of these compression deformities appear to be relatively new as compared with an MR of May 2023  (There are some degenerative changes of the thoracic spine with a mild compression deformity of superior endplate of 1 of the lower thoracic vertebral body levels; there are multiple compression deformities of the lumbar spine including the superior endplate of L3 and L4 there is a compression deformity of the superior endplate of T11 with compression deformities of the superior endplate of T12 and a compression deformity of L1; some of these compression deformities appear to be new since the last exam (MR) of May 4, 2023 other imaging modalities might prove helpful for further assessment    Labs reviewed:  01/12/2023:  Hemoglobin 11.9.  MCV 98.7.  Ferritin 211.66.  Transferrin saturation 42%.  Vitamin B12 969.  Folate 13.9.    06/14/2023:  Hemoglobin 11.0.  MCV 99.1.  ESR 15, normal.    06/14/2023: IgG 3810 mg/dL, elevated.  IgM 11, suppressed.  IgA 33, suppressed.  2.28 gm/dL IgG kappa monoclonal protein on SPEP and BUBBA.  Kappa 99.4, elevated.  Lambda 0.3400, suppressed.  Kappa/lambda ratio 292, elevated.    05/01/2023: PSA 1.7, normal.  06/14/2023:  BUN 15.  Creatinine 0.79.  Calcium 9.6.  Albumin 3.6.  LFTs normal.  TSH normal.  06/16/2023:  , normal.    06/15/2023:  24 hour urine protein 288 mg, elevated (reference Range:  < 229). M spike 183 mg. Monoclonal kappa.    06/16/2023:  Urine:  Kappa 25.5 mg/dL; lambda < 0.7000 mg/dL; kappa/lambda ratio> 36.4    Interval History 6/27/2024: Patient presents alone to clinic for scheduled follow up for Multiple Myeloma.  He is due to receive Zometa infusion today.  Patient is status post ASCT.  Seems to be doing  well.  Continues to report lower back pain currently wearing brace for lower back support and physical therapy.  Patient reports adherence to Revlimid 10 mg p.o. daily as ordered by Dr. Mahajan. He reports compliance with acyclovir, Bactrim.  He also takes gabapentin for lower back pain.  Patient to follow up with Dr. Mahajan on 07/10.  Patient admits to ASCT vaccinations earlier this month 06/2024.  He is due to get his 2nd set of vaccinations on 07/22/2024 in [place].  Lab work reviewed with  patient. We discussed plan of care and follow-up.      Review of Systems   Musculoskeletal:  Positive for back pain, joint pain, myalgias and neck pain.   Neurological:  Positive for tingling.   All other systems reviewed and are negative.        Physical Exam  Constitutional:       Appearance: Normal appearance.   HENT:      Head: Normocephalic.      Mouth/Throat:      Mouth: Mucous membranes are moist.   Eyes:      Pupils: Pupils are equal, round, and reactive to light.   Cardiovascular:      Rate and Rhythm: Normal rate and regular rhythm.      Pulses: Normal pulses.      Heart sounds: Normal heart sounds.   Pulmonary:      Effort: Pulmonary effort is normal.      Breath sounds: Normal breath sounds.   Abdominal:      General: Bowel sounds are normal.      Palpations: Abdomen is soft.   Musculoskeletal:         General: Normal range of motion.      Cervical back: Normal range of motion.   Skin:     General: Skin is warm and dry.      Capillary Refill: Capillary refill takes less than 2 seconds.   Neurological:      General: No focal deficit present.      Mental Status: He is alert and oriented to person, place, and time.   Psychiatric:         Attention and Perception: Attention normal.         Mood and Affect: Affect normal.         Speech: Speech normal.         Behavior: Behavior normal.         Thought Content: Thought content normal.     VITAL SIGNS:   Vitals:    06/27/24 0944   BP: 129/88   Pulse: (!) 54   Resp: 19    Temp: 97.9 °F (36.6 °C)       Lab Results   Component Value Date    WBC 6.23 06/27/2024    RBC 3.44 (L) 06/27/2024    HGB 10.9 (L) 06/27/2024    HCT 33.7 (L) 06/27/2024    MCV 98.0 (H) 06/27/2024    MCH 31.7 (H) 06/27/2024    MCHC 32.3 (L) 06/27/2024    RDW 14.8 06/27/2024     06/27/2024    MPV 8.9 06/27/2024     CMP  Sodium   Date Value Ref Range Status   06/27/2024 137 136 - 145 mmol/L Final   07/01/2020 138 136 - 145 mmol/L Final     Potassium   Date Value Ref Range Status   06/27/2024 4.0 3.5 - 5.1 mmol/L Final   07/01/2020 4.5 3.5 - 5.1 mmol/L Final     Chloride   Date Value Ref Range Status   06/27/2024 107 98 - 107 mmol/L Final   07/01/2020 108 100 - 109 mmol/L Final     CO2   Date Value Ref Range Status   06/27/2024 26 23 - 31 mmol/L Final     Carbon Dioxide   Date Value Ref Range Status   07/01/2020 26 22 - 33 mmol/L Final     Blood Urea Nitrogen   Date Value Ref Range Status   06/27/2024 9.8 8.4 - 25.7 mg/dL Final   07/01/2020 10 5 - 25 mg/dL Final     Creatinine   Date Value Ref Range Status   06/27/2024 1.01 0.73 - 1.18 mg/dL Final   07/01/2020 0.81 0.57 - 1.25 mg/dL Final     Calcium   Date Value Ref Range Status   06/27/2024 9.1 8.8 - 10.0 mg/dL Final   07/01/2020 8.6 (L) 8.8 - 10.6 mg/dL Final     Albumin   Date Value Ref Range Status   06/27/2024 3.4 3.4 - 4.8 g/dL Final     Bilirubin Total   Date Value Ref Range Status   06/27/2024 0.4 <=1.5 mg/dL Final     ALP   Date Value Ref Range Status   06/27/2024 76 40 - 150 unit/L Final     AST   Date Value Ref Range Status   06/27/2024 16 5 - 34 unit/L Final     ALT   Date Value Ref Range Status   06/27/2024 13 0 - 55 unit/L Final     Anion Gap   Date Value Ref Range Status   07/01/2020 4 (L) 8 - 16 mmol/L Final     eGFR   Date Value Ref Range Status   06/27/2024 >60 mL/min/1.73/m2 Final       Assessment:  Multiple myeloma, IgG kappa:  -presentation: 03/2023:  Worsening low back pain, no associated neurological symptoms, 12-14 pound weight loss  over 6 weeks, mild L4-L5 spinal canal stenosis on MRI (05/04/2023), lumbar DJD and facet arthrosis on MRI lumbar spine (05/04/2023)  -skeletal survey 06/14/2023: Negative for lytic or sclerotic lesions; multiple compression deformities lumbar spine and thoracic spine (new since MRI scan dated 05/04/2023)  -06/14/2023:  Mild anemia (hemoglobin 11.0)   -06/14/2023:  2.28 gm/dL IgG kappa M protein. Kappa 99.4, elevated.  Lambda 0.3400, suppressed.  Kappa/lambda ratio 292, elevated.  -06/14/2023:  BUN, creatinine, calcium, albumin normal  -06/15/2022:  24 hour urine protein 280 mg, elevated.  M spike 183 mg.  Urine BUBBA showed monoclonal kappa light chains.  -06/16/2023: Urine:  Kappa 25.5 mg/dL; lambda < 0.7000 mg/dL; kappa/lambda ratio> 36.4  06/16/2023:  , normal.  -06/26/2023:  Hemoglobin 11.0.  Beta 2 microglobulin 3.18.  Calcium 10.1.  Albumin 3.2.  Hepatitis-A/B/C/HIV serology negative.  -bone marrow biopsy 06/27/2022:  Plasma cells 50% in bone marrow aspirate; plasma cells 36% on flow cytometry of bone marrow aspirate, with kappa light chain restriction; molecular studies pending  -CT abdomen pelvis with contrast 06/27/2023:  Hepatic steatosis; bilateral inguinal hernias; heterogeneous bone demineralization, suggesting marrow infiltrative process like multiple myeloma; multilevel compression deformities in the included thoracic spine and lumbar spine; 10 mm exophytic hyperdense focus upper pole of right kidney, compatible with a hyperdense cyst  -FDG PET-CT 06/29/2023:  Left femur lesser trochanter; right 4th rib laterally; multilevel thoracolumbar compression deformities; subacute fractures T8, T12, L1, L2  >>>  From the data available so far, patient has multiple myeloma (symptomatic), by virtue of:  1. Involved: Uninvolved serum FLC ratio =/> 100 and involved FLC concentration 10 mg/dL or higher (in this patient, kappa/lambda ratio age 292, and kappa light chain 99.4 mg/dL)   2. Hypercalcemia  (06/26/2023): Calcium 10.1.  Albumin 3.2   3. Bone marrow plasmacytosis 50%  4. Heterogeneous bone demineralization on CT 06/27/2023, suggesting bone marrow infiltrative process like multiple myeloma  5. FDG PET-CT 06/29/2023:  Left femur lesser trochanter; right 4th rib laterally; multilevel thoracolumbar compression deformities; subacute fractures T8, T12, L1, L2  6. Beta 2 microglobulin level 3.18, elevated (06/26/2023)   7. No renal insufficiency, no significant anemia, LDH normal  >>>  -Velcade started 07/13/2023  -Zometa 4 mg IV every 4 weeks) x2 years), started 07/13/2023  -06/27/2023: Bone marrow biopsy:  Abnormal myeloma FISH panel:  Aneuploidy: gain of chromosomes 7, 9, 15 and CCND1/11q) gain of chromosome 7; gain of chromosome 9; gain of chromosome 15) (aneuploidy is the most common genetic alteration detected in plasma cell myeloma by FISH analysis, seen in approximately 69-90% of patients)  Additional signal for IGH/14q DNA sequence (clinical significance of this finding is unclear)  Loss of MAF/16q) also a common finding in plasma cell neoplasms and is reported to be associated with a poor prognosis in patients with myeloma)  -07/21/2023: Radiation oncology consultation:  Radiotherapy to spine not indicated/will not be helpful; patient referred to IR for kyphoplasty  -MRI thoracic spine 08/08/2023:  Chronic superior endplate compression fractures T9-L2 vertebral bodies, no acute/subacute fracture, no osseous lesion or soft tissue mass  -MRI lumbar spine 09/14/2023:  Significant progression of skeletal demineralization; new/progressive loss of vertebral body height throughout the spine since 05/2023; superior endplate L4 acute/subacute with marrow edema and enhancement; multilevel degenerative disc disease; spinal stenosis severe at L3-L5 with tethering of descending nerve root; moderate spinal canal stenosis at L4-L5 and L2-L3  -08/03/2023: TSH normal; free T4 normal  -06/14/2023: IgG 3810, elevated;  IgM, IgA suppressed; kappa/lambda ratio 292; IgG kappa monoclonal protein 2.2 gm/dL  -07/27/2023:  IgG 2242, elevated but down; IgM, IgA remain suppressed; kappa/lambda ratio of 59.0, down; IgG kappa monoclonal protein 1.36 gm/dL (chemotherapy was started 07/13/2023)  -08/24/2023: IgG 792, has normalized; IgM, IgA remains suppressed; kappa/lambda ratio 5.72, elevated but significantly down; IgG kappa monoclonal protein 0.39 gm/dL, significantly down  -VRD cycle 5:  10/12/2023-10/26/2023  -restaging bone marrow biopsy 10/24/2023, post VRD X 4-1/2 cycles:  Normocellular; < 1% plasma cells; no clonal or abnormal plasma cell population on flow cytometry; no high-risk FISH findings  -11/08/2023: No monoclonal protein in urine  -11/09/2023: TSH, free T4 normal   -11/09/2023:  No monoclonal protein on SPEP and BUBBA; kappa/lambda ratio 1.36, normalized; kappa free light chains 2.29 mg/dL (to recall, 99.4 on 06/14/2023 before starting chemotherapy)  -cycle 6 day 1 of Revlimid 11/21/2023; subsequently, on hold in anticipation of stem cell transplant  >>>  ASCT at [facility]:  -melphalan 360 mg IV administered day -2o (11/28/2023) (200 mg per m2 per day)  -ASCT infusion day 0 (11/30/2023)  -filgrastim 5 microgram/kg subQ every 24 hours, started day +5 (12/05/2023)  -infectious prophylaxis  -pentamidine 300 mg inhaled once on day-2 (11/28/2023)  -acyclovir 800 mg p.o. q.12 hours starting day-2 (11/28/2023), to continue for 1 year  -fluconazole 400 mg p.o. Q 24 hours starting day-2 (11/28/2023), continue until engraftment and/or mucositis resolved  -cefepime started 12/08/2023, vancomycin added  -IVIG on 12/10/2023  -12/12/2023:  HCT day +12; doing well on antibiotics; counts recovered  -12/12/2023: Transfusion independent and ANC has recovered  -12/13/2023: Patient discharged  -venous Doppler both lower extremities 02/29/2024: Pedal edema: No DVT   -03/11/2024:  Hemoglobin 11.2; CMP unremarkable   -03/11/2024 (day + 102):  No  monoclonal protein in blood; FLC assay normal   -restaging bone marrow biopsy 03/12/2024 (day +103):  No increase in plasma cells        Staging of myeloma:  -serum beta 2 microglobulin elevated (3.18)  -serum albumin and LDH normal   -myeloma FISH panel on bone marrow:  Negative for high-risk cytogenetic abnormalities  >>>  ISS stage:  Stage I  R-ISS stage:  Stage I      Osteoporosis on hips on DEXA scan 10/11/2023        Non myeloma findings (DJD of spine, spinal stenosis, compression fractures):  -mild L4-L5 spinal canal stenosis on MRI 05/04/2023   -lumbar DJD and facet arthrosis on MRI lumbar spine 05/04/2023  -MRI T-spine 08/08/2023:  No myeloma lesions   -MRI lumbar spine 09/14/2023:  Progression of skeletal demineralization; multilevel degenerative disc disease; severe spinal stenosis L3-L5; new/progressive loss of vertebral body height throughout the spine since 05/2023, etc.  -IR at [facility] will not perform kyphoplasty      Co-morbidities:  NIDDM.  Dyslipidemia.  Hypertension.  Celiac disease.  History of rheumatic mitral valve disease, S/P Maze/ablation procedure 02/02/2018        Vaccination history:   COVID-19, MRNA, LN-S, PF (MODERNA FULL 0.5 ML DOSE) 8/18/2022 , 10/23/2021 , 3/17/2021 , 2/17/2021   Hepatitis A / Hepatitis B 8/18/2022 , 10/5/2021 , 7/29/2020   Influenza - Quadrivalent - MDCK - PF 10/5/2021   Influenza - Trivalent - MDCK - PF 9/10/2015   Meningococcal Conjugate (MCV4O) 8/18/2022 , 4/17/2012   Meningococcal Conjugate (MCV4P) 4/17/2012   Pneumococcal Conjugate - 20 Valent 1/12/2023   Tdap 7/29/2020   Zoster 11/18/2020 , 7/29/2020   Zoster Recombinant 11/18/2020 , 7/29/2020        10 mm exophytic hyperdense focus upper pole of right kidney, compatible with a hyperdense cyst, on CT abdomen pelvis with contrast 06/27/2023  -07/12/2023:  MRI abdomen with and without contrast (right renal cyst): Small exophytic right renal lesion most likely a proteinaceous or hemorrhagic cyst (10 mm, upper pole  of right kidney)      Plan:  Multiple myeloma, IgG kappa:    Autologous transplantation:   Category 1 evidence supports proceeding directly after induction therapy to high-dose therapy and HCT  Renal dysfunction and advanced age are not contraindications to transplant     Bortezomib/lenalidomide/dexamethasone (VRd), category 1 regimen:  Cycle length 21 days  -bortezomib 1.3 mg per m2 subQ days 1, 8, and 15  -lenalidomide 25 mg p.o. daily, on days 1 through 14  -dexamethasone 40 mg p.o. with food days 1, 8, and 15  Cycle length: 21 days    If response after primary therapy, then:  Autologous HCT versus continuation of myeloma therapy or maintenance therapy versus tandem autologous transplant, etc.    -Velcade started 07/13/2023  -Zometa 4 mg IV every 4 weeks x2 years (started 07/13/2023    -per Radiation Oncology, given radiologic findings, palliative radiotherapy to spine will not be helpful  -Dr. Brandy Mahajan, BMT, [facility], autologous hematopoietic stem cell transplant after 4 cycles of chemotherapy 11/28/23 4/9/24:  Dr. Mahajan note   Will start maintenance therapy with revlimid at 10 mg po daily with Aspirin. Keep maintenance therapy as long as ANC is greater than 500 and plt greater than 50 K. CBC weekly for 4 wks then QOW for 8 weeks then monthly. Follow TFT q 3-6 weeks.  2- Graft stable and transfusion independent 3- ID continue Acyclovir and Bactrim, vaccinations at 6 months post     -continue Zometa every 4 weeks x2 years due 7/12/24  -Continue acyclovir 800mg every 12 hours until November 2024   -Calcium and vitamin-D supplements  -per andrea Carlson CBC and CMP; repletion accordingly  -DEXA scan in October  -SPEP, BUBBA, FLC assay in 3 months (mid September)  -Follow-up with NP in 2 month, (8/9/2024) with lab work  (CBC and CMP) prior to Zometa infusion  -follow up with Dr. Hampton in 4 months (10/04/2024) with lab work (CBC, CMP, myeloma labs) prior to Zometa infusion    Monitoring while on VRD:  -Weekly  assessment for peripheral neuropathy and/or neuropathic pain.  -Monitor for hypotension during bortezomib therapy; adjustment of antihypertensives and/or administration of IV hydration may be needed.    Back Pain  -06/26/2023: Started Norco 5 mg p.o. q.6 hours PRN for pain; did not work; he stopped taking  -06/26/2023: Taking Norco 10 mg only twice daily; instructed him to take Norco 10 mg every 4-6 hours p.r.n. for pain (has 9/10 lower back pain, constant, radiating to anterior aspect of right thigh, without neurological symptoms)  -06/26/2023: Started levofloxacin 500 mg p.o. q.day X 12 weeks  -06/26/2023: Severe back pain; presumed due to myeloma; referred to Radiation Oncology  -on MRI thoracic and lumbar spine 08/08/2023 and 09/14/2023:  Chronic compression fractures of vertebral bodies; no osseous lesion or soft tissue mass; significant progression of skeletal demineralization; progressive loss of vertebral body height throughout the spine since May 2023; spinal stenosis  -patient has been referred to IR for consideration of kyphoplasty-IR @ [facility] will not perform Kyphoplasty  -Continue Physical Therapy with [facility]     Monitoring on lenalidomide:   Monitor for signs and symptoms of infection (if neutropenic), bleeding or bruising, hepatotoxicity, secondary malignancies, thromboembolism (eg, shortness of breath, chest pain, arm or leg swelling), dermatologic toxicity, tumor lysis syndrome, or hypersensitivity.     Monitoring on bortezomib:  Peripheral neuropathy, posterior reversible leukoencephalopathy syndrome, progressive multifocal leukoencephalopathy, tumor lysis syndrome, or hyper-/hypoglycemia. Monitor closely in patients with risk factors for heart failure or existing heart disease.     Autologous transplantation:   Category 1 evidence supports proceeding directly after induction therapy to high-dose therapy and HCT  Renal dysfunction and advanced age are not contraindications  to transplant     If response after primary therapy, then:  Autologous HCT versus continuation of myeloma therapy or maintenance therapy versus tandem autologous transplant, etc.       Above discussed at length with the patient.  All questions answered.      Nature of multiple myeloma discussed.    He understands and agrees with this plan.  ----------------------------------  Supportive care:  -bone targeted treatment: Bisphosphonate, category 1; or denosumab to reduce risk of skeletal related events; denosumab is preferred in patients with renal insufficiency; assess vitamin-D status; baseline dental exam; monitor for osteonecrosis of the jaw; monitor renal dysfunction with bisphosphonate therapy  -continue bone targeted treatment (bisphosphonate or denosumab) for 2 years; continue beyond 2 years should be based on clinical judgment  -patients receiving denosumab for bone disease who subsequently discontinued therapy should be given maintenance denosumab every 6 months or a single dose of bisphosphonate to mitigate risk of rebound osteoporosis  -for hypercalcemia, zoledronic acid, denosumab, steroids, and/or calcitonin  -for hyperviscosity, plasmapheresis should be used as adjuvant therapy for symptomatic hyperviscosity  -intravenous immunoglobulin therapy should be considered in the setting of recurrent serious infection and/or hypogammaglobulinemia (IgG </= 400 mg/dL)  -pneumococcal conjugate vaccine, followed by pneumococcal polysaccharide vaccine 1 year later  -Influenza vaccination is recommended; 2 doses of high-dose inactivated quandaivalent influenza vaccine should be considered  -consider 12 weeks of levofloxacin 500 mg p.o. daily at the time of initial diagnosis of multiple myeloma  -COVID-19 vaccination  -highest risk for VTE is in the 1st 6 months following new diagnosis of multiple myeloma  -VTE prophylaxis if no contraindications to anticoagulation agents or antiplatelets  Recommendations for VTE  prophylaxis:  Aspirin  mg daily; low molecular weight heparin equivalent to enoxaparin 40 mg daily; Xarelto 10 mg daily; Eliquis 2.5 mg b.i.d.; Arixtra 2.5 mg daily; Coumadin with target INR 2.0-3.0        Lenalidomide:  Embryo fetal toxicity  Hematologic toxicity.  Neutropenia.  Thrombocytopenia.  Venous and arterial thromboembolism.  DVT.  PE.  MI.  Stroke.  Dizziness, fatigue.  Tumor lysis syndrome.  Heart failure.    Used with caution in patients with renal impairment.  Lenalidomide =/> 4 cycles may decrease the # of CD34 pos cells collected for autologous stem cell transplant.  DVT, PE, atrial fibrillation, renal failure are more likely in patients> 65 years  Hepatotoxicity  Hypersensitivity reactions.  Anaphylaxis.  Angioedema.  Skin rash.  Eosinophilia.  Immediate hypersensitivity reactions.  Second primary malignancy.  AML.  MDS.  Solid tumor malignancies.  Nonmelanoma skin cancers.     Monitoring with lenalidomide:  -CBC with differential: weekly for the first 2 cycles, every 2 weeks during the third cycle and monthly thereafter;  -serum creatinine, LFTs (periodically).  -Thyroid function tests (TSH at baseline then every 2 to 3 months during lenalidomide treatment  -ECG when clinically indicated. Monitor for signs and symptoms of infection (if neutropenic), bleeding or bruising, hepatotoxicity, secondary malignancies, thromboembolism (eg, shortness of breath, chest pain, arm or leg swelling), dermatologic toxicity, tumor lysis syndrome, or hypersensitivity.  Patients who could become pregnant: Pregnancy test 10 to 14 days and 24 hours prior to initiating therapy, weekly during the first 4 weeks of treatment, then every 2 to 4 weeks through 4 weeks after therapy discontinued.     Bortezomib:  Bone marrow suppression.  Neutropenia.  Thrombocytopenia.  Acute CHF.  Nausea, vomiting, diarrhea, constipation, ileus.    Hepatotoxicity:  Herpes zoster and her PCP plaques reactivation.    Anaphylactic  reactions.  Hypersensitive reactions.  Angioedema.  Laryngeal edema.    Hypotension.  Peripheral neuropathy.  Posterior reversible leukoencephalopathy syndrome.  Progressive multifocal leukoencephalopathy.  Pulmonary toxicity.  Pneumonitis.  Interstitial pneumonia.  Lung infiltrates.  ARDS.  Thrombotic microangiopathy.  Tumor lysis syndrome.       Monitoring parameters with bortezomib:  CBC, CMP  Blood pressure (to monitor for hypotension)  Peripheral neuropathy  Posterior reversible leukoencephalopathy syndrome, progressive multifocal leukoencephalopathy, tumor lysis syndrome, or hyper-/hypoglycemia. Monitor baseline chest x-ray and then periodic pulmonary function testing (with new or worsening pulmonary symptoms). Monitor closely in patients with risk factors for heart failure or existing heart disease.

## 2024-06-27 NOTE — Clinical Note
infusion on 7/12/24 zometa infusion with lab work prior fu w/np on 8/9/24 with lab work + infusion for Zometa infusion

## 2024-07-11 RX ORDER — SODIUM CHLORIDE 0.9 % (FLUSH) 0.9 %
10 SYRINGE (ML) INJECTION
OUTPATIENT
Start: 2024-08-09

## 2024-07-11 RX ORDER — SODIUM CHLORIDE 0.9 % (FLUSH) 0.9 %
10 SYRINGE (ML) INJECTION
Status: CANCELLED | OUTPATIENT
Start: 2024-07-12

## 2024-07-11 RX ORDER — HEPARIN 100 UNIT/ML
500 SYRINGE INTRAVENOUS
Status: CANCELLED | OUTPATIENT
Start: 2024-07-12

## 2024-07-11 RX ORDER — HEPARIN 100 UNIT/ML
500 SYRINGE INTRAVENOUS
OUTPATIENT
Start: 2024-08-09

## 2024-07-11 RX ORDER — ZOLEDRONIC ACID 0.04 MG/ML
4 INJECTION, SOLUTION INTRAVENOUS
Status: CANCELLED | OUTPATIENT
Start: 2024-07-12

## 2024-07-11 RX ORDER — ZOLEDRONIC ACID 0.04 MG/ML
4 INJECTION, SOLUTION INTRAVENOUS
OUTPATIENT
Start: 2024-08-09

## 2024-07-12 ENCOUNTER — APPOINTMENT (OUTPATIENT)
Dept: HEMATOLOGY/ONCOLOGY | Facility: CLINIC | Age: 69
End: 2024-07-12
Payer: MEDICARE

## 2024-07-12 ENCOUNTER — INFUSION (OUTPATIENT)
Dept: INFUSION THERAPY | Facility: HOSPITAL | Age: 69
End: 2024-07-12
Attending: INTERNAL MEDICINE
Payer: MEDICARE

## 2024-07-12 VITALS
SYSTOLIC BLOOD PRESSURE: 107 MMHG | RESPIRATION RATE: 18 BRPM | BODY MASS INDEX: 23.18 KG/M2 | TEMPERATURE: 98 F | HEART RATE: 58 BPM | WEIGHT: 148 LBS | DIASTOLIC BLOOD PRESSURE: 54 MMHG | OXYGEN SATURATION: 99 %

## 2024-07-12 DIAGNOSIS — M51.36 DEGENERATION OF LUMBAR INTERVERTEBRAL DISC: ICD-10-CM

## 2024-07-12 DIAGNOSIS — C90.00 IGG MULTIPLE MYELOMA: Primary | ICD-10-CM

## 2024-07-12 LAB
ALBUMIN SERPL-MCNC: 3.5 G/DL (ref 3.4–4.8)
ALBUMIN/GLOB SERPL: 1.1 RATIO (ref 1.1–2)
ALP SERPL-CCNC: 71 UNIT/L (ref 40–150)
ALT SERPL-CCNC: 15 UNIT/L (ref 0–55)
ANION GAP SERPL CALC-SCNC: 7 MEQ/L
AST SERPL-CCNC: 15 UNIT/L (ref 5–34)
BASOPHILS # BLD AUTO: 0.14 X10(3)/MCL
BASOPHILS NFR BLD AUTO: 2.3 %
BILIRUB SERPL-MCNC: 0.3 MG/DL
BUN SERPL-MCNC: 9.3 MG/DL (ref 8.4–25.7)
CALCIUM SERPL-MCNC: 8.6 MG/DL (ref 8.8–10)
CHLORIDE SERPL-SCNC: 106 MMOL/L (ref 98–107)
CO2 SERPL-SCNC: 24 MMOL/L (ref 23–31)
CREAT SERPL-MCNC: 1 MG/DL (ref 0.73–1.18)
CREAT/UREA NIT SERPL: 9
EOSINOPHIL # BLD AUTO: 0.4 X10(3)/MCL (ref 0–0.9)
EOSINOPHIL NFR BLD AUTO: 6.6 %
ERYTHROCYTE [DISTWIDTH] IN BLOOD BY AUTOMATED COUNT: 14.9 % (ref 11.5–17)
GFR SERPLBLD CREATININE-BSD FMLA CKD-EPI: >60 ML/MIN/1.73/M2
GLOBULIN SER-MCNC: 3.1 GM/DL (ref 2.4–3.5)
GLUCOSE SERPL-MCNC: 82 MG/DL (ref 82–115)
HCT VFR BLD AUTO: 31.8 % (ref 42–52)
HGB BLD-MCNC: 10.5 G/DL (ref 14–18)
IMM GRANULOCYTES # BLD AUTO: 0.01 X10(3)/MCL (ref 0–0.04)
IMM GRANULOCYTES NFR BLD AUTO: 0.2 %
LYMPHOCYTES # BLD AUTO: 3.07 X10(3)/MCL (ref 0.6–4.6)
LYMPHOCYTES NFR BLD AUTO: 51 %
MCH RBC QN AUTO: 32.4 PG (ref 27–31)
MCHC RBC AUTO-ENTMCNC: 33 G/DL (ref 33–36)
MCV RBC AUTO: 98.1 FL (ref 80–94)
MONOCYTES # BLD AUTO: 0.95 X10(3)/MCL (ref 0.1–1.3)
MONOCYTES NFR BLD AUTO: 15.8 %
NEUTROPHILS # BLD AUTO: 1.45 X10(3)/MCL (ref 2.1–9.2)
NEUTROPHILS NFR BLD AUTO: 24.1 %
NRBC BLD AUTO-RTO: 0 %
PLATELET # BLD AUTO: 218 X10(3)/MCL (ref 130–400)
PMV BLD AUTO: 9.5 FL (ref 7.4–10.4)
POTASSIUM SERPL-SCNC: 3.9 MMOL/L (ref 3.5–5.1)
PROT SERPL-MCNC: 6.6 GM/DL (ref 5.8–7.6)
RBC # BLD AUTO: 3.24 X10(6)/MCL (ref 4.7–6.1)
SODIUM SERPL-SCNC: 137 MMOL/L (ref 136–145)
WBC # BLD AUTO: 6.02 X10(3)/MCL (ref 4.5–11.5)

## 2024-07-12 PROCEDURE — 63600175 PHARM REV CODE 636 W HCPCS: Performed by: INTERNAL MEDICINE

## 2024-07-12 PROCEDURE — 36415 COLL VENOUS BLD VENIPUNCTURE: CPT

## 2024-07-12 PROCEDURE — 85025 COMPLETE CBC W/AUTO DIFF WBC: CPT

## 2024-07-12 PROCEDURE — 96374 THER/PROPH/DIAG INJ IV PUSH: CPT

## 2024-07-12 PROCEDURE — 25000003 PHARM REV CODE 250: Performed by: INTERNAL MEDICINE

## 2024-07-12 PROCEDURE — 80053 COMPREHEN METABOLIC PANEL: CPT

## 2024-07-12 RX ORDER — HEPARIN 100 UNIT/ML
500 SYRINGE INTRAVENOUS
Status: DISCONTINUED | OUTPATIENT
Start: 2024-07-12 | End: 2024-07-12 | Stop reason: HOSPADM

## 2024-07-12 RX ORDER — SODIUM CHLORIDE 0.9 % (FLUSH) 0.9 %
10 SYRINGE (ML) INJECTION
Status: DISCONTINUED | OUTPATIENT
Start: 2024-07-12 | End: 2024-07-12 | Stop reason: HOSPADM

## 2024-07-12 RX ORDER — ZOLEDRONIC ACID 0.04 MG/ML
4 INJECTION, SOLUTION INTRAVENOUS
Status: COMPLETED | OUTPATIENT
Start: 2024-07-12 | End: 2024-07-12

## 2024-07-12 RX ADMIN — SODIUM CHLORIDE: 9 INJECTION, SOLUTION INTRAVENOUS at 10:07

## 2024-07-12 RX ADMIN — ZOLEDRONIC ACID 4 MG: 0.04 INJECTION, SOLUTION INTRAVENOUS at 10:07

## 2024-07-12 NOTE — PLAN OF CARE
Zometa Q4 weeks given via PIV R) anterior wrist area tolerated well   Ca 8.6 /corrected calcium 9.3

## 2024-07-29 DIAGNOSIS — E11.9 TYPE 2 DIABETES MELLITUS WITHOUT COMPLICATION, WITHOUT LONG-TERM CURRENT USE OF INSULIN: ICD-10-CM

## 2024-07-29 RX ORDER — METFORMIN HYDROCHLORIDE 500 MG/1
500 TABLET ORAL 2 TIMES DAILY WITH MEALS
Qty: 180 TABLET | Refills: 3 | Status: SHIPPED | OUTPATIENT
Start: 2024-07-29

## 2024-08-01 ENCOUNTER — OFFICE VISIT (OUTPATIENT)
Dept: NEUROSURGERY | Facility: CLINIC | Age: 69
End: 2024-08-01
Payer: COMMERCIAL

## 2024-08-01 VITALS
WEIGHT: 145 LBS | BODY MASS INDEX: 22.76 KG/M2 | HEIGHT: 67 IN | RESPIRATION RATE: 16 BRPM | DIASTOLIC BLOOD PRESSURE: 67 MMHG | SYSTOLIC BLOOD PRESSURE: 114 MMHG | HEART RATE: 50 BPM

## 2024-08-01 DIAGNOSIS — M48.07 SPINAL STENOSIS, LUMBOSACRAL REGION: Primary | ICD-10-CM

## 2024-08-01 DIAGNOSIS — S32.010A COMPRESSION FRACTURE OF FIRST LUMBAR VERTEBRA: ICD-10-CM

## 2024-08-01 DIAGNOSIS — M48.54XA: ICD-10-CM

## 2024-08-01 DIAGNOSIS — M51.36 DEGENERATION OF LUMBAR INTERVERTEBRAL DISC: ICD-10-CM

## 2024-08-01 PROCEDURE — 1101F PT FALLS ASSESS-DOCD LE1/YR: CPT | Mod: CPTII,,, | Performed by: PHYSICIAN ASSISTANT

## 2024-08-01 PROCEDURE — 3060F POS MICROALBUMINURIA REV: CPT | Mod: CPTII,,, | Performed by: PHYSICIAN ASSISTANT

## 2024-08-01 PROCEDURE — 1159F MED LIST DOCD IN RCRD: CPT | Mod: CPTII,,, | Performed by: PHYSICIAN ASSISTANT

## 2024-08-01 PROCEDURE — 3078F DIAST BP <80 MM HG: CPT | Mod: CPTII,,, | Performed by: PHYSICIAN ASSISTANT

## 2024-08-01 PROCEDURE — 1160F RVW MEDS BY RX/DR IN RCRD: CPT | Mod: CPTII,,, | Performed by: PHYSICIAN ASSISTANT

## 2024-08-01 PROCEDURE — 3008F BODY MASS INDEX DOCD: CPT | Mod: CPTII,,, | Performed by: PHYSICIAN ASSISTANT

## 2024-08-01 PROCEDURE — 99204 OFFICE O/P NEW MOD 45 MIN: CPT | Mod: ,,, | Performed by: PHYSICIAN ASSISTANT

## 2024-08-01 PROCEDURE — 3066F NEPHROPATHY DOC TX: CPT | Mod: CPTII,,, | Performed by: PHYSICIAN ASSISTANT

## 2024-08-01 PROCEDURE — 3074F SYST BP LT 130 MM HG: CPT | Mod: CPTII,,, | Performed by: PHYSICIAN ASSISTANT

## 2024-08-01 PROCEDURE — 1125F AMNT PAIN NOTED PAIN PRSNT: CPT | Mod: CPTII,,, | Performed by: PHYSICIAN ASSISTANT

## 2024-08-01 PROCEDURE — 3288F FALL RISK ASSESSMENT DOCD: CPT | Mod: CPTII,,, | Performed by: PHYSICIAN ASSISTANT

## 2024-08-01 PROCEDURE — 3044F HG A1C LEVEL LT 7.0%: CPT | Mod: CPTII,,, | Performed by: PHYSICIAN ASSISTANT

## 2024-08-01 NOTE — PROGRESS NOTES
[facility]  History & Physical  Neurosurgery      Yuriy Díaz   [id number]   1955     SUBJECTIVE:     CHIEF COMPLAINT:  Lower back and leg pain      HPI:  Yuriy Díaz is a 68 y.o. male who presents for neurosurgical evaluation.  Patient has been referred to Dr. Michael by Dr. Lundy due to thoracic and lumbar compression fractures.  He is under Dr. Lundy's care in regards to multiple myeloma.    The patient's symptoms began of insidious onset in April of 2023.  There has been a progressive worsening of his symptoms since that time.  He complains of lower back pain.  Intermittently, he experiences cramping sensations in his feet.  He also has right knee pain.  There is no numbness or weakness in either lower extremity.  The lower back pain increases with walking and prolonged standing.  The further he walks, he develops pain into the thighs.  The patient was able to walk approximately 100 ft.  His pain becomes severe at that point.  He participated in his son's wedding last July.  His pain was severe the following day.  Lying down is his best position, relieving the symptoms.  His pain is also improved with sitting.    The patient was seen by Dr. Henderson last year.  Surgery was discussed at that time.  However, the patient was diagnosed with multiple myeloma soon thereafter and began treatment.  Initially, the patient was told his lower back pain was due to his cancer.  He has been treated successfully and is in remission in regards to his multiple myeloma.  Yet, he was continued with lower back pain.  Dr. Lundy has told him that the lower back pain is not due to his myeloma.  He was then referred to our office for care.    He has undergone a course of physical therapy with [facility] for 3-4 months.  Three injections with Dr. Winters did not provide lasting relief.  Gabapentin only helped a little with his pain.  Due to swelling in his feet, the gabapentin was discontinued.  The patient denies disturbances  in bowel or bladder function.  There is no difficulty with balance.       Past Medical History:   Diagnosis Date    Celiac disease     Cerebral palsy, unspecified     Compression fracture of L1 vertebra with delayed healing, subsequent encounter     Compression fracture of L2 vertebra with delayed healing, subsequent encounter     Cyst of right kidney     Degeneration of lumbar intervertebral disc     Diabetes mellitus, type 2     GERD (gastroesophageal reflux disease)     Hyperlipidemia     Hypertension     IgG multiple myeloma     Low back pain     Lumbago with sciatica, right side     Lumbar spinal stenosis     Monocytosis     Multiple myeloma, remission status unspecified     Neck pain     Nontraumatic compression fracture of T8 vertebra, initial encounter     LOIS (obstructive sleep apnea)     Osteoporosis of femur without pathological fracture     Other chronic pain     Personal history of colonic polyps 06/29/2022    Secondary malignancy of lumbar vertebral column     Secondary malignant neoplasm of bone     Tachycardia        Past Surgical History:   Procedure Laterality Date    BONE MARROW ASPIRATION N/A 06/27/2023    Procedure: ASPIRATION, BONE MARROW;  Surgeon: Namita Daniels MD;  Location: [facility] ENDOSCOPY;  Service: General;  Laterality: N/A;    BONE MARROW ASPIRATION N/A 10/24/2023    Procedure: ASPIRATION, BONE MARROW;  Surgeon: Namita Daniels MD;  Location: [facility] ENDOSCOPY;  Service: General;  Laterality: N/A;    BONE MARROW ASPIRATION N/A 3/12/2024    Procedure: ASPIRATION, BONE MARROW;  Surgeon: Haylie Liu MD;  Location: [facility] ENDOSCOPY;  Service: General;  Laterality: N/A;    BONE MARROW BIOPSY N/A 06/27/2023    Procedure: Biopsy-bone marrow;  Surgeon: Haylie Liu MD;  Location: [facility] ENDOSCOPY;  Service: General;  Laterality: N/A;    BONE MARROW BIOPSY N/A 10/24/2023    Procedure: Biopsy-bone marrow;  Surgeon: Namita Daniels MD;  Location: [facility] ENDOSCOPY;  Service: General;   Laterality: N/A;    BONE MARROW BIOPSY N/A 3/12/2024    Procedure: Biopsy-bone marrow;  Surgeon: Haylie Liu MD;  Location: [facility] ENDOSCOPY;  Service: General;  Laterality: N/A;    CARDIAC SURGERY  2018    COLONOSCOPY  06/29/2022    EYE SURGERY         Family History   Problem Relation Name Age of Onset    Hypertension Mother         Social History     Socioeconomic History    Marital status:    Tobacco Use    Smoking status: Never    Smokeless tobacco: Never   Substance and Sexual Activity    Alcohol use: Never    Drug use: Never    Sexual activity: Not Currently       Current Outpatient Medications   Medication Instructions    acyclovir (ZOVIRAX) 400 mg, Oral, 2 times daily    ammonium lactate 12 % Crea 1 application , Topical (Top), Daily    ascorbic acid (vitamin C) (VITAMIN C) 1,000 mg, Oral, Daily    aspirin (ECOTRIN) 81 mg, Oral, Daily    atorvastatin (LIPITOR) 20 mg, Oral, Daily    calcium carbonate 195 mg calcium (500 mg) Chew 1 tablet, Oral, Daily    coenzyme Q10 10 mg capsule 1 capsule, Oral, Daily    famotidine (PEPCID) 40 mg, Oral, Nightly    ferrous sulfate (FEOSOL) 650 mg, Oral    hydrocortisone (ANUSOL-HC) 2.5 % rectal cream 1 applicator, Rectal, 2 times daily    lenalidomide 10 mg Cap 1 capsule, Oral    metFORMIN (GLUCOPHAGE) 500 mg, Oral, 2 times daily with meals    metoprolol succinate (TOPROL-XL) 50 MG 24 hr tablet Oral    MIRALAX 17 g, Oral    multivit with min-folic acid 0.4 mg Tab 1 tablet, Oral, Daily    naloxone (NARCAN) 4 mg/actuation Spry SMARTSIG:Both Nares    nystatin (MYCOSTATIN) powder Topical (Top), 4 times daily    omega 3-dha-epa-fish oil 300 mg (120 mg- 180mg)-1,000 mg Cap 500 mg, Oral    pantoprazole (PROTONIX) 20 mg, Oral, Daily    sulfamethoxazole-trimethoprim 800-160mg (BACTRIM DS) 800-160 mg Tab Take 1 tablet by mouth on MWF of each week    traMADoL 100 mg Tab 100 tablets, Oral, Every 4 hours PRN       Review of patient's allergies indicates:   Allergen Reactions  "   Wheat Other (See Comments)        Review of Systems   Constitutional:  Negative for chills and fever.   HENT:  Negative for nosebleeds and sore throat.    Eyes:  Negative for pain and visual disturbance.   Respiratory:  Negative for cough, chest tightness and shortness of breath.    Cardiovascular:  Positive for leg swelling. Negative for chest pain.   Gastrointestinal:  Negative for diarrhea, nausea and vomiting.   Genitourinary:  Negative for difficulty urinating, dysuria and hematuria.   Musculoskeletal:  Positive for back pain. Negative for gait problem and myalgias.   Skin:  Negative for rash.   Neurological:  Negative for dizziness, facial asymmetry, weakness, numbness and headaches.   Psychiatric/Behavioral:  Negative for confusion and sleep disturbance. The patient is not nervous/anxious.        OBJECTIVE:       Visit Vitals  /67 (BP Location: Left arm, Patient Position: Sitting)   Pulse (!) 50   Resp 16   Ht 5' 7" (1.702 m)   Wt 65.8 kg (145 lb)   BMI 22.71 kg/m²        General:  Pleasant, Well-nourished, Well-groomed.    CV:  Neck is supple.  There are no carotid bruits.  Heart has regular rate and rhythm.    Lungs:  Lungs are clear to auscultation bilaterally with non-labored respirations.    Abdomen:  Soft, non-tender, non-distended    Musculoskeletal:   Lumbar ROM:  Severely limited with flexion-extension secondary to an increase in lower back pain.  Straight leg raise is negative bilaterally.  Crossed straight leg raise is negative bilaterally.  Hip rotation is negative bilaterally.    Neurological:  The patient is awake, alert, and oriented in all 4 spheres.  Muscle strength against resistance:    Iliopsoas Quadriceps Knee  Flexion Tibialis  anterior Gastro- cnemius EHL   Lower: R 5/5 5/5 5/5 5/5 5/5 5/5    L 5/5 5/5 5/5 5/5 5/5 5/5   Sensation is intact to primary modalities in bilateral lower extremities with the exception of being diminished along the right L4 dermatome.  Toes are " downgoing to Babinski.  There is no clonus at the ankles.  Reflexes:   Right Left   Triceps     Biceps     Brachioradialis     Patellar 1+ 1+   Achilles 0 2+   Gait is antalgic.  He was able to walk on heels and toes.      Imaging:  All pertinent neuroimaging independently reviewed. Discussed these findings in detail with the patient.      ASSESSMENT:     1. Spinal stenosis, lumbosacral region  MRI Lumbar Spine Without Contrast    X-Ray Lumbar Complete Including Flex And Ext      2. Compression fracture of thoracic spine, non-traumatic  Ambulatory referral/consult to Neurosurgery      3. Compression fracture of first lumbar vertebra  Ambulatory referral/consult to Neurosurgery      4. Degeneration of lumbar intervertebral disc  Ambulatory referral/consult to Neurosurgery        PLAN:     Options were discussed at length with the patient.  We reviewed his prior thoracic and lumbar MRIs and CT chest.  The compression fractures are all old.  This is not the source of his pain.  He does have significant lumbar stenosis at multiple levels on a multifactorial basis.  He has exhausted conservative measures.  He would like to explore surgical options.  I do believe he might benefit from lumbar decompression.  We will have him obtain updated lumbar MRI and x-rays.  He will return to see Dr. Grubbs next week.      A total of 28 minutes was spent face-to-face with the patient during this encounter.  Over half of that time was spent on counseling and coordination of care.  An additional 15+ minutes were used to review the pateint's chart including Dr. Lundy's notes, thoracic and lumbar MRI images and reports, CT chest images, and work on office note.      Denise Feldman PA-C      Disclaimer:  This note is prepared using voice recognition software and as such is likely to have errors despite attempts at proofreading.

## 2024-08-02 ENCOUNTER — HOSPITAL ENCOUNTER (OUTPATIENT)
Dept: RADIOLOGY | Facility: HOSPITAL | Age: 69
Discharge: HOME | End: 2024-08-02
Attending: PHYSICIAN ASSISTANT
Payer: COMMERCIAL

## 2024-08-02 DIAGNOSIS — M48.07 SPINAL STENOSIS, LUMBOSACRAL REGION: ICD-10-CM

## 2024-08-02 PROCEDURE — 72114 X-RAY EXAM L-S SPINE BENDING: CPT | Mod: TC

## 2024-08-07 DIAGNOSIS — S32.010G COMPRESSION FRACTURE OF L1 VERTEBRA WITH DELAYED HEALING, SUBSEQUENT ENCOUNTER: ICD-10-CM

## 2024-08-07 DIAGNOSIS — C90.00 IGG MULTIPLE MYELOMA: ICD-10-CM

## 2024-08-07 RX ORDER — TRAMADOL HYDROCHLORIDE 100 MG/1
100 TABLET, COATED ORAL EVERY 4 HOURS PRN
Qty: 60 TABLET | Refills: 2 | Status: SHIPPED | OUTPATIENT
Start: 2024-08-07

## 2024-08-08 ENCOUNTER — TELEPHONE (OUTPATIENT)
Dept: HEMATOLOGY/ONCOLOGY | Facility: CLINIC | Age: 69
End: 2024-08-08
Payer: COMMERCIAL

## 2024-08-09 ENCOUNTER — APPOINTMENT (OUTPATIENT)
Dept: HEMATOLOGY/ONCOLOGY | Facility: CLINIC | Age: 69
End: 2024-08-09
Payer: MEDICARE

## 2024-08-09 ENCOUNTER — INFUSION (OUTPATIENT)
Dept: INFUSION THERAPY | Facility: HOSPITAL | Age: 69
End: 2024-08-09
Attending: INTERNAL MEDICINE
Payer: MEDICARE

## 2024-08-09 ENCOUNTER — OFFICE VISIT (OUTPATIENT)
Dept: HEMATOLOGY/ONCOLOGY | Facility: CLINIC | Age: 69
End: 2024-08-09
Payer: MEDICARE

## 2024-08-09 VITALS
HEART RATE: 52 BPM | TEMPERATURE: 98 F | HEIGHT: 67 IN | SYSTOLIC BLOOD PRESSURE: 110 MMHG | DIASTOLIC BLOOD PRESSURE: 60 MMHG | OXYGEN SATURATION: 100 % | WEIGHT: 147.63 LBS | BODY MASS INDEX: 23.17 KG/M2

## 2024-08-09 VITALS
HEIGHT: 67 IN | SYSTOLIC BLOOD PRESSURE: 121 MMHG | WEIGHT: 147.5 LBS | OXYGEN SATURATION: 100 % | RESPIRATION RATE: 20 BRPM | HEART RATE: 54 BPM | TEMPERATURE: 98 F | BODY MASS INDEX: 23.15 KG/M2 | DIASTOLIC BLOOD PRESSURE: 63 MMHG

## 2024-08-09 DIAGNOSIS — C90.00 IGG MULTIPLE MYELOMA: ICD-10-CM

## 2024-08-09 DIAGNOSIS — C90.00 IGG MULTIPLE MYELOMA: Primary | ICD-10-CM

## 2024-08-09 DIAGNOSIS — M51.36 DEGENERATION OF LUMBAR INTERVERTEBRAL DISC: ICD-10-CM

## 2024-08-09 DIAGNOSIS — M81.0 OSTEOPOROSIS OF FEMUR WITHOUT PATHOLOGICAL FRACTURE: ICD-10-CM

## 2024-08-09 DIAGNOSIS — Z79.899 LONG-TERM USE OF HIGH-RISK MEDICATION: ICD-10-CM

## 2024-08-09 DIAGNOSIS — S32.010G COMPRESSION FRACTURE OF L1 VERTEBRA WITH DELAYED HEALING, SUBSEQUENT ENCOUNTER: ICD-10-CM

## 2024-08-09 DIAGNOSIS — C90.00 MULTIPLE MYELOMA, REMISSION STATUS UNSPECIFIED: ICD-10-CM

## 2024-08-09 LAB
ANION GAP SERPL CALC-SCNC: 5 MEQ/L
BASOPHILS # BLD AUTO: 0.17 X10(3)/MCL
BASOPHILS NFR BLD AUTO: 2.8 %
BUN SERPL-MCNC: 10.3 MG/DL (ref 8.4–25.7)
CALCIUM SERPL-MCNC: 9.2 MG/DL (ref 8.8–10)
CHLORIDE SERPL-SCNC: 109 MMOL/L (ref 98–107)
CO2 SERPL-SCNC: 24 MMOL/L (ref 23–31)
CREAT SERPL-MCNC: 1.05 MG/DL (ref 0.73–1.18)
CREAT/UREA NIT SERPL: 10
EOSINOPHIL # BLD AUTO: 0.55 X10(3)/MCL (ref 0–0.9)
EOSINOPHIL NFR BLD AUTO: 8.9 %
ERYTHROCYTE [DISTWIDTH] IN BLOOD BY AUTOMATED COUNT: 15.6 % (ref 11.5–17)
GFR SERPLBLD CREATININE-BSD FMLA CKD-EPI: >60 ML/MIN/1.73/M2
GLUCOSE SERPL-MCNC: 95 MG/DL (ref 82–115)
HCT VFR BLD AUTO: 33.2 % (ref 42–52)
HGB BLD-MCNC: 10.8 G/DL (ref 14–18)
IMM GRANULOCYTES # BLD AUTO: 0.03 X10(3)/MCL (ref 0–0.04)
IMM GRANULOCYTES NFR BLD AUTO: 0.5 %
LYMPHOCYTES # BLD AUTO: 2.78 X10(3)/MCL (ref 0.6–4.6)
LYMPHOCYTES NFR BLD AUTO: 45.1 %
MCH RBC QN AUTO: 32.4 PG (ref 27–31)
MCHC RBC AUTO-ENTMCNC: 32.5 G/DL (ref 33–36)
MCV RBC AUTO: 99.7 FL (ref 80–94)
MONOCYTES # BLD AUTO: 0.97 X10(3)/MCL (ref 0.1–1.3)
MONOCYTES NFR BLD AUTO: 15.7 %
NEUTROPHILS # BLD AUTO: 1.66 X10(3)/MCL (ref 2.1–9.2)
NEUTROPHILS NFR BLD AUTO: 27 %
NRBC BLD AUTO-RTO: 0 %
PLATELET # BLD AUTO: 259 X10(3)/MCL (ref 130–400)
PMV BLD AUTO: 9.4 FL (ref 7.4–10.4)
POTASSIUM SERPL-SCNC: 4.1 MMOL/L (ref 3.5–5.1)
RBC # BLD AUTO: 3.33 X10(6)/MCL (ref 4.7–6.1)
SODIUM SERPL-SCNC: 138 MMOL/L (ref 136–145)
WBC # BLD AUTO: 6.16 X10(3)/MCL (ref 4.5–11.5)

## 2024-08-09 PROCEDURE — 85025 COMPLETE CBC W/AUTO DIFF WBC: CPT

## 2024-08-09 PROCEDURE — 36415 COLL VENOUS BLD VENIPUNCTURE: CPT

## 2024-08-09 PROCEDURE — 80048 BASIC METABOLIC PNL TOTAL CA: CPT

## 2024-08-09 PROCEDURE — 63600175 PHARM REV CODE 636 W HCPCS: Performed by: INTERNAL MEDICINE

## 2024-08-09 PROCEDURE — 96374 THER/PROPH/DIAG INJ IV PUSH: CPT

## 2024-08-09 PROCEDURE — 25000003 PHARM REV CODE 250: Performed by: INTERNAL MEDICINE

## 2024-08-09 PROCEDURE — A4216 STERILE WATER/SALINE, 10 ML: HCPCS | Performed by: INTERNAL MEDICINE

## 2024-08-09 RX ORDER — ZOLEDRONIC ACID 0.04 MG/ML
4 INJECTION, SOLUTION INTRAVENOUS
Status: COMPLETED | OUTPATIENT
Start: 2024-08-09 | End: 2024-08-09

## 2024-08-09 RX ORDER — SODIUM CHLORIDE 0.9 % (FLUSH) 0.9 %
10 SYRINGE (ML) INJECTION
Status: DISCONTINUED | OUTPATIENT
Start: 2024-08-09 | End: 2024-08-09 | Stop reason: HOSPADM

## 2024-08-09 RX ORDER — HEPARIN 100 UNIT/ML
500 SYRINGE INTRAVENOUS
Status: DISCONTINUED | OUTPATIENT
Start: 2024-08-09 | End: 2024-08-09 | Stop reason: HOSPADM

## 2024-08-09 RX ORDER — HEPARIN 100 UNIT/ML
500 SYRINGE INTRAVENOUS
OUTPATIENT
Start: 2024-09-06

## 2024-08-09 RX ORDER — ZOLEDRONIC ACID 0.04 MG/ML
4 INJECTION, SOLUTION INTRAVENOUS
OUTPATIENT
Start: 2024-09-06

## 2024-08-09 RX ORDER — SODIUM CHLORIDE 0.9 % (FLUSH) 0.9 %
10 SYRINGE (ML) INJECTION
OUTPATIENT
Start: 2024-09-06

## 2024-08-09 RX ADMIN — ZOLEDRONIC ACID 4 MG: 0.04 INJECTION, SOLUTION INTRAVENOUS at 11:08

## 2024-08-09 RX ADMIN — Medication 10 ML: at 11:08

## 2024-08-13 ENCOUNTER — TELEPHONE (OUTPATIENT)
Dept: NEUROSURGERY | Facility: CLINIC | Age: 69
End: 2024-08-13
Payer: COMMERCIAL

## 2024-08-13 NOTE — TELEPHONE ENCOUNTER
I spoke with the patient in regards to his upcoming appointment with Dr. Grubbs at 4:00. I wanted to see if he would like to come in at 3:30 instead. He is okay with that. He is now scheduled for 3:30 on 8/22/24.

## 2024-08-21 ENCOUNTER — PATIENT MESSAGE (OUTPATIENT)
Dept: ADMINISTRATIVE | Facility: HOSPITAL | Age: 69
End: 2024-08-21
Payer: MEDICARE

## 2024-08-21 NOTE — PROGRESS NOTES
[facility]   Neurosurgery      Yuriy Díaz  MRN: [id number], CSN: [id number]      : 1955   Age: 68 y.o. male  Payor: [place] [employer] [facility] / Plan: MEDICARE SUPPLEMENT [place] / Product Type: Medicare Supplement /       Ref:  No referring provider defined for this encounter.    PCP: Gaviota Lyon MD    Visit Date: 2024     Patient Active Problem List   Diagnosis    Calcified granuloma of lung    Celiac disease    Dyslipidemia    Mitral valve insufficiency    Bell's palsy    History of tuberculosis    Varicose veins of both lower extremities    IgG multiple myeloma    Low back pain    Hypercalcemia    Cyst of right kidney    Secondary malignant neoplasm of bone    Compression fracture of thoracic spine, non-traumatic    Secondary malignancy of lumbar vertebral column    Drug-induced immunodeficiency    Compression fracture of first lumbar vertebra    Compression fracture of L2 lumbar vertebra    Abdominal bloating with cramps    Degeneration of lumbar intervertebral disc    Spinal stenosis of lumbar region    Impaired hydration    Hypovolemia    Osteoporosis of femur without pathological fracture    Stem cells transplant status    Pre-diabetes       SUBJECTIVE:      CC:   Chief Complaint   Patient presents with    chronic low back pain radiating to R ant upper leg       HPI:   Mr. Díaz is a 68 y.o. male who has a past medical history significant for hypertension, mitral valve insufficiency, IgG multiple myeloma s/p bone marrow transplant, hypercalcemia, history of TB, GERD, celiac disease, and osteoporosis.  He presents as a follow up patient in the neurosurgery clinic for low back pain radiating into his right greater than left anterior upper legs for 1 1/2 years since 2023.     The patient's last date of visit in the neurosurgery clinic was on 2024 by ANGELINE Walker.  Although it was recognized that Mr. Díaz had various thoracolumbar compression fractures, these were  determined to be chronic.  Updated lumbar spine x-rays and a MRI lumbar spine without gadolinium studies were ordered.  These radiographic results will be reviewed during today's appointment.      Mr. Díaz visits the neurosurgery with an elastic brace around his lower back.  The patient reports having gradual onset of low back pain approximately 1 1/2 ago with no associated traumatic events.  There is radiation of pain into his right greater than left anterior upper legs without any numbness or weakness.  His pain level is a 5/10.  There have been no reports of bowel or bladder incontinence.  Mr. Díaz has been fully ambulatory.    There is increased pain with several activities such as sitting, prolonged driving, walking, and using a treadmill for 15 minutes.  There is a partial reduction of pain when lying down.  The patient has tried taking Tylenol and tramadol.  Neurontin was associated with adverse side effects related to swelling in his bilateral feet and therefore this medication was discontinued.       Mr. Díaz participated in physical therapy at the [facility] facility near [facility] for 3 months without significant improvement.  In addition, he did not have any changes in his symptoms after 3 serial lumbar epidural steroid injection by pain management specialist Dr. Winters.  It is noted that Mr. Díaz was evaluated by neurosurgeon Dr. Henderson in the past, but did not continue with a scheduled follow up due to a diagnosis of multiple myeloma, which required treatment.      The patient is established oncologist is Dr. Lundy at [facility]. Mr. Díaz's established cardiologist is Dr. Albert.  The patient has completed ultrasound studies for a right kidney cyst that was seen on a MRI lumbar spine without gadolinium.       Patient Active Problem List    Diagnosis Date Noted    Pre-diabetes 04/09/2024    Stem cells transplant status 12/20/2023    Osteoporosis of femur without pathological fracture  10/31/2023    Hypovolemia 09/07/2023    Impaired hydration 08/31/2023    Abdominal bloating with cramps 08/09/2023    Degeneration of lumbar intervertebral disc 08/09/2023    Spinal stenosis of lumbar region 08/09/2023    Compression fracture of first lumbar vertebra 07/16/2023    Compression fracture of L2 lumbar vertebra 07/16/2023    Hypercalcemia 07/10/2023    Cyst of right kidney 07/10/2023    Secondary malignant neoplasm of bone 07/10/2023    Compression fracture of thoracic spine, non-traumatic 07/10/2023    Secondary malignancy of lumbar vertebral column 07/10/2023    Drug-induced immunodeficiency 07/10/2023    IgG multiple myeloma 06/23/2023    Low back pain 06/23/2023    History of tuberculosis 08/30/2022    Varicose veins of both lower extremities 08/30/2022    Calcified granuloma of lung 05/30/2022    Celiac disease 01/09/2018    Dyslipidemia 01/09/2018    Bell's palsy 01/09/2018    Mitral valve insufficiency 01/05/2018     Past Medical History:   Diagnosis Date    Celiac disease     Cerebral palsy, unspecified     Compression fracture of L1 vertebra with delayed healing, subsequent encounter     Compression fracture of L2 vertebra with delayed healing, subsequent encounter     Cyst of right kidney     Degeneration of lumbar intervertebral disc     Diabetes mellitus, type 2     GERD (gastroesophageal reflux disease)     Hyperlipidemia     Hypertension     IgG multiple myeloma     Low back pain     Lumbago with sciatica, right side     Lumbar spinal stenosis     Monocytosis     Multiple myeloma, remission status unspecified     Neck pain     Nontraumatic compression fracture of T8 vertebra, initial encounter     LOIS (obstructive sleep apnea)     Osteoporosis of femur without pathological fracture     Other chronic pain     Personal history of colonic polyps 06/29/2022    Secondary malignancy of lumbar vertebral column     Secondary malignant neoplasm of bone     Tachycardia      Past Surgical History:    Procedure Laterality Date    BONE MARROW ASPIRATION N/A 06/27/2023    Procedure: ASPIRATION, BONE MARROW;  Surgeon: Namita Daniels MD;  Location: [facility] ENDOSCOPY;  Service: General;  Laterality: N/A;    BONE MARROW ASPIRATION N/A 10/24/2023    Procedure: ASPIRATION, BONE MARROW;  Surgeon: Namita Daniels MD;  Location: [facility] ENDOSCOPY;  Service: General;  Laterality: N/A;    BONE MARROW ASPIRATION N/A 3/12/2024    Procedure: ASPIRATION, BONE MARROW;  Surgeon: Haylie Liu MD;  Location: [facility] ENDOSCOPY;  Service: General;  Laterality: N/A;    BONE MARROW BIOPSY N/A 06/27/2023    Procedure: Biopsy-bone marrow;  Surgeon: Haylie Liu MD;  Location: [facility] ENDOSCOPY;  Service: General;  Laterality: N/A;    BONE MARROW BIOPSY N/A 10/24/2023    Procedure: Biopsy-bone marrow;  Surgeon: Namita Daniels MD;  Location: [facility] ENDOSCOPY;  Service: General;  Laterality: N/A;    BONE MARROW BIOPSY N/A 3/12/2024    Procedure: Biopsy-bone marrow;  Surgeon: Haylie Liu MD;  Location: [facility] ENDOSCOPY;  Service: General;  Laterality: N/A;    CARDIAC SURGERY  2018    COLONOSCOPY  06/29/2022    EYE SURGERY         Current Outpatient Medications:     acyclovir (ZOVIRAX) 400 MG tablet, Take 1 tablet (400 mg total) by mouth 2 (two) times daily., Disp: 60 tablet, Rfl: 4    ascorbic acid, vitamin C, (VITAMIN C) 1000 MG tablet, Take 1,000 mg by mouth once daily., Disp: , Rfl:     aspirin (ECOTRIN) 81 MG EC tablet, Take 81 mg by mouth once daily., Disp: , Rfl:     atorvastatin (LIPITOR) 20 MG tablet, Take 20 mg by mouth once daily., Disp: , Rfl:     calcium carbonate 195 mg calcium (500 mg) Chew, Take 1 tablet by mouth once daily., Disp: , Rfl:     coenzyme Q10 10 mg capsule, Take 1 capsule by mouth once daily., Disp: , Rfl:     famotidine (PEPCID) 40 MG tablet, Take 40 mg by mouth every evening., Disp: , Rfl:     ferrous sulfate (FEOSOL) 325 mg (65 mg iron) Tab tablet, Take 650 mg by mouth., Disp: , Rfl:      hydrocortisone (ANUSOL-HC) 2.5 % rectal cream, Place 1 applicator rectally 2 (two) times daily., Disp: 28 g, Rfl: 2    lenalidomide 10 mg Cap, Take 1 capsule by mouth., Disp: , Rfl:     metFORMIN (GLUCOPHAGE) 500 MG tablet, Take 1 tablet (500 mg total) by mouth 2 (two) times daily with meals. (Patient taking differently: Take 500 mg by mouth daily with breakfast.), Disp: 180 tablet, Rfl: 3    metoprolol succinate (TOPROL-XL) 50 MG 24 hr tablet, Take 50 mg by mouth once daily., Disp: , Rfl:     MIRALAX 17 gram/dose powder, Take 17 g by mouth., Disp: , Rfl:     multivit with min-folic acid 0.4 mg Tab, Take 1 tablet by mouth once daily., Disp: , Rfl:     omega 3-dha-epa-fish oil 300 mg (120 mg- 180mg)-1,000 mg Cap, Take 500 mg by mouth., Disp: , Rfl:     pantoprazole (PROTONIX) 20 MG tablet, Take 1 tablet (20 mg total) by mouth once daily., Disp: 30 tablet, Rfl: 11    sulfamethoxazole-trimethoprim 800-160mg (BACTRIM DS) 800-160 mg Tab, Take 1 tablet by mouth on MWF of each week, Disp: 48 tablet, Rfl: 3    traMADoL 100 mg Tab, Take 100 tablets by mouth every 4 (four) hours as needed (pain)., Disp: 60 tablet, Rfl: 2    Review of patient's allergies indicates:   Allergen Reactions    Wheat Other (See Comments)       Social History     Tobacco Use    Smoking status: Never    Smokeless tobacco: Never   Substance Use Topics    Alcohol use: Never     Occupation: Retired, 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   HENT:  Negative for congestion and sore throat.    Eyes:  Negative for blurred vision and double vision.   Respiratory:  Negative for cough and shortness of breath.    Cardiovascular:  Negative for chest pain and palpitations.   Gastrointestinal:  Positive for constipation, Negative for diarrhea, nausea and vomiting.   Musculoskeletal:  Positive for back pain,  "Negative for neck pain.   Neurological:  Positive for headaches, Negative for sensory change and focal weakness.  Endo/Heme/Allergies:  Does not bruise/bleed easily.   Psychiatric/Behavioral:  Negative for depression and anxiety.      OBJECTIVE:     EXAMINATION:  /72 (BP Location: Left arm, Patient Position: Sitting)   Pulse (!) 55   Resp 16   Ht 5' 7" (1.702 m)   Wt 65.8 kg (145 lb)   BMI 22.71 kg/m²   Body Habitus: Normal    Physical Exam:  Constitutional: The patient is well-developed and cooperative, sitting comfortably in a chair     Mental Status:   Oriented to person, place, and time  Normal speech     Motor:  Muscle bulk: normal in all extremities  Tone: normal in all extremities     Upper extremities:  Deltoid: right 5/5; left 5/5  Biceps: right 5/5; left 5/5  Triceps: right 5/5; left 5/5  : right 5/5; left 5/5  Interosseous muscles: right 5/5; left 5/5     Lower extremities:  Hip flexors: right 5/5; left 5/5  Knee extensors: right 5/5; left 5/5  Knee flexors: right 5/5; left 5/5  Foot dorsiflexors: right 5/5; left 5/5  Foot plantar flexors: right 5/5; left 5/5  Extensor hallucis longus: right 5/5; left 5/5     Sensation:  Normal to light touch x 4 extremities     Reflexes:  Ramirezs sign: right negative; left negative  Babinski: right downgoing; left downgoing  Clonus: right negative; left negative     Musculoskeletal:     Gait: normal     Straight leg test: right negative; left negative     Cervical: No pain with palpation  Upper back: No pain with palpation  Lower back: No pain with palpation        DIAGNOSTICS REVIEW OF IMAGING, LAB & OTHER STUDIES:  I have personally reviewed and evaluated the following reports as well as radiographic studies:     Lumbar spine x-rays, 08/02/2024- normal alignment with multiple chronic compression fractures from L1 to L5.  There is no motion on flexion-extension views.       MRI lumbar spine without gadolinium, 08/02/2024- there is normal alignment with " chronic lower thoracic and lumbar spine chronic compression fractures.  At L2-3, there is mild stenosis.  At L3-4 and L4-5, there is moderate stenosis with associated bilateral neuroforaminal narrowing.  There is a right kidney lesion/ cyst measuring 1.2 x 1.1 cm.       ASSESSMENT:  Mr. Díaz is a 68 y.o. male who has a past medical history significant for hypertension, mitral valve insufficiency, IgG multiple myeloma s/p bone marrow transplant, hypercalcemia, history of TB, GERD, celiac disease, and osteoporosis.  He presents as a follow up patient in the neurosurgery clinic for low back pain radiating into his right greater than left anterior upper legs for 1 1/2 years since April 2023.  The patient has symptoms consistent with neurogenic claudication.  Mr. Díaz has a normal motor and sensory neurological exam.    I reviewed pertinent imaging studies with the patient.  A MRI lumbar spine without gadolinium demonstrates normal alignment with chronic lower thoracic and lumbar spine chronic compression fractures.  At L2-3, there is mild stenosis.  At L3-4 and L4-5, there is moderate stenosis with associated bilateral neuroforaminal narrowing.  There is a right kidney lesion/ cyst measuring 1.2 x 1.1 cm.         PLAN:    Encounter Diagnoses   Name Primary?    Lumbar stenosis with neurogenic claudication Yes    Chronic bilateral low back pain with bilateral sciatica     Compression fracture of lumbar vertebra, unspecified lumbar vertebral level, sequela      No orders of the defined types were placed in this encounter.       1.  I discussed with Mr. Díaz that his low back pain with radiation into his bilateral anterior upper legs is attributed to multilevel lumbar stenosis.  The patient is exhibiting symptoms of neurogenic claudication that have not significantly improved with optimization of medical management.  Therefore, he may be considered a future candidate for surgery, specifically a L2-3, L3-4, and L4-5  laminectomy.  I reviewed the risks, benefits, and alternatives of this surgery, which are outlined below.  He had a multitude of questions about the specifics of the procedure and anticipated outcome.  All questions were answered to his satisfaction.     2.  I am requesting the neurosurgery staff to obtain preoperative clearance from his cardiologist Dr. Albert.  In addition, permission is being requested for the patient to discontinue taking aspirin 81 mg 10 days before surgery.       3.  Mr. Díaz will need to discontinue taking Omega 3 and all other nutritional supplements 10 days preoperatively.       4.  Furthermore, I am requesting the neurosurgery staff to obtain preoperative clearance from his oncologist Dr. Lundy.      5.  He is scheduled to complete a DEXA scan on 10/14/2024.  Optimization of osteoporosis is deferred to Dr. Lundy.    6.  The patient has been advised to discontinue wearing his back brace, as I explained this weakens core muscles.     7.  Arrangements will be made for Mr. Díaz to follow up in my neurosurgery clinic after the above preoperative criteria have been met.  A surgery date and time will be determined and his appointment will serve as a preoperative evaluation.  The patient is encouraged to bring a family member to this appointment, as the details of surgery will be discussed.  Mr. Díaz has children are physicians, who he plans to have attend at his next visit in the neurosurgery clinic.         The risks, benefits, and alternatives to surgery were discussed with the patient.      Complications of a Lumbar Laminectomy may include:  Failure to improve symptoms and/or increased or persistent pain; Recurrence, continuation, or worsening of the condition that required the operation; Need for further surgical intervention or treatment; Neurological injury, which may include spinal cord or nerve root injury, paralysis (which involves the inability to move arms and/or legs), clumsiness,  loss of sympathetic response, loss of sensation, and loss of bowel, bladder, and sexual function; Delayed or immediate spinal instability; Cerebral spinal fluid leak; Meningitis; Damage to major blood vessels, nerves, and surrounding anatomical structures; Scarring; Blindness; and Positioning problems such as neuropathy or compartment syndrome.     Complications of any surgery may include:  Adverse reaction to anesthesia; Bleeding; Transfusion of blood products, which carries a risk of infection or reaction; Infection, which requires treatment with antibiotics by mouth or intravenously, or even further surgeries; Urinary tract infection; Heart attack, stroke, pneumonia, and DVT/PE (blood clot in the legs or pelvis that can dislodge and go to the lungs); Other unforeseen complications; Coma; and Death.     Benefits of surgery include:  Possible improvement in buttocks and leg pain, numbness, and/or weakness; and possible improvement in back pain.     Alternatives to the procedure include:  Physical therapy, chiropractic care, acupuncture, medical therapy, and pain management.        LUMBAR LAMINECTOMY/ LAMINOTOMY/ DISCECTOMY  DISCHARGE INSTRUCTIONS        1.   Keep your incision clean and dry.    If your sutures are under the skin, they will dissolve with time.  If your sutures or staples are over the skin, they will be removed at your first postoperative follow-up appointment in 10-14 days.   Wait at least 72 hours from the time of your surgery to take a shower.  After showering, pat your incision dry.  Do not immerse your incision in water for 4-6 weeks (e.g. bath tub, hot tub, swimming pool).        2.  Activity restrictions:  No lifting greater than 15 pounds for 4-6 weeks.  No bending, stooping, or twisting.  Avoid sitting in one position for over 30 minutes at a time.  No impact exercise or contact sports for at least 3 months.  To resume driving short distances (<30 minutes), you must be off of your narcotic  medications and be able to comfortably brake suddenly, should the need arise.    Get up and walk.        3.  Contact your Neurosurgeon if the following occurs:  Signs and symptoms of infection, including fever above 101.5 degrees Fahrenheit and/or chills.  Redness, swelling, warmth, or drainage from the incision.  Any lasting changes in sensation, numbness, and/or tingling.  Increased weakness or increased pain.  Swelling of the foot and/or lower leg with calf pain.        Neurosurgery has office hours Monday through Friday, 8:00 AM to 4:00 PM except for holidays. There is an answering service available during non-office hours, with 24 hours neurosurgery coverage.  Report to the Emergency Department if you need immediate medical assistance.       Please contact Dr. Grubbs's office for any questions or concerns.  Typically, your first follow-up appointment after a lumbar laminectomy/ laminotomy/ disectomy is 10-14 days from the date of your operation if sutures or staples need to be removed.  Otherwise, the follow-up appointment is in 4-6 weeks.       This note will be sent to the patient's referring provider No ref. provider found and primary care provider Gaviota Lyon MD.           Xochitl Grubbs MD  Neurosurgeon

## 2024-08-22 ENCOUNTER — OFFICE VISIT (OUTPATIENT)
Dept: NEUROSURGERY | Facility: CLINIC | Age: 69
End: 2024-08-22
Payer: COMMERCIAL

## 2024-08-22 VITALS
WEIGHT: 145 LBS | RESPIRATION RATE: 16 BRPM | SYSTOLIC BLOOD PRESSURE: 116 MMHG | HEART RATE: 55 BPM | HEIGHT: 67 IN | BODY MASS INDEX: 22.76 KG/M2 | DIASTOLIC BLOOD PRESSURE: 72 MMHG

## 2024-08-22 DIAGNOSIS — M48.062 LUMBAR STENOSIS WITH NEUROGENIC CLAUDICATION: Primary | ICD-10-CM

## 2024-08-22 DIAGNOSIS — M54.42 CHRONIC BILATERAL LOW BACK PAIN WITH BILATERAL SCIATICA: ICD-10-CM

## 2024-08-22 DIAGNOSIS — G89.29 CHRONIC BILATERAL LOW BACK PAIN WITH BILATERAL SCIATICA: ICD-10-CM

## 2024-08-22 DIAGNOSIS — M54.41 CHRONIC BILATERAL LOW BACK PAIN WITH BILATERAL SCIATICA: ICD-10-CM

## 2024-08-22 DIAGNOSIS — S32.000S COMPRESSION FRACTURE OF LUMBAR VERTEBRA, UNSPECIFIED LUMBAR VERTEBRAL LEVEL, SEQUELA: ICD-10-CM

## 2024-08-22 PROCEDURE — 3060F POS MICROALBUMINURIA REV: CPT | Mod: CPTII,,, | Performed by: NEUROLOGICAL SURGERY

## 2024-08-22 PROCEDURE — 1125F AMNT PAIN NOTED PAIN PRSNT: CPT | Mod: CPTII,,, | Performed by: NEUROLOGICAL SURGERY

## 2024-08-22 PROCEDURE — 1159F MED LIST DOCD IN RCRD: CPT | Mod: CPTII,,, | Performed by: NEUROLOGICAL SURGERY

## 2024-08-22 PROCEDURE — 3066F NEPHROPATHY DOC TX: CPT | Mod: CPTII,,, | Performed by: NEUROLOGICAL SURGERY

## 2024-08-22 PROCEDURE — 3288F FALL RISK ASSESSMENT DOCD: CPT | Mod: CPTII,,, | Performed by: NEUROLOGICAL SURGERY

## 2024-08-22 PROCEDURE — 99214 OFFICE O/P EST MOD 30 MIN: CPT | Mod: ,,, | Performed by: NEUROLOGICAL SURGERY

## 2024-08-22 PROCEDURE — 1101F PT FALLS ASSESS-DOCD LE1/YR: CPT | Mod: CPTII,,, | Performed by: NEUROLOGICAL SURGERY

## 2024-08-22 PROCEDURE — 3044F HG A1C LEVEL LT 7.0%: CPT | Mod: CPTII,,, | Performed by: NEUROLOGICAL SURGERY

## 2024-08-22 PROCEDURE — 3008F BODY MASS INDEX DOCD: CPT | Mod: CPTII,,, | Performed by: NEUROLOGICAL SURGERY

## 2024-08-22 PROCEDURE — 3074F SYST BP LT 130 MM HG: CPT | Mod: CPTII,,, | Performed by: NEUROLOGICAL SURGERY

## 2024-08-22 PROCEDURE — 3078F DIAST BP <80 MM HG: CPT | Mod: CPTII,,, | Performed by: NEUROLOGICAL SURGERY

## 2024-08-22 PROCEDURE — 1160F RVW MEDS BY RX/DR IN RCRD: CPT | Mod: CPTII,,, | Performed by: NEUROLOGICAL SURGERY

## 2024-08-22 NOTE — PATIENT INSTRUCTIONS
Mr. Díaz is a 68 y.o. male who has a past medical history significant for hypertension, mitral valve insufficiency, IgG multiple myeloma s/p bone marrow transplant, hypercalcemia, history of TB, GERD, celiac disease, and osteoporosis.  He presents as a follow up patient in the neurosurgery clinic for low back pain radiating into his right greater than left anterior upper legs for 1 1/2 years since April 2023.  The patient has symptoms consistent with neurogenic claudication.  Mr. Díaz has a normal motor and sensory neurological exam.    I reviewed pertinent imaging studies with the patient.  A MRI lumbar spine without gadolinium demonstrates normal alignment with chronic lower thoracic and lumbar spine chronic compression fractures.  At L2-3, there is mild stenosis.  At L3-4 and L4-5, there is moderate stenosis with associated bilateral neuroforaminal narrowing.  There is a right kidney lesion/ cyst measuring 1.2 x 1.1 cm.         PLAN:    Encounter Diagnoses   Name Primary?    Lumbar stenosis with neurogenic claudication Yes    Chronic bilateral low back pain with bilateral sciatica     Compression fracture of lumbar vertebra, unspecified lumbar vertebral level, sequela      No orders of the defined types were placed in this encounter.       1.  I discussed with Mr. Díaz that his low back pain with radiation into his bilateral anterior upper legs is attributed to multilevel lumbar stenosis.  The patient is exhibiting symptoms of neurogenic claudication that have not significantly improved with optimization of medical management.  Therefore, he may be considered a future candidate for surgery, specifically a L2-3, L3-4, and L4-5 laminectomy.  I reviewed the risks, benefits, and alternatives of this surgery, which are outlined below.  He had a multitude of questions about the specifics of the procedure and anticipated outcome.  All questions were answered to his satisfaction.     2.  I am requesting the neurosurgery  staff to obtain preoperative clearance from his cardiologist Dr. Albert.  In addition, permission is being requested for the patient to discontinue taking aspirin 81 mg 10 days before surgery.       3.  Mr. Díaz will need to discontinue taking Omega 3 and all other nutritional supplements 10 days preoperatively.       4.  Furthermore, I am requesting the neurosurgery staff to obtain preoperative clearance from his oncologist Dr. Lundy.      5.  He is scheduled to complete a DEXA scan on 10/14/2024.  Optimization of osteoporosis is deferred to Dr. Lundy.    6.  The patient has been advised to discontinue wearing his back brace, as I explained this weakens core muscles.     7.  Arrangements will be made for Mr. Díaz to follow up in my neurosurgery clinic after the above preoperative criteria have been met.  A surgery date and time will be determined and his appointment will serve as a preoperative evaluation.  The patient is encouraged to bring a family member to this appointment, as the details of surgery will be discussed.  Mr. Díaz has children are physicians, who he plans to have attend at his next visit in the neurosurgery clinic.         The risks, benefits, and alternatives to surgery were discussed with the patient.      Complications of a Lumbar Laminectomy may include:  Failure to improve symptoms and/or increased or persistent pain; Recurrence, continuation, or worsening of the condition that required the operation; Need for further surgical intervention or treatment; Neurological injury, which may include spinal cord or nerve root injury, paralysis (which involves the inability to move arms and/or legs), clumsiness, loss of sympathetic response, loss of sensation, and loss of bowel, bladder, and sexual function; Delayed or immediate spinal instability; Cerebral spinal fluid leak; Meningitis; Damage to major blood vessels, nerves, and surrounding anatomical structures; Scarring; Blindness; and Positioning  problems such as neuropathy or compartment syndrome.     Complications of any surgery may include:  Adverse reaction to anesthesia; Bleeding; Transfusion of blood products, which carries a risk of infection or reaction; Infection, which requires treatment with antibiotics by mouth or intravenously, or even further surgeries; Urinary tract infection; Heart attack, stroke, pneumonia, and DVT/PE (blood clot in the legs or pelvis that can dislodge and go to the lungs); Other unforeseen complications; Coma; and Death.     Benefits of surgery include:  Possible improvement in buttocks and leg pain, numbness, and/or weakness; and possible improvement in back pain.     Alternatives to the procedure include:  Physical therapy, chiropractic care, acupuncture, medical therapy, and pain management.        LUMBAR LAMINECTOMY/ LAMINOTOMY/ DISCECTOMY  DISCHARGE INSTRUCTIONS        1.   Keep your incision clean and dry.    If your sutures are under the skin, they will dissolve with time.  If your sutures or staples are over the skin, they will be removed at your first postoperative follow-up appointment in 10-14 days.   Wait at least 72 hours from the time of your surgery to take a shower.  After showering, pat your incision dry.  Do not immerse your incision in water for 4-6 weeks (e.g. bath tub, hot tub, swimming pool).        2.  Activity restrictions:  No lifting greater than 15 pounds for 4-6 weeks.  No bending, stooping, or twisting.  Avoid sitting in one position for over 30 minutes at a time.  No impact exercise or contact sports for at least 3 months.  To resume driving short distances (<30 minutes), you must be off of your narcotic medications and be able to comfortably brake suddenly, should the need arise.    Get up and walk.        3.  Contact your Neurosurgeon if the following occurs:  Signs and symptoms of infection, including fever above 101.5 degrees Fahrenheit and/or chills.  Redness, swelling, warmth, or drainage  from the incision.  Any lasting changes in sensation, numbness, and/or tingling.  Increased weakness or increased pain.  Swelling of the foot and/or lower leg with calf pain.        Neurosurgery has office hours Monday through Friday, 8:00 AM to 4:00 PM except for holidays. There is an answering service available during non-office hours, with 24 hours neurosurgery coverage.  Report to the Emergency Department if you need immediate medical assistance.       Please contact Dr. Grubbs's office for any questions or concerns.  Typically, your first follow-up appointment after a lumbar laminectomy/ laminotomy/ disectomy is 10-14 days from the date of your operation if sutures or staples need to be removed.  Otherwise, the follow-up appointment is in 4-6 weeks.       This note will be sent to the patient's referring provider No ref. provider found and primary care provider Gaviota Lyon MD.

## 2024-09-02 ENCOUNTER — TELEPHONE (OUTPATIENT)
Dept: FAMILY MEDICINE | Facility: CLINIC | Age: 69
End: 2024-09-02
Payer: MEDICARE

## 2024-09-02 NOTE — TELEPHONE ENCOUNTER
"Called pt to tell him it is important to schedule an appointment at [facility] as soon as possible to go over MRI findings of renal mass. Pt stated that he was told it is a "cyst." Pt advised to schedule appointment at earliest convenience anyway in order to follow up with this and any other medical concerns. Pt said "ok."         Erick Amos MD  \[facility]\"" Family Medicine HO-I    "

## 2024-09-03 ENCOUNTER — OFFICE VISIT (OUTPATIENT)
Dept: FAMILY MEDICINE | Facility: CLINIC | Age: 69
End: 2024-09-03
Payer: MEDICARE

## 2024-09-03 VITALS
HEIGHT: 67 IN | DIASTOLIC BLOOD PRESSURE: 63 MMHG | HEART RATE: 52 BPM | RESPIRATION RATE: 20 BRPM | TEMPERATURE: 98 F | BODY MASS INDEX: 23.1 KG/M2 | SYSTOLIC BLOOD PRESSURE: 108 MMHG | WEIGHT: 147.19 LBS | OXYGEN SATURATION: 100 %

## 2024-09-03 DIAGNOSIS — M48.061 SPINAL STENOSIS OF LUMBAR REGION, UNSPECIFIED WHETHER NEUROGENIC CLAUDICATION PRESENT: ICD-10-CM

## 2024-09-03 DIAGNOSIS — C90.00 IGG MULTIPLE MYELOMA: ICD-10-CM

## 2024-09-03 DIAGNOSIS — Z86.39 HISTORY OF DIABETES MELLITUS, TYPE II: ICD-10-CM

## 2024-09-03 DIAGNOSIS — R50.9 FEVER OF UNKNOWN ORIGIN: Primary | ICD-10-CM

## 2024-09-03 PROBLEM — R10.9 ABDOMINAL BLOATING WITH CRAMPS: Status: RESOLVED | Noted: 2023-08-09 | Resolved: 2024-09-03

## 2024-09-03 PROBLEM — C79.51 SECONDARY MALIGNANT NEOPLASM OF BONE: Status: RESOLVED | Noted: 2023-07-10 | Resolved: 2024-09-03

## 2024-09-03 PROBLEM — E86.1 HYPOVOLEMIA: Status: RESOLVED | Noted: 2023-09-07 | Resolved: 2024-09-03

## 2024-09-03 PROBLEM — R14.0 ABDOMINAL BLOATING WITH CRAMPS: Status: RESOLVED | Noted: 2023-08-09 | Resolved: 2024-09-03

## 2024-09-03 PROBLEM — G51.0 BELL'S PALSY: Status: RESOLVED | Noted: 2018-01-09 | Resolved: 2024-09-03

## 2024-09-03 PROBLEM — C79.51 SECONDARY MALIGNANCY OF LUMBAR VERTEBRAL COLUMN: Status: RESOLVED | Noted: 2023-07-10 | Resolved: 2024-09-03

## 2024-09-03 LAB
ALBUMIN SERPL-MCNC: 3.3 G/DL (ref 3.4–4.8)
ALBUMIN/GLOB SERPL: 0.8 RATIO (ref 1.1–2)
ALP SERPL-CCNC: 92 UNIT/L (ref 40–150)
ALT SERPL-CCNC: 42 UNIT/L (ref 0–55)
ANION GAP SERPL CALC-SCNC: 10 MEQ/L
AST SERPL-CCNC: 35 UNIT/L (ref 5–34)
BACTERIA #/AREA URNS AUTO: ABNORMAL /HPF
BASOPHILS # BLD AUTO: 0.13 X10(3)/MCL
BASOPHILS NFR BLD AUTO: 1.4 %
BILIRUB SERPL-MCNC: 0.4 MG/DL
BILIRUB UR QL STRIP.AUTO: NEGATIVE
BUN SERPL-MCNC: 13.2 MG/DL (ref 8.4–25.7)
CALCIUM SERPL-MCNC: 9.7 MG/DL (ref 8.8–10)
CHLORIDE SERPL-SCNC: 105 MMOL/L (ref 98–107)
CLARITY UR: CLEAR
CO2 SERPL-SCNC: 21 MMOL/L (ref 23–31)
COLOR UR AUTO: ABNORMAL
CREAT SERPL-MCNC: 1.14 MG/DL (ref 0.73–1.18)
CREAT/UREA NIT SERPL: 12
EOSINOPHIL # BLD AUTO: 0.19 X10(3)/MCL (ref 0–0.9)
EOSINOPHIL NFR BLD AUTO: 2.1 %
ERYTHROCYTE [DISTWIDTH] IN BLOOD BY AUTOMATED COUNT: 15.4 % (ref 11.5–17)
EST. AVERAGE GLUCOSE BLD GHB EST-MCNC: 102.5 MG/DL
FLUAV AG UPPER RESP QL IA.RAPID: NOT DETECTED
FLUBV AG UPPER RESP QL IA.RAPID: NOT DETECTED
GFR SERPLBLD CREATININE-BSD FMLA CKD-EPI: >60 ML/MIN/1.73/M2
GLOBULIN SER-MCNC: 4.2 GM/DL (ref 2.4–3.5)
GLUCOSE SERPL-MCNC: 104 MG/DL (ref 82–115)
GLUCOSE UR QL STRIP: NORMAL
HBA1C MFR BLD: 5.2 %
HCT VFR BLD AUTO: 32.7 % (ref 42–52)
HGB BLD-MCNC: 10.5 G/DL (ref 14–18)
HGB UR QL STRIP: NEGATIVE
HYALINE CASTS #/AREA URNS LPF: ABNORMAL /LPF
IMM GRANULOCYTES # BLD AUTO: 0.02 X10(3)/MCL (ref 0–0.04)
IMM GRANULOCYTES NFR BLD AUTO: 0.2 %
KETONES UR QL STRIP: NEGATIVE
LEUKOCYTE ESTERASE UR QL STRIP: NEGATIVE
LYMPHOCYTES # BLD AUTO: 3.93 X10(3)/MCL (ref 0.6–4.6)
LYMPHOCYTES NFR BLD AUTO: 43 %
MCH RBC QN AUTO: 32.2 PG (ref 27–31)
MCHC RBC AUTO-ENTMCNC: 32.1 G/DL (ref 33–36)
MCV RBC AUTO: 100.3 FL (ref 80–94)
MONOCYTES # BLD AUTO: 1.94 X10(3)/MCL (ref 0.1–1.3)
MONOCYTES NFR BLD AUTO: 21.2 %
NEUTROPHILS # BLD AUTO: 2.92 X10(3)/MCL (ref 2.1–9.2)
NEUTROPHILS NFR BLD AUTO: 32.1 %
NITRITE UR QL STRIP: NEGATIVE
NRBC BLD AUTO-RTO: 0 %
PH UR STRIP: 6.5 [PH]
PLATELET # BLD AUTO: 236 X10(3)/MCL (ref 130–400)
PMV BLD AUTO: 9.9 FL (ref 7.4–10.4)
POTASSIUM SERPL-SCNC: 4.1 MMOL/L (ref 3.5–5.1)
PROT SERPL-MCNC: 7.5 GM/DL (ref 5.8–7.6)
PROT UR QL STRIP: ABNORMAL
RBC # BLD AUTO: 3.26 X10(6)/MCL (ref 4.7–6.1)
RBC #/AREA URNS AUTO: ABNORMAL /HPF
RSV A 5' UTR RNA NPH QL NAA+PROBE: NOT DETECTED
SARS-COV-2 RNA RESP QL NAA+PROBE: NOT DETECTED
SODIUM SERPL-SCNC: 136 MMOL/L (ref 136–145)
SP GR UR STRIP.AUTO: 1.01 (ref 1–1.03)
SQUAMOUS #/AREA URNS LPF: ABNORMAL /HPF
UROBILINOGEN UR STRIP-ACNC: NORMAL
WBC # BLD AUTO: 9.13 X10(3)/MCL (ref 4.5–11.5)
WBC #/AREA URNS AUTO: ABNORMAL /HPF

## 2024-09-03 PROCEDURE — 99215 OFFICE O/P EST HI 40 MIN: CPT | Mod: PBBFAC

## 2024-09-03 PROCEDURE — 81001 URINALYSIS AUTO W/SCOPE: CPT

## 2024-09-03 PROCEDURE — 36415 COLL VENOUS BLD VENIPUNCTURE: CPT

## 2024-09-03 PROCEDURE — 80053 COMPREHEN METABOLIC PANEL: CPT

## 2024-09-03 PROCEDURE — 0241U COVID/RSV/FLU A&B PCR: CPT

## 2024-09-03 PROCEDURE — 85025 COMPLETE CBC W/AUTO DIFF WBC: CPT

## 2024-09-03 PROCEDURE — 83036 HEMOGLOBIN GLYCOSYLATED A1C: CPT

## 2024-09-03 RX ORDER — SULFAMETHOXAZOLE AND TRIMETHOPRIM 800; 160 MG/1; MG/1
TABLET ORAL
Qty: 48 TABLET | Refills: 3 | Status: SHIPPED | OUTPATIENT
Start: 2024-09-03

## 2024-09-03 NOTE — PROGRESS NOTES
[facility] FM Clinic Note    CHIEF COMPLAINT:  Chief Complaint   Patient presents with    Fever     Complain of fever/cold       HISTORY OF  PRESENT ILLNESS:  Yuriy Díaz is a 68 y.o. male w/PMHx of T2DM, HTN, MM, who presents to clinic today for Fever/chills along with MRI findings of renal cyst.     Acute issues:  1) Right renal cyst  MRI lumbar spine on 08/01/24  1.2 x 1.1 cm lesion exophytic from the anterior upper pole the right kidney   MRI ABD w/o contrast on 07/12/23 - 10 m exophytic right renal lesion most likely a proteinaceous or hemorrhagic cyst.   CT ABD/Pelvis on 06/27/24 - 10 mm exophytic hyperdense focus at the upper pole right kidney is seen compatible with a hyperdense cyst.   Retroperitoneal U/S was ordered - pending  Denies burning with urination, flank pain, blood in urine, difficulty in urination, change in urinary frequency    2) Fever/chills  3-4 days ago onset of fevers patient reports of home temp of 100.6 - 100.7 F, relieved with Tylenol 500 mg, which he has been taking about twice daily (last taken this AM 0600)  States he has no known sick contacts and currently lives by himself; denies any recent travel history in the past 90 days  Denies cough, shortness of breath, sore throat, chest pain, general malaise, fatigue    3) Constipation  Having constipation, last Bowel movement today was hard/minimal (last bowel movement was 3 days ago)  Follows with Dr. Kapoor who performs his colonoscopies and was prescribed Linzess 72 mcg qd prn & Miralax 17 g qd  He states the Linzess was initially helping with his overall constipation but states it is no longer  He states he is usually regular with his bowel movements, having at least one daily but not since this past week    Chronic issues:  1) T2DM  Currently on Metformin 500 mg qd despite being prescribed 500 mg BID  Does not measure Home CBGs  Diet mainly consist of meat/vegetables/rice; little to no sugar     2) Lumbar stenosis w/claudication  Currently  taking Gabapentin 100 mg TID prn and Tramadol 100 mg q4hrs prn which he states controls pain temporarily; he states he only takes when in active pain which is elicited with increased activity, which is not every day  States he previously completed 3 months of [facility] PT in the past and was subsequently discharged after completing therapy  He presents here today wanting to continue with PT, preferring to go to [facility] for further PT        REVIEW OF SYSTEMS:  See HPI      Health Maintenance:  Colon Cancer screening: colonoscopy 6/29/22 (Dr. Kapoor) - 5 mm tubular adenoma in ascending colon   Non-smoker  Prostate Cancer: 2.78 on 05/27/24  Immunizations: Tdap (7/2020), Shingles (7/2020, 11/2020), PCV 20 (1/2023)      Care team:  Cardiology - Dr. Albert  [facility] Heme/Onc - Multiple Myeloma (Dr. Lundy)  Last seen on 08/09/24; next f/u on 10/04/24  GI - Celiac disease (Dr. Zoltan Mann)  Last colonoscopy 06/2022  Neurosurgery - Lumbar stenosis w/claudication (Dr. Xochitl Grubbs)  Last seen on 08/22/24 - future candidate for surgery, specifically a L2-3, L3-4, and L4-5 laminectomy    PMHx:  Multiple Myeloma IgG s/p Bone marrow transplant - Zometa 4mg IV every 28 days for 2 years total (started 07/13/23) and Revlimid 10 mg p.o. daily with 81 mg aspirin (started 04/09/24), Acyclovir 800 mg BID until 11/2024,  T2DM - metformin 500 mg BID  A1c 5.6 on 04/26/24  Nonobstructive CAD  HTN  LOIS - CPAP qhs  Celiac disease  GERD- famotidine (Pepcid) 40 mg qhs  Mitral valve regurg - s/p MVR (02/02/2018)  Lumbar stenosis w/claudication - Tramadol 100 mg q4hrs prn   History of TB  Osteoporosis - DEXA on 10/14/24    Past Medical History:   Diagnosis Date    Celiac disease     Cerebral palsy, unspecified     Compression fracture of L1 vertebra with delayed healing, subsequent encounter     Compression fracture of L2 vertebra with delayed healing, subsequent encounter     Cyst of right kidney     Degeneration of lumbar  intervertebral disc     Diabetes mellitus, type 2     GERD (gastroesophageal reflux disease)     Hyperlipidemia     Hypertension     IgG multiple myeloma     Low back pain     Lumbago with sciatica, right side     Lumbar spinal stenosis     Monocytosis     Multiple myeloma, remission status unspecified     Neck pain     Nontraumatic compression fracture of T8 vertebra, initial encounter     LOIS (obstructive sleep apnea)     Osteoporosis of femur without pathological fracture     Other chronic pain     Personal history of colonic polyps 06/29/2022    Secondary malignancy of lumbar vertebral column     Secondary malignant neoplasm of bone     Tachycardia       Past Surgical History:   Procedure Laterality Date    BONE MARROW ASPIRATION N/A 06/27/2023    Procedure: ASPIRATION, BONE MARROW;  Surgeon: Namita Daniels MD;  Location: [facility] ENDOSCOPY;  Service: General;  Laterality: N/A;    BONE MARROW ASPIRATION N/A 10/24/2023    Procedure: ASPIRATION, BONE MARROW;  Surgeon: Namita Daniels MD;  Location: [facility] ENDOSCOPY;  Service: General;  Laterality: N/A;    BONE MARROW ASPIRATION N/A 3/12/2024    Procedure: ASPIRATION, BONE MARROW;  Surgeon: Haylie Liu MD;  Location: [facility] ENDOSCOPY;  Service: General;  Laterality: N/A;    BONE MARROW BIOPSY N/A 06/27/2023    Procedure: Biopsy-bone marrow;  Surgeon: Haylie Liu MD;  Location: [facility] ENDOSCOPY;  Service: General;  Laterality: N/A;    BONE MARROW BIOPSY N/A 10/24/2023    Procedure: Biopsy-bone marrow;  Surgeon: Namita Daniels MD;  Location: [facility] ENDOSCOPY;  Service: General;  Laterality: N/A;    BONE MARROW BIOPSY N/A 3/12/2024    Procedure: Biopsy-bone marrow;  Surgeon: Haylie Liu MD;  Location: [facility] ENDOSCOPY;  Service: General;  Laterality: N/A;    CARDIAC SURGERY  2018    COLONOSCOPY  06/29/2022    EYE SURGERY        Social History     Socioeconomic History    Marital status:    Tobacco Use    Smoking status: Never    Smokeless  "tobacco: Never   Substance and Sexual Activity    Alcohol use: Never    Drug use: Never    Sexual activity: Not Currently         Review of Most Recent Wound Care-Related Labs:  Lab Results   Component Value Date/Time    WBC 6.16 08/09/2024 10:05 AM    WBC 6.5 04/09/2024 03:23 PM    RBC 3.33 (L) 08/09/2024 10:05 AM    HGB 10.8 (L) 08/09/2024 10:05 AM    HGB 12.3 (L) 04/09/2024 03:23 PM    HCT 33.2 (L) 08/09/2024 10:05 AM    HCT 37.0 (L) 04/09/2024 03:23 PM    MCV 99.7 (H) 08/09/2024 10:05 AM    MCV 97.8 04/09/2024 03:23 PM    MCH 32.4 (H) 08/09/2024 10:05 AM    MCH 32.6 04/09/2024 03:23 PM    CREATININE 1.05 08/09/2024 10:05 AM    CREATININE 0.81 07/01/2020 11:43 AM    HGBA1C 5.6 04/26/2024 08:40 AM    HGBA1C 6.2 07/01/2020 11:43 AM        PHYSICAL EXAMINATION:  Blood pressure 108/63, pulse (!) 52, temperature 98.3 °F (36.8 °C), temperature source Oral, resp. rate 20, height 5' 7" (1.702 m), weight 66.8 kg (147 lb 3.2 oz), SpO2 100%.    General:  VSS. No acute distress.   Head/Face: NC, AT  Eyes: PEERLA, EOMI, normal conjunctiva  Ears:   AD: external normal/nontender, ear canal normal, TM intact, no middle ear fluid  AS: external normal/nontender, ear canal normal, TM intact, no middle ear fluid  Nose: septum midline, no inferior turbinate hypertrophy, no polyps  Neck: soft, non-tender, no palpable lymph nodes  Respiratory: clear to ascultation in all lung fields  Cardiovascular:  RRR, no additional heart sounds heard.  ABD: BS +, soft, nontender in all quadrants  Musculoskeletal:  limited extension/flexion/lateral rotation of back/hips; no joint deformities or peripheral edema. DP 2+ pulses palpable bilaterally. Tenderness present on lumbar spine L2-L5 along with tenderness in bilateral paraspinous muscles and bilateral posterior superior iliac spines.    ASSESSMENT/PLAN:     1) Right renal cyst  Has Retroperitoneal U/S ordered  Will continue to monitor     2) Fever of Unknown origin  Pt does not appear septic in " clinic; VSS, afebrile   Ordered CMP, CBC, COVID/Flu/RSV, UA, CXR  CMP showed mild elevation of AST 35; no leukocytosis/leukopenia on CBC, COVID/RSV/Flu negative, UA only +1 Protein  CXR pending  Spoke with patient over the phone after clinic hours regarding labwork findings; patient states he is feeling fine at this time  Likely could be viral in origin causing some very mild transaminitis    Can consider further workup with CMV/Respiratory panel     3) Constipation  Likely opioid induced  Informed patient to increase Linzess 72 mcg to 124 mcg qd and to take Miralax 17 g daily along with getting Milk of Magnesia daily as needed OTC to see if improvement of constipation   Patient voiced understanding    4) T2DM  Repeat A1c in clinic was 5.2 compared to 5.6 on 04/26/24  Can consider discontinuing Metformin as A1c has never been above 6.5 in past 7 years    5) Lumbar stenosis w/claudication  New PT referral made to [facility] per pt request with interest in continual outpt PT    - RTC in 2-3 month f/u with PCP (Dr. Gaviota Lyon MD)       Daryl Lara MD   [facility] Family Medicine Resident HO-1  09/03/2024

## 2024-09-04 ENCOUNTER — TELEPHONE (OUTPATIENT)
Dept: FAMILY MEDICINE | Facility: CLINIC | Age: 69
End: 2024-09-04
Payer: MEDICARE

## 2024-09-04 ENCOUNTER — LAB VISIT (OUTPATIENT)
Dept: LAB | Facility: HOSPITAL | Age: 69
End: 2024-09-04
Attending: INTERNAL MEDICINE
Payer: MEDICARE

## 2024-09-04 DIAGNOSIS — Z79.899 ENCOUNTER FOR LONG-TERM (CURRENT) USE OF OTHER MEDICATIONS: ICD-10-CM

## 2024-09-04 DIAGNOSIS — Z00.00 ROUTINE GENERAL MEDICAL EXAMINATION AT A HEALTH CARE FACILITY: Primary | ICD-10-CM

## 2024-09-04 DIAGNOSIS — R53.81 DEBILITY: ICD-10-CM

## 2024-09-04 DIAGNOSIS — E55.9 AVITAMINOSIS D: ICD-10-CM

## 2024-09-04 DIAGNOSIS — I10 ESSENTIAL HYPERTENSION, MALIGNANT: ICD-10-CM

## 2024-09-04 LAB
25(OH)D3+25(OH)D2 SERPL-MCNC: 52 NG/ML (ref 30–80)
ALBUMIN SERPL-MCNC: 3.2 G/DL (ref 3.4–4.8)
ALBUMIN/GLOB SERPL: 1 RATIO (ref 1.1–2)
ALP SERPL-CCNC: 104 UNIT/L (ref 40–150)
ALT SERPL-CCNC: 49 UNIT/L (ref 0–55)
ANION GAP SERPL CALC-SCNC: 6 MEQ/L
AST SERPL-CCNC: 39 UNIT/L (ref 5–34)
BILIRUB SERPL-MCNC: 0.4 MG/DL
BUN SERPL-MCNC: 10.7 MG/DL (ref 8.4–25.7)
CALCIUM SERPL-MCNC: 8.5 MG/DL (ref 8.8–10)
CHLORIDE SERPL-SCNC: 104 MMOL/L (ref 98–107)
CHOLEST SERPL-MCNC: 90 MG/DL
CHOLEST/HDLC SERPL: 3 {RATIO} (ref 0–5)
CO2 SERPL-SCNC: 25 MMOL/L (ref 23–31)
CREAT SERPL-MCNC: 1.11 MG/DL (ref 0.73–1.18)
CREAT/UREA NIT SERPL: 10
ERYTHROCYTE [DISTWIDTH] IN BLOOD BY AUTOMATED COUNT: 15.4 % (ref 11.5–17)
GFR SERPLBLD CREATININE-BSD FMLA CKD-EPI: >60 ML/MIN/1.73/M2
GLOBULIN SER-MCNC: 3.2 GM/DL (ref 2.4–3.5)
GLUCOSE SERPL-MCNC: 107 MG/DL (ref 82–115)
HCT VFR BLD AUTO: 31.3 % (ref 42–52)
HDLC SERPL-MCNC: 33 MG/DL (ref 35–60)
HGB BLD-MCNC: 9.9 G/DL (ref 14–18)
LDLC SERPL CALC-MCNC: 43 MG/DL (ref 50–140)
MCH RBC QN AUTO: 32.2 PG (ref 27–31)
MCHC RBC AUTO-ENTMCNC: 31.6 G/DL (ref 33–36)
MCV RBC AUTO: 102 FL (ref 80–94)
NRBC BLD AUTO-RTO: 0 %
PLATELET # BLD AUTO: 207 X10(3)/MCL (ref 130–400)
PMV BLD AUTO: 9.3 FL (ref 7.4–10.4)
POTASSIUM SERPL-SCNC: 4 MMOL/L (ref 3.5–5.1)
PROT SERPL-MCNC: 6.4 GM/DL (ref 5.8–7.6)
RBC # BLD AUTO: 3.07 X10(6)/MCL (ref 4.7–6.1)
SODIUM SERPL-SCNC: 135 MMOL/L (ref 136–145)
TRIGL SERPL-MCNC: 69 MG/DL (ref 34–140)
TSH SERPL-ACNC: 2.67 UIU/ML (ref 0.35–4.94)
VLDLC SERPL CALC-MCNC: 14 MG/DL
WBC # BLD AUTO: 6.74 X10(3)/MCL (ref 4.5–11.5)

## 2024-09-04 PROCEDURE — 85027 COMPLETE CBC AUTOMATED: CPT

## 2024-09-04 PROCEDURE — 84443 ASSAY THYROID STIM HORMONE: CPT

## 2024-09-04 PROCEDURE — 82306 VITAMIN D 25 HYDROXY: CPT

## 2024-09-04 PROCEDURE — 80053 COMPREHEN METABOLIC PANEL: CPT

## 2024-09-04 PROCEDURE — 80061 LIPID PANEL: CPT

## 2024-09-04 PROCEDURE — 36415 COLL VENOUS BLD VENIPUNCTURE: CPT

## 2024-09-05 ENCOUNTER — TELEPHONE (OUTPATIENT)
Dept: FAMILY MEDICINE | Facility: CLINIC | Age: 69
End: 2024-09-05
Payer: MEDICARE

## 2024-09-05 ENCOUNTER — TELEPHONE (OUTPATIENT)
Dept: HEMATOLOGY/ONCOLOGY | Facility: CLINIC | Age: 69
End: 2024-09-05
Payer: MEDICARE

## 2024-09-05 NOTE — TELEPHONE ENCOUNTER
Spoke with patient regarding labwork and medications. He states he saw his cardiologist last week, Dr. Albert, and was informed to continue the metoprolol 50 mg qd. I informed patient about possible reducing dosage of metoprolol succinate 50 mg to 25 mg qd due to bradycardia but patient states he feels fine and wants to continue to take recommended dosage per cardiology. Informed patient about possible risk of becoming to bradycardic while taking medication results in possible arrhythmias but patient voiced understanding of risk and trust about his cardiologist and desires to continue with the medication as prescribed. He states he is planning on coming on 09/06/24 for further labwork and will stop by the radiology/imaging department to complete CXR.    Daryl Lara MD   [facility] Family Medicine Resident HO-1  09/05/2024

## 2024-09-05 NOTE — PROGRESS NOTES
I reviewed History, PE, A/P and medical record.  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  I agree with resident, care reasonable and necessary with any exceptions stated below.  I evaluated the patient with resident at time of visit, participated in key parts of H/P and management was discussed.      Doing well overall  Bradycardic  ABD  - soft NT, ND,  no  mass or HSM,no SP or CVA tenderness  May need a more aggressive bowel regimen    Can lower Toprol due to bradycardia  Consider lower dose lipitor    Yuliet Rivero MD  [facility] [place]  [facility]

## 2024-09-05 NOTE — TELEPHONE ENCOUNTER
10 mg Mounjaro ordered.  Continue with follow-up planned  in July.   Please call patient:  Discuss lab results    Advise to obtain Cxr to complete RAMSEY for his elevated temp    Return to clinic for evaluation if still with elevated temps    Decrease Toprol XL 50 to 25mg due to bradycardia and soft BP

## 2024-09-06 ENCOUNTER — INFUSION (OUTPATIENT)
Dept: INFUSION THERAPY | Facility: HOSPITAL | Age: 69
End: 2024-09-06
Attending: INTERNAL MEDICINE
Payer: MEDICARE

## 2024-09-06 ENCOUNTER — LAB VISIT (OUTPATIENT)
Dept: HEMATOLOGY/ONCOLOGY | Facility: CLINIC | Age: 69
End: 2024-09-06
Payer: MEDICARE

## 2024-09-06 VITALS
TEMPERATURE: 98 F | DIASTOLIC BLOOD PRESSURE: 60 MMHG | SYSTOLIC BLOOD PRESSURE: 107 MMHG | HEART RATE: 50 BPM | RESPIRATION RATE: 18 BRPM

## 2024-09-06 DIAGNOSIS — C90.00 IGG MULTIPLE MYELOMA: ICD-10-CM

## 2024-09-06 DIAGNOSIS — M51.36 DEGENERATION OF LUMBAR INTERVERTEBRAL DISC: ICD-10-CM

## 2024-09-06 DIAGNOSIS — C90.00 IGG MULTIPLE MYELOMA: Primary | ICD-10-CM

## 2024-09-06 LAB
ALBUMIN SERPL-MCNC: 2.9 G/DL (ref 3.4–4.8)
ALBUMIN/GLOB SERPL: 0.7 RATIO (ref 1.1–2)
ALP SERPL-CCNC: 113 UNIT/L (ref 40–150)
ALT SERPL-CCNC: 67 UNIT/L (ref 0–55)
ANION GAP SERPL CALC-SCNC: 7 MEQ/L
AST SERPL-CCNC: 39 UNIT/L (ref 5–34)
BASOPHILS # BLD AUTO: 0.12 X10(3)/MCL
BASOPHILS NFR BLD AUTO: 1.8 %
BILIRUB SERPL-MCNC: 0.3 MG/DL
BUN SERPL-MCNC: 10.4 MG/DL (ref 8.4–25.7)
CALCIUM SERPL-MCNC: 9.1 MG/DL (ref 8.8–10)
CHLORIDE SERPL-SCNC: 106 MMOL/L (ref 98–107)
CO2 SERPL-SCNC: 23 MMOL/L (ref 23–31)
CREAT SERPL-MCNC: 1.09 MG/DL (ref 0.73–1.18)
CREAT/UREA NIT SERPL: 10
EOSINOPHIL # BLD AUTO: 0.39 X10(3)/MCL (ref 0–0.9)
EOSINOPHIL NFR BLD AUTO: 5.8 %
ERYTHROCYTE [DISTWIDTH] IN BLOOD BY AUTOMATED COUNT: 15.2 % (ref 11.5–17)
GFR SERPLBLD CREATININE-BSD FMLA CKD-EPI: >60 ML/MIN/1.73/M2
GLOBULIN SER-MCNC: 4.1 GM/DL (ref 2.4–3.5)
GLUCOSE SERPL-MCNC: 90 MG/DL (ref 82–115)
HCT VFR BLD AUTO: 30.7 % (ref 42–52)
HGB BLD-MCNC: 9.9 G/DL (ref 14–18)
IMM GRANULOCYTES # BLD AUTO: 0.01 X10(3)/MCL (ref 0–0.04)
IMM GRANULOCYTES NFR BLD AUTO: 0.1 %
LYMPHOCYTES # BLD AUTO: 2.87 X10(3)/MCL (ref 0.6–4.6)
LYMPHOCYTES NFR BLD AUTO: 42.8 %
MAGNESIUM SERPL-MCNC: 2.2 MG/DL (ref 1.6–2.6)
MCH RBC QN AUTO: 32.1 PG (ref 27–31)
MCHC RBC AUTO-ENTMCNC: 32.2 G/DL (ref 33–36)
MCV RBC AUTO: 99.7 FL (ref 80–94)
MONOCYTES # BLD AUTO: 1.15 X10(3)/MCL (ref 0.1–1.3)
MONOCYTES NFR BLD AUTO: 17.1 %
NEUTROPHILS # BLD AUTO: 2.17 X10(3)/MCL (ref 2.1–9.2)
NEUTROPHILS NFR BLD AUTO: 32.4 %
NRBC BLD AUTO-RTO: 0 %
PLATELET # BLD AUTO: 233 X10(3)/MCL (ref 130–400)
PMV BLD AUTO: 9.7 FL (ref 7.4–10.4)
POTASSIUM SERPL-SCNC: 3.8 MMOL/L (ref 3.5–5.1)
PROT SERPL-MCNC: 7 GM/DL (ref 5.8–7.6)
RBC # BLD AUTO: 3.08 X10(6)/MCL (ref 4.7–6.1)
SODIUM SERPL-SCNC: 136 MMOL/L (ref 136–145)
WBC # BLD AUTO: 6.71 X10(3)/MCL (ref 4.5–11.5)

## 2024-09-06 PROCEDURE — 36415 COLL VENOUS BLD VENIPUNCTURE: CPT

## 2024-09-06 PROCEDURE — 83735 ASSAY OF MAGNESIUM: CPT

## 2024-09-06 PROCEDURE — 25000003 PHARM REV CODE 250: Performed by: NURSE PRACTITIONER

## 2024-09-06 PROCEDURE — 80053 COMPREHEN METABOLIC PANEL: CPT

## 2024-09-06 PROCEDURE — 63600175 PHARM REV CODE 636 W HCPCS: Performed by: NURSE PRACTITIONER

## 2024-09-06 PROCEDURE — 96365 THER/PROPH/DIAG IV INF INIT: CPT

## 2024-09-06 PROCEDURE — 85025 COMPLETE CBC W/AUTO DIFF WBC: CPT

## 2024-09-06 RX ORDER — ZOLEDRONIC ACID 0.04 MG/ML
4 INJECTION, SOLUTION INTRAVENOUS
Status: COMPLETED | OUTPATIENT
Start: 2024-09-06 | End: 2024-09-06

## 2024-09-06 RX ORDER — HEPARIN 100 UNIT/ML
500 SYRINGE INTRAVENOUS
Status: DISCONTINUED | OUTPATIENT
Start: 2024-09-06 | End: 2024-09-06 | Stop reason: HOSPADM

## 2024-09-06 RX ORDER — SODIUM CHLORIDE 0.9 % (FLUSH) 0.9 %
10 SYRINGE (ML) INJECTION
Status: DISCONTINUED | OUTPATIENT
Start: 2024-09-06 | End: 2024-09-06 | Stop reason: HOSPADM

## 2024-09-06 RX ADMIN — ZOLEDRONIC ACID 4 MG: 0.04 INJECTION, SOLUTION INTRAVENOUS at 11:09

## 2024-09-06 RX ADMIN — SODIUM CHLORIDE: 9 INJECTION, SOLUTION INTRAVENOUS at 11:09

## 2024-09-09 ENCOUNTER — OFFICE VISIT (OUTPATIENT)
Dept: FAMILY MEDICINE | Facility: CLINIC | Age: 69
End: 2024-09-09
Payer: MEDICARE

## 2024-09-09 VITALS
SYSTOLIC BLOOD PRESSURE: 109 MMHG | HEIGHT: 67 IN | TEMPERATURE: 98 F | WEIGHT: 146.81 LBS | OXYGEN SATURATION: 100 % | HEART RATE: 58 BPM | BODY MASS INDEX: 23.04 KG/M2 | DIASTOLIC BLOOD PRESSURE: 65 MMHG

## 2024-09-09 DIAGNOSIS — N28.1 RENAL CYST, RIGHT: ICD-10-CM

## 2024-09-09 DIAGNOSIS — R50.9 FEVER OF UNKNOWN ORIGIN: Primary | ICD-10-CM

## 2024-09-09 PROCEDURE — 99214 OFFICE O/P EST MOD 30 MIN: CPT | Mod: PBBFAC

## 2024-09-09 NOTE — PROGRESS NOTES
Family Medicine Clinic Note     Subjective     Patient ID: Yuriy Díaz is a 68 y.o. male.    Chief Complaint: Follow-up and Diabetes    Fever of unknown origin  - States that he has not had a fever since last appointment  - Continues to take acetaminophen 500 mg BID  - Denies malaise, cough, shortness of breath, abdominal pain, nausea, vomiting, diarrhea  - Afebrile in clinic today ,WBC 9/6/24 WNL, UA 9/3/24 unremarkable  - CXR ordered 9/3/24 has not been done yet    Follow up of renal mass  - MRI lumbar spine on 08/01/24: 1.2 x 1.1 cm lesion exophytic from the anterior upper pole the right kidney   - Retroperitoneal US ordered 8/29/24 - pending    Problem List   Multiple Myeloma ( dx 2023)- in remission, follows [facility] Heme/Onc  DM II/impaired glucose tolerance- well controlled, foot exam 5/2024, eye exam 10/2023, urine screen 1/2023. Offered to stop metformin, wishes to continue for now.  non obstructive CAD, HTN - Follows with cardiologist Dr. Albert. Repeat TTE Sept '24  Sleep apnea- compliant on CPAP at night  Celiac disease- follows Dr. Kapoor, last colonoscopy 8/2024  Mitral valve regurgitation- s/p MVR  GERD   Constipation  Low back pain - follows with Dr. Grubbs, discussing surgery    Family History   Problem Relation Name Age of Onset    Hypertension Mother        Social hx:  Never smoker, denies alcohol use, denies illicit drug use    Past Surgical History:   Procedure Laterality Date    BONE MARROW ASPIRATION N/A 06/27/2023    Procedure: ASPIRATION, BONE MARROW;  Surgeon: Namita Daniels MD;  Location: [facility] ENDOSCOPY;  Service: General;  Laterality: N/A;    BONE MARROW ASPIRATION N/A 10/24/2023    Procedure: ASPIRATION, BONE MARROW;  Surgeon: Namita Daniels MD;  Location: [facility] ENDOSCOPY;  Service: General;  Laterality: N/A;    BONE MARROW ASPIRATION N/A 3/12/2024    Procedure: ASPIRATION, BONE MARROW;  Surgeon: Haylie Liu MD;  Location: [facility] ENDOSCOPY;  Service: General;  Laterality: N/A;    BONE  MARROW BIOPSY N/A 06/27/2023    Procedure: Biopsy-bone marrow;  Surgeon: Haylie Liu MD;  Location: [facility] ENDOSCOPY;  Service: General;  Laterality: N/A;    BONE MARROW BIOPSY N/A 10/24/2023    Procedure: Biopsy-bone marrow;  Surgeon: Namita Daniels MD;  Location: [facility] ENDOSCOPY;  Service: General;  Laterality: N/A;    BONE MARROW BIOPSY N/A 3/12/2024    Procedure: Biopsy-bone marrow;  Surgeon: Haylie Liu MD;  Location: [facility] ENDOSCOPY;  Service: General;  Laterality: N/A;    CARDIAC SURGERY  2018    COLONOSCOPY  06/29/2022    EYE SURGERY          Review of Systems   Constitutional:  Negative for chills, fever and malaise/fatigue.   Respiratory:  Negative for cough and shortness of breath.    Cardiovascular:  Negative for chest pain.   Gastrointestinal:  Negative for abdominal pain, diarrhea, nausea and vomiting.   Musculoskeletal:  Positive for back pain.   Neurological:  Negative for weakness and headaches.        Objective     Vitals:    09/09/24 1333   BP: 109/65   Pulse: (!) 58   Temp: 97.8 °F (36.6 °C)        Current Outpatient Medications   Medication Instructions    acyclovir (ZOVIRAX) 400 mg, Oral, 2 times daily    ascorbic acid (vitamin C) (VITAMIN C) 1,000 mg, Oral, Daily    aspirin (ECOTRIN) 81 mg, Oral, Daily    atorvastatin (LIPITOR) 20 mg, Oral, Daily    calcium carbonate 195 mg calcium (500 mg) Chew 1 tablet, Oral, Daily    coenzyme Q10 10 mg capsule 1 capsule, Oral, Daily    famotidine (PEPCID) 40 mg, Oral, Nightly    ferrous sulfate (FEOSOL) 650 mg, Oral    hydrocortisone (ANUSOL-HC) 2.5 % rectal cream 1 applicator, Rectal, 2 times daily    lenalidomide 10 mg Cap 1 capsule, Oral    metFORMIN (GLUCOPHAGE) 500 mg, Oral, 2 times daily with meals    metoprolol succinate (TOPROL-XL) 50 mg, Oral, Daily    MIRALAX 17 g, Oral    multivit with min-folic acid 0.4 mg Tab 1 tablet, Oral, Daily    omega 3-dha-epa-fish oil 300 mg (120 mg- 180mg)-1,000 mg Cap 500 mg, Oral    pantoprazole  (PROTONIX) 20 mg, Oral, Daily    sulfamethoxazole-trimethoprim 800-160mg (BACTRIM DS) 800-160 mg Tab Take 1 tablet by mouth on MWF of each week    traMADoL 100 mg Tab 100 tablets, Oral, Every 4 hours PRN        Physical Exam  Vitals reviewed.   Constitutional:       General: He is not in acute distress.     Appearance: He is not ill-appearing.   HENT:      Nose: No rhinorrhea.   Eyes:      General:         Right eye: No discharge.         Left eye: No discharge.   Cardiovascular:      Rate and Rhythm: Normal rate and regular rhythm.   Pulmonary:      Effort: Pulmonary effort is normal. No respiratory distress.      Breath sounds: Normal breath sounds. No wheezing, rhonchi or rales.   Skin:     General: Skin is warm and dry.   Neurological:      Mental Status: He is alert and oriented to person, place, and time.         Assessment and Plan      1. Fever of unknown origin    2. Renal cyst, right        Fever of unknown origin  - Stop Tylenol, take temperature daily, and keep a log  - He will go to radiology after appointment and do CXR  - Advised to let Dr. Lundy know if fevers return once stopping Tylenol  - Will call with results of CXR    Renal cyst, right  - He will get retroperitoneal US scheduled while at [facility] radiology this afternoon       Follow up and previously scheduled appt 12/6/24.         Gaviota Lyon MD  [facility] Family Medicine -I

## 2024-09-10 ENCOUNTER — TELEPHONE (OUTPATIENT)
Dept: FAMILY MEDICINE | Facility: CLINIC | Age: 69
End: 2024-09-10
Payer: MEDICARE

## 2024-09-10 DIAGNOSIS — N28.1 RENAL CYST, RIGHT: ICD-10-CM

## 2024-09-10 DIAGNOSIS — R50.9 FEVER OF UNKNOWN ORIGIN: Primary | ICD-10-CM

## 2024-09-10 NOTE — TELEPHONE ENCOUNTER
Received message that patient had difficulty completing imaging. Called patient who stated that he was told there were no orders for renal US or CXR at [facility]. Reordered and advised patient to complete at his earliest convenience or call with any issues.     Gaviota Lyon MD

## 2024-09-10 NOTE — PROGRESS NOTES
I reviewed History, PE, A/P and medical record.  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  I agree with resident, care reasonable and necessary with any exceptions stated below.  I evaluated the patient with resident at time of visit, participated in key parts of H/P and management was discussed.    Doing well overall, feels fine    Did not go for CXR    Yuliet Rivero MD  [facility] - [place]

## 2024-09-18 DIAGNOSIS — R63.0 LOSS OF APPETITE: ICD-10-CM

## 2024-09-18 DIAGNOSIS — R63.4 ABNORMAL WEIGHT LOSS: ICD-10-CM

## 2024-09-18 DIAGNOSIS — K64.4 EXTERNAL HEMORRHOIDS: Primary | ICD-10-CM

## 2024-09-24 ENCOUNTER — HOSPITAL ENCOUNTER (OUTPATIENT)
Dept: RADIOLOGY | Facility: HOSPITAL | Age: 69
Discharge: HOME | End: 2024-09-24
Payer: MEDICARE

## 2024-09-24 DIAGNOSIS — N28.1 RENAL CYST, RIGHT: ICD-10-CM

## 2024-09-24 PROCEDURE — 76770 US EXAM ABDO BACK WALL COMP: CPT | Mod: TC

## 2024-09-27 ENCOUNTER — TELEPHONE (OUTPATIENT)
Dept: FAMILY MEDICINE | Facility: CLINIC | Age: 69
End: 2024-09-27
Payer: MEDICARE

## 2024-09-27 NOTE — TELEPHONE ENCOUNTER
Called patient to discuss results of retroperitoneal ultrasound. Confirmed name and . Informed of results. [facility]. Underwent MRI prostate 24. Impression: PI-RADS 2 Low. Clinically significant disease (Gustavo 4+3=7 or greater, measuring at least 5 mm in diameter), is unlikely to be present.     Chest x-ray scheduled for 24. Abdominal ultrasound ordered by GI for workup of loss of appetite and weight loss; also scheduled for 24.

## 2024-09-30 ENCOUNTER — HOSPITAL ENCOUNTER (OUTPATIENT)
Dept: RADIOLOGY | Facility: HOSPITAL | Age: 69
Discharge: HOME | End: 2024-09-30
Payer: MEDICARE

## 2024-09-30 ENCOUNTER — HOSPITAL ENCOUNTER (OUTPATIENT)
Dept: RADIOLOGY | Facility: HOSPITAL | Age: 69
Discharge: HOME | End: 2024-09-30
Attending: INTERNAL MEDICINE
Payer: MEDICARE

## 2024-09-30 ENCOUNTER — TELEPHONE (OUTPATIENT)
Dept: OBSTETRICS AND GYNECOLOGY | Facility: HOSPITAL | Age: 69
End: 2024-09-30
Payer: COMMERCIAL

## 2024-09-30 DIAGNOSIS — R63.4 ABNORMAL WEIGHT LOSS: ICD-10-CM

## 2024-09-30 DIAGNOSIS — R50.9 FEVER OF UNKNOWN ORIGIN: ICD-10-CM

## 2024-09-30 DIAGNOSIS — R63.0 LOSS OF APPETITE: ICD-10-CM

## 2024-09-30 DIAGNOSIS — K64.4 EXTERNAL HEMORRHOIDS: ICD-10-CM

## 2024-09-30 PROCEDURE — 71046 X-RAY EXAM CHEST 2 VIEWS: CPT | Mod: TC

## 2024-09-30 PROCEDURE — 76700 US EXAM ABDOM COMPLETE: CPT | Mod: TC

## 2024-09-30 NOTE — TELEPHONE ENCOUNTER
Called patient, confirmed name and . Informed of results of chest x-ray. He reports that he has not had a fever in 2-3 weeks and is only taking Tylenol PRN for pain.

## 2024-10-01 DIAGNOSIS — C90.00 IGG MULTIPLE MYELOMA: ICD-10-CM

## 2024-10-02 ENCOUNTER — TELEPHONE (OUTPATIENT)
Dept: FAMILY MEDICINE | Facility: CLINIC | Age: 69
End: 2024-10-02
Payer: MEDICARE

## 2024-10-02 RX ORDER — SULFAMETHOXAZOLE AND TRIMETHOPRIM 800; 160 MG/1; MG/1
TABLET ORAL
Qty: 48 TABLET | Refills: 3 | Status: SHIPPED | OUTPATIENT
Start: 2024-10-02

## 2024-10-03 ENCOUNTER — TELEPHONE (OUTPATIENT)
Dept: HEMATOLOGY/ONCOLOGY | Facility: CLINIC | Age: 69
End: 2024-10-03
Payer: MEDICARE

## 2024-10-03 DIAGNOSIS — C90.00 IGG MULTIPLE MYELOMA: Primary | ICD-10-CM

## 2024-10-03 NOTE — NURSING
Received a message from clinic that patient requesting order for PT to [facility]. Spoke with patient per phone contact. Patient states he was referred to another therapy facility by another provider. Patient says he doesn't want to do any more therapy because it cause too much pain.   Also confirmed that patient plans to schedule appt with Dr. Clark at [facility] in [place]. Patient confirms his appt with Dr. Lundy on 10/4/24.

## 2024-10-04 ENCOUNTER — OFFICE VISIT (OUTPATIENT)
Dept: HEMATOLOGY/ONCOLOGY | Facility: CLINIC | Age: 69
End: 2024-10-04
Attending: INTERNAL MEDICINE
Payer: MEDICARE

## 2024-10-04 ENCOUNTER — INFUSION (OUTPATIENT)
Dept: INFUSION THERAPY | Facility: HOSPITAL | Age: 69
End: 2024-10-04
Attending: INTERNAL MEDICINE
Payer: MEDICARE

## 2024-10-04 ENCOUNTER — APPOINTMENT (OUTPATIENT)
Dept: HEMATOLOGY/ONCOLOGY | Facility: CLINIC | Age: 69
End: 2024-10-04
Payer: COMMERCIAL

## 2024-10-04 VITALS
WEIGHT: 142.38 LBS | HEART RATE: 55 BPM | RESPIRATION RATE: 18 BRPM | OXYGEN SATURATION: 100 % | SYSTOLIC BLOOD PRESSURE: 112 MMHG | TEMPERATURE: 98 F | DIASTOLIC BLOOD PRESSURE: 60 MMHG | HEIGHT: 67 IN | BODY MASS INDEX: 22.35 KG/M2

## 2024-10-04 VITALS
TEMPERATURE: 98 F | OXYGEN SATURATION: 95 % | RESPIRATION RATE: 20 BRPM | DIASTOLIC BLOOD PRESSURE: 61 MMHG | SYSTOLIC BLOOD PRESSURE: 121 MMHG

## 2024-10-04 DIAGNOSIS — C90.00 IGG MULTIPLE MYELOMA: Primary | ICD-10-CM

## 2024-10-04 DIAGNOSIS — R29.818 NEUROGENIC CLAUDICATION: ICD-10-CM

## 2024-10-04 DIAGNOSIS — M81.0 OSTEOPOROSIS OF FEMUR WITHOUT PATHOLOGICAL FRACTURE: ICD-10-CM

## 2024-10-04 DIAGNOSIS — Z79.899 DRUG-INDUCED IMMUNODEFICIENCY: ICD-10-CM

## 2024-10-04 DIAGNOSIS — M48.061 SPINAL STENOSIS OF LUMBAR REGION, UNSPECIFIED WHETHER NEUROGENIC CLAUDICATION PRESENT: ICD-10-CM

## 2024-10-04 DIAGNOSIS — M48.54XA NONTRAUMATIC COMPRESSION FRACTURE OF T8 VERTEBRA, INITIAL ENCOUNTER: ICD-10-CM

## 2024-10-04 DIAGNOSIS — M51.369 DEGENERATION OF LUMBAR INTERVERTEBRAL DISC: ICD-10-CM

## 2024-10-04 DIAGNOSIS — D84.821 DRUG-INDUCED IMMUNODEFICIENCY: ICD-10-CM

## 2024-10-04 DIAGNOSIS — S32.020G COMPRESSION FRACTURE OF L2 VERTEBRA WITH DELAYED HEALING, SUBSEQUENT ENCOUNTER: ICD-10-CM

## 2024-10-04 DIAGNOSIS — E83.52 HYPERCALCEMIA: ICD-10-CM

## 2024-10-04 DIAGNOSIS — S32.010G COMPRESSION FRACTURE OF L1 VERTEBRA WITH DELAYED HEALING, SUBSEQUENT ENCOUNTER: ICD-10-CM

## 2024-10-04 DIAGNOSIS — S32.010G COMPRESSION FRACTURE OF L1 VERTEBRA WITH DELAYED HEALING, SUBSEQUENT ENCOUNTER: Primary | ICD-10-CM

## 2024-10-04 DIAGNOSIS — Z94.84 STEM CELLS TRANSPLANT STATUS: ICD-10-CM

## 2024-10-04 DIAGNOSIS — Z94.84 H/O AUTOLOGOUS STEM CELL TRANSPLANT: ICD-10-CM

## 2024-10-04 DIAGNOSIS — C90.00 IGG MULTIPLE MYELOMA: ICD-10-CM

## 2024-10-04 DIAGNOSIS — R63.0 ANOREXIA: ICD-10-CM

## 2024-10-04 LAB
ALBUMIN SERPL-MCNC: 3.4 G/DL (ref 3.4–4.8)
ALBUMIN/GLOB SERPL: 1 RATIO (ref 1.1–2)
ALP SERPL-CCNC: 78 UNIT/L (ref 40–150)
ALT SERPL-CCNC: 11 UNIT/L (ref 0–55)
ANION GAP SERPL CALC-SCNC: 7 MEQ/L
AST SERPL-CCNC: 14 UNIT/L (ref 5–34)
BASOPHILS # BLD AUTO: 0.12 X10(3)/MCL
BASOPHILS NFR BLD AUTO: 1.7 %
BILIRUB SERPL-MCNC: 0.4 MG/DL
BUN SERPL-MCNC: 13.3 MG/DL (ref 8.4–25.7)
CALCIUM SERPL-MCNC: 9.2 MG/DL (ref 8.8–10)
CHLORIDE SERPL-SCNC: 103 MMOL/L (ref 98–107)
CO2 SERPL-SCNC: 24 MMOL/L (ref 23–31)
CREAT SERPL-MCNC: 1.24 MG/DL (ref 0.73–1.18)
CREAT/UREA NIT SERPL: 11
EOSINOPHIL # BLD AUTO: 0.4 X10(3)/MCL (ref 0–0.9)
EOSINOPHIL NFR BLD AUTO: 5.8 %
ERYTHROCYTE [DISTWIDTH] IN BLOOD BY AUTOMATED COUNT: 15 % (ref 11.5–17)
FERRITIN SERPL-MCNC: 277.09 NG/ML (ref 21.81–274.66)
FOLATE SERPL-MCNC: 33.4 NG/ML (ref 7–31.4)
GFR SERPLBLD CREATININE-BSD FMLA CKD-EPI: >60 ML/MIN/1.73/M2
GLOBULIN SER-MCNC: 3.4 GM/DL (ref 2.4–3.5)
GLUCOSE SERPL-MCNC: 101 MG/DL (ref 82–115)
HAPTOGLOB SERPL-MCNC: 256 MG/DL (ref 40–368)
HCT VFR BLD AUTO: 34.2 % (ref 42–52)
HGB BLD-MCNC: 10.7 G/DL (ref 14–18)
IMM GRANULOCYTES # BLD AUTO: 0.02 X10(3)/MCL (ref 0–0.04)
IMM GRANULOCYTES NFR BLD AUTO: 0.3 %
IRON SATN MFR SERPL: 25 % (ref 20–50)
IRON SERPL-MCNC: 63 UG/DL (ref 65–175)
LDH SERPL-CCNC: 112 U/L (ref 125–220)
LYMPHOCYTES # BLD AUTO: 2.68 X10(3)/MCL (ref 0.6–4.6)
LYMPHOCYTES NFR BLD AUTO: 38.9 %
MAGNESIUM SERPL-MCNC: 2.2 MG/DL (ref 1.6–2.6)
MCH RBC QN AUTO: 31.7 PG (ref 27–31)
MCHC RBC AUTO-ENTMCNC: 31.3 G/DL (ref 33–36)
MCV RBC AUTO: 101.2 FL (ref 80–94)
MONOCYTES # BLD AUTO: 0.98 X10(3)/MCL (ref 0.1–1.3)
MONOCYTES NFR BLD AUTO: 14.2 %
NEUTROPHILS # BLD AUTO: 2.69 X10(3)/MCL (ref 2.1–9.2)
NEUTROPHILS NFR BLD AUTO: 39.1 %
NRBC BLD AUTO-RTO: 0 %
PLATELET # BLD AUTO: 200 X10(3)/MCL (ref 130–400)
PMV BLD AUTO: 9.5 FL (ref 7.4–10.4)
POTASSIUM SERPL-SCNC: 3.9 MMOL/L (ref 3.5–5.1)
PROT SERPL-MCNC: 6.8 GM/DL (ref 5.8–7.6)
RBC # BLD AUTO: 3.38 X10(6)/MCL (ref 4.7–6.1)
SODIUM SERPL-SCNC: 134 MMOL/L (ref 136–145)
TIBC SERPL-MCNC: 188 UG/DL (ref 69–240)
TIBC SERPL-MCNC: 251 UG/DL (ref 250–450)
TRANSFERRIN SERPL-MCNC: 222 MG/DL (ref 163–344)
VIT B12 SERPL-MCNC: 472 PG/ML (ref 213–816)
WBC # BLD AUTO: 6.89 X10(3)/MCL (ref 4.5–11.5)

## 2024-10-04 PROCEDURE — 36415 COLL VENOUS BLD VENIPUNCTURE: CPT

## 2024-10-04 PROCEDURE — 99215 OFFICE O/P EST HI 40 MIN: CPT | Mod: PBBFAC,25 | Performed by: INTERNAL MEDICINE

## 2024-10-04 PROCEDURE — 82607 VITAMIN B-12: CPT | Performed by: INTERNAL MEDICINE

## 2024-10-04 PROCEDURE — 83735 ASSAY OF MAGNESIUM: CPT

## 2024-10-04 PROCEDURE — 80053 COMPREHEN METABOLIC PANEL: CPT

## 2024-10-04 PROCEDURE — 84165 PROTEIN E-PHORESIS SERUM: CPT

## 2024-10-04 PROCEDURE — 83521 IG LIGHT CHAINS FREE EACH: CPT

## 2024-10-04 PROCEDURE — 1101F PT FALLS ASSESS-DOCD LE1/YR: CPT | Mod: CPTII,,, | Performed by: INTERNAL MEDICINE

## 2024-10-04 PROCEDURE — 86334 IMMUNOFIX E-PHORESIS SERUM: CPT

## 2024-10-04 PROCEDURE — 25000003 PHARM REV CODE 250: Performed by: INTERNAL MEDICINE

## 2024-10-04 PROCEDURE — 3074F SYST BP LT 130 MM HG: CPT | Mod: CPTII,,, | Performed by: INTERNAL MEDICINE

## 2024-10-04 PROCEDURE — 84155 ASSAY OF PROTEIN SERUM: CPT

## 2024-10-04 PROCEDURE — 83521 IG LIGHT CHAINS FREE EACH: CPT | Mod: 59

## 2024-10-04 PROCEDURE — 1160F RVW MEDS BY RX/DR IN RCRD: CPT | Mod: CPTII,,, | Performed by: INTERNAL MEDICINE

## 2024-10-04 PROCEDURE — 63600175 PHARM REV CODE 636 W HCPCS: Performed by: INTERNAL MEDICINE

## 2024-10-04 PROCEDURE — 82728 ASSAY OF FERRITIN: CPT | Performed by: INTERNAL MEDICINE

## 2024-10-04 PROCEDURE — 82746 ASSAY OF FOLIC ACID SERUM: CPT | Performed by: INTERNAL MEDICINE

## 2024-10-04 PROCEDURE — 83540 ASSAY OF IRON: CPT | Performed by: INTERNAL MEDICINE

## 2024-10-04 PROCEDURE — 83010 ASSAY OF HAPTOGLOBIN QUANT: CPT | Performed by: INTERNAL MEDICINE

## 2024-10-04 PROCEDURE — 36415 COLL VENOUS BLD VENIPUNCTURE: CPT | Mod: 59 | Performed by: INTERNAL MEDICINE

## 2024-10-04 PROCEDURE — 3078F DIAST BP <80 MM HG: CPT | Mod: CPTII,,, | Performed by: INTERNAL MEDICINE

## 2024-10-04 PROCEDURE — 3066F NEPHROPATHY DOC TX: CPT | Mod: CPTII,,, | Performed by: INTERNAL MEDICINE

## 2024-10-04 PROCEDURE — 96374 THER/PROPH/DIAG INJ IV PUSH: CPT

## 2024-10-04 PROCEDURE — 3008F BODY MASS INDEX DOCD: CPT | Mod: CPTII,,, | Performed by: INTERNAL MEDICINE

## 2024-10-04 PROCEDURE — 83550 IRON BINDING TEST: CPT | Performed by: INTERNAL MEDICINE

## 2024-10-04 PROCEDURE — 3044F HG A1C LEVEL LT 7.0%: CPT | Mod: CPTII,,, | Performed by: INTERNAL MEDICINE

## 2024-10-04 PROCEDURE — 85025 COMPLETE CBC W/AUTO DIFF WBC: CPT

## 2024-10-04 PROCEDURE — 99214 OFFICE O/P EST MOD 30 MIN: CPT | Mod: S$PBB,,, | Performed by: INTERNAL MEDICINE

## 2024-10-04 PROCEDURE — 3060F POS MICROALBUMINURIA REV: CPT | Mod: CPTII,,, | Performed by: INTERNAL MEDICINE

## 2024-10-04 PROCEDURE — 1159F MED LIST DOCD IN RCRD: CPT | Mod: CPTII,,, | Performed by: INTERNAL MEDICINE

## 2024-10-04 PROCEDURE — 3288F FALL RISK ASSESSMENT DOCD: CPT | Mod: CPTII,,, | Performed by: INTERNAL MEDICINE

## 2024-10-04 PROCEDURE — 83615 LACTATE (LD) (LDH) ENZYME: CPT | Performed by: INTERNAL MEDICINE

## 2024-10-04 PROCEDURE — 82784 ASSAY IGA/IGD/IGG/IGM EACH: CPT

## 2024-10-04 RX ORDER — SODIUM CHLORIDE 0.9 % (FLUSH) 0.9 %
10 SYRINGE (ML) INJECTION
Status: CANCELLED | OUTPATIENT
Start: 2024-10-04

## 2024-10-04 RX ORDER — HEPARIN 100 UNIT/ML
500 SYRINGE INTRAVENOUS
OUTPATIENT
Start: 2024-11-01

## 2024-10-04 RX ORDER — SODIUM CHLORIDE 0.9 % (FLUSH) 0.9 %
10 SYRINGE (ML) INJECTION
Status: DISCONTINUED | OUTPATIENT
Start: 2024-10-04 | End: 2024-10-04 | Stop reason: HOSPADM

## 2024-10-04 RX ORDER — ZOLEDRONIC ACID 0.04 MG/ML
4 INJECTION, SOLUTION INTRAVENOUS
Status: CANCELLED | OUTPATIENT
Start: 2024-10-04

## 2024-10-04 RX ORDER — HEPARIN 100 UNIT/ML
500 SYRINGE INTRAVENOUS
Status: DISCONTINUED | OUTPATIENT
Start: 2024-10-04 | End: 2024-10-04 | Stop reason: HOSPADM

## 2024-10-04 RX ORDER — ZOLEDRONIC ACID 0.04 MG/ML
4 INJECTION, SOLUTION INTRAVENOUS
OUTPATIENT
Start: 2024-11-01

## 2024-10-04 RX ORDER — HEPARIN 100 UNIT/ML
500 SYRINGE INTRAVENOUS
Status: CANCELLED | OUTPATIENT
Start: 2024-10-04

## 2024-10-04 RX ORDER — SODIUM CHLORIDE 0.9 % (FLUSH) 0.9 %
10 SYRINGE (ML) INJECTION
OUTPATIENT
Start: 2024-11-01

## 2024-10-04 RX ORDER — MEGESTROL ACETATE 40 MG/ML
400 SUSPENSION ORAL DAILY
Qty: 300 ML | Refills: 3 | Status: SHIPPED | OUTPATIENT
Start: 2024-10-04

## 2024-10-04 RX ADMIN — ZOLEDRONIC ACID 3.5 MG: 4 INJECTION, SOLUTION, CONCENTRATE INTRAVENOUS at 11:10

## 2024-10-04 RX ADMIN — SODIUM CHLORIDE: 9 INJECTION, SOLUTION INTRAVENOUS at 11:10

## 2024-10-04 NOTE — PROGRESS NOTES
History:  Past Medical History:   Diagnosis Date    Celiac disease     Cerebral palsy, unspecified     Compression fracture of L1 vertebra with delayed healing, subsequent encounter     Compression fracture of L2 vertebra with delayed healing, subsequent encounter     Cyst of right kidney     Degeneration of lumbar intervertebral disc     Diabetes mellitus, type 2     GERD (gastroesophageal reflux disease)     Hyperlipidemia     Hypertension     IgG multiple myeloma     Low back pain     Lumbago with sciatica, right side     Lumbar spinal stenosis     Monocytosis     Multiple myeloma, remission status unspecified     Neck pain     Nontraumatic compression fracture of T8 vertebra, initial encounter     LOIS (obstructive sleep apnea)     Osteoporosis of femur without pathological fracture     Other chronic pain     Personal history of colonic polyps 06/29/2022    Secondary malignancy of lumbar vertebral column     Secondary malignant neoplasm of bone     Tachycardia    Past medical history:  NIDDM. Dyslipidemia.  GERD.  Lumbar spinal stenosis.  Vitamin-D deficiency.  Allergic rhinitis.  HTN.  Bell's palsy.  Celiac disease.  Hemorrhoids.  Mitral regurgitation 01/05/2018.  Rheumatic valve narrowing and leaking mitral valve.  -02/02/2018 ([facility]):  АНДРЕЙ, right minimally invasive anterior thoracotomy, right femoral artery and vein exploration, extensive right pleural adhesiolysis/pneumolysis, bilateral Maze/ablation (extensive), left atrial appendage clip placement for left atrial appendage exclusion, mitral valve repair (complex repair with 26 mm annuloplasty ring)    Social history:  .  Lives in [place], [place].  Five children.  Owns a [employer] business.  No history of tobacco, alcohol, or illicit drug abuse.      Family history:  Negative for cancers or blood dyscrasias.      Health maintenance:  PCP in family medicine clinic, [facility].  Had EGD and colonoscopy done 1 year  ago by Dr. Zoltan Kapoor, GI, [place], apparently unremarkable (he gets the endoscopies done periodically because of history of celiac disease).  Past Surgical History:   Procedure Laterality Date    BONE MARROW ASPIRATION N/A 06/27/2023    Procedure: ASPIRATION, BONE MARROW;  Surgeon: Namita Daniels MD;  Location: [facility] ENDOSCOPY;  Service: General;  Laterality: N/A;    BONE MARROW ASPIRATION N/A 10/24/2023    Procedure: ASPIRATION, BONE MARROW;  Surgeon: Namita Daniels MD;  Location: [facility] ENDOSCOPY;  Service: General;  Laterality: N/A;    BONE MARROW ASPIRATION N/A 3/12/2024    Procedure: ASPIRATION, BONE MARROW;  Surgeon: Haylie Liu MD;  Location: [facility] ENDOSCOPY;  Service: General;  Laterality: N/A;    BONE MARROW BIOPSY N/A 06/27/2023    Procedure: Biopsy-bone marrow;  Surgeon: Haylie Liu MD;  Location: [facility] ENDOSCOPY;  Service: General;  Laterality: N/A;    BONE MARROW BIOPSY N/A 10/24/2023    Procedure: Biopsy-bone marrow;  Surgeon: Namita Daniels MD;  Location: [facility] ENDOSCOPY;  Service: General;  Laterality: N/A;    BONE MARROW BIOPSY N/A 3/12/2024    Procedure: Biopsy-bone marrow;  Surgeon: Haylie Liu MD;  Location: [facility] ENDOSCOPY;  Service: General;  Laterality: N/A;    CARDIAC SURGERY  2018    COLONOSCOPY  06/29/2022    EYE SURGERY        Social History     Socioeconomic History    Marital status:    Tobacco Use    Smoking status: Never    Smokeless tobacco: Never   Substance and Sexual Activity    Alcohol use: Never    Drug use: Never    Sexual activity: Not Currently      Family History   Problem Relation Name Age of Onset    Hypertension Mother          Reason for Follow-up:  -multiple myeloma, IgG kappa; S/P ASCT  -worsening low back pain  -hypercalcemia  -osteoporosis hips B/L    History of Present Illness:   IgG multiple myeloma      Oncologic/Hematologic History:  Oncology History   IgG multiple myeloma   6/23/2023 Initial Diagnosis    IgG multiple myeloma      7/13/2023 - 11/21/2023 Chemotherapy    Treatment Summary   Plan Name: OP VRD - WEEKLY BORTEZOMIB LENALIDOMIDE DEXAMETHASONE Q3W  Treatment Goal: Curative  Status: Inactive  Start Date: 7/13/2023  End Date: 11/21/2023  Provider: Narciso Lundy MD  Chemotherapy: bortezomib (VELCADE) injection 2.25 mg, 2.25 mg, Subcutaneous, Clinic[facility] 1 time, 6 of 9 cycles  Administration: 2.25 mg (7/13/2023), 2.25 mg (7/20/2023), 2.25 mg (7/27/2023), 2.25 mg (8/10/2023), 2.25 mg (8/17/2023), 2.25 mg (8/24/2023), 2.25 mg (8/31/2023), 2.25 mg (9/7/2023), 2.25 mg (9/21/2023), 2.25 mg (9/14/2023), 2.25 mg (9/28/2023), 2.25 mg (10/12/2023), 2.25 mg (10/5/2023), 2.25 mg (10/19/2023), 2.25 mg (10/26/2023), 2.25 mg (11/21/2023)  lenalidomide 25 mg Cap, 25 mg, , , 1 of 1 cycle, Start date: 11/3/2023, End date: 2/15/2024     67-year-old gentleman, referred from [facility], with newly diagnosed multiple myeloma.      Patient is being followed for IgG kappa multiple myeloma.    Please assessment and plan section for details.    06/26/2023:  Initial consultation:  Pleasant gentleman who presents for initial medical oncology consultation, accompanied by his wife and family present who is a past interventional cardiologist.  Back pain for 2 months.  Initially started in right groin.  Physical therapy has not helped.  10/10 severity.  Has been taking Tylenol without significant relief.  Secured to take narcotics because of constipation.  Underwent couple of spinal steroid shots 3 weeks ago, with minor relief.  Pain is present all the time.  Pain increases with changing position in bed as well as upon standing.  He finds it difficult to ambulate.  Pain does not radiate down lower extremities and is not accompanied with lower extremity weakness, numbness, urinary or bowel incontinence, or saddle anesthesia.  Also experiences muscle cramps.  Generalized weakness.  However, ECOG 2.  No fevers or chills.  No repeated infections.   Cramps in hands and feet.  Appetite is down.  Appetite has picked up in last 10 days.  Has lost 12-14 lb in last 1-1/2 months.  No abnormal bleeding or bruising.      Interval History:  INF FLUIDS   [No matching plan found]     10/04/2024:   -04/09/2024: Follow-up with Dr. Brandy Mahajan, hematology/oncology/BMT, [place]:  Day 100 visit; CR (day 100 bone marrow biopsy showed 1% plasma cells and multiple myeloma studies revealed no M spike; UPEP was negative for M spike; we will start maintenance therapy with Revlimid 10 mg daily with aspirin; keep maintenance therapy as long as ANC> 500 mm3 and platelets> 50 K; CBC weekly x4 weeks then every other week for 8 weeks, then monthly; follow-up thyroid function tests every 3-6 weeks; graft stable and transferrin independent; continue acyclovir and Bactrim; vaccinations at 6 months post ASCT)  -08/02/2024:  Axillary lumbar spine:  No instability; multilevel compression deformities; significant degenerative changes; Schmorl nodes.  -08/02/2024: MRI lumbar spine without contrast (comparison: 09/14/2023):  Indeterminate 1.2 x 1.1 cm lesion exophytic from the anterior upper pole the right kidney.  Recommend further assessment with ultrasound the kidneys.   Multilevel degenerative disc disease and facet degenerative changes with multilevel canal and foraminal stenosis as described above.  Changes are most significant at L3-4.  Please see above level by level for details.   Multilevel chronic compression fractures throughout the lumbar spine and visualized lower thoracic spine.  -08/20/2024: Follow-up with Dr. Brandy Mahajan MD, hematology/oncology/BMT, [place]:  Continue maintenance therapy with Revlimid at 10 mg daily with aspirin; increase Revlimid to 15 mg as maintenance therapy as long as ANC> 500 and platelets> 50 K; continue acyclovir and Bactrim; vaccinations at 6 months post ASCT  -09/23/2024: MRI prostate with and without contrast (elevated PSA, 2.78 ng/mL):  PI-RADS 2:  Low  -09/24/2024: Complete retroperitoneal ultrasound (evaluation of renal cyst):  Nonocclusive stone left kidney; prominent prostate  -09/30/2024: Complete abdominal ultrasound: Negative  -09/06/2024:  WBC, differential normal; platelets normal; hemoglobin 9.9, MCV 99.7  -chronic anemia: Hemoglobin: 9.9 on 09/06/2024; 10.5 on 09/03/2024; 10.5 on 07/12/2024; 11.1 on 06/14/2024; 11.7 on 05/03/2024  -MCV:  Slightly elevated  -09/06/2024:  CMP: Albumin 2.9; rest of CMP essentially unremarkable except AST 39, ALT 67 (slightly elevated)  -no renal dysfunction; no hypercalcemia  -09/24/2024: TSH normal  -PSA level: 2.78 on 05/27/2024; 1.7 on 05/01/2024; 1.96 on 09/17/2021  -03/11/2024:  No monoclonal bands per SPEP and BUBBA; kappa/lambda ratio normal  -06/10/2024:  No monoclonal bands per SPEP and BUBBA; kappa/lambda ratio normal  -follows up with Xochitl Grubbs MD, neurosurgery, for lumbar stenosis with neurogenic claudication; chronic bilateral low back pain with bilateral sciatica; compression fracture of lumbar vertebrae; planning L2-3, L3-4, L4-5 laminectomy  -10/04/2024:  Hemoglobin 10.7, more less stable; .2; ANC 2.69; otherwise, CBC unremarkable; CMP reviewed; creatinine 1.24, slightly up; calcium 9.2, albumin 3.4 (no hypercalcemia)  -10/04/2024: Anorexia; start Megace 400 mg p.o. q.day  -10/04/2024:  has not received Revlimid 15 mg tablets as yet; we will check on that  Presents for a follow-up visit.  Overall, stable.  Appetite is okay.  Chronic back pain; denies radiation; however, neurosurgeon note mentions radiation to both lower extremities (neurogenic claudication).  Tells me that he does not want to undergo laminectomy.  Has not received Revlimid 15 mg tablet as yet.  Says that ever since started taking Revlimid, his appetite has declined.  We will start Megace.  No signs of venous thromboembolism.  No skin rash.  No constipation diarrhea.  No fevers, chills, weakness, or fatigue.  No bone pains.  No  chest pain, cough, or dyspnea.  No abdominal pain, nausea, vomiting.  No change in bowel habits or GI bleeding.  No urinary problems.  We will check paraprotein parameters today.      Medications:  Current Outpatient Medications on File Prior to Visit   Medication Sig Dispense Refill    acyclovir (ZOVIRAX) 400 MG tablet Take 1 tablet (400 mg total) by mouth 2 (two) times daily. 60 tablet 4    ascorbic acid, vitamin C, (VITAMIN C) 1000 MG tablet Take 1,000 mg by mouth once daily.      aspirin (ECOTRIN) 81 MG EC tablet Take 81 mg by mouth once daily.      atorvastatin (LIPITOR) 20 MG tablet Take 20 mg by mouth once daily.      calcium carbonate 195 mg calcium (500 mg) Chew Take 1 tablet by mouth once daily.      coenzyme Q10 10 mg capsule Take 1 capsule by mouth once daily.      famotidine (PEPCID) 40 MG tablet Take 40 mg by mouth every evening.      ferrous sulfate (FEOSOL) 325 mg (65 mg iron) Tab tablet Take 650 mg by mouth.      hydrocortisone (ANUSOL-HC) 2.5 % rectal cream Place 1 applicator rectally 2 (two) times daily. 28 g 2    lenalidomide 10 mg Cap Take 1 capsule by mouth.      metoprolol succinate (TOPROL-XL) 50 MG 24 hr tablet Take 50 mg by mouth once daily.      MIRALAX 17 gram/dose powder Take 17 g by mouth.      multivit with min-folic acid 0.4 mg Tab Take 1 tablet by mouth once daily.      omega 3-dha-epa-fish oil 300 mg (120 mg- 180mg)-1,000 mg Cap Take 500 mg by mouth.      sulfamethoxazole-trimethoprim 800-160mg (BACTRIM DS) 800-160 mg Tab Take 1 tablet by mouth on MWF of each week 48 tablet 3    traMADoL 100 mg Tab Take 100 tablets by mouth every 4 (four) hours as needed (pain). 60 tablet 2    metFORMIN (GLUCOPHAGE) 500 MG tablet Take 1 tablet (500 mg total) by mouth 2 (two) times daily with meals. (Patient not taking: Reported on 10/4/2024) 180 tablet 3    pantoprazole (PROTONIX) 20 MG tablet Take 1 tablet (20 mg total) by mouth once daily. 30 tablet 11     No current facility-administered  "medications on file prior to visit.       Review of Systems:   All systems reviewed and found to be negative except for the symptoms detailed above    Physical Examination:   VITAL SIGNS:   Vitals:    10/04/24 0944   BP: 112/60   Pulse: (!) 55   Resp: 18   Temp: 98.2 °F (36.8 °C)         GENERAL:  In no apparent distress.    HEAD:  No signs of head trauma.  EYES:  Pupils are equal.  Extraocular motions intact.    EARS:  Hearing grossly intact.  MOUTH:  Oropharynx is normal.   NECK:  No adenopathy, no JVD.     CHEST:  Chest with clear breath sounds bilaterally.  No wheezes, rales, rhonchi.    CARDIAC:  Regular rate and rhythm.  S1 and S2, without murmurs, gallops, rubs.  VASCULAR:  No Edema.  Peripheral pulses normal and equal in all extremities.  ABDOMEN:  Soft, without detectable tenderness.  No sign of distention.  No   rebound or guarding, and no masses palpated.   Bowel Sounds normal.  MUSCULOSKELETAL:  Good range of motion of all major joints. Extremities without clubbing, cyanosis or edema.    NEUROLOGIC EXAM:  Alert and oriented x 3.  No focal sensory or strength deficits.   Speech normal.  Follows commands.  PSYCHIATRIC:  Mood normal.    No results for input(s): "CBC" in the last 72 hours.   No results for input(s): "CMP" in the last 72 hours.     Assessment:  Problem List Items Addressed This Visit          Neuro    Compression fracture of thoracic spine, non-traumatic    Compression fracture of first lumbar vertebra - Primary    Compression fracture of L2 lumbar vertebra    Spinal stenosis of lumbar region    Neurogenic claudication       Renal/    Hypercalcemia       Immunology/Multi System    Drug-induced immunodeficiency       Oncology    IgG multiple myeloma    Stem cells transplant status    H/O autologous stem cell transplant       Endocrine    Osteoporosis of femur without pathological fracture     Orders for 10/04/2024:  -check retic count, serum iron, TIBC, ferritin, B12 level, folic acid level, " LDH, haptoglobin  Continue Revlimid 15 mg daily   Continue baby aspirin 81 mg daily   Check CBC and CMP monthly   TSH and free T4 every 3 months   Continue Zometa every 4 weeks until 07/2025   Continue calcium and vitamin-D supplements  Check SPEP, BUBBA, FLC assay  Continue acyclovir and Bactrim until discontinued by Dr. Brandy Mahajan, BMT, [place]  DEXA scan in October (due now)  -10/04/2024: Anorexia; start Megace 400 mg p.o. q.day  -10/04/2024:  has not received Revlimid 15 mg tablets as yet; we will check on that  Follow-up with NP in 1 month     Above discussed with the patient.  All questions answered.  Discussed labs and scans and gave him copies of relevant results.  Plan of management discussed.    He understands and agrees with this plan.  ================================    Multiple myeloma, IgG kappa:  ISS stage:  Stage I  R-ISS stage:  Stage I  Multiple myeloma, IgG kappa:   ISS stage:  Stage I  R-ISS stage:  Stage I  -presentation:  03/2023: Worsening back pain, 12-14 lb weight loss over 6 weeks  -skeletal survey 06/14/2023: Negative for lytic or sclerotic lesions  -06/14/2023:  Hemoglobin 11.0, IgG kappa M protein 2.28 gm/dL, kappa elevated, lambda suppressed, kappa/lambda ratio 292  -renal function normal, no hypercalcemia  -24 hour urine: M spike 183 mg; monoclonal kappa light chains in urine; urine kappa elevated, urine lambda suppressed, urine kappa/lambda ratio> 36.4  -LDH normal 06/16/2023   -Beta-2 microglobulin 3.18 on 06/26/2023  -bone marrow biopsy 06/27/2023:  50% plasmacytosis with kappa light chain restriction; no high-risk cytogenetics  -FDG PET-CT 06/29/2023:  Questionable findings  ======================================  Staging of myeloma:  -serum beta 2 microglobulin elevated (3.18)  -serum albumin and LDH normal   -myeloma FISH panel on bone marrow:  Negative for high-risk cytogenetic abnormalities  >>>  ISS stage:  Stage I  R-ISS stage:  Stage  I  ======================================  -Velcade started 07/13/2023   -Zometa 4 mg IV every 4 weeks (x2 years), started 07/13/2023  -VRD:  Cycle 1 started 07/13/2023; cycle 2 started 08/10/2023; cycle 3 started 08/31/2023; cycle 4 started 09/21/2023  -Radiation oncology consult:  Radiotherapy to spine not indicated/will not be helpful; questionable findings on FDG PET-CT  -IR at [facility] will not perform kyphoplasty  -significant response to chemotherapy x2 cycles (on 08/24/2023, kappa/lambda ratio 5.72, much improved; IgG kappa monoclonal protein down to 0.39 gm/dL, significantly decreased)  -VRD cycle 5:  10/12/2023-10/26/2023  -restaging bone marrow biopsy 10/24/2023, post VRD X 4-1/2 cycles:  Normocellular; < 1% plasma cells; no clonal or abnormal plasma cell population on flow cytometry; no high-risk FISH findings  -11/08/2023: No monoclonal protein in urine  -11/09/2023:  No monoclonal protein on SPEP and BUBBA; kappa/lambda ratio 1.36, normalized; kappa free light chains 2.29 mg/dL (to recall, 99.4 on 06/14/2023 before starting chemotherapy)  -cycle 6 day 1 of RVD 11/21/2023; subsequently, on hold in anticipation of stem cell transplant  >>>  ASCT at [facility]:  -melphalan 360 mg IV administered day -2 (11/28/2023) (200 mg per m2 per day)  -ASCT infusion day 0 (11/30/2023)  -filgrastim 5 microgram/kg subQ every 24 hours, started day +5 (12/05/2023)  -infection prophylaxis  -pentamidine 300 mg inhaled once on day -2 (11/28/2023)  -acyclovir 800 mg p.o. q.12 hours starting day -2 (11/28/2023), to continue for 1 year  -fluconazole 400 mg p.o. Q 24 hours starting day -2 (11/28/2023), continue until engraftment and/or mucositis resolved  -cefepime started 12/08/2023, vancomycin added  -IVIG on 12/10/2023  -12/12/2023:  HCT day +12; doing well on antibiotics; counts recovered  -12/12/2023: Transfusion independent and ANC has recovered  -12/13/2023: Patient discharged  -venous Doppler both lower extremities 02/29/2024:  Pedal edema: No DVT   -03/11/2024:  Hemoglobin 11.2; CMP unremarkable   -per [facility], multiple myeloma studies and bone marrow biopsy on day 100 post ASCT (anytime after 03/09/2024)  -03/11/2024 (day + 102):  No monoclonal protein in blood; FLC assay normal   -restaging bone marrow biopsy 03/12/2024 (day +103):  No increase in plasma cells  -03/11/2024:  No monoclonal band; kappa/lambda ratio normal  -03/18/2024:  Urine negative for monoclonal protein  -thus, CR, post ASCT  -04/09/2024: Dr. Brandy Mahajan:  Maintenance Revlimid 10 mg p.o. q.day, started, with aspirin  -06/10/2024: No monoclonal bands per SPEP and BUBBA; kappa/lambda ratio normal  -08/20/2024: Dr. Lynch; maintenance Revlimid dose increased to 15 mg daily  -continue acyclovir and Bactrim  >>>  Plan:   -continue Revlimid maintenance 15 mg daily  -baby aspirin 81 mg daily to prevent thromboembolism  -check CBC and CMP monthly  -check TSH and free T4 every 2-3 months  -monitor for signs and symptoms of infection, bleeding, bruising, hepatotoxicity, secondary malignancies, thromboembolism, dermatologic toxicity, hypersensitivity with lenalidomide  -a minimum of 2 years of lenalidomide maintenance therapy is recommended  -lenalidomide dose modifications for toxicity: See below  -continue Zometa every 4 weeks x2 years (started 07/13/2023; continue until 07/2025)  -continue calcium and vitamin-D supplements along with Zometa to prevent hypocalcemia  -monitor renal function while on Zometa  -per Radiation Oncology, given questionable radiologic findings per FDG PET-CT, palliative radiotherapy to spine was not thought to be useful  -SPEP, BUBBA, FLC check every 3 months; next, September (due now)  -continue acyclovir and Bactrim until vaccination completed  -10/04/2024: Anorexia; start Megace 400 mg p.o. q.day  -10/04/2024:  has not received Revlimid 15 mg tablets as yet; we will check on that  -follow-up in 1 month    Lenalidomide Recommended Dosage Modifications for  Hematologic Adverse Reactions in Multiple Myeloma (Maintenance Therapy Following Autologous Transplant):  #Neutropenia:  -ANC <500/mm3: Withhold lenalidomide and check CBC weekly; resume lenalidomide at the next lower dose with continuous dosing on days 1 to 28 of a 28-day cycle when ANC >=500/mm3.  -ANC <500/mm3 (subsequent occurrence at 5 mg daily [continuous] dose): Withhold lenalidomide; resume lenalidomide at 5 mg daily on days 1 to 21 of a 28-day cycle when ANC >=500/mm3. Do not dose below 5 mg daily on days 1 to 21 of a 28-day cycle.  #Thrombocytopenia:  -Platelets <30,000/mm3 (first occurrence): Withhold lenalidomide and check CBC weekly; resume lenalidomide at the next lower dose with continuous dosing on days 1 to 28 of a 28-day cycle when platelets >=30,000/mm3.  -Platelets <30,000/mm3 (subsequent occurrence at 5 mg daily [continuous] dose): Withhold lenalidomide; resume lenalidomide at 5 mg daily on days 1 to 21 of a 28-day cycle when platelets >=30,000/mm3. Do not dose below 5 mg daily on days 1 to 21 of a 28-day cycle.    Monitoring with lenalidomide:  -CBC with differential: weekly for the first 2 cycles, every 2 weeks during the third cycle and monthly thereafter;  -serum creatinine, LFTs (periodically).  -Thyroid function tests (TSH at baseline then every 2 to 3 months during lenalidomide treatment  -ECG when clinically indicated. Monitor for signs and symptoms of infection (if neutropenic), bleeding or bruising, hepatotoxicity, secondary malignancies, thromboembolism (eg, shortness of breath, chest pain, arm or leg swelling), dermatologic toxicity, tumor lysis syndrome, or hypersensitivity.    Lenalidomide:  Embryo fetal toxicity  Hematologic toxicity.  Neutropenia.  Thrombocytopenia.  Venous and arterial thromboembolism.  DVT.  PE.  MI.  Stroke.  Dizziness, fatigue.  Tumor lysis syndrome.  Heart failure.    Used with caution in patients with renal impairment.  Lenalidomide =/> 4 cycles may  decrease the # of CD34 pos cells collected for autologous stem cell transplant.  DVT, PE, atrial fibrillation, renal failure are more likely in patients> 65 years  Hepatotoxicity  Hypersensitivity reactions.  Anaphylaxis.  Angioedema.  Skin rash.  Eosinophilia.  Immediate hypersensitivity reactions.  Second primary malignancy.  AML.  MDS.  Solid tumor malignancies.  Nonmelanoma skin cancers.      Osteoporosis on hips on DEXA scan 10/11/2023  -osteoporosis of hips noted on DEXA scan 10/11/2023  -patient already receiving monthly Zometa for underlying multiple myeloma; this will help with osteoporosis as well  -repeat DEXA scan in 1 year (10/2024)      Exophytic lesion upper pole of right kidney:  10 mm exophytic hyperdense focus upper pole of right kidney, compatible with a hyperdense cyst, on CT abdomen pelvis with contrast 06/27/2023  -07/12/2023:  MRI abdomen with and without contrast (right renal cyst): Small exophytic right renal lesion most likely a proteinaceous or hemorrhagic cyst (10 mm, upper pole of right kidney)  -MRI lumbar spine 08/02/2024:  1.2 cm exophytic lesion indeterminate from anterior upper pole of right kidney; multilevel degenerative disc disease, etc.      Non myeloma findings (DJD of spine, spinal stenosis, compression fractures):  -mild L4-L5 spinal canal stenosis on MRI 05/04/2023   -lumbar DJD and facet arthrosis on MRI lumbar spine 05/04/2023  -MRI T-spine 08/08/2023:  No myeloma lesions   -MRI lumbar spine 09/14/2023:  Progression of skeletal demineralization; multilevel degenerative disc disease; severe spinal stenosis L3-L5; new/progressive loss of vertebral body height throughout the spine since 05/2023, etc.  -IR at [facility] will not perform kyphoplasty  -MRI lumbar spine 08/02/2024:  1.2 cm exophytic lesion indeterminate from anterior upper pole of right kidney; multilevel degenerative disc disease, etc.  -follows up with Xochitl Grubbs MD, neurosurgery, for lumbar stenosis with  neurogenic claudication; chronic bilateral low back pain with bilateral sciatica; compression fracture of lumbar vertebrae; planning L2-3, L3-4, L4-5 laminectomy      Rising PSA level:  -PSA: 2.78 on 05/27/2024; 1.7 on 05/01/2024; 1.96 on 09/17/2021  -MRI prostate 09/23/2024:  PI-RADS 2: Low (rising PSA level)      Chronic macrocytic anemia:  -chronic anemia: Hemoglobin: 9.9 on 09/06/2024; 10.5 on 09/03/2024; 10.5 on 07/12/2024; 11.1 on 06/14/2024; 11.7 on 05/03/2024  -MCV:  Slightly elevated  -10/04/2024:  Hemoglobin 10.7, more less stable; .2; ANC 2.69; otherwise, CBC unremarkable; CMP reviewed; creatinine 1.24, slightly up; calcium 9.2, albumin 3.4 (no hypercalcemia)  >>>  -could be secondary to chemotherapy  -check CBC, retic count, serum iron, TIBC, ferritin, B12 level, folic acid level, LDH, haptoglobin      Co-morbidities:  NIDDM.  Dyslipidemia.  Hypertension.  Celiac disease.    History of rheumatic mitral valve disease, S/P Maze/ablation procedure 02/02/2018      Vaccination history:   COVID-19, MRNA, LN-S, PF (MODERNA FULL 0.5 ML DOSE) 8/18/2022 , 10/23/2021 , 3/17/2021 , 2/17/2021   Hepatitis A / Hepatitis B 8/18/2022 , 10/5/2021 , 7/29/2020   Influenza - Quadrivalent - MDCK - PF 10/5/2021   Influenza - Trivalent - MDCK - PF 9/10/2015   Meningococcal Conjugate (MCV4O) 8/18/2022 , 4/17/2012   Meningococcal Conjugate (MCV4P) 4/17/2012   Pneumococcal Conjugate - 20 Valent 1/12/2023   Tdap 7/29/2020   Zoster 11/18/2020 , 7/29/2020   Zoster Recombinant 11/18/2020 , 7/29/2020     ===========================================    Discussion:  Supportive care for myeloma:  -bone targeted treatment: Bisphosphonate, category 1; or denosumab to reduce risk of skeletal related events; denosumab is preferred in patients with renal insufficiency; assess vitamin-D status; baseline dental exam; monitor for osteonecrosis of the jaw; monitor renal dysfunction with bisphosphonate therapy  -continue bone targeted  treatment (bisphosphonate or denosumab) for 2 years; continue beyond 2 years should be based on clinical judgment  -patients receiving denosumab for bone disease who subsequently discontinued therapy should be given maintenance denosumab every 6 months or a single dose of bisphosphonate to mitigate risk of rebound osteoporosis  -for hypercalcemia, zoledronic acid, denosumab, steroids, and/or calcitonin  -for hyperviscosity, plasmapheresis should be used as adjuvant therapy for symptomatic hyperviscosity  -intravenous immunoglobulin therapy should be considered in the setting of recurrent serious infection and/or hypogammaglobulinemia (IgG </= 400 mg/dL)  -pneumococcal conjugate vaccine, followed by pneumococcal polysaccharide vaccine 1 year later  -Influenza vaccination is recommended; 2 doses of high-dose inactivated quandaivalent influenza vaccine should be considered  -consider 12 weeks of levofloxacin 500 mg p.o. daily at the time of initial diagnosis of multiple myeloma  -COVID-19 vaccination  -highest risk for VTE is in the 1st 6 months following new diagnosis of multiple myeloma  -VTE prophylaxis if no contraindications to anticoagulation agents or antiplatelets  Recommendations for VTE prophylaxis:  Aspirin  mg daily; low molecular weight heparin equivalent to enoxaparin 40 mg daily; Xarelto 10 mg daily; Eliquis 2.5 mg b.i.d.; Arixtra 2.5 mg daily; Coumadin with target INR 2.0-3.0    Follow-up:  No follow-ups on file.

## 2024-10-04 NOTE — Clinical Note
-10/04/2024: Anorexia; start Megace 400 mg p.o. q.day -10/04/2024:  has not received Revlimid 15 mg tablets as yet; we will check on that

## 2024-10-04 NOTE — NURSING
"Pt escorted to Infusion Clinic for Zometa infusion, c/o back pain 5/10 & stated his MRI "did not show anything", AVS given & pt spoke with MERCEDES Shea RN/Nurse Navigator about getting his po chemo refilled.  "

## 2024-10-04 NOTE — Clinical Note
-check retic count, serum iron, TIBC, ferritin, B12 level, folic acid level, LDH, haptoglobin Continue Revlimid 15 mg daily  Continue baby aspirin 81 mg daily  Check CBC and CMP monthly  TSH and free T4 every 3 months  Continue Zometa every 4 weeks until 07/2025  Continue calcium and vitamin-D supplements Check SPEP, BUBBA, FLC assay Continue acyclovir and Bactrim until discontinued by Dr. Brandy Mahajan, [facility], [place] DEXA scan in October (due now) Follow-up with NP in 1 month   Above discussed with the patient.  All questions answered. Discussed labs and scans and gave him copies of relevant results. Plan of management discussed.   He understands and agrees with this plan.

## 2024-10-07 LAB
ALBUMIN % SPEP (OHS): 49.77 (ref 48.1–59.5)
ALBUMIN SERPL ELPH-MCNC: 3.5 G/DL
ALBUMIN SERPL-MCNC: 3.1 G/DL (ref 3.4–4.8)
ALBUMIN/GLOB SERPL: 1 RATIO (ref 1.1–2)
ALPHA 1 GLOB (OHS): 0.24 GM/DL (ref 0–0.4)
ALPHA 1 GLOB% (OHS): 3.83 (ref 2.3–4.9)
ALPHA 2 GLOB % (OHS): 13.86 (ref 6.9–13)
ALPHA 2 GLOB (OHS): 0.87 GM/DL (ref 0.4–1)
ALPHA1 GLOB SERPL ELPH-MCNC: 0.29 G/DL
ALPHA2 GLOB SERPL ELPH-MCNC: 0.86 G/DL
AR EER MONOCLONAL PROTEIN AND FLC, SERUM: ABNORMAL
AR MONOCLONAL PROTEIN: ABNORMAL G/DL
B-GLOBULIN SERPL ELPH-MCNC: 0.68 G/DL
BETA GLOB (OHS): 0.85 GM/DL (ref 0.7–1.3)
BETA GLOB% (OHS): 13.47 (ref 13.8–19.7)
GAMMA GLOB SERPL ELPH-MCNC: 1.07 G/DL
GAMMA GLOBULIN % (OHS): 19.06 (ref 10.1–21.9)
GAMMA GLOBULIN (OHS): 1.2 GM/DL (ref 0.4–1.8)
GLOBULIN SER-MCNC: 3.2 GM/DL (ref 2.4–3.5)
IGA SERPL-MCNC: 156 MG/DL
IGA SERPL-MCNC: 170 MG/DL (ref 101–645)
IGG SERPL-MCNC: 1145 MG/DL
IGG SERPL-MCNC: 1215 MG/DL (ref 540–1822)
IGM SERPL-MCNC: 18 MG/DL (ref 22–240)
IGM SERPL-MCNC: 20 MG/DL
INTERPRETATION SERPL IFE-IMP: ABNORMAL
INTERPRETATION SERPL IFE-IMP: ABNORMAL
KAPPA LC FREE SER NEPH-MCNC: 4.67 MG/DL (ref 0.33–1.94)
KAPPA LC FREE SER-MCNC: 45.34 MG/L
KAPPA LC FREE/LAMBDA FREE SER NEPH: 1.62 {RATIO}
KAPPA LC FREE/LAMBDA FREE SER NEPH: 1.65 {RATIO} (ref 0.26–1.65)
LAMBDA LC FREE SERPL-MCNC: 2.83 MG/DL (ref 0.57–2.63)
LAMBDA LC FREE SERPL-MCNC: 27.92 MG/L
M SPIKE % (OHS): ABNORMAL
M SPIKE (OHS): ABNORMAL
PATH REV: NORMAL
PROT SERPL-MCNC: 6.3 GM/DL (ref 5.8–7.6)
PROT SERPL-MCNC: 6.4 G/DL

## 2024-10-14 ENCOUNTER — HOSPITAL ENCOUNTER (OUTPATIENT)
Dept: RADIOLOGY | Facility: HOSPITAL | Age: 69
Discharge: HOME | End: 2024-10-14
Attending: INTERNAL MEDICINE
Payer: MEDICARE

## 2024-10-14 DIAGNOSIS — M81.0 OSTEOPOROSIS OF FEMUR WITHOUT PATHOLOGICAL FRACTURE: ICD-10-CM

## 2024-10-14 DIAGNOSIS — C90.00 IGG MULTIPLE MYELOMA: ICD-10-CM

## 2024-10-14 PROCEDURE — 77080 DXA BONE DENSITY AXIAL: CPT | Mod: TC

## 2024-10-30 DIAGNOSIS — Z79.899 DRUG-INDUCED IMMUNODEFICIENCY: ICD-10-CM

## 2024-10-30 DIAGNOSIS — S32.010G COMPRESSION FRACTURE OF L1 VERTEBRA WITH DELAYED HEALING, SUBSEQUENT ENCOUNTER: ICD-10-CM

## 2024-10-30 DIAGNOSIS — Z86.11 HISTORY OF TUBERCULOSIS: ICD-10-CM

## 2024-10-30 DIAGNOSIS — C90.00 IGG MULTIPLE MYELOMA: ICD-10-CM

## 2024-10-30 DIAGNOSIS — Z94.84 H/O AUTOLOGOUS STEM CELL TRANSPLANT: ICD-10-CM

## 2024-10-30 DIAGNOSIS — D84.821 DRUG-INDUCED IMMUNODEFICIENCY: ICD-10-CM

## 2024-10-30 DIAGNOSIS — E83.52 HYPERCALCEMIA: ICD-10-CM

## 2024-10-30 DIAGNOSIS — C90.00 IGG MULTIPLE MYELOMA: Primary | ICD-10-CM

## 2024-10-30 DIAGNOSIS — R29.818 NEUROGENIC CLAUDICATION: ICD-10-CM

## 2024-10-31 RX ORDER — ACYCLOVIR 400 MG/1
400 TABLET ORAL 2 TIMES DAILY
Qty: 60 TABLET | Refills: 4 | Status: SHIPPED | OUTPATIENT
Start: 2024-10-31

## 2024-11-01 ENCOUNTER — TELEPHONE (OUTPATIENT)
Dept: HEMATOLOGY/ONCOLOGY | Facility: CLINIC | Age: 69
End: 2024-11-01
Payer: MEDICARE

## 2024-11-04 ENCOUNTER — INFUSION (OUTPATIENT)
Dept: INFUSION THERAPY | Facility: HOSPITAL | Age: 69
End: 2024-11-04
Attending: INTERNAL MEDICINE
Payer: MEDICARE

## 2024-11-04 ENCOUNTER — APPOINTMENT (OUTPATIENT)
Dept: HEMATOLOGY/ONCOLOGY | Facility: CLINIC | Age: 69
End: 2024-11-04
Payer: MEDICARE

## 2024-11-04 ENCOUNTER — OFFICE VISIT (OUTPATIENT)
Dept: HEMATOLOGY/ONCOLOGY | Facility: CLINIC | Age: 69
End: 2024-11-04
Payer: MEDICARE

## 2024-11-04 VITALS
HEART RATE: 57 BPM | WEIGHT: 144.81 LBS | SYSTOLIC BLOOD PRESSURE: 114 MMHG | TEMPERATURE: 98 F | DIASTOLIC BLOOD PRESSURE: 67 MMHG | OXYGEN SATURATION: 99 % | HEIGHT: 67 IN | RESPIRATION RATE: 19 BRPM | BODY MASS INDEX: 22.73 KG/M2

## 2024-11-04 VITALS
TEMPERATURE: 98 F | RESPIRATION RATE: 20 BRPM | HEART RATE: 51 BPM | DIASTOLIC BLOOD PRESSURE: 75 MMHG | HEIGHT: 67 IN | OXYGEN SATURATION: 100 % | BODY MASS INDEX: 22.7 KG/M2 | WEIGHT: 144.63 LBS | SYSTOLIC BLOOD PRESSURE: 126 MMHG

## 2024-11-04 DIAGNOSIS — S32.010G COMPRESSION FRACTURE OF L1 VERTEBRA WITH DELAYED HEALING, SUBSEQUENT ENCOUNTER: ICD-10-CM

## 2024-11-04 DIAGNOSIS — C90.00 IGG MULTIPLE MYELOMA: Primary | ICD-10-CM

## 2024-11-04 DIAGNOSIS — R29.818 NEUROGENIC CLAUDICATION: ICD-10-CM

## 2024-11-04 DIAGNOSIS — C90.00 IGG MULTIPLE MYELOMA: ICD-10-CM

## 2024-11-04 DIAGNOSIS — R63.0 ANOREXIA: ICD-10-CM

## 2024-11-04 DIAGNOSIS — Z86.11 HISTORY OF TUBERCULOSIS: ICD-10-CM

## 2024-11-04 DIAGNOSIS — Z79.899 DRUG-INDUCED IMMUNODEFICIENCY: ICD-10-CM

## 2024-11-04 DIAGNOSIS — D84.821 DRUG-INDUCED IMMUNODEFICIENCY: ICD-10-CM

## 2024-11-04 DIAGNOSIS — Z94.84 H/O AUTOLOGOUS STEM CELL TRANSPLANT: ICD-10-CM

## 2024-11-04 DIAGNOSIS — M81.0 AGE-RELATED OSTEOPOROSIS WITHOUT CURRENT PATHOLOGICAL FRACTURE: ICD-10-CM

## 2024-11-04 DIAGNOSIS — R97.20 RISING PSA LEVEL: ICD-10-CM

## 2024-11-04 DIAGNOSIS — E83.52 HYPERCALCEMIA: ICD-10-CM

## 2024-11-04 DIAGNOSIS — Z79.899 LONG-TERM USE OF HIGH-RISK MEDICATION: ICD-10-CM

## 2024-11-04 LAB
ALBUMIN SERPL-MCNC: 3.3 G/DL (ref 3.4–4.8)
ALBUMIN/GLOB SERPL: 0.9 RATIO (ref 1.1–2)
ALP SERPL-CCNC: 54 UNIT/L (ref 40–150)
ALT SERPL-CCNC: 12 UNIT/L (ref 0–55)
ANION GAP SERPL CALC-SCNC: 6 MEQ/L
AST SERPL-CCNC: 13 UNIT/L (ref 5–34)
BASOPHILS # BLD AUTO: 0.1 X10(3)/MCL
BASOPHILS NFR BLD AUTO: 1.7 %
BILIRUB SERPL-MCNC: 0.3 MG/DL
BUN SERPL-MCNC: 17 MG/DL (ref 8.4–25.7)
CALCIUM SERPL-MCNC: 9.3 MG/DL (ref 8.8–10)
CHLORIDE SERPL-SCNC: 110 MMOL/L (ref 98–107)
CO2 SERPL-SCNC: 21 MMOL/L (ref 23–31)
CREAT SERPL-MCNC: 1.09 MG/DL (ref 0.72–1.25)
CREAT/UREA NIT SERPL: 16
EOSINOPHIL # BLD AUTO: 0.21 X10(3)/MCL (ref 0–0.9)
EOSINOPHIL NFR BLD AUTO: 3.5 %
ERYTHROCYTE [DISTWIDTH] IN BLOOD BY AUTOMATED COUNT: 17 % (ref 11.5–17)
FERRITIN SERPL-MCNC: 254.8 NG/ML (ref 21.81–274.66)
GFR SERPLBLD CREATININE-BSD FMLA CKD-EPI: >60 ML/MIN/1.73/M2
GLOBULIN SER-MCNC: 3.7 GM/DL (ref 2.4–3.5)
GLUCOSE SERPL-MCNC: 108 MG/DL (ref 82–115)
HCT VFR BLD AUTO: 30.4 % (ref 42–52)
HGB BLD-MCNC: 10.1 G/DL (ref 14–18)
IMM GRANULOCYTES # BLD AUTO: 0.02 X10(3)/MCL (ref 0–0.04)
IMM GRANULOCYTES NFR BLD AUTO: 0.3 %
IRON SATN MFR SERPL: 26 % (ref 20–50)
IRON SERPL-MCNC: 72 UG/DL (ref 65–175)
LDH SERPL-CCNC: 114 U/L (ref 125–220)
LYMPHOCYTES # BLD AUTO: 2.81 X10(3)/MCL (ref 0.6–4.6)
LYMPHOCYTES NFR BLD AUTO: 47.2 %
MCH RBC QN AUTO: 33.1 PG (ref 27–31)
MCHC RBC AUTO-ENTMCNC: 33.2 G/DL (ref 33–36)
MCV RBC AUTO: 99.7 FL (ref 80–94)
MONOCYTES # BLD AUTO: 0.94 X10(3)/MCL (ref 0.1–1.3)
MONOCYTES NFR BLD AUTO: 15.8 %
NEUTROPHILS # BLD AUTO: 1.87 X10(3)/MCL (ref 2.1–9.2)
NEUTROPHILS NFR BLD AUTO: 31.5 %
NRBC BLD AUTO-RTO: 0 %
PLATELET # BLD AUTO: 208 X10(3)/MCL (ref 130–400)
PMV BLD AUTO: 9.1 FL (ref 7.4–10.4)
POTASSIUM SERPL-SCNC: 4 MMOL/L (ref 3.5–5.1)
PROT SERPL-MCNC: 7 GM/DL (ref 5.8–7.6)
RBC # BLD AUTO: 3.05 X10(6)/MCL (ref 4.7–6.1)
SODIUM SERPL-SCNC: 137 MMOL/L (ref 136–145)
TIBC SERPL-MCNC: 210 UG/DL (ref 69–240)
TIBC SERPL-MCNC: 282 UG/DL (ref 250–450)
TRANSFERRIN SERPL-MCNC: 250 MG/DL (ref 163–344)
VIT B12 SERPL-MCNC: 316 PG/ML (ref 213–816)
WBC # BLD AUTO: 5.95 X10(3)/MCL (ref 4.5–11.5)

## 2024-11-04 PROCEDURE — 25000003 PHARM REV CODE 250: Performed by: INTERNAL MEDICINE

## 2024-11-04 PROCEDURE — 82728 ASSAY OF FERRITIN: CPT

## 2024-11-04 PROCEDURE — 36415 COLL VENOUS BLD VENIPUNCTURE: CPT

## 2024-11-04 PROCEDURE — 99215 OFFICE O/P EST HI 40 MIN: CPT | Mod: S$PBB,,, | Performed by: NURSE PRACTITIONER

## 2024-11-04 PROCEDURE — 99215 OFFICE O/P EST HI 40 MIN: CPT | Mod: PBBFAC,25 | Performed by: NURSE PRACTITIONER

## 2024-11-04 PROCEDURE — A4216 STERILE WATER/SALINE, 10 ML: HCPCS | Performed by: INTERNAL MEDICINE

## 2024-11-04 PROCEDURE — 1125F AMNT PAIN NOTED PAIN PRSNT: CPT | Mod: CPTII,,, | Performed by: NURSE PRACTITIONER

## 2024-11-04 PROCEDURE — 3008F BODY MASS INDEX DOCD: CPT | Mod: CPTII,,, | Performed by: NURSE PRACTITIONER

## 2024-11-04 PROCEDURE — 1160F RVW MEDS BY RX/DR IN RCRD: CPT | Mod: CPTII,,, | Performed by: NURSE PRACTITIONER

## 2024-11-04 PROCEDURE — 82607 VITAMIN B-12: CPT

## 2024-11-04 PROCEDURE — 3078F DIAST BP <80 MM HG: CPT | Mod: CPTII,,, | Performed by: NURSE PRACTITIONER

## 2024-11-04 PROCEDURE — 85025 COMPLETE CBC W/AUTO DIFF WBC: CPT

## 2024-11-04 PROCEDURE — 83540 ASSAY OF IRON: CPT

## 2024-11-04 PROCEDURE — 1101F PT FALLS ASSESS-DOCD LE1/YR: CPT | Mod: CPTII,,, | Performed by: NURSE PRACTITIONER

## 2024-11-04 PROCEDURE — 3288F FALL RISK ASSESSMENT DOCD: CPT | Mod: CPTII,,, | Performed by: NURSE PRACTITIONER

## 2024-11-04 PROCEDURE — 3060F POS MICROALBUMINURIA REV: CPT | Mod: CPTII,,, | Performed by: NURSE PRACTITIONER

## 2024-11-04 PROCEDURE — 63600175 PHARM REV CODE 636 W HCPCS: Performed by: INTERNAL MEDICINE

## 2024-11-04 PROCEDURE — 80053 COMPREHEN METABOLIC PANEL: CPT

## 2024-11-04 PROCEDURE — 1159F MED LIST DOCD IN RCRD: CPT | Mod: CPTII,,, | Performed by: NURSE PRACTITIONER

## 2024-11-04 PROCEDURE — 3074F SYST BP LT 130 MM HG: CPT | Mod: CPTII,,, | Performed by: NURSE PRACTITIONER

## 2024-11-04 PROCEDURE — 3066F NEPHROPATHY DOC TX: CPT | Mod: CPTII,,, | Performed by: NURSE PRACTITIONER

## 2024-11-04 PROCEDURE — 3044F HG A1C LEVEL LT 7.0%: CPT | Mod: CPTII,,, | Performed by: NURSE PRACTITIONER

## 2024-11-04 PROCEDURE — 96374 THER/PROPH/DIAG INJ IV PUSH: CPT

## 2024-11-04 PROCEDURE — 83615 LACTATE (LD) (LDH) ENZYME: CPT

## 2024-11-04 RX ORDER — ZOLEDRONIC ACID 0.04 MG/ML
4 INJECTION, SOLUTION INTRAVENOUS
Status: COMPLETED | OUTPATIENT
Start: 2024-11-04 | End: 2024-11-04

## 2024-11-04 RX ORDER — SODIUM CHLORIDE 0.9 % (FLUSH) 0.9 %
10 SYRINGE (ML) INJECTION
Status: DISCONTINUED | OUTPATIENT
Start: 2024-11-04 | End: 2024-11-04 | Stop reason: HOSPADM

## 2024-11-04 RX ORDER — HEPARIN 100 UNIT/ML
500 SYRINGE INTRAVENOUS
Status: DISCONTINUED | OUTPATIENT
Start: 2024-11-04 | End: 2024-11-04 | Stop reason: HOSPADM

## 2024-11-04 RX ADMIN — Medication 10 ML: at 12:11

## 2024-11-04 RX ADMIN — ZOLEDRONIC ACID 4 MG: 0.04 INJECTION, SOLUTION INTRAVENOUS at 11:11

## 2024-11-04 NOTE — Clinical Note
infusion today Zometa infusion infusion with lab work on 12/2/24, 12/30/24, 1/27/25  prior to zometa infusion Myeloma lab work to be collected at visit on 1/27/25 fu w/ in 3 months on 2/24/25 Zometa infusion and to review myeloma lab work results

## 2024-11-04 NOTE — PROGRESS NOTES
Reason for Follow-up:  -multiple myeloma, IgG kappa; S/P ASCT  -worsening low back pain  -hypercalcemia  -osteoporosis hips B/L    Current Treatment:  Revlimid 15 mg po daily   started 8/20/24    Zometa 4mg IV every 28 days  Start 7/13/2023    Treatment History:  VRD (7/13/2023-11/21/23)  ASCT 11/30/2023  Revlimid 10mg po daily (4/9/24-8/20/24)    Past medical history:  NIDDM. Dyslipidemia.  GERD.  Lumbar spinal stenosis.  Vitamin-D deficiency.  Allergic rhinitis.  HTN.  Bell's palsy.  Celiac disease.  Hemorrhoids.  Mitral regurgitation 01/05/2018.  Rheumatic valve narrowing and leaking mitral valve.  -02/02/2018 ([facility]  АНДРЕЙ, right minimally invasive anterior thoracotomy, right femoral artery and vein exploration, extensive right pleural adhesiolysis/pneumolysis, bilateral Maze/ablation (extensive), left atrial appendage clip placement for left atrial appendage exclusion, mitral valve repair (complex repair with 26 mm annuloplasty ring)  Social history:  .  Lives in [place], [place].  Five children.  Owns a [employer] business.  No history of tobacco, alcohol, or illicit drug abuse.    Family history:  Negative for cancers or blood dyscrasias.    Health maintenance:  PCP in family medicine clinic, [facility].  Had EGD and colonoscopy done 1 year ago by Dr. Zoltan Kapoor, [facility], apparently unremarkable (he gets the endoscopies done periodically because of history of celiac disease).    History of Present Illness:   Oncologic/Hematologic History:  Oncology History   IgG multiple myeloma   6/23/2023 Initial Diagnosis    IgG multiple myeloma     7/13/2023 - 11/21/2023 Chemotherapy    Treatment Summary   Plan Name: OP VRD - WEEKLY BORTEZOMIB LENALIDOMIDE DEXAMETHASONE Q3W  Treatment Goal: Curative  Status: Inactive  Start Date: 7/13/2023  End Date: 11/21/2023  Provider: Narciso Lundy MD  Chemotherapy: bortezomib (VELCADE) injection 2.25 mg, 2.25 mg, Subcutaneous,  Clinic/HOD 1 time, 6 of 9 cycles  Administration: 2.25 mg (7/13/2023), 2.25 mg (7/20/2023), 2.25 mg (7/27/2023), 2.25 mg (8/10/2023), 2.25 mg (8/17/2023), 2.25 mg (8/24/2023), 2.25 mg (8/31/2023), 2.25 mg (9/7/2023), 2.25 mg (9/21/2023), 2.25 mg (9/14/2023), 2.25 mg (9/28/2023), 2.25 mg (10/12/2023), 2.25 mg (10/5/2023), 2.25 mg (10/19/2023), 2.25 mg (10/26/2023), 2.25 mg (11/21/2023)  lenalidomide 25 mg Cap, 25 mg, , , 1 of 1 cycle, Start date: 11/3/2023, End date: 2/15/2024     67-year-old gentleman, referred from [facility], with newly diagnosed multiple myeloma.    Patient is being followed for IgG kappa multiple myeloma.        Interval History 11/4/24: Patient presents alone for scheduled follow up and treatment. He is due to receive Zometa today. Patient report doing well with compliance of Revlimid daily. Patient says he is currently taking 10mg however was told by  that he was to increase Revlimid to 15mg po daily. He denies any new or worsening reportable symptoms. He does admit to chronic lower back pain. Patient also reports indiegestion despite the use of Pepcid and Protonix use daily. He denies any N/V/D/C, unuusal headache, dyspnea, cough, skin changes, fever or signs of infection. Patient reports appetite improved with the use of Megace as ordered at last visit. He recently completed DEXA scan which showed osteoporosis. Results reviewed with patient. Patient to follow up with  on 11/13/2024. Lab work reviewed with patient, stable. We discussed plan of care and follow up.    Review of Systems   Gastrointestinal:  Positive for heartburn.   Musculoskeletal:  Positive for back pain, joint pain and myalgias.   Neurological:  Positive for tingling and weakness.   All other systems reviewed and are negative.    Physical Exam  Constitutional:       Appearance: Normal appearance.   HENT:      Head: Normocephalic.      Mouth/Throat:      Mouth: Mucous membranes are moist.   Eyes:       Pupils: Pupils are equal, round, and reactive to light.   Cardiovascular:      Rate and Rhythm: Normal rate and regular rhythm.      Pulses: Normal pulses.      Heart sounds: Normal heart sounds.   Pulmonary:      Effort: Pulmonary effort is normal.      Breath sounds: Normal breath sounds.   Abdominal:      General: Bowel sounds are normal.      Palpations: Abdomen is soft.   Musculoskeletal:         General: Normal range of motion.      Cervical back: Normal range of motion.   Skin:     General: Skin is warm and dry.      Capillary Refill: Capillary refill takes less than 2 seconds.   Neurological:      General: No focal deficit present.      Mental Status: He is alert and oriented to person, place, and time.   Psychiatric:         Attention and Perception: Attention normal.         Mood and Affect: Affect normal.         Speech: Speech normal.         Behavior: Behavior normal.         Thought Content: Thought content normal.       Vitals:    11/04/24 1101   BP: 114/67   Pulse: (!) 57   Resp: 19   Temp: 98.1 °F (36.7 °C)     Lab Results   Component Value Date    WBC 5.95 11/04/2024    RBC 3.05 (L) 11/04/2024    HGB 10.1 (L) 11/04/2024    HCT 30.4 (L) 11/04/2024    MCV 99.7 (H) 11/04/2024    MCH 33.1 (H) 11/04/2024    MCHC 33.2 11/04/2024    RDW 17.0 11/04/2024     11/04/2024    MPV 9.1 11/04/2024     CMP  Sodium   Date Value Ref Range Status   11/04/2024 137 136 - 145 mmol/L Final   07/01/2020 138 136 - 145 mmol/L Final     Potassium   Date Value Ref Range Status   11/04/2024 4.0 3.5 - 5.1 mmol/L Final   07/01/2020 4.5 3.5 - 5.1 mmol/L Final     Chloride   Date Value Ref Range Status   11/04/2024 110 (H) 98 - 107 mmol/L Final   07/01/2020 108 100 - 109 mmol/L Final     CO2   Date Value Ref Range Status   11/04/2024 21 (L) 23 - 31 mmol/L Final     Carbon Dioxide   Date Value Ref Range Status   07/01/2020 26 22 - 33 mmol/L Final     Blood Urea Nitrogen   Date Value Ref Range Status   11/04/2024 17.0 8.4  - 25.7 mg/dL Final   07/01/2020 10 5 - 25 mg/dL Final     Creatinine   Date Value Ref Range Status   11/04/2024 1.09 0.72 - 1.25 mg/dL Final   07/01/2020 0.81 0.57 - 1.25 mg/dL Final     Calcium   Date Value Ref Range Status   11/04/2024 9.3 8.8 - 10.0 mg/dL Final   07/01/2020 8.6 (L) 8.8 - 10.6 mg/dL Final     Albumin   Date Value Ref Range Status   11/04/2024 3.3 (L) 3.4 - 4.8 g/dL Final     Bilirubin Total   Date Value Ref Range Status   11/04/2024 0.3 <=1.5 mg/dL Final     ALP   Date Value Ref Range Status   11/04/2024 54 40 - 150 unit/L Final     AST   Date Value Ref Range Status   11/04/2024 13 5 - 34 unit/L Final     ALT   Date Value Ref Range Status   11/04/2024 12 0 - 55 unit/L Final     Anion Gap   Date Value Ref Range Status   07/01/2020 4 (L) 8 - 16 mmol/L Final     eGFR   Date Value Ref Range Status   11/04/2024 >60 mL/min/1.73/m2 Final       Assessment:  Multiple myeloma, IgG kappa:  ISS stage:  Stage I  R-ISS stage:  Stage I  Multiple myeloma, IgG kappa:   ISS stage:  Stage I  R-ISS stage:  Stage I  -presentation:  03/2023: Worsening back pain, 12-14 lb weight loss over 6 weeks  -skeletal survey 06/14/2023: Negative for lytic or sclerotic lesions  -06/14/2023:  Hemoglobin 11.0, IgG kappa M protein 2.28 gm/dL, kappa elevated, lambda suppressed, kappa/lambda ratio 292  -renal function normal, no hypercalcemia  -24 hour urine: M spike 183 mg; monoclonal kappa light chains in urine; urine kappa elevated, urine lambda suppressed, urine kappa/lambda ratio> 36.4  -LDH normal 06/16/2023   -Beta-2 microglobulin 3.18 on 06/26/2023  -bone marrow biopsy 06/27/2023:  50% plasmacytosis with kappa light chain restriction; no high-risk cytogenetics  -FDG PET-CT 06/29/2023:  Questionable findings  ======================================  Staging of myeloma:  -serum beta 2 microglobulin elevated (3.18)  -serum albumin and LDH normal   -myeloma FISH panel on bone marrow:  Negative for high-risk cytogenetic  abnormalities  >>>  ISS stage:  Stage I  R-ISS stage:  Stage I  ======================================  -Velcade started 07/13/2023   -Zometa 4 mg IV every 4 weeks (x2 years), started 07/13/2023  -VRD:  Cycle 1 started 07/13/2023; cycle 2 started 08/10/2023; cycle 3 started 08/31/2023; cycle 4 started 09/21/2023  -Radiation oncology consult:  Radiotherapy to spine not indicated/will not be helpful; questionable findings on FDG PET-CT  -IR at [facility] will not perform kyphoplasty  -significant response to chemotherapy x2 cycles (on 08/24/2023, kappa/lambda ratio 5.72, much improved; IgG kappa monoclonal protein down to 0.39 gm/dL, significantly decreased)  -VRD cycle 5:  10/12/2023-10/26/2023  -restaging bone marrow biopsy 10/24/2023, post VRD X 4-1/2 cycles:  Normocellular; < 1% plasma cells; no clonal or abnormal plasma cell population on flow cytometry; no high-risk FISH findings  -11/08/2023: No monoclonal protein in urine  -11/09/2023:  No monoclonal protein on SPEP and BUBBA; kappa/lambda ratio 1.36, normalized; kappa free light chains 2.29 mg/dL (to recall, 99.4 on 06/14/2023 before starting chemotherapy)  -cycle 6 day 1 of RVD 11/21/2023; subsequently, on hold in anticipation of stem cell transplant  >>>  ASCT at [facility]:  -melphalan 360 mg IV administered day -2 (11/28/2023) (200 mg per m2 per day)  -ASCT infusion day 0 (11/30/2023)  -filgrastim 5 microgram/kg subQ every 24 hours, started day +5 (12/05/2023)  -infection prophylaxis  -pentamidine 300 mg inhaled once on day -2 (11/28/2023)  -acyclovir 800 mg p.o. q.12 hours starting day -2 (11/28/2023), to continue for 1 year  -fluconazole 400 mg p.o. Q 24 hours starting day -2 (11/28/2023), continue until engraftment and/or mucositis resolved  -cefepime started 12/08/2023, vancomycin added  -IVIG on 12/10/2023  -12/12/2023:  HCT day +12; doing well on antibiotics; counts recovered  -12/12/2023: Transfusion independent and ANC has recovered  -12/13/2023: Patient  discharged  -venous Doppler both lower extremities 02/29/2024: Pedal edema: No DVT   -03/11/2024:  Hemoglobin 11.2; CMP unremarkable   -per [facility], multiple myeloma studies and bone marrow biopsy on day 100 post ASCT (anytime after 03/09/2024)  -03/11/2024 (day + 102):  No monoclonal protein in blood; FLC assay normal   -restaging bone marrow biopsy 03/12/2024 (day +103):  No increase in plasma cells  -03/11/2024:  No monoclonal band; kappa/lambda ratio normal  -03/18/2024:  Urine negative for monoclonal protein  -thus, CR, post ASCT  -04/09/2024: Dr. Brandy Mahajan:  Maintenance Revlimid 10 mg p.o. q.day, started, with aspirin  -06/10/2024: No monoclonal bands per SPEP and BUBBA; kappa/lambda ratio normal  -08/20/2024: Dr. Lynch; maintenance Revlimid dose increased to 15 mg daily  -continue acyclovir and Bactrim      #Non myeloma findings (DJD of spine, spinal stenosis, compression fractures):  -mild L4-L5 spinal canal stenosis on MRI 05/04/2023   -lumbar DJD and facet arthrosis on MRI lumbar spine 05/04/2023  -MRI T-spine 08/08/2023:  No myeloma lesions   -MRI lumbar spine 09/14/2023:  Progression of skeletal demineralization; multilevel degenerative disc disease; severe spinal stenosis L3-L5; new/progressive loss of vertebral body height throughout the spine since 05/2023, etc.  -IR at [facility] will not perform kyphoplasty  -MRI lumbar spine 08/02/2024:  1.2 cm exophytic lesion indeterminate from anterior upper pole of right kidney; multilevel degenerative disc disease, etc.  -follows up with Xochitl Grubbs MD, neurosurgery, for lumbar stenosis with neurogenic claudication; chronic bilateral low back pain with bilateral sciatica; compression fracture of lumbar vertebrae; planning L2-3, L3-4, L4-5 laminectomy    #Exophytic lesion upper pole of right kidney:  10 mm exophytic hyperdense focus upper pole of right kidney, compatible with a hyperdense cyst, on CT abdomen pelvis with contrast 06/27/2023  -07/12/2023:  MRI  abdomen with and without contrast (right renal cyst): Small exophytic right renal lesion most likely a proteinaceous or hemorrhagic cyst (10 mm, upper pole of right kidney)  -MRI lumbar spine 08/02/2024:  1.2 cm exophytic lesion indeterminate from anterior upper pole of right kidney; multilevel degenerative disc disease, etc.    Co-morbidities:  NIDDM.  Dyslipidemia.  Hypertension.  Celiac disease.    History of rheumatic mitral valve disease, S/P Maze/ablation procedure 02/02/2018      Vaccination history:   COVID-19, MRNA, LN-S, PF (MODERNA FULL 0.5 ML DOSE) 8/18/2022 , 10/23/2021 , 3/17/2021 , 2/17/2021   Hepatitis A / Hepatitis B 8/18/2022 , 10/5/2021 , 7/29/2020   Influenza - Quadrivalent - MDCK - PF 10/5/2021   Influenza - Trivalent - MDCK - PF 9/10/2015   Meningococcal Conjugate (MCV4O) 8/18/2022 , 4/17/2012   Meningococcal Conjugate (MCV4P) 4/17/2012   Pneumococcal Conjugate - 20 Valent 1/12/2023   Tdap 7/29/2020   Zoster 11/18/2020 , 7/29/2020   Zoster Recombinant 11/18/2020 , 7/29/2020       Plan:   Multiple myeloma, IgG kappa:    -a minimum of 2 years of lenalidomide maintenance therapy is recommended    -per Radiation Oncology, given questionable radiologic findings per FDG PET-CT, palliative radiotherapy to spine was not thought to be useful    -Continue Revlimid 15 mg daily (has not received Revlimid 15 mg tablets as yet; we will check on that)  -Continue baby aspirin 81 mg daily to prevent thromboembolism  -Check CBC and CMP monthly   -TSH and free T4 every 2-3 months   -Continue Zometa every 4 weeks x2 years until 07/2025 due today then (12/2/24, 12/30/24, 1/27/25) BMP prior to each infusion  -Continue calcium and vitamin-D supplements  -Continue acyclovir and Bactrim until discontinued by Dr. Brandy Mahajan, [facility], [place]  -SPEP, BUBBA, FLC check every 3 months; next, January 2025 (Collect on 1/27/25)  -franklin w/ in 3 months on 2/24/25 with lab work (cbc,cmp, tsh, free t4) prior to Zometa  infusion and to review myeloma lab work results   -monitor renal function while on Zometa    Lenalidomide Recommended Dosage Modifications for Hematologic Adverse Reactions in Multiple Myeloma (Maintenance Therapy Following Autologous Transplant):  #Neutropenia:  -ANC <500/mm3: Withhold lenalidomide and check CBC weekly; resume lenalidomide at the next lower dose with continuous dosing on days 1 to 28 of a 28-day cycle when ANC >=500/mm3.  -ANC <500/mm3 (subsequent occurrence at 5 mg daily [continuous] dose): Withhold lenalidomide; resume lenalidomide at 5 mg daily on days 1 to 21 of a 28-day cycle when ANC >=500/mm3. Do not dose below 5 mg daily on days 1 to 21 of a 28-day cycle.  #Thrombocytopenia:  -Platelets <30,000/mm3 (first occurrence): Withhold lenalidomide and check CBC weekly; resume lenalidomide at the next lower dose with continuous dosing on days 1 to 28 of a 28-day cycle when platelets >=30,000/mm3.  -Platelets <30,000/mm3 (subsequent occurrence at 5 mg daily [continuous] dose): Withhold lenalidomide; resume lenalidomide at 5 mg daily on days 1 to 21 of a 28-day cycle when platelets >=30,000/mm3. Do not dose below 5 mg daily on days 1 to 21 of a 28-day cycle.    Monitoring with lenalidomide:  -CBC with differential: weekly for the first 2 cycles, every 2 weeks during the third cycle and monthly thereafter;  -serum creatinine, LFTs (periodically).  -Thyroid function tests (TSH at baseline then every 2 to 3 months during lenalidomide treatment  -ECG when clinically indicated. Monitor for signs and symptoms of infection (if neutropenic), bleeding or bruising, hepatotoxicity, secondary malignancies, thromboembolism (eg, shortness of breath, chest pain, arm or leg swelling), dermatologic toxicity, tumor lysis syndrome, or hypersensitivity.    Lenalidomide:  Embryo fetal toxicity  Hematologic toxicity.  Neutropenia.  Thrombocytopenia.  Venous and arterial thromboembolism.  DVT.  PE.  MI.  Stroke.  Dizziness,  fatigue.  Tumor lysis syndrome.  Heart failure.    Used with caution in patients with renal impairment.  Lenalidomide =/> 4 cycles may decrease the # of CD34 pos cells collected for autologous stem cell transplant.  DVT, PE, atrial fibrillation, renal failure are more likely in patients> 65 years  Hepatotoxicity  Hypersensitivity reactions.  Anaphylaxis.  Angioedema.  Skin rash.  Eosinophilia.  Immediate hypersensitivity reactions.  Second primary malignancy.  AML.  MDS.  Solid tumor malignancies.  Nonmelanoma skin cancers.    Anorexia  -10/04/2024:start Megace 400 mg p.o. q.day    Osteoporosis on hips  -osteoporosis of hips noted on DEXA scan 10/14/2024  -patient already receiving monthly Zometa for underlying multiple myeloma; this will help with osteoporosis as well  -repeat DEXA scan in 1 year (10/2025)  Follow up with PCP to follow    Rising PSA level:  -PSA: 2.78 on 05/27/2024; 1.7 on 05/01/2024; 1.96 on 09/17/2021  -MRI prostate 09/23/2024:  PI-RADS 2: Low (rising PSA level)    ===========================================    Discussion:  Supportive care for myeloma:  -bone targeted treatment: Bisphosphonate, category 1; or denosumab to reduce risk of skeletal related events; denosumab is preferred in patients with renal insufficiency; assess vitamin-D status; baseline dental exam; monitor for osteonecrosis of the jaw; monitor renal dysfunction with bisphosphonate therapy  -continue bone targeted treatment (bisphosphonate or denosumab) for 2 years; continue beyond 2 years should be based on clinical judgment  -patients receiving denosumab for bone disease who subsequently discontinued therapy should be given maintenance denosumab every 6 months or a single dose of bisphosphonate to mitigate risk of rebound osteoporosis  -for hypercalcemia, zoledronic acid, denosumab, steroids, and/or calcitonin  -for hyperviscosity, plasmapheresis should be used as adjuvant therapy for symptomatic hyperviscosity  -intravenous  immunoglobulin therapy should be considered in the setting of recurrent serious infection and/or hypogammaglobulinemia (IgG </= 400 mg/dL)  -pneumococcal conjugate vaccine, followed by pneumococcal polysaccharide vaccine 1 year later  -Influenza vaccination is recommended; 2 doses of high-dose inactivated quandaivalent influenza vaccine should be considered  -consider 12 weeks of levofloxacin 500 mg p.o. daily at the time of initial diagnosis of multiple myeloma  -COVID-19 vaccination  -highest risk for VTE is in the 1st 6 months following new diagnosis of multiple myeloma  -VTE prophylaxis if no contraindications to anticoagulation agents or antiplatelets  Recommendations for VTE prophylaxis:  Aspirin  mg daily; low molecular weight heparin equivalent to enoxaparin 40 mg daily; Xarelto 10 mg daily; Eliquis 2.5 mg b.i.d.; Arixtra 2.5 mg daily; Coumadin with target INR 2.0-3.0    Above discussed with the patient.  All questions answered.  Discussed labs and DEXA scans   Plan of management discussed.    He understands and agrees with this plan.

## 2024-11-04 NOTE — NURSING
1130  Pt did labs, saw A Saurabh NP, and is here for zometa q 4 weeks.  Pt rates LOP to lower back 5/10.  Pt reports he is taking a vit D/Ca supplement.  Denies any issues to his teeth or jaw.  No other complaint verbalized.

## 2024-11-22 NOTE — NURSING
Received phone message from patient's daughter regarding patient asking if he needed to take any other antibiotics for his teeth cleaning. Advised that patient is to continue on the Levaquin. Also discussed with daughter of patient's report that Norco 5mg is not effective. Informed that Dr. Lundy offered for patient to try Norco 10 to see if this will provide some pain relief. Patient's daughter states that she will inform patient and agreed to contact BOBBY Hung if medication continues to be ineffective.   
n/a

## 2024-11-26 ENCOUNTER — DOCUMENTATION ONLY (OUTPATIENT)
Dept: HEMATOLOGY/ONCOLOGY | Facility: CLINIC | Age: 69
End: 2024-11-26
Payer: MEDICARE

## 2024-11-29 RX ORDER — SODIUM CHLORIDE 0.9 % (FLUSH) 0.9 %
10 SYRINGE (ML) INJECTION
OUTPATIENT
Start: 2024-12-27

## 2024-11-29 RX ORDER — HEPARIN 100 UNIT/ML
500 SYRINGE INTRAVENOUS
OUTPATIENT
Start: 2024-11-29

## 2024-11-29 RX ORDER — ZOLEDRONIC ACID 0.04 MG/ML
4 INJECTION, SOLUTION INTRAVENOUS
OUTPATIENT
Start: 2024-11-29

## 2024-11-29 RX ORDER — SODIUM CHLORIDE 0.9 % (FLUSH) 0.9 %
10 SYRINGE (ML) INJECTION
OUTPATIENT
Start: 2024-11-29

## 2024-11-29 RX ORDER — ZOLEDRONIC ACID 0.04 MG/ML
4 INJECTION, SOLUTION INTRAVENOUS
OUTPATIENT
Start: 2024-12-27

## 2024-11-29 RX ORDER — HEPARIN 100 UNIT/ML
500 SYRINGE INTRAVENOUS
OUTPATIENT
Start: 2024-12-27

## 2024-11-30 NOTE — PROGRESS NOTES
Family Medicine Clinic Note     Subjective     Patient ID: Yuriy Díaz is a 69 y.o. male.    Chief Complaint: Type 2 diabetes mellitus    Type 2 diabetes  - On metformin 500mg. Offered trial to stop medication last visit but wished to continue. Now currently taking 2-3 times per week  - Denies symptoms of hypoglycemia  - Last A1c 5.2 9/3/2024  - foot exam 5/2024, eye exam 10/2023, urine screen 1/2023  - On Lipitor 20 mg daily    Lumbar spinal stenosis  - Requesting refill of gabapentin 600 mg which significantly alleviates lumbar radiculopathy pain  - Denies drowsiness when taking medication in the past  - Currently sees Dr. Grubbs who recommended surgery but he is not interested. Has an appointment with neurosurgeon in [place] to discuss other options    Problem List:  Multiple Myeloma (dx 2023)- in remission, follows [facility] Heme/Onc  Osteoporosis: recent DEXA 10/14/24, on Zometa [id number] with heme/onc, on calcium and vitamin D supplementation, repeat DEXA 10/2025. Started on Evenity in October by Dr. Puente but stopped  DM II/impaired glucose tolerance- A1c 5.2 9/2024, foot exam 5/2024, eye exam 10/2023, urine screen 1/2023. Offered to stop metformin, wishes to continue for now  non obstructive CAD, HTN - Follows with cardiologist Dr. Albert. Repeat TTE Sept '24  Sleep apnea - compliant on CPAP at night  Celiac disease - follows Dr. Kapoor, last colonoscopy 8/2024  Mitral valve regurgitation- s/p MVR  GERD   Constipation - improved with Miralax  Inguinal hernia  Lumbar spinal stenosis  Fever of unknown origin - resolved, CXR 9/30/24 no abnormalities  Right renal mass noted on MRI: US performed 9/24/24 showing no cysts/masses  Elevated PSA: following with Dr. Alexander Robles, MRI prostate 9/23/24 PI-RADS 2    Care Team  Urology - Dr. Robles  [facility] Heme/onc  GI - Dr. Antwon Puente MD  Neurosurgery - Dr. Grubbs  [facility]  Sleep Medicine  - Dr. Chaudhary  [facility] general surgery  Cards - Dr. Albert      Family History    Problem Relation Name Age of Onset    Hypertension Mother        Social History     Tobacco Use    Smoking status: Never    Smokeless tobacco: Never   Substance Use Topics    Alcohol use: Never       Past Surgical History:   Procedure Laterality Date    BONE MARROW ASPIRATION N/A 06/27/2023    Procedure: ASPIRATION, BONE MARROW;  Surgeon: Namita Daniels MD;  Location: [facility] ENDOSCOPY;  Service: General;  Laterality: N/A;    BONE MARROW ASPIRATION N/A 10/24/2023    Procedure: ASPIRATION, BONE MARROW;  Surgeon: Namita Daniels MD;  Location: [facility] ENDOSCOPY;  Service: General;  Laterality: N/A;    BONE MARROW ASPIRATION N/A 3/12/2024    Procedure: ASPIRATION, BONE MARROW;  Surgeon: Haylie Liu MD;  Location: [facility] ENDOSCOPY;  Service: General;  Laterality: N/A;    BONE MARROW BIOPSY N/A 06/27/2023    Procedure: Biopsy-bone marrow;  Surgeon: Haylie Liu MD;  Location: [facility] ENDOSCOPY;  Service: General;  Laterality: N/A;    BONE MARROW BIOPSY N/A 10/24/2023    Procedure: Biopsy-bone marrow;  Surgeon: Namita Daniels MD;  Location: [facility] ENDOSCOPY;  Service: General;  Laterality: N/A;    BONE MARROW BIOPSY N/A 3/12/2024    Procedure: Biopsy-bone marrow;  Surgeon: Haylie Liu MD;  Location: [facility] ENDOSCOPY;  Service: General;  Laterality: N/A;    CARDIAC SURGERY  2018    COLONOSCOPY  06/29/2022    EYE SURGERY          Review of Systems   Constitutional:  Negative for chills and fever.   Respiratory:  Negative for shortness of breath.    Cardiovascular:  Negative for chest pain.   Gastrointestinal:  Negative for abdominal pain, blood in stool, constipation, diarrhea, nausea and vomiting.   Genitourinary:  Negative for hematuria.   Musculoskeletal:  Positive for back pain.   Neurological:  Negative for dizziness, weakness and headaches.        Objective     Vitals:    12/06/24 1544   BP: 120/80   Pulse: (!) 59   Temp: 98.1 °F (36.7 °C)        Current Outpatient Medications   Medication Instructions     acyclovir (ZOVIRAX) 400 mg, Oral, 2 times daily    ascorbic acid (vitamin C) (VITAMIN C) 1,000 mg, Daily    aspirin (ECOTRIN) 81 mg, Daily    atorvastatin (LIPITOR) 20 mg, Daily    calcium carbonate 195 mg calcium (500 mg) Chew 1 tablet, Daily    cetirizine (ZYRTEC) 10 mg Cap 1 capsule, Oral, Daily PRN    coenzyme Q10 10 mg capsule 1 capsule, Daily    famotidine (PEPCID) 40 mg, Nightly    ferrous sulfate (FEOSOL) 650 mg    gabapentin (NEURONTIN) 600 mg, Oral, 2 times daily PRN    hydrocortisone (ANUSOL-HC) 2.5 % rectal cream 1 applicator, Rectal, 2 times daily    lenalidomide 10 mg Cap 1 capsule    megestroL (MEGACE) 400 mg, Oral, Daily    metFORMIN (GLUCOPHAGE) 500 mg, Oral, 2 times daily with meals    metoprolol succinate (TOPROL-XL) 50 mg, Daily    MIRALAX 17 g    mirtazapine (REMERON) 7.5 MG Tab 1 tablet, Nightly    multivit with min-folic acid 0.4 mg Tab 1 tablet, Daily    multivitamin with minerals tablet 1 tablet, Oral, Every morning    omega 3-dha-epa-fish oil 300 mg (120 mg- 180mg)-1,000 mg Cap 500 mg    omega-3 fatty acids/fish oil (FISH OIL-OMEGA-3 FATTY ACIDS) 300-1,000 mg capsule 2 g, Oral    pantoprazole (PROTONIX) 20 mg, Oral, Daily    predniSONE (DELTASONE) 20 mg, Oral, Daily    sulfamethoxazole-trimethoprim 800-160mg (BACTRIM DS) 800-160 mg Tab Take 1 tablet by mouth on MWF of each week    traMADoL 100 mg, Oral, 2 times daily PRN        Physical Exam  Gen: alert, oriented, in no acute distress  CV: regular rate and rhythm  Resp: non-labored, clear to auscultation bilaterally  Abd: soft,non-tender, non-distended, +BS    Assessment and Plan      1. Type 2 diabetes mellitus without complication, without long-term current use of insulin    2. Spinal stenosis of lumbar region, unspecified whether neurogenic claudication present    3. Flu vaccine need      - Had NGA on labs last week prior to heme/onc infusion. Was given IVF. Repeat BMP ordered today to monitor renal indices  - Prescription sent for  tramadol 100 mg PO BID PRN sent to [facility] pharmacy. Was having difficulty with insurance approval at outside pharmacy  - gabapentin 600 mg PO BID PRN refilled  - Will obtain recent diabetic eye exam report from Dr. Al's office  - Advised patient to discuss bradycardia at next cardiology appointment; may need to decrease metoprolol dose     Follow up in about 3 months (around 3/6/2025) for DMII.    Health Maintenance    CRC Screening: Dr. Kapoor 6/2022: tubular adenoma  PSA Optional: 1.70 (5/2023), 2.78 (5/2024), following with Dr. Alexander Robles  Lipid Screen: 9/2024  HBV Screening: NR 6/2023  HCV Screening: NR 6/2023  HIV Screening: NR 6/2023  Immunizations: EARLINE Lyon MD  [facility]

## 2024-12-02 ENCOUNTER — INFUSION (OUTPATIENT)
Dept: INFUSION THERAPY | Facility: HOSPITAL | Age: 69
End: 2024-12-02
Attending: INTERNAL MEDICINE
Payer: MEDICARE

## 2024-12-02 ENCOUNTER — LAB VISIT (OUTPATIENT)
Dept: HEMATOLOGY/ONCOLOGY | Facility: CLINIC | Age: 69
End: 2024-12-02
Payer: MEDICARE

## 2024-12-02 VITALS
BODY MASS INDEX: 23.46 KG/M2 | HEART RATE: 58 BPM | SYSTOLIC BLOOD PRESSURE: 114 MMHG | TEMPERATURE: 98 F | OXYGEN SATURATION: 100 % | WEIGHT: 149.81 LBS | DIASTOLIC BLOOD PRESSURE: 60 MMHG | RESPIRATION RATE: 20 BRPM

## 2024-12-02 DIAGNOSIS — S32.010G COMPRESSION FRACTURE OF L1 VERTEBRA WITH DELAYED HEALING, SUBSEQUENT ENCOUNTER: ICD-10-CM

## 2024-12-02 DIAGNOSIS — E83.52 HYPERCALCEMIA: ICD-10-CM

## 2024-12-02 DIAGNOSIS — C90.00 IGG MULTIPLE MYELOMA: ICD-10-CM

## 2024-12-02 DIAGNOSIS — C90.00 IGG MULTIPLE MYELOMA: Primary | ICD-10-CM

## 2024-12-02 LAB
ALBUMIN SERPL-MCNC: 3.3 G/DL (ref 3.4–4.8)
ALBUMIN/GLOB SERPL: 1 RATIO (ref 1.1–2)
ALP SERPL-CCNC: 56 UNIT/L (ref 40–150)
ALT SERPL-CCNC: 13 UNIT/L (ref 0–55)
ANION GAP SERPL CALC-SCNC: 7 MEQ/L
AST SERPL-CCNC: 14 UNIT/L (ref 5–34)
BASOPHILS # BLD AUTO: 0.08 X10(3)/MCL
BASOPHILS NFR BLD AUTO: 1.5 %
BILIRUB SERPL-MCNC: 0.2 MG/DL
BUN SERPL-MCNC: 16.3 MG/DL (ref 8.4–25.7)
CALCIUM SERPL-MCNC: 8.9 MG/DL (ref 8.8–10)
CHLORIDE SERPL-SCNC: 111 MMOL/L (ref 98–107)
CO2 SERPL-SCNC: 20 MMOL/L (ref 23–31)
CREAT SERPL-MCNC: 1.3 MG/DL (ref 0.72–1.25)
CREAT/UREA NIT SERPL: 13
EOSINOPHIL # BLD AUTO: 0.2 X10(3)/MCL (ref 0–0.9)
EOSINOPHIL NFR BLD AUTO: 3.7 %
ERYTHROCYTE [DISTWIDTH] IN BLOOD BY AUTOMATED COUNT: 17.6 % (ref 11.5–17)
GFR SERPLBLD CREATININE-BSD FMLA CKD-EPI: 60 ML/MIN/1.73/M2
GLOBULIN SER-MCNC: 3.4 GM/DL (ref 2.4–3.5)
GLUCOSE SERPL-MCNC: 130 MG/DL (ref 82–115)
HCT VFR BLD AUTO: 30.6 % (ref 42–52)
HGB BLD-MCNC: 10.4 G/DL (ref 14–18)
IMM GRANULOCYTES # BLD AUTO: 0.02 X10(3)/MCL (ref 0–0.04)
IMM GRANULOCYTES NFR BLD AUTO: 0.4 %
LYMPHOCYTES # BLD AUTO: 3.09 X10(3)/MCL (ref 0.6–4.6)
LYMPHOCYTES NFR BLD AUTO: 57.9 %
MCH RBC QN AUTO: 34.6 PG (ref 27–31)
MCHC RBC AUTO-ENTMCNC: 34 G/DL (ref 33–36)
MCV RBC AUTO: 101.7 FL (ref 80–94)
MONOCYTES # BLD AUTO: 0.63 X10(3)/MCL (ref 0.1–1.3)
MONOCYTES NFR BLD AUTO: 11.8 %
NEUTROPHILS # BLD AUTO: 1.32 X10(3)/MCL (ref 2.1–9.2)
NEUTROPHILS NFR BLD AUTO: 24.7 %
NRBC BLD AUTO-RTO: 0 %
PLATELET # BLD AUTO: 169 X10(3)/MCL (ref 130–400)
PMV BLD AUTO: 9.8 FL (ref 7.4–10.4)
POTASSIUM SERPL-SCNC: 3.7 MMOL/L (ref 3.5–5.1)
PROT SERPL-MCNC: 6.7 GM/DL (ref 5.8–7.6)
RBC # BLD AUTO: 3.01 X10(6)/MCL (ref 4.7–6.1)
SODIUM SERPL-SCNC: 138 MMOL/L (ref 136–145)
T4 FREE SERPL-MCNC: 0.97 NG/DL (ref 0.7–1.48)
TSH SERPL-ACNC: 2.33 UIU/ML (ref 0.35–4.94)
WBC # BLD AUTO: 5.34 X10(3)/MCL (ref 4.5–11.5)

## 2024-12-02 PROCEDURE — 80053 COMPREHEN METABOLIC PANEL: CPT

## 2024-12-02 PROCEDURE — 85025 COMPLETE CBC W/AUTO DIFF WBC: CPT

## 2024-12-02 PROCEDURE — 25000003 PHARM REV CODE 250: Performed by: INTERNAL MEDICINE

## 2024-12-02 PROCEDURE — 84443 ASSAY THYROID STIM HORMONE: CPT

## 2024-12-02 PROCEDURE — 84439 ASSAY OF FREE THYROXINE: CPT

## 2024-12-02 PROCEDURE — 96374 THER/PROPH/DIAG INJ IV PUSH: CPT

## 2024-12-02 PROCEDURE — 63600175 PHARM REV CODE 636 W HCPCS: Performed by: INTERNAL MEDICINE

## 2024-12-02 PROCEDURE — 36415 COLL VENOUS BLD VENIPUNCTURE: CPT

## 2024-12-02 RX ADMIN — ZOLEDRONIC ACID 3.5 MG: 4 INJECTION, SOLUTION, CONCENTRATE INTRAVENOUS at 01:12

## 2024-12-02 NOTE — PROGRESS NOTES
Creatinine 1.3, up  Hydrate with 1 L of normal saline in our office today.  If IV fluids not approved, then, advised him to drink plenty of fluids.

## 2024-12-02 NOTE — NURSING
Patient arrived ambulatory to infusion alert and oriented with no acute complaints at this time. Pt is here for Zometa, pt tolerated tx without complication.    Kanchan Gonzalez RN

## 2024-12-06 ENCOUNTER — OFFICE VISIT (OUTPATIENT)
Dept: FAMILY MEDICINE | Facility: CLINIC | Age: 69
End: 2024-12-06
Payer: MEDICARE

## 2024-12-06 VITALS
HEIGHT: 67 IN | BODY MASS INDEX: 23.96 KG/M2 | HEART RATE: 59 BPM | OXYGEN SATURATION: 100 % | SYSTOLIC BLOOD PRESSURE: 120 MMHG | WEIGHT: 152.63 LBS | DIASTOLIC BLOOD PRESSURE: 80 MMHG | TEMPERATURE: 98 F

## 2024-12-06 DIAGNOSIS — M48.061 SPINAL STENOSIS OF LUMBAR REGION, UNSPECIFIED WHETHER NEUROGENIC CLAUDICATION PRESENT: ICD-10-CM

## 2024-12-06 DIAGNOSIS — E11.9 TYPE 2 DIABETES MELLITUS WITHOUT COMPLICATION, WITHOUT LONG-TERM CURRENT USE OF INSULIN: Primary | ICD-10-CM

## 2024-12-06 DIAGNOSIS — Z23 FLU VACCINE NEED: ICD-10-CM

## 2024-12-06 PROBLEM — N40.0 BENIGN PROSTATIC HYPERPLASIA: Status: ACTIVE | Noted: 2024-10-08

## 2024-12-06 PROBLEM — D64.9 ANEMIA: Status: ACTIVE | Noted: 2024-10-08

## 2024-12-06 LAB
ANION GAP SERPL CALC-SCNC: 6 MEQ/L
BUN SERPL-MCNC: 13.9 MG/DL (ref 8.4–25.7)
CALCIUM SERPL-MCNC: 8.9 MG/DL (ref 8.8–10)
CHLORIDE SERPL-SCNC: 111 MMOL/L (ref 98–107)
CO2 SERPL-SCNC: 21 MMOL/L (ref 23–31)
CREAT SERPL-MCNC: 1.35 MG/DL (ref 0.72–1.25)
CREAT/UREA NIT SERPL: 10
GFR SERPLBLD CREATININE-BSD FMLA CKD-EPI: 58 ML/MIN/1.73/M2
GLUCOSE SERPL-MCNC: 125 MG/DL (ref 82–115)
POTASSIUM SERPL-SCNC: 3.9 MMOL/L (ref 3.5–5.1)
SODIUM SERPL-SCNC: 138 MMOL/L (ref 136–145)

## 2024-12-06 PROCEDURE — 36415 COLL VENOUS BLD VENIPUNCTURE: CPT

## 2024-12-06 PROCEDURE — 99215 OFFICE O/P EST HI 40 MIN: CPT | Mod: PBBFAC

## 2024-12-06 PROCEDURE — 80048 BASIC METABOLIC PNL TOTAL CA: CPT

## 2024-12-06 RX ORDER — PREDNISONE 20 MG/1
20 TABLET ORAL DAILY
COMMUNITY

## 2024-12-06 RX ORDER — CETIRIZINE HYDROCHLORIDE 10 MG/1
1 CAPSULE, LIQUID FILLED ORAL DAILY PRN
COMMUNITY

## 2024-12-06 RX ORDER — GABAPENTIN 600 MG/1
600 TABLET ORAL 2 TIMES DAILY PRN
Qty: 60 TABLET | Refills: 0 | Status: SHIPPED | OUTPATIENT
Start: 2024-12-06

## 2024-12-06 RX ORDER — MIRTAZAPINE 7.5 MG/1
1 TABLET, FILM COATED ORAL NIGHTLY
COMMUNITY
Start: 2024-10-29

## 2024-12-06 RX ORDER — GABAPENTIN 600 MG/1
600 TABLET ORAL NIGHTLY
COMMUNITY
End: 2024-12-06

## 2024-12-06 RX ORDER — AMOXICILLIN 500 MG
2 CAPSULE ORAL
COMMUNITY

## 2024-12-06 RX ORDER — TRAMADOL HYDROCHLORIDE 100 MG/1
100 TABLET, COATED ORAL 2 TIMES DAILY PRN
Qty: 60 TABLET | Refills: 0 | Status: SHIPPED | OUTPATIENT
Start: 2024-12-06

## 2024-12-10 ENCOUNTER — TELEPHONE (OUTPATIENT)
Dept: FAMILY MEDICINE | Facility: CLINIC | Age: 69
End: 2024-12-10
Payer: MEDICARE

## 2024-12-10 ENCOUNTER — PATIENT MESSAGE (OUTPATIENT)
Dept: FAMILY MEDICINE | Facility: CLINIC | Age: 69
End: 2024-12-10
Payer: MEDICARE

## 2024-12-10 NOTE — TELEPHONE ENCOUNTER
PA submitted for Tramadol 100 mg on [email or web address], key #[id number] , results pending     PA Approved through 12/31/2024

## 2024-12-10 NOTE — TELEPHONE ENCOUNTER
Called patient to discuss recent lab results. Creatinine elevated at 1.35 compared to 1.30 four days prior. Received IVF at heme/onc appointment that day. Encouraged increased oral hydration alternating water and Gatorade. Patient has heme/onc appointment with lab work in 2 weeks. Will monitor renal indices with upcoming lab work.

## 2024-12-30 ENCOUNTER — APPOINTMENT (OUTPATIENT)
Dept: HEMATOLOGY/ONCOLOGY | Facility: CLINIC | Age: 69
End: 2024-12-30
Payer: MEDICARE

## 2024-12-30 ENCOUNTER — INFUSION (OUTPATIENT)
Dept: INFUSION THERAPY | Facility: HOSPITAL | Age: 69
End: 2024-12-30
Attending: INTERNAL MEDICINE
Payer: MEDICARE

## 2024-12-30 VITALS
SYSTOLIC BLOOD PRESSURE: 114 MMHG | HEART RATE: 51 BPM | DIASTOLIC BLOOD PRESSURE: 65 MMHG | HEIGHT: 67 IN | BODY MASS INDEX: 23.94 KG/M2 | OXYGEN SATURATION: 100 % | WEIGHT: 152.56 LBS | TEMPERATURE: 98 F | RESPIRATION RATE: 18 BRPM

## 2024-12-30 DIAGNOSIS — E83.52 HYPERCALCEMIA: ICD-10-CM

## 2024-12-30 DIAGNOSIS — C90.00 IGG MULTIPLE MYELOMA: ICD-10-CM

## 2024-12-30 DIAGNOSIS — S32.010G COMPRESSION FRACTURE OF L1 VERTEBRA WITH DELAYED HEALING, SUBSEQUENT ENCOUNTER: ICD-10-CM

## 2024-12-30 DIAGNOSIS — C90.00 IGG MULTIPLE MYELOMA: Primary | ICD-10-CM

## 2024-12-30 LAB
ALBUMIN SERPL-MCNC: 3.1 G/DL (ref 3.4–4.8)
ALBUMIN/GLOB SERPL: 0.8 RATIO (ref 1.1–2)
ALP SERPL-CCNC: 65 UNIT/L (ref 40–150)
ALT SERPL-CCNC: 17 UNIT/L (ref 0–55)
ANION GAP SERPL CALC-SCNC: 6 MEQ/L
AST SERPL-CCNC: 14 UNIT/L (ref 5–34)
BASOPHILS # BLD AUTO: 0.07 X10(3)/MCL
BASOPHILS NFR BLD AUTO: 1.3 %
BILIRUB SERPL-MCNC: 0.3 MG/DL
BUN SERPL-MCNC: 7.6 MG/DL (ref 8.4–25.7)
CALCIUM SERPL-MCNC: 8.8 MG/DL (ref 8.8–10)
CHLORIDE SERPL-SCNC: 108 MMOL/L (ref 98–107)
CO2 SERPL-SCNC: 23 MMOL/L (ref 23–31)
CREAT SERPL-MCNC: 1.07 MG/DL (ref 0.72–1.25)
CREAT/UREA NIT SERPL: 7
EOSINOPHIL # BLD AUTO: 0.14 X10(3)/MCL (ref 0–0.9)
EOSINOPHIL NFR BLD AUTO: 2.6 %
ERYTHROCYTE [DISTWIDTH] IN BLOOD BY AUTOMATED COUNT: 16.3 % (ref 11.5–17)
GFR SERPLBLD CREATININE-BSD FMLA CKD-EPI: >60 ML/MIN/1.73/M2
GLOBULIN SER-MCNC: 3.9 GM/DL (ref 2.4–3.5)
GLUCOSE SERPL-MCNC: 92 MG/DL (ref 82–115)
HCT VFR BLD AUTO: 32.7 % (ref 42–52)
HGB BLD-MCNC: 10.4 G/DL (ref 14–18)
IMM GRANULOCYTES # BLD AUTO: 0.03 X10(3)/MCL (ref 0–0.04)
IMM GRANULOCYTES NFR BLD AUTO: 0.6 %
LYMPHOCYTES # BLD AUTO: 3.29 X10(3)/MCL (ref 0.6–4.6)
LYMPHOCYTES NFR BLD AUTO: 61 %
MCH RBC QN AUTO: 33.7 PG (ref 27–31)
MCHC RBC AUTO-ENTMCNC: 31.8 G/DL (ref 33–36)
MCV RBC AUTO: 105.8 FL (ref 80–94)
MONOCYTES # BLD AUTO: 0.67 X10(3)/MCL (ref 0.1–1.3)
MONOCYTES NFR BLD AUTO: 12.4 %
NEUTROPHILS # BLD AUTO: 1.19 X10(3)/MCL (ref 2.1–9.2)
NEUTROPHILS NFR BLD AUTO: 22.1 %
NRBC BLD AUTO-RTO: 0 %
PLATELET # BLD AUTO: 161 X10(3)/MCL (ref 130–400)
PMV BLD AUTO: 9.9 FL (ref 7.4–10.4)
POTASSIUM SERPL-SCNC: 3.8 MMOL/L (ref 3.5–5.1)
PROT SERPL-MCNC: 7 GM/DL (ref 5.8–7.6)
RBC # BLD AUTO: 3.09 X10(6)/MCL (ref 4.7–6.1)
SODIUM SERPL-SCNC: 137 MMOL/L (ref 136–145)
T4 FREE SERPL-MCNC: 0.95 NG/DL (ref 0.7–1.48)
TSH SERPL-ACNC: 2.21 UIU/ML (ref 0.35–4.94)
WBC # BLD AUTO: 5.39 X10(3)/MCL (ref 4.5–11.5)

## 2024-12-30 PROCEDURE — 84439 ASSAY OF FREE THYROXINE: CPT

## 2024-12-30 PROCEDURE — 85025 COMPLETE CBC W/AUTO DIFF WBC: CPT

## 2024-12-30 PROCEDURE — 36415 COLL VENOUS BLD VENIPUNCTURE: CPT

## 2024-12-30 PROCEDURE — 80053 COMPREHEN METABOLIC PANEL: CPT

## 2024-12-30 PROCEDURE — 63600175 PHARM REV CODE 636 W HCPCS: Performed by: INTERNAL MEDICINE

## 2024-12-30 PROCEDURE — 84443 ASSAY THYROID STIM HORMONE: CPT

## 2024-12-30 PROCEDURE — 96374 THER/PROPH/DIAG INJ IV PUSH: CPT

## 2024-12-30 PROCEDURE — 25000003 PHARM REV CODE 250: Performed by: INTERNAL MEDICINE

## 2024-12-30 RX ORDER — HEPARIN 100 UNIT/ML
500 SYRINGE INTRAVENOUS
Status: DISCONTINUED | OUTPATIENT
Start: 2024-12-30 | End: 2024-12-30 | Stop reason: HOSPADM

## 2024-12-30 RX ORDER — SODIUM CHLORIDE 0.9 % (FLUSH) 0.9 %
10 SYRINGE (ML) INJECTION
Status: DISCONTINUED | OUTPATIENT
Start: 2024-12-30 | End: 2024-12-30 | Stop reason: HOSPADM

## 2024-12-30 RX ORDER — ZOLEDRONIC ACID 0.04 MG/ML
4 INJECTION, SOLUTION INTRAVENOUS
Status: COMPLETED | OUTPATIENT
Start: 2024-12-30 | End: 2024-12-30

## 2024-12-30 RX ADMIN — SODIUM CHLORIDE: 9 INJECTION, SOLUTION INTRAVENOUS at 01:12

## 2024-12-30 RX ADMIN — ZOLEDRONIC ACID 4 MG: 0.04 INJECTION, SOLUTION INTRAVENOUS at 01:12

## 2025-01-06 ENCOUNTER — TELEPHONE (OUTPATIENT)
Dept: FAMILY MEDICINE | Facility: CLINIC | Age: 70
End: 2025-01-06
Payer: MEDICARE

## 2025-01-06 NOTE — TELEPHONE ENCOUNTER
Patient complain of dizziness, states his head is spinning, going on for a week, refused   Appointment , ask that you call him.

## 2025-01-07 NOTE — TELEPHONE ENCOUNTER
"Called patient and discussed symptoms. States that he has intermittent dizziness that he describes as "room spinning" that lasts a few seconds. Symptoms have been present for the past 4-5 days and are worse when lying down. Denies chest pain, slurred speech, unsteadiness on feet, falls, numbness, weakness on one side of body, nausea, or vomiting. Has had some diarrhea today. Symptoms not worsened by standing from seated position. He states that the dizziness is improved today. Encouraged to increase fluid intake given the diarrhea. Offered for him to schedule an appointment for evaluation. He will call and schedule.   "

## 2025-01-22 DIAGNOSIS — M48.061 SPINAL STENOSIS OF LUMBAR REGION, UNSPECIFIED WHETHER NEUROGENIC CLAUDICATION PRESENT: ICD-10-CM

## 2025-01-23 RX ORDER — TRAMADOL HYDROCHLORIDE 100 MG/1
100 TABLET, COATED ORAL 2 TIMES DAILY PRN
Qty: 30 TABLET | Refills: 0 | Status: SHIPPED | OUTPATIENT
Start: 2025-01-23

## 2025-01-25 DIAGNOSIS — M48.061 SPINAL STENOSIS OF LUMBAR REGION, UNSPECIFIED WHETHER NEUROGENIC CLAUDICATION PRESENT: ICD-10-CM

## 2025-01-27 ENCOUNTER — INFUSION (OUTPATIENT)
Dept: INFUSION THERAPY | Facility: HOSPITAL | Age: 70
End: 2025-01-27
Attending: INTERNAL MEDICINE
Payer: MEDICARE

## 2025-01-27 ENCOUNTER — APPOINTMENT (OUTPATIENT)
Dept: HEMATOLOGY/ONCOLOGY | Facility: CLINIC | Age: 70
End: 2025-01-27
Payer: MEDICARE

## 2025-01-27 VITALS
TEMPERATURE: 98 F | HEART RATE: 50 BPM | DIASTOLIC BLOOD PRESSURE: 60 MMHG | OXYGEN SATURATION: 100 % | SYSTOLIC BLOOD PRESSURE: 106 MMHG | RESPIRATION RATE: 18 BRPM

## 2025-01-27 DIAGNOSIS — C90.00 IGG MULTIPLE MYELOMA: Primary | ICD-10-CM

## 2025-01-27 DIAGNOSIS — C90.00 IGG MULTIPLE MYELOMA: ICD-10-CM

## 2025-01-27 DIAGNOSIS — S32.010G COMPRESSION FRACTURE OF L1 VERTEBRA WITH DELAYED HEALING, SUBSEQUENT ENCOUNTER: ICD-10-CM

## 2025-01-27 DIAGNOSIS — E83.52 HYPERCALCEMIA: ICD-10-CM

## 2025-01-27 LAB
ALBUMIN SERPL-MCNC: 3.2 G/DL (ref 3.4–4.8)
ALBUMIN/GLOB SERPL: 0.8 RATIO (ref 1.1–2)
ALP SERPL-CCNC: 57 UNIT/L (ref 40–150)
ALT SERPL-CCNC: 17 UNIT/L (ref 0–55)
ANION GAP SERPL CALC-SCNC: 5 MEQ/L
AST SERPL-CCNC: 14 UNIT/L (ref 5–34)
BASOPHILS # BLD AUTO: 0.06 X10(3)/MCL
BASOPHILS NFR BLD AUTO: 1.3 %
BILIRUB SERPL-MCNC: 0.2 MG/DL
BUN SERPL-MCNC: 14 MG/DL (ref 8.4–25.7)
CALCIUM SERPL-MCNC: 8.6 MG/DL (ref 8.8–10)
CHLORIDE SERPL-SCNC: 110 MMOL/L (ref 98–107)
CO2 SERPL-SCNC: 20 MMOL/L (ref 23–31)
CREAT SERPL-MCNC: 1.03 MG/DL (ref 0.72–1.25)
CREAT/UREA NIT SERPL: 14
EOSINOPHIL # BLD AUTO: 0.16 X10(3)/MCL (ref 0–0.9)
EOSINOPHIL NFR BLD AUTO: 3.4 %
ERYTHROCYTE [DISTWIDTH] IN BLOOD BY AUTOMATED COUNT: 16.8 % (ref 11.5–17)
GFR SERPLBLD CREATININE-BSD FMLA CKD-EPI: >60 ML/MIN/1.73/M2
GLOBULIN SER-MCNC: 3.9 GM/DL (ref 2.4–3.5)
GLUCOSE SERPL-MCNC: 103 MG/DL (ref 82–115)
HCT VFR BLD AUTO: 29.9 % (ref 42–52)
HGB BLD-MCNC: 9.7 G/DL (ref 14–18)
IGA SERPL-MCNC: 211 MG/DL (ref 101–645)
IGG SERPL-MCNC: 1165 MG/DL (ref 540–1822)
IGM SERPL-MCNC: 18 MG/DL (ref 22–240)
IMM GRANULOCYTES # BLD AUTO: 0.03 X10(3)/MCL (ref 0–0.04)
IMM GRANULOCYTES NFR BLD AUTO: 0.6 %
LYMPHOCYTES # BLD AUTO: 2.57 X10(3)/MCL (ref 0.6–4.6)
LYMPHOCYTES NFR BLD AUTO: 54.4 %
MCH RBC QN AUTO: 34.6 PG (ref 27–31)
MCHC RBC AUTO-ENTMCNC: 32.4 G/DL (ref 33–36)
MCV RBC AUTO: 106.8 FL (ref 80–94)
MONOCYTES # BLD AUTO: 0.66 X10(3)/MCL (ref 0.1–1.3)
MONOCYTES NFR BLD AUTO: 14 %
NEUTROPHILS # BLD AUTO: 1.24 X10(3)/MCL (ref 2.1–9.2)
NEUTROPHILS NFR BLD AUTO: 26.3 %
NRBC BLD AUTO-RTO: 0 %
PLATELET # BLD AUTO: 129 X10(3)/MCL (ref 130–400)
PMV BLD AUTO: 10 FL (ref 7.4–10.4)
POTASSIUM SERPL-SCNC: 3.6 MMOL/L (ref 3.5–5.1)
PROT SERPL-MCNC: 7.1 GM/DL (ref 5.8–7.6)
RBC # BLD AUTO: 2.8 X10(6)/MCL (ref 4.7–6.1)
SODIUM SERPL-SCNC: 135 MMOL/L (ref 136–145)
WBC # BLD AUTO: 4.72 X10(3)/MCL (ref 4.5–11.5)

## 2025-01-27 PROCEDURE — 80053 COMPREHEN METABOLIC PANEL: CPT

## 2025-01-27 PROCEDURE — 83521 IG LIGHT CHAINS FREE EACH: CPT

## 2025-01-27 PROCEDURE — 86334 IMMUNOFIX E-PHORESIS SERUM: CPT

## 2025-01-27 PROCEDURE — 63600175 PHARM REV CODE 636 W HCPCS: Performed by: INTERNAL MEDICINE

## 2025-01-27 PROCEDURE — 85025 COMPLETE CBC W/AUTO DIFF WBC: CPT

## 2025-01-27 PROCEDURE — 82784 ASSAY IGA/IGD/IGG/IGM EACH: CPT

## 2025-01-27 PROCEDURE — 25000003 PHARM REV CODE 250: Performed by: INTERNAL MEDICINE

## 2025-01-27 PROCEDURE — 96365 THER/PROPH/DIAG IV INF INIT: CPT

## 2025-01-27 PROCEDURE — 36415 COLL VENOUS BLD VENIPUNCTURE: CPT

## 2025-01-27 RX ORDER — HEPARIN 100 UNIT/ML
500 SYRINGE INTRAVENOUS
OUTPATIENT
Start: 2025-02-21

## 2025-01-27 RX ORDER — ZOLEDRONIC ACID 0.04 MG/ML
4 INJECTION, SOLUTION INTRAVENOUS
Status: COMPLETED | OUTPATIENT
Start: 2025-01-27 | End: 2025-01-27

## 2025-01-27 RX ORDER — SODIUM CHLORIDE 0.9 % (FLUSH) 0.9 %
10 SYRINGE (ML) INJECTION
Status: CANCELLED | OUTPATIENT
Start: 2025-01-27

## 2025-01-27 RX ORDER — SODIUM CHLORIDE 0.9 % (FLUSH) 0.9 %
10 SYRINGE (ML) INJECTION
OUTPATIENT
Start: 2025-02-21

## 2025-01-27 RX ORDER — ZOLEDRONIC ACID 0.04 MG/ML
4 INJECTION, SOLUTION INTRAVENOUS
Status: CANCELLED | OUTPATIENT
Start: 2025-01-27

## 2025-01-27 RX ORDER — SODIUM CHLORIDE 0.9 % (FLUSH) 0.9 %
10 SYRINGE (ML) INJECTION
Status: DISCONTINUED | OUTPATIENT
Start: 2025-01-27 | End: 2025-01-27 | Stop reason: HOSPADM

## 2025-01-27 RX ORDER — HEPARIN 100 UNIT/ML
500 SYRINGE INTRAVENOUS
Status: DISCONTINUED | OUTPATIENT
Start: 2025-01-27 | End: 2025-01-27 | Stop reason: HOSPADM

## 2025-01-27 RX ORDER — ZOLEDRONIC ACID 0.04 MG/ML
4 INJECTION, SOLUTION INTRAVENOUS
OUTPATIENT
Start: 2025-02-21

## 2025-01-27 RX ORDER — HEPARIN 100 UNIT/ML
500 SYRINGE INTRAVENOUS
Status: CANCELLED | OUTPATIENT
Start: 2025-01-27

## 2025-01-27 RX ADMIN — SODIUM CHLORIDE: 9 INJECTION, SOLUTION INTRAVENOUS at 01:01

## 2025-01-27 RX ADMIN — ZOLEDRONIC ACID 4 MG: 0.04 INJECTION, SOLUTION INTRAVENOUS at 01:01

## 2025-01-28 ENCOUNTER — OFFICE VISIT (OUTPATIENT)
Dept: FAMILY MEDICINE | Facility: CLINIC | Age: 70
End: 2025-01-28
Payer: MEDICARE

## 2025-01-28 VITALS
OXYGEN SATURATION: 100 % | HEIGHT: 67 IN | WEIGHT: 150.38 LBS | SYSTOLIC BLOOD PRESSURE: 114 MMHG | RESPIRATION RATE: 20 BRPM | HEART RATE: 51 BPM | TEMPERATURE: 98 F | DIASTOLIC BLOOD PRESSURE: 66 MMHG | BODY MASS INDEX: 23.6 KG/M2

## 2025-01-28 DIAGNOSIS — R42 DIZZINESS: Primary | ICD-10-CM

## 2025-01-28 LAB
ALBUMIN % SPEP (OHS): 46.69 (ref 48.1–59.5)
ALBUMIN SERPL-MCNC: 2.9 G/DL (ref 3.4–4.8)
ALBUMIN/GLOB SERPL: 0.9 RATIO (ref 1.1–2)
ALPHA 1 GLOB (OHS): 0.22 GM/DL (ref 0–0.4)
ALPHA 1 GLOB% (OHS): 3.48 (ref 2.3–4.9)
ALPHA 2 GLOB % (OHS): 16.6 (ref 6.9–13)
ALPHA 2 GLOB (OHS): 1.05 GM/DL (ref 0.4–1)
BETA GLOB (OHS): 0.95 GM/DL (ref 0.7–1.3)
BETA GLOB% (OHS): 15.1 (ref 13.8–19.7)
GAMMA GLOBULIN % (OHS): 18.13 (ref 10.1–21.9)
GAMMA GLOBULIN (OHS): 1.14 GM/DL (ref 0.4–1.8)
GLOBULIN SER-MCNC: 3.4 GM/DL (ref 2.4–3.5)
M SPIKE % (OHS): ABNORMAL
M SPIKE (OHS): ABNORMAL
PATH REV: NORMAL
PROT SERPL-MCNC: 6.3 GM/DL (ref 5.8–7.6)

## 2025-01-28 PROCEDURE — 99215 OFFICE O/P EST HI 40 MIN: CPT | Mod: PBBFAC

## 2025-01-28 RX ORDER — TRAMADOL HYDROCHLORIDE 100 MG/1
100 TABLET, COATED ORAL 2 TIMES DAILY PRN
Qty: 30 TABLET | Refills: 0 | OUTPATIENT
Start: 2025-01-28

## 2025-01-28 NOTE — PROGRESS NOTES
[facility] OFFICE VISIT NOTE  Yuriy Díaz  [id number]  02/02/2025    Chief Complaint   Patient presents with    Dizziness     Onset 2 weeks       Yuriy Díaz is a 69 y.o. male  presenting to [facility] for dizziness.    Ongoing x2 weeks. No known triggering event. Describes as a sensation that feels like the room is spinning. No associated fever, chills, nausea, or vomiting. No CP or episodes of syncope. States has checked BP with episodes and has not noticed any drops in BP. Occurs when moving from laying down to sitting, lasts a couple seconds and then resolves. Denies anything that makes the sensation worse. Has not tried anything that makes better. Has a hx of seasonal allergies, not currently taking any medications. Has tried OTC allergy medicines and Flonase in the past, provided relief from allergy symptoms.    Current Medications:   Current Outpatient Medications   Medication Instructions    acyclovir (ZOVIRAX) 400 mg, Oral, 2 times daily    ascorbic acid (vitamin C) (VITAMIN C) 1,000 mg, Daily    aspirin (ECOTRIN) 81 mg, Daily    atorvastatin (LIPITOR) 20 mg, Daily    calcium carbonate 195 mg calcium (500 mg) Chew 1 tablet, Daily    cetirizine (ZYRTEC) 10 mg Cap 1 capsule, Oral, Daily PRN    coenzyme Q10 10 mg capsule 1 capsule, Daily    famotidine (PEPCID) 40 mg, Nightly    ferrous sulfate (FEOSOL) 650 mg    gabapentin (NEURONTIN) 600 mg, Oral, 2 times daily PRN    hydrocortisone (ANUSOL-HC) 2.5 % rectal cream 1 applicator, Rectal, 2 times daily    lenalidomide 10 mg Cap 1 capsule    megestroL (MEGACE) 400 mg, Oral, Daily    metFORMIN (GLUCOPHAGE) 500 mg, Oral, 2 times daily with meals    metoprolol succinate (TOPROL-XL) 50 mg, Daily    MIRALAX 17 g    mirtazapine (REMERON) 7.5 MG Tab 1 tablet, Nightly    multivit with min-folic acid 0.4 mg Tab 1 tablet, Daily    multivitamin with minerals tablet 1 tablet, Oral, Every morning    omega 3-dha-epa-fish oil 300 mg (120 mg- 180mg)-1,000 mg Cap 500 mg    omega-3 fatty  "acids/fish oil (FISH OIL-OMEGA-3 FATTY ACIDS) 300-1,000 mg capsule 2 g, Oral    pantoprazole (PROTONIX) 20 mg, Oral, Daily    predniSONE (DELTASONE) 20 mg, Oral, Daily    sulfamethoxazole-trimethoprim 800-160mg (BACTRIM DS) 800-160 mg Tab Take 1 tablet by mouth on MWF of each week    traMADoL 100 mg, Oral, 2 times daily PRN       Review of Systems   Constitutional:  Negative for activity change, appetite change, chills, diaphoresis and fever.   HENT:  Positive for congestion and postnasal drip. Negative for ear pain, rhinorrhea and sore throat.    Respiratory:  Negative for cough, shortness of breath and wheezing.    Cardiovascular:  Negative for chest pain and leg swelling.   Gastrointestinal:  Negative for abdominal pain, nausea and vomiting.   Genitourinary:  Negative for decreased urine volume.   Skin:  Negative for rash.   Neurological:  Positive for dizziness. Negative for syncope and weakness.       Blood pressure 114/66, pulse (!) 51, temperature 97.8 °F (36.6 °C), temperature source Oral, resp. rate 20, height 5' 7" (1.702 m), weight 68.2 kg (150 lb 6.4 oz), SpO2 100%.   Physical Exam  Vitals and nursing note reviewed.   Constitutional:       General: He is not in acute distress.     Appearance: Normal appearance. He is well-developed. He is not ill-appearing, toxic-appearing or diaphoretic.   HENT:      Head: Normocephalic.      Right Ear: Tympanic membrane, ear canal and external ear normal. There is no impacted cerumen. Tympanic membrane is not injected or erythematous.      Left Ear: Ear canal and external ear normal. A middle ear effusion is present. There is no impacted cerumen. Tympanic membrane is not injected or erythematous.      Nose: Nose normal.      Right Turbinates: Swollen.      Left Turbinates: Swollen.      Mouth/Throat:      Mouth: Mucous membranes are moist.      Pharynx: Oropharynx is clear. Uvula midline. Postnasal drip present. No oropharyngeal exudate or posterior oropharyngeal " erythema.      Tonsils: No tonsillar exudate or tonsillar abscesses.   Eyes:      Conjunctiva/sclera: Conjunctivae normal.   Cardiovascular:      Rate and Rhythm: Normal rate and regular rhythm.      Heart sounds: Normal heart sounds. Heart sounds not distant. No murmur heard.     No friction rub. No gallop.   Pulmonary:      Effort: Pulmonary effort is normal. No accessory muscle usage or respiratory distress.      Breath sounds: Normal breath sounds. No decreased breath sounds, wheezing, rhonchi or rales.   Abdominal:      General: Abdomen is flat. Bowel sounds are normal. There is no distension.      Palpations: Abdomen is soft.      Tenderness: There is no abdominal tenderness. There is no guarding.   Musculoskeletal:      Cervical back: Full passive range of motion without pain and normal range of motion.      Right lower leg: No edema.      Left lower leg: No edema.   Skin:     General: Skin is warm.      Capillary Refill: Capillary refill takes less than 2 seconds.      Coloration: Skin is not pale.      Findings: No rash.   Neurological:      Mental Status: He is alert.   Psychiatric:         Behavior: Behavior is cooperative.       Assessment:   1. Dizziness        Plan:  Dizziness  - Suspicious for eustachian tube dysfunction at this time  - Instructed patient to have trial of OTC allergy medication and Flonase as needed for symptomatic relief  - If symptoms continue to persist may consider trial of abx due to chronic sinusitis          Return to clinic for scheduled appointment 3/28/2025, or sooner if needed.       Sebas Mckinnon MD  [facility] -[facility]

## 2025-01-29 LAB
KAPPA LC FREE SER NEPH-MCNC: 3.78 MG/DL (ref 0.33–1.94)
KAPPA LC FREE/LAMBDA FREE SER NEPH: 1.24 {RATIO} (ref 0.26–1.65)
LAMBDA LC FREE SERPL-MCNC: 3.05 MG/DL (ref 0.57–2.63)

## 2025-02-10 ENCOUNTER — APPOINTMENT (OUTPATIENT)
Dept: LAB | Facility: HOSPITAL | Age: 70
End: 2025-02-10
Attending: INTERNAL MEDICINE
Payer: MEDICARE

## 2025-02-10 DIAGNOSIS — I10 ESSENTIAL HYPERTENSION, MALIGNANT: Primary | ICD-10-CM

## 2025-02-10 LAB
ALBUMIN SERPL-MCNC: 3.2 G/DL (ref 3.4–4.8)
ALBUMIN/GLOB SERPL: 0.9 RATIO (ref 1.1–2)
ALP SERPL-CCNC: 62 UNIT/L (ref 40–150)
ALT SERPL-CCNC: 23 UNIT/L (ref 0–55)
ANION GAP SERPL CALC-SCNC: 5 MEQ/L
AST SERPL-CCNC: 17 UNIT/L (ref 5–34)
BILIRUB SERPL-MCNC: 0.5 MG/DL
BUN SERPL-MCNC: 9.7 MG/DL (ref 8.4–25.7)
CALCIUM SERPL-MCNC: 8.1 MG/DL (ref 8.8–10)
CHLORIDE SERPL-SCNC: 108 MMOL/L (ref 98–107)
CO2 SERPL-SCNC: 22 MMOL/L (ref 23–31)
CREAT SERPL-MCNC: 1.04 MG/DL (ref 0.72–1.25)
CREAT/UREA NIT SERPL: 9
EST. AVERAGE GLUCOSE BLD GHB EST-MCNC: 111.2 MG/DL
GFR SERPLBLD CREATININE-BSD FMLA CKD-EPI: >60 ML/MIN/1.73/M2
GLOBULIN SER-MCNC: 3.5 GM/DL (ref 2.4–3.5)
GLUCOSE SERPL-MCNC: 82 MG/DL (ref 82–115)
HBA1C MFR BLD: 5.5 %
POTASSIUM SERPL-SCNC: 3.9 MMOL/L (ref 3.5–5.1)
PROT SERPL-MCNC: 6.7 GM/DL (ref 5.8–7.6)
SODIUM SERPL-SCNC: 135 MMOL/L (ref 136–145)

## 2025-02-10 PROCEDURE — 36415 COLL VENOUS BLD VENIPUNCTURE: CPT

## 2025-02-10 PROCEDURE — 80053 COMPREHEN METABOLIC PANEL: CPT

## 2025-02-10 PROCEDURE — 83036 HEMOGLOBIN GLYCOSYLATED A1C: CPT | Mod: GA

## 2025-02-18 DIAGNOSIS — E83.52 HYPERCALCEMIA: ICD-10-CM

## 2025-02-18 DIAGNOSIS — C90.00 IGG MULTIPLE MYELOMA: Primary | ICD-10-CM

## 2025-02-20 DIAGNOSIS — C90.00 IGG MULTIPLE MYELOMA: ICD-10-CM

## 2025-02-21 RX ORDER — GABAPENTIN 600 MG/1
600 TABLET ORAL 2 TIMES DAILY PRN
Qty: 60 TABLET | Refills: 2 | Status: SHIPPED | OUTPATIENT
Start: 2025-02-21

## 2025-02-21 RX ORDER — ACYCLOVIR 400 MG/1
400 TABLET ORAL 2 TIMES DAILY
Qty: 60 TABLET | Refills: 4 | Status: SHIPPED | OUTPATIENT
Start: 2025-02-21

## 2025-02-23 PROBLEM — C90.01 MULTIPLE MYELOMA IN REMISSION: Status: ACTIVE | Noted: 2025-02-23

## 2025-02-23 RX ORDER — HEPARIN 100 UNIT/ML
500 SYRINGE INTRAVENOUS
OUTPATIENT
Start: 2025-03-21

## 2025-02-23 RX ORDER — SODIUM CHLORIDE 0.9 % (FLUSH) 0.9 %
10 SYRINGE (ML) INJECTION
OUTPATIENT
Start: 2025-03-21

## 2025-02-23 RX ORDER — ZOLEDRONIC ACID 0.04 MG/ML
4 INJECTION, SOLUTION INTRAVENOUS
OUTPATIENT
Start: 2025-03-21

## 2025-02-24 ENCOUNTER — INFUSION (OUTPATIENT)
Dept: INFUSION THERAPY | Facility: HOSPITAL | Age: 70
End: 2025-02-24
Attending: INTERNAL MEDICINE
Payer: MEDICARE

## 2025-02-24 ENCOUNTER — APPOINTMENT (OUTPATIENT)
Dept: HEMATOLOGY/ONCOLOGY | Facility: CLINIC | Age: 70
End: 2025-02-24
Payer: MEDICARE

## 2025-02-24 ENCOUNTER — OFFICE VISIT (OUTPATIENT)
Dept: HEMATOLOGY/ONCOLOGY | Facility: CLINIC | Age: 70
End: 2025-02-24
Attending: INTERNAL MEDICINE
Payer: MEDICARE

## 2025-02-24 VITALS
SYSTOLIC BLOOD PRESSURE: 108 MMHG | DIASTOLIC BLOOD PRESSURE: 73 MMHG | WEIGHT: 151 LBS | OXYGEN SATURATION: 100 % | RESPIRATION RATE: 18 BRPM | HEIGHT: 67 IN | BODY MASS INDEX: 23.7 KG/M2 | HEART RATE: 67 BPM | TEMPERATURE: 98 F

## 2025-02-24 VITALS — SYSTOLIC BLOOD PRESSURE: 108 MMHG | DIASTOLIC BLOOD PRESSURE: 73 MMHG

## 2025-02-24 DIAGNOSIS — M48.061 SPINAL STENOSIS OF LUMBAR REGION, UNSPECIFIED WHETHER NEUROGENIC CLAUDICATION PRESENT: ICD-10-CM

## 2025-02-24 DIAGNOSIS — C79.51 SECONDARY MALIGNANT NEOPLASM OF BONE: ICD-10-CM

## 2025-02-24 DIAGNOSIS — M81.0 OSTEOPOROSIS OF FEMUR WITHOUT PATHOLOGICAL FRACTURE: ICD-10-CM

## 2025-02-24 DIAGNOSIS — Z86.11 HISTORY OF TUBERCULOSIS: ICD-10-CM

## 2025-02-24 DIAGNOSIS — C90.00 IGG MULTIPLE MYELOMA: Primary | ICD-10-CM

## 2025-02-24 DIAGNOSIS — C90.00 IGG MULTIPLE MYELOMA: ICD-10-CM

## 2025-02-24 DIAGNOSIS — E83.52 HYPERCALCEMIA: ICD-10-CM

## 2025-02-24 DIAGNOSIS — S32.010G COMPRESSION FRACTURE OF L1 VERTEBRA WITH DELAYED HEALING, SUBSEQUENT ENCOUNTER: Primary | ICD-10-CM

## 2025-02-24 DIAGNOSIS — Z94.84 H/O AUTOLOGOUS STEM CELL TRANSPLANT: ICD-10-CM

## 2025-02-24 DIAGNOSIS — C79.51 SECONDARY MALIGNANCY OF LUMBAR VERTEBRAL COLUMN: ICD-10-CM

## 2025-02-24 DIAGNOSIS — S32.020G COMPRESSION FRACTURE OF L2 VERTEBRA WITH DELAYED HEALING, SUBSEQUENT ENCOUNTER: ICD-10-CM

## 2025-02-24 DIAGNOSIS — D84.821 DRUG-INDUCED IMMUNODEFICIENCY: ICD-10-CM

## 2025-02-24 DIAGNOSIS — C90.01 MULTIPLE MYELOMA IN REMISSION: ICD-10-CM

## 2025-02-24 DIAGNOSIS — Z79.899 DRUG-INDUCED IMMUNODEFICIENCY: ICD-10-CM

## 2025-02-24 DIAGNOSIS — K90.0 CELIAC DISEASE: ICD-10-CM

## 2025-02-24 DIAGNOSIS — M51.360 DEGENERATION OF INTERVERTEBRAL DISC OF LUMBAR REGION WITH DISCOGENIC BACK PAIN: ICD-10-CM

## 2025-02-24 DIAGNOSIS — R29.818 NEUROGENIC CLAUDICATION: ICD-10-CM

## 2025-02-24 LAB
ALBUMIN SERPL-MCNC: 3.3 G/DL (ref 3.4–4.8)
ALBUMIN/GLOB SERPL: 0.8 RATIO (ref 1.1–2)
ALP SERPL-CCNC: 66 UNIT/L (ref 40–150)
ALT SERPL-CCNC: 17 UNIT/L (ref 0–55)
ANION GAP SERPL CALC-SCNC: 5 MEQ/L
AST SERPL-CCNC: 15 UNIT/L (ref 5–34)
BASOPHILS # BLD AUTO: 0.07 X10(3)/MCL
BASOPHILS NFR BLD AUTO: 1.3 %
BILIRUB SERPL-MCNC: 0.3 MG/DL
BUN SERPL-MCNC: 15.3 MG/DL (ref 8.4–25.7)
CALCIUM SERPL-MCNC: 9.4 MG/DL (ref 8.8–10)
CHLORIDE SERPL-SCNC: 110 MMOL/L (ref 98–107)
CO2 SERPL-SCNC: 22 MMOL/L (ref 23–31)
CREAT SERPL-MCNC: 1.05 MG/DL (ref 0.72–1.25)
CREAT/UREA NIT SERPL: 15
EOSINOPHIL # BLD AUTO: 0.14 X10(3)/MCL (ref 0–0.9)
EOSINOPHIL NFR BLD AUTO: 2.7 %
ERYTHROCYTE [DISTWIDTH] IN BLOOD BY AUTOMATED COUNT: 16.4 % (ref 11.5–17)
GFR SERPLBLD CREATININE-BSD FMLA CKD-EPI: >60 ML/MIN/1.73/M2
GLOBULIN SER-MCNC: 4.3 GM/DL (ref 2.4–3.5)
GLUCOSE SERPL-MCNC: 89 MG/DL (ref 82–115)
HCT VFR BLD AUTO: 31.5 % (ref 42–52)
HGB BLD-MCNC: 10.4 G/DL (ref 14–18)
IMM GRANULOCYTES # BLD AUTO: 0.02 X10(3)/MCL (ref 0–0.04)
IMM GRANULOCYTES NFR BLD AUTO: 0.4 %
LYMPHOCYTES # BLD AUTO: 3.13 X10(3)/MCL (ref 0.6–4.6)
LYMPHOCYTES NFR BLD AUTO: 59.6 %
MAGNESIUM SERPL-MCNC: 2.3 MG/DL (ref 1.6–2.6)
MCH RBC QN AUTO: 35.6 PG (ref 27–31)
MCHC RBC AUTO-ENTMCNC: 33 G/DL (ref 33–36)
MCV RBC AUTO: 107.9 FL (ref 80–94)
MONOCYTES # BLD AUTO: 0.71 X10(3)/MCL (ref 0.1–1.3)
MONOCYTES NFR BLD AUTO: 13.5 %
NEUTROPHILS # BLD AUTO: 1.18 X10(3)/MCL (ref 2.1–9.2)
NEUTROPHILS NFR BLD AUTO: 22.5 %
NRBC BLD AUTO-RTO: 0 %
PLATELET # BLD AUTO: 123 X10(3)/MCL (ref 130–400)
PMV BLD AUTO: 9.2 FL (ref 7.4–10.4)
POTASSIUM SERPL-SCNC: 3.8 MMOL/L (ref 3.5–5.1)
PROT SERPL-MCNC: 7.6 GM/DL (ref 5.8–7.6)
RBC # BLD AUTO: 2.92 X10(6)/MCL (ref 4.7–6.1)
SODIUM SERPL-SCNC: 137 MMOL/L (ref 136–145)
WBC # BLD AUTO: 5.25 X10(3)/MCL (ref 4.5–11.5)

## 2025-02-24 PROCEDURE — 3288F FALL RISK ASSESSMENT DOCD: CPT | Mod: CPTII,,, | Performed by: INTERNAL MEDICINE

## 2025-02-24 PROCEDURE — 96374 THER/PROPH/DIAG INJ IV PUSH: CPT

## 2025-02-24 PROCEDURE — 83735 ASSAY OF MAGNESIUM: CPT

## 2025-02-24 PROCEDURE — 3044F HG A1C LEVEL LT 7.0%: CPT | Mod: CPTII,,, | Performed by: INTERNAL MEDICINE

## 2025-02-24 PROCEDURE — 3074F SYST BP LT 130 MM HG: CPT | Mod: CPTII,,, | Performed by: INTERNAL MEDICINE

## 2025-02-24 PROCEDURE — 36415 COLL VENOUS BLD VENIPUNCTURE: CPT

## 2025-02-24 PROCEDURE — 99214 OFFICE O/P EST MOD 30 MIN: CPT | Mod: S$PBB,,, | Performed by: INTERNAL MEDICINE

## 2025-02-24 PROCEDURE — 3008F BODY MASS INDEX DOCD: CPT | Mod: CPTII,,, | Performed by: INTERNAL MEDICINE

## 2025-02-24 PROCEDURE — 63600175 PHARM REV CODE 636 W HCPCS: Performed by: INTERNAL MEDICINE

## 2025-02-24 PROCEDURE — 1101F PT FALLS ASSESS-DOCD LE1/YR: CPT | Mod: CPTII,,, | Performed by: INTERNAL MEDICINE

## 2025-02-24 PROCEDURE — 3078F DIAST BP <80 MM HG: CPT | Mod: CPTII,,, | Performed by: INTERNAL MEDICINE

## 2025-02-24 PROCEDURE — 85025 COMPLETE CBC W/AUTO DIFF WBC: CPT

## 2025-02-24 PROCEDURE — 80053 COMPREHEN METABOLIC PANEL: CPT

## 2025-02-24 PROCEDURE — 1125F AMNT PAIN NOTED PAIN PRSNT: CPT | Mod: CPTII,,, | Performed by: INTERNAL MEDICINE

## 2025-02-24 PROCEDURE — 99215 OFFICE O/P EST HI 40 MIN: CPT | Mod: PBBFAC,25 | Performed by: INTERNAL MEDICINE

## 2025-02-24 PROCEDURE — 25000003 PHARM REV CODE 250: Performed by: INTERNAL MEDICINE

## 2025-02-24 RX ORDER — ZOLEDRONIC ACID 0.04 MG/ML
4 INJECTION, SOLUTION INTRAVENOUS
Status: COMPLETED | OUTPATIENT
Start: 2025-02-24 | End: 2025-02-24

## 2025-02-24 RX ORDER — SODIUM CHLORIDE 0.9 % (FLUSH) 0.9 %
10 SYRINGE (ML) INJECTION
Status: DISCONTINUED | OUTPATIENT
Start: 2025-02-24 | End: 2025-02-24 | Stop reason: HOSPADM

## 2025-02-24 RX ORDER — HEPARIN 100 UNIT/ML
500 SYRINGE INTRAVENOUS
Status: DISCONTINUED | OUTPATIENT
Start: 2025-02-24 | End: 2025-02-24 | Stop reason: HOSPADM

## 2025-02-24 RX ADMIN — ZOLEDRONIC ACID 4 MG: 0.04 INJECTION, SOLUTION INTRAVENOUS at 12:02

## 2025-02-24 RX ADMIN — SODIUM CHLORIDE: 9 INJECTION, SOLUTION INTRAVENOUS at 12:02

## 2025-02-24 NOTE — Clinical Note
Orders for 02/24/2025:  Continue Revlimid 10 mg daily  Baby aspirin 81 mg daily  Check CBC and CMP monthly  Check TSH and free T4 every 3 months Continue Zometa every 4 weeks Calcium and vitamin-D supplements to continue  Check SPEP, BUBBA, FLC assay every 3 months; next, March Continue acyclovir and Bactrim until vaccinations are completed/until recommended by Dr. Brandy Mahajan DEXA scan in October Follow-up with NP in 1 month

## 2025-02-24 NOTE — PROGRESS NOTES
History:  Past Medical History:   Diagnosis Date    Celiac disease     Cerebral palsy, unspecified     Compression fracture of L1 vertebra with delayed healing, subsequent encounter     Compression fracture of L2 vertebra with delayed healing, subsequent encounter     Cyst of right kidney     Degeneration of lumbar intervertebral disc     Diabetes mellitus, type 2     GERD (gastroesophageal reflux disease)     Hyperlipidemia     Hypertension     IgG multiple myeloma     Low back pain     Lumbago with sciatica, right side     Lumbar spinal stenosis     Monocytosis     Multiple myeloma, remission status unspecified     Neck pain     Nontraumatic compression fracture of T8 vertebra, initial encounter     LOIS (obstructive sleep apnea)     Osteoporosis of femur without pathological fracture     Other chronic pain     Personal history of colonic polyps 06/29/2022    Secondary malignancy of lumbar vertebral column     Secondary malignant neoplasm of bone     Tachycardia    Past medical history:  NIDDM. Dyslipidemia.  GERD.  Lumbar spinal stenosis.  Vitamin-D deficiency.  Allergic rhinitis.  HTN.  Bell's palsy.  Celiac disease.  Hemorrhoids.  Mitral regurgitation 01/05/2018.  Rheumatic valve narrowing and leaking mitral valve.  -02/02/2018 ([facility]):  АНДРЕЙ, right minimally invasive anterior thoracotomy, right femoral artery and vein exploration, extensive right pleural adhesiolysis/pneumolysis, bilateral Maze/ablation (extensive), left atrial appendage clip placement for left atrial appendage exclusion, mitral valve repair (complex repair with 26 mm annuloplasty ring)  Social history:  .  Lives in [place], [place].  Five children.  Owns a [employer] business.  No history of tobacco, alcohol, or illicit drug abuse.    Family history:  Negative for cancers or blood dyscrasias.    Health maintenance:  PCP in family medicine clinic, [facility].  Had EGD and colonoscopy done 1 year ago by  Dr. Zoltan Kapoor, GI, [place], apparently unremarkable (he gets the endoscopies done periodically because of history of celiac disease).  Past Surgical History:   Procedure Laterality Date    BONE MARROW ASPIRATION N/A 06/27/2023    Procedure: ASPIRATION, BONE MARROW;  Surgeon: Namita Daniels MD;  Location: [facility] ENDOSCOPY;  Service: General;  Laterality: N/A;    BONE MARROW ASPIRATION N/A 10/24/2023    Procedure: ASPIRATION, BONE MARROW;  Surgeon: Namita Daniels MD;  Location: [facility] ENDOSCOPY;  Service: General;  Laterality: N/A;    BONE MARROW ASPIRATION N/A 03/12/2024    Procedure: ASPIRATION, BONE MARROW;  Surgeon: Haylie Liu MD;  Location: [facility] ENDOSCOPY;  Service: General;  Laterality: N/A;    BONE MARROW BIOPSY N/A 06/27/2023    Procedure: Biopsy-bone marrow;  Surgeon: Haylie Liu MD;  Location: [facility] ENDOSCOPY;  Service: General;  Laterality: N/A;    BONE MARROW BIOPSY N/A 10/24/2023    Procedure: Biopsy-bone marrow;  Surgeon: Namita Daniels MD;  Location: [facility] ENDOSCOPY;  Service: General;  Laterality: N/A;    BONE MARROW BIOPSY N/A 03/12/2024    Procedure: Biopsy-bone marrow;  Surgeon: Haylie Liu MD;  Location: [facility] ENDOSCOPY;  Service: General;  Laterality: N/A;    CARDIAC SURGERY  2018    CARDIAC VALVE REPLACEMENT  2018    Valve repair  milter    COLONOSCOPY  06/29/2022    EYE SURGERY  9/26/2022    Cataract      Social History     Socioeconomic History    Marital status:    Tobacco Use    Smoking status: Never    Smokeless tobacco: Never   Substance and Sexual Activity    Alcohol use: Never    Drug use: Never    Sexual activity: Not Currently      Family History   Problem Relation Name Age of Onset    Hypertension Mother          Reason for Follow-up:  -multiple myeloma, IgG kappa; S/P ASCT  -chronic low back pain  -hypercalcemia  -osteoporosis hips B/L    History of Present Illness:   IgG multiple myeloma      Oncologic/Hematologic History:  Oncology History   IgG  multiple myeloma   6/23/2023 Initial Diagnosis    IgG multiple myeloma     7/13/2023 - 11/21/2023 Chemotherapy    Treatment Summary   Plan Name: OP VRD - WEEKLY BORTEZOMIB LENALIDOMIDE DEXAMETHASONE Q3W  Treatment Goal: Curative  Status: Inactive  Start Date: 7/13/2023  End Date: 11/21/2023  Provider: Narciso Lundy MD  Chemotherapy: bortezomib (VELCADE) injection 2.25 mg, 2.25 mg, Subcutaneous, Clinic/[facility] 1 time, 6 of 9 cycles  Administration: 2.25 mg (7/13/2023), 2.25 mg (7/20/2023), 2.25 mg (7/27/2023), 2.25 mg (8/10/2023), 2.25 mg (8/17/2023), 2.25 mg (8/24/2023), 2.25 mg (8/31/2023), 2.25 mg (9/7/2023), 2.25 mg (9/21/2023), 2.25 mg (9/14/2023), 2.25 mg (9/28/2023), 2.25 mg (10/12/2023), 2.25 mg (10/5/2023), 2.25 mg (10/19/2023), 2.25 mg (10/26/2023), 2.25 mg (11/21/2023)  lenalidomide 25 mg Cap, 25 mg, , , 1 of 1 cycle, Start date: 11/3/2023, End date: 2/15/2024     67-year-old gentleman, referred from [facility], with newly diagnosed multiple myeloma.      Patient is being followed for IgG kappa multiple myeloma.    Please assessment and plan section for details.    06/26/2023:  Initial consultation:  Pleasant gentleman who presents for initial medical oncology consultation, accompanied by his wife and family present who is a past interventional cardiologist.  Back pain for 2 months.  Initially started in right groin.  Physical therapy has not helped.  10/10 severity.  Has been taking Tylenol without significant relief.  Secured to take narcotics because of constipation.  Underwent couple of spinal steroid shots 3 weeks ago, with minor relief.  Pain is present all the time.  Pain increases with changing position in bed as well as upon standing.  He finds it difficult to ambulate.  Pain does not radiate down lower extremities and is not accompanied with lower extremity weakness, numbness, urinary or bowel incontinence, or saddle anesthesia.  Also experiences muscle cramps.  Generalized  weakness.  However, ECOG 2.  No fevers or chills.  No repeated infections.  Cramps in hands and feet.  Appetite is down.  Appetite has picked up in last 10 days.  Has lost 12-14 lb in last 1-1/2 months.  No abnormal bleeding or bruising.      Interval History:  INF FLUIDS   [No matching plan found]       02/24/2025:  -continues on Zometa every 4 weeks  -10/14/2024: DEXA scan (comparison:  10/11/2023): Osteoporosis based on hips; significantly increased fracture risk  -11/05/2024:  CT abdomen pelvis with IV contrast (abnormal weight loss; comparison 06/27/2023):  No acute abnormality detected in the abdomen or pelvis.   Severe skeletal demineralization.  Loss of vertebral body height throughout the lumbar spine, new at L5 when compared to July of 2023.  -11/05/2024:  CT chest with contrast (abnormal weight loss; comparison 11/01/2023):  Grossly stable bronchiectasis and scarring in the right upper lobe.  Numerous calcified granulomas right lung.   Atrial appendage clip.   Skeletal demineralization, with chronic shallow superior endplate depression from T9 through T12.  -10/04/2024: Hemoglobin 10.7, .2 elevated; serum iron 63 low, TIBC normal, ferritin 277 elevated; transferrin saturation 25% normal; B12 level 472 normal; folate 33.4 elevated; haptoglobin 256 normal;  low  -hemoglobin: 9.7 on 01/27/2025; 10.4 on 12/30/2024; 10.4 on 12/02/2024; 10.7 on 10/04/2024, etc.  -TSH and free T4 normal on 12/30/2024, 12/02/2024  -10/04/2024:  No monoclonal bands; kappa/lambda ratio 1.65 normal, kappa 4.67 without consistent upward trend; lambda 2.83 elevated  -01/27/2025:  No monoclonal bands; kappa/lambda ratio 1.24 normal; kappa 3.78 elevated and without upward trend; lambda 3.05 elevated and more less stable  -02/11/2025: Follow-up with Dr. Brandy Mahajan, hematology/oncology/BMT:  Decrease Revlimid from 15 mg to 10 mg because hemoglobin down to 8  -02/24/2025:  WBC 5.25, hemoglobin 10.4, platelets 123 K, ANC 1.18,  CO2 22 chronically suppressed, chloride 110 chronically elevated, BUN and creatinine normal, estimated GFR greater than 60 mL/minutes/1.73 m2, calcium 9.4, albumin 3.3, very mild hypercalcemia  Presents for a follow-up visit.  In no acute discomfort.  Gaining weight.  On Megace.  Wants to continue Megace.  On Revlimid 10 mg daily for maintenance.  His appetite declined ever since he started taking Revlimid.  Improved with Megace.  Gaining weight.  Happy.    No signs of venous thromboembolism.  On baby aspirin 81 mg daily along with Revlimid.  No fevers, chills, weakness, fatigue, or bone pains.  No chest pain, cough, or dyspnea.  No abdominal pain, nausea, vomiting, GI bleeding.  No change in bowel habits.  No significant weakness or fatigue.  ECOG 0-1.  Chronic back pain.  Does not radiate.  Says that he has consulted with 4-5 spinal surgeons and none of them have recommended any kind of surgery.  No skin rash with Revlimid.  No recurrent infections.  No hematuria.  In the past, consulted with Xochitl Grubbs MD, neurosurgery, for lumbar stenosis with neurogenic claudication; chronic bilateral low back pain with bilateral sciatica; compression fracture of lumbar vertebrae; planning L2-3, L3-4, L4-5 laminectomy apparently, she recommended surgery; however, Mr. Díaz opted against surgery.    Medications:  Current Outpatient Medications on File Prior to Visit   Medication Sig Dispense Refill    acyclovir (ZOVIRAX) 400 MG tablet Take 1 tablet (400 mg total) by mouth 2 (two) times daily. 60 tablet 4    ascorbic acid, vitamin C, (VITAMIN C) 1000 MG tablet Take 1,000 mg by mouth once daily.      aspirin (ECOTRIN) 81 MG EC tablet Take 81 mg by mouth once daily.      atorvastatin (LIPITOR) 20 MG tablet Take 20 mg by mouth once daily.      calcium carbonate 195 mg calcium (500 mg) Chew Take 1 tablet by mouth once daily.      cetirizine (ZYRTEC) 10 mg Cap Take 1 capsule by mouth daily as needed.      coenzyme Q10 10 mg  capsule Take 1 capsule by mouth once daily.      famotidine (PEPCID) 40 MG tablet Take 40 mg by mouth every evening.      ferrous sulfate (FEOSOL) 325 mg (65 mg iron) Tab tablet Take 650 mg by mouth.      gabapentin (NEURONTIN) 600 MG tablet Take 1 tablet (600 mg total) by mouth 2 (two) times daily as needed. 60 tablet 2    hydrocortisone (ANUSOL-HC) 2.5 % rectal cream Place 1 applicator rectally 2 (two) times daily. 28 g 2    lenalidomide 10 mg Cap Take 1 capsule by mouth.      megestroL (MEGACE) 400 mg/10 mL (40 mg/mL) Susp Take 10 mLs (400 mg total) by mouth once daily. 300 mL 3    metFORMIN (GLUCOPHAGE) 500 MG tablet Take 1 tablet (500 mg total) by mouth 2 (two) times daily with meals. 180 tablet 3    metoprolol succinate (TOPROL-XL) 50 MG 24 hr tablet Take 50 mg by mouth once daily.      MIRALAX 17 gram/dose powder Take 17 g by mouth.      mirtazapine (REMERON) 7.5 MG Tab Take 1 tablet by mouth every evening.      multivit with min-folic acid 0.4 mg Tab Take 1 tablet by mouth once daily.      multivitamin with minerals tablet Take 1 tablet by mouth every morning.      omega 3-dha-epa-fish oil 300 mg (120 mg- 180mg)-1,000 mg Cap Take 500 mg by mouth.      omega-3 fatty acids/fish oil (FISH OIL-OMEGA-3 FATTY ACIDS) 300-1,000 mg capsule Take 2 g by mouth.      predniSONE (DELTASONE) 20 MG tablet Take 20 mg by mouth once daily.      sulfamethoxazole-trimethoprim 800-160mg (BACTRIM DS) 800-160 mg Tab Take 1 tablet by mouth on MWF of each week 48 tablet 3    traMADoL 100 mg Tab Take 100 mg by mouth 2 (two) times daily as needed (pain). 30 tablet 0    pantoprazole (PROTONIX) 20 MG tablet Take 1 tablet (20 mg total) by mouth once daily. 30 tablet 11     No current facility-administered medications on file prior to visit.     Review of Systems:   All systems reviewed and found to be negative except for the symptoms detailed above    Physical Examination:   VITAL SIGNS:   Vitals:    02/24/25 1109   BP: 108/73   Pulse: 67  "  Resp: 18   Temp: 98.2 °F (36.8 °C)       GENERAL:  In no apparent distress.    HEAD:  No signs of head trauma.  EYES:  Pupils are equal.  Extraocular motions intact.    EARS:  Hearing grossly intact.  MOUTH:  Oropharynx is normal.   NECK:  No adenopathy, no JVD.     CHEST:  Chest with clear breath sounds bilaterally.  No wheezes, rales, rhonchi.    CARDIAC:  Regular rate and rhythm.  S1 and S2, without murmurs, gallops, rubs.  VASCULAR:  No Edema.  Peripheral pulses normal and equal in all extremities.  ABDOMEN:  Soft, without detectable tenderness.  No sign of distention.  No   rebound or guarding, and no masses palpated.   Bowel Sounds normal.  MUSCULOSKELETAL:  Good range of motion of all major joints. Extremities without clubbing, cyanosis or edema.    NEUROLOGIC EXAM:  Alert and oriented x 3.  No focal sensory or strength deficits.   Speech normal.  Follows commands.  PSYCHIATRIC:  Mood normal.    No results for input(s): "CBC" in the last 72 hours.   No results for input(s): "CMP" in the last 72 hours.     Assessment:  Problem List Items Addressed This Visit       Celiac disease    History of tuberculosis    IgG multiple myeloma    RESOLVED: Secondary malignant neoplasm of bone    RESOLVED: Secondary malignancy of lumbar vertebral column    Drug-induced immunodeficiency    Compression fracture of first lumbar vertebra - Primary    Compression fracture of L2 lumbar vertebra    Degeneration of lumbar intervertebral disc    Spinal stenosis of lumbar region    Osteoporosis of femur without pathological fracture    H/O autologous stem cell transplant    Neurogenic claudication    Multiple myeloma in remission       Orders for 02/24/2025:   Continue Revlimid 10 mg daily   Baby aspirin 81 mg daily   Check CBC and CMP monthly   Check TSH and free T4 every 3 months  Continue Zometa every 4 weeks  Calcium and vitamin-D supplements to continue   Check SPEP, BUBBA, FLC assay every 3 months; next, March  Continue acyclovir " and Bactrim until vaccinations are completed/until recommended by Dr. Brandy Mahajan  DEXA scan in October  Follow-up with NP in 1 month    Above discussed with the patient.  All questions answered.  Discussed labs and scans.  Plan of management discussed.    He understands and agrees with this plan.  ================================    # Multiple myeloma, IgG kappa:  ISS stage:  Stage I  R-ISS stage:  Stage I  -presentation:  03/2023: Worsening back pain, 12-14 lb weight loss over 6 weeks  -skeletal survey 06/14/2023: Negative for lytic or sclerotic lesions  -06/14/2023:  Hemoglobin 11.0, IgG kappa M protein 2.28 gm/dL, kappa elevated, lambda suppressed, kappa/lambda ratio 292  -renal function normal, no hypercalcemia  -24 hour urine: M spike 183 mg; monoclonal kappa light chains in urine; urine kappa elevated, urine lambda suppressed, urine kappa/lambda ratio> 36.4  -LDH normal 06/16/2023   -Beta-2 microglobulin 3.18 on 06/26/2023  -bone marrow biopsy 06/27/2023:  50% plasmacytosis with kappa light chain restriction; no high-risk cytogenetics  -FDG PET-CT 06/29/2023:  Questionable findings  ======================================  Staging of myeloma:  -serum beta 2 microglobulin elevated (3.18)  -serum albumin and LDH normal   -myeloma FISH panel on bone marrow:  Negative for high-risk cytogenetic abnormalities  >>>  ISS stage:  Stage I  R-ISS stage:  Stage I  ======================================  -Velcade started 07/13/2023   -Zometa 4 mg IV every 4 weeks (x2 years), started 07/13/2023  -VRD:  Cycle 1 started 07/13/2023; cycle 2 started 08/10/2023; cycle 3 started 08/31/2023; cycle 4 started 09/21/2023  -Radiation oncology consult:  Radiotherapy to spine not indicated/will not be helpful; questionable findings on FDG PET-CT  -IR at [facility] will not perform kyphoplasty  -significant response to chemotherapy x2 cycles (on 08/24/2023, kappa/lambda ratio 5.72, much improved; IgG kappa monoclonal protein down to 0.39 gm/dL,  significantly decreased)   (pre chemotherapy IgG kappa, on 06/14/2023, was 2.28 gm/dL)  -VRD cycle 5:  10/12/2023-10/26/2023  -restaging bone marrow biopsy 10/24/2023, post VRD X 4-1/2 cycles:  Normocellular; < 1% plasma cells; no clonal or abnormal plasma cell population on flow cytometry; no high-risk FISH findings  (restaging bone marrow biopsy post VRD X 4-1/2 cycles:  <1% plasma cells)  (pre chemotherapy bone marrow biopsy 06/27/2023, had shown 50% plasmacytosis)  -11/08/2023: No monoclonal protein in urine  -11/09/2023:  No monoclonal protein on SPEP and BUBBA; kappa/lambda ratio 1.36, normalized; kappa free light chains 2.29 mg/dL (to recall, 99.4 on 06/14/2023 before starting chemotherapy)  -cycle 6 day 1 of RVD 11/21/2023; subsequently, on hold in anticipation of stem cell transplant  >>>  # ASCT at [facility]:  -melphalan 360 mg IV administered day -2 (11/28/2023) (200 mg per m2 per day)  -ASCT infusion day 0 (11/30/2023)  -filgrastim 5 microgram/kg subQ every 24 hours, started day +5 (12/05/2023)  -infection prophylaxis  -pentamidine 300 mg inhaled once on day -2 (11/28/2023)  -acyclovir 800 mg p.o. q.12 hours starting day -2 (11/28/2023), to continue for 1 year  -fluconazole 400 mg p.o. Q 24 hours starting day -2 (11/28/2023), continue until engraftment and/or mucositis resolved  -cefepime started 12/08/2023, vancomycin added  -IVIG on 12/10/2023  -12/12/2023:  HCT day +12; doing well on antibiotics; counts recovered  -12/12/2023: Transfusion independent and ANC has recovered  -12/13/2023: Patient discharged  -venous Doppler both lower extremities 02/29/2024: Pedal edema: No DVT   -03/11/2024:  Hemoglobin 11.2; CMP unremarkable   -03/11/2024 (day + 102):  No monoclonal protein in blood; FLC assay normal   -restaging bone marrow biopsy 03/12/2024 (day +103):  No increase in plasma cells  -03/11/2024:  No monoclonal band; kappa/lambda ratio normal  -03/18/2024:  Urine negative for monoclonal protein  -thus, CR,  post ASCT  -04/09/2024: Dr. Brandy Mahajan:  Maintenance Revlimid 10 mg p.o. q.day, started, with aspirin  -06/10/2024: No monoclonal bands per SPEP and BUBBA; kappa/lambda ratio normal  -08/20/2024: Dr. Lynch; maintenance Revlimid dose increased to 15 mg daily  -continue acyclovir and Bactrim  -DEXA scan 10/14/2025:  Osteoporosis based on hips  -CTs C/A/P 11 05/2024: Abnormal weight loss:  Essentially negative  -TSH and free T4 normal on 12/30/2024, 12/02/2024  -10/04/2024:  No monoclonal bands; kappa/lambda ratio 1.65 normal, kappa 4.67 without consistent upward trend; lambda 2.83 elevated  -01/27/2025:  No monoclonal bands; kappa/lambda ratio 1.24 normal; kappa 3.78 elevated and without upward trend; lambda 3.05 elevated and more less stable  -02/11/2025: Follow-up with Dr. Brandy Mahajan, hematology/oncology/BMT:  Decrease Revlimid from 15 mg to 10 mg because hemoglobin down to 8  >>>  Plan:   -Revlimid dose decreased to 10 mg daily from 02/11/2025, by Dr. Brandy Mahajan, because hemoglobin dropped to 8  -baby aspirin 81 mg daily to prevent thromboembolism  -check CBC and CMP monthly  -check TSH and free T4 every 2-3 months  -with lenalidomide, monitor for signs and symptoms of infection, bleeding, bruising, hepatotoxicity, secondary malignancies, thromboembolism, dermatologic toxicity, hypersensitivity  -a minimum of 2 years of lenalidomide maintenance therapy is recommended  -lenalidomide dose modifications for toxicity: See below  -continue Zometa every 4 weeks x2 years (started 07/13/2023; continue until 07/2025)  -continue calcium and vitamin-D supplements along with Zometa to prevent hypocalcemia  -monitor renal function while on Zometa  -per Radiation Oncology, given questionable radiologic findings per FDG PET-CT, palliative radiotherapy to spine was not thought to be not useful, therefore, not pursued  -SPEP, BUBBA, FLC check every 3 months; next, March  -continue acyclovir and Bactrim until vaccination  completed  -10/04/2024: Anorexia; start Megace 400 mg p.o. q.day  -follow-up in 1 month    Lenalidomide Recommended Dosage Modifications for Hematologic Adverse Reactions in Multiple Myeloma (Maintenance Therapy Following Autologous Transplant):  #Neutropenia:  -ANC <500/mm3: Withhold lenalidomide and check CBC weekly; resume lenalidomide at the next lower dose with continuous dosing on days 1 to 28 of a 28-day cycle when ANC >=500/mm3.  -ANC <500/mm3 (subsequent occurrence at 5 mg daily [continuous] dose): Withhold lenalidomide; resume lenalidomide at 5 mg daily on days 1 to 21 of a 28-day cycle when ANC >=500/mm3. Do not dose below 5 mg daily on days 1 to 21 of a 28-day cycle.  #Thrombocytopenia:  -Platelets <30,000/mm3 (first occurrence): Withhold lenalidomide and check CBC weekly; resume lenalidomide at the next lower dose with continuous dosing on days 1 to 28 of a 28-day cycle when platelets >=30,000/mm3.  -Platelets <30,000/mm3 (subsequent occurrence at 5 mg daily [continuous] dose): Withhold lenalidomide; resume lenalidomide at 5 mg daily on days 1 to 21 of a 28-day cycle when platelets >=30,000/mm3. Do not dose below 5 mg daily on days 1 to 21 of a 28-day cycle.    Monitoring with lenalidomide:  -CBC with differential: weekly for the first 2 cycles, every 2 weeks during the third cycle and monthly thereafter;  -serum creatinine, LFTs (periodically).  -Thyroid function tests (TSH at baseline then every 2 to 3 months during lenalidomide treatment  -ECG when clinically indicated. Monitor for signs and symptoms of infection (if neutropenic), bleeding or bruising, hepatotoxicity, secondary malignancies, thromboembolism (eg, shortness of breath, chest pain, arm or leg swelling), dermatologic toxicity, tumor lysis syndrome, or hypersensitivity.    Lenalidomide:  Potential side effects:  Embryo fetal toxicity  Hematologic toxicity.  Neutropenia.  Thrombocytopenia.  Venous and arterial thromboembolism.  DVT.  PE.   MI.  Stroke.  Dizziness, fatigue.  Tumor lysis syndrome.  Heart failure.    Used with caution in patients with renal impairment.  Lenalidomide =/> 4 cycles may decrease the # of CD34 pos cells collected for autologous stem cell transplant.  DVT, PE, atrial fibrillation, renal failure are more likely in patients> 65 years  Hepatotoxicity  Hypersensitivity reactions.  Anaphylaxis.  Angioedema.  Skin rash.  Eosinophilia.  Immediate hypersensitivity reactions.  Second primary malignancy.  AML.  MDS.  Solid tumor malignancies.  Nonmelanoma skin cancers.    # Osteoporosis of hips on DEXA scan 10/11/2023  -osteoporosis of hips noted on DEXA scan 10/11/2023  -DEXA scan 10/14/2025:  Osteoporosis based on hips  >>>  -patient already receiving monthly Zometa for underlying multiple myeloma; this will help with osteoporosis as well  -repeat DEXA scan in 1 year (10/2025)    # Exophytic lesion upper pole of right kidney:  10 mm exophytic hyperdense focus upper pole of right kidney, compatible with a hyperdense cyst, on CT abdomen pelvis with contrast 06/27/2023  -07/12/2023:  MRI abdomen with and without contrast (right renal cyst): Small exophytic right renal lesion most likely a proteinaceous or hemorrhagic cyst (10 mm, upper pole of right kidney)  -MRI lumbar spine 08/02/2024:  1.2 cm exophytic lesion indeterminate from anterior upper pole of right kidney; multilevel degenerative disc disease, etc.    # Non myeloma findings (DJD of spine, spinal stenosis, compression fractures):  -mild L4-L5 spinal canal stenosis on MRI 05/04/2023   -lumbar DJD and facet arthrosis on MRI lumbar spine 05/04/2023  -MRI T-spine 08/08/2023:  No myeloma lesions   -MRI lumbar spine 09/14/2023:  Progression of skeletal demineralization; multilevel degenerative disc disease; severe spinal stenosis L3-L5; new/progressive loss of vertebral body height throughout the spine since 05/2023, etc.  -IR at [facility] will not perform kyphoplasty  -MRI lumbar spine  08/02/2024:  1.2 cm exophytic lesion indeterminate from anterior upper pole of right kidney; multilevel degenerative disc disease, etc.  -follows up with Xochitl Grubbs MD, neurosurgery, for lumbar stenosis with neurogenic claudication; chronic bilateral low back pain with bilateral sciatica; compression fracture of lumbar vertebrae; planning L2-3, L3-4, L4-5 laminectomy    # Rising PSA level:  -PSA: 2.78 on 05/27/2024; 1.7 on 05/01/2024; 1.96 on 09/17/2021  -MRI prostate 09/23/2024:  PI-RADS 2: Low (rising PSA level)    # Chronic macrocytic anemia:  -chronic anemia: Hemoglobin: 9.9 on 09/06/2024; 10.5 on 09/03/2024; 10.5 on 07/12/2024; 11.1 on 06/14/2024; 11.7 on 05/03/2024  -MCV:  Slightly elevated  -10/04/2024:  Hemoglobin 10.7, more less stable; .2; ANC 2.69; otherwise, CBC unremarkable; CMP reviewed; creatinine 1.24, slightly up; calcium 9.2, albumin 3.4 (no hypercalcemia)  -10/04/2024: Hemoglobin 10.7, .2 elevated; serum iron 63 low, TIBC normal, ferritin 277 elevated; transferrin saturation 25% normal; B12 level 472 normal; folate 33.4 elevated; haptoglobin 256 normal;  low  -hemoglobin: 9.7 on 01/27/2025; 10.4 on 12/30/2024; 10.4 on 12/02/2024; 10.7 on 10/04/2024, etc.  (macrocytic anemia; iron/B12/folate stores normal; no hemolysis; patient on Revlimid maintenance)  -02/11/2025: Follow-up with Dr. Brandy Mahajan, hematology/oncology/BMT:  Decrease Revlimid from 15 mg to 10 mg because hemoglobin down to 8  >>>  -anemia most likely secondary to bone marrow suppression with Revlimid; expectant monitoring    # Co-morbidities:  NIDDM.  Dyslipidemia.  Hypertension.  Celiac disease.    History of rheumatic mitral valve disease, S/P Maze/ablation procedure 02/02/2018      Vaccination history:   COVID-19, MRNA, LN-S, PF (MODERNA FULL 0.5 ML DOSE) 8/18/2022 , 10/23/2021 , 3/17/2021 , 2/17/2021   Hepatitis A / Hepatitis B 8/18/2022 , 10/5/2021 , 7/29/2020   Influenza - Quadrivalent - [facility] - PF  10/5/2021   Influenza - Trivalent - [facility] - PF 9/10/2015   Meningococcal Conjugate (MCV4O) 8/18/2022 , 4/17/2012   Meningococcal Conjugate (MCV4P) 4/17/2012   Pneumococcal Conjugate - 20 Valent 1/12/2023   Tdap 7/29/2020   Zoster 11/18/2020 , 7/29/2020   Zoster Recombinant 11/18/2020 , 7/29/2020     ===========================================    Discussion:  Supportive care for myeloma:  -bone targeted treatment: Bisphosphonate, category 1; or denosumab to reduce risk of skeletal related events; denosumab is preferred in patients with renal insufficiency; assess vitamin-D status; baseline dental exam; monitor for osteonecrosis of the jaw; monitor renal dysfunction with bisphosphonate therapy  -continue bone targeted treatment (bisphosphonate or denosumab) for 2 years; continue beyond 2 years should be based on clinical judgment  -patients receiving denosumab for bone disease who subsequently discontinued therapy should be given maintenance denosumab every 6 months or a single dose of bisphosphonate to mitigate risk of rebound osteoporosis  -for hypercalcemia, zoledronic acid, denosumab, steroids, and/or calcitonin  -for hyperviscosity, plasmapheresis should be used as adjuvant therapy for symptomatic hyperviscosity  -intravenous immunoglobulin therapy should be considered in the setting of recurrent serious infection and/or hypogammaglobulinemia (IgG </= 400 mg/dL)  -pneumococcal conjugate vaccine, followed by pneumococcal polysaccharide vaccine 1 year later  -Influenza vaccination is recommended; 2 doses of high-dose inactivated quandaivalent influenza vaccine should be considered  -consider 12 weeks of levofloxacin 500 mg p.o. daily at the time of initial diagnosis of multiple myeloma  -COVID-19 vaccination  -highest risk for VTE is in the 1st 6 months following new diagnosis of multiple myeloma  -VTE prophylaxis if no contraindications to anticoagulation agents or antiplatelets  Recommendations for VTE  prophylaxis:  Aspirin  mg daily; low molecular weight heparin equivalent to enoxaparin 40 mg daily; Xarelto 10 mg daily; Eliquis 2.5 mg b.i.d.; Arixtra 2.5 mg daily; Coumadin with target INR 2.0-3.0    Follow-up:  No follow-ups on file.

## 2025-02-24 NOTE — NURSING
Patient here for zometa C22. Patient CrCl 63.8.   Patient denies CVA or MI in past year. Patient denies on any bisphospates taking currently. Patient states he is taking calcium  and vitamin D supplements daily. Patient has not had a invasive dental procedure in the past 3 months. Patient verbalizes understanding not to have invasive dental procedure in the next 3 months.   Patient tolerated treatment.

## 2025-02-27 ENCOUNTER — TELEPHONE (OUTPATIENT)
Dept: HEMATOLOGY/ONCOLOGY | Facility: CLINIC | Age: 70
End: 2025-02-27
Payer: MEDICARE

## 2025-03-19 DIAGNOSIS — M48.061 SPINAL STENOSIS OF LUMBAR REGION, UNSPECIFIED WHETHER NEUROGENIC CLAUDICATION PRESENT: ICD-10-CM

## 2025-03-20 RX ORDER — TRAMADOL HYDROCHLORIDE 100 MG/1
100 TABLET, COATED ORAL 2 TIMES DAILY PRN
Qty: 30 TABLET | Refills: 0 | Status: SHIPPED | OUTPATIENT
Start: 2025-03-20

## 2025-03-21 ENCOUNTER — HOSPITAL ENCOUNTER (OUTPATIENT)
Dept: RADIOLOGY | Facility: HOSPITAL | Age: 70
Discharge: HOME | End: 2025-03-21
Attending: PHYSICIAN ASSISTANT
Payer: MEDICARE

## 2025-03-21 DIAGNOSIS — R14.1 FLATULENCE, ERUCTATION, AND GAS PAIN: ICD-10-CM

## 2025-03-21 DIAGNOSIS — R14.3 FLATULENCE, ERUCTATION, AND GAS PAIN: ICD-10-CM

## 2025-03-21 DIAGNOSIS — K90.0 CELIAC DISEASE: ICD-10-CM

## 2025-03-21 DIAGNOSIS — R14.2 FLATULENCE, ERUCTATION, AND GAS PAIN: ICD-10-CM

## 2025-03-21 DIAGNOSIS — K59.00 COLONIC CONSTIPATION: ICD-10-CM

## 2025-03-21 PROCEDURE — 74018 RADEX ABDOMEN 1 VIEW: CPT | Mod: TC

## 2025-03-25 RX ORDER — HEPARIN 100 UNIT/ML
500 SYRINGE INTRAVENOUS
OUTPATIENT
Start: 2025-04-18

## 2025-03-25 RX ORDER — ZOLEDRONIC ACID 0.04 MG/ML
4 INJECTION, SOLUTION INTRAVENOUS
OUTPATIENT
Start: 2025-04-18

## 2025-03-25 RX ORDER — SODIUM CHLORIDE 0.9 % (FLUSH) 0.9 %
10 SYRINGE (ML) INJECTION
OUTPATIENT
Start: 2025-04-18

## 2025-03-31 DIAGNOSIS — C90.00 IGG MULTIPLE MYELOMA: ICD-10-CM

## 2025-03-31 RX ORDER — ACYCLOVIR 400 MG/1
400 TABLET ORAL 2 TIMES DAILY
Qty: 60 TABLET | Refills: 4 | Status: CANCELLED | OUTPATIENT
Start: 2025-03-31

## 2025-03-31 NOTE — PROGRESS NOTES
[facility] Hematology/Oncology  PROGRESS NOTE    Subjective:       Patient ID: Yuriy Díaz is a 69 y.o. male.    Diagnosis:   -multiple myeloma, IgG kappa; S/P ASCT  -worsening low back pain  -hypercalcemia  -osteoporosis hips B/L      Current Treatment:  Revlimid 10 mg po daily, decreased from 15 mg daily on 02/11/2025 by  due to anemia with hemoglobin of 8  started 8/20/24    Zometa 4mg IV every 28 days  Start 7/13/2023       Treatment History:  VRD (7/13/2023-11/21/23)  ASCT 11/30/2023  Revlimid 10mg po daily (4/9/24-8/20/24)    Chief Complaint: Follow-up (Patient reports lower back pain.pain level 5.)    HPI:  67-year-old gentleman, referred from [facility], with newly diagnosed multiple myeloma.       Patient is being followed for IgG kappa multiple myeloma.    Please assessment and plan section for details.     06/26/2023:  Initial consultation:  Pleasant gentleman who presents for initial medical oncology consultation, accompanied by his wife and family present who is a past interventional cardiologist.  Back pain for 2 months.  Initially started in right groin.  Physical therapy has not helped.  10/10 severity.  Has been taking Tylenol without significant relief.  Secured to take narcotics because of constipation.  Underwent couple of spinal steroid shots 3 weeks ago, with minor relief.  Pain is present all the time.  Pain increases with changing position in bed as well as upon standing.  He finds it difficult to ambulate.  Pain does not radiate down lower extremities and is not accompanied with lower extremity weakness, numbness, urinary or bowel incontinence, or saddle anesthesia.  Also experiences muscle cramps.  Generalized weakness.  However, ECOG 2.  No fevers or chills.  No repeated infections.  Cramps in hands and feet.  Appetite is down.  Appetite has picked up in last 10 days.  Has lost 12-14 lb in last 1-1/2 months.  No abnormal bleeding or bruising.    Past medical history:   NIDDM. Dyslipidemia.  GERD.  Lumbar spinal stenosis.  Vitamin-D deficiency.  Allergic rhinitis.  HTN.  Bell's palsy.  Celiac disease.  Hemorrhoids.  Mitral regurgitation 01/05/2018.  Rheumatic valve narrowing and leaking mitral valve.  -02/02/2018 ([facility]):  АНДРЕЙ, right minimally invasive anterior thoracotomy, right femoral artery and vein exploration, extensive right pleural adhesiolysis/pneumolysis, bilateral Maze/ablation (extensive), left atrial appendage clip placement for left atrial appendage exclusion, mitral valve repair (complex repair with 26 mm annuloplasty ring)  Social history:  .  Lives in [place], [place].  Five children.  Owns a [employer] business.  No history of tobacco, alcohol, or illicit drug abuse.    Family history:  Negative for cancers or blood dyscrasias.    Health maintenance:  PCP in family medicine clinic, [facility].  Had EGD and colonoscopy done 1 year ago by Dr. Zoltan Kapoor, , [place], apparently unremarkable (he gets the endoscopies done periodically because of history of celiac disease).    Interval History:  Patient presents to clinic for a scheduled follow-up visit and treatment.  He is due for Zometa today.  He reports compliance with Revlimid 10 mg daily.  He was recently seen via tele-visit by Dr. Clark on 2/11/2025, revlimid decreased from 15 mg to 10 mg daily secondary to anemia with a hemoglobin of 8. He denies any new or worsening symptoms.  He does continue with chronic lower back pain.  He denies any nausea, vomiting, diarrhea, constipation, unusual headache, shortness of breath, cough, skin changes, fever or signs of infection he continues on Megace for his appetite.  Results reviewed with patient in detail.  Labs and plan of care reviewed as well, patient verbalized understanding.    PMX/PSHx  Past Medical History:   Diagnosis Date    Celiac disease     Cerebral palsy, unspecified     Compression fracture of L1 vertebra  with delayed healing, subsequent encounter     Compression fracture of L2 vertebra with delayed healing, subsequent encounter     Cyst of right kidney     Degeneration of lumbar intervertebral disc     Diabetes mellitus, type 2     GERD (gastroesophageal reflux disease)     Hyperlipidemia     Hypertension     IgG multiple myeloma     Low back pain     Lumbago with sciatica, right side     Lumbar spinal stenosis     Monocytosis     Multiple myeloma, remission status unspecified     Neck pain     Nontraumatic compression fracture of T8 vertebra, initial encounter     LOIS (obstructive sleep apnea)     Osteoporosis of femur without pathological fracture     Other chronic pain     Personal history of colonic polyps 06/29/2022    Secondary malignancy of lumbar vertebral column     Secondary malignant neoplasm of bone     Tachycardia       Past Surgical History:   Procedure Laterality Date    BONE MARROW ASPIRATION N/A 06/27/2023    Procedure: ASPIRATION, BONE MARROW;  Surgeon: Namita Daniels MD;  Location: [facility] ENDOSCOPY;  Service: General;  Laterality: N/A;    BONE MARROW ASPIRATION N/A 10/24/2023    Procedure: ASPIRATION, BONE MARROW;  Surgeon: Namita Daniels MD;  Location: [facility] ENDOSCOPY;  Service: General;  Laterality: N/A;    BONE MARROW ASPIRATION N/A 03/12/2024    Procedure: ASPIRATION, BONE MARROW;  Surgeon: Haylie Liu MD;  Location: [facility] ENDOSCOPY;  Service: General;  Laterality: N/A;    BONE MARROW BIOPSY N/A 06/27/2023    Procedure: Biopsy-bone marrow;  Surgeon: Haylie Liu MD;  Location: [facility] ENDOSCOPY;  Service: General;  Laterality: N/A;    BONE MARROW BIOPSY N/A 10/24/2023    Procedure: Biopsy-bone marrow;  Surgeon: Namita Daniels MD;  Location: [facility] ENDOSCOPY;  Service: General;  Laterality: N/A;    BONE MARROW BIOPSY N/A 03/12/2024    Procedure: Biopsy-bone marrow;  Surgeon: Haylie Liu MD;  Location: [facility] ENDOSCOPY;  Service: General;  Laterality: N/A;    CARDIAC SURGERY   2018    CARDIAC VALVE REPLACEMENT  2018    Valve repair  [facility]    COLONOSCOPY  06/29/2022    EYE SURGERY  9/26/2022    Cataract     Allergies:  Review of patient's allergies indicates:   Allergen Reactions    Wheat Other (See Comments)      Social History:   Social History[1]  Medications:  Current Outpatient Medications   Medication Instructions    acyclovir (ZOVIRAX) 400 mg, Oral, 2 times daily    ascorbic acid (vitamin C) (VITAMIN C) 1,000 mg, Daily    aspirin (ECOTRIN) 81 mg, Daily    atorvastatin (LIPITOR) 20 mg, Daily    calcium carbonate 195 mg calcium (500 mg) Chew 1 tablet, Daily    cetirizine (ZYRTEC) 10 mg Cap 1 capsule, Daily PRN    coenzyme Q10 10 mg capsule 1 capsule, Daily    famotidine (PEPCID) 40 mg, Nightly    ferrous sulfate (FEOSOL) 650 mg    gabapentin (NEURONTIN) 600 mg, Oral, 2 times daily PRN    hydrocortisone (ANUSOL-HC) 2.5 % rectal cream 1 applicator, Rectal, 2 times daily    lenalidomide 10 mg Cap 1 capsule    megestroL (MEGACE) 400 mg, Oral, Daily    metFORMIN (GLUCOPHAGE) 500 mg, Oral, 2 times daily with meals    metoprolol succinate (TOPROL-XL) 50 mg, Daily    MIRALAX 17 g    mirtazapine (REMERON) 7.5 MG Tab 1 tablet, Nightly    multivit with min-folic acid 0.4 mg Tab 1 tablet, Daily    multivitamin with minerals tablet 1 tablet, Every morning    omega 3-dha-epa-fish oil 300 mg (120 mg- 180mg)-1,000 mg Cap 500 mg    omega-3 fatty acids/fish oil (FISH OIL-OMEGA-3 FATTY ACIDS) 300-1,000 mg capsule 2 g    pantoprazole (PROTONIX) 20 mg, Oral, Daily    predniSONE (DELTASONE) 20 mg, Daily    sulfamethoxazole-trimethoprim 800-160mg (BACTRIM DS) 800-160 mg Tab Take 1 tablet by mouth on MWF of each week    traMADoL 100 mg, Oral, 2 times daily PRN     ROS:  Review of Systems   Constitutional:  Negative for appetite change, chills, fatigue, fever and unexpected weight change.   HENT:  Negative for ear discharge, facial swelling, mouth sores, nosebleeds, sinus pressure/congestion and sore throat.     Eyes:  Negative for pain and visual disturbance.   Respiratory:  Negative for cough, chest tightness, shortness of breath and wheezing.    Cardiovascular:  Negative for chest pain, palpitations and leg swelling.   Gastrointestinal:  Negative for abdominal pain, blood in stool, change in bowel habit, constipation, diarrhea, nausea and vomiting.   Endocrine: Negative.    Genitourinary:  Negative for dysuria, frequency, hematuria and urgency.   Musculoskeletal:  Negative for arthralgias, gait problem, joint swelling and myalgias.   Integumentary:  Negative for color change, pallor, rash and wound.   Allergic/Immunologic: Negative.  Negative for frequent infections.   Neurological:  Negative for dizziness, vertigo, syncope, weakness and numbness.   Hematological:  Negative for adenopathy. Does not bruise/bleed easily.   Psychiatric/Behavioral:  Negative for confusion and dysphoric mood. The patient is not nervous/anxious.    All other systems reviewed and are negative.      Objective:   Vitals:  Vitals:    04/01/25 1255   BP: 110/66   Pulse: 60   Resp: 20   Temp: 97.8 °F (36.6 °C)      Wt Readings from Last 3 Encounters:   04/01/25 1255 67.9 kg (149 lb 9.6 oz)   02/24/25 1109 68.5 kg (151 lb)   01/28/25 1346 68.2 kg (150 lb 6.4 oz)      Physical Examination:  Physical Exam  Vitals and nursing note reviewed.   HENT:      Head: Normocephalic and atraumatic.      Mouth/Throat:      Mouth: Mucous membranes are moist.      Pharynx: Oropharynx is clear.   Eyes:      Extraocular Movements: Extraocular movements intact.      Conjunctiva/sclera: Conjunctivae normal.      Pupils: Pupils are equal, round, and reactive to light.   Cardiovascular:      Rate and Rhythm: Normal rate and regular rhythm.   Pulmonary:      Effort: Pulmonary effort is normal.      Breath sounds: Normal breath sounds.   Abdominal:      General: Abdomen is flat. Bowel sounds are normal.      Palpations: Abdomen is soft.   Musculoskeletal:          General: Normal range of motion.      Cervical back: Normal range of motion and neck supple.   Skin:     General: Skin is warm and dry.   Neurological:      General: No focal deficit present.      Mental Status: He is alert.   Psychiatric:         Mood and Affect: Mood normal.       ECOG SCORE           Labs:  Lab Visit on 04/01/2025   Component Date Value    TSH 04/01/2025 2.000     Thyroxine Free 04/01/2025 0.90     Sodium 04/01/2025 137     Potassium 04/01/2025 3.8     Chloride 04/01/2025 113 (H)     CO2 04/01/2025 21 (L)     Glucose 04/01/2025 104     Blood Urea Nitrogen 04/01/2025 16.8     Creatinine 04/01/2025 1.29 (H)     Calcium 04/01/2025 8.8     Protein Total 04/01/2025 7.2     Albumin 04/01/2025 3.3 (L)     Globulin 04/01/2025 3.9 (H)     Albumin/Globulin Ratio 04/01/2025 0.8 (L)     Bilirubin Total 04/01/2025 0.4     ALP 04/01/2025 62     ALT 04/01/2025 16     AST 04/01/2025 14     eGFR 04/01/2025 >60     Anion Gap 04/01/2025 3.0     BUN/Creatinine Ratio 04/01/2025 13     Magnesium Level 04/01/2025 2.20     WBC 04/01/2025 3.48 (L)     RBC 04/01/2025 2.86 (L)     Hgb 04/01/2025 10.0 (L)     Hct 04/01/2025 30.9 (L)     MCV 04/01/2025 108.0 (H)     MCH 04/01/2025 35.0 (H)     MCHC 04/01/2025 32.4 (L)     RDW 04/01/2025 15.7     Platelet 04/01/2025 105 (L)     MPV 04/01/2025 10.2     Neut % 04/01/2025 22.8     Lymph % 04/01/2025 58.9     Mono % 04/01/2025 12.9     Eos % 04/01/2025 4.0     Basophil % 04/01/2025 1.1     Imm Grans % 04/01/2025 0.3     Neut # 04/01/2025 0.79 (L)     Lymph # 04/01/2025 2.05     Mono # 04/01/2025 0.45     Eos # 04/01/2025 0.14     Baso # 04/01/2025 0.04     Imm Gran # 04/01/2025 0.01     NRBC% 04/01/2025 0.0       Pathology:  Radiology/Diagnostics:      I have reviewed all available lab results and radiology reports.  Assessment:   Multiple myeloma  Labs and exam stable  Continue follow-up with Dr. Clark,   Continue Revlimid 10 mg as maintenance therapy as long as ANC is greater  than 500 and platelet> 50 K  Proceed with Zometa 3.5 mg today, due every 4 weeks  Continue aspirin 81 mg daily  Continue follow-up with Neurology for chronic low back pain  Continue physical therapy  Continue gabapentin 100 mg t.i.d. and tramadol as needed for pain  Continue calcium and vitamin-D supplement  Continue acyclovir and Bactrim until vaccinations are completed/until recommended by Dr. Clark  DEXA scan due October 2025  Check TSH and free T4 every 3 months, TSH and free T4 every 3 months due 07/2024  CBC and CMP monthly  Check SPEP, BUBBA, FLC assay every 3 months, due 07/2025  Problem List Items Addressed This Visit       IgG multiple myeloma - Primary    Secondary malignant neoplasm of bone    Drug-induced immunodeficiency    Osteoporosis of femur without pathological fracture    H/O autologous stem cell transplant         Plan:    04/01/2025  Labs and exam stable  Continue follow-up with Dr. Clark,   Continue Revlimid 10 mg as maintenance therapy as long as ANC is greater than 500 and platelet> 50 K  Proceed with Zometa 3.5 mg today CrCl 52, due every 4 weeks  Continue aspirin 81 mg daily  Continue follow-up with Neurology for chronic low back pain  Continue physical therapy  Continue gabapentin 100 mg t.i.d. and tramadol as needed for pain, refill sent   Continue calcium and vitamin-D supplement  Continue acyclovir and Bactrim until vaccinations are completed/until recommended by Dr. Clark  DEXA scan due October 2025  Check TSH and free T4 every 3 months, TSH and free T4 every 3 months due 07/2024  CBC and CMP in 2 weeks   Check SPEP, BUBBA, FLC assay every 3 months, due 07/2025  Return to clinic in 4 weeks with lab/MD/Kathleen  CBC CMP     Providers:         Orders for 02/24/2025:   Continue Revlimid 10 mg daily   Baby aspirin 81 mg daily   Check CBC and CMP monthly   Check TSH and free T4 every 3 months  Continue Zometa every 4 weeks  Calcium and vitamin-D supplements to continue   Check SPEP, BUBBA, FLC  assay every 3 months; next, March  Continue acyclovir and Bactrim until vaccinations are completed/until recommended by Dr. Brandy Mahajan  DEXA scan in October  Follow-up with NP in 1 month     Above discussed with the patient.  All questions answered.  Discussed labs and scans.  Plan of management discussed.    He understands and agrees with this plan.  ================================     # Multiple myeloma, IgG kappa:  ISS stage:  Stage I  R-ISS stage:  Stage I  -presentation:  03/2023: Worsening back pain, 12-14 lb weight loss over 6 weeks  -skeletal survey 06/14/2023: Negative for lytic or sclerotic lesions  -06/14/2023:  Hemoglobin 11.0, IgG kappa M protein 2.28 gm/dL, kappa elevated, lambda suppressed, kappa/lambda ratio 292  -renal function normal, no hypercalcemia  -24 hour urine: M spike 183 mg; monoclonal kappa light chains in urine; urine kappa elevated, urine lambda suppressed, urine kappa/lambda ratio> 36.4  -LDH normal 06/16/2023   -Beta-2 microglobulin 3.18 on 06/26/2023  -bone marrow biopsy 06/27/2023:  50% plasmacytosis with kappa light chain restriction; no high-risk cytogenetics  -FDG PET-CT 06/29/2023:  Questionable findings  ======================================  Staging of myeloma:  -serum beta 2 microglobulin elevated (3.18)  -serum albumin and LDH normal   -myeloma FISH panel on bone marrow:  Negative for high-risk cytogenetic abnormalities  >>>  ISS stage:  Stage I  R-ISS stage:  Stage I  ======================================  -Velcade started 07/13/2023   -Zometa 4 mg IV every 4 weeks (x2 years), started 07/13/2023  -VRD:  Cycle 1 started 07/13/2023; cycle 2 started 08/10/2023; cycle 3 started 08/31/2023; cycle 4 started 09/21/2023  -Radiation oncology consult:  Radiotherapy to spine not indicated/will not be helpful; questionable findings on FDG PET-CT  -IR at [facility] will not perform kyphoplasty  -significant response to chemotherapy x2 cycles (on 08/24/2023, kappa/lambda ratio 5.72, much  improved; IgG kappa monoclonal protein down to 0.39 gm/dL, significantly decreased)   (pre chemotherapy IgG kappa, on 06/14/2023, was 2.28 gm/dL)  -VRD cycle 5:  10/12/2023-10/26/2023  -restaging bone marrow biopsy 10/24/2023, post VRD X 4-1/2 cycles:  Normocellular; < 1% plasma cells; no clonal or abnormal plasma cell population on flow cytometry; no high-risk FISH findings  (restaging bone marrow biopsy post VRD X 4-1/2 cycles:  <1% plasma cells)  (pre chemotherapy bone marrow biopsy 06/27/2023, had shown 50% plasmacytosis)  -11/08/2023: No monoclonal protein in urine  -11/09/2023:  No monoclonal protein on SPEP and BUBBA; kappa/lambda ratio 1.36, normalized; kappa free light chains 2.29 mg/dL (to recall, 99.4 on 06/14/2023 before starting chemotherapy)  -cycle 6 day 1 of RVD 11/21/2023; subsequently, on hold in anticipation of stem cell transplant  >>>  # ASCT at [facility]:  -melphalan 360 mg IV administered day -2 (11/28/2023) (200 mg per m2 per day)  -ASCT infusion day 0 (11/30/2023)  -filgrastim 5 microgram/kg subQ every 24 hours, started day +5 (12/05/2023)  -infection prophylaxis  -pentamidine 300 mg inhaled once on day -2 (11/28/2023)  -acyclovir 800 mg p.o. q.12 hours starting day -2 (11/28/2023), to continue for 1 year  -fluconazole 400 mg p.o. Q 24 hours starting day -2 (11/28/2023), continue until engraftment and/or mucositis resolved  -cefepime started 12/08/2023, vancomycin added  -IVIG on 12/10/2023  -12/12/2023:  HCT day +12; doing well on antibiotics; counts recovered  -12/12/2023: Transfusion independent and ANC has recovered  -12/13/2023: Patient discharged  -venous Doppler both lower extremities 02/29/2024: Pedal edema: No DVT   -03/11/2024:  Hemoglobin 11.2; CMP unremarkable   -03/11/2024 (day + 102):  No monoclonal protein in blood; FLC assay normal   -restaging bone marrow biopsy 03/12/2024 (day +103):  No increase in plasma cells  -03/11/2024:  No monoclonal band; kappa/lambda ratio  normal  -03/18/2024:  Urine negative for monoclonal protein  -thus, CR, post ASCT  -04/09/2024: Dr. Brandy Mahajan:  Maintenance Revlimid 10 mg p.o. q.day, started, with aspirin  -06/10/2024: No monoclonal bands per SPEP and BUBBA; kappa/lambda ratio normal  -08/20/2024: Dr. Lynch; maintenance Revlimid dose increased to 15 mg daily  -continue acyclovir and Bactrim  -DEXA scan 10/14/2025:  Osteoporosis based on hips  -CTs C/A/P 11 05/2024: Abnormal weight loss:  Essentially negative  -TSH and free T4 normal on 12/30/2024, 12/02/2024  -10/04/2024:  No monoclonal bands; kappa/lambda ratio 1.65 normal, kappa 4.67 without consistent upward trend; lambda 2.83 elevated  -01/27/2025:  No monoclonal bands; kappa/lambda ratio 1.24 normal; kappa 3.78 elevated and without upward trend; lambda 3.05 elevated and more less stable  -02/11/2025: Follow-up with Dr. Brandy Mahajan, hematology/oncology/BMT:  Decrease Revlimid from 15 mg to 10 mg because hemoglobin down to 8  >>>  Plan:   -Revlimid dose decreased to 10 mg daily from 02/11/2025, by Dr. Brandy Mahajan, because hemoglobin dropped to 8  -baby aspirin 81 mg daily to prevent thromboembolism  -check CBC and CMP monthly  -check TSH and free T4 every 2-3 months  -with lenalidomide, monitor for signs and symptoms of infection, bleeding, bruising, hepatotoxicity, secondary malignancies, thromboembolism, dermatologic toxicity, hypersensitivity  -a minimum of 2 years of lenalidomide maintenance therapy is recommended  -lenalidomide dose modifications for toxicity: See below  -continue Zometa every 4 weeks x2 years (started 07/13/2023; continue until 07/2025)  -continue calcium and vitamin-D supplements along with Zometa to prevent hypocalcemia  -monitor renal function while on Zometa  -per Radiation Oncology, given questionable radiologic findings per FDG PET-CT, palliative radiotherapy to spine was not thought to be not useful, therefore, not pursued  -SPEP, BUBBA, FLC check every 3 months; next,  March  -continue acyclovir and Bactrim until vaccination completed  -10/04/2024: Anorexia; start Megace 400 mg p.o. q.day  -follow-up in 1 month     Lenalidomide Recommended Dosage Modifications for Hematologic Adverse Reactions in Multiple Myeloma (Maintenance Therapy Following Autologous Transplant):  #Neutropenia:  -ANC <500/mm3: Withhold lenalidomide and check CBC weekly; resume lenalidomide at the next lower dose with continuous dosing on days 1 to 28 of a 28-day cycle when ANC >=500/mm3.  -ANC <500/mm3 (subsequent occurrence at 5 mg daily [continuous] dose): Withhold lenalidomide; resume lenalidomide at 5 mg daily on days 1 to 21 of a 28-day cycle when ANC >=500/mm3. Do not dose below 5 mg daily on days 1 to 21 of a 28-day cycle.  #Thrombocytopenia:  -Platelets <30,000/mm3 (first occurrence): Withhold lenalidomide and check CBC weekly; resume lenalidomide at the next lower dose with continuous dosing on days 1 to 28 of a 28-day cycle when platelets >=30,000/mm3.  -Platelets <30,000/mm3 (subsequent occurrence at 5 mg daily [continuous] dose): Withhold lenalidomide; resume lenalidomide at 5 mg daily on days 1 to 21 of a 28-day cycle when platelets >=30,000/mm3. Do not dose below 5 mg daily on days 1 to 21 of a 28-day cycle.     Monitoring with lenalidomide:  -CBC with differential: weekly for the first 2 cycles, every 2 weeks during the third cycle and monthly thereafter;  -serum creatinine, LFTs (periodically).  -Thyroid function tests (TSH at baseline then every 2 to 3 months during lenalidomide treatment  -ECG when clinically indicated. Monitor for signs and symptoms of infection (if neutropenic), bleeding or bruising, hepatotoxicity, secondary malignancies, thromboembolism (eg, shortness of breath, chest pain, arm or leg swelling), dermatologic toxicity, tumor lysis syndrome, or hypersensitivity.     Lenalidomide:  Potential side effects:  Embryo fetal toxicity  Hematologic toxicity.  Neutropenia.   Thrombocytopenia.  Venous and arterial thromboembolism.  DVT.  PE.  MI.  Stroke.  Dizziness, fatigue.  Tumor lysis syndrome.  Heart failure.    Used with caution in patients with renal impairment.  Lenalidomide =/> 4 cycles may decrease the # of CD34 pos cells collected for autologous stem cell transplant.  DVT, PE, atrial fibrillation, renal failure are more likely in patients> 65 years  Hepatotoxicity  Hypersensitivity reactions.  Anaphylaxis.  Angioedema.  Skin rash.  Eosinophilia.  Immediate hypersensitivity reactions.  Second primary malignancy.  AML.  MDS.  Solid tumor malignancies.  Nonmelanoma skin cancers.     # Osteoporosis of hips on DEXA scan 10/11/2023  -osteoporosis of hips noted on DEXA scan 10/11/2023  -DEXA scan 10/14/2025:  Osteoporosis based on hips  >>>  -patient already receiving monthly Zometa for underlying multiple myeloma; this will help with osteoporosis as well  -repeat DEXA scan in 1 year (10/2025)     # Exophytic lesion upper pole of right kidney:  10 mm exophytic hyperdense focus upper pole of right kidney, compatible with a hyperdense cyst, on CT abdomen pelvis with contrast 06/27/2023  -07/12/2023:  MRI abdomen with and without contrast (right renal cyst): Small exophytic right renal lesion most likely a proteinaceous or hemorrhagic cyst (10 mm, upper pole of right kidney)  -MRI lumbar spine 08/02/2024:  1.2 cm exophytic lesion indeterminate from anterior upper pole of right kidney; multilevel degenerative disc disease, etc.     # Non myeloma findings (DJD of spine, spinal stenosis, compression fractures):  -mild L4-L5 spinal canal stenosis on MRI 05/04/2023   -lumbar DJD and facet arthrosis on MRI lumbar spine 05/04/2023  -MRI T-spine 08/08/2023:  No myeloma lesions   -MRI lumbar spine 09/14/2023:  Progression of skeletal demineralization; multilevel degenerative disc disease; severe spinal stenosis L3-L5; new/progressive loss of vertebral body height throughout the spine since  05/2023, etc.  -IR at [facility] will not perform kyphoplasty  -MRI lumbar spine 08/02/2024:  1.2 cm exophytic lesion indeterminate from anterior upper pole of right kidney; multilevel degenerative disc disease, etc.  -follows up with Xochitl Grubbs MD, neurosurgery, for lumbar stenosis with neurogenic claudication; chronic bilateral low back pain with bilateral sciatica; compression fracture of lumbar vertebrae; planning L2-3, L3-4, L4-5 laminectomy     # Rising PSA level:  -PSA: 2.78 on 05/27/2024; 1.7 on 05/01/2024; 1.96 on 09/17/2021  -MRI prostate 09/23/2024:  PI-RADS 2: Low (rising PSA level)     # Chronic macrocytic anemia:  -chronic anemia: Hemoglobin: 9.9 on 09/06/2024; 10.5 on 09/03/2024; 10.5 on 07/12/2024; 11.1 on 06/14/2024; 11.7 on 05/03/2024  -MCV:  Slightly elevated  -10/04/2024:  Hemoglobin 10.7, more less stable; .2; ANC 2.69; otherwise, CBC unremarkable; CMP reviewed; creatinine 1.24, slightly up; calcium 9.2, albumin 3.4 (no hypercalcemia)  -10/04/2024: Hemoglobin 10.7, .2 elevated; serum iron 63 low, TIBC normal, ferritin 277 elevated; transferrin saturation 25% normal; B12 level 472 normal; folate 33.4 elevated; haptoglobin 256 normal;  low  -hemoglobin: 9.7 on 01/27/2025; 10.4 on 12/30/2024; 10.4 on 12/02/2024; 10.7 on 10/04/2024, etc.  (macrocytic anemia; iron/B12/folate stores normal; no hemolysis; patient on Revlimid maintenance)  -02/11/2025: Follow-up with Dr. Brandy Mahajan, hematology/oncology/BMT:  Decrease Revlimid from 15 mg to 10 mg because hemoglobin down to 8  >>>  -anemia most likely secondary to bone marrow suppression with Revlimid; expectant monitoring     # Co-morbidities:  NIDDM.  Dyslipidemia.  Hypertension.  Celiac disease.    History of rheumatic mitral valve disease, S/P Maze/ablation procedure 02/02/2018        Vaccination history:   COVID-19, MRNA, LN-S, PF (MODERNA FULL 0.5 ML DOSE) 8/18/2022 , 10/23/2021 , 3/17/2021 , 2/17/2021   Hepatitis A / Hepatitis B  8/18/2022 , 10/5/2021 , 7/29/2020   Influenza - Quadrivalent - [facility]CK - PF 10/5/2021   Influenza - Trivalent - [facility] - PF 9/10/2015   Meningococcal Conjugate (MCV4O) 8/18/2022 , 4/17/2012   Meningococcal Conjugate (MCV4P) 4/17/2012   Pneumococcal Conjugate - 20 Valent 1/12/2023   Tdap 7/29/2020   Zoster 11/18/2020 , 7/29/2020   Zoster Recombinant 11/18/2020 , 7/29/2020      ===========================================     Discussion:  Supportive care for myeloma:  -bone targeted treatment: Bisphosphonate, category 1; or denosumab to reduce risk of skeletal related events; denosumab is preferred in patients with renal insufficiency; assess vitamin-D status; baseline dental exam; monitor for osteonecrosis of the jaw; monitor renal dysfunction with bisphosphonate therapy  -continue bone targeted treatment (bisphosphonate or denosumab) for 2 years; continue beyond 2 years should be based on clinical judgment  -patients receiving denosumab for bone disease who subsequently discontinued therapy should be given maintenance denosumab every 6 months or a single dose of bisphosphonate to mitigate risk of rebound osteoporosis  -for hypercalcemia, zoledronic acid, denosumab, steroids, and/or calcitonin  -for hyperviscosity, plasmapheresis should be used as adjuvant therapy for symptomatic hyperviscosity  -intravenous immunoglobulin therapy should be considered in the setting of recurrent serious infection and/or hypogammaglobulinemia (IgG </= 400 mg/dL)  -pneumococcal conjugate vaccine, followed by pneumococcal polysaccharide vaccine 1 year later  -Influenza vaccination is recommended; 2 doses of high-dose inactivated quandaivalent influenza vaccine should be considered  -consider 12 weeks of levofloxacin 500 mg p.o. daily at the time of initial diagnosis of multiple myeloma  -COVID-19 vaccination  -highest risk for VTE is in the 1st 6 months following new diagnosis of multiple myeloma  -VTE prophylaxis if no contraindications to  anticoagulation agents or antiplatelets  Recommendations for VTE prophylaxis:  Aspirin  mg daily; low molecular weight heparin equivalent to enoxaparin 40 mg daily; Xarelto 10 mg daily; Eliquis 2.5 mg b.i.d.; Arixtra 2.5 mg daily; Coumadin with target INR 2.0-3.0        I have explained all of the above in detail and the patient understands all of the current recommendation(s). I have answered all of their questions to the best of my ability and to their complete satisfaction.       Nuzhat Lewis, FNP-C  Oncology/Hematology   [facility]           [1]   Social History  Tobacco Use    Smoking status: Never    Smokeless tobacco: Never   Substance Use Topics    Alcohol use: Never    Drug use: Never

## 2025-04-01 ENCOUNTER — INFUSION (OUTPATIENT)
Dept: INFUSION THERAPY | Facility: HOSPITAL | Age: 70
End: 2025-04-01
Attending: INTERNAL MEDICINE
Payer: MEDICARE

## 2025-04-01 ENCOUNTER — OFFICE VISIT (OUTPATIENT)
Dept: HEMATOLOGY/ONCOLOGY | Facility: CLINIC | Age: 70
End: 2025-04-01
Payer: MEDICARE

## 2025-04-01 ENCOUNTER — LAB VISIT (OUTPATIENT)
Dept: HEMATOLOGY/ONCOLOGY | Facility: CLINIC | Age: 70
End: 2025-04-01
Payer: MEDICARE

## 2025-04-01 VITALS
BODY MASS INDEX: 23.48 KG/M2 | SYSTOLIC BLOOD PRESSURE: 110 MMHG | HEART RATE: 60 BPM | OXYGEN SATURATION: 100 % | TEMPERATURE: 98 F | HEIGHT: 67 IN | DIASTOLIC BLOOD PRESSURE: 66 MMHG | WEIGHT: 149.63 LBS | RESPIRATION RATE: 20 BRPM

## 2025-04-01 DIAGNOSIS — M48.061 SPINAL STENOSIS OF LUMBAR REGION, UNSPECIFIED WHETHER NEUROGENIC CLAUDICATION PRESENT: ICD-10-CM

## 2025-04-01 DIAGNOSIS — C79.51 SECONDARY MALIGNANT NEOPLASM OF BONE: ICD-10-CM

## 2025-04-01 DIAGNOSIS — T45.1X5A PERIPHERAL NEUROPATHY DUE TO CHEMOTHERAPY: ICD-10-CM

## 2025-04-01 DIAGNOSIS — C90.00 IGG MULTIPLE MYELOMA: Primary | ICD-10-CM

## 2025-04-01 DIAGNOSIS — Z94.84 H/O AUTOLOGOUS STEM CELL TRANSPLANT: ICD-10-CM

## 2025-04-01 DIAGNOSIS — C90.00 IGG MULTIPLE MYELOMA: ICD-10-CM

## 2025-04-01 DIAGNOSIS — M81.0 OSTEOPOROSIS OF FEMUR WITHOUT PATHOLOGICAL FRACTURE: ICD-10-CM

## 2025-04-01 DIAGNOSIS — S32.010G COMPRESSION FRACTURE OF L1 VERTEBRA WITH DELAYED HEALING, SUBSEQUENT ENCOUNTER: ICD-10-CM

## 2025-04-01 DIAGNOSIS — G62.0 PERIPHERAL NEUROPATHY DUE TO CHEMOTHERAPY: ICD-10-CM

## 2025-04-01 DIAGNOSIS — D84.821 DRUG-INDUCED IMMUNODEFICIENCY: ICD-10-CM

## 2025-04-01 DIAGNOSIS — E83.52 HYPERCALCEMIA: ICD-10-CM

## 2025-04-01 DIAGNOSIS — Z79.899 DRUG-INDUCED IMMUNODEFICIENCY: ICD-10-CM

## 2025-04-01 LAB
ALBUMIN SERPL-MCNC: 3.3 G/DL (ref 3.4–4.8)
ALBUMIN/GLOB SERPL: 0.8 RATIO (ref 1.1–2)
ALP SERPL-CCNC: 62 UNIT/L (ref 40–150)
ALT SERPL-CCNC: 16 UNIT/L (ref 0–55)
ANION GAP SERPL CALC-SCNC: 3 MEQ/L
AST SERPL-CCNC: 14 UNIT/L (ref 11–45)
BASOPHILS # BLD AUTO: 0.04 X10(3)/MCL
BASOPHILS NFR BLD AUTO: 1.1 %
BILIRUB SERPL-MCNC: 0.4 MG/DL
BUN SERPL-MCNC: 16.8 MG/DL (ref 8.4–25.7)
CALCIUM SERPL-MCNC: 8.8 MG/DL (ref 8.8–10)
CHLORIDE SERPL-SCNC: 113 MMOL/L (ref 98–107)
CO2 SERPL-SCNC: 21 MMOL/L (ref 23–31)
CREAT SERPL-MCNC: 1.29 MG/DL (ref 0.72–1.25)
CREAT/UREA NIT SERPL: 13
EOSINOPHIL # BLD AUTO: 0.14 X10(3)/MCL (ref 0–0.9)
EOSINOPHIL NFR BLD AUTO: 4 %
ERYTHROCYTE [DISTWIDTH] IN BLOOD BY AUTOMATED COUNT: 15.7 % (ref 11.5–17)
GFR SERPLBLD CREATININE-BSD FMLA CKD-EPI: >60 ML/MIN/1.73/M2
GLOBULIN SER-MCNC: 3.9 GM/DL (ref 2.4–3.5)
GLUCOSE SERPL-MCNC: 104 MG/DL (ref 82–115)
HCT VFR BLD AUTO: 30.9 % (ref 42–52)
HGB BLD-MCNC: 10 G/DL (ref 14–18)
IGA SERPL-MCNC: 188 MG/DL (ref 101–645)
IGG SERPL-MCNC: 1045 MG/DL (ref 540–1822)
IGM SERPL-MCNC: 16 MG/DL (ref 22–240)
IMM GRANULOCYTES # BLD AUTO: 0.01 X10(3)/MCL (ref 0–0.04)
IMM GRANULOCYTES NFR BLD AUTO: 0.3 %
LYMPHOCYTES # BLD AUTO: 2.05 X10(3)/MCL (ref 0.6–4.6)
LYMPHOCYTES NFR BLD AUTO: 58.9 %
MAGNESIUM SERPL-MCNC: 2.2 MG/DL (ref 1.6–2.6)
MCH RBC QN AUTO: 35 PG (ref 27–31)
MCHC RBC AUTO-ENTMCNC: 32.4 G/DL (ref 33–36)
MCV RBC AUTO: 108 FL (ref 80–94)
MONOCYTES # BLD AUTO: 0.45 X10(3)/MCL (ref 0.1–1.3)
MONOCYTES NFR BLD AUTO: 12.9 %
NEUTROPHILS # BLD AUTO: 0.79 X10(3)/MCL (ref 2.1–9.2)
NEUTROPHILS NFR BLD AUTO: 22.8 %
NRBC BLD AUTO-RTO: 0 %
PLATELET # BLD AUTO: 105 X10(3)/MCL (ref 130–400)
PMV BLD AUTO: 10.2 FL (ref 7.4–10.4)
POTASSIUM SERPL-SCNC: 3.8 MMOL/L (ref 3.5–5.1)
PROT SERPL-MCNC: 7.2 GM/DL (ref 5.8–7.6)
RBC # BLD AUTO: 2.86 X10(6)/MCL (ref 4.7–6.1)
SODIUM SERPL-SCNC: 137 MMOL/L (ref 136–145)
T4 FREE SERPL-MCNC: 0.9 NG/DL (ref 0.7–1.48)
TSH SERPL-ACNC: 2 UIU/ML (ref 0.35–4.94)
WBC # BLD AUTO: 3.48 X10(3)/MCL (ref 4.5–11.5)

## 2025-04-01 PROCEDURE — 84443 ASSAY THYROID STIM HORMONE: CPT

## 2025-04-01 PROCEDURE — 83521 IG LIGHT CHAINS FREE EACH: CPT

## 2025-04-01 PROCEDURE — 83735 ASSAY OF MAGNESIUM: CPT

## 2025-04-01 PROCEDURE — 63600175 PHARM REV CODE 636 W HCPCS: Performed by: INTERNAL MEDICINE

## 2025-04-01 PROCEDURE — 96365 THER/PROPH/DIAG IV INF INIT: CPT

## 2025-04-01 PROCEDURE — 84439 ASSAY OF FREE THYROXINE: CPT

## 2025-04-01 PROCEDURE — 84165 PROTEIN E-PHORESIS SERUM: CPT

## 2025-04-01 PROCEDURE — 25000003 PHARM REV CODE 250: Performed by: INTERNAL MEDICINE

## 2025-04-01 PROCEDURE — 85025 COMPLETE CBC W/AUTO DIFF WBC: CPT

## 2025-04-01 PROCEDURE — 36415 COLL VENOUS BLD VENIPUNCTURE: CPT

## 2025-04-01 PROCEDURE — 82784 ASSAY IGA/IGD/IGG/IGM EACH: CPT

## 2025-04-01 PROCEDURE — 80053 COMPREHEN METABOLIC PANEL: CPT

## 2025-04-01 RX ORDER — SODIUM CHLORIDE 0.9 % (FLUSH) 0.9 %
10 SYRINGE (ML) INJECTION
Status: DISCONTINUED | OUTPATIENT
Start: 2025-04-01 | End: 2025-04-01 | Stop reason: HOSPADM

## 2025-04-01 RX ORDER — HEPARIN 100 UNIT/ML
500 SYRINGE INTRAVENOUS
Status: DISCONTINUED | OUTPATIENT
Start: 2025-04-01 | End: 2025-04-01 | Stop reason: HOSPADM

## 2025-04-01 RX ORDER — GABAPENTIN 600 MG/1
600 TABLET ORAL 2 TIMES DAILY PRN
Qty: 60 TABLET | Refills: 2 | Status: SHIPPED | OUTPATIENT
Start: 2025-04-01

## 2025-04-01 RX ORDER — TRAMADOL HYDROCHLORIDE 100 MG/1
100 TABLET, COATED ORAL 2 TIMES DAILY PRN
Qty: 30 TABLET | Refills: 0 | Status: SHIPPED | OUTPATIENT
Start: 2025-04-01

## 2025-04-01 RX ADMIN — SODIUM CHLORIDE: 9 INJECTION, SOLUTION INTRAVENOUS at 01:04

## 2025-04-01 RX ADMIN — SODIUM CHLORIDE 3.5 MG: 9 INJECTION, SOLUTION INTRAVENOUS at 01:04

## 2025-04-02 LAB
ALBUMIN % SPEP (OHS): 47.25 (ref 48.1–59.5)
ALBUMIN SERPL-MCNC: 3.1 G/DL (ref 3.4–4.8)
ALBUMIN/GLOB SERPL: 0.9 RATIO (ref 1.1–2)
ALPHA 1 GLOB (OHS): 0.25 GM/DL (ref 0–0.4)
ALPHA 1 GLOB% (OHS): 3.92 (ref 2.3–4.9)
ALPHA 2 GLOB % (OHS): 16.26 (ref 6.9–13)
ALPHA 2 GLOB (OHS): 1.06 GM/DL (ref 0.4–1)
BETA GLOB (OHS): 0.99 GM/DL (ref 0.7–1.3)
BETA GLOB% (OHS): 15.25 (ref 13.8–19.7)
GAMMA GLOBULIN % (OHS): 17.33 (ref 10.1–21.9)
GAMMA GLOBULIN (OHS): 1.13 GM/DL (ref 0.4–1.8)
GLOBULIN SER-MCNC: 3.4 GM/DL (ref 2.4–3.5)
KAPPA LC FREE SER NEPH-MCNC: 3.5 MG/DL (ref 0.33–1.94)
KAPPA LC FREE/LAMBDA FREE SER NEPH: 1.14 {RATIO} (ref 0.26–1.65)
LAMBDA LC FREE SERPL-MCNC: 3.06 MG/DL (ref 0.57–2.63)
M SPIKE % (OHS): ABNORMAL
M SPIKE (OHS): ABNORMAL
PATH REV: NORMAL
PROT SERPL-MCNC: 6.5 GM/DL (ref 5.8–7.6)

## 2025-04-07 ENCOUNTER — TELEPHONE (OUTPATIENT)
Dept: HEMATOLOGY/ONCOLOGY | Facility: CLINIC | Age: 70
End: 2025-04-07
Payer: MEDICARE

## 2025-04-14 ENCOUNTER — LAB VISIT (OUTPATIENT)
Dept: HEMATOLOGY/ONCOLOGY | Facility: CLINIC | Age: 70
End: 2025-04-14
Payer: MEDICARE

## 2025-04-14 DIAGNOSIS — Z79.899 DRUG-INDUCED IMMUNODEFICIENCY: ICD-10-CM

## 2025-04-14 DIAGNOSIS — M81.0 OSTEOPOROSIS OF FEMUR WITHOUT PATHOLOGICAL FRACTURE: ICD-10-CM

## 2025-04-14 DIAGNOSIS — D84.821 DRUG-INDUCED IMMUNODEFICIENCY: ICD-10-CM

## 2025-04-14 DIAGNOSIS — C90.00 IGG MULTIPLE MYELOMA: ICD-10-CM

## 2025-04-14 DIAGNOSIS — Z94.84 H/O AUTOLOGOUS STEM CELL TRANSPLANT: ICD-10-CM

## 2025-04-14 DIAGNOSIS — C79.51 SECONDARY MALIGNANT NEOPLASM OF BONE: ICD-10-CM

## 2025-04-14 LAB
ALBUMIN SERPL-MCNC: 3.2 G/DL (ref 3.4–4.8)
ALBUMIN/GLOB SERPL: 0.9 RATIO (ref 1.1–2)
ALP SERPL-CCNC: 67 UNIT/L (ref 40–150)
ALT SERPL-CCNC: 17 UNIT/L (ref 0–55)
ANION GAP SERPL CALC-SCNC: 5 MEQ/L
AST SERPL-CCNC: 16 UNIT/L (ref 11–45)
BASOPHILS # BLD AUTO: 0.04 X10(3)/MCL
BASOPHILS NFR BLD AUTO: 0.9 %
BILIRUB SERPL-MCNC: 0.3 MG/DL
BUN SERPL-MCNC: 13 MG/DL (ref 8.4–25.7)
CALCIUM SERPL-MCNC: 8.5 MG/DL (ref 8.8–10)
CHLORIDE SERPL-SCNC: 107 MMOL/L (ref 98–107)
CO2 SERPL-SCNC: 23 MMOL/L (ref 23–31)
CREAT SERPL-MCNC: 1.05 MG/DL (ref 0.72–1.25)
CREAT/UREA NIT SERPL: 12
EOSINOPHIL # BLD AUTO: 0.19 X10(3)/MCL (ref 0–0.9)
EOSINOPHIL NFR BLD AUTO: 4.1 %
ERYTHROCYTE [DISTWIDTH] IN BLOOD BY AUTOMATED COUNT: 15.3 % (ref 11.5–17)
GFR SERPLBLD CREATININE-BSD FMLA CKD-EPI: >60 ML/MIN/1.73/M2
GLOBULIN SER-MCNC: 3.7 GM/DL (ref 2.4–3.5)
GLUCOSE SERPL-MCNC: 92 MG/DL (ref 82–115)
HCT VFR BLD AUTO: 29.9 % (ref 42–52)
HGB BLD-MCNC: 9.8 G/DL (ref 14–18)
IMM GRANULOCYTES # BLD AUTO: 0.02 X10(3)/MCL (ref 0–0.04)
IMM GRANULOCYTES NFR BLD AUTO: 0.4 %
LYMPHOCYTES # BLD AUTO: 2.63 X10(3)/MCL (ref 0.6–4.6)
LYMPHOCYTES NFR BLD AUTO: 56.4 %
MCH RBC QN AUTO: 36.4 PG (ref 27–31)
MCHC RBC AUTO-ENTMCNC: 32.8 G/DL (ref 33–36)
MCV RBC AUTO: 111.2 FL (ref 80–94)
MONOCYTES # BLD AUTO: 0.71 X10(3)/MCL (ref 0.1–1.3)
MONOCYTES NFR BLD AUTO: 15.2 %
NEUTROPHILS # BLD AUTO: 1.07 X10(3)/MCL (ref 2.1–9.2)
NEUTROPHILS NFR BLD AUTO: 23 %
NRBC BLD AUTO-RTO: 0 %
PLATELET # BLD AUTO: 111 X10(3)/MCL (ref 130–400)
PMV BLD AUTO: 9.3 FL (ref 7.4–10.4)
POTASSIUM SERPL-SCNC: 3.6 MMOL/L (ref 3.5–5.1)
PROT SERPL-MCNC: 6.9 GM/DL (ref 5.8–7.6)
RBC # BLD AUTO: 2.69 X10(6)/MCL (ref 4.7–6.1)
SODIUM SERPL-SCNC: 135 MMOL/L (ref 136–145)
WBC # BLD AUTO: 4.66 X10(3)/MCL (ref 4.5–11.5)

## 2025-04-14 PROCEDURE — 36415 COLL VENOUS BLD VENIPUNCTURE: CPT

## 2025-04-14 PROCEDURE — 80053 COMPREHEN METABOLIC PANEL: CPT

## 2025-04-14 PROCEDURE — 85025 COMPLETE CBC W/AUTO DIFF WBC: CPT

## 2025-04-29 ENCOUNTER — OFFICE VISIT (OUTPATIENT)
Dept: HEMATOLOGY/ONCOLOGY | Facility: CLINIC | Age: 70
End: 2025-04-29
Attending: INTERNAL MEDICINE
Payer: MEDICARE

## 2025-04-29 ENCOUNTER — INFUSION (OUTPATIENT)
Dept: INFUSION THERAPY | Facility: HOSPITAL | Age: 70
End: 2025-04-29
Attending: INTERNAL MEDICINE
Payer: MEDICARE

## 2025-04-29 VITALS
BODY MASS INDEX: 23.7 KG/M2 | RESPIRATION RATE: 18 BRPM | HEIGHT: 67 IN | TEMPERATURE: 98 F | HEART RATE: 52 BPM | OXYGEN SATURATION: 100 % | DIASTOLIC BLOOD PRESSURE: 78 MMHG | WEIGHT: 151 LBS | SYSTOLIC BLOOD PRESSURE: 113 MMHG

## 2025-04-29 DIAGNOSIS — Z79.899 DRUG-INDUCED IMMUNODEFICIENCY: ICD-10-CM

## 2025-04-29 DIAGNOSIS — M48.061 SPINAL STENOSIS OF LUMBAR REGION, UNSPECIFIED WHETHER NEUROGENIC CLAUDICATION PRESENT: ICD-10-CM

## 2025-04-29 DIAGNOSIS — D84.821 DRUG-INDUCED IMMUNODEFICIENCY: ICD-10-CM

## 2025-04-29 DIAGNOSIS — M54.50 RIGHT-SIDED LOW BACK PAIN WITHOUT SCIATICA, UNSPECIFIED CHRONICITY: ICD-10-CM

## 2025-04-29 DIAGNOSIS — S32.010G COMPRESSION FRACTURE OF L1 VERTEBRA WITH DELAYED HEALING, SUBSEQUENT ENCOUNTER: Primary | ICD-10-CM

## 2025-04-29 DIAGNOSIS — C90.00 IGG MULTIPLE MYELOMA: Primary | ICD-10-CM

## 2025-04-29 DIAGNOSIS — S32.020G COMPRESSION FRACTURE OF L2 VERTEBRA WITH DELAYED HEALING, SUBSEQUENT ENCOUNTER: ICD-10-CM

## 2025-04-29 DIAGNOSIS — C90.00 IGG MULTIPLE MYELOMA: ICD-10-CM

## 2025-04-29 DIAGNOSIS — M81.0 OSTEOPOROSIS OF FEMUR WITHOUT PATHOLOGICAL FRACTURE: ICD-10-CM

## 2025-04-29 DIAGNOSIS — Z94.84 H/O AUTOLOGOUS STEM CELL TRANSPLANT: ICD-10-CM

## 2025-04-29 DIAGNOSIS — K90.0 CELIAC DISEASE: ICD-10-CM

## 2025-04-29 DIAGNOSIS — C90.01 MULTIPLE MYELOMA IN REMISSION: ICD-10-CM

## 2025-04-29 DIAGNOSIS — S32.010G COMPRESSION FRACTURE OF L1 VERTEBRA WITH DELAYED HEALING, SUBSEQUENT ENCOUNTER: ICD-10-CM

## 2025-04-29 DIAGNOSIS — E83.52 HYPERCALCEMIA: ICD-10-CM

## 2025-04-29 DIAGNOSIS — M48.54XA NONTRAUMATIC COMPRESSION FRACTURE OF T8 VERTEBRA, INITIAL ENCOUNTER: ICD-10-CM

## 2025-04-29 LAB
ALBUMIN SERPL-MCNC: 3.1 G/DL (ref 3.4–4.8)
ALBUMIN/GLOB SERPL: 0.9 RATIO (ref 1.1–2)
ALP SERPL-CCNC: 69 UNIT/L (ref 40–150)
ALT SERPL-CCNC: 14 UNIT/L (ref 0–55)
ANION GAP SERPL CALC-SCNC: 7 MEQ/L
AST SERPL-CCNC: 15 UNIT/L (ref 11–45)
BASOPHILS # BLD AUTO: 0.06 X10(3)/MCL
BASOPHILS NFR BLD AUTO: 1.7 %
BILIRUB SERPL-MCNC: 0.3 MG/DL
BUN SERPL-MCNC: 11.9 MG/DL (ref 8.4–25.7)
CALCIUM SERPL-MCNC: 8.8 MG/DL (ref 8.8–10)
CHLORIDE SERPL-SCNC: 106 MMOL/L (ref 98–107)
CO2 SERPL-SCNC: 23 MMOL/L (ref 23–31)
CREAT SERPL-MCNC: 1.22 MG/DL (ref 0.72–1.25)
CREAT/UREA NIT SERPL: 10
EOSINOPHIL # BLD AUTO: 0.09 X10(3)/MCL (ref 0–0.9)
EOSINOPHIL NFR BLD AUTO: 2.6 %
ERYTHROCYTE [DISTWIDTH] IN BLOOD BY AUTOMATED COUNT: 14.8 % (ref 11.5–17)
GFR SERPLBLD CREATININE-BSD FMLA CKD-EPI: >60 ML/MIN/1.73/M2
GLOBULIN SER-MCNC: 3.5 GM/DL (ref 2.4–3.5)
GLUCOSE SERPL-MCNC: 123 MG/DL (ref 82–115)
HCT VFR BLD AUTO: 29.9 % (ref 42–52)
HGB BLD-MCNC: 9.8 G/DL (ref 14–18)
IMM GRANULOCYTES # BLD AUTO: 0.01 X10(3)/MCL (ref 0–0.04)
IMM GRANULOCYTES NFR BLD AUTO: 0.3 %
LYMPHOCYTES # BLD AUTO: 1.92 X10(3)/MCL (ref 0.6–4.6)
LYMPHOCYTES NFR BLD AUTO: 55.2 %
MCH RBC QN AUTO: 35.8 PG (ref 27–31)
MCHC RBC AUTO-ENTMCNC: 32.8 G/DL (ref 33–36)
MCV RBC AUTO: 109.1 FL (ref 80–94)
MONOCYTES # BLD AUTO: 0.61 X10(3)/MCL (ref 0.1–1.3)
MONOCYTES NFR BLD AUTO: 17.5 %
NEUTROPHILS # BLD AUTO: 0.79 X10(3)/MCL (ref 2.1–9.2)
NEUTROPHILS NFR BLD AUTO: 22.7 %
NRBC BLD AUTO-RTO: 0 %
PLATELET # BLD AUTO: 133 X10(3)/MCL (ref 130–400)
PMV BLD AUTO: 9.8 FL (ref 7.4–10.4)
POTASSIUM SERPL-SCNC: 3.6 MMOL/L (ref 3.5–5.1)
PROT SERPL-MCNC: 6.6 GM/DL (ref 5.8–7.6)
RBC # BLD AUTO: 2.74 X10(6)/MCL (ref 4.7–6.1)
SODIUM SERPL-SCNC: 136 MMOL/L (ref 136–145)
WBC # BLD AUTO: 3.48 X10(3)/MCL (ref 4.5–11.5)

## 2025-04-29 PROCEDURE — 63600175 PHARM REV CODE 636 W HCPCS: Performed by: INTERNAL MEDICINE

## 2025-04-29 PROCEDURE — 99215 OFFICE O/P EST HI 40 MIN: CPT | Mod: PBBFAC | Performed by: INTERNAL MEDICINE

## 2025-04-29 PROCEDURE — 85025 COMPLETE CBC W/AUTO DIFF WBC: CPT

## 2025-04-29 PROCEDURE — 96365 THER/PROPH/DIAG IV INF INIT: CPT

## 2025-04-29 PROCEDURE — 80053 COMPREHEN METABOLIC PANEL: CPT

## 2025-04-29 PROCEDURE — 25000003 PHARM REV CODE 250: Performed by: INTERNAL MEDICINE

## 2025-04-29 RX ORDER — SODIUM CHLORIDE 0.9 % (FLUSH) 0.9 %
10 SYRINGE (ML) INJECTION
Status: DISCONTINUED | OUTPATIENT
Start: 2025-04-29 | End: 2025-04-29 | Stop reason: HOSPADM

## 2025-04-29 RX ORDER — HEPARIN 100 UNIT/ML
500 SYRINGE INTRAVENOUS
Status: DISCONTINUED | OUTPATIENT
Start: 2025-04-29 | End: 2025-04-29 | Stop reason: HOSPADM

## 2025-04-29 RX ADMIN — SODIUM CHLORIDE: 9 INJECTION, SOLUTION INTRAVENOUS at 02:04

## 2025-04-29 RX ADMIN — ZOLEDRONIC ACID 3.5 MG: 4 INJECTION, SOLUTION, CONCENTRATE INTRAVENOUS at 02:04

## 2025-04-29 NOTE — Clinical Note
Orders for 04/29/2025:  Continue Revlimid 10 mg daily Baby aspirin 81 mg daily  Check CBC and CMP monthly  Check TSH and free T4 every 2-3 months  Continue Zometa every 4 weeks Continue calcium and vitamin-D supplements  SPEP, BUBBA, FLC assay in July Continue Megace 400 mg p.o. q.d. to boost appetite DEXA scan October 2025 Follow-up with NP in 1 month with CBC and CMP

## 2025-04-29 NOTE — PROGRESS NOTES
History:  Past Medical History:   Diagnosis Date    Celiac disease     Cerebral palsy, unspecified     Compression fracture of L1 vertebra with delayed healing, subsequent encounter     Compression fracture of L2 vertebra with delayed healing, subsequent encounter     Cyst of right kidney     Degeneration of lumbar intervertebral disc     Diabetes mellitus, type 2     GERD (gastroesophageal reflux disease)     Hyperlipidemia     Hypertension     IgG multiple myeloma     Low back pain     Lumbago with sciatica, right side     Lumbar spinal stenosis     Monocytosis     Multiple myeloma, remission status unspecified     Neck pain     Nontraumatic compression fracture of T8 vertebra, initial encounter     LOIS (obstructive sleep apnea)     Osteoporosis of femur without pathological fracture     Other chronic pain     Personal history of colonic polyps 06/29/2022    Secondary malignancy of lumbar vertebral column     Secondary malignant neoplasm of bone     Tachycardia    Past medical history:  NIDDM. Dyslipidemia.  GERD.  Lumbar spinal stenosis.  Vitamin-D deficiency.  Allergic rhinitis.  HTN.  Bell's palsy.  Celiac disease.  Hemorrhoids.  Mitral regurgitation 01/05/2018.  Rheumatic valve narrowing and leaking mitral valve.  -02/02/2018 ([facility]):  АНДРЕЙ, right minimally invasive anterior thoracotomy, right femoral artery and vein exploration, extensive right pleural adhesiolysis/pneumolysis, bilateral Maze/ablation (extensive), left atrial appendage clip placement for left atrial appendage exclusion, mitral valve repair (complex repair with 26 mm annuloplasty ring)  Social history:  .  Lives in [place], [place].  Five children.  Owns a [employer] business.  No history of tobacco, alcohol, or illicit drug abuse.    Family history:  Negative for cancers or blood dyscrasias.    Health maintenance:  PCP in family medicine clinic, [facility].  Had EGD and colonoscopy done 1 year ago by  Dr. Zoltan Kapoor, GI, [place], apparently unremarkable (he gets the endoscopies done periodically because of history of celiac disease).  Past Surgical History:   Procedure Laterality Date    BONE MARROW ASPIRATION N/A 06/27/2023    Procedure: ASPIRATION, BONE MARROW;  Surgeon: Namita Daniels MD;  Location: [facility] ENDOSCOPY;  Service: General;  Laterality: N/A;    BONE MARROW ASPIRATION N/A 10/24/2023    Procedure: ASPIRATION, BONE MARROW;  Surgeon: Namita Daniels MD;  Location: [facility] ENDOSCOPY;  Service: General;  Laterality: N/A;    BONE MARROW ASPIRATION N/A 03/12/2024    Procedure: ASPIRATION, BONE MARROW;  Surgeon: Haylie Liu MD;  Location: [facility] ENDOSCOPY;  Service: General;  Laterality: N/A;    BONE MARROW BIOPSY N/A 06/27/2023    Procedure: Biopsy-bone marrow;  Surgeon: Haylie Liu MD;  Location: [facility] ENDOSCOPY;  Service: General;  Laterality: N/A;    BONE MARROW BIOPSY N/A 10/24/2023    Procedure: Biopsy-bone marrow;  Surgeon: Namita Daniels MD;  Location: [facility] ENDOSCOPY;  Service: General;  Laterality: N/A;    BONE MARROW BIOPSY N/A 03/12/2024    Procedure: Biopsy-bone marrow;  Surgeon: Haylie Liu MD;  Location: [facility] ENDOSCOPY;  Service: General;  Laterality: N/A;    CARDIAC SURGERY  2018    CARDIAC VALVE REPLACEMENT  2018    Valve repair  milter    COLONOSCOPY  06/29/2022    EYE SURGERY  9/26/2022    Cataract      Social History     Socioeconomic History    Marital status:    Tobacco Use    Smoking status: Never    Smokeless tobacco: Never   Substance and Sexual Activity    Alcohol use: Never    Drug use: Never    Sexual activity: Not Currently      Family History   Problem Relation Name Age of Onset    Hypertension Mother        Reason for Follow-up:  -multiple myeloma, IgG kappa; S/P ASCT  -chronic low back pain  -hypercalcemia  -osteoporosis hips B/L    History of Present Illness:   IgG multiple myeloma      Oncologic/Hematologic History:  Oncology History   IgG  multiple myeloma   6/23/2023 Initial Diagnosis    IgG multiple myeloma     7/13/2023 - 11/21/2023 Chemotherapy    Treatment Summary   Plan Name: OP VRD - WEEKLY BORTEZOMIB LENALIDOMIDE DEXAMETHASONE Q3W  Treatment Goal: Curative  Status: Inactive  Start Date: 7/13/2023  End Date: 11/21/2023  Provider: Narciso Lundy MD  Chemotherapy: bortezomib (VELCADE) injection 2.25 mg, 2.25 mg, Subcutaneous, Clinic[facility] 1 time, 6 of 9 cycles  Administration: 2.25 mg (7/13/2023), 2.25 mg (7/20/2023), 2.25 mg (7/27/2023), 2.25 mg (8/10/2023), 2.25 mg (8/17/2023), 2.25 mg (8/24/2023), 2.25 mg (8/31/2023), 2.25 mg (9/7/2023), 2.25 mg (9/21/2023), 2.25 mg (9/14/2023), 2.25 mg (9/28/2023), 2.25 mg (10/12/2023), 2.25 mg (10/5/2023), 2.25 mg (10/19/2023), 2.25 mg (10/26/2023), 2.25 mg (11/21/2023)  lenalidomide 25 mg Cap, 25 mg, , , 1 of 1 cycle, Start date: 11/3/2023, End date: 2/15/2024     67-year-old gentleman, referred from [facility], with newly diagnosed multiple myeloma.      Patient is being followed for IgG kappa multiple myeloma.    Please assessment and plan section for details.    06/26/2023:  Initial consultation:  Pleasant gentleman who presents for initial medical oncology consultation, accompanied by his wife and family present who is a past interventional cardiologist.  Back pain for 2 months.  Initially started in right groin.  Physical therapy has not helped.  10/10 severity.  Has been taking Tylenol without significant relief.  Secured to take narcotics because of constipation.  Underwent couple of spinal steroid shots 3 weeks ago, with minor relief.  Pain is present all the time.  Pain increases with changing position in bed as well as upon standing.  He finds it difficult to ambulate.  Pain does not radiate down lower extremities and is not accompanied with lower extremity weakness, numbness, urinary or bowel incontinence, or saddle anesthesia.  Also experiences muscle cramps.  Generalized  weakness.  However, ECOG 2.  No fevers or chills.  No repeated infections.  Cramps in hands and feet.  Appetite is down.  Appetite has picked up in last 10 days.  Has lost 12-14 lb in last 1-1/2 months.  No abnormal bleeding or bruising.      Interval History:  INF FLUIDS   [No matching plan found]       02/24/2025:  -continues on Zometa every 4 weeks  -10/14/2024: DEXA scan (comparison:  10/11/2023): Osteoporosis based on hips; significantly increased fracture risk  -11/05/2024:  CT abdomen pelvis with IV contrast (abnormal weight loss; comparison 06/27/2023):  No acute abnormality detected in the abdomen or pelvis.   Severe skeletal demineralization.  Loss of vertebral body height throughout the lumbar spine, new at L5 when compared to July of 2023.  -11/05/2024:  CT chest with contrast (abnormal weight loss; comparison 11/01/2023):  Grossly stable bronchiectasis and scarring in the right upper lobe.  Numerous calcified granulomas right lung.   Atrial appendage clip.   Skeletal demineralization, with chronic shallow superior endplate depression from T9 through T12.  -10/04/2024: Hemoglobin 10.7, .2 elevated; serum iron 63 low, TIBC normal, ferritin 277 elevated; transferrin saturation 25% normal; B12 level 472 normal; folate 33.4 elevated; haptoglobin 256 normal;  low  -hemoglobin: 9.7 on 01/27/2025; 10.4 on 12/30/2024; 10.4 on 12/02/2024; 10.7 on 10/04/2024, etc.  -TSH and free T4 normal on 12/30/2024, 12/02/2024  -10/04/2024:  No monoclonal bands; kappa/lambda ratio 1.65 normal, kappa 4.67 without consistent upward trend; lambda 2.83 elevated  -01/27/2025:  No monoclonal bands; kappa/lambda ratio 1.24 normal; kappa 3.78 elevated and without upward trend; lambda 3.05 elevated and more less stable  -02/11/2025: Follow-up with Dr. Brandy Mahajan, hematology/oncology/BMT:  Decrease Revlimid from 15 mg to 10 mg because hemoglobin down to 8  -02/24/2025:  WBC 5.25, hemoglobin 10.4, platelets 123 K, ANC 1.18,  CO2 22 chronically suppressed, chloride 110 chronically elevated, BUN and creatinine normal, estimated GFR greater than 60 mL/minutes/1.73 m2, calcium 9.4, albumin 3.3, very mild hypercalcemia  Presents for a follow-up visit.  In no acute discomfort.  Gaining weight.  On Megace.  Wants to continue Megace.  On Revlimid 10 mg daily for maintenance.  His appetite declined ever since he started taking Revlimid.  Improved with Megace.  Gaining weight.  Happy.    No signs of venous thromboembolism.  On baby aspirin 81 mg daily along with Revlimid.  No fevers, chills, weakness, fatigue, or bone pains.  No chest pain, cough, or dyspnea.  No abdominal pain, nausea, vomiting, GI bleeding.  No change in bowel habits.  No significant weakness or fatigue.  ECOG 0-1.  Chronic back pain.  Does not radiate.  Says that he has consulted with 4-5 spinal surgeons and none of them have recommended any kind of surgery.  No skin rash with Revlimid.  No recurrent infections.  No hematuria.  In the past, consulted with Xochitl Grubbs MD, neurosurgery, for lumbar stenosis with neurogenic claudication; chronic bilateral low back pain with bilateral sciatica; compression fracture of lumbar vertebrae; planning L2-3, L3-4, L4-5 laminectomy apparently, she recommended surgery; however, Mr. Díaz opted against surgery.    04/29/2025:   -on Zometa to prevent skeletal events from myeloma bone lesions  -04/01/2025: TSH, free T4 normal  -04/01/2025: IgG, IgA normal; IgM 16 suppressed, stable; no monoclonal bands; kappa/lambda ratio 1.14 normal, kappa 3.5 elevated and stable, lambda 3.06 elevated and stable  -04/29/2025:  WBC 3.48, hemoglobin 9.8, .1, platelets 133 K, ANC 0.79, CMP reviewed  Presents for a follow-up visit.  Doing well.  Appetite is okay.  On Megace.  Compliant with lenalidomide 10 mg daily.  No significant side effects.  Chronic back pain.  Back pain is not secondary to myeloma.  Back pain secondary to DJD of spine.  On tramadol  and gabapentin.  Gabapentin 600 mg p.o. b.i.d. and tramadol 100 mg p.o. twice daily PRN for pain.  No recurrent fevers, chills, or infections.  No anorexia or unintentional weight loss.  His appetite declined ever since he started taking Revlimid but improved with Megace.  No signs of venous thromboembolism.  On baby aspirin 81 mg daily along with Revlimid.    No chest pain, cough, or dyspnea.  No abdominal pain, nausea, vomiting, GI bleeding.  No change in bowel habits.  No significant weakness or fatigue.  ECOG 0-1.  Chronic back pain.  Does not radiate.  Says that he has consulted with 4-5 spinal surgeons and none of them have recommended any kind of surgery.  No skin rash with Revlimid.  No recurrent infections.  No hematuria.  In the past, consulted with Xochitl Grubbs MD, neurosurgery, for lumbar stenosis with neurogenic claudication; chronic bilateral low back pain with bilateral sciatica; compression fracture of lumbar vertebrae; planning L2-3, L3-4, L4-5 laminectomy apparently, she recommended surgery; however, Mr. Díaz opted against surgery.      Immunization History   Administered Date(s) Administered    COVID-19 MRNA, LN-S PF (MODERNA HALF 0.25 ML DOSE) 10/23/2021, 08/18/2022    COVID-19 Vaccine 02/17/2021, 03/17/2021    COVID-19, MRNA, LN-S, PF (MODERNA FULL 0.5 ML DOSE) 02/17/2021, 03/17/2021    COVID-19, mRNA, LNP-S, bivalent booster, PF (Moderna Omicron)12 + YEARS 07/01/2023    HPV 9-Valent 05/31/2024, 07/22/2024    Hepatitis A / Hepatitis B 07/29/2020, 10/05/2021, 08/18/2022, 05/31/2024, 07/22/2024    HiB PRP-T 05/31/2024, 07/22/2024    IPV 05/31/2024, 07/22/2024    Influenza - Quadrivalent - MDCK - PF 10/26/2017, 10/05/2021    Influenza - Trivalent - Fluad - Adjuvanted - PF (65 years and older 12/06/2024    Influenza - Trivalent - Flucelvax - PF 09/10/2015    Meningococcal Conjugate (MCV4O) 1 Vial Dose(10yr-55yr) 04/17/2012, 08/18/2022, 08/18/2022    Meningococcal Conjugate (MCV4O) 2 Vial  (2mo-55yr) 04/17/2012    Meningococcal Conjugate (MCV4P) 04/17/2012    Pneumococcal Conjugate - 20 Valent 01/12/2023, 05/31/2024, 07/22/2024    Tdap 07/29/2020, 05/31/2024, 07/22/2024    Zoster 07/29/2020, 11/18/2020    Zoster Recombinant 07/29/2020, 11/18/2020     Review of patient's allergies indicates:   Allergen Reactions    Wheat Other (See Comments)     Medications:  Current Outpatient Medications on File Prior to Visit   Medication Sig Dispense Refill    acyclovir (ZOVIRAX) 400 MG tablet Take 1 tablet (400 mg total) by mouth 2 (two) times daily. 60 tablet 4    ascorbic acid, vitamin C, (VITAMIN C) 1000 MG tablet Take 1,000 mg by mouth once daily.      aspirin (ECOTRIN) 81 MG EC tablet Take 81 mg by mouth once daily.      atorvastatin (LIPITOR) 20 MG tablet Take 20 mg by mouth once daily.      calcium carbonate 195 mg calcium (500 mg) Chew Take 1 tablet by mouth once daily.      cetirizine (ZYRTEC) 10 mg Cap Take 1 capsule by mouth daily as needed.      coenzyme Q10 10 mg capsule Take 1 capsule by mouth once daily.      famotidine (PEPCID) 40 MG tablet Take 40 mg by mouth every evening.      ferrous sulfate (FEOSOL) 325 mg (65 mg iron) Tab tablet Take 650 mg by mouth.      gabapentin (NEURONTIN) 600 MG tablet Take 1 tablet (600 mg total) by mouth 2 (two) times daily as needed (neuropathy). 60 tablet 2    hydrocortisone (ANUSOL-HC) 2.5 % rectal cream Place 1 applicator rectally 2 (two) times daily. 28 g 2    lenalidomide 10 mg Cap Take 1 capsule by mouth.      megestroL (MEGACE) 400 mg/10 mL (40 mg/mL) Susp Take 10 mLs (400 mg total) by mouth once daily. 300 mL 3    metFORMIN (GLUCOPHAGE) 500 MG tablet Take 1 tablet (500 mg total) by mouth 2 (two) times daily with meals. 180 tablet 3    metoprolol succinate (TOPROL-XL) 50 MG 24 hr tablet Take 50 mg by mouth once daily.      MIRALAX 17 gram/dose powder Take 17 g by mouth.      mirtazapine (REMERON) 7.5 MG Tab Take 1 tablet by mouth every evening.      multivit  with min-folic acid 0.4 mg Tab Take 1 tablet by mouth once daily.      multivitamin with minerals tablet Take 1 tablet by mouth every morning.      omega 3-dha-epa-fish oil 300 mg (120 mg- 180mg)-1,000 mg Cap Take 500 mg by mouth.      omega-3 fatty acids/fish oil (FISH OIL-OMEGA-3 FATTY ACIDS) 300-1,000 mg capsule Take 2 g by mouth.      predniSONE (DELTASONE) 20 MG tablet Take 20 mg by mouth once daily.      sulfamethoxazole-trimethoprim 800-160mg (BACTRIM DS) 800-160 mg Tab Take 1 tablet by mouth on MWF of each week 48 tablet 3    traMADoL 100 mg Tab Take 100 mg by mouth 2 (two) times daily as needed (pain). 30 tablet 0    pantoprazole (PROTONIX) 20 MG tablet Take 1 tablet (20 mg total) by mouth once daily. 30 tablet 11     No current facility-administered medications on file prior to visit.     Review of Systems:   All systems reviewed and found to be negative except for the symptoms detailed above    Physical Examination:   VITAL SIGNS:   Vitals:    04/29/25 1321   BP: 113/78   Pulse: (!) 52   Resp: 18   Temp: 98.3 °F (36.8 °C)         GENERAL:  In no apparent distress.    HEAD:  No signs of head trauma.  EYES:  Pupils are equal.  Extraocular motions intact.    EARS:  Hearing grossly intact.  MOUTH:  Oropharynx is normal.   NECK:  No adenopathy, no JVD.     CHEST:  Chest with clear breath sounds bilaterally.  No wheezes, rales, rhonchi.    CARDIAC:  Regular rate and rhythm.  S1 and S2, without murmurs, gallops, rubs.  VASCULAR:  No Edema.  Peripheral pulses normal and equal in all extremities.  ABDOMEN:  Soft, without detectable tenderness.  No sign of distention.  No   rebound or guarding, and no masses palpated.   Bowel Sounds normal.  MUSCULOSKELETAL:  Good range of motion of all major joints. Extremities without clubbing, cyanosis or edema.    NEUROLOGIC EXAM:  Alert and oriented x 3.  No focal sensory or strength deficits.   Speech normal.  Follows commands.  PSYCHIATRIC:  Mood normal.    No results for  "input(s): "CBC" in the last 72 hours.   No results for input(s): "CMP" in the last 72 hours.     Assessment:  Problem List Items Addressed This Visit       Celiac disease    IgG multiple myeloma    Low back pain    Compression fracture of thoracic spine, non-traumatic    Drug-induced immunodeficiency    Compression fracture of first lumbar vertebra - Primary    Compression fracture of L2 lumbar vertebra    Spinal stenosis of lumbar region    Osteoporosis of femur without pathological fracture    H/O autologous stem cell transplant    Multiple myeloma in remission     Orders for 04/29/2025:   Continue Revlimid 10 mg daily  Baby aspirin 81 mg daily   Check CBC and CMP monthly   Check TSH and free T4 every 2-3 months   Continue Zometa every 4 weeks  Continue calcium and vitamin-D supplements   SPEP, BUBBA, FLC assay in July  Continue Megace 400 mg p.o. q.d. to boost appetite  DEXA scan October 2025  Follow-up with NP in 1 month with CBC and CMP    Above discussed with the patient.  All questions answered.  Discussed labs and scans.  Plan of management discussed.    He understands and agrees with this plan.  ================================    # Multiple myeloma, IgG kappa:  ISS stage:  Stage I  R-ISS stage:  Stage I  -presentation:  03/2023: Worsening back pain, 12-14 lb weight loss over 6 weeks  -skeletal survey 06/14/2023: Negative for lytic or sclerotic lesions  -06/14/2023:  Hemoglobin 11.0, IgG kappa M protein 2.28 gm/dL, kappa elevated, lambda suppressed, kappa/lambda ratio 292  -renal function normal, no hypercalcemia  -24 hour urine: M spike 183 mg; monoclonal kappa light chains in urine; urine kappa elevated, urine lambda suppressed, urine kappa/lambda ratio> 36.4  -LDH normal 06/16/2023   -Beta-2 microglobulin 3.18 on 06/26/2023  -bone marrow biopsy 06/27/2023:  50% plasmacytosis with kappa light chain restriction; no high-risk cytogenetics  -FDG PET-CT 06/29/2023:  Questionable findings  Staging of " myeloma:  -serum beta 2 microglobulin elevated (3.18)  -serum albumin and LDH normal   -myeloma FISH panel on bone marrow:  Negative for high-risk cytogenetic abnormalities  >>>  ISS stage:  Stage I  R-ISS stage:  Stage I  -Velcade started 07/13/2023   -Zometa 4 mg IV every 4 weeks (x2 years), started 07/13/2023  -VRD:  Cycle 1 started 07/13/2023; cycle 2 started 08/10/2023; cycle 3 started 08/31/2023; cycle 4 started 09/21/2023  -Radiation oncology consult:  Radiotherapy to spine not indicated/will not be helpful; questionable findings on FDG PET-CT  -IR at [facility] will not perform kyphoplasty  -significant response to chemotherapy x2 cycles (on 08/24/2023, kappa/lambda ratio 5.72, much improved; IgG kappa monoclonal protein down to 0.39 gm/dL, significantly decreased)   (pre chemotherapy IgG kappa, on 06/14/2023, was 2.28 gm/dL)  -VRD cycle 5:  10/12/2023-10/26/2023  -restaging bone marrow biopsy 10/24/2023, post VRD X 4-1/2 cycles:  Normocellular; < 1% plasma cells; no clonal or abnormal plasma cell population on flow cytometry; no high-risk FISH findings  (restaging bone marrow biopsy post VRD X 4-1/2 cycles:  <1% plasma cells)  (pre chemotherapy bone marrow biopsy 06/27/2023, had shown 50% plasmacytosis)  -11/08/2023: No monoclonal protein in urine  -11/09/2023:  No monoclonal protein on SPEP and BUBBA; kappa/lambda ratio 1.36, normalized; kappa free light chains 2.29 mg/dL dramatically reduced (to recall, was 99.4 on 06/14/2023 before starting chemotherapy)  -cycle 6 day 1 of RVD 11/21/2023; subsequently, on hold in anticipation of stem cell transplant  >>>  # ASCT at [facility]:  -melphalan 360 mg IV administered day -2 (11/28/2023) (200 mg per m2 per day)  -ASCT infusion day 0 (11/30/2023)  -filgrastim 5 microgram/kg subQ every 24 hours, started day +5 (12/05/2023)  -infection prophylaxis  -pentamidine 300 mg inhaled once on day -2 (11/28/2023)  -acyclovir 800 mg p.o. q.12 hours starting day -2 (11/28/2023), to  continue for 1 year  -fluconazole 400 mg p.o. Q 24 hours starting day -2 (11/28/2023), continue until engraftment and/or mucositis resolved  -cefepime started 12/08/2023, vancomycin added  -IVIG on 12/10/2023  -12/12/2023:  HCT day +12; doing well on antibiotics; counts recovered  -12/12/2023: Transfusion independent and ANC has recovered  -12/13/2023: Patient discharged  -venous Doppler both lower extremities 02/29/2024: Pedal edema: No DVT   -03/11/2024:  Hemoglobin 11.2; CMP unremarkable   -03/11/2024 (day + 102):  No monoclonal protein in blood; FLC assay normal   -restaging bone marrow biopsy 03/12/2024 (day +103):  No increase in plasma cells  -03/11/2024:  No monoclonal band; kappa/lambda ratio normal  -03/18/2024:  Urine negative for monoclonal protein  -thus, CR, post ASCT  -04/09/2024: Dr. Brandy Mahajan:  Started maintenance Revlimid 10 mg p.o. q.day, with aspirin  -06/10/2024: No monoclonal bands per SPEP and BUBBA; kappa/lambda ratio normal  -08/20/2024:  Dr. Brandy Mahajan; maintenance Revlimid dose increased to 15 mg daily  -continue acyclovir and Bactrim  -DEXA scan 10/14/2025:  Osteoporosis based on hips  -CTs C/A/P 11 05/2024: Abnormal weight loss:  Essentially negative  -TSH and free T4 normal on 12/30/2024, 12/02/2024  -10/04/2024:  No monoclonal bands; kappa/lambda ratio 1.65 normal, kappa 4.67 without consistent upward trend; lambda 2.83 elevated  -01/27/2025:  No monoclonal bands; kappa/lambda ratio 1.24 normal; kappa 3.78 elevated and without upward trend; lambda 3.05 elevated and more less stable  -02/11/2025: Follow-up with Dr. Brandy Mahajan, hematology/oncology/BMT:  Decrease Revlimid from 15 mg to 10 mg because hemoglobin down to 8  -04/01/2025: TSH, free T4 normal  -04/01/2025:  No monoclonal protein; kappa/lambda ratio normal  -04/29/2025:  WBC 3.48, hemoglobin 9.8, .1, platelets 133 K, ANC 0.79, CMP reviewed  >>>  Plan:   -Revlimid dose decreased to 10 mg daily from 02/11/2025, by Dr. Nava  Safa, because hemoglobin dropped to 8  -baby aspirin 81 mg daily to prevent thromboembolism  -check CBC and CMP monthly  -check TSH and free T4 every 2-3 months (we will check every 3 months)  -with lenalidomide, monitor for signs and symptoms of infection, bleeding, bruising, hepatotoxicity, secondary malignancies, thromboembolism, dermatologic toxicity, hypersensitivity  -a minimum of 2 years of lenalidomide maintenance therapy is recommended  -lenalidomide dose modifications for toxicity: See below  -continue Zometa every 4 weeks x2 years (started 07/13/2023; continue until 07/2025)  -continue calcium and vitamin-D supplements along with Zometa to prevent hypocalcemia  -monitor renal function while on Zometa  -per Radiation Oncology, given questionable radiologic findings per FDG PET-CT, palliative radiotherapy to spine was not thought to be useful, therefore, not pursued  -SPEP, BUBBA, FLC check every 3 months; next, July  -continue acyclovir and Bactrim until vaccination completed  -10/04/2024: Anorexia; start Megace 400 mg p.o. q.day    Lenalidomide Recommended Dosage Modifications for Hematologic Adverse Reactions in Multiple Myeloma (Maintenance Therapy Following Autologous Transplant):  #Neutropenia:  -ANC <500/mm3: Withhold lenalidomide and check CBC weekly; resume lenalidomide at the next lower dose with continuous dosing on days 1 to 28 of a 28-day cycle when ANC >=500/mm3.  -ANC <500/mm3 (subsequent occurrence at 5 mg daily [continuous] dose): Withhold lenalidomide; resume lenalidomide at 5 mg daily on days 1 to 21 of a 28-day cycle when ANC >=500/mm3. Do not dose below 5 mg daily on days 1 to 21 of a 28-day cycle.  #Thrombocytopenia:  -Platelets <30,000/mm3 (first occurrence): Withhold lenalidomide and check CBC weekly; resume lenalidomide at the next lower dose with continuous dosing on days 1 to 28 of a 28-day cycle when platelets >=30,000/mm3.  -Platelets <30,000/mm3 (subsequent occurrence at 5 mg  daily [continuous] dose): Withhold lenalidomide; resume lenalidomide at 5 mg daily on days 1 to 21 of a 28-day cycle when platelets >=30,000/mm3. Do not dose below 5 mg daily on days 1 to 21 of a 28-day cycle.    Monitoring with lenalidomide:  -CBC with differential: weekly for the first 2 cycles, every 2 weeks during the third cycle and monthly thereafter;  -serum creatinine, LFTs (periodically).  -Thyroid function tests (TSH at baseline then every 2 to 3 months during lenalidomide treatment  -ECG when clinically indicated. Monitor for signs and symptoms of infection (if neutropenic), bleeding or bruising, hepatotoxicity, secondary malignancies, thromboembolism (eg, shortness of breath, chest pain, arm or leg swelling), dermatologic toxicity, tumor lysis syndrome, or hypersensitivity.    Lenalidomide:  Potential side effects:  Embryo fetal toxicity  Hematologic toxicity.  Neutropenia.  Thrombocytopenia.  Venous and arterial thromboembolism.  DVT.  PE.  MI.  Stroke.  Dizziness, fatigue.  Tumor lysis syndrome.  Heart failure.    Used with caution in patients with renal impairment.  Lenalidomide =/> 4 cycles may decrease the # of CD34 pos cells collected for autologous stem cell transplant.  DVT, PE, atrial fibrillation, renal failure are more likely in patients> 65 years  Hepatotoxicity  Hypersensitivity reactions.  Anaphylaxis.  Angioedema.  Skin rash.  Eosinophilia.  Immediate hypersensitivity reactions.  Second primary malignancy.  AML.  MDS.  Solid tumor malignancies.  Nonmelanoma skin cancers.    # Osteoporosis of hips on DEXA scan 10/11/2023  -osteoporosis of hips noted on DEXA scan 10/11/2023  -DEXA scan 10/14/2025:  Osteoporosis based on hips  >>>  -patient already receiving monthly Zometa for underlying multiple myeloma; this will help with osteoporosis as well  -repeat DEXA scan in 1 year (10/2025)    # Exophytic lesion upper pole of right kidney:  10 mm exophytic hyperdense focus upper pole of right  kidney, compatible with a hyperdense cyst, on CT abdomen pelvis with contrast 06/27/2023  -07/12/2023:  MRI abdomen with and without contrast (right renal cyst): Small exophytic right renal lesion most likely a proteinaceous or hemorrhagic cyst (10 mm, upper pole of right kidney)  -MRI lumbar spine 08/02/2024:  1.2 cm exophytic lesion indeterminate from anterior upper pole of right kidney; multilevel degenerative disc disease, etc.    # Non myeloma findings (DJD of spine, spinal stenosis, compression fractures):  -mild L4-L5 spinal canal stenosis on MRI 05/04/2023   -lumbar DJD and facet arthrosis on MRI lumbar spine 05/04/2023  -MRI T-spine 08/08/2023:  No myeloma lesions   -MRI lumbar spine 09/14/2023:  Progression of skeletal demineralization; multilevel degenerative disc disease; severe spinal stenosis L3-L5; new/progressive loss of vertebral body height throughout the spine since 05/2023, etc.  -IR at [facility] will not perform kyphoplasty  -MRI lumbar spine 08/02/2024:  1.2 cm exophytic lesion indeterminate from anterior upper pole of right kidney; multilevel degenerative disc disease, etc.  -follows up with Xochitl Grubbs MD, neurosurgery, for lumbar stenosis with neurogenic claudication; chronic bilateral low back pain with bilateral sciatica; compression fracture of lumbar vertebrae; planning L2-3, L3-4, L4-5 laminectomy  -patient declined laminectomy    # Rising PSA level:  -PSA: 2.78 on 05/27/2024; 1.7 on 05/01/2024; 1.96 on 09/17/2021  -MRI prostate 09/23/2024:  PI-RADS 2: Low (rising PSA level)    # Chronic macrocytic anemia:  -chronic anemia: Hemoglobin: 9.9 on 09/06/2024; 10.5 on 09/03/2024; 10.5 on 07/12/2024; 11.1 on 06/14/2024; 11.7 on 05/03/2024  -MCV:  Slightly elevated  -10/04/2024:  Hemoglobin 10.7, more less stable; .2  -10/04/2024: Hemoglobin 10.7, .2 elevated; iron/B12/folate stores normal  -hemoglobin: 9.7 on 01/27/2025; 10.4 on 12/30/2024; 10.4 on 12/02/2024; 10.7 on 10/04/2024,  etc.  (macrocytic anemia; iron/B12/folate stores normal; no hemolysis; patient on Revlimid maintenance)  -02/11/2025: Follow-up with Dr. Brandy Mahajan, hematology/oncology/BMT:  Decrease Revlimid from 15 mg to 10 mg because hemoglobin down to 8  >>>  -anemia most likely secondary to bone marrow suppression with Revlimid; expectant monitoring    # Co-morbidities:  NIDDM.  Dyslipidemia.  Hypertension.  Celiac disease.    History of rheumatic mitral valve disease, S/P Maze/ablation procedure 02/02/2018      Discussion:  Supportive care for myeloma:  -bone targeted treatment: Bisphosphonate, category 1; or denosumab to reduce risk of skeletal related events; denosumab is preferred in patients with renal insufficiency; assess vitamin-D status; baseline dental exam; monitor for osteonecrosis of the jaw; monitor renal dysfunction with bisphosphonate therapy  -continue bone targeted treatment (bisphosphonate or denosumab) for 2 years; continue beyond 2 years should be based on clinical judgment  -patients receiving denosumab for bone disease who subsequently discontinued therapy should be given maintenance denosumab every 6 months or a single dose of bisphosphonate to mitigate risk of rebound osteoporosis  -for hypercalcemia, zoledronic acid, denosumab, steroids, and/or calcitonin  -for hyperviscosity, plasmapheresis should be used as adjuvant therapy for symptomatic hyperviscosity  -intravenous immunoglobulin therapy should be considered in the setting of recurrent serious infection and/or hypogammaglobulinemia (IgG </= 400 mg/dL)  -pneumococcal conjugate vaccine, followed by pneumococcal polysaccharide vaccine 1 year later  -Influenza vaccination is recommended; 2 doses of high-dose inactivated quandaivalent influenza vaccine should be considered  -consider 12 weeks of levofloxacin 500 mg p.o. daily at the time of initial diagnosis of multiple myeloma  -COVID-19 vaccination  -highest risk for VTE is in the 1st 6 months  following new diagnosis of multiple myeloma  -VTE prophylaxis if no contraindications to anticoagulation agents or antiplatelets  Recommendations for VTE prophylaxis:  Aspirin  mg daily; low molecular weight heparin equivalent to enoxaparin 40 mg daily; Xarelto 10 mg daily; Eliquis 2.5 mg b.i.d.; Arixtra 2.5 mg daily; Coumadin with target INR 2.0-3.0    Follow-up:  No follow-ups on file.

## 2025-05-19 DIAGNOSIS — C90.00 IGG MULTIPLE MYELOMA: ICD-10-CM

## 2025-05-19 DIAGNOSIS — M48.061 SPINAL STENOSIS OF LUMBAR REGION, UNSPECIFIED WHETHER NEUROGENIC CLAUDICATION PRESENT: ICD-10-CM

## 2025-05-19 RX ORDER — MEGESTROL ACETATE 40 MG/ML
400 SUSPENSION ORAL DAILY
Qty: 300 ML | Refills: 3 | Status: SHIPPED | OUTPATIENT
Start: 2025-05-19

## 2025-05-19 RX ORDER — TRAMADOL HYDROCHLORIDE 100 MG/1
100 TABLET, COATED ORAL 2 TIMES DAILY PRN
Qty: 30 TABLET | Refills: 0 | Status: SHIPPED | OUTPATIENT
Start: 2025-05-19

## 2025-05-26 NOTE — PROGRESS NOTES
[facility] Hematology/Oncology  PROGRESS NOTE    Subjective:       Patient ID: Yruiy Díaz is a 69 y.o. male.    Diagnosis:   -multiple myeloma, IgG kappa; S/P ASCT  -worsening low back pain  -hypercalcemia  -osteoporosis hips B/L      Current Treatment:  Revlimid 10 mg po daily, decreased from 15 mg daily on 02/11/2025 by  due to anemia with hemoglobin of 8  started 8/20/24    Zometa 4mg IV every 28 days  Start 7/13/2023       Treatment History:  VRD (7/13/2023-11/21/23)  ASCT 11/30/2023  Revlimid 10mg po daily (4/9/24-8/20/24)    Chief Complaint: Follow-up (Patient reports lower back pain.)    HPI:  67-year-old gentleman, referred from [facility], with newly diagnosed multiple myeloma.       Patient is being followed for IgG kappa multiple myeloma.    Please assessment and plan section for details.     06/26/2023:  Initial consultation:  Pleasant gentleman who presents for initial medical oncology consultation, accompanied by his wife and family present who is a past interventional cardiologist.  Back pain for 2 months.  Initially started in right groin.  Physical therapy has not helped.  10/10 severity.  Has been taking Tylenol without significant relief.  Secured to take narcotics because of constipation.  Underwent couple of spinal steroid shots 3 weeks ago, with minor relief.  Pain is present all the time.  Pain increases with changing position in bed as well as upon standing.  He finds it difficult to ambulate.  Pain does not radiate down lower extremities and is not accompanied with lower extremity weakness, numbness, urinary or bowel incontinence, or saddle anesthesia.  Also experiences muscle cramps.  Generalized weakness.  However, ECOG 2.  No fevers or chills.  No repeated infections.  Cramps in hands and feet.  Appetite is down.  Appetite has picked up in last 10 days.  Has lost 12-14 lb in last 1-1/2 months.  No abnormal bleeding or bruising.    Past medical history:  NIDDM.  Dyslipidemia.  GERD.  Lumbar spinal stenosis.  Vitamin-D deficiency.  Allergic rhinitis.  HTN.  Bell's palsy.  Celiac disease.  Hemorrhoids.  Mitral regurgitation 01/05/2018.  Rheumatic valve narrowing and leaking mitral valve.  -02/02/2018 ([facility]):  АНДРЕЙ, right minimally invasive anterior thoracotomy, right femoral artery and vein exploration, extensive right pleural adhesiolysis/pneumolysis, bilateral Maze/ablation (extensive), left atrial appendage clip placement for left atrial appendage exclusion, mitral valve repair (complex repair with 26 mm annuloplasty ring)  Social history:  .  Lives in [place], [place].  Five children.  Owns a [employer] business.  No history of tobacco, alcohol, or illicit drug abuse.    Family history:  Negative for cancers or blood dyscrasias.    Health maintenance:  PCP in family medicine clinic, [facility].  Had EGD and colonoscopy done 1 year ago by Dr. Zoltan Kapoor, , Cash, apparently unremarkable (he gets the endoscopies done periodically because of history of celiac disease).    Interval History:  Patient presents to clinic for a scheduled follow-up visit and treatment.  He is due for Zometa today.  He reports compliance with Revlimid 10 mg daily.  He was recently seen via tele-visit by Dr. Clark on 5/20/2025, continue revlimid 10 mg daily.He denies any new or worsening symptoms.  He does continue with chronic lower back pain.  He is taking tramadol as needed for back pain, no longer taking gabapentin due to swelling.  He denies any nausea, vomiting, diarrhea, constipation, unusual headache, shortness of breath, cough, skin changes, fever or signs of infection he continues on Megace for his appetite.  Results reviewed with patient in detail.  Labs and plan of care reviewed as well, patient verbalized understanding.    PMX/PSHx  Past Medical History:   Diagnosis Date    Celiac disease     Cerebral palsy, unspecified     Compression  fracture of L1 vertebra with delayed healing, subsequent encounter     Compression fracture of L2 vertebra with delayed healing, subsequent encounter     Cyst of right kidney     Degeneration of lumbar intervertebral disc     Diabetes mellitus, type 2     GERD (gastroesophageal reflux disease)     Hyperlipidemia     Hypertension     IgG multiple myeloma     Low back pain     Lumbago with sciatica, right side     Lumbar spinal stenosis     Monocytosis     Multiple myeloma, remission status unspecified     Neck pain     Nontraumatic compression fracture of T8 vertebra, initial encounter     LOIS (obstructive sleep apnea)     Osteoporosis of femur without pathological fracture     Other chronic pain     Personal history of colonic polyps 06/29/2022    Secondary malignancy of lumbar vertebral column     Secondary malignant neoplasm of bone     Tachycardia       Past Surgical History:   Procedure Laterality Date    BONE MARROW ASPIRATION N/A 06/27/2023    Procedure: ASPIRATION, BONE MARROW;  Surgeon: Namita Daniels MD;  Location: [facility] ENDOSCOPY;  Service: General;  Laterality: N/A;    BONE MARROW ASPIRATION N/A 10/24/2023    Procedure: ASPIRATION, BONE MARROW;  Surgeon: Namita Daniels MD;  Location: [facility] ENDOSCOPY;  Service: General;  Laterality: N/A;    BONE MARROW ASPIRATION N/A 03/12/2024    Procedure: ASPIRATION, BONE MARROW;  Surgeon: Haylie Liu MD;  Location: [facility] ENDOSCOPY;  Service: General;  Laterality: N/A;    BONE MARROW BIOPSY N/A 06/27/2023    Procedure: Biopsy-bone marrow;  Surgeon: Haylie Liu MD;  Location: [facility] ENDOSCOPY;  Service: General;  Laterality: N/A;    BONE MARROW BIOPSY N/A 10/24/2023    Procedure: Biopsy-bone marrow;  Surgeon: Namita Daniels MD;  Location: [facility] ENDOSCOPY;  Service: General;  Laterality: N/A;    BONE MARROW BIOPSY N/A 03/12/2024    Procedure: Biopsy-bone marrow;  Surgeon: Haylie Liu MD;  Location: [facility] ENDOSCOPY;  Service: General;  Laterality:  N/A;    CARDIAC SURGERY  2018    CARDIAC VALVE REPLACEMENT  2018    Valve repair  milter    COLONOSCOPY  06/29/2022    EYE SURGERY  9/26/2022    Cataract     Allergies:  Review of patient's allergies indicates:   Allergen Reactions    Wheat Other (See Comments)      Social History:   Social History[1]  Medications:  Current Outpatient Medications   Medication Instructions    acyclovir (ZOVIRAX) 400 mg, Oral, 2 times daily    ascorbic acid (vitamin C) (VITAMIN C) 1,000 mg, Daily    aspirin (ECOTRIN) 81 mg, Daily    atorvastatin (LIPITOR) 20 mg, Daily    calcium carbonate 195 mg calcium (500 mg) Chew 1 tablet, Daily    cetirizine (ZYRTEC) 10 mg Cap 1 capsule, Daily PRN    coenzyme Q10 10 mg capsule 1 capsule, Daily    famotidine (PEPCID) 40 mg, Nightly    ferrous sulfate (FEOSOL) 650 mg    gabapentin (NEURONTIN) 600 mg, Oral, 2 times daily PRN    hydrocortisone (ANUSOL-HC) 2.5 % rectal cream 1 applicator, Rectal, 2 times daily    lenalidomide 10 mg Cap 1 capsule    megestroL (MEGACE) 400 mg, Oral, Daily    metFORMIN (GLUCOPHAGE) 500 mg, Oral, 2 times daily with meals    metoprolol succinate (TOPROL-XL) 50 mg, Daily    MIRALAX 17 g    mirtazapine (REMERON) 7.5 MG Tab 1 tablet, Nightly    multivit with min-folic acid 0.4 mg Tab 1 tablet, Daily    multivitamin with minerals tablet 1 tablet, Every morning    omega 3-dha-epa-fish oil 300 mg (120 mg- 180mg)-1,000 mg Cap 500 mg    omega-3 fatty acids/fish oil (FISH OIL-OMEGA-3 FATTY ACIDS) 300-1,000 mg capsule 2 g    pantoprazole (PROTONIX) 20 mg, Oral, Daily    predniSONE (DELTASONE) 20 mg, Daily    sulfamethoxazole-trimethoprim 800-160mg (BACTRIM DS) 800-160 mg Tab Take 1 tablet by mouth on MWF of each week    traMADoL 100 mg, Oral, 2 times daily PRN     ROS:  Review of Systems   Constitutional:  Negative for appetite change, chills, fatigue, fever and unexpected weight change.   HENT:  Negative for ear discharge, facial swelling, mouth sores, nosebleeds, sinus  pressure/congestion and sore throat.    Eyes:  Negative for pain and visual disturbance.   Respiratory:  Negative for cough, chest tightness, shortness of breath and wheezing.    Cardiovascular:  Negative for chest pain, palpitations and leg swelling.   Gastrointestinal:  Negative for abdominal pain, blood in stool, change in bowel habit, constipation, diarrhea, nausea and vomiting.   Endocrine: Negative.    Genitourinary:  Negative for dysuria, frequency, hematuria and urgency.   Musculoskeletal:  Negative for arthralgias, gait problem, joint swelling and myalgias.   Integumentary:  Negative for color change, pallor, rash and wound.   Allergic/Immunologic: Negative.  Negative for frequent infections.   Neurological:  Negative for dizziness, vertigo, syncope, weakness and numbness.   Hematological:  Negative for adenopathy. Does not bruise/bleed easily.   Psychiatric/Behavioral:  Negative for confusion and dysphoric mood. The patient is not nervous/anxious.    All other systems reviewed and are negative.      Objective:   Vitals:  Vitals:    05/27/25 1351   BP: 124/68   Pulse: 60   Resp: 19   Temp: 97.9 °F (36.6 °C)        Wt Readings from Last 3 Encounters:   05/27/25 1351 69.5 kg (153 lb 3.2 oz)   04/29/25 1321 68.5 kg (151 lb)   04/01/25 1255 67.9 kg (149 lb 9.6 oz)      Physical Examination:  Physical Exam  Vitals and nursing note reviewed.   HENT:      Head: Normocephalic and atraumatic.      Mouth/Throat:      Mouth: Mucous membranes are moist.      Pharynx: Oropharynx is clear.   Eyes:      Extraocular Movements: Extraocular movements intact.      Conjunctiva/sclera: Conjunctivae normal.      Pupils: Pupils are equal, round, and reactive to light.   Cardiovascular:      Rate and Rhythm: Normal rate and regular rhythm.   Pulmonary:      Effort: Pulmonary effort is normal.      Breath sounds: Normal breath sounds.   Abdominal:      General: Abdomen is flat. Bowel sounds are normal.      Palpations: Abdomen is  soft.   Musculoskeletal:         General: Normal range of motion.      Cervical back: Normal range of motion and neck supple.   Skin:     General: Skin is warm and dry.   Neurological:      General: No focal deficit present.      Mental Status: He is alert.   Psychiatric:         Mood and Affect: Mood normal.       ECOG SCORE           Labs:  Lab Visit on 05/27/2025   Component Date Value Ref Range Status    Sodium 05/27/2025 134 (L)  136 - 145 mmol/L Final    Potassium 05/27/2025 3.7  3.5 - 5.1 mmol/L Final    Chloride 05/27/2025 107  98 - 107 mmol/L Final    CO2 05/27/2025 19 (L)  23 - 31 mmol/L Final    Glucose 05/27/2025 132 (H)  82 - 115 mg/dL Final    Blood Urea Nitrogen 05/27/2025 14.4  8.4 - 25.7 mg/dL Final    Creatinine 05/27/2025 1.36 (H)  0.72 - 1.25 mg/dL Final    Calcium 05/27/2025 8.9  8.8 - 10.0 mg/dL Final    Protein Total 05/27/2025 6.9  5.8 - 7.6 gm/dL Final    Albumin 05/27/2025 3.3 (L)  3.4 - 4.8 g/dL Final    Globulin 05/27/2025 3.6 (H)  2.4 - 3.5 gm/dL Final    Albumin/Globulin Ratio 05/27/2025 0.9 (L)  1.1 - 2.0 ratio Final    Bilirubin Total 05/27/2025 0.2  <=1.5 mg/dL Final    ALP 05/27/2025 61  40 - 150 unit/L Final    ALT 05/27/2025 13  0 - 55 unit/L Final    AST 05/27/2025 13  11 - 45 unit/L Final    eGFR 05/27/2025 57  mL/min/1.73/m2 Final    Estimated GFR calculated using the CKD-EPI creatinine (2021) equation.    Anion Gap 05/27/2025 8.0  mEq/L Final    BUN/Creatinine Ratio 05/27/2025 11   Final    WBC 05/27/2025 5.25  4.50 - 11.50 x10(3)/mcL Final    RBC 05/27/2025 2.92 (L)  4.70 - 6.10 x10(6)/mcL Final    Hgb 05/27/2025 10.5 (L)  14.0 - 18.0 g/dL Final    Hct 05/27/2025 31.3 (L)  42.0 - 52.0 % Final    MCV 05/27/2025 107.2 (H)  80.0 - 94.0 fL Final    MCH 05/27/2025 36.0 (H)  27.0 - 31.0 pg Final    MCHC 05/27/2025 33.5  33.0 - 36.0 g/dL Final    RDW 05/27/2025 14.7  11.5 - 17.0 % Final    Platelet 05/27/2025 155  130 - 400 x10(3)/mcL Final    MPV 05/27/2025 9.7  7.4 - 10.4 fL Final     Neut % 05/27/2025 23.5  % Final    Lymph % 05/27/2025 54.9  % Final    Mono % 05/27/2025 16.2  % Final    Eos % 05/27/2025 2.9  % Final    Basophil % 05/27/2025 1.7  % Final    Imm Grans % 05/27/2025 0.8  % Final    Neut # 05/27/2025 1.24 (L)  2.1 - 9.2 x10(3)/mcL Final    Lymph # 05/27/2025 2.88  0.6 - 4.6 x10(3)/mcL Final    Mono # 05/27/2025 0.85  0.1 - 1.3 x10(3)/mcL Final    Eos # 05/27/2025 0.15  0 - 0.9 x10(3)/mcL Final    Baso # 05/27/2025 0.09  <=0.2 x10(3)/mcL Final    Imm Gran # 05/27/2025 0.04  0.00 - 0.04 x10(3)/mcL Final    NRBC% 05/27/2025 0.0  % Final        Pathology:  Radiology/Diagnostics:      I have reviewed all available lab results and radiology reports.  Assessment:   Multiple myeloma  Labs and exam stable  Continue follow-up with Dr. Clark,   Continue Revlimid 10 mg as maintenance therapy as long as ANC is greater than 500 and platelet> 50 K  Proceed with Zometa 3.3 mg today, due every 4 weeks  Continue aspirin 81 mg daily  Continue follow-up with Neurology for chronic low back pain  Continue physical therapy  Continue tramadol as needed for pain  Continue calcium and vitamin-D supplement  Continue acyclovir and Bactrim until vaccinations are completed/until recommended by Dr. Clark  DEXA scan due October 2025  Check TSH and free T4 every 3 months, TSH and free T4 every 3 months due 07/2024  CBC and CMP monthly  Check SPEP, BUBBA, FLC assay every 3 months, due 07/2025  Problem List Items Addressed This Visit       IgG multiple myeloma - Primary    Secondary malignant neoplasm of bone    Drug-induced immunodeficiency    Osteoporosis of femur without pathological fracture    H/O autologous stem cell transplant           Plan:   05/27/2025  Labs and exam stable  Continue follow-up with Dr. Clark,   Continue Revlimid 10 mg as maintenance therapy as long as ANC is greater than 500 and platelet> 50 K  Proceed with Zometa 3.3 mg today CrCl 49.3, due every 4 weeks  Continue aspirin 81 mg  daily  Continue follow-up with Neurology for chronic low back pain  Continue  tramadol as needed for pain  Continue calcium and vitamin-D supplement  Continue Megace for appetite  Continue acyclovir and Bactrim until vaccinations are completed/until recommended by Dr. Clark  DEXA scan due October 2025  Check TSH and free T4 every 3 months, TSH and free T4 every 3 months due 07/2024  Check SPEP, BUBBA, FLC assay every 3 months, due 07/2025  Return to clinic in 4 weeks with lab/MD/Kathleen  CBC CMP     Providers:           Orders for 04/29/2025:   Continue Revlimid 10 mg daily  Baby aspirin 81 mg daily   Check CBC and CMP monthly   Check TSH and free T4 every 2-3 months   Continue Zometa every 4 weeks  Continue calcium and vitamin-D supplements   SPEP, BUBBA, FLC assay in July  Continue Megace 400 mg p.o. q.d. to boost appetite  DEXA scan October 2025  Follow-up with NP in 1 month with CBC and CMP     Above discussed with the patient.  All questions answered.  Discussed labs and scans.  Plan of management discussed.    He understands and agrees with this plan.  ================================     # Multiple myeloma, IgG kappa:  ISS stage:  Stage I  R-ISS stage:  Stage I  -presentation:  03/2023: Worsening back pain, 12-14 lb weight loss over 6 weeks  -skeletal survey 06/14/2023: Negative for lytic or sclerotic lesions  -06/14/2023:  Hemoglobin 11.0, IgG kappa M protein 2.28 gm/dL, kappa elevated, lambda suppressed, kappa/lambda ratio 292  -renal function normal, no hypercalcemia  -24 hour urine: M spike 183 mg; monoclonal kappa light chains in urine; urine kappa elevated, urine lambda suppressed, urine kappa/lambda ratio> 36.4  -LDH normal 06/16/2023   -Beta-2 microglobulin 3.18 on 06/26/2023  -bone marrow biopsy 06/27/2023:  50% plasmacytosis with kappa light chain restriction; no high-risk cytogenetics  -FDG PET-CT 06/29/2023:  Questionable findings  Staging of myeloma:  -serum beta 2 microglobulin elevated  (3.18)  -serum albumin and LDH normal   -myeloma FISH panel on bone marrow:  Negative for high-risk cytogenetic abnormalities  >>>  ISS stage:  Stage I  R-ISS stage:  Stage I  -Velcade started 07/13/2023   -Zometa 4 mg IV every 4 weeks (x2 years), started 07/13/2023  -VRD:  Cycle 1 started 07/13/2023; cycle 2 started 08/10/2023; cycle 3 started 08/31/2023; cycle 4 started 09/21/2023  -Radiation oncology consult:  Radiotherapy to spine not indicated/will not be helpful; questionable findings on FDG PET-CT  -IR at [facility] will not perform kyphoplasty  -significant response to chemotherapy x2 cycles (on 08/24/2023, kappa/lambda ratio 5.72, much improved; IgG kappa monoclonal protein down to 0.39 gm/dL, significantly decreased)   (pre chemotherapy IgG kappa, on 06/14/2023, was 2.28 gm/dL)  -VRD cycle 5:  10/12/2023-10/26/2023  -restaging bone marrow biopsy 10/24/2023, post VRD X 4-1/2 cycles:  Normocellular; < 1% plasma cells; no clonal or abnormal plasma cell population on flow cytometry; no high-risk FISH findings  (restaging bone marrow biopsy post VRD X 4-1/2 cycles:  <1% plasma cells)  (pre chemotherapy bone marrow biopsy 06/27/2023, had shown 50% plasmacytosis)  -11/08/2023: No monoclonal protein in urine  -11/09/2023:  No monoclonal protein on SPEP and BUBBA; kappa/lambda ratio 1.36, normalized; kappa free light chains 2.29 mg/dL dramatically reduced (to recall, was 99.4 on 06/14/2023 before starting chemotherapy)  -cycle 6 day 1 of RVD 11/21/2023; subsequently, on hold in anticipation of stem cell transplant  >>>  # ASCT at [facility]:  -melphalan 360 mg IV administered day -2 (11/28/2023) (200 mg per m2 per day)  -ASCT infusion day 0 (11/30/2023)  -filgrastim 5 microgram/kg subQ every 24 hours, started day +5 (12/05/2023)  -infection prophylaxis  -pentamidine 300 mg inhaled once on day -2 (11/28/2023)  -acyclovir 800 mg p.o. q.12 hours starting day -2 (11/28/2023), to continue for 1 year  -fluconazole 400 mg p.o. Q 24  hours starting day -2 (11/28/2023), continue until engraftment and/or mucositis resolved  -cefepime started 12/08/2023, vancomycin added  -IVIG on 12/10/2023  -12/12/2023:  HCT day +12; doing well on antibiotics; counts recovered  -12/12/2023: Transfusion independent and ANC has recovered  -12/13/2023: Patient discharged  -venous Doppler both lower extremities 02/29/2024: Pedal edema: No DVT   -03/11/2024:  Hemoglobin 11.2; CMP unremarkable   -03/11/2024 (day + 102):  No monoclonal protein in blood; FLC assay normal   -restaging bone marrow biopsy 03/12/2024 (day +103):  No increase in plasma cells  -03/11/2024:  No monoclonal band; kappa/lambda ratio normal  -03/18/2024:  Urine negative for monoclonal protein  -thus, CR, post ASCT  -04/09/2024: Dr. Brandy Mahajan:  Started maintenance Revlimid 10 mg p.o. q.day, with aspirin  -06/10/2024: No monoclonal bands per SPEP and BUBBA; kappa/lambda ratio normal  -08/20/2024:  Dr. Brandy Mahajan; maintenance Revlimid dose increased to 15 mg daily  -continue acyclovir and Bactrim  -DEXA scan 10/14/2025:  Osteoporosis based on hips  -CTs C/A/P 11 05/2024: Abnormal weight loss:  Essentially negative  -TSH and free T4 normal on 12/30/2024, 12/02/2024  -10/04/2024:  No monoclonal bands; kappa/lambda ratio 1.65 normal, kappa 4.67 without consistent upward trend; lambda 2.83 elevated  -01/27/2025:  No monoclonal bands; kappa/lambda ratio 1.24 normal; kappa 3.78 elevated and without upward trend; lambda 3.05 elevated and more less stable  -02/11/2025: Follow-up with Dr. Brandy Mahajan, hematology/oncology/BMT:  Decrease Revlimid from 15 mg to 10 mg because hemoglobin down to 8  -04/01/2025: TSH, free T4 normal  -04/01/2025:  No monoclonal protein; kappa/lambda ratio normal  -04/29/2025:  WBC 3.48, hemoglobin 9.8, .1, platelets 133 K, ANC 0.79, CMP reviewed  >>>  Plan:   -Revlimid dose decreased to 10 mg daily from 02/11/2025, by Dr. Brandy Mahajan, because hemoglobin dropped to 8  -baby aspirin 81  mg daily to prevent thromboembolism  -check CBC and CMP monthly  -check TSH and free T4 every 2-3 months (we will check every 3 months)  -with lenalidomide, monitor for signs and symptoms of infection, bleeding, bruising, hepatotoxicity, secondary malignancies, thromboembolism, dermatologic toxicity, hypersensitivity  -a minimum of 2 years of lenalidomide maintenance therapy is recommended  -lenalidomide dose modifications for toxicity: See below  -continue Zometa every 4 weeks x2 years (started 07/13/2023; continue until 07/2025)  -continue calcium and vitamin-D supplements along with Zometa to prevent hypocalcemia  -monitor renal function while on Zometa  -per Radiation Oncology, given questionable radiologic findings per FDG PET-CT, palliative radiotherapy to spine was not thought to be useful, therefore, not pursued  -SPEP, BUBBA, FLC check every 3 months; next, July  -continue acyclovir and Bactrim until vaccination completed  -10/04/2024: Anorexia; start Megace 400 mg p.o. q.day     Lenalidomide Recommended Dosage Modifications for Hematologic Adverse Reactions in Multiple Myeloma (Maintenance Therapy Following Autologous Transplant):  #Neutropenia:  -ANC <500/mm3: Withhold lenalidomide and check CBC weekly; resume lenalidomide at the next lower dose with continuous dosing on days 1 to 28 of a 28-day cycle when ANC >=500/mm3.  -ANC <500/mm3 (subsequent occurrence at 5 mg daily [continuous] dose): Withhold lenalidomide; resume lenalidomide at 5 mg daily on days 1 to 21 of a 28-day cycle when ANC >=500/mm3. Do not dose below 5 mg daily on days 1 to 21 of a 28-day cycle.  #Thrombocytopenia:  -Platelets <30,000/mm3 (first occurrence): Withhold lenalidomide and check CBC weekly; resume lenalidomide at the next lower dose with continuous dosing on days 1 to 28 of a 28-day cycle when platelets >=30,000/mm3.  -Platelets <30,000/mm3 (subsequent occurrence at 5 mg daily [continuous] dose): Withhold lenalidomide; resume  lenalidomide at 5 mg daily on days 1 to 21 of a 28-day cycle when platelets >=30,000/mm3. Do not dose below 5 mg daily on days 1 to 21 of a 28-day cycle.     Monitoring with lenalidomide:  -CBC with differential: weekly for the first 2 cycles, every 2 weeks during the third cycle and monthly thereafter;  -serum creatinine, LFTs (periodically).  -Thyroid function tests (TSH at baseline then every 2 to 3 months during lenalidomide treatment  -ECG when clinically indicated. Monitor for signs and symptoms of infection (if neutropenic), bleeding or bruising, hepatotoxicity, secondary malignancies, thromboembolism (eg, shortness of breath, chest pain, arm or leg swelling), dermatologic toxicity, tumor lysis syndrome, or hypersensitivity.     Lenalidomide:  Potential side effects:  Embryo fetal toxicity  Hematologic toxicity.  Neutropenia.  Thrombocytopenia.  Venous and arterial thromboembolism.  DVT.  PE.  MI.  Stroke.  Dizziness, fatigue.  Tumor lysis syndrome.  Heart failure.    Used with caution in patients with renal impairment.  Lenalidomide =/> 4 cycles may decrease the # of CD34 pos cells collected for autologous stem cell transplant.  DVT, PE, atrial fibrillation, renal failure are more likely in patients> 65 years  Hepatotoxicity  Hypersensitivity reactions.  Anaphylaxis.  Angioedema.  Skin rash.  Eosinophilia.  Immediate hypersensitivity reactions.  Second primary malignancy.  AML.  MDS.  Solid tumor malignancies.  Nonmelanoma skin cancers.     # Osteoporosis of hips on DEXA scan 10/11/2023  -osteoporosis of hips noted on DEXA scan 10/11/2023  -DEXA scan 10/14/2025:  Osteoporosis based on hips  >>>  -patient already receiving monthly Zometa for underlying multiple myeloma; this will help with osteoporosis as well  -repeat DEXA scan in 1 year (10/2025)     # Exophytic lesion upper pole of right kidney:  10 mm exophytic hyperdense focus upper pole of right kidney, compatible with a hyperdense cyst, on CT abdomen  pelvis with contrast 06/27/2023  -07/12/2023:  MRI abdomen with and without contrast (right renal cyst): Small exophytic right renal lesion most likely a proteinaceous or hemorrhagic cyst (10 mm, upper pole of right kidney)  -MRI lumbar spine 08/02/2024:  1.2 cm exophytic lesion indeterminate from anterior upper pole of right kidney; multilevel degenerative disc disease, etc.     # Non myeloma findings (DJD of spine, spinal stenosis, compression fractures):  -mild L4-L5 spinal canal stenosis on MRI 05/04/2023   -lumbar DJD and facet arthrosis on MRI lumbar spine 05/04/2023  -MRI T-spine 08/08/2023:  No myeloma lesions   -MRI lumbar spine 09/14/2023:  Progression of skeletal demineralization; multilevel degenerative disc disease; severe spinal stenosis L3-L5; new/progressive loss of vertebral body height throughout the spine since 05/2023, etc.  -IR at [facility] will not perform kyphoplasty  -MRI lumbar spine 08/02/2024:  1.2 cm exophytic lesion indeterminate from anterior upper pole of right kidney; multilevel degenerative disc disease, etc.  -follows up with Xochitl Grubbs MD, neurosurgery, for lumbar stenosis with neurogenic claudication; chronic bilateral low back pain with bilateral sciatica; compression fracture of lumbar vertebrae; planning L2-3, L3-4, L4-5 laminectomy  -patient declined laminectomy     # Rising PSA level:  -PSA: 2.78 on 05/27/2024; 1.7 on 05/01/2024; 1.96 on 09/17/2021  -MRI prostate 09/23/2024:  PI-RADS 2: Low (rising PSA level)     # Chronic macrocytic anemia:  -chronic anemia: Hemoglobin: 9.9 on 09/06/2024; 10.5 on 09/03/2024; 10.5 on 07/12/2024; 11.1 on 06/14/2024; 11.7 on 05/03/2024  -MCV:  Slightly elevated  -10/04/2024:  Hemoglobin 10.7, more less stable; .2  -10/04/2024: Hemoglobin 10.7, .2 elevated; iron/B12/folate stores normal  -hemoglobin: 9.7 on 01/27/2025; 10.4 on 12/30/2024; 10.4 on 12/02/2024; 10.7 on 10/04/2024, etc.  (macrocytic anemia; iron/B12/folate stores  normal; no hemolysis; patient on Revlimid maintenance)  -02/11/2025: Follow-up with Dr. Brandy Mahajan, hematology/oncology/BMT:  Decrease Revlimid from 15 mg to 10 mg because hemoglobin down to 8  >>>  -anemia most likely secondary to bone marrow suppression with Revlimid; expectant monitoring     # Co-morbidities:  NIDDM.  Dyslipidemia.  Hypertension.  Celiac disease.    History of rheumatic mitral valve disease, S/P Maze/ablation procedure 02/02/2018        Discussion:  Supportive care for myeloma:  -bone targeted treatment: Bisphosphonate, category 1; or denosumab to reduce risk of skeletal related events; denosumab is preferred in patients with renal insufficiency; assess vitamin-D status; baseline dental exam; monitor for osteonecrosis of the jaw; monitor renal dysfunction with bisphosphonate therapy  -continue bone targeted treatment (bisphosphonate or denosumab) for 2 years; continue beyond 2 years should be based on clinical judgment  -patients receiving denosumab for bone disease who subsequently discontinued therapy should be given maintenance denosumab every 6 months or a single dose of bisphosphonate to mitigate risk of rebound osteoporosis  -for hypercalcemia, zoledronic acid, denosumab, steroids, and/or calcitonin  -for hyperviscosity, plasmapheresis should be used as adjuvant therapy for symptomatic hyperviscosity  -intravenous immunoglobulin therapy should be considered in the setting of recurrent serious infection and/or hypogammaglobulinemia (IgG </= 400 mg/dL)  -pneumococcal conjugate vaccine, followed by pneumococcal polysaccharide vaccine 1 year later  -Influenza vaccination is recommended; 2 doses of high-dose inactivated quandaivalent influenza vaccine should be considered  -consider 12 weeks of levofloxacin 500 mg p.o. daily at the time of initial diagnosis of multiple myeloma  -COVID-19 vaccination  -highest risk for VTE is in the 1st 6 months following new diagnosis of multiple myeloma  -VTE  prophylaxis if no contraindications to anticoagulation agents or antiplatelets  Recommendations for VTE prophylaxis:  Aspirin  mg daily; low molecular weight heparin equivalent to enoxaparin 40 mg daily; Xarelto 10 mg daily; Eliquis 2.5 mg b.i.d.; Arixtra 2.5 mg daily; Coumadin with target INR 2.0-3.0       I have explained all of the above in detail and the patient understands all of the current recommendation(s). I have answered all of their questions to the best of my ability and to their complete satisfaction.       Nuzhat Lewis, FNP-C  Oncology/Hematology   [facility]             [1]   Social History  Tobacco Use    Smoking status: Never    Smokeless tobacco: Never   Substance Use Topics    Alcohol use: Never    Drug use: Never

## 2025-05-27 ENCOUNTER — OFFICE VISIT (OUTPATIENT)
Dept: HEMATOLOGY/ONCOLOGY | Facility: CLINIC | Age: 70
End: 2025-05-27
Attending: INTERNAL MEDICINE
Payer: MEDICARE

## 2025-05-27 ENCOUNTER — INFUSION (OUTPATIENT)
Dept: INFUSION THERAPY | Facility: HOSPITAL | Age: 70
End: 2025-05-27
Attending: INTERNAL MEDICINE
Payer: MEDICARE

## 2025-05-27 VITALS
HEART RATE: 60 BPM | DIASTOLIC BLOOD PRESSURE: 68 MMHG | HEIGHT: 67 IN | BODY MASS INDEX: 24.04 KG/M2 | TEMPERATURE: 98 F | SYSTOLIC BLOOD PRESSURE: 124 MMHG | OXYGEN SATURATION: 99 % | WEIGHT: 153.19 LBS | RESPIRATION RATE: 19 BRPM

## 2025-05-27 DIAGNOSIS — D84.821 DRUG-INDUCED IMMUNODEFICIENCY: ICD-10-CM

## 2025-05-27 DIAGNOSIS — C79.51 SECONDARY MALIGNANT NEOPLASM OF BONE: ICD-10-CM

## 2025-05-27 DIAGNOSIS — C90.00 IGG MULTIPLE MYELOMA: Primary | ICD-10-CM

## 2025-05-27 DIAGNOSIS — M81.0 OSTEOPOROSIS OF FEMUR WITHOUT PATHOLOGICAL FRACTURE: ICD-10-CM

## 2025-05-27 DIAGNOSIS — Z94.84 H/O AUTOLOGOUS STEM CELL TRANSPLANT: ICD-10-CM

## 2025-05-27 DIAGNOSIS — C90.00 IGG MULTIPLE MYELOMA: ICD-10-CM

## 2025-05-27 DIAGNOSIS — Z79.899 DRUG-INDUCED IMMUNODEFICIENCY: ICD-10-CM

## 2025-05-27 LAB
ALBUMIN SERPL-MCNC: 3.3 G/DL (ref 3.4–4.8)
ALBUMIN/GLOB SERPL: 0.9 RATIO (ref 1.1–2)
ALP SERPL-CCNC: 61 UNIT/L (ref 40–150)
ALT SERPL-CCNC: 13 UNIT/L (ref 0–55)
ANION GAP SERPL CALC-SCNC: 8 MEQ/L
AST SERPL-CCNC: 13 UNIT/L (ref 11–45)
BASOPHILS # BLD AUTO: 0.09 X10(3)/MCL
BASOPHILS NFR BLD AUTO: 1.7 %
BILIRUB SERPL-MCNC: 0.2 MG/DL
BUN SERPL-MCNC: 14.4 MG/DL (ref 8.4–25.7)
CALCIUM SERPL-MCNC: 8.9 MG/DL (ref 8.8–10)
CHLORIDE SERPL-SCNC: 107 MMOL/L (ref 98–107)
CO2 SERPL-SCNC: 19 MMOL/L (ref 23–31)
CREAT SERPL-MCNC: 1.36 MG/DL (ref 0.72–1.25)
CREAT/UREA NIT SERPL: 11
EOSINOPHIL # BLD AUTO: 0.15 X10(3)/MCL (ref 0–0.9)
EOSINOPHIL NFR BLD AUTO: 2.9 %
ERYTHROCYTE [DISTWIDTH] IN BLOOD BY AUTOMATED COUNT: 14.7 % (ref 11.5–17)
GFR SERPLBLD CREATININE-BSD FMLA CKD-EPI: 57 ML/MIN/1.73/M2
GLOBULIN SER-MCNC: 3.6 GM/DL (ref 2.4–3.5)
GLUCOSE SERPL-MCNC: 132 MG/DL (ref 82–115)
HCT VFR BLD AUTO: 31.3 % (ref 42–52)
HGB BLD-MCNC: 10.5 G/DL (ref 14–18)
IMM GRANULOCYTES # BLD AUTO: 0.04 X10(3)/MCL (ref 0–0.04)
IMM GRANULOCYTES NFR BLD AUTO: 0.8 %
LYMPHOCYTES # BLD AUTO: 2.88 X10(3)/MCL (ref 0.6–4.6)
LYMPHOCYTES NFR BLD AUTO: 54.9 %
MCH RBC QN AUTO: 36 PG (ref 27–31)
MCHC RBC AUTO-ENTMCNC: 33.5 G/DL (ref 33–36)
MCV RBC AUTO: 107.2 FL (ref 80–94)
MONOCYTES # BLD AUTO: 0.85 X10(3)/MCL (ref 0.1–1.3)
MONOCYTES NFR BLD AUTO: 16.2 %
NEUTROPHILS # BLD AUTO: 1.24 X10(3)/MCL (ref 2.1–9.2)
NEUTROPHILS NFR BLD AUTO: 23.5 %
NRBC BLD AUTO-RTO: 0 %
PLATELET # BLD AUTO: 155 X10(3)/MCL (ref 130–400)
PMV BLD AUTO: 9.7 FL (ref 7.4–10.4)
POTASSIUM SERPL-SCNC: 3.7 MMOL/L (ref 3.5–5.1)
PROT SERPL-MCNC: 6.9 GM/DL (ref 5.8–7.6)
RBC # BLD AUTO: 2.92 X10(6)/MCL (ref 4.7–6.1)
SODIUM SERPL-SCNC: 134 MMOL/L (ref 136–145)
WBC # BLD AUTO: 5.25 X10(3)/MCL (ref 4.5–11.5)

## 2025-05-27 PROCEDURE — 3078F DIAST BP <80 MM HG: CPT | Mod: CPTII,,,

## 2025-05-27 PROCEDURE — 1160F RVW MEDS BY RX/DR IN RCRD: CPT | Mod: CPTII,,,

## 2025-05-27 PROCEDURE — 3074F SYST BP LT 130 MM HG: CPT | Mod: CPTII,,,

## 2025-05-27 PROCEDURE — 1125F AMNT PAIN NOTED PAIN PRSNT: CPT | Mod: CPTII,,,

## 2025-05-27 PROCEDURE — 80053 COMPREHEN METABOLIC PANEL: CPT

## 2025-05-27 PROCEDURE — 1159F MED LIST DOCD IN RCRD: CPT | Mod: CPTII,,,

## 2025-05-27 PROCEDURE — 85025 COMPLETE CBC W/AUTO DIFF WBC: CPT

## 2025-05-27 PROCEDURE — 1101F PT FALLS ASSESS-DOCD LE1/YR: CPT | Mod: CPTII,,,

## 2025-05-27 PROCEDURE — 3044F HG A1C LEVEL LT 7.0%: CPT | Mod: CPTII,,,

## 2025-05-27 PROCEDURE — 96365 THER/PROPH/DIAG IV INF INIT: CPT

## 2025-05-27 PROCEDURE — 25000003 PHARM REV CODE 250

## 2025-05-27 PROCEDURE — 99215 OFFICE O/P EST HI 40 MIN: CPT | Mod: S$PBB,,,

## 2025-05-27 PROCEDURE — 63600175 PHARM REV CODE 636 W HCPCS

## 2025-05-27 PROCEDURE — 3008F BODY MASS INDEX DOCD: CPT | Mod: CPTII,,,

## 2025-05-27 PROCEDURE — 99215 OFFICE O/P EST HI 40 MIN: CPT | Mod: PBBFAC

## 2025-05-27 PROCEDURE — 3288F FALL RISK ASSESSMENT DOCD: CPT | Mod: CPTII,,,

## 2025-05-27 RX ORDER — HEPARIN 100 UNIT/ML
500 SYRINGE INTRAVENOUS
Status: DISCONTINUED | OUTPATIENT
Start: 2025-05-27 | End: 2025-05-27 | Stop reason: HOSPADM

## 2025-05-27 RX ORDER — HEPARIN 100 UNIT/ML
500 SYRINGE INTRAVENOUS
Status: CANCELLED | OUTPATIENT
Start: 2025-05-27

## 2025-05-27 RX ORDER — SODIUM CHLORIDE 0.9 % (FLUSH) 0.9 %
10 SYRINGE (ML) INJECTION
Status: CANCELLED | OUTPATIENT
Start: 2025-05-27

## 2025-05-27 RX ORDER — SODIUM CHLORIDE 0.9 % (FLUSH) 0.9 %
10 SYRINGE (ML) INJECTION
Status: DISCONTINUED | OUTPATIENT
Start: 2025-05-27 | End: 2025-05-27 | Stop reason: HOSPADM

## 2025-05-27 RX ADMIN — SODIUM CHLORIDE 3.3 MG: 9 INJECTION, SOLUTION INTRAVENOUS at 02:05

## 2025-05-27 RX ADMIN — SODIUM CHLORIDE: 9 INJECTION, SOLUTION INTRAVENOUS at 02:05

## 2025-06-05 ENCOUNTER — OFFICE VISIT (OUTPATIENT)
Dept: FAMILY MEDICINE | Facility: CLINIC | Age: 70
End: 2025-06-05
Payer: MEDICARE

## 2025-06-05 VITALS
TEMPERATURE: 99 F | WEIGHT: 140.63 LBS | BODY MASS INDEX: 22.07 KG/M2 | OXYGEN SATURATION: 99 % | SYSTOLIC BLOOD PRESSURE: 104 MMHG | DIASTOLIC BLOOD PRESSURE: 66 MMHG | HEART RATE: 60 BPM | HEIGHT: 67 IN

## 2025-06-05 DIAGNOSIS — E11.9 TYPE 2 DIABETES MELLITUS WITHOUT COMPLICATION, WITHOUT LONG-TERM CURRENT USE OF INSULIN: Primary | ICD-10-CM

## 2025-06-05 DIAGNOSIS — M48.061 SPINAL STENOSIS OF LUMBAR REGION, UNSPECIFIED WHETHER NEUROGENIC CLAUDICATION PRESENT: ICD-10-CM

## 2025-06-05 LAB
CREAT UR-MCNC: 97.6 MG/DL (ref 63–166)
MICROALBUMIN UR-MCNC: 119.7 UG/ML
MICROALBUMIN/CREAT RATIO PNL UR: 122.6 MG/GM CR (ref 0–30)

## 2025-06-05 PROCEDURE — 99215 OFFICE O/P EST HI 40 MIN: CPT | Mod: PBBFAC

## 2025-06-05 PROCEDURE — 82570 ASSAY OF URINE CREATININE: CPT

## 2025-06-05 RX ORDER — DULOXETIN HYDROCHLORIDE 20 MG/1
20 CAPSULE, DELAYED RELEASE ORAL 2 TIMES DAILY
COMMUNITY

## 2025-06-05 RX ORDER — FLUTICASONE PROPIONATE 50 MCG
1 SPRAY, SUSPENSION (ML) NASAL
COMMUNITY

## 2025-06-05 RX ORDER — TAMSULOSIN HYDROCHLORIDE 0.4 MG/1
1 CAPSULE ORAL
COMMUNITY
Start: 2025-04-03

## 2025-06-05 RX ORDER — ALBUTEROL SULFATE 90 UG/1
2 INHALANT RESPIRATORY (INHALATION)
COMMUNITY

## 2025-06-05 RX ORDER — COLESEVELAM 180 1/1
625 TABLET ORAL
COMMUNITY
Start: 2025-05-20

## 2025-06-17 DIAGNOSIS — M48.061 SPINAL STENOSIS OF LUMBAR REGION, UNSPECIFIED WHETHER NEUROGENIC CLAUDICATION PRESENT: ICD-10-CM

## 2025-06-18 RX ORDER — TRAMADOL HYDROCHLORIDE 100 MG/1
100 TABLET, COATED ORAL 2 TIMES DAILY PRN
Qty: 30 TABLET | Refills: 0 | Status: SHIPPED | OUTPATIENT
Start: 2025-06-18

## 2025-06-23 ENCOUNTER — TELEPHONE (OUTPATIENT)
Dept: HEMATOLOGY/ONCOLOGY | Facility: CLINIC | Age: 70
End: 2025-06-23
Payer: MEDICARE

## 2025-06-24 ENCOUNTER — INFUSION (OUTPATIENT)
Dept: INFUSION THERAPY | Facility: HOSPITAL | Age: 70
End: 2025-06-24
Attending: INTERNAL MEDICINE
Payer: MEDICARE

## 2025-06-24 ENCOUNTER — OFFICE VISIT (OUTPATIENT)
Dept: HEMATOLOGY/ONCOLOGY | Facility: CLINIC | Age: 70
End: 2025-06-24
Payer: MEDICARE

## 2025-06-24 VITALS
WEIGHT: 154.19 LBS | HEART RATE: 62 BPM | TEMPERATURE: 98 F | RESPIRATION RATE: 20 BRPM | SYSTOLIC BLOOD PRESSURE: 119 MMHG | BODY MASS INDEX: 24.2 KG/M2 | OXYGEN SATURATION: 100 % | HEIGHT: 67 IN | DIASTOLIC BLOOD PRESSURE: 67 MMHG | DIASTOLIC BLOOD PRESSURE: 67 MMHG | SYSTOLIC BLOOD PRESSURE: 119 MMHG

## 2025-06-24 DIAGNOSIS — D84.821 DRUG-INDUCED IMMUNODEFICIENCY: ICD-10-CM

## 2025-06-24 DIAGNOSIS — Z79.899 DRUG-INDUCED IMMUNODEFICIENCY: ICD-10-CM

## 2025-06-24 DIAGNOSIS — M81.0 OSTEOPOROSIS OF FEMUR WITHOUT PATHOLOGICAL FRACTURE: ICD-10-CM

## 2025-06-24 DIAGNOSIS — C90.00 IGG MULTIPLE MYELOMA: Primary | ICD-10-CM

## 2025-06-24 DIAGNOSIS — C79.51 SECONDARY MALIGNANT NEOPLASM OF BONE: ICD-10-CM

## 2025-06-24 DIAGNOSIS — M48.061 SPINAL STENOSIS OF LUMBAR REGION, UNSPECIFIED WHETHER NEUROGENIC CLAUDICATION PRESENT: ICD-10-CM

## 2025-06-24 PROCEDURE — 63600175 PHARM REV CODE 636 W HCPCS

## 2025-06-24 PROCEDURE — 25000003 PHARM REV CODE 250

## 2025-06-24 PROCEDURE — 1101F PT FALLS ASSESS-DOCD LE1/YR: CPT | Mod: CPTII,,,

## 2025-06-24 PROCEDURE — 3008F BODY MASS INDEX DOCD: CPT | Mod: CPTII,,,

## 2025-06-24 PROCEDURE — 99215 OFFICE O/P EST HI 40 MIN: CPT | Mod: S$PBB,,,

## 2025-06-24 PROCEDURE — 1160F RVW MEDS BY RX/DR IN RCRD: CPT | Mod: CPTII,,,

## 2025-06-24 PROCEDURE — 3044F HG A1C LEVEL LT 7.0%: CPT | Mod: CPTII,,,

## 2025-06-24 PROCEDURE — 3066F NEPHROPATHY DOC TX: CPT | Mod: CPTII,,,

## 2025-06-24 PROCEDURE — 3078F DIAST BP <80 MM HG: CPT | Mod: CPTII,,,

## 2025-06-24 PROCEDURE — 96374 THER/PROPH/DIAG INJ IV PUSH: CPT

## 2025-06-24 PROCEDURE — 3074F SYST BP LT 130 MM HG: CPT | Mod: CPTII,,,

## 2025-06-24 PROCEDURE — 99215 OFFICE O/P EST HI 40 MIN: CPT | Mod: PBBFAC,25

## 2025-06-24 PROCEDURE — 3288F FALL RISK ASSESSMENT DOCD: CPT | Mod: CPTII,,,

## 2025-06-24 PROCEDURE — 3060F POS MICROALBUMINURIA REV: CPT | Mod: CPTII,,,

## 2025-06-24 PROCEDURE — 1125F AMNT PAIN NOTED PAIN PRSNT: CPT | Mod: CPTII,,,

## 2025-06-24 PROCEDURE — 1159F MED LIST DOCD IN RCRD: CPT | Mod: CPTII,,,

## 2025-06-24 RX ORDER — HEPARIN 100 UNIT/ML
500 SYRINGE INTRAVENOUS
Status: CANCELLED | OUTPATIENT
Start: 2025-06-24

## 2025-06-24 RX ORDER — HEPARIN 100 UNIT/ML
500 SYRINGE INTRAVENOUS
Status: DISCONTINUED | OUTPATIENT
Start: 2025-06-24 | End: 2025-06-24 | Stop reason: HOSPADM

## 2025-06-24 RX ORDER — ZOLEDRONIC ACID 0.04 MG/ML
4 INJECTION, SOLUTION INTRAVENOUS
Status: CANCELLED | OUTPATIENT
Start: 2025-06-24

## 2025-06-24 RX ORDER — SODIUM CHLORIDE 0.9 % (FLUSH) 0.9 %
10 SYRINGE (ML) INJECTION
Status: DISCONTINUED | OUTPATIENT
Start: 2025-06-24 | End: 2025-06-24 | Stop reason: HOSPADM

## 2025-06-24 RX ORDER — LIDOCAINE 50 MG/G
2 PATCH TOPICAL DAILY
Qty: 14 PATCH | Refills: 0 | Status: SHIPPED | OUTPATIENT
Start: 2025-06-24

## 2025-06-24 RX ORDER — SODIUM CHLORIDE 0.9 % (FLUSH) 0.9 %
10 SYRINGE (ML) INJECTION
Status: CANCELLED | OUTPATIENT
Start: 2025-06-24

## 2025-06-24 RX ADMIN — SODIUM CHLORIDE 3.5 MG: 9 INJECTION, SOLUTION INTRAVENOUS at 01:06

## 2025-06-24 RX ADMIN — SODIUM CHLORIDE: 9 INJECTION, SOLUTION INTRAVENOUS at 01:06

## 2025-06-24 NOTE — PROGRESS NOTES
[facility] Hematology/Oncology  PROGRESS NOTE    Subjective:       Patient ID: Yuriy Díaz is a 69 y.o. male.    Diagnosis:   -multiple myeloma, IgG kappa; S/P ASCT  -worsening low back pain  -hypercalcemia  -osteoporosis hips B/L      Current Treatment:  Revlimid 10 mg po daily, decreased from 15 mg daily on 02/11/2025 by  due to anemia with hemoglobin of 8  started 8/20/24    Zometa 4mg IV every 28 days  Start 7/13/2023       Treatment History:  VRD (7/13/2023-11/21/23)  ASCT 11/30/2023  Revlimid 10mg po daily (4/9/24-8/20/24)    Chief Complaint: No chief complaint on file.    HPI:  67-year-old gentleman, referred from [facility], with newly diagnosed multiple myeloma.       Patient is being followed for IgG kappa multiple myeloma.    Please assessment and plan section for details.     06/26/2023:  Initial consultation:  Pleasant gentleman who presents for initial medical oncology consultation, accompanied by his wife and family present who is a past interventional cardiologist.  Back pain for 2 months.  Initially started in right groin.  Physical therapy has not helped.  10/10 severity.  Has been taking Tylenol without significant relief.  Secured to take narcotics because of constipation.  Underwent couple of spinal steroid shots 3 weeks ago, with minor relief.  Pain is present all the time.  Pain increases with changing position in bed as well as upon standing.  He finds it difficult to ambulate.  Pain does not radiate down lower extremities and is not accompanied with lower extremity weakness, numbness, urinary or bowel incontinence, or saddle anesthesia.  Also experiences muscle cramps.  Generalized weakness.  However, ECOG 2.  No fevers or chills.  No repeated infections.  Cramps in hands and feet.  Appetite is down.  Appetite has picked up in last 10 days.  Has lost 12-14 lb in last 1-1/2 months.  No abnormal bleeding or bruising.    Past medical history:  NIDDM. Dyslipidemia.  GERD.   Lumbar spinal stenosis.  Vitamin-D deficiency.  Allergic rhinitis.  HTN.  Bell's palsy.  Celiac disease.  Hemorrhoids.  Mitral regurgitation 01/05/2018.  Rheumatic valve narrowing and leaking mitral valve.  -02/02/2018 ([facility]):  АНДРЕЙ, right minimally invasive anterior thoracotomy, right femoral artery and vein exploration, extensive right pleural adhesiolysis/pneumolysis, bilateral Maze/ablation (extensive), left atrial appendage clip placement for left atrial appendage exclusion, mitral valve repair (complex repair with 26 mm annuloplasty ring)  Social history:  .  Lives in [place], [place].  Five children.  Owns a [employer] business.  No history of tobacco, alcohol, or illicit drug abuse.    Family history:  Negative for cancers or blood dyscrasias.    Health maintenance:  PCP in family medicine clinic, [facility].  Had EGD and colonoscopy done 1 year ago by Dr. Zoltan Kapoor, GI, [place], apparently unremarkable (he gets the endoscopies done periodically because of history of celiac disease).    Interval History 6/24/25:  Patient presents to clinic for a scheduled follow-up visit and treatment.  He is due for Zometa today.  He reports compliance with Revlimid 10 mg daily. He denies any new or worsening symptoms.  He does continue with chronic lower back pain.  He is taking tramadol as needed for back pain, no longer taking gabapentin due to swelling. He states that he does not want to take a lot of Tramadol because he is afriad it will injure his kidneys but he continues to hurt in his back. He rates his pain a 7/10 today.  He denies any nausea, vomiting, diarrhea, constipation, unusual headache, shortness of breath, cough, skin changes, fever or signs of infection he continues on Megace for his appetite.  Results reviewed with patient in detail.  Labs and plan of care reviewed as well, patient verbalized understanding.    PMX/PSHx  Past Medical History:   Diagnosis  Date    Celiac disease     Cerebral palsy, unspecified     Compression fracture of L1 vertebra with delayed healing, subsequent encounter     Compression fracture of L2 vertebra with delayed healing, subsequent encounter     Cyst of right kidney     Degeneration of lumbar intervertebral disc     Diabetes mellitus, type 2     GERD (gastroesophageal reflux disease)     Hyperlipidemia     Hypertension     IgG multiple myeloma     Low back pain     Lumbago with sciatica, right side     Lumbar spinal stenosis     Monocytosis     Multiple myeloma, remission status unspecified     Neck pain     Nontraumatic compression fracture of T8 vertebra, initial encounter     LOIS (obstructive sleep apnea)     Osteoporosis of femur without pathological fracture     Other chronic pain     Personal history of colonic polyps 06/29/2022    Secondary malignancy of lumbar vertebral column     Secondary malignant neoplasm of bone     Tachycardia       Past Surgical History:   Procedure Laterality Date    BONE MARROW ASPIRATION N/A 06/27/2023    Procedure: ASPIRATION, BONE MARROW;  Surgeon: Namita Daniels MD;  Location: [facility] ENDOSCOPY;  Service: General;  Laterality: N/A;    BONE MARROW ASPIRATION N/A 10/24/2023    Procedure: ASPIRATION, BONE MARROW;  Surgeon: Namita Daniels MD;  Location: [facility] ENDOSCOPY;  Service: General;  Laterality: N/A;    BONE MARROW ASPIRATION N/A 03/12/2024    Procedure: ASPIRATION, BONE MARROW;  Surgeon: Haylie Liu MD;  Location: [facility] ENDOSCOPY;  Service: General;  Laterality: N/A;    BONE MARROW BIOPSY N/A 06/27/2023    Procedure: Biopsy-bone marrow;  Surgeon: Haylie Liu MD;  Location: [facility] ENDOSCOPY;  Service: General;  Laterality: N/A;    BONE MARROW BIOPSY N/A 10/24/2023    Procedure: Biopsy-bone marrow;  Surgeon: Namita Daniels MD;  Location: [facility] ENDOSCOPY;  Service: General;  Laterality: N/A;    BONE MARROW BIOPSY N/A 03/12/2024    Procedure: Biopsy-bone marrow;  Surgeon: Noe  Haylie RANGEL MD;  Location: [facility] ENDOSCOPY;  Service: General;  Laterality: N/A;    CARDIAC SURGERY  2018    CARDIAC VALVE REPLACEMENT  2018    Valve repair  milter    COLONOSCOPY  06/29/2022    EYE SURGERY  9/26/2022    Cataract     Allergies:  Review of patient's allergies indicates:   Allergen Reactions    Wheat Other (See Comments)      Social History:   Social History[1]  Medications:  Current Outpatient Medications   Medication Instructions    acyclovir (ZOVIRAX) 400 mg, Oral, 2 times daily    albuterol (PROVENTIL/VENTOLIN HFA) 90 mcg/actuation inhaler 2 puffs    ascorbic acid (vitamin C) (VITAMIN C) 1,000 mg, Daily    aspirin (ECOTRIN) 81 mg, Daily    atorvastatin (LIPITOR) 20 mg, Daily    calcium carbonate 195 mg calcium (500 mg) Chew 1 tablet, Daily    cetirizine (ZYRTEC) 10 mg Cap 1 capsule, Daily PRN    coenzyme Q10 10 mg capsule 1 capsule, Daily    colesevelam (WELCHOL) 625 mg    DULoxetine (CYMBALTA) 20 mg, Oral, 2 times daily    famotidine (PEPCID) 40 mg, Nightly    ferrous sulfate (FEOSOL) 650 mg    fluticasone propionate (FLONASE) 50 mcg/actuation nasal spray 1 spray    gabapentin (NEURONTIN) 600 mg, Oral, 2 times daily PRN    hydrocortisone (ANUSOL-HC) 2.5 % rectal cream 1 applicator, Rectal, 2 times daily    lenalidomide 10 mg Cap 1 capsule    megestroL (MEGACE) 400 mg, Oral, Daily    metFORMIN (GLUCOPHAGE) 500 mg, Oral, 2 times daily with meals    metoprolol succinate (TOPROL-XL) 50 mg, Daily    MIRALAX 17 g    mirtazapine (REMERON) 7.5 MG Tab 1 tablet, Nightly    multivit with min-folic acid 0.4 mg Tab 1 tablet, Daily    multivitamin with minerals tablet 1 tablet, Every morning    omega 3-dha-epa-fish oil 300 mg (120 mg- 180mg)-1,000 mg Cap 500 mg    omega-3 fatty acids/fish oil (FISH OIL-OMEGA-3 FATTY ACIDS) 300-1,000 mg capsule 2 g    pantoprazole (PROTONIX) 20 mg, Oral, Daily    predniSONE (DELTASONE) 20 mg, Daily    sulfamethoxazole-trimethoprim 800-160mg (BACTRIM DS) 800-160 mg Tab Take 1 tablet  by mouth on MWF of each week    tamsulosin (FLOMAX) 0.4 mg Cap 1 capsule    traMADoL 100 mg, Oral, 2 times daily PRN     ROS:  Review of Systems   Constitutional:  Negative for appetite change, chills, fatigue, fever and unexpected weight change.   HENT:  Negative for ear discharge, facial swelling, mouth sores, nosebleeds, sinus pressure/congestion and sore throat.    Eyes:  Negative for pain and visual disturbance.   Respiratory:  Negative for cough, chest tightness, shortness of breath and wheezing.    Cardiovascular:  Negative for chest pain, palpitations and leg swelling.   Gastrointestinal:  Negative for abdominal pain, blood in stool, change in bowel habit, constipation, diarrhea, nausea and vomiting.   Endocrine: Negative.    Genitourinary:  Negative for dysuria, frequency, hematuria and urgency.   Musculoskeletal:  Negative for arthralgias, gait problem, joint swelling and myalgias.   Integumentary:  Negative for color change, pallor, rash and wound.   Allergic/Immunologic: Negative.  Negative for frequent infections.   Neurological:  Negative for dizziness, vertigo, syncope, weakness and numbness.   Hematological:  Negative for adenopathy. Does not bruise/bleed easily.   Psychiatric/Behavioral:  Negative for confusion and dysphoric mood. The patient is not nervous/anxious.    All other systems reviewed and are negative.      Objective:   Vitals:  There were no vitals filed for this visit.       Wt Readings from Last 3 Encounters:   06/05/25 1355 63.8 kg (140 lb 9.6 oz)   05/27/25 1351 69.5 kg (153 lb 3.2 oz)   04/29/25 1321 68.5 kg (151 lb)      Physical Examination:  Physical Exam  Vitals and nursing note reviewed.   HENT:      Head: Normocephalic and atraumatic.      Mouth/Throat:      Mouth: Mucous membranes are moist.      Pharynx: Oropharynx is clear.   Eyes:      Extraocular Movements: Extraocular movements intact.      Conjunctiva/sclera: Conjunctivae normal.      Pupils: Pupils are equal, round,  and reactive to light.   Cardiovascular:      Rate and Rhythm: Normal rate and regular rhythm.   Pulmonary:      Effort: Pulmonary effort is normal.      Breath sounds: Normal breath sounds.   Abdominal:      General: Abdomen is flat. Bowel sounds are normal.      Palpations: Abdomen is soft.   Musculoskeletal:         General: Normal range of motion.      Cervical back: Normal range of motion and neck supple.   Skin:     General: Skin is warm and dry.   Neurological:      General: No focal deficit present.      Mental Status: He is alert.   Psychiatric:         Mood and Affect: Mood normal.       ECOG SCORE           Labs:  No visits with results within 1 Day(s) from this visit.   Latest known visit with results is:   Office Visit on 06/05/2025   Component Date Value Ref Range Status    Urine Microalbumin 06/05/2025 119.7 (H)  <=30.0 ug/mL Final    Urine Creatinine 06/05/2025 97.6  63.0 - 166.0 mg/dL Final    Microalbumin Creatinine Ratio 06/05/2025 122.6 (H)  0.0 - 30.0 mg/gm Cr Final        Pathology:  Radiology/Diagnostics:      I have reviewed all available lab results and radiology reports.  Assessment:     Problem List Items Addressed This Visit    None            Plan:   Labs and exam stable  Continue to follow-up with Dr. Clark  Continue Revlimid 10 mg as maintenance therapy as long as ANC is greater than 500 and platelet> 50 K  Proceed with Zometa 3.3 mg today CrCl 50.8, due every 4 weeks  Continue aspirin 81 mg daily  Continue follow-up with Neurology for chronic low back pain  Continue  tramadol as needed for pain  Prescribed Lidocaine patch to apply to lower back- RX sent to pharmacy today (6/24/25)   Continue calcium and vitamin-D supplement  Continue Megace for appetite  Continue acyclovir and Bactrim until vaccinations are completed/until recommended by Dr. Clark  DEXA scan due October 2025  Check TSH and free T4 every 3 months, TSH and free T4 every 3 months due 07/2024  Check SPEP, BUBBA, FLC assay  every 3 months, due 07/2025  Return to clinic in 4 weeks with lab/MD/Kathleen  CBC CMP          Above discussed with the patient.  All questions answered.  Discussed labs and scans.  Plan of management discussed.    He understands and agrees with this plan.  ================================     # Multiple myeloma, IgG kappa:  ISS stage:  Stage I  R-ISS stage:  Stage I  -presentation:  03/2023: Worsening back pain, 12-14 lb weight loss over 6 weeks  -skeletal survey 06/14/2023: Negative for lytic or sclerotic lesions  -06/14/2023:  Hemoglobin 11.0, IgG kappa M protein 2.28 gm/dL, kappa elevated, lambda suppressed, kappa/lambda ratio 292  -renal function normal, no hypercalcemia  -24 hour urine: M spike 183 mg; monoclonal kappa light chains in urine; urine kappa elevated, urine lambda suppressed, urine kappa/lambda ratio> 36.4  -LDH normal 06/16/2023   -Beta-2 microglobulin 3.18 on 06/26/2023  -bone marrow biopsy 06/27/2023:  50% plasmacytosis with kappa light chain restriction; no high-risk cytogenetics  -FDG PET-CT 06/29/2023:  Questionable findings  Staging of myeloma:  -serum beta 2 microglobulin elevated (3.18)  -serum albumin and LDH normal   -myeloma FISH panel on bone marrow:  Negative for high-risk cytogenetic abnormalities  >>>  ISS stage:  Stage I  R-ISS stage:  Stage I  -Velcade started 07/13/2023   -Zometa 4 mg IV every 4 weeks (x2 years), started 07/13/2023  -VRD:  Cycle 1 started 07/13/2023; cycle 2 started 08/10/2023; cycle 3 started 08/31/2023; cycle 4 started 09/21/2023  -Radiation oncology consult:  Radiotherapy to spine not indicated/will not be helpful; questionable findings on FDG PET-CT  -IR at [facility] will not perform kyphoplasty  -significant response to chemotherapy x2 cycles (on 08/24/2023, kappa/lambda ratio 5.72, much improved; IgG kappa monoclonal protein down to 0.39 gm/dL, significantly decreased)   (pre chemotherapy IgG kappa, on 06/14/2023, was 2.28 gm/dL)  -VRD cycle 5:   10/12/2023-10/26/2023  -restaging bone marrow biopsy 10/24/2023, post VRD X 4-1/2 cycles:  Normocellular; < 1% plasma cells; no clonal or abnormal plasma cell population on flow cytometry; no high-risk FISH findings  (restaging bone marrow biopsy post VRD X 4-1/2 cycles:  <1% plasma cells)  (pre chemotherapy bone marrow biopsy 06/27/2023, had shown 50% plasmacytosis)  -11/08/2023: No monoclonal protein in urine  -11/09/2023:  No monoclonal protein on SPEP and BUBBA; kappa/lambda ratio 1.36, normalized; kappa free light chains 2.29 mg/dL dramatically reduced (to recall, was 99.4 on 06/14/2023 before starting chemotherapy)  -cycle 6 day 1 of RVD 11/21/2023; subsequently, on hold in anticipation of stem cell transplant  >>>  # ASCT at [facility]:  -melphalan 360 mg IV administered day -2 (11/28/2023) (200 mg per m2 per day)  -ASCT infusion day 0 (11/30/2023)  -filgrastim 5 microgram/kg subQ every 24 hours, started day +5 (12/05/2023)  -infection prophylaxis  -pentamidine 300 mg inhaled once on day -2 (11/28/2023)  -acyclovir 800 mg p.o. q.12 hours starting day -2 (11/28/2023), to continue for 1 year  -fluconazole 400 mg p.o. Q 24 hours starting day -2 (11/28/2023), continue until engraftment and/or mucositis resolved  -cefepime started 12/08/2023, vancomycin added  -IVIG on 12/10/2023  -12/12/2023:  HCT day +12; doing well on antibiotics; counts recovered  -12/12/2023: Transfusion independent and ANC has recovered  -12/13/2023: Patient discharged  -venous Doppler both lower extremities 02/29/2024: Pedal edema: No DVT   -03/11/2024:  Hemoglobin 11.2; CMP unremarkable   -03/11/2024 (day + 102):  No monoclonal protein in blood; FLC assay normal   -restaging bone marrow biopsy 03/12/2024 (day +103):  No increase in plasma cells  -03/11/2024:  No monoclonal band; kappa/lambda ratio normal  -03/18/2024:  Urine negative for monoclonal protein  -thus, CR, post ASCT  -04/09/2024: Dr. Brandy Mahajan:  Started maintenance Revlimid 10 mg  p.o. q.day, with aspirin  -06/10/2024: No monoclonal bands per SPEP and BUBBA; kappa/lambda ratio normal  -08/20/2024:  Dr. Brandy Mahajan; maintenance Revlimid dose increased to 15 mg daily  -continue acyclovir and Bactrim  -DEXA scan 10/14/2025:  Osteoporosis based on hips  -CTs C/A/P 11 05/2024: Abnormal weight loss:  Essentially negative  -TSH and free T4 normal on 12/30/2024, 12/02/2024  -10/04/2024:  No monoclonal bands; kappa/lambda ratio 1.65 normal, kappa 4.67 without consistent upward trend; lambda 2.83 elevated  -01/27/2025:  No monoclonal bands; kappa/lambda ratio 1.24 normal; kappa 3.78 elevated and without upward trend; lambda 3.05 elevated and more less stable  -02/11/2025: Follow-up with Dr. Brandy Mahajan, hematology/oncology/BMT:  Decrease Revlimid from 15 mg to 10 mg because hemoglobin down to 8  -04/01/2025: TSH, free T4 normal  -04/01/2025:  No monoclonal protein; kappa/lambda ratio normal  -04/29/2025:  WBC 3.48, hemoglobin 9.8, .1, platelets 133 K, ANC 0.79, CMP reviewed  >>>    -Revlimid dose decreased to 10 mg daily from 02/11/2025, by Dr. Brandy Mahajan, because hemoglobin dropped to 8  -baby aspirin 81 mg daily to prevent thromboembolism  -check CBC and CMP monthly  -check TSH and free T4 every 2-3 months (we will check every 3 months)  -with lenalidomide, monitor for signs and symptoms of infection, bleeding, bruising, hepatotoxicity, secondary malignancies, thromboembolism, dermatologic toxicity, hypersensitivity  -a minimum of 2 years of lenalidomide maintenance therapy is recommended  -lenalidomide dose modifications for toxicity: See below  -continue Zometa every 4 weeks x2 years (started 07/13/2023; continue until 07/2025)  -continue calcium and vitamin-D supplements along with Zometa to prevent hypocalcemia  -monitor renal function while on Zometa  -per Radiation Oncology, given questionable radiologic findings per FDG PET-CT, palliative radiotherapy to spine was not thought to be useful,  therefore, not pursued  -SPEP, BUBBA, FLC check every 3 months; next, July  -continue acyclovir and Bactrim until vaccination completed  -10/04/2024: Anorexia; start Megace 400 mg p.o. q.day     Lenalidomide Recommended Dosage Modifications for Hematologic Adverse Reactions in Multiple Myeloma (Maintenance Therapy Following Autologous Transplant):  #Neutropenia:  -ANC <500/mm3: Withhold lenalidomide and check CBC weekly; resume lenalidomide at the next lower dose with continuous dosing on days 1 to 28 of a 28-day cycle when ANC >=500/mm3.  -ANC <500/mm3 (subsequent occurrence at 5 mg daily [continuous] dose): Withhold lenalidomide; resume lenalidomide at 5 mg daily on days 1 to 21 of a 28-day cycle when ANC >=500/mm3. Do not dose below 5 mg daily on days 1 to 21 of a 28-day cycle.  #Thrombocytopenia:  -Platelets <30,000/mm3 (first occurrence): Withhold lenalidomide and check CBC weekly; resume lenalidomide at the next lower dose with continuous dosing on days 1 to 28 of a 28-day cycle when platelets >=30,000/mm3.  -Platelets <30,000/mm3 (subsequent occurrence at 5 mg daily [continuous] dose): Withhold lenalidomide; resume lenalidomide at 5 mg daily on days 1 to 21 of a 28-day cycle when platelets >=30,000/mm3. Do not dose below 5 mg daily on days 1 to 21 of a 28-day cycle.     Monitoring with lenalidomide:  -CBC with differential: weekly for the first 2 cycles, every 2 weeks during the third cycle and monthly thereafter;  -serum creatinine, LFTs (periodically).  -Thyroid function tests (TSH at baseline then every 2 to 3 months during lenalidomide treatment  -ECG when clinically indicated. Monitor for signs and symptoms of infection (if neutropenic), bleeding or bruising, hepatotoxicity, secondary malignancies, thromboembolism (eg, shortness of breath, chest pain, arm or leg swelling), dermatologic toxicity, tumor lysis syndrome, or hypersensitivity.     Lenalidomide:  Potential side effects:  Embryo fetal  toxicity  Hematologic toxicity.  Neutropenia.  Thrombocytopenia.  Venous and arterial thromboembolism.  DVT.  PE.  MI.  Stroke.  Dizziness, fatigue.  Tumor lysis syndrome.  Heart failure.    Used with caution in patients with renal impairment.  Lenalidomide =/> 4 cycles may decrease the # of CD34 pos cells collected for autologous stem cell transplant.  DVT, PE, atrial fibrillation, renal failure are more likely in patients> 65 years  Hepatotoxicity  Hypersensitivity reactions.  Anaphylaxis.  Angioedema.  Skin rash.  Eosinophilia.  Immediate hypersensitivity reactions.  Second primary malignancy.  AML.  MDS.  Solid tumor malignancies.  Nonmelanoma skin cancers.     # Osteoporosis of hips on DEXA scan 10/11/2023  -osteoporosis of hips noted on DEXA scan 10/11/2023  -DEXA scan 10/14/2025:  Osteoporosis based on hips  >>>  -patient already receiving monthly Zometa for underlying multiple myeloma; this will help with osteoporosis as well  -repeat DEXA scan in 1 year (10/2025)     # Exophytic lesion upper pole of right kidney:  10 mm exophytic hyperdense focus upper pole of right kidney, compatible with a hyperdense cyst, on CT abdomen pelvis with contrast 06/27/2023  -07/12/2023:  MRI abdomen with and without contrast (right renal cyst): Small exophytic right renal lesion most likely a proteinaceous or hemorrhagic cyst (10 mm, upper pole of right kidney)  -MRI lumbar spine 08/02/2024:  1.2 cm exophytic lesion indeterminate from anterior upper pole of right kidney; multilevel degenerative disc disease, etc.     # Non myeloma findings (DJD of spine, spinal stenosis, compression fractures):  -mild L4-L5 spinal canal stenosis on MRI 05/04/2023   -lumbar DJD and facet arthrosis on MRI lumbar spine 05/04/2023  -MRI T-spine 08/08/2023:  No myeloma lesions   -MRI lumbar spine 09/14/2023:  Progression of skeletal demineralization; multilevel degenerative disc disease; severe spinal stenosis L3-L5; new/progressive loss of  vertebral body height throughout the spine since 05/2023, etc.  -IR at [facility] will not perform kyphoplasty  -MRI lumbar spine 08/02/2024:  1.2 cm exophytic lesion indeterminate from anterior upper pole of right kidney; multilevel degenerative disc disease, etc.  -follows up with Xochitl Grubbs MD, neurosurgery, for lumbar stenosis with neurogenic claudication; chronic bilateral low back pain with bilateral sciatica; compression fracture of lumbar vertebrae; planning L2-3, L3-4, L4-5 laminectomy  -patient declined laminectomy     # Rising PSA level:  -PSA: 2.78 on 05/27/2024; 1.7 on 05/01/2024; 1.96 on 09/17/2021  -MRI prostate 09/23/2024:  PI-RADS 2: Low (rising PSA level)     # Chronic macrocytic anemia:  -chronic anemia: Hemoglobin: 9.9 on 09/06/2024; 10.5 on 09/03/2024; 10.5 on 07/12/2024; 11.1 on 06/14/2024; 11.7 on 05/03/2024  -MCV:  Slightly elevated  -10/04/2024:  Hemoglobin 10.7, more less stable; .2  -10/04/2024: Hemoglobin 10.7, .2 elevated; iron/B12/folate stores normal  -hemoglobin: 9.7 on 01/27/2025; 10.4 on 12/30/2024; 10.4 on 12/02/2024; 10.7 on 10/04/2024, etc.  (macrocytic anemia; iron/B12/folate stores normal; no hemolysis; patient on Revlimid maintenance)  -02/11/2025: Follow-up with Dr. Brandy Mahajan, hematology/oncology/BMT:  Decrease Revlimid from 15 mg to 10 mg because hemoglobin down to 8  >>>  -anemia most likely secondary to bone marrow suppression with Revlimid; expectant monitoring     # Co-morbidities:  NIDDM.  Dyslipidemia.  Hypertension.  Celiac disease.    History of rheumatic mitral valve disease, S/P Maze/ablation procedure 02/02/2018        Discussion:  Supportive care for myeloma:  -bone targeted treatment: Bisphosphonate, category 1; or denosumab to reduce risk of skeletal related events; denosumab is preferred in patients with renal insufficiency; assess vitamin-D status; baseline dental exam; monitor for osteonecrosis of the jaw; monitor renal dysfunction with  bisphosphonate therapy  -continue bone targeted treatment (bisphosphonate or denosumab) for 2 years; continue beyond 2 years should be based on clinical judgment  -patients receiving denosumab for bone disease who subsequently discontinued therapy should be given maintenance denosumab every 6 months or a single dose of bisphosphonate to mitigate risk of rebound osteoporosis  -for hypercalcemia, zoledronic acid, denosumab, steroids, and/or calcitonin  -for hyperviscosity, plasmapheresis should be used as adjuvant therapy for symptomatic hyperviscosity  -intravenous immunoglobulin therapy should be considered in the setting of recurrent serious infection and/or hypogammaglobulinemia (IgG </= 400 mg/dL)  -pneumococcal conjugate vaccine, followed by pneumococcal polysaccharide vaccine 1 year later  -Influenza vaccination is recommended; 2 doses of high-dose inactivated quandaivalent influenza vaccine should be considered  -consider 12 weeks of levofloxacin 500 mg p.o. daily at the time of initial diagnosis of multiple myeloma  -COVID-19 vaccination  -highest risk for VTE is in the 1st 6 months following new diagnosis of multiple myeloma  -VTE prophylaxis if no contraindications to anticoagulation agents or antiplatelets  Recommendations for VTE prophylaxis:  Aspirin  mg daily; low molecular weight heparin equivalent to enoxaparin 40 mg daily; Xarelto 10 mg daily; Eliquis 2.5 mg b.i.d.; Arixtra 2.5 mg daily; Coumadin with target INR 2.0-3.0       I have explained all of the above in detail and the patient understands all of the current recommendation(s). I have answered all of their questions to the best of my ability and to their complete satisfaction.       MARIANA Martin  Oncology/Hematology   [facility]               [1]   Social History  Tobacco Use    Smoking status: Never    Smokeless tobacco: Never   Substance Use Topics    Alcohol use: Never    Drug use: Never

## 2025-06-30 DIAGNOSIS — C90.00 IGG MULTIPLE MYELOMA: ICD-10-CM

## 2025-07-01 RX ORDER — ACYCLOVIR 400 MG/1
400 TABLET ORAL 2 TIMES DAILY
Qty: 60 TABLET | Refills: 4 | Status: SHIPPED | OUTPATIENT
Start: 2025-07-01

## 2025-07-08 ENCOUNTER — OFFICE VISIT (OUTPATIENT)
Facility: CLINIC | Age: 70
End: 2025-07-08
Payer: MEDICARE

## 2025-07-08 VITALS
WEIGHT: 148 LBS | SYSTOLIC BLOOD PRESSURE: 117 MMHG | BODY MASS INDEX: 23.23 KG/M2 | HEIGHT: 67 IN | HEART RATE: 64 BPM | DIASTOLIC BLOOD PRESSURE: 71 MMHG

## 2025-07-08 DIAGNOSIS — M48.061 SPINAL STENOSIS OF LUMBAR REGION, UNSPECIFIED WHETHER NEUROGENIC CLAUDICATION PRESENT: ICD-10-CM

## 2025-07-08 DIAGNOSIS — G89.29 CHRONIC BACK PAIN GREATER THAN 3 MONTHS DURATION: Primary | ICD-10-CM

## 2025-07-08 DIAGNOSIS — M54.9 CHRONIC BACK PAIN GREATER THAN 3 MONTHS DURATION: Primary | ICD-10-CM

## 2025-07-08 DIAGNOSIS — M47.816 LUMBAR SPONDYLOSIS: ICD-10-CM

## 2025-07-08 PROCEDURE — 3078F DIAST BP <80 MM HG: CPT | Mod: CPTII,,, | Performed by: ANESTHESIOLOGY

## 2025-07-08 PROCEDURE — 1101F PT FALLS ASSESS-DOCD LE1/YR: CPT | Mod: CPTII,,, | Performed by: ANESTHESIOLOGY

## 2025-07-08 PROCEDURE — 1159F MED LIST DOCD IN RCRD: CPT | Mod: CPTII,,, | Performed by: ANESTHESIOLOGY

## 2025-07-08 PROCEDURE — 99204 OFFICE O/P NEW MOD 45 MIN: CPT | Mod: ,,, | Performed by: ANESTHESIOLOGY

## 2025-07-08 PROCEDURE — 3044F HG A1C LEVEL LT 7.0%: CPT | Mod: CPTII,,, | Performed by: ANESTHESIOLOGY

## 2025-07-08 PROCEDURE — 3008F BODY MASS INDEX DOCD: CPT | Mod: CPTII,,, | Performed by: ANESTHESIOLOGY

## 2025-07-08 PROCEDURE — 1160F RVW MEDS BY RX/DR IN RCRD: CPT | Mod: CPTII,,, | Performed by: ANESTHESIOLOGY

## 2025-07-08 PROCEDURE — 3288F FALL RISK ASSESSMENT DOCD: CPT | Mod: CPTII,,, | Performed by: ANESTHESIOLOGY

## 2025-07-08 PROCEDURE — 3074F SYST BP LT 130 MM HG: CPT | Mod: CPTII,,, | Performed by: ANESTHESIOLOGY

## 2025-07-08 PROCEDURE — 3066F NEPHROPATHY DOC TX: CPT | Mod: CPTII,,, | Performed by: ANESTHESIOLOGY

## 2025-07-08 PROCEDURE — 3060F POS MICROALBUMINURIA REV: CPT | Mod: CPTII,,, | Performed by: ANESTHESIOLOGY

## 2025-07-08 PROCEDURE — 1125F AMNT PAIN NOTED PAIN PRSNT: CPT | Mod: CPTII,,, | Performed by: ANESTHESIOLOGY

## 2025-07-08 NOTE — PROGRESS NOTES
Cora Guerrero MD        PATIENT NAME: Yuriy Díaz  : 1955  DATE: 25  MRN: [id number]      Billing Provider: Cora Guerrero MD  Level of Service:   Patient PCP Information       Provider PCP Type    Gaviota Lyon MD General            Reason for Visit / Chief Complaint: Back Pain (Referral from Gaviota Lyon MD for spinal stenosis lumbar region MRI in imaging C/O pain level 5, Taking Tramadol for pain helps some. No prior injury or sx.)       Update PCP  Update Chief Complaint         History of Present Illness / Problem Focused Workflow     Yuriy Díaz presents to the clinic with Back Pain (Referral from Gaviota Lyon MD for spinal stenosis lumbar region MRI in imaging C/O pain level 5, Taking Tramadol for pain helps some. No prior injury or sx.)     This is a 69-year-old male who presents to clinic today for his initial consultation as a referral from his primary care physician.  He complains of low back pain that has been present for over 2 years now.  He denies any specific injury that precipitated his symptoms.  Rather, it had a gradual onset and has been worsening over time.  He occasionally has pain that radiates down the right lower extremity to the knee.  He states that he had a couple of injections with Dr. Ruchi miguel about 2 years ago but they really did not help.  He did physical therapy for 6 months, which also did not help.  He has not undergone any surgery on his lumbar spine.  His pain gets up to 8/10 on the NRS at worst and down to 4/10 at best.  He is prescribed tramadol and occasionally takes over-the-counter Tylenol.  He tried gabapentin but it caused his feet and legs to swell to the point that he could not put shoes on.          Back Pain      Review of Systems     Review of Systems   Musculoskeletal:  Positive for back pain.   Neurological:  Positive for dizziness.   All other systems reviewed and are negative.       Medical / Social / Family History     Past Medical  History:   Diagnosis Date    Celiac disease     Cerebral palsy, unspecified     Compression fracture of L1 vertebra with delayed healing, subsequent encounter     Compression fracture of L2 vertebra with delayed healing, subsequent encounter     Cyst of right kidney     Degeneration of lumbar intervertebral disc     Diabetes mellitus, type 2     GERD (gastroesophageal reflux disease)     Hyperlipidemia     Hypertension     IgG multiple myeloma     Low back pain     Lumbago with sciatica, right side     Lumbar spinal stenosis     Monocytosis     Multiple myeloma, remission status unspecified     Neck pain     Nontraumatic compression fracture of T8 vertebra, initial encounter     LOIS (obstructive sleep apnea)     Osteoporosis of femur without pathological fracture     Other chronic pain     Personal history of colonic polyps 06/29/2022    Secondary malignancy of lumbar vertebral column     Secondary malignant neoplasm of bone     Tachycardia        Past Surgical History:   Procedure Laterality Date    BONE MARROW ASPIRATION N/A 06/27/2023    Procedure: ASPIRATION, BONE MARROW;  Surgeon: Namita Daniels MD;  Location: [facility] ENDOSCOPY;  Service: General;  Laterality: N/A;    BONE MARROW ASPIRATION N/A 10/24/2023    Procedure: ASPIRATION, BONE MARROW;  Surgeon: Namita Daniels MD;  Location: [facility] ENDOSCOPY;  Service: General;  Laterality: N/A;    BONE MARROW ASPIRATION N/A 03/12/2024    Procedure: ASPIRATION, BONE MARROW;  Surgeon: Haylie Liu MD;  Location: [facility] ENDOSCOPY;  Service: General;  Laterality: N/A;    BONE MARROW BIOPSY N/A 06/27/2023    Procedure: Biopsy-bone marrow;  Surgeon: Haylie Liu MD;  Location: [facility] ENDOSCOPY;  Service: General;  Laterality: N/A;    BONE MARROW BIOPSY N/A 10/24/2023    Procedure: Biopsy-bone marrow;  Surgeon: aNmita Daniels MD;  Location: [facility] ENDOSCOPY;  Service: General;  Laterality: N/A;    BONE MARROW BIOPSY N/A 03/12/2024    Procedure: Biopsy-bone marrow;   Surgeon: Haylie Liu MD;  Location: [facility] ENDOSCOPY;  Service: General;  Laterality: N/A;    CARDIAC SURGERY  2018    CARDIAC VALVE REPLACEMENT  2018    Valve repair  milter    COLONOSCOPY  06/29/2022    EYE SURGERY  9/26/2022    Cataract       Social History  Mr. Díaz  reports that he has never smoked. He has never used smokeless tobacco. He reports that he does not drink alcohol and does not use drugs.    Family History  's Arjun family history includes Hypertension in his mother.    Medications and Allergies     Medications  Outpatient Medications Marked as Taking for the 7/8/25 encounter (Office Visit) with Cora Guerrero MD   Medication Sig Dispense Refill    acyclovir (ZOVIRAX) 400 MG tablet Take 1 tablet (400 mg total) by mouth 2 (two) times daily. 60 tablet 4    ascorbic acid, vitamin C, (VITAMIN C) 1000 MG tablet Take 1,000 mg by mouth once daily.      aspirin (ECOTRIN) 81 MG EC tablet Take 81 mg by mouth once daily.      atorvastatin (LIPITOR) 20 MG tablet Take 20 mg by mouth once daily.      calcium carbonate 195 mg calcium (500 mg) Chew Take 1 tablet by mouth once daily.      cetirizine (ZYRTEC) 10 mg Cap Take 1 capsule by mouth daily as needed.      coenzyme Q10 10 mg capsule Take 1 capsule by mouth once daily.      colesevelam (WELCHOL) 625 mg tablet Take 625 mg by mouth.      DULoxetine (CYMBALTA) 20 MG capsule Take 20 mg by mouth 2 (two) times daily.      famotidine (PEPCID) 40 MG tablet Take 40 mg by mouth every evening.      ferrous sulfate (FEOSOL) 325 mg (65 mg iron) Tab tablet Take 650 mg by mouth.      fluticasone propionate (FLONASE) 50 mcg/actuation nasal spray 1 spray.      hydrocortisone (ANUSOL-HC) 2.5 % rectal cream Place 1 applicator rectally 2 (two) times daily. 28 g 2    lenalidomide 10 mg Cap Take 1 capsule by mouth.      LIDOcaine (LIDODERM) 5 % Place 2 patches onto the skin once daily. Remove & Discard patch within 12 hours or as directed by MD 14 patch 0    megestroL  (MEGACE) 400 mg/10 mL (40 mg/mL) Susp Take 10 mLs (400 mg total) by mouth once daily. 300 mL 3    metFORMIN (GLUCOPHAGE) 500 MG tablet Take 1 tablet (500 mg total) by mouth 2 (two) times daily with meals. 180 tablet 3    metoprolol succinate (TOPROL-XL) 50 MG 24 hr tablet Take 50 mg by mouth once daily.      MIRALAX 17 gram/dose powder Take 17 g by mouth.      mirtazapine (REMERON) 7.5 MG Tab Take 1 tablet by mouth every evening.      multivit with min-folic acid 0.4 mg Tab Take 1 tablet by mouth once daily.      multivitamin with minerals tablet Take 1 tablet by mouth every morning.      omega 3-dha-epa-fish oil 300 mg (120 mg- 180mg)-1,000 mg Cap Take 500 mg by mouth.      omega-3 fatty acids/fish oil (FISH OIL-OMEGA-3 FATTY ACIDS) 300-1,000 mg capsule Take 2 g by mouth.      sulfamethoxazole-trimethoprim 800-160mg (BACTRIM DS) 800-160 mg Tab Take 1 tablet by mouth on MWF of each week 48 tablet 3    tamsulosin (FLOMAX) 0.4 mg Cap Take 1 capsule by mouth.      traMADoL 100 mg Tab Take 100 mg by mouth 2 (two) times daily as needed (pain). 30 tablet 0       Allergies  Review of patient's allergies indicates:   Allergen Reactions    Wheat Other (See Comments)       Physical Examination     Vitals:    07/08/25 0946   BP: 117/71   Pulse: 64     Pain Disability Index (PDI): 35       Spine Musculoskeletal Exam    Gait    Gait is normal.    Inspection    Thoracolumbar    Thoracolumbar inspection is normal.    Palpation    Thoracolumbar    Tenderness: present      Paraspinous: right and left    Right      Masses: none      Spasms: none      Crepitus: none      Muscle tone: normal    Left      Masses: none      Spasms: none      Crepitus: none      Muscle tone: normal    Range of Motion    Thoracolumbar        Thoracolumbar range of motion additional comments: Limited range of motion in the lumbar spine due to pain.    Strength    Thoracolumbar    Thoracolumbar motor exam is normal.       Sensory    Thoracolumbar     Thoracolumbar sensation is normal.    General      Constitutional: appears stated age, well-developed and well-nourished    Scleral icterus: no    Labored breathing: no    Psychiatric: normal mood and affect and no acute distress    Neurological: alert and oriented x3    Skin: intact    Lymphadenopathy: none  CV: extremities warm, well-perfused  Resp: nonlabored breathing       Assessment and Plan (including Health Maintenance)      Problem List  Smart Sets  Document Outside HM   :    Plan:   Chronic back pain greater than 3 months duration    Spinal stenosis of lumbar region, unspecified whether neurogenic claudication present  -     Ambulatory referral/consult to Pain Clinic       He is being scheduled for bilateral L4 transforaminal epidural steroid injections today to help with his chronic low back pain related to degenerative disc disease.  I am also requesting the records of injections he had done in the past with Dr. Winters.    Problem List Items Addressed This Visit          Neuro    Spinal stenosis of lumbar region       Orthopedic    Chronic back pain greater than 3 months duration - Primary         Future Appointments   Date Time Provider Department Center   7/21/2025 11:00 AM NURSE, [facility]    7/22/2025  8:15 AM [facility]    7/22/2025  9:20 AM [facility]arciso Lund[facility]    7/22/2025 10:00 AM INFUSION ROOM 532, [facility] CHEMOTHERAPY INFUSION [facility] CHEMO [facility]   10/15/2025  8:00 AM [facility] XR10 DEXA1 350 LB LIMIT [facility] XRAY [place]         There are no Patient Instructions on file for this visit.  No follow-ups on file.     Signature:  Cora Guerrero MD      Date of encounter: 7/8/25

## 2025-07-08 NOTE — LETTER
This communication is flagged as high priority.    To: Dr. Narciso Lundy MD      Patient Name:Yuriy Díaz    1955      The above patient is being scheduled for a procedure under fluoroscopy with iodine.  Is patient in good standards to proceed with transforaminal epidural steroid injection bilateral L4 on 2025.           Approved by: ___________________________  Date: ______________      Denied by: _____________________________  Date: ______________    Interventional Pain Managment   Cora Guerrero MD  [address] [address]  [phone] (M)  [phone] (F)        
right/internal jugular

## 2025-07-08 NOTE — H&P (VIEW-ONLY)
Cora Guerrero MD        PATIENT NAME: Yuriy Díaz  : 1955  DATE: 25  MRN: [id number]      Billing Provider: Cora Guerrero MD  Level of Service:   Patient PCP Information       Provider PCP Type    Gaviota Lyon MD General            Reason for Visit / Chief Complaint: Back Pain (Referral from Gaviota Lyon MD for spinal stenosis lumbar region MRI in imaging C/O pain level 5, Taking Tramadol for pain helps some. No prior injury or sx.)       Update PCP  Update Chief Complaint         History of Present Illness / Problem Focused Workflow     Yuriy Díaz presents to the clinic with Back Pain (Referral from Gaviota Lyon MD for spinal stenosis lumbar region MRI in imaging C/O pain level 5, Taking Tramadol for pain helps some. No prior injury or sx.)     This is a 69-year-old male who presents to clinic today for his initial consultation as a referral from his primary care physician.  He complains of low back pain that has been present for over 2 years now.  He denies any specific injury that precipitated his symptoms.  Rather, it had a gradual onset and has been worsening over time.  He occasionally has pain that radiates down the right lower extremity to the knee.  He states that he had a couple of injections with Dr. Ruchi miguel about 2 years ago but they really did not help.  He did physical therapy for 6 months, which also did not help.  He has not undergone any surgery on his lumbar spine.  His pain gets up to 8/10 on the NRS at worst and down to 4/10 at best.  He is prescribed tramadol and occasionally takes over-the-counter Tylenol.  He tried gabapentin but it caused his feet and legs to swell to the point that he could not put shoes on.          Back Pain      Review of Systems     Review of Systems   Musculoskeletal:  Positive for back pain.   Neurological:  Positive for dizziness.   All other systems reviewed and are negative.       Medical / Social / Family History     Past Medical  History:   Diagnosis Date    Celiac disease     Cerebral palsy, unspecified     Compression fracture of L1 vertebra with delayed healing, subsequent encounter     Compression fracture of L2 vertebra with delayed healing, subsequent encounter     Cyst of right kidney     Degeneration of lumbar intervertebral disc     Diabetes mellitus, type 2     GERD (gastroesophageal reflux disease)     Hyperlipidemia     Hypertension     IgG multiple myeloma     Low back pain     Lumbago with sciatica, right side     Lumbar spinal stenosis     Monocytosis     Multiple myeloma, remission status unspecified     Neck pain     Nontraumatic compression fracture of T8 vertebra, initial encounter     LOIS (obstructive sleep apnea)     Osteoporosis of femur without pathological fracture     Other chronic pain     Personal history of colonic polyps 06/29/2022    Secondary malignancy of lumbar vertebral column     Secondary malignant neoplasm of bone     Tachycardia        Past Surgical History:   Procedure Laterality Date    BONE MARROW ASPIRATION N/A 06/27/2023    Procedure: ASPIRATION, BONE MARROW;  Surgeon: Namita Daniels MD;  Location: [facility] ENDOSCOPY;  Service: General;  Laterality: N/A;    BONE MARROW ASPIRATION N/A 10/24/2023    Procedure: ASPIRATION, BONE MARROW;  Surgeon: Namita Daniels MD;  Location: [facility] ENDOSCOPY;  Service: General;  Laterality: N/A;    BONE MARROW ASPIRATION N/A 03/12/2024    Procedure: ASPIRATION, BONE MARROW;  Surgeon: Haylie Liu MD;  Location: [facility] ENDOSCOPY;  Service: General;  Laterality: N/A;    BONE MARROW BIOPSY N/A 06/27/2023    Procedure: Biopsy-bone marrow;  Surgeon: Haylie Liu MD;  Location: [facility] ENDOSCOPY;  Service: General;  Laterality: N/A;    BONE MARROW BIOPSY N/A 10/24/2023    Procedure: Biopsy-bone marrow;  Surgeon: Namita Daniels MD;  Location: [facility] ENDOSCOPY;  Service: General;  Laterality: N/A;    BONE MARROW BIOPSY N/A 03/12/2024    Procedure: Biopsy-bone marrow;   Surgeon: Haylie Liu MD;  Location: [facility] ENDOSCOPY;  Service: General;  Laterality: N/A;    CARDIAC SURGERY  2018    CARDIAC VALVE REPLACEMENT  2018    Valve repair  milter    COLONOSCOPY  06/29/2022    EYE SURGERY  9/26/2022    Cataract       Social History  Mr. Díaz  reports that he has never smoked. He has never used smokeless tobacco. He reports that he does not drink alcohol and does not use drugs.    Family History  's Arjun family history includes Hypertension in his mother.    Medications and Allergies     Medications  Outpatient Medications Marked as Taking for the 7/8/25 encounter (Office Visit) with Cora Guerrero MD   Medication Sig Dispense Refill    acyclovir (ZOVIRAX) 400 MG tablet Take 1 tablet (400 mg total) by mouth 2 (two) times daily. 60 tablet 4    ascorbic acid, vitamin C, (VITAMIN C) 1000 MG tablet Take 1,000 mg by mouth once daily.      aspirin (ECOTRIN) 81 MG EC tablet Take 81 mg by mouth once daily.      atorvastatin (LIPITOR) 20 MG tablet Take 20 mg by mouth once daily.      calcium carbonate 195 mg calcium (500 mg) Chew Take 1 tablet by mouth once daily.      cetirizine (ZYRTEC) 10 mg Cap Take 1 capsule by mouth daily as needed.      coenzyme Q10 10 mg capsule Take 1 capsule by mouth once daily.      colesevelam (WELCHOL) 625 mg tablet Take 625 mg by mouth.      DULoxetine (CYMBALTA) 20 MG capsule Take 20 mg by mouth 2 (two) times daily.      famotidine (PEPCID) 40 MG tablet Take 40 mg by mouth every evening.      ferrous sulfate (FEOSOL) 325 mg (65 mg iron) Tab tablet Take 650 mg by mouth.      fluticasone propionate (FLONASE) 50 mcg/actuation nasal spray 1 spray.      hydrocortisone (ANUSOL-HC) 2.5 % rectal cream Place 1 applicator rectally 2 (two) times daily. 28 g 2    lenalidomide 10 mg Cap Take 1 capsule by mouth.      LIDOcaine (LIDODERM) 5 % Place 2 patches onto the skin once daily. Remove & Discard patch within 12 hours or as directed by MD 14 patch 0    megestroL  (MEGACE) 400 mg/10 mL (40 mg/mL) Susp Take 10 mLs (400 mg total) by mouth once daily. 300 mL 3    metFORMIN (GLUCOPHAGE) 500 MG tablet Take 1 tablet (500 mg total) by mouth 2 (two) times daily with meals. 180 tablet 3    metoprolol succinate (TOPROL-XL) 50 MG 24 hr tablet Take 50 mg by mouth once daily.      MIRALAX 17 gram/dose powder Take 17 g by mouth.      mirtazapine (REMERON) 7.5 MG Tab Take 1 tablet by mouth every evening.      multivit with min-folic acid 0.4 mg Tab Take 1 tablet by mouth once daily.      multivitamin with minerals tablet Take 1 tablet by mouth every morning.      omega 3-dha-epa-fish oil 300 mg (120 mg- 180mg)-1,000 mg Cap Take 500 mg by mouth.      omega-3 fatty acids/fish oil (FISH OIL-OMEGA-3 FATTY ACIDS) 300-1,000 mg capsule Take 2 g by mouth.      sulfamethoxazole-trimethoprim 800-160mg (BACTRIM DS) 800-160 mg Tab Take 1 tablet by mouth on MWF of each week 48 tablet 3    tamsulosin (FLOMAX) 0.4 mg Cap Take 1 capsule by mouth.      traMADoL 100 mg Tab Take 100 mg by mouth 2 (two) times daily as needed (pain). 30 tablet 0       Allergies  Review of patient's allergies indicates:   Allergen Reactions    Wheat Other (See Comments)       Physical Examination     Vitals:    07/08/25 0946   BP: 117/71   Pulse: 64     Pain Disability Index (PDI): 35       Spine Musculoskeletal Exam    Gait    Gait is normal.    Inspection    Thoracolumbar    Thoracolumbar inspection is normal.    Palpation    Thoracolumbar    Tenderness: present      Paraspinous: right and left    Right      Masses: none      Spasms: none      Crepitus: none      Muscle tone: normal    Left      Masses: none      Spasms: none      Crepitus: none      Muscle tone: normal    Range of Motion    Thoracolumbar        Thoracolumbar range of motion additional comments: Limited range of motion in the lumbar spine due to pain.    Strength    Thoracolumbar    Thoracolumbar motor exam is normal.       Sensory    Thoracolumbar     Thoracolumbar sensation is normal.    General      Constitutional: appears stated age, well-developed and well-nourished    Scleral icterus: no    Labored breathing: no    Psychiatric: normal mood and affect and no acute distress    Neurological: alert and oriented x3    Skin: intact    Lymphadenopathy: none  CV: extremities warm, well-perfused  Resp: nonlabored breathing       Assessment and Plan (including Health Maintenance)      Problem List  Smart Sets  Document Outside HM   :    Plan:   Chronic back pain greater than 3 months duration    Spinal stenosis of lumbar region, unspecified whether neurogenic claudication present  -     Ambulatory referral/consult to Pain Clinic       He is being scheduled for bilateral L4 transforaminal epidural steroid injections today to help with his chronic low back pain related to degenerative disc disease.  I am also requesting the records of injections he had done in the past with Dr. Winters.    Problem List Items Addressed This Visit          Neuro    Spinal stenosis of lumbar region       Orthopedic    Chronic back pain greater than 3 months duration - Primary         Future Appointments   Date Time Provider Department Center   7/21/2025 11:00 AM NURSE, [facility]    7/22/2025  8:15 AM NURSE[facility]    7/22/2025  9:20 AM [facility]    7/22/2025 10:00 AM INFUSION ROOM 532, [facility] CHEMOTHERAPY INFUSION [facility] CHEMO [facility]   10/15/2025  8:00 AM [facility] XR10 DEXA1 350 LB LIMIT [facility] XRAY [place]         There are no Patient Instructions on file for this visit.  No follow-ups on file.     Signature:  Cora Guerrero MD      Date of encounter: 7/8/25

## 2025-07-10 DIAGNOSIS — M48.061 SPINAL STENOSIS OF LUMBAR REGION, UNSPECIFIED WHETHER NEUROGENIC CLAUDICATION PRESENT: ICD-10-CM

## 2025-07-10 DIAGNOSIS — C90.00 IGG MULTIPLE MYELOMA: ICD-10-CM

## 2025-07-10 RX ORDER — TRAMADOL HYDROCHLORIDE 100 MG/1
100 TABLET, COATED ORAL 2 TIMES DAILY PRN
Qty: 30 TABLET | Refills: 0 | Status: SHIPPED | OUTPATIENT
Start: 2025-07-10

## 2025-07-10 RX ORDER — SULFAMETHOXAZOLE AND TRIMETHOPRIM 800; 160 MG/1; MG/1
TABLET ORAL
Qty: 48 TABLET | Refills: 3 | Status: SHIPPED | OUTPATIENT
Start: 2025-07-10

## 2025-07-16 ENCOUNTER — HOSPITAL ENCOUNTER (OUTPATIENT)
Facility: HOSPITAL | Age: 70
Discharge: HOME | End: 2025-07-16
Attending: ANESTHESIOLOGY | Admitting: ANESTHESIOLOGY
Payer: MEDICARE

## 2025-07-16 DIAGNOSIS — M54.9 CHRONIC BACK PAIN GREATER THAN 3 MONTHS DURATION: Primary | ICD-10-CM

## 2025-07-16 DIAGNOSIS — G89.29 CHRONIC BACK PAIN GREATER THAN 3 MONTHS DURATION: Primary | ICD-10-CM

## 2025-07-16 PROCEDURE — 63600175 PHARM REV CODE 636 W HCPCS: Performed by: ANESTHESIOLOGY

## 2025-07-16 PROCEDURE — 25000003 PHARM REV CODE 250: Performed by: ANESTHESIOLOGY

## 2025-07-16 PROCEDURE — 64483 NJX AA&/STRD TFRM EPI L/S 1: CPT | Mod: 50 | Performed by: ANESTHESIOLOGY

## 2025-07-16 PROCEDURE — 64483 NJX AA&/STRD TFRM EPI L/S 1: CPT | Mod: 50,,, | Performed by: ANESTHESIOLOGY

## 2025-07-16 RX ORDER — DIAZEPAM 5 MG/1
10 TABLET ORAL
Status: DISCONTINUED | OUTPATIENT
Start: 2025-07-16 | End: 2025-07-16 | Stop reason: HOSPADM

## 2025-07-16 RX ORDER — LIDOCAINE HYDROCHLORIDE 10 MG/ML
INJECTION, SOLUTION EPIDURAL; INFILTRATION; INTRACAUDAL; PERINEURAL
Status: DISCONTINUED | OUTPATIENT
Start: 2025-07-16 | End: 2025-07-16 | Stop reason: HOSPADM

## 2025-07-16 RX ORDER — DEXAMETHASONE SODIUM PHOSPHATE 10 MG/ML
INJECTION, SOLUTION INTRA-ARTICULAR; INTRALESIONAL; INTRAMUSCULAR; INTRAVENOUS; SOFT TISSUE
Status: DISCONTINUED
Start: 2025-07-16 | End: 2025-07-16 | Stop reason: HOSPADM

## 2025-07-16 RX ORDER — DEXAMETHASONE SODIUM PHOSPHATE 10 MG/ML
INJECTION, SOLUTION INTRA-ARTICULAR; INTRALESIONAL; INTRAMUSCULAR; INTRAVENOUS; SOFT TISSUE
Status: DISCONTINUED | OUTPATIENT
Start: 2025-07-16 | End: 2025-07-16 | Stop reason: HOSPADM

## 2025-07-16 RX ORDER — LIDOCAINE HYDROCHLORIDE 10 MG/ML
INJECTION, SOLUTION EPIDURAL; INFILTRATION; INTRACAUDAL; PERINEURAL
Status: DISCONTINUED
Start: 2025-07-16 | End: 2025-07-16 | Stop reason: HOSPADM

## 2025-07-16 RX ORDER — BUPIVACAINE HYDROCHLORIDE 2.5 MG/ML
INJECTION, SOLUTION EPIDURAL; INFILTRATION; INTRACAUDAL; PERINEURAL
Status: DISCONTINUED | OUTPATIENT
Start: 2025-07-16 | End: 2025-07-16 | Stop reason: HOSPADM

## 2025-07-16 RX ORDER — BUPIVACAINE HYDROCHLORIDE 2.5 MG/ML
INJECTION, SOLUTION EPIDURAL; INFILTRATION; INTRACAUDAL; PERINEURAL
Status: DISCONTINUED
Start: 2025-07-16 | End: 2025-07-16 | Stop reason: HOSPADM

## 2025-07-16 RX ADMIN — DIAZEPAM 10 MG: 5 TABLET ORAL at 09:07

## 2025-07-16 NOTE — DISCHARGE SUMMARY
[facility] Surgical - Periop Services  Discharge Note  Short Stay    Procedure(s) (LRB):  INJECTION, SPINE, LUMBOSACRAL, TRANSFORAMINAL APPROACH    ////TFESI Bilateral L4 (Bilateral)      OUTCOME: Patient tolerated treatment/procedure well without complication and is now ready for discharge.    DISPOSITION: Home or Self Care    FINAL DIAGNOSIS:  <principal problem not specified>    FOLLOWUP: In clinic    DISCHARGE INSTRUCTIONS:  No discharge procedures on file.     TIME SPENT ON DISCHARGE: 5 minutes

## 2025-07-16 NOTE — OP NOTE
Procedure:    Left L4 transforaminal epidural steroid injection    Right L4 transforaminal epidural steroid injection    Pre-Procedure Diagnoses:  Chronic back pain greater than 3 months  Lumbar degenerative disc disease  Lumbar radiculopathy  Lumbar disc displacement    Post-Procedure Diagnoses:  Chronic back pain greater than 3 months  Lumbar degenerative disc disease  Lumbar radiculopathy  Lumbar disc displacement    Anesthesia:  Local    Estimated Blood Loss:  < 2 ML    Consent:  The procedure, risks, benefits, and alternatives were discussed with the patient.  The patient voiced understanding and fully informed written consent was obtained.    Description of the Procedure:  The patient was taken to the operating room and placed in the prone position. The skin overlying the lumbar spine was prepped with Chloraprep and draped in the usual sterile fashion.  An oblique fluoroscopic view was obtained on the left side at L4, with the superior articular process of the inferior vertebral body aligned with the pedicle.  Skin anesthesia was achieved using 2 mL of lidocaine 1%.  A 22-gauge 5 -inch Quinke spinal needle was inserted and advanced under intermittent fluoroscopic views into the epidural space. Proper needle position was confirmed under AP, oblique, and lateral fluoroscopic views.  Negative aspiration for blood or CSF was confirmed. 1 mL of contrast was injected, which revealed spread into the epidural space.  Then a combination of 5 mg of dexamethasone with 1 mL of 0.25% bupivacaine was easily injected.   There was no pain on injection. The needle was removed intact and bleeding was nil.  The same procedure was repeated in identical fashion on the right side at L4.  Sterile bandages were applied. The patient was taken to the recovery room for further observation in stable condition. The patient was then discharged home with no complications.

## 2025-07-18 VITALS
OXYGEN SATURATION: 100 % | RESPIRATION RATE: 18 BRPM | WEIGHT: 152.13 LBS | BODY MASS INDEX: 23.88 KG/M2 | SYSTOLIC BLOOD PRESSURE: 112 MMHG | TEMPERATURE: 98 F | DIASTOLIC BLOOD PRESSURE: 69 MMHG | HEIGHT: 67 IN | HEART RATE: 58 BPM

## 2025-07-19 PROBLEM — Z79.61 LONG TERM (CURRENT) USE OF IMMUNOMODULATOR: Status: ACTIVE | Noted: 2025-07-19

## 2025-07-19 PROBLEM — Z94.84 H/O STEM CELL TRANSPLANT: Status: ACTIVE | Noted: 2025-07-19

## 2025-07-19 RX ORDER — HEPARIN 100 UNIT/ML
500 SYRINGE INTRAVENOUS
Status: CANCELLED | OUTPATIENT
Start: 2025-07-22

## 2025-07-19 RX ORDER — SODIUM CHLORIDE 0.9 % (FLUSH) 0.9 %
10 SYRINGE (ML) INJECTION
Status: CANCELLED | OUTPATIENT
Start: 2025-07-22

## 2025-07-19 RX ORDER — ZOLEDRONIC ACID 0.04 MG/ML
4 INJECTION, SOLUTION INTRAVENOUS
Status: CANCELLED | OUTPATIENT
Start: 2025-07-22

## 2025-07-20 NOTE — PROGRESS NOTES
History:  Past Medical History:   Diagnosis Date    Celiac disease     Cerebral palsy, unspecified     Compression fracture of L1 vertebra with delayed healing, subsequent encounter     Compression fracture of L2 vertebra with delayed healing, subsequent encounter     Cyst of right kidney     Degeneration of lumbar intervertebral disc     Diabetes mellitus, type 2     GERD (gastroesophageal reflux disease)     Hyperlipidemia     Hypertension     IgG multiple myeloma     Low back pain     Lumbago with sciatica, right side     Lumbar spinal stenosis     Monocytosis     Multiple myeloma, remission status unspecified     Neck pain     Nontraumatic compression fracture of T8 vertebra, initial encounter     LOIS (obstructive sleep apnea)     Osteoporosis of femur without pathological fracture     Other chronic pain     Personal history of colonic polyps 06/29/2022    Secondary malignancy of lumbar vertebral column     Secondary malignant neoplasm of bone     Tachycardia    Past medical history:  NIDDM. Dyslipidemia.  GERD.  Lumbar spinal stenosis.  Vitamin-D deficiency.  Allergic rhinitis.  HTN.  Bell's palsy.  Celiac disease.  Hemorrhoids.  Mitral regurgitation 01/05/2018.  Rheumatic valve narrowing and leaking mitral valve.  -02/02/2018 ([facility]):  АНДРЕЙ, right minimally invasive anterior thoracotomy, right femoral artery and vein exploration, extensive right pleural adhesiolysis/pneumolysis, bilateral Maze/ablation (extensive), left atrial appendage clip placement for left atrial appendage exclusion, mitral valve repair (complex repair with 26 mm annuloplasty ring)  Social history:  .  Lives in [place], [place].  Five children.  Owns a [employer] business.  No history of tobacco, alcohol, or illicit drug abuse.    Family history:  Negative for cancers or blood dyscrasias.    Health maintenance:  PCP in family medicine clinic, [facility].  Had EGD and colonoscopy done 1 year ago by  Dr. Zoltan Kapoor, GI, [place], apparently unremarkable (he gets the endoscopies done periodically because of history of celiac disease).  Past Surgical History:   Procedure Laterality Date    BONE MARROW ASPIRATION N/A 06/27/2023    Procedure: ASPIRATION, BONE MARROW;  Surgeon: Namita Daniels MD;  Location: [facility] ENDOSCOPY;  Service: General;  Laterality: N/A;    BONE MARROW ASPIRATION N/A 10/24/2023    Procedure: ASPIRATION, BONE MARROW;  Surgeon: Namita Daniels MD;  Location: [facility] ENDOSCOPY;  Service: General;  Laterality: N/A;    BONE MARROW ASPIRATION N/A 03/12/2024    Procedure: ASPIRATION, BONE MARROW;  Surgeon: Haylie Liu MD;  Location: [facility] ENDOSCOPY;  Service: General;  Laterality: N/A;    BONE MARROW BIOPSY N/A 06/27/2023    Procedure: Biopsy-bone marrow;  Surgeon: Haylie Liu MD;  Location: [facility] ENDOSCOPY;  Service: General;  Laterality: N/A;    BONE MARROW BIOPSY N/A 10/24/2023    Procedure: Biopsy-bone marrow;  Surgeon: Namita Daniels MD;  Location: [facility] ENDOSCOPY;  Service: General;  Laterality: N/A;    BONE MARROW BIOPSY N/A 03/12/2024    Procedure: Biopsy-bone marrow;  Surgeon: Haylie Liu MD;  Location: [facility] ENDOSCOPY;  Service: General;  Laterality: N/A;    CARDIAC SURGERY  2018    CARDIAC VALVE REPLACEMENT  2018    Valve repair  milter    COLONOSCOPY  06/29/2022    EPIDURAL STEROID INJECTION INTO LUMBAR SPINE  01/2024    EYE SURGERY  9/26/2022    Cataract    INJECTION, SPINE, LUMBOSACRAL, TRANSFORAMINAL APPROACH Bilateral 7/16/2025    Procedure: INJECTION, SPINE, LUMBOSACRAL, TRANSFORAMINAL APPROACH    ////TFESI Bilateral L4;  Surgeon: Cora Guerrero MD;  Location: [facility];  Service: Pain Management;  Laterality: Bilateral;  TFESI Bilateral L4      Social History     Socioeconomic History    Marital status:    Tobacco Use    Smoking status: Never    Smokeless tobacco: Never   Vaping Use    Vaping status: Never Used   Substance and Sexual Activity     Alcohol use: Never    Drug use: Never    Sexual activity: Not Currently      Family History   Problem Relation Name Age of Onset    Hypertension Mother        Reason for Follow-up:  -multiple myeloma, IgG kappa; S/P ASCT  -chronic low back pain  -hypercalcemia  -osteoporosis hips B/L    History of Present Illness:   IgG multiple myeloma      Oncologic/Hematologic History:  Oncology History   IgG multiple myeloma   6/23/2023 Initial Diagnosis    IgG multiple myeloma     7/13/2023 - 11/21/2023 Chemotherapy    Treatment Summary   Plan Name: OP VRD - WEEKLY BORTEZOMIB LENALIDOMIDE DEXAMETHASONE Q3W  Treatment Goal: Curative  Status: Inactive  Start Date: 7/13/2023  End Date: 11/21/2023  Provider: Narciso Lundy MD  Chemotherapy: bortezomib (VELCADE) injection 2.25 mg, 2.25 mg, Subcutaneous, Clinic/\[facility] [place] [facility]\"" 1 time, 6 of 9 cycles  Administration: 2.25 mg (7/13/2023), 2.25 mg (7/20/2023), 2.25 mg (7/27/2023), 2.25 mg (8/10/2023), 2.25 mg (8/17/2023), 2.25 mg (8/24/2023), 2.25 mg (8/31/2023), 2.25 mg (9/7/2023), 2.25 mg (9/21/2023), 2.25 mg (9/14/2023), 2.25 mg (9/28/2023), 2.25 mg (10/12/2023), 2.25 mg (10/5/2023), 2.25 mg (10/19/2023), 2.25 mg (10/26/2023), 2.25 mg (11/21/2023)  lenalidomide 25 mg Cap, 25 mg, , , 1 of 1 cycle, Start date: 11/3/2023, End date: 2/15/2024     67-year-old gentleman, referred from [facility], with newly diagnosed multiple myeloma.      Patient is being followed for IgG kappa multiple myeloma, S/P autologous HSCT..    Please assessment and plan section for details.    06/26/2023:  Initial consultation:  Pleasant gentleman who presents for initial medical oncology consultation, accompanied by his wife and family present who is a past interventional cardiologist.  Back pain for 2 months.  Initially started in right groin.  Physical therapy has not helped.  10/10 severity.  Has been taking Tylenol without significant relief.  Secured to take narcotics because of constipation.   Underwent couple of spinal steroid shots 3 weeks ago, with minor relief.  Pain is present all the time.  Pain increases with changing position in bed as well as upon standing.  He finds it difficult to ambulate.  Pain does not radiate down lower extremities and is not accompanied with lower extremity weakness, numbness, urinary or bowel incontinence, or saddle anesthesia.  Also experiences muscle cramps.  Generalized weakness.  However, ECOG 2.  No fevers or chills.  No repeated infections.  Cramps in hands and feet.  Appetite is down.  Appetite has picked up in last 10 days.  Has lost 12-14 lb in last 1-1/2 months.  No abnormal bleeding or bruising.      Oncologic history:  # Multiple myeloma, IgG kappa:  ISS stage:  Stage I  R-ISS stage:  Stage I  -presentation:  03/2023: Worsening back pain, 12-14 lb weight loss over 6 weeks  -skeletal survey 06/14/2023: Negative for lytic or sclerotic lesions  -06/14/2023:  Hemoglobin 11.0, IgG kappa M protein 2.28 gm/dL, kappa elevated, lambda suppressed, kappa/lambda ratio 292  -renal function normal, no hypercalcemia  -24 hour urine: M spike 183 mg; monoclonal kappa light chains in urine; urine kappa elevated, urine lambda suppressed, urine kappa/lambda ratio> 36.4  -LDH normal 06/16/2023   -Beta-2 microglobulin 3.18 on 06/26/2023  -bone marrow biopsy 06/27/2023:  50% plasmacytosis with kappa light chain restriction; no high-risk cytogenetics  -FDG PET-CT 06/29/2023:  Questionable findings  Staging of myeloma:  -serum beta 2 microglobulin elevated (3.18)  -serum albumin and LDH normal   -myeloma FISH panel on bone marrow:  Negative for high-risk cytogenetic abnormalities  >>>  ISS stage:  Stage I  R-ISS stage:  Stage I  -Velcade started 07/13/2023   -Zometa 4 mg IV every 4 weeks (x2 years), started 07/13/2023  -VRD:  Cycle 1 started 07/13/2023; cycle 2 started 08/10/2023; cycle 3 started 08/31/2023; cycle 4 started 09/21/2023  -Radiation oncology consult:  Radiotherapy to  spine not indicated/will not be helpful; questionable findings on FDG PET-CT  -IR at [facility] will not perform kyphoplasty  -significant response to chemotherapy x2 cycles (on 08/24/2023, kappa/lambda ratio 5.72, much improved; IgG kappa monoclonal protein down to 0.39 gm/dL, significantly decreased)   (pre chemotherapy IgG kappa, on 06/14/2023, was 2.28 gm/dL)  -VRD cycle 5:  10/12/2023-10/26/2023  -restaging bone marrow biopsy 10/24/2023, post VRD X 4-1/2 cycles:  Normocellular; < 1% plasma cells; no clonal or abnormal plasma cell population on flow cytometry; no high-risk FISH findings  (restaging bone marrow biopsy post VRD X 4-1/2 cycles:  <1% plasma cells)  (pre chemotherapy bone marrow biopsy 06/27/2023, had shown 50% plasmacytosis)  -11/08/2023: No monoclonal protein in urine  -11/09/2023:  No monoclonal protein on SPEP and BUBBA; kappa/lambda ratio 1.36, normalized; kappa free light chains 2.29 mg/dL dramatically reduced (to recall, was 99.4 on 06/14/2023 before starting chemotherapy)  -cycle 6 day 1 of RVD 11/21/2023; subsequently, on hold in anticipation of stem cell transplant  >>>  # ASCT at [facility]:  -melphalan 360 mg IV administered day -2 (11/28/2023) (200 mg per m2 per day)  -ASCT infusion day 0 (11/30/2023)  -filgrastim 5 microgram/kg subQ every 24 hours, started day +5 (12/05/2023)  -infection prophylaxis  -pentamidine 300 mg inhaled once on day -2 (11/28/2023)  -acyclovir 800 mg p.o. q.12 hours starting day -2 (11/28/2023), to continue for 1 year  -fluconazole 400 mg p.o. Q 24 hours starting day -2 (11/28/2023), continue until engraftment and/or mucositis resolved  -cefepime started 12/08/2023, vancomycin added  -IVIG on 12/10/2023  -12/12/2023:  HCT day +12; doing well on antibiotics; counts recovered  -12/12/2023: Transfusion independent and ANC has recovered  -12/13/2023: Patient discharged  -venous Doppler both lower extremities 02/29/2024: Pedal edema: No DVT   -03/11/2024:  Hemoglobin 11.2; CMP  unremarkable   -03/11/2024 (day + 102):  No monoclonal protein in blood; FLC assay normal   -restaging bone marrow biopsy 03/12/2024 (day +103):  No increase in plasma cells  -03/11/2024:  No monoclonal band; kappa/lambda ratio normal  -03/18/2024:  Urine negative for monoclonal protein  -thus, CR, post ASCT  -04/09/2024: Dr. Brandy Mahajan:  Started maintenance Revlimid 10 mg p.o. q.day, with aspirin  -06/10/2024: No monoclonal bands per SPEP and BUBBA; kappa/lambda ratio normal  -08/20/2024:  Dr. Brandy Mahajan; maintenance Revlimid dose increased to 15 mg daily  -continue acyclovir and Bactrim  -DEXA scan 10/14/2025:  Osteoporosis based on hips  -CTs C/A/P 11 05/2024: Abnormal weight loss:  Essentially negative  -TSH and free T4 normal on 12/30/2024, 12/02/2024  -10/04/2024:  No monoclonal bands; kappa/lambda ratio 1.65 normal, kappa 4.67 without consistent upward trend; lambda 2.83 elevated  -01/27/2025:  No monoclonal bands; kappa/lambda ratio 1.24 normal; kappa 3.78 elevated and without upward trend; lambda 3.05 elevated and more less stable  -02/11/2025: Follow-up with Dr. Brandy Mahajan, hematology/oncology/BMT:  Decrease Revlimid from 15 mg to 10 mg because hemoglobin down to 8  -04/01/2025: TSH, free T4 normal  -04/01/2025:  No monoclonal protein; kappa/lambda ratio normal  -04/29/2025:  WBC 3.48, hemoglobin 9.8, .1, platelets 133 K, ANC 0.79, CMP reviewed    Interval History:  INF FLUIDS   [No matching plan found]     04/29/2025:   -on Zometa to prevent skeletal events from myeloma bone lesions  -04/01/2025: TSH, free T4 normal  -04/01/2025: IgG, IgA normal; IgM 16 suppressed, stable; no monoclonal bands; kappa/lambda ratio 1.14 normal, kappa 3.5 elevated and stable, lambda 3.06 elevated and stable  -04/29/2025:  WBC 3.48, hemoglobin 9.8, .1, platelets 133 K, ANC 0.79, CMP reviewed  Presents for a follow-up visit.  Doing well.  Appetite is okay.  On Megace.  Compliant with lenalidomide 10 mg daily.  No  significant side effects.  Chronic back pain.  Back pain is not secondary to myeloma.  Back pain secondary to DJD of spine.  On tramadol and gabapentin.  Gabapentin 600 mg p.o. b.i.d. and tramadol 100 mg p.o. twice daily PRN for pain.  No recurrent fevers, chills, or infections.  No anorexia or unintentional weight loss.  His appetite declined ever since he started taking Revlimid but improved with Megace.  No signs of venous thromboembolism.  On baby aspirin 81 mg daily along with Revlimid.    No chest pain, cough, or dyspnea.  No abdominal pain, nausea, vomiting, GI bleeding.  No change in bowel habits.  No significant weakness or fatigue.  ECOG 0-1.  Chronic back pain.  Does not radiate.  Says that he has consulted with 4-5 spinal surgeons and none of them have recommended any kind of surgery.  No skin rash with Revlimid.  No recurrent infections.  No hematuria.  In the past, consulted with Xochitl Grubbs MD, neurosurgery, for lumbar stenosis with neurogenic claudication; chronic bilateral low back pain with bilateral sciatica; compression fracture of lumbar vertebrae; planning L2-3, L3-4, L4-5 laminectomy apparently, she recommended surgery; however, Mr. Díaz opted against surgery.    07/21/2025:  -continues on Zometa every 4 weeks, adjusted for renal function  -no severe cytopenias with Revlimid  -hemoglobin: 11.4 on 06/24/2025; 10.5 on 05/27/2025; 9.8 on 04/29/2025, 9.8 on 04/14/2025, etc.  -at the most, grade grade 2-grade 3 neutropenia with Revlimid  -estimated GFR has been range between 57-> 60  -no hypercalcemia  -TSH and free T4 normal on 06/24/2025  -07/16/2025:  Left L4 transforaminal epidural steroid injection; right L4 transforaminal epidural steroid injection (Cora Guerrero MD, pain management) (indication:  Chronic back pain, lumbar degenerative disc disease, lumbar radiculopathy, lumbar disc displacement)  -07/21/2025:  Hemoglobin 9.5 (down from 11.4 on 06/24/2025); .9, WBC 4.68, ANC  1.2168, platelets 110 K; serum iron 51 low, TIBC normal, ferritin 642 elevated, transferrin saturation 20% low (anemia of chronic disease/malignancy/inflammation); B12 level 1392 elevated, retic count 2.18%; CMP reviewed;  low; TSH and free T4 normal; haptoglobin 347 normal  (hemoglobin 9.5 on on 07/21/2025, down from 11.4 on 06/24/2025; labs indicate anemia of chronic disease/malignancy/inflammation; B12 stores normal; no hemolysis)  Presents for a follow-up visit.  Overall, doing okay.  No pain relief despite epidural steroid shot.  No lower extremity numbness and weakness.  No saddle anesthesia or loss of bladder or bowel control.  Reports swelling of both lower extremities for last 1 month, more so in right lower extremity.  Compliant with baby aspirin.  No dyspnea.  No chest pain or hemoptysis.  We will order venous Doppler study of both lower extremities today, rule out DVT (patient is on Revlimid).  No recurrent fevers, chills, drenching night sweats, anorexia, unintentional weight loss, chest pain, cough, dyspnea, abdominal pain, nausea, vomiting, constipation, diarrhea, bone pains, skin rash, etc..  ECOG 0-1.  Compliant with lenalidomide 10 mg daily.  No significant side effects except for anemia.  Chronic back pain.  Back pain is not secondary to myeloma.  Back pain secondary to DJD of spine.  On tramadol and gabapentin.  Gabapentin 600 mg p.o. b.i.d. and tramadol 100 mg p.o. twice daily PRN for pain.    His appetite declined ever since he started taking Revlimid but improved with Megace.  No signs of venous thromboembolism.  On baby aspirin 81 mg daily along with Revlimid.    Chronic back pain.  He consulted with 4-5 spinal surgeons and none of them have recommended any kind of surgery.  No skin rash with Revlimid.  No recurrent infections.  No hematuria.  In the past, consulted with Xochitl Grubbs MD, neurosurgery, for lumbar stenosis with neurogenic claudication; chronic bilateral low back pain  with bilateral sciatica; compression fracture of lumbar vertebrae; planning L2-3, L3-4, L4-5 laminectomy apparently, she recommended surgery; however, Mr. Díaz opted against surgery.  With Revlimid, has not experienced severe neutropenia, thrombocytopenia, DVT, PE, MI, stroke, any symptoms or signs of heart failure, hepatotoxicity, or hypersensitivity reactions.  No skin rash.  No eosinophilia.  No signs of myelodysplastic syndrome.  On acyclovir and Bactrim DS as well.    Immunization History   Administered Date(s) Administered    COVID-19 MRNA, LN-S PF (MODERNA HALF 0.25 ML DOSE) 10/23/2021, 08/18/2022    COVID-19 Vaccine 02/17/2021, 03/17/2021    COVID-19, MRNA, LN-S, PF (MODERNA FULL 0.5 ML DOSE) 02/17/2021, 03/17/2021    COVID-19, mRNA, LNP-S, bivalent booster, PF (Moderna Omicron)12 + YEARS 07/01/2023    HPV 9-Valent 05/31/2024, 07/22/2024    Hepatitis A / Hepatitis B 07/29/2020, 10/05/2021, 08/18/2022, 05/31/2024, 07/22/2024, 11/13/2024    HiB PRP-T 05/31/2024, 07/22/2024, 09/18/2024    IPV 05/31/2024, 07/22/2024, 09/18/2024    Influenza - Quadrivalent - MDCK - PF 10/26/2017, 10/05/2021    Influenza - Trivalent - Fluad - Adjuvanted - PF (65 years and older 12/06/2024    Influenza - Trivalent - Flucelvax - PF 09/10/2015    Meningococcal Conjugate (MCV4O) 1 Vial Dose(10yr-55yr) 04/17/2012, 08/18/2022, 08/18/2022    Meningococcal Conjugate (MCV4O) 2 Vial (2mo-55yr) 04/17/2012    Meningococcal Conjugate (MCV4P) 04/17/2012    Pneumococcal Conjugate - 20 Valent 01/12/2023, 05/31/2024, 07/22/2024    Pneumococcal Polysaccharide - 23 Valent 11/13/2024    Tdap 07/29/2020, 05/31/2024, 07/22/2024, 09/18/2024    Zoster 07/29/2020, 11/18/2020    Zoster Recombinant 07/29/2020, 11/18/2020, 09/18/2024, 11/13/2024     Review of patient's allergies indicates:   Allergen Reactions    Wheat Other (See Comments)     Medications:  Current Outpatient Medications on File Prior to Visit   Medication Sig Dispense Refill    acyclovir  (ZOVIRAX) 400 MG tablet Take 1 tablet (400 mg total) by mouth 2 (two) times daily. 60 tablet 4    ascorbic acid, vitamin C, (VITAMIN C) 1000 MG tablet Take 1,000 mg by mouth once daily.      aspirin (ECOTRIN) 81 MG EC tablet Take 81 mg by mouth once daily.      atorvastatin (LIPITOR) 20 MG tablet Take 20 mg by mouth once daily.      calcium carbonate 195 mg calcium (500 mg) Chew Take 1 tablet by mouth once daily.      cetirizine (ZYRTEC) 10 mg Cap Take 1 capsule by mouth daily as needed.      coenzyme Q10 10 mg capsule Take 1 capsule by mouth once daily.      colesevelam (WELCHOL) 625 mg tablet Take 625 mg by mouth.      DULoxetine (CYMBALTA) 20 MG capsule Take 20 mg by mouth 2 (two) times daily.      famotidine (PEPCID) 40 MG tablet Take 40 mg by mouth every evening.      ferrous sulfate (FEOSOL) 325 mg (65 mg iron) Tab tablet Take 650 mg by mouth.      fluticasone propionate (FLONASE) 50 mcg/actuation nasal spray 1 spray.      hydrocortisone (ANUSOL-HC) 2.5 % rectal cream Place 1 applicator rectally 2 (two) times daily. 28 g 2    lenalidomide 10 mg Cap Take 1 capsule by mouth.      LIDOcaine (LIDODERM) 5 % Place 2 patches onto the skin once daily. Remove & Discard patch within 12 hours or as directed by MD 14 patch 0    megestroL (MEGACE) 400 mg/10 mL (40 mg/mL) Susp Take 10 mLs (400 mg total) by mouth once daily. 300 mL 3    metFORMIN (GLUCOPHAGE) 500 MG tablet Take 1 tablet (500 mg total) by mouth 2 (two) times daily with meals. 180 tablet 3    metoprolol succinate (TOPROL-XL) 50 MG 24 hr tablet Take 50 mg by mouth once daily.      MIRALAX 17 gram/dose powder Take 17 g by mouth.      mirtazapine (REMERON) 7.5 MG Tab Take 1 tablet by mouth every evening.      multivit with min-folic acid 0.4 mg Tab Take 1 tablet by mouth once daily.      multivitamin with minerals tablet Take 1 tablet by mouth every morning.      omega 3-dha-epa-fish oil 300 mg (120 mg- 180mg)-1,000 mg Cap Take 500 mg by mouth.      omega-3 fatty  acids/fish oil (FISH OIL-OMEGA-3 FATTY ACIDS) 300-1,000 mg capsule Take 2 g by mouth.      sulfamethoxazole-trimethoprim 800-160mg (BACTRIM DS) 800-160 mg Tab Take 1 tablet by mouth on MWF of each week 48 tablet 3    tamsulosin (FLOMAX) 0.4 mg Cap Take 1 capsule by mouth.      traMADoL 100 mg Tab Take 100 mg by mouth 2 (two) times daily as needed (pain). 30 tablet 0    pantoprazole (PROTONIX) 20 MG tablet Take 1 tablet (20 mg total) by mouth once daily. 30 tablet 11     No current facility-administered medications on file prior to visit.     Review of Systems:   All systems reviewed and found to be negative except for the symptoms detailed above    Physical Examination:   VITAL SIGNS:   Vitals:    07/22/25 0915   BP: 110/68   Pulse: 64   Resp: 18   Temp: 98.1 °F (36.7 °C)       GENERAL:  In no apparent distress.    HEAD:  No signs of head trauma.  EYES:  Pupils are equal.  Extraocular motions intact.    EARS:  Hearing grossly intact.  MOUTH:  Oropharynx is normal.   NECK:  No adenopathy, no JVD.     CHEST:  Chest with clear breath sounds bilaterally.  No wheezes, rales, rhonchi.    CARDIAC:  Regular rate and rhythm.  S1 and S2, without murmurs, gallops, rubs.  VASCULAR:  No Edema.  Peripheral pulses normal and equal in all extremities.  ABDOMEN:  Soft, without detectable tenderness.  No sign of distention.  No   rebound or guarding, and no masses palpated.   Bowel Sounds normal.  MUSCULOSKELETAL:  Good range of motion of all major joints. Extremities without clubbing, cyanosis or edema.    NEUROLOGIC EXAM:  Alert and oriented x 3.  No focal sensory or strength deficits.   Speech normal.  Follows commands.  PSYCHIATRIC:  Mood normal.    Assessment:  Problem List Items Addressed This Visit       IgG multiple myeloma    Hypercalcemia    Compression fracture of thoracic spine, non-traumatic    Drug-induced immunodeficiency    Compression fracture of first lumbar vertebra - Primary    Compression fracture of L2 lumbar  vertebra    Degeneration of lumbar intervertebral disc    Spinal stenosis of lumbar region    Osteoporosis of femur without pathological fracture    H/O autologous stem cell transplant    Neurogenic claudication    Anemia    Multiple myeloma in remission    H/O stem cell transplant    Long term (current) use of immunomodulator    Edema of both lower extremities     Other Visit Diagnoses         Immunosuppressed status              Orders for 07/22/2025:   Continue Revlimid 10 mg daily  Baby aspirin 81 mg daily   Check CBC and CMP monthly   In 1 month, retic count, serum iron, TIBC, ferritin, B12 level, folic acid level, LDH, haptoglobin   Please order FIT test  TSH and free T4 every 3 months  Continue Zometa every 4 weeks  Calcium and vitamin-D supplements  SPEP, BUBBA, FLC assay every 3 months  Continue acyclovir and Bactrim; check now; check with Dr. Brandy Mahajan when we can stop these medications  DEXA scan October 2025  -07/22/2025: We will order venous Doppler of both lower extremities rule out DVT  Order ultrasound of right kidney now to follow-up on exophytic lesion noted on MRI lumbar spine 08/2024  Follow-up with NP in 1 month    Above discussed with the patient.  All questions answered.  Discussed labs and scans.  Plan of management discussed.    He understands and agrees with this plan.  ================================    # Multiple myeloma, IgG kappa:  ISS stage:  Stage I  R-ISS stage:  Stage I  Briefly:  -multiple myeloma, IgG kappa  -presentation: Worsening back pain  -baseline: 06/14/2023:  IgG kappa 2.28 gm/dL; kappa/lambda ratio 292; renal function normal; no hypercalcemia; no significant anemia  ISS stage:  Stage I  R-ISS stage:  Stage I  -bone marrow biopsy 06/27/2023:  50% plasmacytosis, high-risk cytogenetics  -PET-CT 06/29/2023:  Questionable/negative  -S/P VRD x5 cycles: 07/13/2023-10/26/2023  -Zometa started 07/13/2023  -significant response to chemotherapy  -restaging bone marrow biopsy post VRD  X 4-1/2 cycles:  <1% plasma cells  -11/09/2023:  Monoclonal spike disappeared; kappa/lambda ratio normalized post VRD X 4-1/2 cycles  -VRD: C6 D1 on 11/21/2023; subsequently, discontinued in anticipation of stem cell transplant  -melphalan conditioning 11/28/2023 (day -2)  -s/p ASCT infusion 11/30/2023 (day 0)  -03/11/2024:  Day +102:  No M protein, FLC assay normal  -restaging bone marrow biopsy 03/12/2025:  (day +103):  Negative  -03/18/2024:  Urine negative  -thus, experienced CR post autologous ASCT  -maintenance Revlimid started 04/09/2024 (10 mg daily) +baby aspirin daily  -maintenance Revlimid dose increased to 15 mg daily from 08/20/2024  -acyclovir and Bactrim continued  -02/11/2025:  maintenance Revlimid dose decreased from 15 mg to 10 mg because hemoglobin down to 8  -04/01/2025: No monoclonal protein, kappa/lambda ratio normal  >>>  Plan:   -Revlimid dose decreased to 10 mg daily from 02/11/2025, by Dr. Brandy Mahajan, because hemoglobin dropped to 8  -baby aspirin 81 mg daily to prevent thromboembolism  -check CBC and CMP monthly  -check TSH and free T4 every 2-3 months (we will check every 3 months)  -with lenalidomide, monitor for signs and symptoms of infection, bleeding, bruising, hepatotoxicity, secondary malignancies, thromboembolism, dermatologic toxicity, hypersensitivity  -a minimum of 2 years of lenalidomide maintenance therapy is recommended  -lenalidomide dose modifications for toxicity: See below  -continue Zometa every 4 weeks x2 years (started 07/13/2023; continue until 07/2025)  -continue calcium and vitamin-D supplements along with Zometa to prevent hypocalcemia  -monitor renal function while on Zometa  -per Radiation Oncology, given questionable radiologic findings per FDG PET-CT, palliative radiotherapy to spine was not thought to be useful, therefore, not pursued  -SPEP, BUBBA, FLC check every 3 months; next, July (now)  -07/22/2025:  Per Dr. Brandy Mahajan, continue acyclovir and Bactrim as  long as he is on Revlimid  -10/04/2024: Anorexia; start Megace 400 mg p.o. q.day    07/22/2025:  Bilateral lower extremity edema for last 1 month, more in right lower extremity; no calf pain or tenderness  >>>  -07/22/2025: We will order venous Doppler of both lower extremities rule out DVT    Lenalidomide Recommended Dosage Modifications for Hematologic Adverse Reactions in Multiple Myeloma (Maintenance Therapy Following Autologous Transplant):  #Neutropenia:  -ANC <500/mm3: Withhold lenalidomide and check CBC weekly; resume lenalidomide at the next lower dose with continuous dosing on days 1 to 28 of a 28-day cycle when ANC >=500/mm3.  -ANC <500/mm3 (subsequent occurrence at 5 mg daily [continuous] dose): Withhold lenalidomide; resume lenalidomide at 5 mg daily on days 1 to 21 of a 28-day cycle when ANC >=500/mm3. Do not dose below 5 mg daily on days 1 to 21 of a 28-day cycle.  #Thrombocytopenia:  -Platelets <30,000/mm3 (first occurrence): Withhold lenalidomide and check CBC weekly; resume lenalidomide at the next lower dose with continuous dosing on days 1 to 28 of a 28-day cycle when platelets >=30,000/mm3.  -Platelets <30,000/mm3 (subsequent occurrence at 5 mg daily [continuous] dose): Withhold lenalidomide; resume lenalidomide at 5 mg daily on days 1 to 21 of a 28-day cycle when platelets >=30,000/mm3. Do not dose below 5 mg daily on days 1 to 21 of a 28-day cycle.    Monitoring with lenalidomide:  -CBC with differential: weekly for the first 2 cycles, every 2 weeks during the third cycle and monthly thereafter;  -serum creatinine, LFTs (periodically).  -Thyroid function tests (TSH at baseline then every 2 to 3 months during lenalidomide treatment  -ECG when clinically indicated. Monitor for signs and symptoms of infection (if neutropenic), bleeding or bruising, hepatotoxicity, secondary malignancies, thromboembolism (eg, shortness of breath, chest pain, arm or leg swelling), dermatologic toxicity, tumor lysis  syndrome, or hypersensitivity.    Lenalidomide:  Potential side effects:  Embryo fetal toxicity  Hematologic toxicity.  Neutropenia.  Thrombocytopenia.  Venous and arterial thromboembolism.  DVT.  PE.  MI.  Stroke.  Dizziness, fatigue.  Tumor lysis syndrome.  Heart failure.    Used with caution in patients with renal impairment.  Lenalidomide =/> 4 cycles may decrease the # of CD34 pos cells collected for autologous stem cell transplant.  DVT, PE, atrial fibrillation, renal failure are more likely in patients> 65 years  Hepatotoxicity  Hypersensitivity reactions.  Anaphylaxis.  Angioedema.  Skin rash.  Eosinophilia.  Immediate hypersensitivity reactions.  Second primary malignancy.  AML.  MDS.  Solid tumor malignancies.  Nonmelanoma skin cancers.    # Osteoporosis of hips on DEXA scan 10/11/2023  -osteoporosis of hips noted on DEXA scan 10/11/2023  -DEXA scan 10/14/2025:  Osteoporosis based on hips  >>>  -patient already receiving monthly Zometa for underlying multiple myeloma; this will help with osteoporosis as well  -repeat DEXA scan in 1 year (10/2025)    # Exophytic lesion upper pole of right kidney:  10 mm exophytic hyperdense focus upper pole of right kidney, compatible with a hyperdense cyst, on CT abdomen pelvis with contrast 06/27/2023  -07/12/2023:  MRI abdomen with and without contrast (right renal cyst): Small exophytic right renal lesion most likely a proteinaceous or hemorrhagic cyst (10 mm, upper pole of right kidney)  -MRI lumbar spine 08/02/2024:  1.2 cm exophytic lesion indeterminate from anterior upper pole of right kidney; multilevel degenerative disc disease, etc.  >>>  -07/22/2025: Order ultrasound of right kidney now to follow-up on exophytic lesion noted on MRI lumbar spine 08/2024    # Non myeloma findings (DJD of spine, spinal stenosis, compression fractures):  -mild L4-L5 spinal canal stenosis on MRI 05/04/2023   -lumbar DJD and facet arthrosis on MRI lumbar spine 05/04/2023  -MRI T-spine  08/08/2023:  No myeloma lesions   -MRI lumbar spine 09/14/2023:  Progression of skeletal demineralization; multilevel degenerative disc disease; severe spinal stenosis L3-L5; new/progressive loss of vertebral body height throughout the spine since 05/2023, etc.  -IR at [facility] will not perform kyphoplasty  -MRI lumbar spine 08/02/2024:  1.2 cm exophytic lesion indeterminate from anterior upper pole of right kidney; multilevel degenerative disc disease, etc.  -follows up with Xochitl Grubbs MD, neurosurgery, for lumbar stenosis with neurogenic claudication; chronic bilateral low back pain with bilateral sciatica; compression fracture of lumbar vertebrae; planning L2-3, L3-4, L4-5 laminectomy  -patient declined laminectomy  -07/16/2025:  Left L4 transforaminal epidural steroid injection; right L4 transforaminal epidural steroid injection (CopponCora armenta MD, pain management) (indication:  Chronic back pain, lumbar degenerative disc disease, lumbar radiculopathy, lumbar disc displacement)    # Rising PSA level:  -PSA: 2.78 on 05/27/2024; 1.7 on 05/01/2024; 1.96 on 09/17/2021  -MRI prostate 09/23/2024:  PI-RADS 2: Low (rising PSA level)    # Chronic macrocytic anemia:  -chronic anemia: Hemoglobin: 9.9 on 09/06/2024; 10.5 on 09/03/2024; 10.5 on 07/12/2024; 11.1 on 06/14/2024; 11.7 on 05/03/2024  -MCV:  Slightly elevated  -10/04/2024:  Hemoglobin 10.7, more less stable; .2  -10/04/2024: Hemoglobin 10.7, .2 elevated; iron/B12/folate stores normal  -hemoglobin: 9.7 on 01/27/2025; 10.4 on 12/30/2024; 10.4 on 12/02/2024; 10.7 on 10/04/2024, etc.  (macrocytic anemia; iron/B12/folate stores normal; no hemolysis; patient on Revlimid maintenance)  -02/11/2025: Follow-up with Dr. Brandy Mahajan, hematology/oncology/BMT:  Decrease Revlimid from 15 mg to 10 mg because hemoglobin down to 8  -hemoglobin: 11.4 on 06/24/2025; 10.5 on 05/27/2025; 9.8 on 04/29/2025, 9.8 on 04/14/2025, etc.  -07/21/2025:  Hemoglobin 9.5 (down  from 11.4 on 06/24/2025); .9, WBC 4.68, ANC 1.2168, platelets 110 K; serum iron 51 low, TIBC normal, ferritin 642 elevated, transferrin saturation 20% low (anemia of chronic disease/malignancy/inflammation); B12 level 1392 elevated, retic count 2.18%; CMP reviewed;  low; TSH and free T4 normal; haptoglobin 347 normal  (hemoglobin 9.5 on on 07/21/2025, down from 11.4 on 06/24/2025; labs indicate anemia of chronic disease/malignancy/inflammation; B12 stores normal; no hemolysis)  >>>  -anemia most likely secondary to bone marrow suppression with Revlimid; expectant monitoring    # Co-morbidities:  NIDDM.  Dyslipidemia.  Hypertension.  Celiac disease.    History of rheumatic mitral valve disease, S/P Maze/ablation procedure 02/02/2018      Discussion:  Supportive care for myeloma:  -bone targeted treatment: Bisphosphonate, category 1; or denosumab to reduce risk of skeletal related events; denosumab is preferred in patients with renal insufficiency; assess vitamin-D status; baseline dental exam; monitor for osteonecrosis of the jaw; monitor renal dysfunction with bisphosphonate therapy  -continue bone targeted treatment (bisphosphonate or denosumab) for 2 years; continue beyond 2 years should be based on clinical judgment  -patients receiving denosumab for bone disease who subsequently discontinued therapy should be given maintenance denosumab every 6 months or a single dose of bisphosphonate to mitigate risk of rebound osteoporosis  -for hypercalcemia, zoledronic acid, denosumab, steroids, and/or calcitonin  -for hyperviscosity, plasmapheresis should be used as adjuvant therapy for symptomatic hyperviscosity  -intravenous immunoglobulin therapy should be considered in the setting of recurrent serious infection and/or hypogammaglobulinemia (IgG </= 400 mg/dL)  -pneumococcal conjugate vaccine, followed by pneumococcal polysaccharide vaccine 1 year later  -Influenza vaccination is recommended; 2 doses of  high-dose inactivated quandaivalent influenza vaccine should be considered  -consider 12 weeks of levofloxacin 500 mg p.o. daily at the time of initial diagnosis of multiple myeloma  -COVID-19 vaccination  -highest risk for VTE is in the 1st 6 months following new diagnosis of multiple myeloma  -VTE prophylaxis if no contraindications to anticoagulation agents or antiplatelets  Recommendations for VTE prophylaxis:  Aspirin  mg daily; low molecular weight heparin equivalent to enoxaparin 40 mg daily; Xarelto 10 mg daily; Eliquis 2.5 mg b.i.d.; Arixtra 2.5 mg daily; Coumadin with target INR 2.0-3.0    Follow-up:  No follow-ups on file.

## 2025-07-21 ENCOUNTER — LAB VISIT (OUTPATIENT)
Dept: HEMATOLOGY/ONCOLOGY | Facility: CLINIC | Age: 70
End: 2025-07-21
Attending: INTERNAL MEDICINE
Payer: MEDICARE

## 2025-07-21 DIAGNOSIS — E46 PROTEIN-CALORIE MALNUTRITION, UNSPECIFIED SEVERITY: ICD-10-CM

## 2025-07-21 DIAGNOSIS — E83.52 HYPERCALCEMIA: ICD-10-CM

## 2025-07-21 DIAGNOSIS — M81.0 OSTEOPOROSIS OF FEMUR WITHOUT PATHOLOGICAL FRACTURE: ICD-10-CM

## 2025-07-21 DIAGNOSIS — S32.010G COMPRESSION FRACTURE OF L1 VERTEBRA WITH DELAYED HEALING, SUBSEQUENT ENCOUNTER: ICD-10-CM

## 2025-07-21 DIAGNOSIS — C90.00 IGG MULTIPLE MYELOMA: ICD-10-CM

## 2025-07-21 DIAGNOSIS — C79.51 SECONDARY MALIGNANT NEOPLASM OF BONE: ICD-10-CM

## 2025-07-21 DIAGNOSIS — D64.9 ANEMIA, UNSPECIFIED TYPE: ICD-10-CM

## 2025-07-21 DIAGNOSIS — C90.00 IGG MULTIPLE MYELOMA: Primary | ICD-10-CM

## 2025-07-21 LAB
ABS NEUT CALC (OHS): 1.22 X10(3)/MCL (ref 2.1–9.2)
ALBUMIN SERPL-MCNC: 2.9 G/DL (ref 3.4–4.8)
ALBUMIN/GLOB SERPL: 0.7 RATIO (ref 1.1–2)
ALP SERPL-CCNC: 64 UNIT/L (ref 40–150)
ALT SERPL-CCNC: 31 UNIT/L (ref 0–55)
ANION GAP SERPL CALC-SCNC: 7 MEQ/L
AST SERPL-CCNC: 24 UNIT/L (ref 11–45)
BILIRUB SERPL-MCNC: 0.3 MG/DL
BUN SERPL-MCNC: 19.3 MG/DL (ref 8.4–25.7)
BURR CELLS (OLG): SLIGHT
CALCIUM SERPL-MCNC: 8.6 MG/DL (ref 8.8–10)
CHLORIDE SERPL-SCNC: 109 MMOL/L (ref 98–107)
CO2 SERPL-SCNC: 20 MMOL/L (ref 23–31)
CREAT SERPL-MCNC: 1.22 MG/DL (ref 0.72–1.25)
CREAT/UREA NIT SERPL: 16
ERYTHROCYTE [DISTWIDTH] IN BLOOD BY AUTOMATED COUNT: 16.3 % (ref 11.5–17)
FERRITIN SERPL-MCNC: 642.27 NG/ML (ref 21.81–274.66)
GFR SERPLBLD CREATININE-BSD FMLA CKD-EPI: >60 ML/MIN/1.73/M2
GLOBULIN SER-MCNC: 4.1 GM/DL (ref 2.4–3.5)
GLUCOSE SERPL-MCNC: 91 MG/DL (ref 82–115)
HAPTOGLOB SERPL-MCNC: 347 MG/DL (ref 40–368)
HCT VFR BLD AUTO: 28.8 % (ref 42–52)
HGB BLD-MCNC: 9.5 G/DL (ref 14–18)
IGA SERPL-MCNC: 192 MG/DL (ref 101–645)
IGG SERPL-MCNC: 993 MG/DL (ref 540–1822)
IGM SERPL-MCNC: 13 MG/DL (ref 22–240)
IRON SATN MFR SERPL: 20 % (ref 20–50)
IRON SERPL-MCNC: 51 UG/DL (ref 65–175)
LDH SERPL-CCNC: 122 U/L (ref 125–220)
LYMPHOCYTES NFR BLD MANUAL: 2.67 X10(3)/MCL (ref 0.6–4.6)
LYMPHOCYTES NFR BLD MANUAL: 57 % (ref 13–40)
MAGNESIUM SERPL-MCNC: 2.6 MG/DL (ref 1.6–2.6)
MCH RBC QN AUTO: 34.9 PG (ref 27–31)
MCHC RBC AUTO-ENTMCNC: 33 G/DL (ref 33–36)
MCV RBC AUTO: 105.9 FL (ref 80–94)
MONOCYTES NFR BLD MANUAL: 0.8 X10(3)/MCL (ref 0.1–1.3)
MONOCYTES NFR BLD MANUAL: 17 % (ref 2–11)
NEUTROPHILS NFR BLD MANUAL: 26 % (ref 47–80)
NRBC BLD AUTO-RTO: 0 %
OVALOCYTES (OLG): SLIGHT
PLATELET # BLD AUTO: 110 X10(3)/MCL (ref 130–400)
PLATELET # BLD EST: ADEQUATE 10*3/UL
PMV BLD AUTO: 10 FL (ref 7.4–10.4)
POTASSIUM SERPL-SCNC: 4 MMOL/L (ref 3.5–5.1)
PROT SERPL-MCNC: 7 GM/DL (ref 5.8–7.6)
RBC # BLD AUTO: 2.72 X10(6)/MCL (ref 4.7–6.1)
RBC MORPH BLD: ABNORMAL
RET# (OHS): 0.06 X10E6/UL (ref 0.03–0.1)
RETICULOCYTE COUNT AUTOMATED (OLG): 2.18 % (ref 1.1–2.1)
SODIUM SERPL-SCNC: 136 MMOL/L (ref 136–145)
T4 FREE SERPL-MCNC: 1.08 NG/DL (ref 0.7–1.48)
TEAR DROP CELL (OLG): SLIGHT
TIBC SERPL-MCNC: 209 UG/DL (ref 60–240)
TIBC SERPL-MCNC: 260 UG/DL (ref 250–450)
TRANSFERRIN SERPL-MCNC: 227 MG/DL (ref 163–344)
TSH SERPL-ACNC: 3.15 UIU/ML (ref 0.35–4.94)
VIT B12 SERPL-MCNC: 1392 PG/ML (ref 213–816)
WBC # BLD AUTO: 4.68 X10(3)/MCL (ref 4.5–11.5)

## 2025-07-21 PROCEDURE — 83550 IRON BINDING TEST: CPT | Performed by: INTERNAL MEDICINE

## 2025-07-21 PROCEDURE — 84443 ASSAY THYROID STIM HORMONE: CPT

## 2025-07-21 PROCEDURE — 84439 ASSAY OF FREE THYROXINE: CPT

## 2025-07-21 PROCEDURE — 86334 IMMUNOFIX E-PHORESIS SERUM: CPT

## 2025-07-21 PROCEDURE — 83010 ASSAY OF HAPTOGLOBIN QUANT: CPT | Performed by: INTERNAL MEDICINE

## 2025-07-21 PROCEDURE — 80053 COMPREHEN METABOLIC PANEL: CPT

## 2025-07-21 PROCEDURE — 82607 VITAMIN B-12: CPT | Performed by: INTERNAL MEDICINE

## 2025-07-21 PROCEDURE — 82784 ASSAY IGA/IGD/IGG/IGM EACH: CPT

## 2025-07-21 PROCEDURE — 82728 ASSAY OF FERRITIN: CPT | Performed by: INTERNAL MEDICINE

## 2025-07-21 PROCEDURE — 85045 AUTOMATED RETICULOCYTE COUNT: CPT | Performed by: INTERNAL MEDICINE

## 2025-07-21 PROCEDURE — 85025 COMPLETE CBC W/AUTO DIFF WBC: CPT

## 2025-07-21 PROCEDURE — 83521 IG LIGHT CHAINS FREE EACH: CPT | Mod: 91

## 2025-07-21 PROCEDURE — 83735 ASSAY OF MAGNESIUM: CPT

## 2025-07-21 PROCEDURE — 83615 LACTATE (LD) (LDH) ENZYME: CPT | Performed by: INTERNAL MEDICINE

## 2025-07-22 ENCOUNTER — RESULTS FOLLOW-UP (OUTPATIENT)
Dept: HEMATOLOGY/ONCOLOGY | Facility: CLINIC | Age: 70
End: 2025-07-22

## 2025-07-22 ENCOUNTER — OFFICE VISIT (OUTPATIENT)
Dept: HEMATOLOGY/ONCOLOGY | Facility: CLINIC | Age: 70
End: 2025-07-22
Attending: INTERNAL MEDICINE
Payer: MEDICARE

## 2025-07-22 ENCOUNTER — HOSPITAL ENCOUNTER (OUTPATIENT)
Dept: RADIOLOGY | Facility: HOSPITAL | Age: 70
Discharge: HOME | End: 2025-07-22
Attending: INTERNAL MEDICINE
Payer: MEDICARE

## 2025-07-22 ENCOUNTER — DOCUMENTATION ONLY (OUTPATIENT)
Dept: HEMATOLOGY/ONCOLOGY | Facility: CLINIC | Age: 70
End: 2025-07-22

## 2025-07-22 ENCOUNTER — INFUSION (OUTPATIENT)
Dept: INFUSION THERAPY | Facility: HOSPITAL | Age: 70
End: 2025-07-22
Attending: INTERNAL MEDICINE
Payer: MEDICARE

## 2025-07-22 ENCOUNTER — TELEPHONE (OUTPATIENT)
Dept: HEMATOLOGY/ONCOLOGY | Facility: CLINIC | Age: 70
End: 2025-07-22

## 2025-07-22 VITALS
WEIGHT: 155 LBS | HEIGHT: 67 IN | DIASTOLIC BLOOD PRESSURE: 68 MMHG | RESPIRATION RATE: 18 BRPM | SYSTOLIC BLOOD PRESSURE: 110 MMHG | BODY MASS INDEX: 24.33 KG/M2 | TEMPERATURE: 98 F | HEART RATE: 64 BPM | OXYGEN SATURATION: 99 %

## 2025-07-22 DIAGNOSIS — R22.43 LOCALIZED SWELLING OF BOTH LOWER LEGS: ICD-10-CM

## 2025-07-22 DIAGNOSIS — R60.0 EDEMA OF BOTH LOWER EXTREMITIES: ICD-10-CM

## 2025-07-22 DIAGNOSIS — E83.52 HYPERCALCEMIA: ICD-10-CM

## 2025-07-22 DIAGNOSIS — D84.9 IMMUNOSUPPRESSED STATUS: ICD-10-CM

## 2025-07-22 DIAGNOSIS — M51.360 DEGENERATION OF INTERVERTEBRAL DISC OF LUMBAR REGION WITH DISCOGENIC BACK PAIN: ICD-10-CM

## 2025-07-22 DIAGNOSIS — Z79.61 LONG TERM (CURRENT) USE OF IMMUNOMODULATOR: ICD-10-CM

## 2025-07-22 DIAGNOSIS — Z94.84 H/O AUTOLOGOUS STEM CELL TRANSPLANT: ICD-10-CM

## 2025-07-22 DIAGNOSIS — C79.51 SECONDARY MALIGNANT NEOPLASM OF BONE: ICD-10-CM

## 2025-07-22 DIAGNOSIS — C90.00 IGG MULTIPLE MYELOMA: Primary | ICD-10-CM

## 2025-07-22 DIAGNOSIS — S32.010G COMPRESSION FRACTURE OF L1 VERTEBRA WITH DELAYED HEALING, SUBSEQUENT ENCOUNTER: Primary | ICD-10-CM

## 2025-07-22 DIAGNOSIS — M48.54XA NONTRAUMATIC COMPRESSION FRACTURE OF T8 VERTEBRA, INITIAL ENCOUNTER: ICD-10-CM

## 2025-07-22 DIAGNOSIS — C90.00 IGG MULTIPLE MYELOMA: ICD-10-CM

## 2025-07-22 DIAGNOSIS — D64.9 ANEMIA, UNSPECIFIED TYPE: ICD-10-CM

## 2025-07-22 DIAGNOSIS — M48.061 SPINAL STENOSIS OF LUMBAR REGION, UNSPECIFIED WHETHER NEUROGENIC CLAUDICATION PRESENT: ICD-10-CM

## 2025-07-22 DIAGNOSIS — R29.818 NEUROGENIC CLAUDICATION: ICD-10-CM

## 2025-07-22 DIAGNOSIS — M81.0 OSTEOPOROSIS OF FEMUR WITHOUT PATHOLOGICAL FRACTURE: ICD-10-CM

## 2025-07-22 DIAGNOSIS — C90.01 MULTIPLE MYELOMA IN REMISSION: ICD-10-CM

## 2025-07-22 DIAGNOSIS — E46 PROTEIN-CALORIE MALNUTRITION, UNSPECIFIED SEVERITY: ICD-10-CM

## 2025-07-22 DIAGNOSIS — S32.020G COMPRESSION FRACTURE OF L2 VERTEBRA WITH DELAYED HEALING, SUBSEQUENT ENCOUNTER: ICD-10-CM

## 2025-07-22 DIAGNOSIS — M54.50 RIGHT-SIDED LOW BACK PAIN WITHOUT SCIATICA, UNSPECIFIED CHRONICITY: ICD-10-CM

## 2025-07-22 DIAGNOSIS — Z94.84 H/O STEM CELL TRANSPLANT: ICD-10-CM

## 2025-07-22 DIAGNOSIS — D84.821 DRUG-INDUCED IMMUNODEFICIENCY: ICD-10-CM

## 2025-07-22 DIAGNOSIS — Z79.899 DRUG-INDUCED IMMUNODEFICIENCY: ICD-10-CM

## 2025-07-22 LAB
ALBUMIN % SPEP (OHS): 45.8 (ref 48.1–59.5)
ALBUMIN SERPL-MCNC: 2.8 G/DL (ref 3.4–4.8)
ALBUMIN/GLOB SERPL: 0.8 RATIO (ref 1.1–2)
ALPHA 1 GLOB (OHS): 0.36 GM/DL (ref 0–0.4)
ALPHA 1 GLOB% (OHS): 5.8 (ref 2.3–4.9)
ALPHA 2 GLOB % (OHS): 17.24 (ref 6.9–13)
ALPHA 2 GLOB (OHS): 1.07 GM/DL (ref 0.4–1)
BETA GLOB (OHS): 0.89 GM/DL (ref 0.7–1.3)
BETA GLOB% (OHS): 14.28 (ref 13.8–19.7)
GAMMA GLOBULIN % (OHS): 16.88 (ref 10.1–21.9)
GAMMA GLOBULIN (OHS): 1.05 GM/DL (ref 0.4–1.8)
GLOBULIN SER-MCNC: 3.4 GM/DL (ref 2.4–3.5)
KAPPA LC FREE SER NEPH-MCNC: 3.1 MG/DL (ref 0.33–1.94)
KAPPA LC FREE/LAMBDA FREE SER NEPH: 1.05 {RATIO} (ref 0.26–1.65)
LAMBDA LC FREE SERPL-MCNC: 2.95 MG/DL (ref 0.57–2.63)
M SPIKE % (OHS): ABNORMAL
M SPIKE (OHS): ABNORMAL
PATH REV: NORMAL
PROT SERPL-MCNC: 6.2 GM/DL (ref 5.8–7.6)

## 2025-07-22 PROCEDURE — 99215 OFFICE O/P EST HI 40 MIN: CPT | Mod: S$PBB,,, | Performed by: INTERNAL MEDICINE

## 2025-07-22 PROCEDURE — 93970 EXTREMITY STUDY: CPT

## 2025-07-22 PROCEDURE — 99213 OFFICE O/P EST LOW 20 MIN: CPT | Mod: PBBFAC,25 | Performed by: INTERNAL MEDICINE

## 2025-07-22 PROCEDURE — 96365 THER/PROPH/DIAG IV INF INIT: CPT

## 2025-07-22 PROCEDURE — 1159F MED LIST DOCD IN RCRD: CPT | Mod: CPTII,,, | Performed by: INTERNAL MEDICINE

## 2025-07-22 PROCEDURE — 1160F RVW MEDS BY RX/DR IN RCRD: CPT | Mod: CPTII,,, | Performed by: INTERNAL MEDICINE

## 2025-07-22 PROCEDURE — 3044F HG A1C LEVEL LT 7.0%: CPT | Mod: CPTII,,, | Performed by: INTERNAL MEDICINE

## 2025-07-22 PROCEDURE — 25000003 PHARM REV CODE 250: Performed by: INTERNAL MEDICINE

## 2025-07-22 PROCEDURE — 1101F PT FALLS ASSESS-DOCD LE1/YR: CPT | Mod: CPTII,,, | Performed by: INTERNAL MEDICINE

## 2025-07-22 PROCEDURE — 3060F POS MICROALBUMINURIA REV: CPT | Mod: CPTII,,, | Performed by: INTERNAL MEDICINE

## 2025-07-22 PROCEDURE — 3288F FALL RISK ASSESSMENT DOCD: CPT | Mod: CPTII,,, | Performed by: INTERNAL MEDICINE

## 2025-07-22 PROCEDURE — 63600175 PHARM REV CODE 636 W HCPCS: Performed by: INTERNAL MEDICINE

## 2025-07-22 PROCEDURE — 3078F DIAST BP <80 MM HG: CPT | Mod: CPTII,,, | Performed by: INTERNAL MEDICINE

## 2025-07-22 PROCEDURE — 3008F BODY MASS INDEX DOCD: CPT | Mod: CPTII,,, | Performed by: INTERNAL MEDICINE

## 2025-07-22 PROCEDURE — 3066F NEPHROPATHY DOC TX: CPT | Mod: CPTII,,, | Performed by: INTERNAL MEDICINE

## 2025-07-22 PROCEDURE — 3074F SYST BP LT 130 MM HG: CPT | Mod: CPTII,,, | Performed by: INTERNAL MEDICINE

## 2025-07-22 PROCEDURE — 76775 US EXAM ABDO BACK WALL LIM: CPT | Mod: TC

## 2025-07-22 RX ORDER — SODIUM CHLORIDE 0.9 % (FLUSH) 0.9 %
10 SYRINGE (ML) INJECTION
Status: DISCONTINUED | OUTPATIENT
Start: 2025-07-22 | End: 2025-07-22 | Stop reason: HOSPADM

## 2025-07-22 RX ORDER — HEPARIN 100 UNIT/ML
500 SYRINGE INTRAVENOUS
Status: DISCONTINUED | OUTPATIENT
Start: 2025-07-22 | End: 2025-07-22 | Stop reason: HOSPADM

## 2025-07-22 RX ADMIN — SODIUM CHLORIDE 3.5 MG: 9 INJECTION, SOLUTION INTRAVENOUS at 10:07

## 2025-07-22 RX ADMIN — SODIUM CHLORIDE: 9 INJECTION, SOLUTION INTRAVENOUS at 10:07

## 2025-07-22 NOTE — PROGRESS NOTES
Please order renal protocol MRI.      Reading Physician Reading Date Result Priority   Boris Valverde MD  [phone]  7/22/2025 Routine     Narrative & Impression  EXAMINATION:  US RETROPERITONEAL LIMITED     CLINICAL HISTORY:  Indeterminate right renal lesion     COMPARISON:  CT abdomen and pelvis 11/05/2024     FINDINGS:  Right kidney measures 11.2 cm. Left kidney measures 10.7 cm.  No stones or hydronephrosis.  Renal echogenicity is normal.     Impression:     No abnormal findings.  Consider follow-up with renal protocol CT or MR given lack of visualization on ultrasound        Electronically signed by:Boris Valverde MD  Date:                                            07/22/2025  Time:                                           14:19        Exam Ended: 07/22/25 13:53 CDT Last Resulted: 07/22/25 14:19 CDT    Dago as an Unsuccessful Attempt

## 2025-07-22 NOTE — PROGRESS NOTES
Folate normal.      FOLATE  Order: [id number]   Status: Final result       Dx: Hypercalcemia; IgG multiple myeloma; ...    Test Result Released: Yes (not seen)    0 Result Notes         Component  Ref Range & Units (hover) 08:36 9 mo ago 2 yr ago 3 yr ago   Folate Level 13.8 33.4 High  13.9 17.0   Resulting Agency [facility]             Specimen Collected: 07/22/25 08:36 CDT Last Resulted: 07/22/25 10:10 CDT

## 2025-07-22 NOTE — PROGRESS NOTES
Patient brought back up from ultrasound via . Report given to TIANNA Jean Baptiste NP. Patient ultrasound negative for DVT in BLE. Dr. Lundy notified. Patient notified and let go.

## 2025-07-22 NOTE — Clinical Note
Orders for 07/20/2025:  Continue Revlimid 10 mg daily Baby aspirin 81 mg daily  Check CBC and CMP monthly  In 1 month, retic count, serum iron, TIBC, ferritin, B12 level, folic acid level, LDH, haptoglobin  Please order FIT test TSH and free T4 every 3 months Continue Zometa every 4 weeks Calcium and vitamin-D supplements SPEP, BUBBA, FLC assay every 3 months Continue acyclovir and Bactrim; check now; check with Dr. Brandy Mahajan when we can stop these medications DEXA scan October 2025 Order ultrasound of right kidney now to follow-up on exophytic lesion noted on MRI lumbar spine 08/2024 Follow-up with NP in 1 month

## 2025-07-22 NOTE — NURSING
1025 Arrive from Oncology clinic post provider visit for C27 D1 Zometa q 4 wks Calcium 8.6 Dr Lundy gave verbal approval to continue with Zometa infusion.

## 2025-07-23 ENCOUNTER — LAB VISIT (OUTPATIENT)
Dept: LAB | Facility: HOSPITAL | Age: 70
End: 2025-07-23
Attending: INTERNAL MEDICINE
Payer: MEDICARE

## 2025-07-23 DIAGNOSIS — E55.9 AVITAMINOSIS D: ICD-10-CM

## 2025-07-23 DIAGNOSIS — I10 ESSENTIAL HYPERTENSION, MALIGNANT: ICD-10-CM

## 2025-07-23 DIAGNOSIS — Z79.899 POLYPHARMACY: Primary | ICD-10-CM

## 2025-07-23 DIAGNOSIS — N28.89 OTHER SPECIFIED DISORDERS OF KIDNEY AND URETER: Primary | ICD-10-CM

## 2025-07-23 DIAGNOSIS — N28.9 RENAL LESION: ICD-10-CM

## 2025-07-23 DIAGNOSIS — M54.50 RIGHT-SIDED LOW BACK PAIN WITHOUT SCIATICA, UNSPECIFIED CHRONICITY: ICD-10-CM

## 2025-07-23 DIAGNOSIS — R53.81 DEBILITY: ICD-10-CM

## 2025-07-23 LAB
25(OH)D3+25(OH)D2 SERPL-MCNC: 69 NG/ML (ref 30–80)
CHOLEST SERPL-MCNC: 107 MG/DL
CHOLEST/HDLC SERPL: 3 {RATIO} (ref 0–5)
HDLC SERPL-MCNC: 40 MG/DL (ref 35–60)
LDLC SERPL CALC-MCNC: 54 MG/DL (ref 50–140)
TRIGL SERPL-MCNC: 66 MG/DL (ref 34–140)
TSH SERPL-ACNC: 1.49 UIU/ML (ref 0.35–4.94)
VLDLC SERPL CALC-MCNC: 13 MG/DL

## 2025-07-23 PROCEDURE — 84443 ASSAY THYROID STIM HORMONE: CPT

## 2025-07-23 PROCEDURE — 82306 VITAMIN D 25 HYDROXY: CPT

## 2025-07-23 PROCEDURE — 36415 COLL VENOUS BLD VENIPUNCTURE: CPT

## 2025-07-23 PROCEDURE — 80061 LIPID PANEL: CPT

## 2025-07-29 DIAGNOSIS — C90.00 IGG MULTIPLE MYELOMA: ICD-10-CM

## 2025-07-29 DIAGNOSIS — M48.061 SPINAL STENOSIS OF LUMBAR REGION, UNSPECIFIED WHETHER NEUROGENIC CLAUDICATION PRESENT: ICD-10-CM

## 2025-07-29 RX ORDER — TRAMADOL HYDROCHLORIDE 100 MG/1
100 TABLET, COATED ORAL 2 TIMES DAILY PRN
Qty: 30 TABLET | Refills: 0 | Status: SHIPPED | OUTPATIENT
Start: 2025-07-29

## 2025-07-29 RX ORDER — ACYCLOVIR 400 MG/1
400 TABLET ORAL 2 TIMES DAILY
Qty: 60 TABLET | Refills: 4 | Status: SHIPPED | OUTPATIENT
Start: 2025-07-29

## 2025-08-05 ENCOUNTER — OFFICE VISIT (OUTPATIENT)
Facility: CLINIC | Age: 70
End: 2025-08-05
Payer: MEDICARE

## 2025-08-05 VITALS
SYSTOLIC BLOOD PRESSURE: 110 MMHG | WEIGHT: 150 LBS | BODY MASS INDEX: 23.54 KG/M2 | HEIGHT: 67 IN | HEART RATE: 57 BPM | DIASTOLIC BLOOD PRESSURE: 68 MMHG

## 2025-08-05 DIAGNOSIS — M54.9 CHRONIC BACK PAIN GREATER THAN 3 MONTHS DURATION: Primary | ICD-10-CM

## 2025-08-05 DIAGNOSIS — G89.29 CHRONIC BACK PAIN GREATER THAN 3 MONTHS DURATION: Primary | ICD-10-CM

## 2025-08-05 PROCEDURE — 3288F FALL RISK ASSESSMENT DOCD: CPT | Mod: CPTII,,, | Performed by: ANESTHESIOLOGY

## 2025-08-05 PROCEDURE — 1160F RVW MEDS BY RX/DR IN RCRD: CPT | Mod: CPTII,,, | Performed by: ANESTHESIOLOGY

## 2025-08-05 PROCEDURE — 3066F NEPHROPATHY DOC TX: CPT | Mod: CPTII,,, | Performed by: ANESTHESIOLOGY

## 2025-08-05 PROCEDURE — 99214 OFFICE O/P EST MOD 30 MIN: CPT | Mod: ,,, | Performed by: ANESTHESIOLOGY

## 2025-08-05 PROCEDURE — 1101F PT FALLS ASSESS-DOCD LE1/YR: CPT | Mod: CPTII,,, | Performed by: ANESTHESIOLOGY

## 2025-08-05 PROCEDURE — 1125F AMNT PAIN NOTED PAIN PRSNT: CPT | Mod: CPTII,,, | Performed by: ANESTHESIOLOGY

## 2025-08-05 PROCEDURE — 3044F HG A1C LEVEL LT 7.0%: CPT | Mod: CPTII,,, | Performed by: ANESTHESIOLOGY

## 2025-08-05 PROCEDURE — 3008F BODY MASS INDEX DOCD: CPT | Mod: CPTII,,, | Performed by: ANESTHESIOLOGY

## 2025-08-05 PROCEDURE — 3060F POS MICROALBUMINURIA REV: CPT | Mod: CPTII,,, | Performed by: ANESTHESIOLOGY

## 2025-08-05 PROCEDURE — 3078F DIAST BP <80 MM HG: CPT | Mod: CPTII,,, | Performed by: ANESTHESIOLOGY

## 2025-08-05 PROCEDURE — 1159F MED LIST DOCD IN RCRD: CPT | Mod: CPTII,,, | Performed by: ANESTHESIOLOGY

## 2025-08-05 PROCEDURE — 3074F SYST BP LT 130 MM HG: CPT | Mod: CPTII,,, | Performed by: ANESTHESIOLOGY

## 2025-08-05 RX ORDER — GABAPENTIN 300 MG/1
CAPSULE ORAL
COMMUNITY
End: 2025-08-05

## 2025-08-05 RX ORDER — PREGABALIN 50 MG/1
50 CAPSULE ORAL 2 TIMES DAILY
Qty: 60 CAPSULE | Refills: 1 | Status: SHIPPED | OUTPATIENT
Start: 2025-08-05

## 2025-08-05 NOTE — PROGRESS NOTES
Cora Guerrero MD        PATIENT NAME: Yuriy Díaz  : 1955  DATE: 25  MRN: [id number]      Billing Provider: Cora Guerrero MD  Level of Service:   Patient PCP Information       Provider PCP Type    Gaviota Lyon MD General            Reason for Visit / Chief Complaint: Post-op Evaluation (Post op TFESI Kristopher L4 25 C/O pain level 7, Taking pain meds takes the edge off. States procedure did not help)       Update PCP  Update Chief Complaint         History of Present Illness / Problem Focused Workflow     Yuriy Díaz presents to the clinic with Post-op Evaluation (Post op TFESI Kristopher L4 25 C/O pain level 7, Taking pain meds takes the edge off. States procedure did not help)     This is a 69-year-old male who presents to clinic today for follow up of low back pain that has been present for over 2 years now.  He denies any specific injury that precipitated his symptoms.  Rather, it had a gradual onset and has been worsening over time.  He occasionally has pain that radiates down the right lower extremity to the knee.  He states that he had a couple of injections with Dr. Winters about 2 years ago but they really did not help.  He did physical therapy for 6 months, which also did not help.  He has not undergone any surgery on his lumbar spine.  His pain gets up to 8/10 on the NRS at worst and down to 4/10 at best.  He is prescribed tramadol and occasionally takes over-the-counter Tylenol.  He tried gabapentin but it caused his feet and legs to swell to the point that he could not put shoes on.  He underwent bilateral L4 transforaminal epidural steroid injections a few weeks ago on  but states that he really did not get any relief with that.          Back Pain      Review of Systems     Review of Systems   Musculoskeletal:  Positive for back pain.   Neurological:  Positive for dizziness.   All other systems reviewed and are negative.       Medical / Social / Family History     Past Medical  History:   Diagnosis Date    Celiac disease     Cerebral palsy, unspecified     Compression fracture of L1 vertebra with delayed healing, subsequent encounter     Compression fracture of L2 vertebra with delayed healing, subsequent encounter     Cyst of right kidney     Degeneration of lumbar intervertebral disc     Diabetes mellitus, type 2     GERD (gastroesophageal reflux disease)     Hyperlipidemia     Hypertension     IgG multiple myeloma     Low back pain     Lumbago with sciatica, right side     Lumbar spinal stenosis     Monocytosis     Multiple myeloma, remission status unspecified     Neck pain     Nontraumatic compression fracture of T8 vertebra, initial encounter     LOIS (obstructive sleep apnea)     Osteoporosis of femur without pathological fracture     Other chronic pain     Personal history of colonic polyps 06/29/2022    Secondary malignancy of lumbar vertebral column     Secondary malignant neoplasm of bone     Tachycardia        Past Surgical History:   Procedure Laterality Date    BONE MARROW ASPIRATION N/A 06/27/2023    Procedure: ASPIRATION, BONE MARROW;  Surgeon: Namita Daniels MD;  Location: [facility] ENDOSCOPY;  Service: General;  Laterality: N/A;    BONE MARROW ASPIRATION N/A 10/24/2023    Procedure: ASPIRATION, BONE MARROW;  Surgeon: Namita Daniels MD;  Location: [facility] ENDOSCOPY;  Service: General;  Laterality: N/A;    BONE MARROW ASPIRATION N/A 03/12/2024    Procedure: ASPIRATION, BONE MARROW;  Surgeon: Haylie Liu MD;  Location: [facility] ENDOSCOPY;  Service: General;  Laterality: N/A;    BONE MARROW BIOPSY N/A 06/27/2023    Procedure: Biopsy-bone marrow;  Surgeon: Haylie Liu MD;  Location: [facility] ENDOSCOPY;  Service: General;  Laterality: N/A;    BONE MARROW BIOPSY N/A 10/24/2023    Procedure: Biopsy-bone marrow;  Surgeon: Namita Daniels MD;  Location: [facility] ENDOSCOPY;  Service: General;  Laterality: N/A;    BONE MARROW BIOPSY N/A 03/12/2024    Procedure: Biopsy-bone marrow;   Surgeon: Haylie Liu MD;  Location: [facility] ENDOSCOPY;  Service: General;  Laterality: N/A;    CARDIAC SURGERY  2018    CARDIAC VALVE REPLACEMENT  2018    Valve repair  milter    COLONOSCOPY  06/29/2022    EPIDURAL STEROID INJECTION INTO LUMBAR SPINE  01/2024    EYE SURGERY  9/26/2022    Cataract    INJECTION, SPINE, LUMBOSACRAL, TRANSFORAMINAL APPROACH Bilateral 7/16/2025    Procedure: INJECTION, SPINE, LUMBOSACRAL, TRANSFORAMINAL APPROACH    ////TFESI Bilateral L4;  Surgeon: Cora Guerrero MD;  Location: [facility] OR;  Service: Pain Management;  Laterality: Bilateral;  TFESI Bilateral L4       Social History  Mr. Díaz  reports that he has never smoked. He has never used smokeless tobacco. He reports that he does not drink alcohol and does not use drugs.    Family History  's Arjun family history includes Hypertension in his mother.    Medications and Allergies     Medications  Outpatient Medications Marked as Taking for the 8/5/25 encounter (Office Visit) with Cora Guerrero MD   Medication Sig Dispense Refill    acyclovir (ZOVIRAX) 400 MG tablet Take 1 tablet (400 mg total) by mouth 2 (two) times daily. 60 tablet 4    ascorbic acid, vitamin C, (VITAMIN C) 1000 MG tablet Take 1,000 mg by mouth once daily.      aspirin (ECOTRIN) 81 MG EC tablet Take 81 mg by mouth once daily.      atorvastatin (LIPITOR) 20 MG tablet Take 20 mg by mouth once daily.      calcium carbonate 195 mg calcium (500 mg) Chew Take 1 tablet by mouth once daily.      cetirizine (ZYRTEC) 10 mg Cap Take 1 capsule by mouth daily as needed.      coenzyme Q10 10 mg capsule Take 1 capsule by mouth once daily.      colesevelam (WELCHOL) 625 mg tablet Take 625 mg by mouth.      DULoxetine (CYMBALTA) 20 MG capsule Take 20 mg by mouth 2 (two) times daily.      famotidine (PEPCID) 40 MG tablet Take 40 mg by mouth every evening.      ferrous sulfate (FEOSOL) 325 mg (65 mg iron) Tab tablet Take 650 mg by mouth.      fluticasone propionate (FLONASE) 50  mcg/actuation nasal spray 1 spray.      hydrocortisone (ANUSOL-HC) 2.5 % rectal cream Place 1 applicator rectally 2 (two) times daily. 28 g 2    lenalidomide 10 mg Cap Take 1 capsule by mouth.      LIDOcaine (LIDODERM) 5 % Place 2 patches onto the skin once daily. Remove & Discard patch within 12 hours or as directed by MD Doyle patch 0    megestroL (MEGACE) 400 mg/10 mL (40 mg/mL) Susp Take 10 mLs (400 mg total) by mouth once daily. 300 mL 3    metFORMIN (GLUCOPHAGE) 500 MG tablet Take 1 tablet (500 mg total) by mouth 2 (two) times daily with meals. 180 tablet 3    metoprolol succinate (TOPROL-XL) 50 MG 24 hr tablet Take 50 mg by mouth once daily.      MIRALAX 17 gram/dose powder Take 17 g by mouth.      mirtazapine (REMERON) 7.5 MG Tab Take 1 tablet by mouth every evening.      multivit with min-folic acid 0.4 mg Tab Take 1 tablet by mouth once daily.      multivitamin with minerals tablet Take 1 tablet by mouth every morning.      omega 3-dha-epa-fish oil 300 mg (120 mg- 180mg)-1,000 mg Cap Take 500 mg by mouth.      omega-3 fatty acids/fish oil (FISH OIL-OMEGA-3 FATTY ACIDS) 300-1,000 mg capsule Take 2 g by mouth.      sulfamethoxazole-trimethoprim 800-160mg (BACTRIM DS) 800-160 mg Tab Take 1 tablet by mouth on MWF of each week 48 tablet 3    tamsulosin (FLOMAX) 0.4 mg Cap Take 1 capsule by mouth.      traMADoL 100 mg Tab Take 100 mg by mouth 2 (two) times daily as needed (pain). 30 tablet 0    [DISCONTINUED] gabapentin (NEURONTIN) 300 MG capsule TAKE 1 CAPSULE BY MOUTH THREE TIMES DAILY (300MG NIGHTLY THEN INCREASE TO TWICE DAILY AS DIRECTED) Oral; Duration: 30         Allergies  Review of patient's allergies indicates:   Allergen Reactions    Wheat Other (See Comments)       Physical Examination     Vitals:    08/05/25 1140   BP: 110/68   Pulse: (!) 57     Pain Disability Index (PDI): 33       Spine Musculoskeletal Exam    Gait    Gait is normal.    Inspection    Thoracolumbar    Thoracolumbar inspection is  normal.    Palpation    Thoracolumbar    Tenderness: present      Paraspinous: right and left    Right      Masses: none      Spasms: none      Crepitus: none      Muscle tone: normal    Left      Masses: none      Spasms: none      Crepitus: none      Muscle tone: normal    Range of Motion    Thoracolumbar        Thoracolumbar range of motion additional comments: Limited range of motion in the lumbar spine due to pain.    Strength    Thoracolumbar    Thoracolumbar motor exam is normal.       Sensory    Thoracolumbar    Thoracolumbar sensation is normal.    General      Constitutional: appears stated age, well-developed and well-nourished    Scleral icterus: no    Labored breathing: no    Psychiatric: normal mood and affect and no acute distress    Neurological: alert and oriented x3    Skin: intact    Lymphadenopathy: none  CV: extremities warm, well-perfused  Resp: nonlabored breathing       Assessment and Plan (including Health Maintenance)      Problem List  Smart Sets  Document Outside HM   :    Plan:   Chronic back pain greater than 3 months duration  -     pregabalin (LYRICA) 50 MG capsule; Take 1 capsule (50 mg total) by mouth 2 (two) times daily.  Dispense: 60 capsule; Refill: 1       I will start him on Lyrica and will titrate up the dose as tolerated for the neuropathic component of his pain.  He does not wish to try any more injections at this point.    Problem List Items Addressed This Visit          Orthopedic    Chronic back pain greater than 3 months duration - Primary    Relevant Medications    pregabalin (LYRICA) 50 MG capsule         Future Appointments   Date Time Provider Department Center   8/19/2025 12:30 PM NURSE, [facility] HEMATOLOGY ONCOLOGY [facility] En    8/19/2025  1:30 PM Nuzhat Lewis FNP [facility]    8/19/2025  2:00 PM INFUSION ROOM 533, [facility] CHEMOTHERAPY INFUSION [facility] CHEMO En    10/15/2025  8:00 AM [facility] XR10 DEXA1 350 LB LIMIT [facility] XRAY En  Un   11/6/2025  1:45 PM Cora Guerrero MD [facility] PAINMD En [place]        There are no Patient Instructions on file for this visit.  No follow-ups on file.     Signature:  Cora Guerrero MD      Date of encounter: 8/5/25

## 2025-08-14 DIAGNOSIS — M48.061 SPINAL STENOSIS OF LUMBAR REGION, UNSPECIFIED WHETHER NEUROGENIC CLAUDICATION PRESENT: ICD-10-CM

## 2025-08-15 DIAGNOSIS — R41.9 UNSPECIFIED SYMPTOMS AND SIGNS INVOLVING COGNITIVE FUNCTIONS AND AWARENESS: ICD-10-CM

## 2025-08-15 DIAGNOSIS — R20.9 UNSPECIFIED DISTURBANCES OF SKIN SENSATION: ICD-10-CM

## 2025-08-15 DIAGNOSIS — C90.00 IGG MULTIPLE MYELOMA: Primary | ICD-10-CM

## 2025-08-15 RX ORDER — TRAMADOL HYDROCHLORIDE 100 MG/1
100 TABLET, COATED ORAL 2 TIMES DAILY PRN
Qty: 30 TABLET | Refills: 0 | Status: SHIPPED | OUTPATIENT
Start: 2025-08-15

## 2025-08-19 ENCOUNTER — OFFICE VISIT (OUTPATIENT)
Dept: HEMATOLOGY/ONCOLOGY | Facility: CLINIC | Age: 70
End: 2025-08-19
Attending: INTERNAL MEDICINE
Payer: MEDICARE

## 2025-08-19 ENCOUNTER — INFUSION (OUTPATIENT)
Dept: INFUSION THERAPY | Facility: HOSPITAL | Age: 70
End: 2025-08-19
Attending: INTERNAL MEDICINE
Payer: MEDICARE

## 2025-08-19 VITALS
HEIGHT: 68 IN | WEIGHT: 156.63 LBS | RESPIRATION RATE: 16 BRPM | SYSTOLIC BLOOD PRESSURE: 94 MMHG | BODY MASS INDEX: 23.74 KG/M2 | DIASTOLIC BLOOD PRESSURE: 56 MMHG | TEMPERATURE: 99 F | OXYGEN SATURATION: 100 % | HEART RATE: 59 BPM

## 2025-08-19 DIAGNOSIS — M48.061 SPINAL STENOSIS OF LUMBAR REGION, UNSPECIFIED WHETHER NEUROGENIC CLAUDICATION PRESENT: ICD-10-CM

## 2025-08-19 DIAGNOSIS — C90.00 IGG MULTIPLE MYELOMA: ICD-10-CM

## 2025-08-19 DIAGNOSIS — C90.00 IGG MULTIPLE MYELOMA: Primary | ICD-10-CM

## 2025-08-19 DIAGNOSIS — R20.9 UNSPECIFIED DISTURBANCES OF SKIN SENSATION: ICD-10-CM

## 2025-08-19 DIAGNOSIS — R41.9 UNSPECIFIED SYMPTOMS AND SIGNS INVOLVING COGNITIVE FUNCTIONS AND AWARENESS: ICD-10-CM

## 2025-08-19 DIAGNOSIS — D64.9 ANEMIA, UNSPECIFIED TYPE: ICD-10-CM

## 2025-08-19 DIAGNOSIS — Z79.899 DRUG-INDUCED IMMUNODEFICIENCY: ICD-10-CM

## 2025-08-19 DIAGNOSIS — D84.821 DRUG-INDUCED IMMUNODEFICIENCY: ICD-10-CM

## 2025-08-19 DIAGNOSIS — Z94.84 H/O AUTOLOGOUS STEM CELL TRANSPLANT: ICD-10-CM

## 2025-08-19 DIAGNOSIS — C79.51 SECONDARY MALIGNANT NEOPLASM OF BONE: ICD-10-CM

## 2025-08-19 LAB
ALBUMIN SERPL-MCNC: 2.7 G/DL (ref 3.4–4.8)
ALBUMIN/GLOB SERPL: 0.6 RATIO (ref 1.1–2)
ALP SERPL-CCNC: 74 UNIT/L (ref 40–150)
ALT SERPL-CCNC: 28 UNIT/L (ref 0–55)
ANION GAP SERPL CALC-SCNC: 6 MEQ/L
AST SERPL-CCNC: 18 UNIT/L (ref 11–45)
BASOPHILS # BLD AUTO: 0.06 X10(3)/MCL
BASOPHILS NFR BLD AUTO: 1.4 %
BILIRUB SERPL-MCNC: 0.3 MG/DL
BUN SERPL-MCNC: 13.4 MG/DL (ref 8.4–25.7)
CALCIUM SERPL-MCNC: 8.4 MG/DL (ref 8.8–10)
CHLORIDE SERPL-SCNC: 109 MMOL/L (ref 98–107)
CO2 SERPL-SCNC: 21 MMOL/L (ref 23–31)
CREAT SERPL-MCNC: 1.4 MG/DL (ref 0.72–1.25)
CREAT/UREA NIT SERPL: 10
EOSINOPHIL # BLD AUTO: 0.18 X10(3)/MCL (ref 0–0.9)
EOSINOPHIL NFR BLD AUTO: 4.1 %
ERYTHROCYTE [DISTWIDTH] IN BLOOD BY AUTOMATED COUNT: 17.2 % (ref 11.5–17)
FERRITIN SERPL-MCNC: 390.19 NG/ML (ref 21.81–274.66)
FOLATE SERPL-MCNC: 15.9 NG/ML (ref 7–31.4)
GFR SERPLBLD CREATININE-BSD FMLA CKD-EPI: 55 ML/MIN/1.73/M2
GLOBULIN SER-MCNC: 4.2 GM/DL (ref 2.4–3.5)
GLUCOSE SERPL-MCNC: 143 MG/DL (ref 82–115)
HAPTOGLOB SERPL-MCNC: 433 MG/DL (ref 40–368)
HCT VFR BLD AUTO: 26.9 % (ref 42–52)
HGB BLD-MCNC: 8.7 G/DL (ref 14–18)
IMM GRANULOCYTES # BLD AUTO: 0.01 X10(3)/MCL (ref 0–0.04)
IMM GRANULOCYTES NFR BLD AUTO: 0.2 %
IRON SATN MFR SERPL: 24 % (ref 20–50)
IRON SERPL-MCNC: 60 UG/DL (ref 65–175)
LDH SERPL-CCNC: 115 U/L (ref 125–220)
LYMPHOCYTES # BLD AUTO: 2.04 X10(3)/MCL (ref 0.6–4.6)
LYMPHOCYTES NFR BLD AUTO: 46.3 %
MAGNESIUM SERPL-MCNC: 2 MG/DL (ref 1.6–2.6)
MCH RBC QN AUTO: 34.5 PG (ref 27–31)
MCHC RBC AUTO-ENTMCNC: 32.3 G/DL (ref 33–36)
MCV RBC AUTO: 106.7 FL (ref 80–94)
MONOCYTES # BLD AUTO: 0.75 X10(3)/MCL (ref 0.1–1.3)
MONOCYTES NFR BLD AUTO: 17 %
NEUTROPHILS # BLD AUTO: 1.37 X10(3)/MCL (ref 2.1–9.2)
NEUTROPHILS NFR BLD AUTO: 31 %
NRBC BLD AUTO-RTO: 0 %
PLATELET # BLD AUTO: 133 X10(3)/MCL (ref 130–400)
PMV BLD AUTO: 10.7 FL (ref 7.4–10.4)
POTASSIUM SERPL-SCNC: 3.7 MMOL/L (ref 3.5–5.1)
PROT SERPL-MCNC: 6.9 GM/DL (ref 5.8–7.6)
RBC # BLD AUTO: 2.52 X10(6)/MCL (ref 4.7–6.1)
RET# (OHS): 0.06 X10E6/UL (ref 0.03–0.1)
RETICULOCYTE COUNT AUTOMATED (OLG): 2.29 % (ref 1.1–2.1)
SODIUM SERPL-SCNC: 136 MMOL/L (ref 136–145)
TIBC SERPL-MCNC: 188 UG/DL (ref 60–240)
TIBC SERPL-MCNC: 248 UG/DL (ref 250–450)
TRANSFERRIN SERPL-MCNC: 221 MG/DL (ref 163–344)
WBC # BLD AUTO: 4.41 X10(3)/MCL (ref 4.5–11.5)

## 2025-08-19 PROCEDURE — 3044F HG A1C LEVEL LT 7.0%: CPT | Mod: CPTII,,,

## 2025-08-19 PROCEDURE — 3066F NEPHROPATHY DOC TX: CPT | Mod: CPTII,,,

## 2025-08-19 PROCEDURE — 83540 ASSAY OF IRON: CPT

## 2025-08-19 PROCEDURE — 99215 OFFICE O/P EST HI 40 MIN: CPT | Mod: S$PBB,,,

## 2025-08-19 PROCEDURE — 1125F AMNT PAIN NOTED PAIN PRSNT: CPT | Mod: CPTII,,,

## 2025-08-19 PROCEDURE — 3078F DIAST BP <80 MM HG: CPT | Mod: CPTII,,,

## 2025-08-19 PROCEDURE — 82746 ASSAY OF FOLIC ACID SERUM: CPT

## 2025-08-19 PROCEDURE — 3008F BODY MASS INDEX DOCD: CPT | Mod: CPTII,,,

## 2025-08-19 PROCEDURE — 83010 ASSAY OF HAPTOGLOBIN QUANT: CPT

## 2025-08-19 PROCEDURE — 3074F SYST BP LT 130 MM HG: CPT | Mod: CPTII,,,

## 2025-08-19 PROCEDURE — 63600175 PHARM REV CODE 636 W HCPCS

## 2025-08-19 PROCEDURE — 96374 THER/PROPH/DIAG INJ IV PUSH: CPT

## 2025-08-19 PROCEDURE — 25000003 PHARM REV CODE 250

## 2025-08-19 PROCEDURE — 85045 AUTOMATED RETICULOCYTE COUNT: CPT

## 2025-08-19 PROCEDURE — 3288F FALL RISK ASSESSMENT DOCD: CPT | Mod: CPTII,,,

## 2025-08-19 PROCEDURE — 80053 COMPREHEN METABOLIC PANEL: CPT

## 2025-08-19 PROCEDURE — 83615 LACTATE (LD) (LDH) ENZYME: CPT

## 2025-08-19 PROCEDURE — 1159F MED LIST DOCD IN RCRD: CPT | Mod: CPTII,,,

## 2025-08-19 PROCEDURE — 83735 ASSAY OF MAGNESIUM: CPT

## 2025-08-19 PROCEDURE — 1101F PT FALLS ASSESS-DOCD LE1/YR: CPT | Mod: CPTII,,,

## 2025-08-19 PROCEDURE — 82728 ASSAY OF FERRITIN: CPT

## 2025-08-19 PROCEDURE — 3060F POS MICROALBUMINURIA REV: CPT | Mod: CPTII,,,

## 2025-08-19 PROCEDURE — 99215 OFFICE O/P EST HI 40 MIN: CPT | Mod: PBBFAC,25

## 2025-08-19 PROCEDURE — 1160F RVW MEDS BY RX/DR IN RCRD: CPT | Mod: CPTII,,,

## 2025-08-19 PROCEDURE — 85025 COMPLETE CBC W/AUTO DIFF WBC: CPT

## 2025-08-19 RX ORDER — PANTOPRAZOLE SODIUM 40 MG/1
TABLET, DELAYED RELEASE ORAL
COMMUNITY

## 2025-08-19 RX ORDER — HEPARIN 100 UNIT/ML
500 SYRINGE INTRAVENOUS
Status: CANCELLED | OUTPATIENT
Start: 2025-08-19

## 2025-08-19 RX ORDER — SODIUM CHLORIDE 0.9 % (FLUSH) 0.9 %
10 SYRINGE (ML) INJECTION
Status: DISCONTINUED | OUTPATIENT
Start: 2025-08-19 | End: 2025-08-19 | Stop reason: HOSPADM

## 2025-08-19 RX ORDER — ZOLEDRONIC ACID 0.04 MG/ML
4 INJECTION, SOLUTION INTRAVENOUS
Status: CANCELLED | OUTPATIENT
Start: 2025-08-19 | End: 2025-08-19

## 2025-08-19 RX ORDER — ALBUTEROL SULFATE 90 UG/1
INHALANT RESPIRATORY (INHALATION)
COMMUNITY

## 2025-08-19 RX ORDER — HEPARIN 100 UNIT/ML
500 SYRINGE INTRAVENOUS
Status: DISCONTINUED | OUTPATIENT
Start: 2025-08-19 | End: 2025-08-19 | Stop reason: HOSPADM

## 2025-08-19 RX ORDER — SODIUM CHLORIDE 0.9 % (FLUSH) 0.9 %
10 SYRINGE (ML) INJECTION
Status: CANCELLED | OUTPATIENT
Start: 2025-08-19

## 2025-08-19 RX ORDER — DULOXETIN HYDROCHLORIDE 30 MG/1
30 CAPSULE, DELAYED RELEASE ORAL
COMMUNITY
Start: 2025-07-24

## 2025-08-19 RX ADMIN — SODIUM CHLORIDE: 9 INJECTION, SOLUTION INTRAVENOUS at 02:08

## 2025-08-19 RX ADMIN — ZOLEDRONIC ACID 3.5 MG: 4 INJECTION, SOLUTION, CONCENTRATE INTRAVENOUS at 02:08

## 2025-08-26 ENCOUNTER — TELEPHONE (OUTPATIENT)
Dept: HEMATOLOGY/ONCOLOGY | Facility: CLINIC | Age: 70
End: 2025-08-26
Payer: MEDICARE

## 2025-09-02 DIAGNOSIS — E11.9 TYPE 2 DIABETES MELLITUS WITHOUT COMPLICATION, WITHOUT LONG-TERM CURRENT USE OF INSULIN: ICD-10-CM

## 2025-09-03 RX ORDER — METFORMIN HYDROCHLORIDE 500 MG/1
500 TABLET ORAL 2 TIMES DAILY WITH MEALS
Qty: 180 TABLET | Refills: 3 | Status: SHIPPED | OUTPATIENT
Start: 2025-09-03

## (undated) DEVICE — GLOVE SIGNATURE MICRO LTX 6.5

## (undated) DEVICE — NDL SPINAL 22GA 3.5 IN QUINCKE

## (undated) DEVICE — SYR 3ML LL 18GA 1.5IN

## (undated) DEVICE — ADAPTER DUAL NSL LUER M-M 7FT

## (undated) DEVICE — Device

## (undated) DEVICE — CONTRAST ISOVUE M 200 20ML VIL

## (undated) DEVICE — DRAPE UTILITY W/ TAPE 20X30IN

## (undated) DEVICE — NDL CHIBA DCHN-20-15.0

## (undated) DEVICE — DRAPE MEDIUM SHEET 40X70IN

## (undated) DEVICE — TRAY JAMSHIDI BONE MAR 11GX4

## (undated) DEVICE — NDL FLTR 5MCRN BLNT TIP 18GX1

## (undated) DEVICE — NDL SYR 10ML 18X1.5 LL BLUNT

## (undated) DEVICE — NDL HYPO REG 25G X 1 1/2

## (undated) DEVICE — SET SMARTSITE EXT SMALLBORE NF

## (undated) DEVICE — CHLORAPREP 10.5 ML APPLICATOR